# Patient Record
Sex: FEMALE | Race: WHITE | NOT HISPANIC OR LATINO
[De-identification: names, ages, dates, MRNs, and addresses within clinical notes are randomized per-mention and may not be internally consistent; named-entity substitution may affect disease eponyms.]

---

## 2017-03-07 ENCOUNTER — NON-APPOINTMENT (OUTPATIENT)
Age: 62
End: 2017-03-07

## 2017-03-07 ENCOUNTER — APPOINTMENT (OUTPATIENT)
Dept: CARDIOLOGY | Facility: CLINIC | Age: 62
End: 2017-03-07

## 2017-03-07 VITALS
HEIGHT: 64 IN | HEART RATE: 80 BPM | DIASTOLIC BLOOD PRESSURE: 89 MMHG | BODY MASS INDEX: 19.12 KG/M2 | SYSTOLIC BLOOD PRESSURE: 142 MMHG | OXYGEN SATURATION: 100 % | WEIGHT: 112 LBS

## 2017-03-07 DIAGNOSIS — Z01.810 ENCOUNTER FOR PREPROCEDURAL CARDIOVASCULAR EXAMINATION: ICD-10-CM

## 2017-03-07 DIAGNOSIS — N18.9 CHRONIC KIDNEY DISEASE, UNSPECIFIED: ICD-10-CM

## 2017-03-10 ENCOUNTER — OUTPATIENT (OUTPATIENT)
Dept: OUTPATIENT SERVICES | Facility: HOSPITAL | Age: 62
LOS: 1 days | End: 2017-03-10
Payer: COMMERCIAL

## 2017-03-10 VITALS
WEIGHT: 115.08 LBS | HEIGHT: 63 IN | SYSTOLIC BLOOD PRESSURE: 121 MMHG | RESPIRATION RATE: 17 BRPM | HEART RATE: 100 BPM | DIASTOLIC BLOOD PRESSURE: 81 MMHG | OXYGEN SATURATION: 98 % | TEMPERATURE: 98 F

## 2017-03-10 DIAGNOSIS — Z90.49 ACQUIRED ABSENCE OF OTHER SPECIFIED PARTS OF DIGESTIVE TRACT: Chronic | ICD-10-CM

## 2017-03-10 DIAGNOSIS — M19.011 PRIMARY OSTEOARTHRITIS, RIGHT SHOULDER: Chronic | ICD-10-CM

## 2017-03-10 DIAGNOSIS — I70.90 UNSPECIFIED ATHEROSCLEROSIS: ICD-10-CM

## 2017-03-10 DIAGNOSIS — Z98.51 TUBAL LIGATION STATUS: Chronic | ICD-10-CM

## 2017-03-10 DIAGNOSIS — I70.211 ATHEROSCLEROSIS OF NATIVE ARTERIES OF EXTREMITIES WITH INTERMITTENT CLAUDICATION, RIGHT LEG: ICD-10-CM

## 2017-03-10 DIAGNOSIS — M19.90 UNSPECIFIED OSTEOARTHRITIS, UNSPECIFIED SITE: ICD-10-CM

## 2017-03-10 DIAGNOSIS — I10 ESSENTIAL (PRIMARY) HYPERTENSION: ICD-10-CM

## 2017-03-10 DIAGNOSIS — G25.81 RESTLESS LEGS SYNDROME: ICD-10-CM

## 2017-03-10 DIAGNOSIS — M06.9 RHEUMATOID ARTHRITIS, UNSPECIFIED: Chronic | ICD-10-CM

## 2017-03-10 DIAGNOSIS — F17.200 NICOTINE DEPENDENCE, UNSPECIFIED, UNCOMPLICATED: ICD-10-CM

## 2017-03-10 DIAGNOSIS — K27.1 ACUTE PEPTIC ULCER, SITE UNSPECIFIED, WITH PERFORATION: Chronic | ICD-10-CM

## 2017-03-10 DIAGNOSIS — Z01.818 ENCOUNTER FOR OTHER PREPROCEDURAL EXAMINATION: ICD-10-CM

## 2017-03-10 LAB
ALBUMIN SERPL ELPH-MCNC: 4.4 G/DL — SIGNIFICANT CHANGE UP (ref 3.3–5)
ALP SERPL-CCNC: 74 U/L — SIGNIFICANT CHANGE UP (ref 40–120)
ALT FLD-CCNC: 15 U/L — SIGNIFICANT CHANGE UP (ref 10–45)
ANION GAP SERPL CALC-SCNC: 10 MMOL/L — SIGNIFICANT CHANGE UP (ref 5–17)
AST SERPL-CCNC: 30 U/L — SIGNIFICANT CHANGE UP (ref 10–40)
BILIRUB SERPL-MCNC: 0.2 MG/DL — SIGNIFICANT CHANGE UP (ref 0.2–1.2)
BLD GP AB SCN SERPL QL: NEGATIVE — SIGNIFICANT CHANGE UP
BUN SERPL-MCNC: 31 MG/DL — HIGH (ref 7–23)
CALCIUM SERPL-MCNC: 9.7 MG/DL — SIGNIFICANT CHANGE UP (ref 8.4–10.5)
CHLORIDE SERPL-SCNC: 105 MMOL/L — SIGNIFICANT CHANGE UP (ref 96–108)
CO2 SERPL-SCNC: 22 MMOL/L — SIGNIFICANT CHANGE UP (ref 22–31)
CREAT SERPL-MCNC: 2.03 MG/DL — HIGH (ref 0.5–1.3)
GLUCOSE SERPL-MCNC: 83 MG/DL — SIGNIFICANT CHANGE UP (ref 70–99)
HCT VFR BLD CALC: 35.2 % — SIGNIFICANT CHANGE UP (ref 34.5–45)
HGB BLD-MCNC: 11.4 G/DL — LOW (ref 11.5–15.5)
MCHC RBC-ENTMCNC: 31.6 PG — SIGNIFICANT CHANGE UP (ref 27–34)
MCHC RBC-ENTMCNC: 32.4 GM/DL — SIGNIFICANT CHANGE UP (ref 32–36)
MCV RBC AUTO: 97.5 FL — SIGNIFICANT CHANGE UP (ref 80–100)
PLATELET # BLD AUTO: 259 K/UL — SIGNIFICANT CHANGE UP (ref 150–400)
POTASSIUM SERPL-MCNC: 4.5 MMOL/L — SIGNIFICANT CHANGE UP (ref 3.5–5.3)
POTASSIUM SERPL-SCNC: 4.5 MMOL/L — SIGNIFICANT CHANGE UP (ref 3.5–5.3)
PROT SERPL-MCNC: 7.1 G/DL — SIGNIFICANT CHANGE UP (ref 6–8.3)
RBC # BLD: 3.61 M/UL — LOW (ref 3.8–5.2)
RBC # FLD: 13.4 % — SIGNIFICANT CHANGE UP (ref 10.3–14.5)
RH IG SCN BLD-IMP: POSITIVE — SIGNIFICANT CHANGE UP
SODIUM SERPL-SCNC: 137 MMOL/L — SIGNIFICANT CHANGE UP (ref 135–145)
WBC # BLD: 7.87 K/UL — SIGNIFICANT CHANGE UP (ref 3.8–10.5)
WBC # FLD AUTO: 7.87 K/UL — SIGNIFICANT CHANGE UP (ref 3.8–10.5)

## 2017-03-10 PROCEDURE — 86900 BLOOD TYPING SEROLOGIC ABO: CPT

## 2017-03-10 PROCEDURE — 86901 BLOOD TYPING SEROLOGIC RH(D): CPT

## 2017-03-10 PROCEDURE — 80053 COMPREHEN METABOLIC PANEL: CPT

## 2017-03-10 PROCEDURE — 85027 COMPLETE CBC AUTOMATED: CPT

## 2017-03-10 PROCEDURE — G0463: CPT

## 2017-03-10 PROCEDURE — 86850 RBC ANTIBODY SCREEN: CPT

## 2017-03-10 NOTE — H&P PST ADULT - PMH
Arthritis  Cervical Spine  and Hands  Hypertension    Restless leg syndrome  Especially with General Anesthesia

## 2017-03-10 NOTE — H&P PST ADULT - ATTENDING COMMENTS
We plan right femoral endarterectomy for disabling claudication.  All risks and alternatives were reiterated to the Juan Luiss who agree with this plan.

## 2017-03-10 NOTE — H&P PST ADULT - NSANTHOSAYNRD_GEN_A_CORE
No. SEDRICK screening performed.  STOP BANG Legend: 0-2 = LOW Risk; 3-4 = INTERMEDIATE Risk; 5-8 = HIGH Risk

## 2017-03-10 NOTE — H&P PST ADULT - ASSESSMENT
Atherosclerosis of native arteries of extremities with intermittent claudication Right Leg.  Scheduled for a Right Femoral Endarterectomy Atherosclerosis of native arteries of extremities with intermittent claudication Right Leg.  Scheduled for a Right Femoral Endarterectomy.  Pt presently taking PLAVIX.  Dr Bolton instructed the patient to stop the PLAVIX  a week prior to the surgery. She was instructed to continue the Nicotine Patch and remove the day of surgery.

## 2017-03-10 NOTE — H&P PST ADULT - PSH
Acute peptic ulcer with perforation  1980  Osteoarthritis of right shoulder region  Right Shoulder Arthroscopy  2007  Rheumatoid arthritis of carpometacarpal joint of thumb  Total Joint Replacement of Left Thumb  S/P appendectomy  2014  S/P tubal ligation  1986

## 2017-03-18 ENCOUNTER — APPOINTMENT (OUTPATIENT)
Dept: CARDIOLOGY | Facility: CLINIC | Age: 62
End: 2017-03-18

## 2017-03-20 ENCOUNTER — APPOINTMENT (OUTPATIENT)
Dept: CARDIOLOGY | Facility: CLINIC | Age: 62
End: 2017-03-20

## 2017-03-23 ENCOUNTER — RESULT REVIEW (OUTPATIENT)
Age: 62
End: 2017-03-23

## 2017-03-24 ENCOUNTER — INPATIENT (INPATIENT)
Facility: HOSPITAL | Age: 62
LOS: 0 days | Discharge: HOME | DRG: 254 | End: 2017-03-25
Attending: SURGERY | Admitting: SURGERY
Payer: COMMERCIAL

## 2017-03-24 ENCOUNTER — TRANSCRIPTION ENCOUNTER (OUTPATIENT)
Age: 62
End: 2017-03-24

## 2017-03-24 VITALS
OXYGEN SATURATION: 98 % | TEMPERATURE: 98 F | WEIGHT: 115.08 LBS | SYSTOLIC BLOOD PRESSURE: 152 MMHG | HEART RATE: 88 BPM | HEIGHT: 63 IN | RESPIRATION RATE: 20 BRPM | DIASTOLIC BLOOD PRESSURE: 82 MMHG

## 2017-03-24 DIAGNOSIS — I70.211 ATHEROSCLEROSIS OF NATIVE ARTERIES OF EXTREMITIES WITH INTERMITTENT CLAUDICATION, RIGHT LEG: ICD-10-CM

## 2017-03-24 DIAGNOSIS — M06.9 RHEUMATOID ARTHRITIS, UNSPECIFIED: Chronic | ICD-10-CM

## 2017-03-24 DIAGNOSIS — M19.011 PRIMARY OSTEOARTHRITIS, RIGHT SHOULDER: Chronic | ICD-10-CM

## 2017-03-24 DIAGNOSIS — K27.1 ACUTE PEPTIC ULCER, SITE UNSPECIFIED, WITH PERFORATION: Chronic | ICD-10-CM

## 2017-03-24 DIAGNOSIS — Z90.49 ACQUIRED ABSENCE OF OTHER SPECIFIED PARTS OF DIGESTIVE TRACT: Chronic | ICD-10-CM

## 2017-03-24 DIAGNOSIS — Z98.51 TUBAL LIGATION STATUS: Chronic | ICD-10-CM

## 2017-03-24 LAB — RH IG SCN BLD-IMP: POSITIVE — SIGNIFICANT CHANGE UP

## 2017-03-24 PROCEDURE — 99223 1ST HOSP IP/OBS HIGH 75: CPT

## 2017-03-24 PROCEDURE — 88311 DECALCIFY TISSUE: CPT | Mod: 26

## 2017-03-24 PROCEDURE — 88304 TISSUE EXAM BY PATHOLOGIST: CPT | Mod: 26

## 2017-03-24 RX ORDER — CEFAZOLIN SODIUM 1 G
2000 VIAL (EA) INJECTION EVERY 12 HOURS
Qty: 0 | Refills: 0 | Status: DISCONTINUED | OUTPATIENT
Start: 2017-03-24 | End: 2017-03-24

## 2017-03-24 RX ORDER — MORPHINE SULFATE 50 MG/1
2 CAPSULE, EXTENDED RELEASE ORAL EVERY 4 HOURS
Qty: 0 | Refills: 0 | Status: DISCONTINUED | OUTPATIENT
Start: 2017-03-24 | End: 2017-03-25

## 2017-03-24 RX ORDER — SODIUM CHLORIDE 9 MG/ML
3 INJECTION INTRAMUSCULAR; INTRAVENOUS; SUBCUTANEOUS EVERY 8 HOURS
Qty: 0 | Refills: 0 | Status: DISCONTINUED | OUTPATIENT
Start: 2017-03-24 | End: 2017-03-24

## 2017-03-24 RX ORDER — CLOPIDOGREL BISULFATE 75 MG/1
75 TABLET, FILM COATED ORAL DAILY
Qty: 0 | Refills: 0 | Status: DISCONTINUED | OUTPATIENT
Start: 2017-03-25 | End: 2017-03-25

## 2017-03-24 RX ORDER — SIMVASTATIN 20 MG/1
10 TABLET, FILM COATED ORAL AT BEDTIME
Qty: 0 | Refills: 0 | Status: DISCONTINUED | OUTPATIENT
Start: 2017-03-24 | End: 2017-03-25

## 2017-03-24 RX ORDER — CEFAZOLIN SODIUM 1 G
2000 VIAL (EA) INJECTION EVERY 8 HOURS
Qty: 0 | Refills: 0 | Status: DISCONTINUED | OUTPATIENT
Start: 2017-03-24 | End: 2017-03-24

## 2017-03-24 RX ORDER — PRAMIPEXOLE DIHYDROCHLORIDE 0.12 MG/1
0.25 TABLET ORAL AT BEDTIME
Qty: 0 | Refills: 0 | Status: DISCONTINUED | OUTPATIENT
Start: 2017-03-24 | End: 2017-03-25

## 2017-03-24 RX ORDER — HYDROMORPHONE HYDROCHLORIDE 2 MG/ML
0.5 INJECTION INTRAMUSCULAR; INTRAVENOUS; SUBCUTANEOUS
Qty: 0 | Refills: 0 | Status: DISCONTINUED | OUTPATIENT
Start: 2017-03-24 | End: 2017-03-24

## 2017-03-24 RX ORDER — LOSARTAN POTASSIUM 100 MG/1
25 TABLET, FILM COATED ORAL DAILY
Qty: 0 | Refills: 0 | Status: DISCONTINUED | OUTPATIENT
Start: 2017-03-24 | End: 2017-03-24

## 2017-03-24 RX ORDER — LOSARTAN POTASSIUM 100 MG/1
25 TABLET, FILM COATED ORAL DAILY
Qty: 0 | Refills: 0 | Status: DISCONTINUED | OUTPATIENT
Start: 2017-03-24 | End: 2017-03-25

## 2017-03-24 RX ORDER — SODIUM CHLORIDE 9 MG/ML
1000 INJECTION INTRAMUSCULAR; INTRAVENOUS; SUBCUTANEOUS
Qty: 0 | Refills: 0 | Status: DISCONTINUED | OUTPATIENT
Start: 2017-03-24 | End: 2017-03-25

## 2017-03-24 RX ORDER — HEPARIN SODIUM 5000 [USP'U]/ML
5000 INJECTION INTRAVENOUS; SUBCUTANEOUS EVERY 8 HOURS
Qty: 0 | Refills: 0 | Status: DISCONTINUED | OUTPATIENT
Start: 2017-03-24 | End: 2017-03-25

## 2017-03-24 RX ORDER — SODIUM CHLORIDE 9 MG/ML
3 INJECTION INTRAMUSCULAR; INTRAVENOUS; SUBCUTANEOUS EVERY 8 HOURS
Qty: 0 | Refills: 0 | Status: DISCONTINUED | OUTPATIENT
Start: 2017-03-24 | End: 2017-03-25

## 2017-03-24 RX ADMIN — MORPHINE SULFATE 2 MILLIGRAM(S): 50 CAPSULE, EXTENDED RELEASE ORAL at 18:46

## 2017-03-24 RX ADMIN — SODIUM CHLORIDE 3 MILLILITER(S): 9 INJECTION INTRAMUSCULAR; INTRAVENOUS; SUBCUTANEOUS at 21:39

## 2017-03-24 RX ADMIN — HYDROMORPHONE HYDROCHLORIDE 0.5 MILLIGRAM(S): 2 INJECTION INTRAMUSCULAR; INTRAVENOUS; SUBCUTANEOUS at 10:45

## 2017-03-24 RX ADMIN — HEPARIN SODIUM 5000 UNIT(S): 5000 INJECTION INTRAVENOUS; SUBCUTANEOUS at 21:33

## 2017-03-24 RX ADMIN — SODIUM CHLORIDE 75 MILLILITER(S): 9 INJECTION INTRAMUSCULAR; INTRAVENOUS; SUBCUTANEOUS at 13:38

## 2017-03-24 RX ADMIN — SODIUM CHLORIDE 75 MILLILITER(S): 9 INJECTION INTRAMUSCULAR; INTRAVENOUS; SUBCUTANEOUS at 21:34

## 2017-03-24 RX ADMIN — MORPHINE SULFATE 2 MILLIGRAM(S): 50 CAPSULE, EXTENDED RELEASE ORAL at 19:02

## 2017-03-24 RX ADMIN — HYDROMORPHONE HYDROCHLORIDE 0.5 MILLIGRAM(S): 2 INJECTION INTRAMUSCULAR; INTRAVENOUS; SUBCUTANEOUS at 14:40

## 2017-03-24 RX ADMIN — PRAMIPEXOLE DIHYDROCHLORIDE 0.25 MILLIGRAM(S): 0.12 TABLET ORAL at 21:33

## 2017-03-24 RX ADMIN — HYDROMORPHONE HYDROCHLORIDE 0.5 MILLIGRAM(S): 2 INJECTION INTRAMUSCULAR; INTRAVENOUS; SUBCUTANEOUS at 11:02

## 2017-03-24 RX ADMIN — HYDROMORPHONE HYDROCHLORIDE 0.5 MILLIGRAM(S): 2 INJECTION INTRAMUSCULAR; INTRAVENOUS; SUBCUTANEOUS at 14:55

## 2017-03-24 RX ADMIN — SODIUM CHLORIDE 3 MILLILITER(S): 9 INJECTION INTRAMUSCULAR; INTRAVENOUS; SUBCUTANEOUS at 06:43

## 2017-03-24 RX ADMIN — HYDROMORPHONE HYDROCHLORIDE 0.5 MILLIGRAM(S): 2 INJECTION INTRAMUSCULAR; INTRAVENOUS; SUBCUTANEOUS at 10:27

## 2017-03-24 RX ADMIN — HYDROMORPHONE HYDROCHLORIDE 0.5 MILLIGRAM(S): 2 INJECTION INTRAMUSCULAR; INTRAVENOUS; SUBCUTANEOUS at 10:46

## 2017-03-24 RX ADMIN — SODIUM CHLORIDE 3 MILLILITER(S): 9 INJECTION INTRAMUSCULAR; INTRAVENOUS; SUBCUTANEOUS at 13:35

## 2017-03-24 RX ADMIN — HEPARIN SODIUM 5000 UNIT(S): 5000 INJECTION INTRAVENOUS; SUBCUTANEOUS at 13:46

## 2017-03-24 RX ADMIN — SIMVASTATIN 10 MILLIGRAM(S): 20 TABLET, FILM COATED ORAL at 21:33

## 2017-03-25 ENCOUNTER — CHART COPY (OUTPATIENT)
Age: 62
End: 2017-03-25

## 2017-03-25 ENCOUNTER — TRANSCRIPTION ENCOUNTER (OUTPATIENT)
Age: 62
End: 2017-03-25

## 2017-03-25 VITALS
OXYGEN SATURATION: 98 % | DIASTOLIC BLOOD PRESSURE: 69 MMHG | TEMPERATURE: 99 F | SYSTOLIC BLOOD PRESSURE: 111 MMHG | HEART RATE: 72 BPM | RESPIRATION RATE: 18 BRPM

## 2017-03-25 LAB
ANION GAP SERPL CALC-SCNC: 10 MMOL/L — SIGNIFICANT CHANGE UP (ref 5–17)
BASOPHILS # BLD AUTO: 0 K/UL — SIGNIFICANT CHANGE UP (ref 0–0.2)
BASOPHILS NFR BLD AUTO: 0.4 % — SIGNIFICANT CHANGE UP (ref 0–2)
BUN SERPL-MCNC: 24 MG/DL — HIGH (ref 7–23)
CALCIUM SERPL-MCNC: 8 MG/DL — LOW (ref 8.4–10.5)
CHLORIDE SERPL-SCNC: 108 MMOL/L — SIGNIFICANT CHANGE UP (ref 96–108)
CO2 SERPL-SCNC: 21 MMOL/L — LOW (ref 22–31)
CREAT SERPL-MCNC: 1.64 MG/DL — HIGH (ref 0.5–1.3)
EOSINOPHIL # BLD AUTO: 0.1 K/UL — SIGNIFICANT CHANGE UP (ref 0–0.5)
EOSINOPHIL NFR BLD AUTO: 2 % — SIGNIFICANT CHANGE UP (ref 0–6)
GLUCOSE SERPL-MCNC: 87 MG/DL — SIGNIFICANT CHANGE UP (ref 70–99)
HCT VFR BLD CALC: 26.5 % — LOW (ref 34.5–45)
HCT VFR BLD CALC: 27.7 % — LOW (ref 34.5–45)
HGB BLD-MCNC: 8.9 G/DL — LOW (ref 11.5–15.5)
HGB BLD-MCNC: 9.1 G/DL — LOW (ref 11.5–15.5)
LYMPHOCYTES # BLD AUTO: 1.7 K/UL — SIGNIFICANT CHANGE UP (ref 1–3.3)
LYMPHOCYTES # BLD AUTO: 25.9 % — SIGNIFICANT CHANGE UP (ref 13–44)
MAGNESIUM SERPL-MCNC: 2 MG/DL — SIGNIFICANT CHANGE UP (ref 1.6–2.6)
MCHC RBC-ENTMCNC: 32.8 GM/DL — SIGNIFICANT CHANGE UP (ref 32–36)
MCHC RBC-ENTMCNC: 32.8 PG — SIGNIFICANT CHANGE UP (ref 27–34)
MCHC RBC-ENTMCNC: 33.5 PG — SIGNIFICANT CHANGE UP (ref 27–34)
MCHC RBC-ENTMCNC: 33.6 GM/DL — SIGNIFICANT CHANGE UP (ref 32–36)
MCV RBC AUTO: 100 FL — SIGNIFICANT CHANGE UP (ref 80–100)
MCV RBC AUTO: 99.8 FL — SIGNIFICANT CHANGE UP (ref 80–100)
MONOCYTES # BLD AUTO: 0.6 K/UL — SIGNIFICANT CHANGE UP (ref 0–0.9)
MONOCYTES NFR BLD AUTO: 8.8 % — SIGNIFICANT CHANGE UP (ref 2–14)
NEUTROPHILS # BLD AUTO: 4.2 K/UL — SIGNIFICANT CHANGE UP (ref 1.8–7.4)
NEUTROPHILS NFR BLD AUTO: 62.9 % — SIGNIFICANT CHANGE UP (ref 43–77)
PHOSPHATE SERPL-MCNC: 3.1 MG/DL — SIGNIFICANT CHANGE UP (ref 2.5–4.5)
PLATELET # BLD AUTO: 161 K/UL — SIGNIFICANT CHANGE UP (ref 150–400)
PLATELET # BLD AUTO: 171 K/UL — SIGNIFICANT CHANGE UP (ref 150–400)
POTASSIUM SERPL-MCNC: 4.8 MMOL/L — SIGNIFICANT CHANGE UP (ref 3.5–5.3)
POTASSIUM SERPL-SCNC: 4.8 MMOL/L — SIGNIFICANT CHANGE UP (ref 3.5–5.3)
RBC # BLD: 2.66 M/UL — LOW (ref 3.8–5.2)
RBC # BLD: 2.77 M/UL — LOW (ref 3.8–5.2)
RBC # FLD: 11.9 % — SIGNIFICANT CHANGE UP (ref 10.3–14.5)
RBC # FLD: 11.9 % — SIGNIFICANT CHANGE UP (ref 10.3–14.5)
SODIUM SERPL-SCNC: 139 MMOL/L — SIGNIFICANT CHANGE UP (ref 135–145)
WBC # BLD: 6.6 K/UL — SIGNIFICANT CHANGE UP (ref 3.8–10.5)
WBC # BLD: 6.7 K/UL — SIGNIFICANT CHANGE UP (ref 3.8–10.5)
WBC # FLD AUTO: 6.6 K/UL — SIGNIFICANT CHANGE UP (ref 3.8–10.5)
WBC # FLD AUTO: 6.7 K/UL — SIGNIFICANT CHANGE UP (ref 3.8–10.5)

## 2017-03-25 PROCEDURE — C1768: CPT

## 2017-03-25 PROCEDURE — 86901 BLOOD TYPING SEROLOGIC RH(D): CPT

## 2017-03-25 PROCEDURE — 83735 ASSAY OF MAGNESIUM: CPT

## 2017-03-25 PROCEDURE — 88311 DECALCIFY TISSUE: CPT

## 2017-03-25 PROCEDURE — 85027 COMPLETE CBC AUTOMATED: CPT

## 2017-03-25 PROCEDURE — 88304 TISSUE EXAM BY PATHOLOGIST: CPT

## 2017-03-25 PROCEDURE — 86900 BLOOD TYPING SEROLOGIC ABO: CPT

## 2017-03-25 PROCEDURE — 99232 SBSQ HOSP IP/OBS MODERATE 35: CPT

## 2017-03-25 PROCEDURE — 80048 BASIC METABOLIC PNL TOTAL CA: CPT

## 2017-03-25 PROCEDURE — C1769: CPT

## 2017-03-25 PROCEDURE — 84100 ASSAY OF PHOSPHORUS: CPT

## 2017-03-25 RX ORDER — SIMVASTATIN 20 MG/1
1 TABLET, FILM COATED ORAL
Qty: 0 | Refills: 0 | COMMUNITY
Start: 2017-03-25

## 2017-03-25 RX ORDER — SIMVASTATIN 20 MG/1
1 TABLET, FILM COATED ORAL
Qty: 0 | Refills: 0 | COMMUNITY

## 2017-03-25 RX ORDER — PRAMIPEXOLE DIHYDROCHLORIDE 0.12 MG/1
2 TABLET ORAL
Qty: 0 | Refills: 0 | COMMUNITY

## 2017-03-25 RX ORDER — CLOPIDOGREL BISULFATE 75 MG/1
1 TABLET, FILM COATED ORAL
Qty: 0 | Refills: 0 | COMMUNITY
Start: 2017-03-25

## 2017-03-25 RX ORDER — OXYCODONE HYDROCHLORIDE 5 MG/1
1 TABLET ORAL
Qty: 30 | Refills: 0 | OUTPATIENT
Start: 2017-03-25

## 2017-03-25 RX ORDER — SODIUM CHLORIDE 9 MG/ML
1000 INJECTION INTRAMUSCULAR; INTRAVENOUS; SUBCUTANEOUS
Qty: 0 | Refills: 0 | Status: DISCONTINUED | OUTPATIENT
Start: 2017-03-25 | End: 2017-03-25

## 2017-03-25 RX ORDER — CLOPIDOGREL BISULFATE 75 MG/1
1 TABLET, FILM COATED ORAL
Qty: 0 | Refills: 0 | COMMUNITY

## 2017-03-25 RX ORDER — PRAMIPEXOLE DIHYDROCHLORIDE 0.12 MG/1
1 TABLET ORAL
Qty: 0 | Refills: 0 | COMMUNITY
Start: 2017-03-25

## 2017-03-25 RX ADMIN — SODIUM CHLORIDE 75 MILLILITER(S): 9 INJECTION INTRAMUSCULAR; INTRAVENOUS; SUBCUTANEOUS at 13:01

## 2017-03-25 RX ADMIN — CLOPIDOGREL BISULFATE 75 MILLIGRAM(S): 75 TABLET, FILM COATED ORAL at 13:07

## 2017-03-25 RX ADMIN — SODIUM CHLORIDE 3 MILLILITER(S): 9 INJECTION INTRAMUSCULAR; INTRAVENOUS; SUBCUTANEOUS at 13:01

## 2017-03-25 RX ADMIN — SODIUM CHLORIDE 3 MILLILITER(S): 9 INJECTION INTRAMUSCULAR; INTRAVENOUS; SUBCUTANEOUS at 06:09

## 2017-03-25 RX ADMIN — HEPARIN SODIUM 5000 UNIT(S): 5000 INJECTION INTRAVENOUS; SUBCUTANEOUS at 06:15

## 2017-03-25 NOTE — DISCHARGE NOTE ADULT - PATIENT PORTAL LINK FT
“You can access the [employer] Patient Portal, offered by [facility], by registering with the following website: [email or web address]”

## 2017-03-25 NOTE — DISCHARGE NOTE ADULT - NS AS ACTIVITY OBS
No Heavy lifting/straining/Do not make important decisions/Do not drive or operate machinery/not while taking narcotic pain medication

## 2017-03-25 NOTE — DISCHARGE NOTE ADULT - CARE PROVIDER_API CALL
Rip Bolton), Vascular Surgery  [address] [facility] [address] [place] [address]  Phone: [phone]  Fax: [phone]

## 2017-03-25 NOTE — DISCHARGE NOTE ADULT - MEDICATION SUMMARY - MEDICATIONS TO TAKE
I will START or STAY ON the medications listed below when I get home from the hospital:    acetaminophen-oxyCODONE 325 mg-5 mg oral tablet  -- 1-2 tab(s) by mouth every 4 hours, As needed, Moderate Pain MDD:6  -- Indication: For pain    Benicar 40 mg oral tablet  -- 1 tab(s) by mouth once a day  -- Indication: For blood pressure    diltiaZEM 180 mg/24 hours oral capsule, extended release  -- 1 cap(s) by mouth once a day  -- Indication: For blood pressure    simvastatin 10 mg oral tablet  -- 1 tab(s) by mouth once a day (at bedtime)  -- Indication: For cholesterol    pramipexole 0.25 mg oral tablet  -- 1 tab(s) by mouth once a day (at bedtime)  -- Indication: For Antiparkinson    clopidogrel 75 mg oral tablet  -- 1 tab(s) by mouth once a day  -- Indication: For Antiplatelet

## 2017-03-25 NOTE — DISCHARGE NOTE ADULT - HOSPITAL COURSE
61-year-old female with disabling right leg claudication, brought to the OR now for femoral endarterectomy on 3/24/17. The patient tolerated the procedure well (see operative report for full details). She was transferred to PACU in stable condition.  Diet was advanced as tolerated. She remained intact from a neurovascular standpoint.  On day of discharge, the patient was tolerating diet, ambulating well and pain controlled. She will follow up with Dr. Bolton in 1 week.

## 2017-03-25 NOTE — DISCHARGE NOTE ADULT - PLAN OF CARE
improve ambulation and return to daily living activity prior to surgery, postoperative recovery WOUND CARE: Leave steri strips in place until they come off on their own.  Please pat area dry.   BATHING: Please do not submerge wound underwater. You may shower and/or sponge bathe.  ACTIVITY: No heavy lifting or straining. Otherwise, you may return to your usual level of physical activity. If you are taking narcotic pain medication (such as Percocet), do NOT drive a car, operate machinery or make important decisions.  DIET: Return to your usual diet.  NOTIFY YOUR SURGEON IF: You have any bleeding that does not stop, any pus draining from your wound, any fever (over 100.4 F) or chills, numbness or tingling, weakness in the leg, excessive swelling around incision or if your pain is not controlled on your discharge pain medications.  FOLLOW-UP:  1. Please call to make a follow-up appointment within one week of discharge with Dr. Bolton (See below for office information to call for an appointment)   2. Please follow up with your primary care physician in one week regarding your hospitalization.

## 2017-03-25 NOTE — DISCHARGE NOTE ADULT - CARE PLAN
Principal Discharge DX:	Right leg claudication  Goal:	improve ambulation and return to daily living activity prior to surgery, postoperative recovery  Instructions for follow-up, activity and diet:	WOUND CARE: Leave steri strips in place until they come off on their own.  Please pat area dry.   BATHING: Please do not submerge wound underwater. You may shower and/or sponge bathe.  ACTIVITY: No heavy lifting or straining. Otherwise, you may return to your usual level of physical activity. If you are taking narcotic pain medication (such as Percocet), do NOT drive a car, operate machinery or make important decisions.  DIET: Return to your usual diet.  NOTIFY YOUR SURGEON IF: You have any bleeding that does not stop, any pus draining from your wound, any fever (over 100.4 F) or chills, numbness or tingling, weakness in the leg, excessive swelling around incision or if your pain is not controlled on your discharge pain medications.  FOLLOW-UP:  1. Please call to make a follow-up appointment within one week of discharge with Dr. Bolton (See below for office information to call for an appointment)   2. Please follow up with your primary care physician in one week regarding your hospitalization.

## 2017-03-28 LAB — SURGICAL PATHOLOGY STUDY: SIGNIFICANT CHANGE UP

## 2017-08-16 ENCOUNTER — APPOINTMENT (OUTPATIENT)
Dept: CARDIOLOGY | Facility: CLINIC | Age: 62
End: 2017-08-16
Payer: COMMERCIAL

## 2017-08-16 ENCOUNTER — NON-APPOINTMENT (OUTPATIENT)
Age: 62
End: 2017-08-16

## 2017-08-16 VITALS
BODY MASS INDEX: 20.14 KG/M2 | HEART RATE: 82 BPM | SYSTOLIC BLOOD PRESSURE: 155 MMHG | DIASTOLIC BLOOD PRESSURE: 76 MMHG | HEIGHT: 64 IN | OXYGEN SATURATION: 98 % | WEIGHT: 118 LBS

## 2017-08-16 PROCEDURE — 99215 OFFICE O/P EST HI 40 MIN: CPT

## 2017-08-16 PROCEDURE — 93000 ELECTROCARDIOGRAM COMPLETE: CPT

## 2017-08-19 ENCOUNTER — APPOINTMENT (OUTPATIENT)
Dept: CARDIOLOGY | Facility: CLINIC | Age: 62
End: 2017-08-19
Payer: COMMERCIAL

## 2017-08-19 PROCEDURE — 93880 EXTRACRANIAL BILAT STUDY: CPT

## 2017-08-25 ENCOUNTER — OTHER (OUTPATIENT)
Age: 62
End: 2017-08-25

## 2019-04-13 ENCOUNTER — INPATIENT (INPATIENT)
Facility: HOSPITAL | Age: 64
LOS: 0 days | Discharge: HOME | DRG: 313 | End: 2019-04-14
Attending: INTERNAL MEDICINE | Admitting: INTERNAL MEDICINE
Payer: COMMERCIAL

## 2019-04-13 VITALS
TEMPERATURE: 98 F | HEART RATE: 115 BPM | WEIGHT: 115.96 LBS | OXYGEN SATURATION: 100 % | RESPIRATION RATE: 18 BRPM | DIASTOLIC BLOOD PRESSURE: 82 MMHG | SYSTOLIC BLOOD PRESSURE: 172 MMHG | HEIGHT: 64 IN

## 2019-04-13 DIAGNOSIS — N18.2 CHRONIC KIDNEY DISEASE, STAGE 2 (MILD): ICD-10-CM

## 2019-04-13 DIAGNOSIS — Z90.49 ACQUIRED ABSENCE OF OTHER SPECIFIED PARTS OF DIGESTIVE TRACT: Chronic | ICD-10-CM

## 2019-04-13 DIAGNOSIS — R09.89 OTHER SPECIFIED SYMPTOMS AND SIGNS INVOLVING THE CIRCULATORY AND RESPIRATORY SYSTEMS: ICD-10-CM

## 2019-04-13 DIAGNOSIS — I73.9 PERIPHERAL VASCULAR DISEASE, UNSPECIFIED: ICD-10-CM

## 2019-04-13 DIAGNOSIS — K27.1 ACUTE PEPTIC ULCER, SITE UNSPECIFIED, WITH PERFORATION: Chronic | ICD-10-CM

## 2019-04-13 DIAGNOSIS — Z98.51 TUBAL LIGATION STATUS: Chronic | ICD-10-CM

## 2019-04-13 DIAGNOSIS — R00.0 TACHYCARDIA, UNSPECIFIED: ICD-10-CM

## 2019-04-13 DIAGNOSIS — Z98.890 OTHER SPECIFIED POSTPROCEDURAL STATES: Chronic | ICD-10-CM

## 2019-04-13 DIAGNOSIS — D64.9 ANEMIA, UNSPECIFIED: ICD-10-CM

## 2019-04-13 DIAGNOSIS — R07.9 CHEST PAIN, UNSPECIFIED: ICD-10-CM

## 2019-04-13 DIAGNOSIS — M06.9 RHEUMATOID ARTHRITIS, UNSPECIFIED: Chronic | ICD-10-CM

## 2019-04-13 DIAGNOSIS — M19.011 PRIMARY OSTEOARTHRITIS, RIGHT SHOULDER: Chronic | ICD-10-CM

## 2019-04-13 PROBLEM — I10 ESSENTIAL (PRIMARY) HYPERTENSION: Chronic | Status: ACTIVE | Noted: 2017-03-10

## 2019-04-13 PROBLEM — M19.90 UNSPECIFIED OSTEOARTHRITIS, UNSPECIFIED SITE: Chronic | Status: ACTIVE | Noted: 2017-03-10

## 2019-04-13 PROBLEM — G25.81 RESTLESS LEGS SYNDROME: Chronic | Status: ACTIVE | Noted: 2017-03-10

## 2019-04-13 LAB
ALBUMIN SERPL ELPH-MCNC: 2.7 G/DL — LOW (ref 3.3–5)
ALP SERPL-CCNC: 136 U/L — HIGH (ref 40–120)
ALT FLD-CCNC: 16 U/L — SIGNIFICANT CHANGE UP (ref 12–78)
ANION GAP SERPL CALC-SCNC: 8 MMOL/L — SIGNIFICANT CHANGE UP (ref 5–17)
APTT BLD: 33.5 SEC — SIGNIFICANT CHANGE UP (ref 28.5–37)
APTT BLD: 43.3 SEC — HIGH (ref 27.5–36.3)
AST SERPL-CCNC: 16 U/L — SIGNIFICANT CHANGE UP (ref 15–37)
BASOPHILS # BLD AUTO: 0.05 K/UL — SIGNIFICANT CHANGE UP (ref 0–0.2)
BASOPHILS NFR BLD AUTO: 0.4 % — SIGNIFICANT CHANGE UP (ref 0–2)
BILIRUB SERPL-MCNC: 0.4 MG/DL — SIGNIFICANT CHANGE UP (ref 0.2–1.2)
BUN SERPL-MCNC: 28 MG/DL — HIGH (ref 7–23)
CALCIUM SERPL-MCNC: 8.2 MG/DL — LOW (ref 8.5–10.1)
CHLORIDE SERPL-SCNC: 110 MMOL/L — HIGH (ref 96–108)
CK MB BLD-MCNC: <2.9 % — SIGNIFICANT CHANGE UP (ref 0–3.5)
CK MB CFR SERPL CALC: <1 NG/ML — SIGNIFICANT CHANGE UP (ref 0–3.6)
CK SERPL-CCNC: 28 U/L — SIGNIFICANT CHANGE UP (ref 26–192)
CK SERPL-CCNC: 31 U/L — SIGNIFICANT CHANGE UP (ref 26–192)
CK SERPL-CCNC: 35 U/L — SIGNIFICANT CHANGE UP (ref 26–192)
CO2 SERPL-SCNC: 21 MMOL/L — LOW (ref 22–31)
CREAT SERPL-MCNC: 2.1 MG/DL — HIGH (ref 0.5–1.3)
D DIMER BLD IA.RAPID-MCNC: 632 NG/ML DDU — HIGH
EOSINOPHIL # BLD AUTO: 0.1 K/UL — SIGNIFICANT CHANGE UP (ref 0–0.5)
EOSINOPHIL NFR BLD AUTO: 0.8 % — SIGNIFICANT CHANGE UP (ref 0–6)
GLUCOSE SERPL-MCNC: 121 MG/DL — HIGH (ref 70–99)
HCT VFR BLD CALC: 27.2 % — LOW (ref 34.5–45)
HCT VFR BLD CALC: 27.3 % — LOW (ref 34.5–45)
HCV AB S/CO SERPL IA: 0.06 S/CO — SIGNIFICANT CHANGE UP (ref 0–0.99)
HCV AB SERPL-IMP: SIGNIFICANT CHANGE UP
HGB BLD-MCNC: 8.7 G/DL — LOW (ref 11.5–15.5)
HGB BLD-MCNC: 8.9 G/DL — LOW (ref 11.5–15.5)
IMM GRANULOCYTES NFR BLD AUTO: 0.8 % — SIGNIFICANT CHANGE UP (ref 0–1.5)
INR BLD: 1.09 RATIO — SIGNIFICANT CHANGE UP (ref 0.88–1.16)
LYMPHOCYTES # BLD AUTO: 1.14 K/UL — SIGNIFICANT CHANGE UP (ref 1–3.3)
LYMPHOCYTES # BLD AUTO: 8.8 % — LOW (ref 13–44)
MCHC RBC-ENTMCNC: 29.5 PG — SIGNIFICANT CHANGE UP (ref 27–34)
MCHC RBC-ENTMCNC: 30.1 PG — SIGNIFICANT CHANGE UP (ref 27–34)
MCHC RBC-ENTMCNC: 31.9 GM/DL — LOW (ref 32–36)
MCHC RBC-ENTMCNC: 32.7 GM/DL — SIGNIFICANT CHANGE UP (ref 32–36)
MCV RBC AUTO: 91.9 FL — SIGNIFICANT CHANGE UP (ref 80–100)
MCV RBC AUTO: 92.5 FL — SIGNIFICANT CHANGE UP (ref 80–100)
MONOCYTES # BLD AUTO: 0.94 K/UL — HIGH (ref 0–0.9)
MONOCYTES NFR BLD AUTO: 7.2 % — SIGNIFICANT CHANGE UP (ref 2–14)
NEUTROPHILS # BLD AUTO: 10.64 K/UL — HIGH (ref 1.8–7.4)
NEUTROPHILS NFR BLD AUTO: 82 % — HIGH (ref 43–77)
NRBC # BLD: 0 /100 WBCS — SIGNIFICANT CHANGE UP (ref 0–0)
NRBC # BLD: 0 /100 WBCS — SIGNIFICANT CHANGE UP (ref 0–0)
PLATELET # BLD AUTO: 277 K/UL — SIGNIFICANT CHANGE UP (ref 150–400)
PLATELET # BLD AUTO: 285 K/UL — SIGNIFICANT CHANGE UP (ref 150–400)
POTASSIUM SERPL-MCNC: 3.5 MMOL/L — SIGNIFICANT CHANGE UP (ref 3.5–5.3)
POTASSIUM SERPL-SCNC: 3.5 MMOL/L — SIGNIFICANT CHANGE UP (ref 3.5–5.3)
PROT SERPL-MCNC: 6.3 G/DL — SIGNIFICANT CHANGE UP (ref 6–8.3)
PROTHROM AB SERPL-ACNC: 12.5 SEC — SIGNIFICANT CHANGE UP (ref 10–12.9)
RBC # BLD: 2.95 M/UL — LOW (ref 3.8–5.2)
RBC # BLD: 2.96 M/UL — LOW (ref 3.8–5.2)
RBC # FLD: 15.5 % — HIGH (ref 10.3–14.5)
RBC # FLD: 15.6 % — HIGH (ref 10.3–14.5)
SODIUM SERPL-SCNC: 139 MMOL/L — SIGNIFICANT CHANGE UP (ref 135–145)
TROPONIN I SERPL-MCNC: <.015 NG/ML — SIGNIFICANT CHANGE UP (ref 0.01–0.04)
WBC # BLD: 12.98 K/UL — HIGH (ref 3.8–10.5)
WBC # BLD: 13.12 K/UL — HIGH (ref 3.8–10.5)
WBC # FLD AUTO: 12.98 K/UL — HIGH (ref 3.8–10.5)
WBC # FLD AUTO: 13.12 K/UL — HIGH (ref 3.8–10.5)

## 2019-04-13 PROCEDURE — 99285 EMERGENCY DEPT VISIT HI MDM: CPT

## 2019-04-13 PROCEDURE — 93010 ELECTROCARDIOGRAM REPORT: CPT

## 2019-04-13 PROCEDURE — 78582 LUNG VENTILAT&PERFUS IMAGING: CPT | Mod: 26

## 2019-04-13 PROCEDURE — 93970 EXTREMITY STUDY: CPT | Mod: 26

## 2019-04-13 PROCEDURE — 99223 1ST HOSP IP/OBS HIGH 75: CPT

## 2019-04-13 PROCEDURE — 71045 X-RAY EXAM CHEST 1 VIEW: CPT | Mod: 26

## 2019-04-13 RX ORDER — HEPARIN SODIUM 5000 [USP'U]/ML
625 INJECTION INTRAVENOUS; SUBCUTANEOUS
Qty: 25000 | Refills: 0 | Status: DISCONTINUED | OUTPATIENT
Start: 2019-04-13 | End: 2019-04-13

## 2019-04-13 RX ORDER — BUPROPION HYDROCHLORIDE 150 MG/1
150 TABLET, EXTENDED RELEASE ORAL DAILY
Qty: 0 | Refills: 0 | Status: DISCONTINUED | OUTPATIENT
Start: 2019-04-13 | End: 2019-04-14

## 2019-04-13 RX ORDER — ACETAMINOPHEN 500 MG
650 TABLET ORAL EVERY 6 HOURS
Qty: 0 | Refills: 0 | Status: DISCONTINUED | OUTPATIENT
Start: 2019-04-13 | End: 2019-04-14

## 2019-04-13 RX ORDER — MORPHINE SULFATE 50 MG/1
4 CAPSULE, EXTENDED RELEASE ORAL ONCE
Qty: 0 | Refills: 0 | Status: DISCONTINUED | OUTPATIENT
Start: 2019-04-13 | End: 2019-04-13

## 2019-04-13 RX ORDER — PANTOPRAZOLE SODIUM 20 MG/1
40 TABLET, DELAYED RELEASE ORAL
Qty: 0 | Refills: 0 | Status: DISCONTINUED | OUTPATIENT
Start: 2019-04-13 | End: 2019-04-14

## 2019-04-13 RX ORDER — OXYCODONE AND ACETAMINOPHEN 5; 325 MG/1; MG/1
2 TABLET ORAL EVERY 4 HOURS
Qty: 0 | Refills: 0 | Status: DISCONTINUED | OUTPATIENT
Start: 2019-04-13 | End: 2019-04-14

## 2019-04-13 RX ORDER — HYDRALAZINE HCL 50 MG
10 TABLET ORAL
Qty: 0 | Refills: 0 | Status: DISCONTINUED | OUTPATIENT
Start: 2019-04-13 | End: 2019-04-14

## 2019-04-13 RX ORDER — HEPARIN SODIUM 5000 [USP'U]/ML
3200 INJECTION INTRAVENOUS; SUBCUTANEOUS ONCE
Qty: 0 | Refills: 0 | Status: COMPLETED | OUTPATIENT
Start: 2019-04-13 | End: 2019-04-13

## 2019-04-13 RX ORDER — PRAMIPEXOLE DIHYDROCHLORIDE 0.12 MG/1
0.5 TABLET ORAL AT BEDTIME
Qty: 0 | Refills: 0 | Status: DISCONTINUED | OUTPATIENT
Start: 2019-04-13 | End: 2019-04-14

## 2019-04-13 RX ORDER — CLOPIDOGREL BISULFATE 75 MG/1
75 TABLET, FILM COATED ORAL DAILY
Qty: 0 | Refills: 0 | Status: DISCONTINUED | OUTPATIENT
Start: 2019-04-13 | End: 2019-04-14

## 2019-04-13 RX ORDER — DILTIAZEM HCL 120 MG
360 CAPSULE, EXT RELEASE 24 HR ORAL DAILY
Qty: 0 | Refills: 0 | Status: DISCONTINUED | OUTPATIENT
Start: 2019-04-13 | End: 2019-04-14

## 2019-04-13 RX ORDER — SODIUM CHLORIDE 9 MG/ML
1000 INJECTION INTRAMUSCULAR; INTRAVENOUS; SUBCUTANEOUS
Qty: 0 | Refills: 0 | Status: DISCONTINUED | OUTPATIENT
Start: 2019-04-13 | End: 2019-04-14

## 2019-04-13 RX ORDER — SIMVASTATIN 20 MG/1
10 TABLET, FILM COATED ORAL AT BEDTIME
Qty: 0 | Refills: 0 | Status: DISCONTINUED | OUTPATIENT
Start: 2019-04-13 | End: 2019-04-14

## 2019-04-13 RX ORDER — OLMESARTAN MEDOXOMIL 5 MG/1
1 TABLET, FILM COATED ORAL
Qty: 0 | Refills: 0 | COMMUNITY

## 2019-04-13 RX ORDER — ONDANSETRON 8 MG/1
4 TABLET, FILM COATED ORAL ONCE
Qty: 0 | Refills: 0 | Status: COMPLETED | OUTPATIENT
Start: 2019-04-13 | End: 2019-04-13

## 2019-04-13 RX ORDER — OXYCODONE AND ACETAMINOPHEN 5; 325 MG/1; MG/1
1 TABLET ORAL EVERY 4 HOURS
Qty: 0 | Refills: 0 | Status: DISCONTINUED | OUTPATIENT
Start: 2019-04-13 | End: 2019-04-14

## 2019-04-13 RX ADMIN — OXYCODONE AND ACETAMINOPHEN 2 TABLET(S): 5; 325 TABLET ORAL at 17:19

## 2019-04-13 RX ADMIN — MORPHINE SULFATE 4 MILLIGRAM(S): 50 CAPSULE, EXTENDED RELEASE ORAL at 05:16

## 2019-04-13 RX ADMIN — CLOPIDOGREL BISULFATE 75 MILLIGRAM(S): 75 TABLET, FILM COATED ORAL at 11:44

## 2019-04-13 RX ADMIN — ONDANSETRON 4 MILLIGRAM(S): 8 TABLET, FILM COATED ORAL at 02:23

## 2019-04-13 RX ADMIN — Medication 10 MILLIGRAM(S): at 18:28

## 2019-04-13 RX ADMIN — HEPARIN SODIUM 3200 UNIT(S): 5000 INJECTION INTRAVENOUS; SUBCUTANEOUS at 02:59

## 2019-04-13 RX ADMIN — Medication 360 MILLIGRAM(S): at 11:44

## 2019-04-13 RX ADMIN — SIMVASTATIN 10 MILLIGRAM(S): 20 TABLET, FILM COATED ORAL at 21:29

## 2019-04-13 RX ADMIN — MORPHINE SULFATE 4 MILLIGRAM(S): 50 CAPSULE, EXTENDED RELEASE ORAL at 02:23

## 2019-04-13 RX ADMIN — HEPARIN SODIUM 6.25 UNIT(S)/HR: 5000 INJECTION INTRAVENOUS; SUBCUTANEOUS at 03:02

## 2019-04-13 RX ADMIN — OXYCODONE AND ACETAMINOPHEN 2 TABLET(S): 5; 325 TABLET ORAL at 22:30

## 2019-04-13 RX ADMIN — MORPHINE SULFATE 4 MILLIGRAM(S): 50 CAPSULE, EXTENDED RELEASE ORAL at 02:52

## 2019-04-13 RX ADMIN — OXYCODONE AND ACETAMINOPHEN 2 TABLET(S): 5; 325 TABLET ORAL at 21:29

## 2019-04-13 RX ADMIN — PRAMIPEXOLE DIHYDROCHLORIDE 0.5 MILLIGRAM(S): 0.12 TABLET ORAL at 21:28

## 2019-04-13 RX ADMIN — OXYCODONE AND ACETAMINOPHEN 1 TABLET(S): 5; 325 TABLET ORAL at 11:31

## 2019-04-13 RX ADMIN — MORPHINE SULFATE 4 MILLIGRAM(S): 50 CAPSULE, EXTENDED RELEASE ORAL at 03:38

## 2019-04-13 RX ADMIN — MORPHINE SULFATE 4 MILLIGRAM(S): 50 CAPSULE, EXTENDED RELEASE ORAL at 06:08

## 2019-04-13 RX ADMIN — SODIUM CHLORIDE 50 MILLILITER(S): 9 INJECTION INTRAMUSCULAR; INTRAVENOUS; SUBCUTANEOUS at 09:49

## 2019-04-13 RX ADMIN — OXYCODONE AND ACETAMINOPHEN 1 TABLET(S): 5; 325 TABLET ORAL at 12:16

## 2019-04-13 NOTE — ED ADULT NURSE NOTE - OBJECTIVE STATEMENT
Pt complains of 10/10 middle chest pain which radiates to her arm, and back. Denies any other symptoms at this time.

## 2019-04-13 NOTE — ED PROVIDER NOTE - CLINICAL SUMMARY MEDICAL DECISION MAKING FREE TEXT BOX
63 year old female with chest pain x 2 days, worse after cross-country flight yesterday.  Associated with SOB, rapid heart rate.  Labs, d-dimer, CE, cariology consult, EKG, analgesia, treat for PE, admit

## 2019-04-13 NOTE — H&P ADULT - PROBLEM SELECTOR PLAN 1
pt recent travel with cp, sob and tachycardia  unable to have cta due to ckd  vq scan  echo  dopplers noted  iv heparin for now

## 2019-04-13 NOTE — ED ADULT TRIAGE NOTE - CHIEF COMPLAINT QUOTE
c/o chest pain s since yesterday- radiating to left side of arm and back- has difficulty breathing, denies nausea, vomiting

## 2019-04-13 NOTE — ED PROVIDER NOTE - PROGRESS NOTE DETAILS
VQ scan ordered.  Patient aware of elevated Cr, states it is usually 1.8 and she has been told by her nephrologist (Esdras Zepeda in [place]) that she cannot receive IV contrast.  Will presumably treat for PE given chest pain, SOB, sinus tachycardia, history of coagulapathy, and return from [place] by plane yesterday.  Will give heparin instead of Lovenox due to renal insufficiency

## 2019-04-13 NOTE — H&P ADULT - NSICDXPASTMEDICALHX_GEN_ALL_CORE_FT
PAST MEDICAL HISTORY:  Arthritis Cervical Spine  and Hands    Hypertension     Restless leg syndrome Especially with General Anesthesia

## 2019-04-13 NOTE — ED ADULT NURSE REASSESSMENT NOTE - NS ED NURSE REASSESS COMMENT FT1
dr forrest at bedside, [place] made aware of pt needing vq scan dr forrest at bedside, [place], [place] made aware of pt needing vq scan. spoke to dr forrest about heparin drip, to continue with ACS protocol. pt was titrated up to 7.25ml/hr for aptt result

## 2019-04-13 NOTE — CONSULT NOTE ADULT - SUBJECTIVE AND OBJECTIVE BOX
[facility] Consultation    CHIEF COMPLAINT: Patient is a 63y old  Female who presents with a chief complaint of chest pain    HPI:  Patient came in to the emergency room with one day of left-sided chest pain which he describes as a constant burning in the left shoulder radiating to the left arm without relationship to exertion, position, or other clear inciting factors. Reports no shortness of breath, palpitations, lightheadedness, or syncope    Past medical history is remarkable for peripheral arterial disease status post right femoral endarterectomy for claudication, stenosis 75% right internal carotid artery stenosis. She had a unremarkable echo and negative pharmacologic stress nuclear evaluation in March of 2017    She continues to smoke    PAST MEDICAL & SURGICAL HISTORY:  Hypertension  Restless leg syndrome: Especially with General Anesthesia  Arthritis: Cervical Spine  and Hands  Acute peptic ulcer with perforation: 1980  S/P appendectomy: 2014  S/P tubal ligation: 1986  Osteoarthritis of right shoulder region: Right Shoulder Arthroscopy  2007  Rheumatoid arthritis of carpometacarpal joint of thumb: Total Joint Replacement of Left Thumb      SOCIAL HISTORY: pos tobacco, no etoh    FAMILY HISTORY: no CAD      MEDICATIONS  (STANDING):  buPROPion XL . 150 milliGRAM(s) Oral daily  clopidogrel Tablet 75 milliGRAM(s) Oral daily  diltiazem    milliGRAM(s) Oral daily  heparin  Infusion 625 Unit(s)/Hr (6.25 mL/Hr) IV Continuous <Continuous>  hydrALAZINE 10 milliGRAM(s) Oral two times a day  pantoprazole    Tablet 40 milliGRAM(s) Oral before breakfast  pramipexole 0.5 milliGRAM(s) Oral at bedtime  simvastatin 10 milliGRAM(s) Oral at bedtime  sodium chloride 0.9%. 1000 milliLiter(s) (50 mL/Hr) IV Continuous <Continuous>        Allergies    No Known Allergies          REVIEW OF SYSTEMS:    CONSTITUTIONAL: No weakness, fevers or chills  EYES: No visual changes, No diplopia  ENMT: No throat pain , No exudate  NECK: No pain or stiffness  RESPIRATORY: No cough, wheezing, hemoptysis; No shortness of breath  CARDIOVASCULAR:  chest pain as described, no palpitations  GASTROINTESTINAL: No abdominal pain. No nausea, vomiting, or hematemesis; No diarrhea or constipation. No melena or hematochezia.  GENITOURINARY: No dysuria, frequency or hematuria  NEUROLOGICAL: No numbness or weakness  SKIN: No itching or rash  All other review of systems is negative unless indicated above    VITAL SIGNS:   Vital Signs Last 24 Hrs  T(C): 36.8 (13 Apr 2019 05:25), Max: 36.8 (13 Apr 2019 05:25)  T(F): 98.3 (13 Apr 2019 05:25), Max: 98.3 (13 Apr 2019 05:25)  HR: 106 (13 Apr 2019 05:25) (106 - 115)  BP: 151/74 (13 Apr 2019 05:25) (151/74 - 172/82)  BP(mean): --  RR: 18 (13 Apr 2019 05:25) (18 - 18)  SpO2: 99% (13 Apr 2019 05:25) (99% - 100%)    I&O's Summary      PHYSICAL EXAM:    Constitutional: NAD, awake and alert, well-developed  Eyes:   Pupils round, no lesions  ENMT: no exudate or erythema  Pulmonary: Non-labored, breath sounds are clear bilaterally, No wheezing, rales or rhonchi  Cardiovascular: PMI  non-displaced Regular S1 and S2, no murmurs, rubs, gallops or clicks  Gastrointestinal: Bowel Sounds present, soft, nontender.   Lymph: No peripheral edema. No cervical lymphadenopathy.  Neurological: Alert, no focal deficits  Skin: No rashes. Changes of chronic venous stasis. No cyanosis.  Psych:  Mood & affect appropriate    LABS: All Labs Reviewed:                        8.7    12.98 )-----------( 285      ( 13 Apr 2019 02:16 )             27.3     13 Apr 2019 02:16    139    |  110    |  28     ----------------------------<  121    3.5     |  21     |  2.10     Ca    8.2        13 Apr 2019 02:16    TPro  6.3    /  Alb  2.7    /  TBili  0.4    /  DBili  x      /  AST  16     /  ALT  16     /  AlkPhos  136    13 Apr 2019 02:16    PT/INR - ( 13 Apr 2019 02:16 )   PT: 12.5 sec;   INR: 1.09 ratio         PTT - ( 13 Apr 2019 02:16 )  PTT:33.5 sec  CARDIAC MARKERS ( 13 Apr 2019 02:16 )  <.015 ng/mL / x     / 35 U/L / x     / <1.0 ng/mL    ECG: SR, normal

## 2019-04-13 NOTE — H&P ADULT - PROBLEM SELECTOR PLAN 3
unclear etiology despite being on diltiazem  unable to have cta due to ckd  vq scan ordered  iv heparin  le dopplers noted  r/o mi  echo

## 2019-04-13 NOTE — ED PROVIDER NOTE - OBJECTIVE STATEMENT
63 year old female with a history of HTN, claudication, renal insufficiency presents with chest pain and shortness of breath.  She states the chest pain initially started 2 days ago, described as mild and just a "slight annoyance."  Yesterday, she took a direct flight from [place] back to [place].  Afterwards, the chest pain worsened, described as pressure radiating to her left arm, associated with SOB and rapid heart rate.  She has never had pain like this before.  She has 2 stents in her leg, takes Plavix.  Smokes 1 PPD. Last stress test about 1 year ago.  PMD Dr. Jarquin, Cardiology Dr. Lacy

## 2019-04-13 NOTE — CONSULT NOTE ADULT - ASSESSMENT
The patient was admitted for atypical chest discomfort doubt suggestion of an acute coronary syndrome or other acute cardiac process. Troponin level is normal and EKG is unremarkable.. She has known peripheral arterial disease with known right internal carotid artery stenosis s/p right femoral endarterectomy on antiplatelet therapy    She is stable at present with possible musculoskeletal etiology of her discomfort    Recommend    Serial cardiac enzymes    Consider orthopedic evaluation    No clear indication for intravenous heparin    Continue antiplatelet therapy    No other cardiac evaluation is required at this time    Followup with Dr. Lacy as an outpatient

## 2019-04-13 NOTE — H&P ADULT - NSICDXPASTSURGICALHX_GEN_ALL_CORE_FT
PAST SURGICAL HISTORY:  Acute peptic ulcer with perforation 1980    Osteoarthritis of right shoulder region Right Shoulder Arthroscopy  2007    Rheumatoid arthritis of carpometacarpal joint of thumb Total Joint Replacement of Left Thumb    S/P appendectomy 2014    S/P tubal ligation 1986

## 2019-04-14 ENCOUNTER — TRANSCRIPTION ENCOUNTER (OUTPATIENT)
Age: 64
End: 2019-04-14

## 2019-04-14 VITALS
RESPIRATION RATE: 18 BRPM | SYSTOLIC BLOOD PRESSURE: 129 MMHG | DIASTOLIC BLOOD PRESSURE: 69 MMHG | OXYGEN SATURATION: 95 % | TEMPERATURE: 98 F | HEART RATE: 95 BPM

## 2019-04-14 LAB
ANION GAP SERPL CALC-SCNC: 7 MMOL/L — SIGNIFICANT CHANGE UP (ref 5–17)
BUN SERPL-MCNC: 21 MG/DL — SIGNIFICANT CHANGE UP (ref 7–23)
CALCIUM SERPL-MCNC: 8.5 MG/DL — SIGNIFICANT CHANGE UP (ref 8.5–10.1)
CHLORIDE SERPL-SCNC: 109 MMOL/L — HIGH (ref 96–108)
CK SERPL-CCNC: 25 U/L — LOW (ref 26–192)
CO2 SERPL-SCNC: 23 MMOL/L — SIGNIFICANT CHANGE UP (ref 22–31)
CREAT SERPL-MCNC: 1.8 MG/DL — HIGH (ref 0.5–1.3)
GLUCOSE SERPL-MCNC: 103 MG/DL — HIGH (ref 70–99)
HCT VFR BLD CALC: 25.4 % — LOW (ref 34.5–45)
HGB BLD-MCNC: 8.1 G/DL — LOW (ref 11.5–15.5)
MCHC RBC-ENTMCNC: 29.8 PG — SIGNIFICANT CHANGE UP (ref 27–34)
MCHC RBC-ENTMCNC: 31.9 GM/DL — LOW (ref 32–36)
MCV RBC AUTO: 93.4 FL — SIGNIFICANT CHANGE UP (ref 80–100)
NRBC # BLD: 0 /100 WBCS — SIGNIFICANT CHANGE UP (ref 0–0)
PLATELET # BLD AUTO: 268 K/UL — SIGNIFICANT CHANGE UP (ref 150–400)
POTASSIUM SERPL-MCNC: 3.9 MMOL/L — SIGNIFICANT CHANGE UP (ref 3.5–5.3)
POTASSIUM SERPL-SCNC: 3.9 MMOL/L — SIGNIFICANT CHANGE UP (ref 3.5–5.3)
RBC # BLD: 2.72 M/UL — LOW (ref 3.8–5.2)
RBC # FLD: 15.2 % — HIGH (ref 10.3–14.5)
SODIUM SERPL-SCNC: 139 MMOL/L — SIGNIFICANT CHANGE UP (ref 135–145)
TROPONIN I SERPL-MCNC: <.015 NG/ML — SIGNIFICANT CHANGE UP (ref 0.01–0.04)
WBC # BLD: 13.67 K/UL — HIGH (ref 3.8–10.5)
WBC # FLD AUTO: 13.67 K/UL — HIGH (ref 3.8–10.5)

## 2019-04-14 PROCEDURE — 96375 TX/PRO/DX INJ NEW DRUG ADDON: CPT

## 2019-04-14 PROCEDURE — 99232 SBSQ HOSP IP/OBS MODERATE 35: CPT

## 2019-04-14 PROCEDURE — 85610 PROTHROMBIN TIME: CPT

## 2019-04-14 PROCEDURE — 80053 COMPREHEN METABOLIC PANEL: CPT

## 2019-04-14 PROCEDURE — 85027 COMPLETE CBC AUTOMATED: CPT

## 2019-04-14 PROCEDURE — 93970 EXTREMITY STUDY: CPT

## 2019-04-14 PROCEDURE — 85379 FIBRIN DEGRADATION QUANT: CPT

## 2019-04-14 PROCEDURE — 84484 ASSAY OF TROPONIN QUANT: CPT

## 2019-04-14 PROCEDURE — A9540: CPT

## 2019-04-14 PROCEDURE — 85730 THROMBOPLASTIN TIME PARTIAL: CPT

## 2019-04-14 PROCEDURE — 36415 COLL VENOUS BLD VENIPUNCTURE: CPT

## 2019-04-14 PROCEDURE — 80048 BASIC METABOLIC PNL TOTAL CA: CPT

## 2019-04-14 PROCEDURE — 99285 EMERGENCY DEPT VISIT HI MDM: CPT | Mod: 25

## 2019-04-14 PROCEDURE — 96374 THER/PROPH/DIAG INJ IV PUSH: CPT

## 2019-04-14 PROCEDURE — 78582 LUNG VENTILAT&PERFUS IMAGING: CPT

## 2019-04-14 PROCEDURE — 93005 ELECTROCARDIOGRAM TRACING: CPT

## 2019-04-14 PROCEDURE — 82553 CREATINE MB FRACTION: CPT

## 2019-04-14 PROCEDURE — 86803 HEPATITIS C AB TEST: CPT

## 2019-04-14 PROCEDURE — 84443 ASSAY THYROID STIM HORMONE: CPT

## 2019-04-14 PROCEDURE — 71045 X-RAY EXAM CHEST 1 VIEW: CPT

## 2019-04-14 PROCEDURE — 82550 ASSAY OF CK (CPK): CPT

## 2019-04-14 PROCEDURE — A9567: CPT

## 2019-04-14 RX ORDER — ACETAMINOPHEN 500 MG
2 TABLET ORAL
Qty: 0 | Refills: 0 | DISCHARGE
Start: 2019-04-14

## 2019-04-14 RX ADMIN — OXYCODONE AND ACETAMINOPHEN 2 TABLET(S): 5; 325 TABLET ORAL at 07:25

## 2019-04-14 RX ADMIN — Medication 360 MILLIGRAM(S): at 06:56

## 2019-04-14 RX ADMIN — OXYCODONE AND ACETAMINOPHEN 2 TABLET(S): 5; 325 TABLET ORAL at 06:55

## 2019-04-14 RX ADMIN — PANTOPRAZOLE SODIUM 40 MILLIGRAM(S): 20 TABLET, DELAYED RELEASE ORAL at 08:30

## 2019-04-14 RX ADMIN — Medication 10 MILLIGRAM(S): at 06:56

## 2019-04-14 RX ADMIN — CLOPIDOGREL BISULFATE 75 MILLIGRAM(S): 75 TABLET, FILM COATED ORAL at 11:15

## 2019-04-14 RX ADMIN — BUPROPION HYDROCHLORIDE 150 MILLIGRAM(S): 150 TABLET, EXTENDED RELEASE ORAL at 11:15

## 2019-04-14 NOTE — PROGRESS NOTE ADULT - ASSESSMENT
The patient was admitted for atypical chest discomfort doubt suggestion of an acute coronary syndrome or other acute cardiac process. Troponin level is normal and EKG is unremarkable.. She has known peripheral arterial disease with known right internal carotid artery stenosis s/p right femoral endarterectomy on antiplatelet therapy    She is stable at present with possible musculoskeletal etiology of her discomfort    Recommend    -cardiac enzymes negative x3  _ hemodynamically stable   - VQ scan low probability   -Chest xray shows no active pulmonary process  -CW dilt cd, hydralazine, statin  -Consider orthopedic evaluation  -Continue antiplatelet therapy  -D/C tele   -No other cardiac evaluation is required at this time  -Followup with Dr. Lacy as an outpatient  -Monitor and replete lytes, keep K>4 and Mg >2  - All other workup per primary team  Thank you for the consult  Will continue to follow  Luan MELO  Cardiology

## 2019-04-14 NOTE — DISCHARGE NOTE NURSING/CASE MANAGEMENT/SOCIAL WORK - NSDCDPATPORTLINK_GEN_ALL_CORE
You can access the [facility] Patient Portal, offered by [facility], by registering with the following website: [email or web address]

## 2019-04-14 NOTE — PROGRESS NOTE ADULT - SUBJECTIVE AND OBJECTIVE BOX
[facility] -- Chao Lomax, Regino Syed, Roshan Paris Savella  Office # [phone]      Follow Up:    CP  Subjective/Observations:   No events overnight resting comfortably in bed.  No complaints of chest pain, dyspnea, or palpitations reported. No signs of orthopnea or PND. Pt c/o back pain with deep breaths    REVIEW OF SYSTEMS: All other review of systems is negative unless indicated above    PAST MEDICAL & SURGICAL HISTORY:  Hypertension  Restless leg syndrome: Especially with General Anesthesia  Arthritis: Cervical Spine  and Hands  H/O cervical discectomy  Acute peptic ulcer with perforation: 1980  S/P appendectomy: 2014  S/P tubal ligation: 1986  Osteoarthritis of right shoulder region: Right Shoulder Arthroscopy  2007  Rheumatoid arthritis of carpometacarpal joint of thumb: Total Joint Replacement of Left Thumb      MEDICATIONS  (STANDING):  buPROPion XL . 150 milliGRAM(s) Oral daily  clopidogrel Tablet 75 milliGRAM(s) Oral daily  diltiazem    milliGRAM(s) Oral daily  hydrALAZINE 10 milliGRAM(s) Oral two times a day  pantoprazole    Tablet 40 milliGRAM(s) Oral before breakfast  pramipexole 0.5 milliGRAM(s) Oral at bedtime  simvastatin 10 milliGRAM(s) Oral at bedtime  sodium chloride 0.9%. 1000 milliLiter(s) (50 mL/Hr) IV Continuous <Continuous>    MEDICATIONS  (PRN):  acetaminophen   Tablet .. 650 milliGRAM(s) Oral every 6 hours PRN Temp greater or equal to 38C (100.4F), Mild Pain (1 - 3)  oxyCODONE    5 mG/acetaminophen 325 mG 1 Tablet(s) Oral every 4 hours PRN Moderate Pain (4 - 6)  oxyCODONE    5 mG/acetaminophen 325 mG 2 Tablet(s) Oral every 4 hours PRN Severe Pain (7 - 10)      Allergies    No Known Allergies    Intolerances        Vital Signs Last 24 Hrs  T(C): 36.8 (14 Apr 2019 07:25), Max: 37.4 (13 Apr 2019 15:47)  T(F): 98.2 (14 Apr 2019 07:25), Max: 99.4 (13 Apr 2019 15:47)  HR: 95 (14 Apr 2019 07:25) (90 - 106)  BP: 129/69 (14 Apr 2019 07:25) (120/69 - 156/58)  BP(mean): --  RR: 18 (14 Apr 2019 07:25) (16 - 18)  SpO2: 95% (14 Apr 2019 07:25) (95% - 98%)    I&O's Summary        PHYSICAL EXAM:  TELE: Sr  Constitutional: NAD, awake and alert, well-developed  HEENT: Moist Mucous Membranes, Anicteric  Pulmonary: Non-labored, breath sounds are clear bilaterally, No wheezing, crackles or rhonchi  Cardiovascular: Regular, S1 and S2 nl, No murmurs, rubs, gallops or clicks  Gastrointestinal: Bowel Sounds present, soft, nontender.   Lymph: No lymphadenopathy. No peripheral edema.  Skin: No visible rashes or ulcers.  Psych:  Mood & affect appropriate    LABS: All Labs Reviewed:                        8.1    13.67 )-----------( 268      ( 14 Apr 2019 06:24 )             25.4                         8.9    13.12 )-----------( 277      ( 13 Apr 2019 09:44 )             27.2                         8.7    12.98 )-----------( 285      ( 13 Apr 2019 02:16 )             27.3     14 Apr 2019 06:24    139    |  109    |  21     ----------------------------<  103    3.9     |  23     |  1.80   13 Apr 2019 02:16    139    |  110    |  28     ----------------------------<  121    3.5     |  21     |  2.10     Ca    8.5        14 Apr 2019 06:24  Ca    8.2        13 Apr 2019 02:16    TPro  6.3    /  Alb  2.7    /  TBili  0.4    /  DBili  x      /  AST  16     /  ALT  16     /  AlkPhos  136    13 Apr 2019 02:16    PT/INR - ( 13 Apr 2019 02:16 )   PT: 12.5 sec;   INR: 1.09 ratio         PTT - ( 13 Apr 2019 08:40 )  PTT:43.3 sec  CARDIAC MARKERS ( 14 Apr 2019 06:32 )  <.015 ng/mL / x     / 25 U/L / x     / x      CARDIAC MARKERS ( 13 Apr 2019 20:31 )  <.015 ng/mL / x     / 28 U/L / x     / x      CARDIAC MARKERS ( 13 Apr 2019 11:49 )  <.015 ng/mL / x     / 31 U/L / x     / x      CARDIAC MARKERS ( 13 Apr 2019 02:16 )  <.015 ng/mL / x     / 35 U/L / x     / <1.0 ng/mL         ECG:  < from: 12 Lead ECG (04.13.19 @ 01:42) >  Ventricular Rate 113 BPM    Atrial Rate 113 BPM    P-R Interval 128 ms    QRS Duration 96 ms    Q-T Interval 348 ms    QTC Calculation(Bezet) 477 ms    P Axis 74 degrees    R Axis 77 degrees    T Axis 68 degrees    Diagnosis Line Sinus tachycardia  Possible Left atrial enlargement  Borderline ECG  When compared with ECG of 13-APR-2019 01:42, (Unconfirmed)  No significant change was found  Confirmed by Kunal PINON, Luan ([id number]) on 4/13/2019 10:42:31 AM    < end of copied text >    Echo:    Radiology:  < from: NM Pulmonary Ventilation/Perfusion Scan (04.13.19 @ 13:11) >  EXAM:  NM PULM VENTILATION PERFUS IMG                            PROCEDURE DATE:  04/13/2019          INTERPRETATION:  RADIOPHARMACEUTICAL: 1.0 mCi Tc-99m-DTPA via inhalation;   6.2 mCi Tc-99m-MAA, I.V.    CLINICAL INFORMATION: 63 year old female with dyspnea, tachycardia and   recent travel; referred to evaluate for pulmonary embolism.    TECHNIQUE:  Ventilation and perfusion images of the lungs were obtained   following administration of Tc-99m-DTPA and Tc-99m-MAA. Images were   obtained in the anterior, posterior, both lateral, and all 4 oblique   projections. The study was interpreted in conjunction with a chest   radiograph of April 13, 2019.    COMPARISON: None    FINDINGS: There is very heterogeneous distribution of radiopharmaceutical   in both lungs on the ventilation and perfusion images. There are no   segmental perfusion defects in either lung.    IMPRESSION: Very low probability of pulmonary embolus.                DELISA HERNANDEZ M.D., ATTENDING RADIOLOGIST  This document has been electronically signed. Apr 13 2019  1:26PM        < end of copied text >  < from: Xray Chest 1 View AP/PA (04.13.19 @ 02:46) >  EXAM:  XR CHEST AP OR PA 1V                            PROCEDURE DATE:  04/13/2019          INTERPRETATION:  Chest portable.    Clinical History: Chest pain.    Comparison: 1/27/2013.    Single AP view submitted.    The evaluation of the cardiomediastinal silhouette is limited on portable   technique.    No focal consolidation, effusion or pneumothorax is noted.    Impression:    No acute pulmonary process demonstrated.                      TYREL AVELAR M.D., ATTENDING RADIOLOGIST  This document has been electronically signed. Apr 13 2019 11:02AM                < end of copied text >           Luan Joya ANP   Cardiology

## 2019-04-14 NOTE — DISCHARGE NOTE PROVIDER - NSDCCPCAREPLAN_GEN_ALL_CORE_FT
PRINCIPAL DISCHARGE DIAGNOSIS  Diagnosis: Anemia  Assessment and Plan of Treatment: fu with pcp  outpt [facility] for anemia, gi fu      SECONDARY DISCHARGE DIAGNOSES  Diagnosis: Shortness of breath at rest  Assessment and Plan of Treatment: fu with pcp

## 2019-04-14 NOTE — DISCHARGE NOTE PROVIDER - HOSPITAL COURSE
pt admitted for cp and sob with tachycardia, seen by cardiology. ruled out for acute mi, and pe rules out by negative vq scan. pt noted to be dehydrated and with anemia, improved with ivf, will get outpt [facility] for anemia by her pcp, feels better.    cleared by cardio for dc. all other [facility] as outpt.    >30 minutes spent on discharge

## 2019-04-14 NOTE — DISCHARGE NOTE PROVIDER - CARE PROVIDER_API CALL
Angelo Jarquin)  Internal Medicine  [address] [place] [address]  Phone: [phone]  Fax: [phone]  Follow Up Time:

## 2019-05-20 ENCOUNTER — INPATIENT (INPATIENT)
Facility: HOSPITAL | Age: 64
LOS: 7 days | Discharge: HOME | DRG: 288 | End: 2019-05-28
Attending: INTERNAL MEDICINE | Admitting: INTERNAL MEDICINE
Payer: COMMERCIAL

## 2019-05-20 VITALS
RESPIRATION RATE: 14 BRPM | HEART RATE: 99 BPM | TEMPERATURE: 99 F | SYSTOLIC BLOOD PRESSURE: 117 MMHG | WEIGHT: 106.04 LBS | OXYGEN SATURATION: 97 % | DIASTOLIC BLOOD PRESSURE: 49 MMHG

## 2019-05-20 DIAGNOSIS — D64.9 ANEMIA, UNSPECIFIED: ICD-10-CM

## 2019-05-20 DIAGNOSIS — K27.1 ACUTE PEPTIC ULCER, SITE UNSPECIFIED, WITH PERFORATION: Chronic | ICD-10-CM

## 2019-05-20 DIAGNOSIS — Z98.51 TUBAL LIGATION STATUS: Chronic | ICD-10-CM

## 2019-05-20 DIAGNOSIS — M19.011 PRIMARY OSTEOARTHRITIS, RIGHT SHOULDER: Chronic | ICD-10-CM

## 2019-05-20 DIAGNOSIS — M06.9 RHEUMATOID ARTHRITIS, UNSPECIFIED: Chronic | ICD-10-CM

## 2019-05-20 DIAGNOSIS — E78.5 HYPERLIPIDEMIA, UNSPECIFIED: ICD-10-CM

## 2019-05-20 DIAGNOSIS — I10 ESSENTIAL (PRIMARY) HYPERTENSION: ICD-10-CM

## 2019-05-20 DIAGNOSIS — Z29.9 ENCOUNTER FOR PROPHYLACTIC MEASURES, UNSPECIFIED: ICD-10-CM

## 2019-05-20 DIAGNOSIS — Z98.890 OTHER SPECIFIED POSTPROCEDURAL STATES: Chronic | ICD-10-CM

## 2019-05-20 DIAGNOSIS — R06.09 OTHER FORMS OF DYSPNEA: ICD-10-CM

## 2019-05-20 DIAGNOSIS — N18.4 CHRONIC KIDNEY DISEASE, STAGE 4 (SEVERE): ICD-10-CM

## 2019-05-20 DIAGNOSIS — I73.9 PERIPHERAL VASCULAR DISEASE, UNSPECIFIED: ICD-10-CM

## 2019-05-20 DIAGNOSIS — G25.81 RESTLESS LEGS SYNDROME: ICD-10-CM

## 2019-05-20 DIAGNOSIS — Z90.49 ACQUIRED ABSENCE OF OTHER SPECIFIED PARTS OF DIGESTIVE TRACT: Chronic | ICD-10-CM

## 2019-05-20 LAB
ALBUMIN SERPL ELPH-MCNC: 2.5 G/DL — LOW (ref 3.3–5)
ALP SERPL-CCNC: 117 U/L — SIGNIFICANT CHANGE UP (ref 40–120)
ALT FLD-CCNC: 32 U/L — SIGNIFICANT CHANGE UP (ref 12–78)
ANION GAP SERPL CALC-SCNC: 13 MMOL/L — SIGNIFICANT CHANGE UP (ref 5–17)
APTT BLD: 32.2 SEC — SIGNIFICANT CHANGE UP (ref 27.5–36.3)
AST SERPL-CCNC: 47 U/L — HIGH (ref 15–37)
BASOPHILS # BLD AUTO: 0.03 K/UL — SIGNIFICANT CHANGE UP (ref 0–0.2)
BASOPHILS NFR BLD AUTO: 0.3 % — SIGNIFICANT CHANGE UP (ref 0–2)
BILIRUB DIRECT SERPL-MCNC: 0.1 MG/DL — SIGNIFICANT CHANGE UP (ref 0.05–0.2)
BILIRUB INDIRECT FLD-MCNC: 0.2 MG/DL — SIGNIFICANT CHANGE UP (ref 0.2–1)
BILIRUB SERPL-MCNC: 0.3 MG/DL — SIGNIFICANT CHANGE UP (ref 0.2–1.2)
BUN SERPL-MCNC: 33 MG/DL — HIGH (ref 7–23)
CALCIUM SERPL-MCNC: 8.2 MG/DL — LOW (ref 8.5–10.1)
CHLORIDE SERPL-SCNC: 107 MMOL/L — SIGNIFICANT CHANGE UP (ref 96–108)
CO2 SERPL-SCNC: 17 MMOL/L — LOW (ref 22–31)
CREAT SERPL-MCNC: 2.4 MG/DL — HIGH (ref 0.5–1.3)
EOSINOPHIL # BLD AUTO: 0.02 K/UL — SIGNIFICANT CHANGE UP (ref 0–0.5)
EOSINOPHIL NFR BLD AUTO: 0.2 % — SIGNIFICANT CHANGE UP (ref 0–6)
ERYTHROCYTE [SEDIMENTATION RATE] IN BLOOD: 64 MM/HR — HIGH (ref 0–20)
GLUCOSE SERPL-MCNC: 90 MG/DL — SIGNIFICANT CHANGE UP (ref 70–99)
HCT VFR BLD CALC: 23.1 % — LOW (ref 34.5–45)
HGB BLD-MCNC: 7.3 G/DL — LOW (ref 11.5–15.5)
IMM GRANULOCYTES NFR BLD AUTO: 0.8 % — SIGNIFICANT CHANGE UP (ref 0–1.5)
INR BLD: 1.25 RATIO — HIGH (ref 0.88–1.16)
LYMPHOCYTES # BLD AUTO: 1.13 K/UL — SIGNIFICANT CHANGE UP (ref 1–3.3)
LYMPHOCYTES # BLD AUTO: 12.3 % — LOW (ref 13–44)
MAGNESIUM SERPL-MCNC: 2 MG/DL — SIGNIFICANT CHANGE UP (ref 1.6–2.6)
MCHC RBC-ENTMCNC: 27.1 PG — SIGNIFICANT CHANGE UP (ref 27–34)
MCHC RBC-ENTMCNC: 31.6 GM/DL — LOW (ref 32–36)
MCV RBC AUTO: 85.9 FL — SIGNIFICANT CHANGE UP (ref 80–100)
MONOCYTES # BLD AUTO: 0.66 K/UL — SIGNIFICANT CHANGE UP (ref 0–0.9)
MONOCYTES NFR BLD AUTO: 7.2 % — SIGNIFICANT CHANGE UP (ref 2–14)
NEUTROPHILS # BLD AUTO: 7.31 K/UL — SIGNIFICANT CHANGE UP (ref 1.8–7.4)
NEUTROPHILS NFR BLD AUTO: 79.2 % — HIGH (ref 43–77)
NRBC # BLD: 0 /100 WBCS — SIGNIFICANT CHANGE UP (ref 0–0)
NT-PROBNP SERPL-SCNC: HIGH PG/ML (ref 0–125)
PLATELET # BLD AUTO: 252 K/UL — SIGNIFICANT CHANGE UP (ref 150–400)
POTASSIUM SERPL-MCNC: 3.8 MMOL/L — SIGNIFICANT CHANGE UP (ref 3.5–5.3)
POTASSIUM SERPL-SCNC: 3.8 MMOL/L — SIGNIFICANT CHANGE UP (ref 3.5–5.3)
PROCALCITONIN SERPL-MCNC: 1.12 NG/ML — HIGH (ref 0–0.04)
PROT SERPL-MCNC: 6.3 G/DL — SIGNIFICANT CHANGE UP (ref 6–8.3)
PROTHROM AB SERPL-ACNC: 14.2 SEC — HIGH (ref 10–12.9)
RBC # BLD: 2.69 M/UL — LOW (ref 3.8–5.2)
RBC # BLD: 2.69 M/UL — LOW (ref 3.8–5.2)
RBC # FLD: 16.5 % — HIGH (ref 10.3–14.5)
RETICS #: 76.4 K/UL — SIGNIFICANT CHANGE UP (ref 25–125)
RETICS/RBC NFR: 2.8 % — HIGH (ref 0.5–2.5)
SODIUM SERPL-SCNC: 137 MMOL/L — SIGNIFICANT CHANGE UP (ref 135–145)
WBC # BLD: 9.22 K/UL — SIGNIFICANT CHANGE UP (ref 3.8–10.5)
WBC # FLD AUTO: 9.22 K/UL — SIGNIFICANT CHANGE UP (ref 3.8–10.5)

## 2019-05-20 PROCEDURE — 99285 EMERGENCY DEPT VISIT HI MDM: CPT

## 2019-05-20 PROCEDURE — 99223 1ST HOSP IP/OBS HIGH 75: CPT

## 2019-05-20 PROCEDURE — 71250 CT THORAX DX C-: CPT | Mod: 26

## 2019-05-20 PROCEDURE — 93970 EXTREMITY STUDY: CPT | Mod: 26

## 2019-05-20 PROCEDURE — 74176 CT ABD & PELVIS W/O CONTRAST: CPT | Mod: 26

## 2019-05-20 PROCEDURE — 93010 ELECTROCARDIOGRAM REPORT: CPT

## 2019-05-20 PROCEDURE — 71046 X-RAY EXAM CHEST 2 VIEWS: CPT | Mod: 26

## 2019-05-20 PROCEDURE — 76770 US EXAM ABDO BACK WALL COMP: CPT | Mod: 26

## 2019-05-20 RX ORDER — METOPROLOL TARTRATE 50 MG
25 TABLET ORAL ONCE
Refills: 0 | Status: COMPLETED | OUTPATIENT
Start: 2019-05-20 | End: 2019-05-20

## 2019-05-20 RX ORDER — HEPARIN SODIUM 5000 [USP'U]/ML
5000 INJECTION INTRAVENOUS; SUBCUTANEOUS EVERY 12 HOURS
Refills: 0 | Status: DISCONTINUED | OUTPATIENT
Start: 2019-05-20 | End: 2019-05-21

## 2019-05-20 RX ORDER — PANTOPRAZOLE SODIUM 20 MG/1
40 TABLET, DELAYED RELEASE ORAL
Refills: 0 | Status: DISCONTINUED | OUTPATIENT
Start: 2019-05-20 | End: 2019-05-28

## 2019-05-20 RX ORDER — FUROSEMIDE 40 MG
20 TABLET ORAL DAILY
Refills: 0 | Status: DISCONTINUED | OUTPATIENT
Start: 2019-05-20 | End: 2019-05-21

## 2019-05-20 RX ORDER — HYDRALAZINE HCL 50 MG
1 TABLET ORAL
Qty: 0 | Refills: 0 | DISCHARGE

## 2019-05-20 RX ORDER — SODIUM CHLORIDE 9 MG/ML
3 INJECTION INTRAMUSCULAR; INTRAVENOUS; SUBCUTANEOUS ONCE
Refills: 0 | Status: COMPLETED | OUTPATIENT
Start: 2019-05-20 | End: 2019-05-20

## 2019-05-20 RX ORDER — SIMVASTATIN 20 MG/1
10 TABLET, FILM COATED ORAL AT BEDTIME
Refills: 0 | Status: DISCONTINUED | OUTPATIENT
Start: 2019-05-20 | End: 2019-05-28

## 2019-05-20 RX ORDER — ACETAMINOPHEN 500 MG
650 TABLET ORAL EVERY 6 HOURS
Refills: 0 | Status: DISCONTINUED | OUTPATIENT
Start: 2019-05-20 | End: 2019-05-28

## 2019-05-20 RX ORDER — BUPROPION HYDROCHLORIDE 150 MG/1
150 TABLET, EXTENDED RELEASE ORAL DAILY
Refills: 0 | Status: DISCONTINUED | OUTPATIENT
Start: 2019-05-20 | End: 2019-05-28

## 2019-05-20 RX ORDER — DILTIAZEM HCL 120 MG
360 CAPSULE, EXT RELEASE 24 HR ORAL DAILY
Refills: 0 | Status: DISCONTINUED | OUTPATIENT
Start: 2019-05-20 | End: 2019-05-28

## 2019-05-20 RX ORDER — PRAMIPEXOLE DIHYDROCHLORIDE 0.12 MG/1
0.5 TABLET ORAL AT BEDTIME
Refills: 0 | Status: DISCONTINUED | OUTPATIENT
Start: 2019-05-20 | End: 2019-05-28

## 2019-05-20 RX ORDER — HYDRALAZINE HCL 50 MG
10 TABLET ORAL
Refills: 0 | Status: DISCONTINUED | OUTPATIENT
Start: 2019-05-20 | End: 2019-05-23

## 2019-05-20 RX ORDER — CLOPIDOGREL BISULFATE 75 MG/1
75 TABLET, FILM COATED ORAL DAILY
Refills: 0 | Status: DISCONTINUED | OUTPATIENT
Start: 2019-05-20 | End: 2019-05-21

## 2019-05-20 RX ADMIN — Medication 20 MILLIGRAM(S): at 19:15

## 2019-05-20 RX ADMIN — Medication 650 MILLIGRAM(S): at 19:14

## 2019-05-20 RX ADMIN — Medication 25 MILLIGRAM(S): at 19:14

## 2019-05-20 RX ADMIN — Medication 650 MILLIGRAM(S): at 20:15

## 2019-05-20 RX ADMIN — HEPARIN SODIUM 5000 UNIT(S): 5000 INJECTION INTRAVENOUS; SUBCUTANEOUS at 18:40

## 2019-05-20 RX ADMIN — SODIUM CHLORIDE 3 MILLILITER(S): 9 INJECTION INTRAMUSCULAR; INTRAVENOUS; SUBCUTANEOUS at 16:01

## 2019-05-20 RX ADMIN — PRAMIPEXOLE DIHYDROCHLORIDE 0.5 MILLIGRAM(S): 0.12 TABLET ORAL at 23:06

## 2019-05-20 RX ADMIN — SIMVASTATIN 10 MILLIGRAM(S): 20 TABLET, FILM COATED ORAL at 23:06

## 2019-05-20 NOTE — ED ADULT NURSE NOTE - PSH
Acute peptic ulcer with perforation  1980  H/O cervical discectomy    Osteoarthritis of right shoulder region  Right Shoulder Arthroscopy  2007  Rheumatoid arthritis of carpometacarpal joint of thumb  Total Joint Replacement of Left Thumb  S/P appendectomy  2014  S/P tubal ligation  1986

## 2019-05-20 NOTE — ED PROVIDER NOTE - CADM POA URETHRAL CATHETER
After Visit Summary   4/12/2017    Meg Diallo    MRN: [id number]           Patient Information     Date Of Birth          2000        Visit Information        Provider Department      4/12/2017 10:00 AM Orin Kaplan LMFT [facility]        Today's Diagnoses     Major depressive disorder, single episode, severe with mood-congruent psychotic features (H)    -  1       Follow-ups after your visit        Your next 10 appointments already scheduled     Apr 19, 2017 10:30 AM CDT   (Arrive by 10:15 AM)   Return Visit with SVITLANA Haynes   [facility] ([facility])    [address] [place], [place] [address] [place] [address]   [phone]            Apr 26, 2017 10:00 AM CDT   (Arrive by 9:45 AM)   Return Visit with SVITLANA Haynes   [facility] ([facility])    [address] [place] [address]   [phone]            May 03, 2017  3:20 PM CDT   (Arrive by 3:05 PM)   Return Visit with Barb Vasquez MD   [facility] ([facility])    [address] [place] [id number] [phone]              Who to contact     If you have questions or need follow up information about today's clinic visit or your schedule please contact [facility] directly at [phone].  Normal or non-critical lab and imaging results will be communicated to you by [employer], letter or phone within 4 business days after the clinic has received the results. If you do not hear from us within 7 days, please contact the clinic through [employer] or phone. If you have a critical or abnormal lab result, we will notify you by phone as soon as possible.  Submit refill requests through [employer] or call your pharmacy and they will forward the refill request to us. Please allow 3 business days for your refill to be completed.          Additional Information About Your Visit        [employer] Information     [employer] lets you send messages to your doctor,  view your test results, renew your prescriptions, schedule appointments and more. To sign up, go to [email or web address], contact your [place] clinic or call [phone] during business hours.            Care EveryWhere ID     This is your Care EveryWhere ID. This could be used by other organizations to access your [place] medical records  [id number]         Blood Pressure from Last 3 Encounters:   04/06/17 104/58   04/05/17 94/52   03/22/17 118/72    Weight from Last 3 Encounters:   04/06/17 157 lb (71.2 kg) (89 %)*   04/05/17 157 lb (71.2 kg) (89 %)*   03/22/17 157 lb 3.2 oz (71.3 kg) (89 %)*     * Growth percentiles are based on [facility] 2-20 Years data.              Today, you had the following     No orders found for display       Primary Care Provider    None       No address on file        Thank you!     Thank you for choosing [facility]  for your care. Our goal is always to provide you with excellent care. Hearing back from our patients is one way we can continue to improve our services. Please take a few minutes to complete the written survey that you may receive in the mail after your visit with us. Thank you!             Your Updated Medication List - Protect others around you: Learn how to safely use, store and throw away your medicines at www.disposemymeds.org.          This list is accurate as of: 4/12/17 12:12 PM.  Always use your most recent med list.                   Brand Name Dispense Instructions for use    clindamycin-benzoyl peroxide gel    BENZACLIN    30 g    Apply 1 Application topically 2 times daily Not to be exposed to sun while on the face.       guaiFENesin-dextromethorphan 100-10 MG/5ML syrup    ROBITUSSIN DM    210 mL    Take 5-10 mLs by mouth 3 times daily for 7 days       melatonin 3 MG Caps     30 capsule    Take 1 capful by mouth every evening as needed       VENTOLIN  (90 BASE) MCG/ACT Inhaler   Generic drug:  albuterol     18 g    INHALE 2 PUFFS EVERY 4-6  HOURS AS NEEDED          No

## 2019-05-20 NOTE — CONSULT NOTE ADULT - SUBJECTIVE AND OBJECTIVE BOX
[facility] - Chao Lomax, Kunal, Regino, [place], Elva Islas  Office Number: [phone]    Initial Consult Note    CHIEF COMPLAINT: Patient is a 63y old  Female who presents with a chief complaint of CABA, weakness (20 May 2019 15:29)      HPI:  This is a 63 F with PMH of HTN CKD PAD depression smoker who came in c/o 5 day h/o increased CABA and weakness. She denies CP, n/v/d, palpitations, BRBPR, melena. + anorexia. Ten pound weight loss in 1 month. Patient had an EGD/colon last week which she says was normal.    Past medical history is also remarkable for peripheral arterial disease status post right femoral endarterectomy for claudication, stenosis 75% right internal carotid artery stenosis. She had a unremarkable echo and negative pharmacologic stress nuclear evaluation in March of 2017. (20 May 2019 15:29)    PAST MEDICAL & SURGICAL HISTORY:  Hypertension  Restless leg syndrome: Especially with General Anesthesia  Arthritis: Cervical Spine  and Hands  H/O cervical discectomy  Acute peptic ulcer with perforation: 1980  S/P appendectomy: 2014  S/P tubal ligation: 1986  Osteoarthritis of right shoulder region: Right Shoulder Arthroscopy  2007  Rheumatoid arthritis of carpometacarpal joint of thumb: Total Joint Replacement of Left Thumb    SOCIAL HISTORY:  No tobacco, ethanol, or drug abuse.    FAMILY HISTORY:  No pertinent family history in first degree relatives    No family history of acute MI or sudden cardiac death.    MEDICATIONS  (STANDING):  buPROPion XL . 150 milliGRAM(s) Oral daily  clopidogrel Tablet 75 milliGRAM(s) Oral daily  diltiazem    milliGRAM(s) Oral daily  heparin  Injectable 5000 Unit(s) SubCutaneous every 12 hours  hydrALAZINE 10 milliGRAM(s) Oral two times a day  pantoprazole    Tablet 40 milliGRAM(s) Oral before breakfast  pramipexole 0.5 milliGRAM(s) Oral at bedtime  simvastatin 10 milliGRAM(s) Oral at bedtime    MEDICATIONS  (PRN):  acetaminophen   Tablet .. 650 milliGRAM(s) Oral every 6 hours PRN Temp greater or equal to 38C (100.4F), Mild Pain (1 - 3)    Allergies    No Known Allergies    Intolerances    REVIEW OF SYSTEMS:    CONSTITUTIONAL: No weakness, fevers or chills  EYES/ENT: No visual changes;  No vertigo or throat pain   NECK: No pain or stiffness  RESPIRATORY: No cough, wheezing, hemoptysis; No shortness of breath  CARDIOVASCULAR: No chest pain or palpitations  GASTROINTESTINAL: No abdominal pain. No nausea, vomiting, or hematemesis; No diarrhea or constipation. No melena or hematochezia.  GENITOURINARY: No dysuria, frequency or hematuria  NEUROLOGICAL: No numbness or weakness  SKIN: No itching or rash  All other review of systems is negative unless indicated above    VITAL SIGNS:   Vital Signs Last 24 Hrs  T(C): 37.2 (20 May 2019 14:08), Max: 37.2 (20 May 2019 14:08)  T(F): 98.9 (20 May 2019 14:08), Max: 98.9 (20 May 2019 14:08)  HR: 99 (20 May 2019 14:08) (99 - 99)  BP: 117/49 (20 May 2019 14:08) (117/49 - 117/49)  BP(mean): --  RR: 14 (20 May 2019 14:08) (14 - 14)  SpO2: 97% (20 May 2019 14:08) (97% - 97%)    I&O's Summary      On Exam:    Constitutional: NAD, alert and oriented x 3  Lungs:  Non-labored, breath sounds are clear bilaterally, No wheezing, rales or rhonchi  Cardiovascular: RRR.  S1 and S2 positive.  No murmurs, rubs, gallops or clicks  Gastrointestinal: Bowel Sounds present, soft, nontender.   Lymph: No peripheral edema. No cervical lymphadenopathy.  Neurological: Alert, no focal deficits  Skin: No rashes or ulcers   Psych:  Mood & affect appropriate.    LABS: All Labs Reviewed:                        7.3    9.22  )-----------( 252      ( 20 May 2019 15:14 )             23.1     20 May 2019 15:14    137    |  107    |  33     ----------------------------<  90     3.8     |  17     |  2.40     Ca    8.2        20 May 2019 15:14  Mg     2.0       20 May 2019 15:14    TPro  6.3    /  Alb  2.5    /  TBili  0.3    /  DBili  .10    /  AST  47     /  ALT  32     /  AlkPhos  117    20 May 2019 15:14    PT/INR - ( 20 May 2019 15:14 )   PT: 14.2 sec;   INR: 1.25 ratio         PTT - ( 20 May 2019 15:14 )  PTT:32.2 sec      Blood Culture:   05-20 @ 15:14  Pro Bnp [id number]    RADIOLOGY:  < from: Xray Chest 2 Views PA/Lat (05.20.19 @ 16:02) >    EXAM:  XR CHEST PA LAT 2V                          PROCEDURE DATE:  05/20/2019      INTERPRETATION:  Clinical information: Shortness of breath.    Technique: PA and lateral views of the chest.    Comparison: Prior chest x-ray examination from4/13/2019.    Findings: There are new small bilateral pleural effusions with adjacent   atelectasis. The heart size is mildly enlarged. There is redemonstration   of a lower anterior cervical discectomy and fusion. Otherwise, the   visualized osseousstructures are unremarkable.    IMPRESSION: New small bilateral pleural effusions with adjacent atelectasis.    Unchanged mild cardiomegaly.    LONNIE TRUJILLO M.D., ATTENDING RADIOLOGIST  This document has been electronically signed. May 20 2019  4:07PM      < end of copied text >    EKG: [facility] - Chao Lomax, Kunal, Regino, [place], Elva Islas  Office Number: [phone]    Initial Consult Note    CHIEF COMPLAINT: Patient is a 63y old  Female who presents with a chief complaint of CABA, weakness (20 May 2019 15:29)      HPI:  This is a 63 F with PMH of HTN CKD PAD depression smoker who came in c/o 5 day h/o increased CABA and weakness. She denies CP, n/v/d, palpitations, BRBPR, melena. + anorexia. Ten pound weight loss in 1 month. Patient had an EGD/colon last week which she says was normal.    Past medical history is also remarkable for peripheral arterial disease status post right femoral endarterectomy for claudication, stenosis 75% right internal carotid artery stenosis. She had a unremarkable echo and negative pharmacologic stress nuclear evaluation in March of 2017. (20 May 2019 15:29)    PAST MEDICAL & SURGICAL HISTORY:  Hypertension  Restless leg syndrome: Especially with General Anesthesia  Arthritis: Cervical Spine  and Hands  H/O cervical discectomy  Acute peptic ulcer with perforation: 1980  S/P appendectomy: 2014  S/P tubal ligation: 1986  Osteoarthritis of right shoulder region: Right Shoulder Arthroscopy  2007  Rheumatoid arthritis of carpometacarpal joint of thumb: Total Joint Replacement of Left Thumb    SOCIAL HISTORY:  No tobacco, ethanol, or drug abuse.    FAMILY HISTORY:  No pertinent family history in first degree relatives    No family history of acute MI or sudden cardiac death.    MEDICATIONS  (STANDING):  buPROPion XL . 150 milliGRAM(s) Oral daily  clopidogrel Tablet 75 milliGRAM(s) Oral daily  diltiazem    milliGRAM(s) Oral daily  heparin  Injectable 5000 Unit(s) SubCutaneous every 12 hours  hydrALAZINE 10 milliGRAM(s) Oral two times a day  pantoprazole    Tablet 40 milliGRAM(s) Oral before breakfast  pramipexole 0.5 milliGRAM(s) Oral at bedtime  simvastatin 10 milliGRAM(s) Oral at bedtime    MEDICATIONS  (PRN):  acetaminophen   Tablet .. 650 milliGRAM(s) Oral every 6 hours PRN Temp greater or equal to 38C (100.4F), Mild Pain (1 - 3)    Allergies    No Known Allergies    Intolerances    REVIEW OF SYSTEMS:    CONSTITUTIONAL: + weakness.  No fevers or chills.  + unintentional weight loss of 10 lbs within 1 month  EYES/ENT: No visual changes;  No vertigo or throat pain   NECK: + neck pain and stiffness  RESPIRATORY: No cough, wheezing, hemoptysis; + shortness of breath + CABA  CARDIOVASCULAR: No chest pain or palpitations  GASTROINTESTINAL: No abdominal pain. No nausea, vomiting, or hematemesis; No diarrhea or constipation. No melena or hematochezia.  GENITOURINARY: No dysuria, frequency or hematuria  NEUROLOGICAL: No numbness or weakness  SKIN: No itching or rash  All other review of systems is negative unless indicated above    VITAL SIGNS:   Vital Signs Last 24 Hrs  T(C): 37.2 (20 May 2019 14:08), Max: 37.2 (20 May 2019 14:08)  T(F): 98.9 (20 May 2019 14:08), Max: 98.9 (20 May 2019 14:08)  HR: 99 (20 May 2019 14:08) (99 - 99)  BP: 117/49 (20 May 2019 14:08) (117/49 - 117/49)  BP(mean): --  RR: 14 (20 May 2019 14:08) (14 - 14)  SpO2: 97% (20 May 2019 14:08) (97% - 97%)    I&O's Summary      On Exam:    Constitutional: NAD, alert and oriented x 3  Lungs:  Non-labored, breath sounds are diminished bilaterally, No wheezing or rhonchi. + fine rales at bases L>R  Cardiovascular: RRR.  S1 and S2 positive.  No murmurs, rubs, gallops or clicks  Gastrointestinal: Bowel Sounds present, soft, nontender.   Lymph: No peripheral edema. No cervical lymphadenopathy.  Neurological: Alert, no focal deficits  Skin: No rashes or ulcers   Psych:  Mood & affect appropriate.    LABS: All Labs Reviewed:                        7.3    9.22  )-----------( 252      ( 20 May 2019 15:14 )             23.1     20 May 2019 15:14    137    |  107    |  33     ----------------------------<  90     3.8     |  17     |  2.40     Ca    8.2        20 May 2019 15:14  Mg     2.0       20 May 2019 15:14    TPro  6.3    /  Alb  2.5    /  TBili  0.3    /  DBili  .10    /  AST  47     /  ALT  32     /  AlkPhos  117    20 May 2019 15:14    PT/INR - ( 20 May 2019 15:14 )   PT: 14.2 sec;   INR: 1.25 ratio       PTT - ( 20 May 2019 15:14 )  PTT:32.2 sec    Blood Culture:   05-20 @ 15:14  Pro Bnp [id number]    RADIOLOGY:  < from: Xray Chest 2 Views PA/Lat (05.20.19 @ 16:02) >    EXAM:  XR CHEST PA LAT 2V                          PROCEDURE DATE:  05/20/2019      INTERPRETATION:  Clinical information: Shortness of breath.    Technique: PA and lateral views of the chest.    Comparison: Prior chest x-ray examination from4/13/2019.    Findings: There are new small bilateral pleural effusions with adjacent   atelectasis. The heart size is mildly enlarged. There is redemonstration   of a lower anterior cervical discectomy and fusion. Otherwise, the   visualized osseousstructures are unremarkable.    IMPRESSION: New small bilateral pleural effusions with adjacent atelectasis.    Unchanged mild cardiomegaly.    LONNIE TRUJILLO M.D., ATTENDING RADIOLOGIST  This document has been electronically signed. May 20 2019  4:07PM      < end of copied text >    EKG: [facility] - Chao Lomax, Kunal, Regino, [place], Elva Islas  Office Number: [phone]    Initial Consult Note    CHIEF COMPLAINT: Patient is a 63y old  Female who presents with a chief complaint of CABA, weakness (20 May 2019 15:29)      HPI:  This is a 63 F with PMH of HTN CKD PAD depression smoker who came in c/o 5 day h/o increased CABA and weakness. She denies CP, n/v/d, palpitations, BRBPR, melena. + anorexia. Ten pound weight loss in 1 month. Patient had an EGD/colon last week which she says was normal. SHe is also complaining of orthopnea.     Past medical history is also remarkable for peripheral arterial disease status post right femoral endarterectomy for claudication, stenosis 75% right internal carotid artery stenosis. She had a unremarkable echo and negative pharmacologic stress nuclear evaluation in March of 2017.      PAST MEDICAL & SURGICAL HISTORY:  Hypertension  Restless leg syndrome: Especially with General Anesthesia  Arthritis: Cervical Spine  and Hands  H/O cervical discectomy  Acute peptic ulcer with perforation: 1980  S/P appendectomy: 2014  S/P tubal ligation: 1986  Osteoarthritis of right shoulder region: Right Shoulder Arthroscopy  2007  Rheumatoid arthritis of carpometacarpal joint of thumb: Total Joint Replacement of Left Thumb    SOCIAL HISTORY:  No tobacco, ethanol, or drug abuse.    FAMILY HISTORY:  No pertinent family history in first degree relatives    No family history of acute MI or sudden cardiac death.    MEDICATIONS  (STANDING):  buPROPion XL . 150 milliGRAM(s) Oral daily  clopidogrel Tablet 75 milliGRAM(s) Oral daily  diltiazem    milliGRAM(s) Oral daily  heparin  Injectable 5000 Unit(s) SubCutaneous every 12 hours  hydrALAZINE 10 milliGRAM(s) Oral two times a day  pantoprazole    Tablet 40 milliGRAM(s) Oral before breakfast  pramipexole 0.5 milliGRAM(s) Oral at bedtime  simvastatin 10 milliGRAM(s) Oral at bedtime    MEDICATIONS  (PRN):  acetaminophen   Tablet .. 650 milliGRAM(s) Oral every 6 hours PRN Temp greater or equal to 38C (100.4F), Mild Pain (1 - 3)    Allergies    No Known Allergies    Intolerances    REVIEW OF SYSTEMS:    CONSTITUTIONAL: + weakness.  No fevers or chills.  + unintentional weight loss of 10 lbs within 1 month  EYES/ENT: No visual changes;  No vertigo or throat pain   NECK: + neck pain and stiffness  RESPIRATORY: No cough, wheezing, hemoptysis; + shortness of breath + CABA  CARDIOVASCULAR: No chest pain or palpitations  GASTROINTESTINAL: No abdominal pain. No nausea, vomiting, or hematemesis; No diarrhea or constipation. No melena or hematochezia.  GENITOURINARY: No dysuria, frequency or hematuria  NEUROLOGICAL: No numbness or weakness  SKIN: No itching or rash  All other review of systems is negative unless indicated above    VITAL SIGNS:   Vital Signs Last 24 Hrs  T(C): 37.2 (20 May 2019 14:08), Max: 37.2 (20 May 2019 14:08)  T(F): 98.9 (20 May 2019 14:08), Max: 98.9 (20 May 2019 14:08)  HR: 99 (20 May 2019 14:08) (99 - 99)  BP: 117/49 (20 May 2019 14:08) (117/49 - 117/49)  BP(mean): --  RR: 14 (20 May 2019 14:08) (14 - 14)  SpO2: 97% (20 May 2019 14:08) (97% - 97%)    I&O's Summary      On Exam:    Constitutional: NAD, alert and oriented x 3  HEENT MMM, Anicteric   Lungs:  Non-labored, breath sounds are diminished bilaterally, No wheezing or rhonchi. + fine rales at bases L>R  Cardiovascular: RRR.  S1 and S2 positive.  No murmurs, rubs, gallops or clicks  Gastrointestinal: Bowel Sounds present, soft, nontender.   Lymph: No peripheral edema. No cervical lymphadenopathy.  Neurological: Alert, no focal deficits  Skin: No rashes or ulcers   Psych:  Mood & affect appropriate.    LABS: All Labs Reviewed:                        7.3    9.22  )-----------( 252      ( 20 May 2019 15:14 )             23.1     20 May 2019 15:14    137    |  107    |  33     ----------------------------<  90     3.8     |  17     |  2.40     Ca    8.2        20 May 2019 15:14  Mg     2.0       20 May 2019 15:14    TPro  6.3    /  Alb  2.5    /  TBili  0.3    /  DBili  .10    /  AST  47     /  ALT  32     /  AlkPhos  117    20 May 2019 15:14    PT/INR - ( 20 May 2019 15:14 )   PT: 14.2 sec;   INR: 1.25 ratio       PTT - ( 20 May 2019 15:14 )  PTT:32.2 sec    Blood Culture:   05-20 @ 15:14  Pro Bnp [id number]    RADIOLOGY:  < from: Xray Chest 2 Views PA/Lat (05.20.19 @ 16:02) >    EXAM:  XR CHEST PA LAT 2V                          PROCEDURE DATE:  05/20/2019      INTERPRETATION:  Clinical information: Shortness of breath.    Technique: PA and lateral views of the chest.    Comparison: Prior chest x-ray examination from4/13/2019.    Findings: There are new small bilateral pleural effusions with adjacent   atelectasis. The heart size is mildly enlarged. There is redemonstration   of a lower anterior cervical discectomy and fusion. Otherwise, the   visualized osseousstructures are unremarkable.    IMPRESSION: New small bilateral pleural effusions with adjacent atelectasis.    Unchanged mild cardiomegaly.    LONNIE TRUJILLO M.D., ATTENDING RADIOLOGIST  This document has been electronically signed. May 20 2019  4:07PM      < end of copied text >    EKG: SR with left atrial enlargement. [facility] - Chao Lomax, Kunal, Regino, [place], Elva Islas  Office Number: [phone]    Initial Consult Note    CHIEF COMPLAINT: Patient is a 63y old  Female who presents with a chief complaint of CABA, weakness (20 May 2019 15:29)      HPI:  This is a 63 F with PMH of HTN CKD PAD depression smoker who came in c/o 5 day h/o increased CABA and weakness. She denies CP, n/v/d, palpitations, BRBPR, melena. + anorexia. Ten pound weight loss in 1 month. Patient had an EGD/colon last week which she says was normal. She is also complaining of orthopnea.     Past medical history is also remarkable for peripheral arterial disease status post right femoral endarterectomy for claudication, stenosis 75% right internal carotid artery stenosis. She had a unremarkable echo and negative pharmacologic stress nuclear evaluation in March of 2017.      PAST MEDICAL & SURGICAL HISTORY:  Hypertension  Restless leg syndrome: Especially with General Anesthesia  Arthritis: Cervical Spine  and Hands  H/O cervical discectomy  Acute peptic ulcer with perforation: 1980  S/P appendectomy: 2014  S/P tubal ligation: 1986  Osteoarthritis of right shoulder region: Right Shoulder Arthroscopy  2007  Rheumatoid arthritis of carpometacarpal joint of thumb: Total Joint Replacement of Left Thumb    SOCIAL HISTORY:  No tobacco, ethanol, or drug abuse.    FAMILY HISTORY:  No pertinent family history in first degree relatives    No family history of acute MI or sudden cardiac death.    MEDICATIONS  (STANDING):  buPROPion XL . 150 milliGRAM(s) Oral daily  clopidogrel Tablet 75 milliGRAM(s) Oral daily  diltiazem    milliGRAM(s) Oral daily  heparin  Injectable 5000 Unit(s) SubCutaneous every 12 hours  hydrALAZINE 10 milliGRAM(s) Oral two times a day  pantoprazole    Tablet 40 milliGRAM(s) Oral before breakfast  pramipexole 0.5 milliGRAM(s) Oral at bedtime  simvastatin 10 milliGRAM(s) Oral at bedtime    MEDICATIONS  (PRN):  acetaminophen   Tablet .. 650 milliGRAM(s) Oral every 6 hours PRN Temp greater or equal to 38C (100.4F), Mild Pain (1 - 3)    Allergies    No Known Allergies    Intolerances    REVIEW OF SYSTEMS:    CONSTITUTIONAL: + weakness.  No fevers or chills.  + unintentional weight loss of 10 lbs within 1 month  EYES/ENT: No visual changes;  No vertigo or throat pain   NECK: + neck pain and stiffness  RESPIRATORY: No cough, wheezing, hemoptysis; + shortness of breath + CABA  CARDIOVASCULAR: No chest pain or palpitations  GASTROINTESTINAL: No abdominal pain. No nausea, vomiting, or hematemesis; No diarrhea or constipation. No melena or hematochezia.  GENITOURINARY: No dysuria, frequency or hematuria  NEUROLOGICAL: No numbness or weakness  SKIN: No itching or rash  All other review of systems is negative unless indicated above    VITAL SIGNS:   Vital Signs Last 24 Hrs  T(C): 37.2 (20 May 2019 14:08), Max: 37.2 (20 May 2019 14:08)  T(F): 98.9 (20 May 2019 14:08), Max: 98.9 (20 May 2019 14:08)  HR: 99 (20 May 2019 14:08) (99 - 99)  BP: 117/49 (20 May 2019 14:08) (117/49 - 117/49)  BP(mean): --  RR: 14 (20 May 2019 14:08) (14 - 14)  SpO2: 97% (20 May 2019 14:08) (97% - 97%)    I&O's Summary      On Exam:    Constitutional: NAD, alert and oriented x 3  HEENT MMM, Anicteric   Lungs:  Non-labored, breath sounds are diminished bilaterally, No wheezing or rhonchi. + fine rales at bases L>R  Cardiovascular: RRR.  S1 and S2 positive.  No murmurs, rubs, gallops or clicks  Gastrointestinal: Bowel Sounds present, soft, nontender.   Lymph: No peripheral edema. No cervical lymphadenopathy.  Neurological: Alert, no focal deficits  Skin: No rashes or ulcers   Psych:  Mood & affect appropriate.    LABS: All Labs Reviewed:                        7.3    9.22  )-----------( 252      ( 20 May 2019 15:14 )             23.1     20 May 2019 15:14    137    |  107    |  33     ----------------------------<  90     3.8     |  17     |  2.40     Ca    8.2        20 May 2019 15:14  Mg     2.0       20 May 2019 15:14    TPro  6.3    /  Alb  2.5    /  TBili  0.3    /  DBili  .10    /  AST  47     /  ALT  32     /  AlkPhos  117    20 May 2019 15:14    PT/INR - ( 20 May 2019 15:14 )   PT: 14.2 sec;   INR: 1.25 ratio       PTT - ( 20 May 2019 15:14 )  PTT:32.2 sec    Blood Culture:   05-20 @ 15:14  Pro Bnp [id number]    RADIOLOGY:  < from: Xray Chest 2 Views PA/Lat (05.20.19 @ 16:02) >    EXAM:  XR CHEST PA LAT 2V                          PROCEDURE DATE:  05/20/2019      INTERPRETATION:  Clinical information: Shortness of breath.    Technique: PA and lateral views of the chest.    Comparison: Prior chest x-ray examination from4/13/2019.    Findings: There are new small bilateral pleural effusions with adjacent   atelectasis. The heart size is mildly enlarged. There is redemonstration   of a lower anterior cervical discectomy and fusion. Otherwise, the   visualized osseousstructures are unremarkable.    IMPRESSION: New small bilateral pleural effusions with adjacent atelectasis.    Unchanged mild cardiomegaly.    LONNIE TRUJILLO M.D., ATTENDING RADIOLOGIST  This document has been electronically signed. May 20 2019  4:07PM      < end of copied text >    EKG: SR with left atrial enlargement.

## 2019-05-20 NOTE — CONSULT NOTE ADULT - ASSESSMENT
Assessment/Plan:  63y Female with HTN, CKD, PAD s/p right femoral endarterectomy, depression, and current smoker presented to the ED c/o SOB and weakness x 5 days and a 10-lb weight loss within a month. Assessment/Plan:  63y Female with HTN, CKD, PAD s/p right femoral endarterectomy, depression, and smoker (quit 5 days ago) presented to the ED c/o SOB and weakness that started in April but have gotten worse 5 days and a 10-lb weight loss within a month with less appetitie.  Patient states, she had to stop to catch her breath in going to the bathroom from her living room.  She used to be a very active woman until recently.  Denies edema, however, admits to orthopnea.  Denies CP or palpitations, though, she was here last month with c/o neck pain radiating to her chest, for which she though was an MI.  Her workup for ACS and PE was negative and she was sent home.  However, she was found to be anemic and was placed on Iron.  Her EGD last week was negative.  She denies any melena.  Denies fever chills, nausea, vomiting, diarrhea, constipation, abdominal pain    In the ED, her CxR showed small B/L pleural effusion, pro-BNP>08727, H/H 7.3/23.1, procalcitonin=1.12.  She is now receiving 1 unit of PRBC's.  Upon evaluation, she is dyspneic during conversation and requiring her HOB elevated.  She also has elevated creatinine (2.8).    She follows Dr. Lacy as outpatient and claims to have had negative cardiac workup     Recommend:    Heart Failure  - Admit to medicine floor  - She appears volume overloaded, though, no evidence of edema or JVD.  CxR showed bibasilar pleural effusion  - Please give Lasix 20 mg IV x 1 post transfusion  - Start Lasix 20 mg IV daily.  Patient is Lasix-naive  - Monitor strict I & O   - Daily weights  - Monitor electrolytes, replete to keep K>4 and Mag>2  - TTE to assess LVF and valvular structures    INA on CKD  - Monitor renal function  - Avoid nephrotoxics    Anemia  - Transfuse per Primary  - Follow up anemia w/u  - FOBT  - Heme eval    PAD  - Continue Plavix    DVT ppx  - PAS for now    Smoker  - Education on smoking cessation    Further cardiac workup pending clinical course  Will continue to follow    Judi Villanueva [facility]  Cardiology Assessment/Plan:  63y Female with HTN, CKD, PAD s/p right femoral endarterectomy, depression, and smoker (quit 5 days ago) presented to the ED c/o SOB and weakness that started in April but have gotten worse 5 days and a 10-lb weight loss within a month with less appetitie.  Patient states, she had to stop to catch her breath in going to the bathroom from her living room.  She used to be a very active woman until recently.  Denies edema, however, admits to orthopnea.  Denies CP or palpitations, though, she was here last month with c/o neck pain radiating to her chest, for which she though was an MI.  Her workup for ACS and PE was negative and she was sent home.  However, she was found to be anemic and was placed on Iron.  Her EGD last week was negative.  She denies any melena.  Denies fever chills, nausea, vomiting, diarrhea, constipation, abdominal pain    In the ED, her CxR showed small B/L pleural effusion, pro-BNP>53427, H/H 7.3/23.1, procalcitonin=1.12.  She is now receiving 1 unit of PRBC's.  Upon evaluation, she is dyspneic during conversation and requiring her HOB elevated.  She also has elevated creatinine (2.8).    She follows Dr. Lacy as outpatient and claims to have had negative cardiac workup     Recommend:  - Likely in high output HF  - Admit to medicine floor. consider remote tele  - She appears volume overloaded, though, no evidence of edema or JVD.  CxR showed bibasilar pleural effusion  - Please give Lasix 40 mg IV x 1 post transfusion  - Start Lasix 40 mg IV daily.     - Please continue to maintain strict I/Os, monitor daily weights, Cr, and K.   - Monitor electrolytes, replete to keep K>4 and Mag>2  - TTE to assess LVF and valvular structures    INA on CKD  - Monitor renal function  - Avoid nephrotoxics    Anemia  - Transfuse per Primary  - Follow up anemia w/u  - FOBT  - Heme eval  - Would hold plavix.     PAD  - would monitor for claudication and and signs of limb ischemia.     DVT ppx  - PAS for now    Smoker  - Education on smoking cessation    Further cardiac workup pending clinical course  Will continue to follow    Judi Villanueva [facility]  Cardiology Assessment/Plan:  63y Female with HTN, CKD, PAD s/p right femoral endarterectomy, depression, and smoker (quit 5 days ago) presented to the ED c/o SOB and weakness that started in April but have gotten worse 5 days and a 10-lb weight loss within a month with less appetitie.  Patient states, she had to stop to catch her breath in going to the bathroom from her living room.  She used to be a very active woman until recently.  Denies edema, however, admits to orthopnea.  Denies CP or palpitations, though, she was here last month with c/o neck pain radiating to her chest, for which she though was an MI.  Her workup for ACS and PE was negative and she was sent home.  However, she was found to be anemic and was placed on Iron.  Her EGD last week was negative.  She denies any melena.  Denies fever chills, nausea, vomiting, diarrhea, constipation, abdominal pain    In the ED, her CxR showed small B/L pleural effusion, pro-BNP>39512, H/H 7.3/23.1, procalcitonin=1.12.  She is now receiving 1 unit of PRBC's.  Upon evaluation, she is dyspneic during conversation and requiring her HOB elevated.  She also has elevated creatinine (2.8), baseline = 2.1    She follows Dr. Lacy as outpatient and claims to have had negative cardiac workup.  TTE in 2017 with normal LVF and no segmental WMA.  Nuclear stress test (Pharm) normal.    Recommend:    - Likely in high output HF  - Admit to medicine floor. consider remote tele  - She appears volume overloaded, though, no evidence of edema or JVD.  CxR showed bibasilar pleural effusion  - Please give Lasix 40 mg IV x 1 post transfusion  - Start Lasix 40 mg IV daily.     - Please continue to maintain strict I/Os, monitor daily weights, Cr, and K.   - Monitor electrolytes, replete to keep K>4 and Mag>2  - TTE to assess LVF and valvular structures    INA on CKD  - Monitor renal function  - Avoid nephrotoxics    Anemia  - Transfuse per Primary  - Follow up anemia w/u  - FOBT  - Heme eval  - Would hold plavix.     PAD  - would monitor for claudication and and signs of limb ischemia.     DVT ppx  - PAS for now    Smoker  - Education on smoking cessation    Further cardiac workup pending clinical course  Will continue to follow    Judi Villanueva DNP  Cardiology

## 2019-05-20 NOTE — H&P ADULT - PROBLEM SELECTOR PLAN 2
INA on CKD 4  Monitor Cr  Renal u/s  Avoid nephrotoxic medications  Renal consult  Further work-up/management pending clinical course.

## 2019-05-20 NOTE — H&P ADULT - HISTORY OF PRESENT ILLNESS
This is a 63 F with PMH of HTN CKD PAD depression smoker who came in c/o 5 day h/o increased CABA and weakness. She denies CP, n/v/d, palpitations, BRBPR, melena. + anorexia. Ten pound weight loss in 1 month. Patient had an EGD/colon last week which she says was normal. This is a 63 F with PMH of HTN CKD PAD depression smoker who came in c/o 5 day h/o increased CABA and weakness. She denies CP, n/v/d, palpitations, BRBPR, melena. + anorexia. Ten pound weight loss in 1 month. Patient had an EGD/colon last week which she says was normal.    Past medical history is also remarkable for peripheral arterial disease status post right femoral endarterectomy for claudication, stenosis 75% right internal carotid artery stenosis. She had a unremarkable echo and negative pharmacologic stress nuclear evaluation in March of 2017.

## 2019-05-20 NOTE — ED ADULT NURSE NOTE - OBJECTIVE STATEMENT
Pt. received alert and oriented x3 with chief complaint of SOB w/ exertion and generalized weakness for 1 month.

## 2019-05-20 NOTE — ED PROVIDER NOTE - ATTENDING CONTRIBUTION TO CARE
pt with c/o symptomatic anemia hemoccult positive, elevated LDH.  imaging neg for acute pathology. admit for GI and trend H/H

## 2019-05-20 NOTE — ED PROVIDER NOTE - CHPI ED SYMPTOMS NEG
no decreased eating/drinking/no pain/no fever/no headache/no vomiting/no loss of consciousness/no nausea

## 2019-05-20 NOTE — H&P ADULT - PROBLEM SELECTOR PLAN 1
Admit  Iron studies, B12, folate, ferritin, haptoglobin, LDH, reticulocyte count  SPEP, UPEP  Transfuse 1 unit PRBC  Hematology consult  Further work-up/management pending clinical course.

## 2019-05-20 NOTE — CHART NOTE - NSCHARTNOTEFT_GEN_A_CORE
Called b/c pt desaturating in to mid-80s -- likely fluid overload  Lasix 20mg ivp x 1 stat and then qday  Check LE u/s to r/o DVT  Check CT chest to evaluate effusions  Consider V/Q scan in AM if above work-up negative and no improvement with lasix.  Oxygen 2L NC and titrate as needed   Continuous pulse oximetry  Pulmonary consult    >31 min spent

## 2019-05-20 NOTE — H&P ADULT - NSICDXPASTSURGICALHX_GEN_ALL_CORE_FT
PAST SURGICAL HISTORY:  Acute peptic ulcer with perforation 1980    H/O cervical discectomy     Osteoarthritis of right shoulder region Right Shoulder Arthroscopy  2007    Rheumatoid arthritis of carpometacarpal joint of thumb Total Joint Replacement of Left Thumb    S/P appendectomy 2014    S/P tubal ligation 1986

## 2019-05-20 NOTE — CONSULT NOTE ADULT - ATTENDING COMMENTS
I saw and examined the patient personally. Spoke with above provider regarding this case. I reviewed the above findings completely.  I agree with the above history, physical, and plan which I have edited where appropriate.     Appears that pt is likely symptomatic from anemia. Tx PRBCs. Appears mildly vol ol.   lasix 40mg IV.  hold antiplatelets  nabila carson I saw and examined the patient personally. Spoke with above provider regarding this case. I reviewed the above findings completely.  I agree with the above history, physical, and plan which I have edited where appropriate.      Appears that pt is likely symptomatic from anemia. Tx PRBCs. Appears mildly vol ol.   lasix 40mg IV.  hold antiplatelets  nabila carson

## 2019-05-20 NOTE — H&P ADULT - PROBLEM SELECTOR PLAN 3
Continue hydralazine and diltiazem Likely secondary to anemia  Transfuse 1 unit PRBC  Check ECHO due to elevated proBNP  Check CXR  Further work-up/management pending clinical course.

## 2019-05-21 DIAGNOSIS — D64.9 ANEMIA, UNSPECIFIED: ICD-10-CM

## 2019-05-21 LAB
ALBUMIN SERPL ELPH-MCNC: 2.4 G/DL — LOW (ref 3.3–5)
ALP SERPL-CCNC: 119 U/L — SIGNIFICANT CHANGE UP (ref 40–120)
ALT FLD-CCNC: 35 U/L — SIGNIFICANT CHANGE UP (ref 12–78)
ANION GAP SERPL CALC-SCNC: 11 MMOL/L — SIGNIFICANT CHANGE UP (ref 5–17)
AST SERPL-CCNC: 43 U/L — HIGH (ref 15–37)
BASOPHILS # BLD AUTO: 0.02 K/UL — SIGNIFICANT CHANGE UP (ref 0–0.2)
BASOPHILS NFR BLD AUTO: 0.2 % — SIGNIFICANT CHANGE UP (ref 0–2)
BILIRUB SERPL-MCNC: 0.6 MG/DL — SIGNIFICANT CHANGE UP (ref 0.2–1.2)
BUN SERPL-MCNC: 35 MG/DL — HIGH (ref 7–23)
CALCIUM SERPL-MCNC: 8.1 MG/DL — LOW (ref 8.5–10.1)
CHLORIDE SERPL-SCNC: 109 MMOL/L — HIGH (ref 96–108)
CO2 SERPL-SCNC: 17 MMOL/L — LOW (ref 22–31)
CREAT SERPL-MCNC: 2.4 MG/DL — HIGH (ref 0.5–1.3)
CRP SERPL-MCNC: 11.34 MG/DL — HIGH (ref 0–0.4)
EOSINOPHIL # BLD AUTO: 0 K/UL — SIGNIFICANT CHANGE UP (ref 0–0.5)
EOSINOPHIL NFR BLD AUTO: 0 % — SIGNIFICANT CHANGE UP (ref 0–6)
FERRITIN SERPL-MCNC: 136 NG/ML — SIGNIFICANT CHANGE UP (ref 15–150)
FOLATE SERPL-MCNC: 14.4 NG/ML — SIGNIFICANT CHANGE UP
GLUCOSE SERPL-MCNC: 147 MG/DL — HIGH (ref 70–99)
HAPTOGLOB SERPL-MCNC: 355 MG/DL — HIGH (ref 34–200)
HCT VFR BLD CALC: 30.1 % — LOW (ref 34.5–45)
HGB BLD-MCNC: 9.7 G/DL — LOW (ref 11.5–15.5)
IMM GRANULOCYTES NFR BLD AUTO: 0.8 % — SIGNIFICANT CHANGE UP (ref 0–1.5)
IRON SATN MFR SERPL: 17 UG/DL — LOW (ref 30–160)
IRON SATN MFR SERPL: 8 % — LOW (ref 14–50)
LDH SERPL L TO P-CCNC: 283 U/L — HIGH (ref 50–242)
LYMPHOCYTES # BLD AUTO: 0.88 K/UL — LOW (ref 1–3.3)
LYMPHOCYTES # BLD AUTO: 6.9 % — LOW (ref 13–44)
MAGNESIUM SERPL-MCNC: 2.1 MG/DL — SIGNIFICANT CHANGE UP (ref 1.6–2.6)
MCHC RBC-ENTMCNC: 27.2 PG — SIGNIFICANT CHANGE UP (ref 27–34)
MCHC RBC-ENTMCNC: 32.2 GM/DL — SIGNIFICANT CHANGE UP (ref 32–36)
MCV RBC AUTO: 84.3 FL — SIGNIFICANT CHANGE UP (ref 80–100)
MONOCYTES # BLD AUTO: 0.58 K/UL — SIGNIFICANT CHANGE UP (ref 0–0.9)
MONOCYTES NFR BLD AUTO: 4.6 % — SIGNIFICANT CHANGE UP (ref 2–14)
NEUTROPHILS # BLD AUTO: 11.09 K/UL — HIGH (ref 1.8–7.4)
NEUTROPHILS NFR BLD AUTO: 87.5 % — HIGH (ref 43–77)
NRBC # BLD: 0 /100 WBCS — SIGNIFICANT CHANGE UP (ref 0–0)
OB PNL STL: POSITIVE
PLATELET # BLD AUTO: 249 K/UL — SIGNIFICANT CHANGE UP (ref 150–400)
POTASSIUM SERPL-MCNC: 4.5 MMOL/L — SIGNIFICANT CHANGE UP (ref 3.5–5.3)
POTASSIUM SERPL-SCNC: 4.5 MMOL/L — SIGNIFICANT CHANGE UP (ref 3.5–5.3)
PROT SERPL-MCNC: 6.1 G/DL — SIGNIFICANT CHANGE UP (ref 6–8.3)
RBC # BLD: 3.57 M/UL — LOW (ref 3.8–5.2)
RBC # FLD: 16.7 % — HIGH (ref 10.3–14.5)
SODIUM SERPL-SCNC: 137 MMOL/L — SIGNIFICANT CHANGE UP (ref 135–145)
TIBC SERPL-MCNC: 223 UG/DL — SIGNIFICANT CHANGE UP (ref 220–430)
UIBC SERPL-MCNC: 206 UG/DL — SIGNIFICANT CHANGE UP (ref 110–370)
VIT B12 SERPL-MCNC: 643 PG/ML — SIGNIFICANT CHANGE UP (ref 232–1245)
WBC # BLD: 12.67 K/UL — HIGH (ref 3.8–10.5)
WBC # FLD AUTO: 12.67 K/UL — HIGH (ref 3.8–10.5)

## 2019-05-21 PROCEDURE — 99232 SBSQ HOSP IP/OBS MODERATE 35: CPT

## 2019-05-21 PROCEDURE — 93306 TTE W/DOPPLER COMPLETE: CPT | Mod: 26

## 2019-05-21 RX ORDER — AZITHROMYCIN 500 MG/1
500 TABLET, FILM COATED ORAL ONCE
Refills: 0 | Status: COMPLETED | OUTPATIENT
Start: 2019-05-21 | End: 2019-05-21

## 2019-05-21 RX ORDER — CEFTRIAXONE 500 MG/1
1 INJECTION, POWDER, FOR SOLUTION INTRAMUSCULAR; INTRAVENOUS EVERY 24 HOURS
Refills: 0 | Status: DISCONTINUED | OUTPATIENT
Start: 2019-05-22 | End: 2019-05-22

## 2019-05-21 RX ORDER — CEFTRIAXONE 500 MG/1
1 INJECTION, POWDER, FOR SOLUTION INTRAMUSCULAR; INTRAVENOUS ONCE
Refills: 0 | Status: COMPLETED | OUTPATIENT
Start: 2019-05-21 | End: 2019-05-21

## 2019-05-21 RX ORDER — FUROSEMIDE 40 MG
20 TABLET ORAL ONCE
Refills: 0 | Status: COMPLETED | OUTPATIENT
Start: 2019-05-21 | End: 2019-05-21

## 2019-05-21 RX ORDER — ALBUTEROL 90 UG/1
2.5 AEROSOL, METERED ORAL EVERY 8 HOURS
Refills: 0 | Status: DISCONTINUED | OUTPATIENT
Start: 2019-05-21 | End: 2019-05-28

## 2019-05-21 RX ORDER — FUROSEMIDE 40 MG
40 TABLET ORAL DAILY
Refills: 0 | Status: DISCONTINUED | OUTPATIENT
Start: 2019-05-22 | End: 2019-05-22

## 2019-05-21 RX ORDER — AZITHROMYCIN 500 MG/1
500 TABLET, FILM COATED ORAL EVERY 24 HOURS
Refills: 0 | Status: DISCONTINUED | OUTPATIENT
Start: 2019-05-22 | End: 2019-05-22

## 2019-05-21 RX ORDER — CEFTRIAXONE 500 MG/1
INJECTION, POWDER, FOR SOLUTION INTRAMUSCULAR; INTRAVENOUS
Refills: 0 | Status: DISCONTINUED | OUTPATIENT
Start: 2019-05-21 | End: 2019-05-22

## 2019-05-21 RX ORDER — AZITHROMYCIN 500 MG/1
TABLET, FILM COATED ORAL
Refills: 0 | Status: DISCONTINUED | OUTPATIENT
Start: 2019-05-21 | End: 2019-05-22

## 2019-05-21 RX ORDER — IRON SUCROSE 20 MG/ML
100 INJECTION, SOLUTION INTRAVENOUS EVERY 24 HOURS
Refills: 0 | Status: COMPLETED | OUTPATIENT
Start: 2019-05-21 | End: 2019-05-23

## 2019-05-21 RX ADMIN — Medication 650 MILLIGRAM(S): at 02:03

## 2019-05-21 RX ADMIN — ALBUTEROL 2.5 MILLIGRAM(S): 90 AEROSOL, METERED ORAL at 07:46

## 2019-05-21 RX ADMIN — Medication 20 MILLIGRAM(S): at 18:57

## 2019-05-21 RX ADMIN — HEPARIN SODIUM 5000 UNIT(S): 5000 INJECTION INTRAVENOUS; SUBCUTANEOUS at 05:05

## 2019-05-21 RX ADMIN — Medication 360 MILLIGRAM(S): at 09:42

## 2019-05-21 RX ADMIN — AZITHROMYCIN 255 MILLIGRAM(S): 500 TABLET, FILM COATED ORAL at 13:31

## 2019-05-21 RX ADMIN — CEFTRIAXONE 100 GRAM(S): 500 INJECTION, POWDER, FOR SOLUTION INTRAMUSCULAR; INTRAVENOUS at 12:46

## 2019-05-21 RX ADMIN — PRAMIPEXOLE DIHYDROCHLORIDE 0.5 MILLIGRAM(S): 0.12 TABLET ORAL at 21:52

## 2019-05-21 RX ADMIN — SIMVASTATIN 10 MILLIGRAM(S): 20 TABLET, FILM COATED ORAL at 21:52

## 2019-05-21 RX ADMIN — IRON SUCROSE 100 MILLIGRAM(S): 20 INJECTION, SOLUTION INTRAVENOUS at 18:57

## 2019-05-21 RX ADMIN — ALBUTEROL 2.5 MILLIGRAM(S): 90 AEROSOL, METERED ORAL at 23:12

## 2019-05-21 RX ADMIN — BUPROPION HYDROCHLORIDE 150 MILLIGRAM(S): 150 TABLET, EXTENDED RELEASE ORAL at 12:20

## 2019-05-21 RX ADMIN — PANTOPRAZOLE SODIUM 40 MILLIGRAM(S): 20 TABLET, DELAYED RELEASE ORAL at 06:39

## 2019-05-21 RX ADMIN — Medication 650 MILLIGRAM(S): at 03:00

## 2019-05-21 RX ADMIN — CLOPIDOGREL BISULFATE 75 MILLIGRAM(S): 75 TABLET, FILM COATED ORAL at 12:46

## 2019-05-21 RX ADMIN — ALBUTEROL 2.5 MILLIGRAM(S): 90 AEROSOL, METERED ORAL at 14:24

## 2019-05-21 RX ADMIN — Medication 20 MILLIGRAM(S): at 12:20

## 2019-05-21 NOTE — CHART NOTE - NSCHARTNOTEFT_GEN_A_CORE
Upon Nutritional Assessment by the Registered Dietitian your patient was determined to meet criteria / has evidence of the following diagnosis/diagnoses:          [ ]  Mild Protein Calorie Malnutrition        [ ]  Moderate Protein Calorie Malnutrition        [X] Severe Protein Calorie Malnutrition        [ ] Unspecified Protein Calorie Malnutrition        [X] Underweight / BMI <19        [ ] Morbid Obesity / BMI > 40      Findings as based on:  [X] Comprehensive nutrition assessment   [X] Nutrition Focused Physical Exam: BMI: 18.2 kg/m2; Nutrition focused physical exam conducted - found signs of malnutrition [ ]absent [X]present   Subcutaneous fat loss: [X] Orbital fat pads region (moderate), [X]Buccal fat region (moderate), [X]Triceps region (severe),  Muscle wasting: [X]Temples region (severe), [X]Clavicle region (moderate), [X]Shoulder region (moderate), [X] Scapula region (moderate), [X]Interosseous region (mild)  [X] Other: Malnutrition (acute, severe), related to inadequate energy/protein intake in setting of acute illness as evidenced by 8.6% wt loss x 5 weeks, moderate-severe muscle and fat loss, and <75% of nutrition needs x1 month.      Nutrition Plan/Recommendations:    1) Liberalize diet to low sodium. Encourage po intake of nutrient dense, high kcal/protein meals and snacks. Monitor renal indices closely (pt with CKD stage 4) to determine need for renal restriction. Check current Phos level.  2) Add on Ensure Enlive daily for additional 350 kcal, 20 grams of protein. Add on Mighty shake daily for additional 200 kcal, 6 grams of protein. Discussed with covering MD and pending verification placed.  3) Multivitamin for micronutrient coverage.  4) Monitor po intake, weights, BMs, skin integrity, tolerance of diet consistency, and nutrition related labs.   5) RD to remain available.      PROVIDER Section:     By signing this assessment you are acknowledging and agree with the diagnosis/diagnoses assigned by the Registered Dietitian    Jumana Villatoro RD

## 2019-05-21 NOTE — DIETITIAN INITIAL EVALUATION ADULT. - FACTORS AFF FOOD INTAKE
increased lethargy per pt; difficulty breathing upon exertion. Patient states she has been sleeping through meals PTA./persistent lack of appetite/other (specify)

## 2019-05-21 NOTE — DIETITIAN INITIAL EVALUATION ADULT. - OTHER INFO
Pt seen for nutrition assessment for BMI <18 (current BMI 18.2 kg/m2). Per chart, pt with PMH of HTN, arthritis, admitted for CABA, weakness +anemia (S/P 1 unit PRBC). Pt reports moderate appetite/intake this AM, consumed 50% of breakfast (cereal, roll, tea). Denied N/V/diarrhea/constipation, last BM 5/21.

## 2019-05-21 NOTE — PROGRESS NOTE ADULT - SUBJECTIVE AND OBJECTIVE BOX
[facility] -- Chao Lomax, Regino Syed Pannella, Patel, Savella  Office # [phone]    Follow Up:  Weakness and SOB    Subjective/Observations:     REVIEW OF SYSTEMS: All other review of systems is negative unless indicated above    PAST MEDICAL & SURGICAL HISTORY:  Hypertension  Restless leg syndrome: Especially with General Anesthesia  Arthritis: Cervical Spine  and Hands  H/O cervical discectomy  Acute peptic ulcer with perforation: 1980  S/P appendectomy: 2014  S/P tubal ligation: 1986  Osteoarthritis of right shoulder region: Right Shoulder Arthroscopy  2007  Rheumatoid arthritis of carpometacarpal joint of thumb: Total Joint Replacement of Left Thumb    MEDICATIONS  (STANDING):  ALBUTerol    0.083% 2.5 milliGRAM(s) Nebulizer every 8 hours  azithromycin  IVPB      buPROPion XL . 150 milliGRAM(s) Oral daily  cefTRIAXone   IVPB      diltiazem    milliGRAM(s) Oral daily  furosemide   Injectable 20 milliGRAM(s) IV Push once  hydrALAZINE 10 milliGRAM(s) Oral two times a day  pantoprazole    Tablet 40 milliGRAM(s) Oral before breakfast  pramipexole 0.5 milliGRAM(s) Oral at bedtime  simvastatin 10 milliGRAM(s) Oral at bedtime    MEDICATIONS  (PRN):  acetaminophen   Tablet .. 650 milliGRAM(s) Oral every 6 hours PRN Temp greater or equal to 38C (100.4F), Mild Pain (1 - 3)      Allergies    No Known Allergie  Intolerances    Vital Signs Last 24 Hrs  T(C): 37.4 (21 May 2019 12:59), Max: 37.4 (20 May 2019 19:09)  T(F): 99.4 (21 May 2019 12:59), Max: 99.4 (20 May 2019 19:09)  HR: 85 (21 May 2019 14:26) (79 - 108)  BP: 149/60 (21 May 2019 12:59) (94/77 - 180/66)  BP(mean): --  RR: 19 (21 May 2019 12:59) (17 - 20)  SpO2: 95% (21 May 2019 14:26) (91% - 99%)    I&O's Summary    21 May 2019 07:01  -  21 May 2019 16:26  --------------------------------------------------------  IN: 0 mL / OUT: 300 mL / NET: -300 mL    PHYSICAL EXAM:  TELE: Not on tele  Constitutional: NAD, awake and alert, well-developed  HEENT: Moist Mucous Membranes, Anicteric  Pulmonary: Non-labored, breath sounds are clear bilaterally, No wheezing, rales or rhonchi  Cardiovascular: Regular, S1 and S2, No murmurs, rubs, gallops or clicks  Gastrointestinal: Bowel Sounds present, soft, nontender.   Lymph: No peripheral edema. No lymphadenopathy.  Skin: No visible rashes or ulcers.  Psych:  Mood & affect appropriate    LABS: All Labs Reviewed:                     9.7    12.67 )-----------( 249      ( 21 May 2019 10:48 )             30.1                         7.3    9.22  )-----------( 252      ( 20 May 2019 15:14 )             23.1     21 May 2019 10:48    137    |  109    |  35     ----------------------------<  147    4.5     |  17     |  2.40   20 May 2019 15:14    137    |  107    |  33     ----------------------------<  90     3.8     |  17     |  2.40     Ca    8.1        21 May 2019 10:48  Ca    8.2        20 May 2019 15:14  Mg     2.1       21 May 2019 10:48  Mg     2.0       20 May 2019 15:14    TPro  6.1    /  Alb  2.4    /  TBili  0.6    /  DBili  x      /  AST  43     /  ALT  35     /  AlkPhos  119    21 May 2019 10:48  TPro  6.3    /  Alb  2.5    /  TBili  0.3    /  DBili  .10    /  AST  47     /  ALT  32     /  AlkPhos  117    20 May 2019 15:14    PT/INR - ( 20 May 2019 15:14 )   PT: 14.2 sec;   INR: 1.25 ratio     PTT - ( 20 May 2019 15:14 )  PTT:32.2 sec    < from: CT Chest No Cont (05.20.19 @ 22:27) >    EXAM:  CT CHEST                            PROCEDURE DATE:  05/20/2019          INTERPRETATION:  [facility] RADIOLOGIST PRELIMINARY REPORT    EXAM:    CT Chest Without Contrast     EXAM DATE/TIME:    5/20/2019 10:13 PM     CLINICAL HISTORY:    63 years old, female; Signs and symptoms; Other: Weight loss, anemia;   Shortness of breath; Prior surgery; Surgery type: Exploratory, tubal   ligation     TECHNIQUE:    Imaging protocol: Axial computed tomography images of the chest without   intravenous contrast. Coronal and sagittal reformatted images were   created and   reviewed.    3D rendering: MIP reconstructed images were created and reviewed.     COMPARISON:    DX XR CHEST PA AND LATERAL 5/20/2019 3:59 PM     FINDINGS:    Lungs: Smooth interlobular septal thickening compatible with hydrostatic   interstitial pulmonary edema/CHF. Patchy airspace opacity in the lungs   bilaterally, compatible with pneumonia.    Pleural space: Small bilateral pleural effusions.    Heart: Normal. No cardiomegaly. No pericardial effusion.    Mediastinum: Esophagus is unremarkable.    Aorta: Normal. No aortic aneurysm.    Lymph nodes: Unremarkable. No enlarged lymph nodes.    Bones/joints: Unremarkable. No acute fracture.    Soft tissues: Unremarkable.     IMPRESSION:   1. Hydrostatic interstitial pulmonary edema/CHF.   2. Small bilateral pleural effusions.   3. Multifocal bilateral pneumonia.       =========================  EXAM:    CT Abdomen and Pelvis Without Contrast     EXAM DATE/TIME:    5/20/2019 10:13 PM     CLINICAL HISTORY:    63 years old, female; Signs and symptoms; Other: Weight loss, anemia;   Shortness of breath; Prior surgery; Surgery type: Exploratory, tubal   ligation     TECHNIQUE:    Imaging protocol: Axial computed tomography images of the abdomen and   pelvis   without contrast. Coronal and sagittal reformatted images were created   and   reviewed.    3D rendering: MIP reconstructed images were created and reviewed.     COMPARISON:    DX XR CHEST PA AND LATERAL 5/20/2019 3:59 PM     FINDINGS:     ABDOMEN:    Liver: Normal. No mass.    Gallbladder and bile ducts: Normal. No calcified stones. No ductal   dilation.    Pancreas: Normal. No ductal dilation.    Spleen: Normal. No splenomegaly.    Adrenals: Normal. No mass.    Kidneys and ureters: No hydronephrosis or urolithiasis. Cortical   thinning.   Stomach and bowel: Colonic diverticulosis. No diverticulitis. No bowel   wall   thickening or intestinal obstruction.    Appendix: Appendix not visualized. No evidence of appendicitis.     PELVIS:    Bladder: Unremarkable as visualized.    Reproductive: Unremarkable as visualized.     ABDOMEN and PELVIS:    Intraperitoneal space: Normal. No free air. No significant fluid   collection.    Bones/joints: No acute fracture. No dislocation.    Soft tissues: Unremarkable.    Vasculature: Normal. No abdominal aortic aneurysm.    Lymph nodes: Normal. No enlarged lymph nodes.    Other findings: 2.5 cm AAA. No rupture.     IMPRESSION:   No acute findings.    Agree with above report    NATIVIDAD ZAMORA M.D., ATTENDING RADIOLOGIST  This document has been electronically signed. May 21 2019 10:20AM      < end of copied text >    < from: CT Abdomen and Pelvis No Cont (05.20.19 @ 22:25) >    EXAM:  CT ABDOMEN AND PELVIS                          PROCEDURE DATE:  05/20/2019      INTERPRETATION:  [facility] RADIOLOGIST PRELIMINARY REPORT    EXAM:    CT Chest Without Contrast     EXAM DATE/TIME:    5/20/2019 10:13 PM     CLINICAL HISTORY:    63 years old, female; Signs and symptoms; Other: Weight loss, anemia;   Shortness of breath; Prior surgery; Surgery type: Exploratory, tubal   ligation     TECHNIQUE:    Imaging protocol: Axial computed tomography images of the chest without   intravenous contrast. Coronal and sagittal reformatted images were   created and   reviewed.    3D rendering: MIP reconstructed images were created and reviewed.     COMPARISON:    DX XR CHEST PA AND LATERAL 5/20/2019 3:59 PM     FINDINGS:    Lungs: Smooth interlobular septal thickening compatible with hydrostatic   interstitial pulmonary edema/CHF. Patchy airspace opacity in the lungs   bilaterally, compatible with pneumonia.    Pleural space: Small bilateral pleural effusions.    Heart: Normal. No cardiomegaly. No pericardial effusion.    Mediastinum: Esophagus is unremarkable.    Aorta: Normal. No aortic aneurysm.    Lymph nodes: Unremarkable. No enlarged lymph nodes.    Bones/joints: Unremarkable. No acute fracture.    Soft tissues: Unremarkable.     IMPRESSION:   1. Hydrostatic interstitial pulmonary edema/CHF.   2. Small bilateral pleural effusions.   3. Multifocal bilateral pneumonia.     =========================  EXAM:    CT Abdomen and Pelvis Without Contrast     EXAM DATE/TIME:   5/20/2019 10:13 PM     CLINICAL HISTORY:    63 years old, female; Signs and symptoms; Other: Weight loss, anemia;   Shortness of breath; Prior surgery; Surgery type: Exploratory, tubal   ligation     TECHNIQUE:    Imaging protocol: Axial computed tomography images of the abdomen and   pelvis   without contrast. Coronal and sagittal reformatted images were created   and   reviewed.    3D rendering: MIP reconstructed images were created and reviewed.     COMPARISON:    DX XR CHEST PA AND LATERAL 5/20/2019 3:59 PM     FINDINGS:     ABDOMEN:    Liver: Normal. No mass.    Gallbladder and bile ducts: Normal. No calcified stones. No ductal   dilation.    Pancreas: Normal. No ductal dilation.    Spleen: Normal. No splenomegaly.    Adrenals: Normal. No mass.    Kidneys and ureters: Chronic medical renal disease.    Stomach and bowel: Colonic diverticulosis. No diverticulitis. No bowel   wall   thickening or intestinal obstruction.    Appendix: Appendix not visualized. No evidence of appendicitis.     PELVIS:    Bladder: Unremarkable as visualized.    Reproductive: Unremarkable as visualized.     ABDOMEN and PELVIS:    Intraperitoneal space: Normal. No free air. No significant fluid   collection.    Bones/joints: No acute fracture. No dislocation.    Soft tissues: Unremarkable.    Vasculature: Normal. No abdominal aortic aneurysm.    Lymph nodes: Normal. No enlarged lymph nodes.    Other findings: 2.5 cm AAA. No rupture.     IMPRESSION:   No acute findings.    Agree with above report    NATIVIDAD ZAMORA M.D., ATTENDING RADIOLOGIST  This document has been electronically signed. May 21 2019 10:36AM      < end of copied text >  	   < from: US Duplex Venous Lower Ext Complete, Bilateral (05.20.19 @ 22:12) >    EXAM:  US DPLX LWR EXT VEINS COMPL BI                            PROCEDURE DATE:  05/20/2019          INTERPRETATION:  Venous Duplex Ultrasound of the Lower Extremities    Clinical information: Intermittent leg pain bilaterally    Sonographic evaluation of the lower extremity veins to the level of the   posterior tibial veins was performed using gray-scale and color Doppler   imaging.    Interpretation:     Right leg:  The visualized veins are patent and compressible.  No   abnormal echoes or flow disturbances are identified.    Left leg:  The visualized veins are patent and compressible.  No abnormal   echoes or flow disturbances are identified.    IMPRESSION:  No ultrasound evidence of DVT.    The above findings correspond to a report provided by the offsite   overnight image reading service at the time of the examination.    Thank you for this referral.    MATHEW UMANZOR M.D., ATTENDING RADIOLOGIST  This document has been electronically signed. May 21 2019  8:02AM      < end of copied text > [facility] -- Chao Lomax, Regino Syed Pannella, Patel, Savella  Office # [phone]    Follow Up:  Weakness and SOB    Subjective/Observations: On nasal cannula.  Admits that she feels better but still dyspneic on ambulation to the BR.  Denies coughing.  Denies CP or palpitations    REVIEW OF SYSTEMS: All other review of systems is negative unless indicated above    PAST MEDICAL & SURGICAL HISTORY:  Hypertension  Restless leg syndrome: Especially with General Anesthesia  Arthritis: Cervical Spine  and Hands  H/O cervical discectomy  Acute peptic ulcer with perforation: 1980  S/P appendectomy: 2014  S/P tubal ligation: 1986  Osteoarthritis of right shoulder region: Right Shoulder Arthroscopy  2007  Rheumatoid arthritis of carpometacarpal joint of thumb: Total Joint Replacement of Left Thumb    MEDICATIONS  (STANDING):  ALBUTerol    0.083% 2.5 milliGRAM(s) Nebulizer every 8 hours  azithromycin  IVPB      buPROPion XL . 150 milliGRAM(s) Oral daily  cefTRIAXone   IVPB      diltiazem    milliGRAM(s) Oral daily  furosemide   Injectable 20 milliGRAM(s) IV Push once  hydrALAZINE 10 milliGRAM(s) Oral two times a day  pantoprazole    Tablet 40 milliGRAM(s) Oral before breakfast  pramipexole 0.5 milliGRAM(s) Oral at bedtime  simvastatin 10 milliGRAM(s) Oral at bedtime    MEDICATIONS  (PRN):  acetaminophen   Tablet .. 650 milliGRAM(s) Oral every 6 hours PRN Temp greater or equal to 38C (100.4F), Mild Pain (1 - 3)    Allergies    No Known Allergie  Intolerances    Vital Signs Last 24 Hrs  T(C): 37.4 (21 May 2019 12:59), Max: 37.4 (20 May 2019 19:09)  T(F): 99.4 (21 May 2019 12:59), Max: 99.4 (20 May 2019 19:09)  HR: 85 (21 May 2019 14:26) (79 - 108)  BP: 149/60 (21 May 2019 12:59) (94/77 - 180/66)  BP(mean): --  RR: 19 (21 May 2019 12:59) (17 - 20)  SpO2: 95% (21 May 2019 14:26) (91% - 99%)    I&O's Summary    21 May 2019 07:01  -  21 May 2019 16:26  --------------------------------------------------------  IN: 0 mL / OUT: 300 mL / NET: -300 mL    PHYSICAL EXAM:  TELE: Not on tele  Constitutional: NAD, awake and alert, well-developed  HEENT: Moist Mucous Membranes, Anicteric  Pulmonary: Non-labored, breath sounds are clear bilaterally, No wheezing, rales or rhonchi  Cardiovascular: Regular, S1 and S2, No murmurs, rubs, gallops or clicks  Gastrointestinal: Bowel Sounds present, soft, nontender.   Lymph: No peripheral edema. No lymphadenopathy.  Skin: No visible rashes or ulcers.  Psych:  Mood & affect appropriate    LABS: All Labs Reviewed:                     9.7    12.67 )-----------( 249      ( 21 May 2019 10:48 )             30.1                         7.3    9.22  )-----------( 252      ( 20 May 2019 15:14 )             23.1     21 May 2019 10:48    137    |  109    |  35     ----------------------------<  147    4.5     |  17     |  2.40   20 May 2019 15:14    137    |  107    |  33     ----------------------------<  90     3.8     |  17     |  2.40     Ca    8.1        21 May 2019 10:48  Ca    8.2        20 May 2019 15:14  Mg     2.1       21 May 2019 10:48  Mg     2.0       20 May 2019 15:14    TPro  6.1    /  Alb  2.4    /  TBili  0.6    /  DBili  x      /  AST  43     /  ALT  35     /  AlkPhos  119    21 May 2019 10:48  TPro  6.3    /  Alb  2.5    /  TBili  0.3    /  DBili  .10    /  AST  47     /  ALT  32     /  AlkPhos  117    20 May 2019 15:14    PT/INR - ( 20 May 2019 15:14 )   PT: 14.2 sec;   INR: 1.25 ratio     PTT - ( 20 May 2019 15:14 )  PTT:32.2 sec    < from: CT Chest No Cont (05.20.19 @ 22:27) >    EXAM:  CT CHEST                          PROCEDURE DATE:  05/20/2019      INTERPRETATION:  [facility] RADIOLOGIST PRELIMINARY REPORT    EXAM:    CT Chest Without Contrast     EXAM DATE/TIME:    5/20/2019 10:13 PM     CLINICAL HISTORY:    63 years old, female; Signs and symptoms; Other: Weight loss, anemia;   Shortness of breath; Prior surgery; Surgery type: Exploratory, tubal   ligation     TECHNIQUE:    Imaging protocol: Axial computed tomography images of the chest without   intravenous contrast. Coronal and sagittal reformatted images were   created and   reviewed.    3D rendering: MIP reconstructed images were created and reviewed.     COMPARISON:    DX XR CHEST PA AND LATERAL 5/20/2019 3:59 PM     FINDINGS:    Lungs: Smooth interlobular septal thickening compatible with hydrostatic   interstitial pulmonary edema/CHF. Patchy airspace opacity in the lungs   bilaterally, compatible with pneumonia.    Pleural space: Small bilateral pleural effusions.    Heart: Normal. No cardiomegaly. No pericardial effusion.    Mediastinum: Esophagus is unremarkable.    Aorta: Normal. No aortic aneurysm.    Lymph nodes: Unremarkable. No enlarged lymph nodes.    Bones/joints: Unremarkable. No acute fracture.    Soft tissues: Unremarkable.     IMPRESSION:   1. Hydrostatic interstitial pulmonary edema/CHF.   2. Small bilateral pleural effusions.   3. Multifocal bilateral pneumonia.       =========================  EXAM:    CT Abdomen and Pelvis Without Contrast     EXAM DATE/TIME:    5/20/2019 10:13 PM     CLINICAL HISTORY:    63 years old, female; Signs and symptoms; Other: Weight loss, anemia;   Shortness of breath; Prior surgery; Surgery type: Exploratory, tubal   ligation     TECHNIQUE:    Imaging protocol: Axial computed tomography images of the abdomen and   pelvis   without contrast. Coronal and sagittal reformatted images were created   and   reviewed.    3D rendering: MIP reconstructed images were created and reviewed.     COMPARISON:    DX XR CHEST PA AND LATERAL 5/20/2019 3:59 PM     FINDINGS:     ABDOMEN:    Liver: Normal. No mass.    Gallbladder and bile ducts: Normal. No calcified stones. No ductal   dilation.    Pancreas: Normal. No ductal dilation.    Spleen: Normal. No splenomegaly.    Adrenals: Normal. No mass.    Kidneys and ureters: No hydronephrosis or urolithiasis. Cortical   thinning.   Stomach and bowel: Colonic diverticulosis. No diverticulitis. No bowel   wall   thickening or intestinal obstruction.    Appendix: Appendix not visualized. No evidence of appendicitis.     PELVIS:    Bladder: Unremarkable as visualized.    Reproductive: Unremarkable as visualized.     ABDOMEN and PELVIS:    Intraperitoneal space: Normal. No free air. No significant fluid   collection.    Bones/joints: No acute fracture. No dislocation.    Soft tissues: Unremarkable.    Vasculature: Normal. No abdominal aortic aneurysm.    Lymph nodes: Normal. No enlarged lymph nodes.    Other findings: 2.5 cm AAA. No rupture.     IMPRESSION:   No acute findings.    Agree with above report    NATIVIDAD ZAMORA M.D., ATTENDING RADIOLOGIST  This document has been electronically signed. May 21 2019 10:20AM      < end of copied text >    < from: CT Abdomen and Pelvis No Cont (05.20.19 @ 22:25) >    EXAM:  CT ABDOMEN AND PELVIS                          PROCEDURE DATE:  05/20/2019      INTERPRETATION:  [facility] RADIOLOGIST PRELIMINARY REPORT    EXAM:    CT Chest Without Contrast     EXAM DATE/TIME:    5/20/2019 10:13 PM     CLINICAL HISTORY:    63 years old, female; Signs and symptoms; Other: Weight loss, anemia;   Shortness of breath; Prior surgery; Surgery type: Exploratory, tubal   ligation     TECHNIQUE:    Imaging protocol: Axial computed tomography images of the chest without   intravenous contrast. Coronal and sagittal reformatted images were   created and   reviewed.    3D rendering: MIP reconstructed images were created and reviewed.     COMPARISON:    DX XR CHEST PA AND LATERAL 5/20/2019 3:59 PM     FINDINGS:    Lungs: Smooth interlobular septal thickening compatible with hydrostatic   interstitial pulmonary edema/CHF. Patchy airspace opacity in the lungs   bilaterally, compatible with pneumonia.    Pleural space: Small bilateral pleural effusions.    Heart: Normal. No cardiomegaly. No pericardial effusion.    Mediastinum: Esophagus is unremarkable.    Aorta: Normal. No aortic aneurysm.    Lymph nodes: Unremarkable. No enlarged lymph nodes.    Bones/joints: Unremarkable. No acute fracture.    Soft tissues: Unremarkable.     IMPRESSION:   1. Hydrostatic interstitial pulmonary edema/CHF.   2. Small bilateral pleural effusions.   3. Multifocal bilateral pneumonia.     =========================  EXAM:    CT Abdomen and Pelvis Without Contrast     EXAM DATE/TIME:   5/20/2019 10:13 PM     CLINICAL HISTORY:    63 years old, female; Signs and symptoms; Other: Weight loss, anemia;   Shortness of breath; Prior surgery; Surgery type: Exploratory, tubal   ligation     TECHNIQUE:    Imaging protocol: Axial computed tomography images of the abdomen and   pelvis   without contrast. Coronal and sagittal reformatted images were created   and   reviewed.    3D rendering: MIP reconstructed images were created and reviewed.     COMPARISON:    DX XR CHEST PA AND LATERAL 5/20/2019 3:59 PM     FINDINGS:     ABDOMEN:    Liver: Normal. No mass.    Gallbladder and bile ducts: Normal. No calcified stones. No ductal   dilation.    Pancreas: Normal. No ductal dilation.    Spleen: Normal. No splenomegaly.    Adrenals: Normal. No mass.    Kidneys and ureters: Chronic medical renal disease.    Stomach and bowel: Colonic diverticulosis. No diverticulitis. No bowel   wall   thickening or intestinal obstruction.    Appendix: Appendix not visualized. No evidence of appendicitis.     PELVIS:    Bladder: Unremarkable as visualized.    Reproductive: Unremarkable as visualized.     ABDOMEN and PELVIS:    Intraperitoneal space: Normal. No free air. No significant fluid   collection.    Bones/joints: No acute fracture. No dislocation.    Soft tissues: Unremarkable.    Vasculature: Normal. No abdominal aortic aneurysm.    Lymph nodes: Normal. No enlarged lymph nodes.    Other findings: 2.5 cm AAA. No rupture.     IMPRESSION:   No acute findings.    Agree with above report    NATIVIDAD ZAMORA M.D., ATTENDING RADIOLOGIST  This document has been electronically signed. May 21 2019 10:36AM      < end of copied text >  	   < from: US Duplex Venous Lower Ext Complete, Bilateral (05.20.19 @ 22:12) >    EXAM:  US DPLX LWR EXT VEINS COMPL BI                            PROCEDURE DATE:  05/20/2019          INTERPRETATION:  Venous Duplex Ultrasound of the Lower Extremities    Clinical information: Intermittent leg pain bilaterally    Sonographic evaluation of the lower extremity veins to the level of the   posterior tibial veins was performed using gray-scale and color Doppler   imaging.    Interpretation:     Right leg:  The visualized veins are patent and compressible.  No   abnormal echoes or flow disturbances are identified.    Left leg:  The visualized veins are patent and compressible.  No abnormal   echoes or flow disturbances are identified.    IMPRESSION:  No ultrasound evidence of DVT.    The above findings correspond to a report provided by the offsite   overnight image reading service at the time of the examination.    Thank you for this referral.    MATHEW UMANZOR M.D., ATTENDING RADIOLOGIST  This document has been electronically signed. May 21 2019  8:02AM      < end of copied text >

## 2019-05-21 NOTE — PROGRESS NOTE ADULT - SUBJECTIVE AND OBJECTIVE BOX
Patient is a 63y old  Female who presents with a chief complaint of CABA, weakness (21 May 2019 06:03)      INTERVAL HPI/OVERNIGHT EVENTS: Patient seen and examined. NAD. Still SOB.    Vital Signs Last 24 Hrs  T(C): 36.7 (21 May 2019 04:42), Max: 37.4 (20 May 2019 19:09)  T(F): 98.1 (21 May 2019 04:42), Max: 99.4 (20 May 2019 19:09)  HR: 96 (21 May 2019 09:40) (79 - 108)  BP: 129/64 (21 May 2019 09:40) (94/77 - 180/66)  BP(mean): --  RR: 20 (21 May 2019 04:42) (14 - 20)  SpO2: 95% (21 May 2019 07:49) (92% - 99%)    05-21    137  |  109<H>  |  35<H>  ----------------------------<  147<H>  4.5   |  17<L>  |  2.40<H>    Ca    8.1<L>      21 May 2019 10:48  Mg     2.1     05-21    TPro  6.1  /  Alb  2.4<L>  /  TBili  0.6  /  DBili  x   /  AST  43<H>  /  ALT  35  /  AlkPhos  119  05-21                          9.7    12.67 )-----------( 249      ( 21 May 2019 10:48 )             30.1     PT/INR - ( 20 May 2019 15:14 )   PT: 14.2 sec;   INR: 1.25 ratio         PTT - ( 20 May 2019 15:14 )  PTT:32.2 sec  CAPILLARY BLOOD GLUCOSE                  acetaminophen   Tablet .. 650 milliGRAM(s) Oral every 6 hours PRN  ALBUTerol    0.083% 2.5 milliGRAM(s) Nebulizer every 8 hours  buPROPion XL . 150 milliGRAM(s) Oral daily  clopidogrel Tablet 75 milliGRAM(s) Oral daily  diltiazem    milliGRAM(s) Oral daily  furosemide   Injectable 20 milliGRAM(s) IV Push daily  heparin  Injectable 5000 Unit(s) SubCutaneous every 12 hours  hydrALAZINE 10 milliGRAM(s) Oral two times a day  pantoprazole    Tablet 40 milliGRAM(s) Oral before breakfast  pramipexole 0.5 milliGRAM(s) Oral at bedtime  simvastatin 10 milliGRAM(s) Oral at bedtime      < from: CT Chest No Cont (05.20.19 @ 22:27) >    EXAM:  CT CHEST                            PROCEDURE DATE:  05/20/2019          INTERPRETATION:  [facility] RADIOLOGIST PRELIMINARY REPORT    EXAM:    CT Chest Without Contrast     EXAM DATE/TIME:    5/20/2019 10:13 PM     CLINICAL HISTORY:    63 years old, female; Signs and symptoms; Other: Weight loss, anemia;   Shortness of breath; Prior surgery; Surgery type: Exploratory, tubal   ligation     TECHNIQUE:    Imaging protocol: Axial computed tomography images of the chest without   intravenous contrast. Coronal and sagittal reformatted images were   created and   reviewed.    3D rendering: MIP reconstructed images were created and reviewed.     COMPARISON:    DX XR CHEST PA AND LATERAL 5/20/2019 3:59 PM     FINDINGS:    Lungs: Smooth interlobular septal thickening compatible with hydrostatic   interstitial pulmonary edema/CHF. Patchy airspace opacity in the lungs   bilaterally, compatible with pneumonia.    Pleural space: Small bilateral pleural effusions.    Heart: Normal. No cardiomegaly. No pericardial effusion.    Mediastinum: Esophagus is unremarkable.    Aorta: Normal. No aortic aneurysm.    Lymph nodes: Unremarkable. No enlarged lymph nodes.    Bones/joints: Unremarkable. No acute fracture.    Soft tissues: Unremarkable.     IMPRESSION:   1. Hydrostatic interstitial pulmonary edema/CHF.   2. Small bilateral pleural effusions.   3. Multifocal bilateral pneumonia.       =========================  EXAM:    CT Abdomen and Pelvis Without Contrast     EXAM DATE/TIME:    5/20/2019 10:13 PM     CLINICAL HISTORY:    63 years old, female; Signs and symptoms; Other: Weight loss, anemia;   Shortness of breath; Prior surgery; Surgery type: Exploratory, tubal   ligation     TECHNIQUE:    Imaging protocol: Axial computed tomography images of the abdomen and   pelvis   without contrast. Coronal and sagittal reformatted images were created   and   reviewed.    3D rendering: MIP reconstructed images were created and reviewed.     COMPARISON:    DX XR CHEST PA AND LATERAL 5/20/2019 3:59 PM     FINDINGS:     ABDOMEN:    Liver: Normal. No mass.    Gallbladder and bile ducts: Normal. No calcified stones. No ductal   dilation.    Pancreas: Normal. No ductal dilation.    Spleen: Normal. No splenomegaly.    Adrenals: Normal. No mass.    Kidneys and ureters: No hydronephrosis or urolithiasis. Cortical   thinning.   Stomach and bowel: Colonic diverticulosis. No diverticulitis. No bowel   wall   thickening or intestinal obstruction.    Appendix: Appendix not visualized. No evidence of appendicitis.     PELVIS:    Bladder: Unremarkable as visualized.    Reproductive: Unremarkable as visualized.     ABDOMEN and PELVIS:    Intraperitoneal space: Normal. No free air. No significant fluid   collection.    Bones/joints: No acute fracture. No dislocation.    Soft tissues: Unremarkable.    Vasculature: Normal. No abdominal aortic aneurysm.    Lymph nodes: Normal. No enlarged lymph nodes.    Other findings: 2.5 cm AAA. No rupture.     IMPRESSION:   No acute findings.    Agree with above report                NATIVIDAD ZAMORA M.D., ATTENDING RADIOLOGIST  This document has been electronically signed. May 21 2019 10:20AM                < end of copied text >              REVIEW OF SYSTEMS:  CONSTITUTIONAL: No fever, no weight loss, or + fatigue  NECK: No pain, no stiffness  RESPIRATORY: No cough, no wheezing, no chills, no hemoptysis, + shortness of breath  CARDIOVASCULAR: No chest pain, no palpitations, no dizziness, no leg swelling  GASTROINTESTINAL: No abdominal pain. No nausea, no vomiting, no hematemesis; No diarrhea, no constipation. No melena, no hematochezia.  GENITOURINARY: No dysuria, no frequency, no hematuria, no incontinence  NEUROLOGICAL: No headaches, no loss of strength, no numbness, no tremors  SKIN: No itching, no burning  MUSCULOSKELETAL: No joint pain, no swelling; No muscle, no back, no extremity pain  PSYCHIATRIC: No depression, no mood swings,   HEME/LYMPH: No easy bruising, no bleeding gums  ALLERY AND IMMUNOLOGIC: No hives       Consultant(s) Notes Reviewed:  [X] YES  [ ] NO    PHYSICAL EXAM:  GENERAL: NAD  HEAD:  Atraumatic, Normocephalic  EYES: EOMI, PERRLA, conjunctiva and sclera clear  ENMT: No tonsillar erythema, exudates, or enlargement; Moist mucous membranes  NECK: Supple, No JVD  NERVOUS SYSTEM:  Awake & alert  CHEST/LUNG: bibasilar crackles  HEART: Regular rate and rhythm  ABDOMEN: Soft, Nontender, Nondistended; Bowel sounds present  EXTREMITIES:  No clubbing, cyanosis, or edema  LYMPH: No lymphadenopathy noted  SKIN: No rashes      Advanced care planning discussed with patient/family [X] YES   [ ] NO    Advanced care planning discussed with patient/family. Advanced care planning forms reviewed/discussed/completed. 20 minutes spent.

## 2019-05-21 NOTE — PROGRESS NOTE ADULT - ASSESSMENT
Assessment/Plan:  63y Female with HTN, CKD, PAD s/p right femoral endarterectomy, depression, and smoker (quit 5 days ago) presented to the ED c/o SOB and weakness that started in April but have gotten worse 5 days and a 10-lb weight loss within a month with less appetitie.  Patient states, she had to stop to catch her breath in going to the bathroom from her living room.  She used to be a very active woman until recently.  Denies edema, however, admits to orthopnea.  Denies CP or palpitations, though, she was here last month with c/o neck pain radiating to her chest, for which she though was an MI.  Her workup for ACS and PE was negative and she was sent home.  However, she was found to be anemic and was placed on Iron.  Her EGD last week was negative.  She denies any melena.  Denies fever chills, nausea, vomiting, diarrhea, constipation, abdominal pain    In the ED, her CxR showed small B/L pleural effusion, pro-BNP>48487, H/H 7.3/23.1, procalcitonin=1.12.  She is now receiving 1 unit of PRBC's.  Upon evaluation, she is dyspneic during conversation and requiring her HOB elevated.  She also has elevated creatinine (2.8), baseline = 2.1    She follows Dr. Lacy as outpatient and claims to have had negative cardiac workup.  TTE in 2017 with normal LVF and no segmental WMA.  Nuclear stress test (Pharm) normal.    Recommend:    - Likely in high output HF.  s/p Lasix 40 gm IV x 1 in ED.  Now euvolemic  - CT chest yesterday showed interstitial pulmonary edema, small B/L pleural effusions and multifocal B/L Pna  - Continue Lasix 40 mg IV daily.     - Please continue to maintain strict I/Os, monitor daily weights, Cr, and K.   - Monitor electrolytes, replete to keep K>4 and Mag>2  - TTE to assess LVF and valvular structures    INA on CKD  - Creatinine stable at 2.4  - Monitor renal function  - Avoid nephrotoxics  - Renal following    Anemia  - Transfuse per Primary.  s/p 1 unit of PRBC's with appropriate response  - Follow up anemia w/u  - FOBT + but no overt GIB.  GI following  - Heme eval  - Would hold plavix.     HTN  - SBP is labile, range 110-180.  - Continue Cardizem CD  - May initiate Norvasc if remains elevated      PAD  - Would monitor for claudication and and signs of limb ischemia.   - Continue statin    DVT ppx  - PAS for now    Smoker  - Education on smoking cessation    Further cardiac workup pending clinical course  Other workup per Primary  Will continue to follow    Judi Villanueva [facility]  Cardiology Assessment/Plan:  63y Female with HTN, CKD, PAD s/p right femoral endarterectomy, depression, and smoker (quit 5 days ago) presented to the ED c/o SOB and weakness that started in April but have gotten worse 5 days and a 10-lb weight loss within a month with less appetitie.  Patient states, she had to stop to catch her breath in going to the bathroom from her living room.  She used to be a very active woman until recently.  Denies edema, however, admits to orthopnea.  Denies CP or palpitations, though, she was here last month with c/o neck pain radiating to her chest, for which she though was an MI.  Her workup for ACS and PE was negative and she was sent home.  However, she was found to be anemic and was placed on Iron.  Her EGD last week was negative.  She denies any melena.  Denies fever chills, nausea, vomiting, diarrhea, constipation, abdominal pain    In the ED, her CxR showed small B/L pleural effusion, pro-BNP>22842, H/H 7.3/23.1, procalcitonin=1.12.  She is now receiving 1 unit of PRBC's.  Upon evaluation, she is dyspneic during conversation and requiring her HOB elevated.  She also has elevated creatinine (2.8), baseline = 2.1    She follows Dr. Lacy as outpatient and claims to have had negative cardiac workup.  TTE in 2017 with normal LVF and no segmental WMA.  Nuclear stress test (Pharm) normal.    Recommend:    - was likely in a degree of hf.  s/p Lasix 40 gm IV x 1 in ED.  Now more euvolemic  - CT chest yesterday showed interstitial pulmonary edema, small B/L pleural effusions and multifocal B/L Pna  - Continue Lasix 40 mg IV daily.     - Please continue to maintain strict I/Os, monitor daily weights, Cr, and K.   - Monitor electrolytes, replete to keep K>4 and Mag>2  - TTE to assess LVF and valvular structures    INA on CKD  - Creatinine stable at 2.4  - Monitor renal function while actively diuresing  - Avoid nephrotoxics  - Renal following    Anemia  - Transfuse per Primary.  s/p 1 unit of PRBC's with appropriate response  - Follow up anemia w/u  - FOBT + but no overt GIB.  GI following  - Heme eval  - Would hold plavix for now    HTN  - SBP is labile, range 110-180.  - Continue Cardizem CD  - May initiate Norvasc if remains elevated      PAD  - Would monitor for claudication and and signs of limb ischemia.   - Continue statin    DVT ppx  - PAS for now    Smoker  - Education on smoking cessation    Further cardiac workup pending clinical course  Other workup per Primary  Will continue to follow    Judi Villanueva [facility]  Cardiology

## 2019-05-21 NOTE — CONSULT NOTE ADULT - SUBJECTIVE AND OBJECTIVE BOX
Date/Time Patient Seen:  		  Referring MD:   Data Reviewed	       Patient is a 63y old  Female who presents with a chief complaint of RAMAN, weakness (20 May 2019 17:57)      Subjective/HPI    in bed  seen and examined  vs and meds reviewed  labs reviewed  ct chest reviewed    sob and raman  on o2 support    H and P reviewed    H&P Adult [Charted Location: [facility] ED] [Authored: 20-May-2019 15:29]- for Visit: [id number], Complete, Revised, Signed in Full, General    History and Physical:   Source of Information	Patient  Outpatient Providers	Dr Jarquin       History of Present Illness:  Reason for Admission: RAMAN, weakness  History of Present Illness:   This is a 63 F with PMH of HTN CKD PAD depression smoker who came in c/o 5 day h/o increased RAMAN and weakness. She denies CP, n/v/d, palpitations, BRBPR, melena. + anorexia. Ten pound weight loss in 1 month. Patient had an EGD/colon last week which she says was normal.    Past medical history is also remarkable for peripheral arterial disease status post right femoral endarterectomy for claudication, stenosis 75% right internal carotid artery stenosis. She had a unremarkable echo and negative pharmacologic stress nuclear evaluation in March of 2017.      FAMILY HISTORY:  No pertinent family history in first degree relatives.     No Pertinent Family History in first degree relatives of: none.     Tobacco Screening:  · Core Measure Site	Yes  · Has the patient used tobacco in the past 30 days?	Yes  · Tobacco Cessation Education/Counseling	Offered and patient declined  · Tobacco Cessation Medication	Offered and patient declined    Risk Assessment:    Present on Admission:  Deep Venous Thrombosis	no  Pulmonary Embolus	no       PAST MEDICAL & SURGICAL HISTORY:  Hypertension  Restless leg syndrome: Especially with General Anesthesia  Arthritis: Cervical Spine  and Hands  H/O cervical discectomy  Acute peptic ulcer with perforation: 1980  S/P appendectomy: 2014  S/P tubal ligation: 1986  Osteoarthritis of right shoulder region: Right Shoulder Arthroscopy  2007  Rheumatoid arthritis of carpometacarpal joint of thumb: Total Joint Replacement of Left Thumb        Medication list         MEDICATIONS  (STANDING):  buPROPion XL . 150 milliGRAM(s) Oral daily  clopidogrel Tablet 75 milliGRAM(s) Oral daily  diltiazem    milliGRAM(s) Oral daily  furosemide   Injectable 20 milliGRAM(s) IV Push daily  heparin  Injectable 5000 Unit(s) SubCutaneous every 12 hours  hydrALAZINE 10 milliGRAM(s) Oral two times a day  pantoprazole    Tablet 40 milliGRAM(s) Oral before breakfast  pramipexole 0.5 milliGRAM(s) Oral at bedtime  simvastatin 10 milliGRAM(s) Oral at bedtime    MEDICATIONS  (PRN):  acetaminophen   Tablet .. 650 milliGRAM(s) Oral every 6 hours PRN Temp greater or equal to 38C (100.4F), Mild Pain (1 - 3)         Vitals log        ICU Vital Signs Last 24 Hrs  T(C): 36.7 (21 May 2019 04:42), Max: 37.4 (20 May 2019 19:09)  T(F): 98.1 (21 May 2019 04:42), Max: 99.4 (20 May 2019 19:09)  HR: 79 (21 May 2019 04:42) (79 - 108)  BP: 112/58 (21 May 2019 04:42) (94/77 - 180/66)  BP(mean): --  ABP: --  ABP(mean): --  RR: 20 (21 May 2019 04:42) (14 - 20)  SpO2: 93% (21 May 2019 04:42) (92% - 99%)           Input and Output:  I&O's Detail      Lab Data                        7.3    9.22  )-----------( 252      ( 20 May 2019 15:14 )             23.1     05-20    137  |  107  |  33<H>  ----------------------------<  90  3.8   |  17<L>  |  2.40<H>    Ca    8.2<L>      20 May 2019 15:14  Mg     2.0     05-20    TPro  6.3  /  Alb  2.5<L>  /  TBili  0.3  /  DBili  .10  /  AST  47<H>  /  ALT  32  /  AlkPhos  117  05-20      family hx - HTN  smoker        Review of Systems	    sob  raman    Objective     Physical Examination    heart s1s2  lung dec BS  on o2 support  head at  moves all extr      Pertinent Lab findings & Imaging      [place]:  NO   Adequate UO     I&O's Detail           Discussed with:     Cultures:	        Radiology      ******PRELIMINARY REPORT******    ******PRELIMINARY REPORT******          EXAM:  CT CHEST                            PROCEDURE DATE:  05/20/2019    ******PRELIMINARY REPORT******    ******PRELIMINARY REPORT******              INTERPRETATION:  [facility] RADIOLOGIST PRELIMINARY REPORT    EXAM:    CT Chest Without Contrast     EXAM DATE/TIME:    5/20/2019 10:13 PM     CLINICAL HISTORY:    63 years old, female; Signs and symptoms; Other: Weight loss, anemia;   Shortness of breath; Prior surgery; Surgery type: Exploratory, tubal   ligation     TECHNIQUE:    Imaging protocol: Axial computed tomography images of the chest without   intravenous contrast. Coronal and sagittal reformatted images were   created and   reviewed.    3D rendering: MIP reconstructed images were created and reviewed.     COMPARISON:    DX XR CHEST PA AND LATERAL 5/20/2019 3:59 PM     FINDINGS:    Lungs: Smooth interlobular septal thickening compatible with hydrostatic   interstitial pulmonary edema/CHF. Patchy airspace opacity in the lungs   bilaterally, compatible with pneumonia.    Pleural space: Small bilateral pleural effusions.    Heart: Normal. No cardiomegaly. No pericardial effusion.    Mediastinum: Esophagus is unremarkable.    Aorta: Normal. No aortic aneurysm.    Lymph nodes: Unremarkable. No enlarged lymph nodes.    Bones/joints: Unremarkable. No acute fracture.    Soft tissues: Unremarkable.     IMPRESSION:   1. Hydrostatic interstitial pulmonary edema/CHF.   2. Small bilateral pleural effusions.   3. Multifocal bilateral pneumonia.       =========================  EXAM:    CT Abdomen and Pelvis Without Contrast     EXAM DATE/TIME:    5/20/2019 10:13 PM     CLINICAL HISTORY:    63 years old, female; Signs and symptoms; Other: Weight loss, anemia;   Shortness of breath; Prior surgery; Surgery type: Exploratory, tubal   ligation     TECHNIQUE:    Imaging protocol: Axial computed tomography images of the abdomen and   pelvis   without contrast. Coronal and sagittal reformatted images were created   and   reviewed.    3D rendering: MIP reconstructed images were created and reviewed.     COMPARISON:    DX XR CHEST PA AND LATERAL 5/20/2019 3:59 PM     FINDINGS:     ABDOMEN:    Liver: Normal. No mass.    Gallbladder and bile ducts: Normal. No calcified stones. No ductal   dilation.    Pancreas: Normal. No ductal dilation.    Spleen: Normal. No splenomegaly.    Adrenals: Normal. No mass.    Kidneys and ureters: Chronic medical renal disease.    Stomach and bowel: Colonic diverticulosis. No diverticulitis. No bowel   wall   thickening or intestinal obstruction.    Appendix: Appendix not visualized. No evidence of appendicitis.     PELVIS:    Bladder: Unremarkable as visualized.    Reproductive: Unremarkable as visualized.     ABDOMEN and PELVIS:    Intraperitoneal space: Normal. No free air. No significant fluid   collection.    Bones/joints: No acute fracture. No dislocation.    Soft tissues: Unremarkable.    Vasculature: Normal. No abdominal aortic aneurysm.    Lymph nodes: Normal. No enlarged lymph nodes.    Other findings: 2.5 cm AAA. No rupture.     IMPRESSION:   No acute findings.          ******PRELIMINARY REPORT******    ******PRELIMINARY REPORT******              MARGARET OCONNELL M.D.;[facility] RADIOLOGIST

## 2019-05-21 NOTE — DIETITIAN INITIAL EVALUATION ADULT. - SIGNS/SYMPTOMS
as evidenced by as evidenced by 8.6% wt loss x 5 weeks, mod-severe muscle/fat loss,<75% of nutrition needs x1 month

## 2019-05-21 NOTE — CONSULT NOTE ADULT - ASSESSMENT
64 y/o woman w hx HTN, CKD, PAD depression smoker(stppped for 7days 1ppd x40yrs),adm w c/w 1week incr fatigue, reports cough and SOB/CABA when arrived in ER.  CBC w Hgb 7.3, MCV low 90's, CT c/a/p w findings c/w CHF and multilobar pneumonia.  Pt was tx'd one unit PRBC w some improvement in fatigue, Hgb today 9.7  Review of labs in computer show in 4/2019 Hgb was 8+, MCV low 90's, pt reports that was "incidental finding", she was here for neck pain, fatigue was mild at that time. Started taking Slow FE one a day since that time. Had endoscopy and colonoscopy early May w Dr Prater, negative, not candidate for small bowel capsule study due to anatomy of the bowel.  Stool occult blood positive during this admission.  CMP sig for Cr 2 range  Review of shin blood morphologty no abnormal findings  Hgb w higher than expected incr after one unit PRBC    -anemia-multifactorial, due to renal insufficiency, acute illness, multilovar pneumonia, CHF, blood loss w occult blood positive stool and on Plavix, iron studies also can be c/w partially treated iron deficiency.  -GI consulted  -will start IV Venofer to expedite repletion, 100mg IV fo 3 days   -check SPEP/immunofixation/hossein levels in view of renal insuff and anemia to r/o paraprotein disease.  -follow serial CBC    discussed w pt

## 2019-05-21 NOTE — CONSULT NOTE ADULT - SUBJECTIVE AND OBJECTIVE BOX
Chief Complaint:  Patient is a 63y old  Female who presents with a chief complaint of CABA, weakness (21 May 2019 12:23)    Hypertension  Restless leg syndrome  Arthritis  H/O cervical discectomy  Acute peptic ulcer with perforation  S/P appendectomy  S/P tubal ligation  Osteoarthritis of right shoulder region  Rheumatoid arthritis of carpometacarpal joint of thumb     HPI:  This is a 63 F with PMH of HTN CKD PAD depression smoker who came in c/o 5 day h/o increased CABA and weakness. She denies CP, n/v/d, palpitations, BRBPR, melena. + anorexia. Ten pound weight loss in 1 month. Patient had an EGD/colon last week which she says was normal.    Past medical history is also remarkable for peripheral arterial disease status post right femoral endarterectomy for claudication, stenosis 75% right internal carotid artery stenosis. She had a unremarkable echo and negative pharmacologic stress nuclear evaluation in 2017. (20 May 2019 15:29)    gi consulted for ob+ stool. chart reviewed. pt seen and examined. reports dark stools but states she was starte don po iron by dr costello recent;y. denies hematemesis and brbpr. had egd/colon 5/10/2019 reportedly normal. states she was told she is not a candidate for capsule study but doesnt remember why. c/o sob and recent wt loss/dec po. denies f/c/n/v/d/c/abd pain. abd sx as above- remote hx- sp ex lap for abd pain found to have perf ulcer. fhx nc. on plavix pta.    recent vs/labs/imaging reviewed afebrile mildly tachy  sp 1u prbc  ob +  inr 1.25  low iron   ct as below      No Known Allergies      acetaminophen   Tablet .. 650 milliGRAM(s) Oral every 6 hours PRN  ALBUTerol    0.083% 2.5 milliGRAM(s) Nebulizer every 8 hours  azithromycin  IVPB      buPROPion XL . 150 milliGRAM(s) Oral daily  cefTRIAXone   IVPB      clopidogrel Tablet 75 milliGRAM(s) Oral daily  diltiazem    milliGRAM(s) Oral daily  furosemide   Injectable 20 milliGRAM(s) IV Push once  hydrALAZINE 10 milliGRAM(s) Oral two times a day  pantoprazole    Tablet 40 milliGRAM(s) Oral before breakfast  pramipexole 0.5 milliGRAM(s) Oral at bedtime  simvastatin 10 milliGRAM(s) Oral at bedtime        FAMILY HISTORY:  No pertinent family history in first degree relatives        Review of Systems:    General: wt loss  Eyes:  Good vision, no reported pain  ENT:  No sore throat, pain, runny nose, dysphagia  CV:  No pain, palpitations, no lightheadedness  Resp:  sob  GI: see above  :  No pain, bleeding, incontinence, nocturia  Muscle:  No pain, weakness  Neuro:  No weakness, tingling, memory problems  Psych:  No fatigue, insomnia, mood problems, depression  Endocrine:  No polyuria, polydypsia, cold/heat intolerance  Heme:  No petechiae, ecchymosis, easy bruisability  Skin:  No rash, tattoos, scars, edema    Relevant Family History:   n/c    Relevant Social History: n/c      Physical Exam:    Vital Signs:  Vital Signs Last 24 Hrs  T(C): 37.4 (21 May 2019 12:59), Max: 37.4 (20 May 2019 19:09)  T(F): 99.4 (21 May 2019 12:59), Max: 99.4 (20 May 2019 19:09)  HR: 101 (21 May 2019 12:59) (79 - 108)  BP: 149/60 (21 May 2019 12:59) (94/77 - 180/66)  BP(mean): --  RR: 19 (21 May 2019 12:59) (17 - 20)  SpO2: 91% (21 May 2019 12:59) (91% - 99%)  Daily     Daily Weight in k.4 (21 May 2019 11:40)    General:  appears uncomfortable frail  HEENT:  NC/AT  Chest:  dec bs  Cardiovascular:  Regular rhythm, S1, S2  Abdomen:  Soft, non-tender, non-distended  Extremities:  no  edema  Skin:  No rash  Neuro/Psych:  A&O x3    Laboratory:                            9.7    12.67 )-----------( 249      ( 21 May 2019 10:48 )             30.1     05-21    137  |  109<H>  |  35<H>  ----------------------------<  147<H>  4.5   |  17<L>  |  2.40<H>    Ca    8.1<L>      21 May 2019 10:48  Mg     2.1         TPro  6.1  /  Alb  2.4<L>  /  TBili  0.6  /  DBili  x   /  AST  43<H>  /  ALT  35  /  AlkPhos  119  05-    LIVER FUNCTIONS - ( 21 May 2019 10:48 )  Alb: 2.4 g/dL / Pro: 6.1 g/dL / ALK PHOS: 119 U/L / ALT: 35 U/L / AST: 43 U/L / GGT: x           PT/INR - ( 20 May 2019 15:14 )   PT: 14.2 sec;   INR: 1.25 ratio         PTT - ( 20 May 2019 15:14 )  PTT:32.2 sec      Imaging:      < from: CT Abdomen and Pelvis No Cont (19 @ 22:25) >    EXAM:  CT ABDOMEN AND PELVIS                            PROCEDURE DATE:  2019          INTERPRETATION:  [facility] RADIOLOGIST PRELIMINARY REPORT    EXAM:    CT Chest Without Contrast     EXAM DATE/TIME:    2019 10:13 PM     CLINICAL HISTORY:    63 years old, female; Signs and symptoms; Other: Weight loss, anemia;   Shortness of breath; Prior surgery; Surgery type: Exploratory, tubal   ligation     TECHNIQUE:    Imaging protocol: Axial computed tomography images of the chest without   intravenous contrast. Coronal and sagittal reformatted images were   created and   reviewed.    3D rendering: MIP reconstructed images were created and reviewed.     COMPARISON:    DX XR CHEST PA AND LATERAL 2019 3:59 PM     FINDINGS:    Lungs: Smooth interlobular septal thickening compatible with hydrostatic   interstitial pulmonary edema/CHF. Patchy airspace opacity in the lungs   bilaterally, compatible with pneumonia.    Pleural space: Small bilateral pleural effusions.    Heart: Normal. No cardiomegaly. No pericardial effusion.    Mediastinum: Esophagus is unremarkable.    Aorta: Normal. No aortic aneurysm.    Lymph nodes: Unremarkable. No enlarged lymph nodes.    Bones/joints: Unremarkable. No acute fracture.    Soft tissues: Unremarkable.     IMPRESSION:   1. Hydrostatic interstitial pulmonary edema/CHF.   2. Small bilateral pleural effusions.   3. Multifocal bilateral pneumonia.       =========================  EXAM:    CT Abdomen and Pelvis Without Contrast     EXAM DATE/TIME:   2019 10:13 PM     CLINICAL HISTORY:    63 years old, female; Signs and symptoms; Other: Weight loss, anemia;   Shortness of breath; Prior surgery; Surgery type: Exploratory, tubal   ligation     TECHNIQUE:    Imaging protocol: Axial computed tomography images of the abdomen and   pelvis   without contrast. Coronal and sagittal reformatted images were created   and   reviewed.    3D rendering: MIP reconstructed images were created and reviewed.     COMPARISON:    DX XR CHEST PA AND LATERAL 2019 3:59 PM     FINDINGS:     ABDOMEN:    Liver: Normal. No mass.    Gallbladder and bile ducts: Normal. No calcified stones. No ductal   dilation.    Pancreas: Normal. No ductal dilation.    Spleen: Normal. No splenomegaly.    Adrenals: Normal. No mass.    Kidneys and ureters: Chronic medical renal disease.    Stomach and bowel: Colonic diverticulosis. No diverticulitis. No bowel   wall   thickening or intestinal obstruction.    Appendix: Appendix not visualized. No evidence of appendicitis.     PELVIS:    Bladder: Unremarkable as visualized.    Reproductive: Unremarkable as visualized.     ABDOMEN and PELVIS:    Intraperitoneal space: Normal. No free air. No significant fluid   collection.    Bones/joints: No acute fracture. No dislocation.    Soft tissues: Unremarkable.    Vasculature: Normal. No abdominal aortic aneurysm.    Lymph nodes: Normal. No enlarged lymph nodes.    Other findings: 2.5 cm AAA. No rupture.     IMPRESSION:   No acute findings.    Agree with above report                NATIVIDAD ZAMORA M.D., ATTENDING RADIOLOGIST  This document has been electronically signed. May 21 2019 10:36AM                < end of copied text >

## 2019-05-21 NOTE — DIETITIAN INITIAL EVALUATION ADULT. - ORAL INTAKE PTA
no Pt reports poor appetite/intake PTA. Diet recall "on a good day" includes breakfast: english muffin, tea, 2 glasses of orange juice, lunch: ham sandwich on rye, and dinner: meatloaf, mashed potatoes, a vegetable. Takes iron, calcium, align probiotic.

## 2019-05-21 NOTE — DIETITIAN INITIAL EVALUATION ADULT. - NS AS NUTRI INTERV MEALS SNACK
Liberalize diet to low sodium. Encourage po intake of nutrient dense, high kcal/protein meals and snacks. Monitor renal indices closely (pt with CKD stage 4) to determine need for renal restrictions. Check Phos level.

## 2019-05-21 NOTE — CONSULT NOTE ADULT - ASSESSMENT
63 F with PMH of HTN CKD PAD depression smoker who came in c/o 5 day h/o increased CABA and weakness.  gi consulted for anemia

## 2019-05-21 NOTE — CONSULT NOTE ADULT - ASSESSMENT
63 white female with a history of HTN, CAD, CHF, CKD with anemia now admitted with a history of 10 pound weight loss associated with SOB and low hgb,.   HGB improved with blood transfusion. Renal indices are close to baseline. Will continue the abx as ordered. Follow up with SPEP and IPEP.   May need heme/onc evaluation. Start on lasix at 20 mg IVP twice a day. will follow.

## 2019-05-21 NOTE — DIETITIAN INITIAL EVALUATION ADULT. - NS AS NUTRI INTERV MEDICAL AND FOOD SUPPLEMENTS
Add on Ensure Enlive daily for additional 350 kcal, 20 grams of protein. Add on Mighty shake daily for additional 200 kcal, 6 grams of protein. Discussed with covering MD and pending verification placed.

## 2019-05-21 NOTE — CONSULT NOTE ADULT - SUBJECTIVE AND OBJECTIVE BOX
All records reviewed.    HPI:  62 y/o woman w hx HTN, CKD, PAD depression smoker(stppped for 7days 1ppd x40yrs),adm w c/w 1week incr fatigue, reports cough and SOB/CABA when arrived in ER.  CBC w Hgb 7.3, MCV low 90's, CT c/a/p w findings c/w CHF and multilobar pneumonia.  Pt was tx'd one unit PRBC w some improvement in fatigue, Hgb today 9.7  Review of labs in computer show in 4/2019 Hgb was 8+, MCV low 90's, pt reports that was "incidental finding", she was here for neck pain, fatigue was mild at that time. Started taking Slow FE one a day since that time. Had endoscopy and colonoscopy early May w Dr Prater, negative, not candidate for small bowel capsule study due to anatomy of the bowel.  Stool occult blood positive during this admission.        PAST MEDICAL & SURGICAL HISTORY:  Hypertension  Restless leg syndrome: Especially with General Anesthesia  Arthritis: Cervical Spine  and Hands  H/O cervical discectomy  Acute peptic ulcer with perforation: 1980  S/P appendectomy: 2014  S/P tubal ligation: 1986  Osteoarthritis of right shoulder region: Right Shoulder Arthroscopy  2007  Rheumatoid arthritis of carpometacarpal joint of thumb: Total Joint Replacement of Left Thumb  right leg stent 4 yrs ago and started on Plavix      Review of System:  see above  no melena, blood in stool or urine  easy bruisability since on Plavix    MEDICATIONS  (STANDING):  ALBUTerol    0.083% 2.5 milliGRAM(s) Nebulizer every 8 hours  azithromycin  IVPB      buPROPion XL . 150 milliGRAM(s) Oral daily  cefTRIAXone   IVPB      diltiazem    milliGRAM(s) Oral daily  furosemide   Injectable 20 milliGRAM(s) IV Push once  hydrALAZINE 10 milliGRAM(s) Oral two times a day  pantoprazole    Tablet 40 milliGRAM(s) Oral before breakfast  pramipexole 0.5 milliGRAM(s) Oral at bedtime  simvastatin 10 milliGRAM(s) Oral at bedtime    MEDICATIONS  (PRN):  acetaminophen   Tablet .. 650 milliGRAM(s) Oral every 6 hours PRN Temp greater or equal to 38C (100.4F), Mild Pain (1 - 3)      Allergies    No Known Allergies    Intolerances        SOCIAL HISTORY:  no Etoh, smoker 1ppd x40yrs stopped 7 days ago    FAMILY HISTORY:  No anemia      Vital Signs Last 24 Hrs  T(C): 37.4 (21 May 2019 12:59), Max: 37.4 (20 May 2019 19:09)  T(F): 99.4 (21 May 2019 12:59), Max: 99.4 (20 May 2019 19:09)  HR: 85 (21 May 2019 14:26) (79 - 108)  BP: 149/60 (21 May 2019 12:59) (112/58 - 180/66)  BP(mean): --  RR: 19 (21 May 2019 12:59) (18 - 20)  SpO2: 95% (21 May 2019 14:26) (91% - 99%)    PHYSICAL EXAM:      General:well developed well nourished, thin,  in no acute distress  Eyes:sclera anicteric, pupils equal and reactive to light  ENMT:buccal mucosa moist, pharynx not injected  Neck:supple, trachea midline  Lungs:clear, no wheeze/rhonchi  Cardiovascular:regular rate and rhythm, S1 S2  Abdomen:soft, nontender, no organomegaly present, bowel sounds normal  Neurological:alert and oriented x3, Cranial Nerves II-XII grossly intact  Skin:no increased ecchymosis/petechiae/purpura  Lymph Nodes:no palpable cervical/supraclavicular lymph nodes enlargements  Extremities:no cyanosis/clubbing/edema        LABS:                        9.7    12.67 )-----------( 249      ( 21 May 2019 10:48 )             30.1     05-21 @ 10:48  WBC12.67  RBC3.57 Hgb9.7 Hct30.1  MCV84.3  [id number]  Auto Oigjmc18.5 Band-- Auto Lymph6.9 Atypical Lymph-- Reactive Lymph-- Auto Mono4.6 Auto Eos0.0 Auto Baso0.2        05-20 @ 15:14  WBC9.22  RBC2.69 Hgb7.3 Hct23.1  MCV85.9  [id number]  Auto Rnjvwq53.2 Band-- Auto Lymph12.3 Atypical Lymph-- Reactive Lymph-- Auto Mono7.2 Auto Eos0.2 Auto Baso0.3    Iron with Total Binding Capacity (05.20.19 @ 23:38)    Iron - Total Binding Capacity.: 223 ug/dL    % Saturation, Iron: 8 %    Iron Total, Serum: 17 ug/dL    Unsaturated Iron Binding Capacity: 206 ug/dL    Ferritin, Serum: 136 ng/mL (05.20.19 @ 23:05)    Vitamin B12, Serum: 643 pg/mL (05.20.19 @ 23:05)    Folate, Serum: 14.4 ng/mL (05.20.19 @ 23:05)    Thyroid Stimulating Hormone, Serum: 1.44 uIU/mL (04.13.19 @ 11:49)          05-21    137  |  109<H>  |  35<H>  ----------------------------<  147<H>  4.5   |  17<L>  |  2.40<H>    Ca    8.1<L>      21 May 2019 10:48  Mg     2.1     05-21    TPro  6.1  /  Alb  2.4<L>  /  TBili  0.6  /  DBili  x   /  AST  43<H>  /  ALT  35  /  AlkPhos  119  05-21 05-20 @ 15:14  PT14.2 INR1.25  PTT32.2        PERIPHERAL BLOOD SMEAR REVIEW:  RBC-normocytic, normochromic, no significant anisocytosis or poikilocytosis. No rouleaux/schistocytes or increased polychromasia.  WBC-normal in differential and morphology, no immature or abnormal cells seen.  Platelets-adequate on smear, no platelets clumping or giant platelets.       RADIOLOGY & ADDITIONAL STUDIES:  < from: CT Chest No Cont (05.20.19 @ 22:27) >  EXAM:  CT CHEST                            PROCEDURE DATE:  05/20/2019          INTERPRETATION:  [facility] RADIOLOGIST PRELIMINARY REPORT    EXAM:    CT Chest Without Contrast     EXAM DATE/TIME:    5/20/2019 10:13 PM     CLINICAL HISTORY:    63 years old, female; Signs and symptoms; Other: Weight loss, anemia;   Shortness of breath; Prior surgery; Surgery type: Exploratory, tubal   ligation     TECHNIQUE:    Imaging protocol: Axial computed tomography images of the chest without   intravenous contrast. Coronal and sagittal reformatted images were   created and   reviewed.    3D rendering: MIP reconstructed images were created and reviewed.     COMPARISON:    DX XR CHEST PA AND LATERAL 5/20/2019 3:59 PM     FINDINGS:    Lungs: Smooth interlobular septal thickening compatible with hydrostatic   interstitial pulmonary edema/CHF. Patchy airspace opacity in the lungs   bilaterally, compatible with pneumonia.    Pleural space: Small bilateral pleural effusions.    Heart: Normal. No cardiomegaly. No pericardial effusion.    Mediastinum: Esophagus is unremarkable.    Aorta: Normal. No aortic aneurysm.    Lymph nodes: Unremarkable. No enlarged lymph nodes.    Bones/joints: Unremarkable. No acute fracture.    Soft tissues: Unremarkable.     IMPRESSION:   1. Hydrostatic interstitial pulmonary edema/CHF.   2. Small bilateral pleural effusions.   3. Multifocal bilateral pneumonia.       =========================  EXAM:    CT Abdomen and Pelvis Without Contrast     EXAM DATE/TIME:    5/20/2019 10:13 PM     CLINICAL HISTORY:    63 years old, female; Signs and symptoms; Other: Weight loss, anemia;   Shortness of breath; Prior surgery; Surgery type: Exploratory, tubal   ligation     TECHNIQUE:    Imaging protocol: Axial computed tomography images of the abdomen and   pelvis   without contrast. Coronal and sagittal reformatted images were created   and   reviewed.    3D rendering: MIP reconstructed images were created and reviewed.     COMPARISON:    DX XR CHEST PA AND LATERAL 5/20/2019 3:59 PM     FINDINGS:     ABDOMEN:    Liver: Normal. No mass.    Gallbladder and bile ducts: Normal. No calcified stones. No ductal   dilation.    Pancreas: Normal. No ductal dilation.    Spleen: Normal. No splenomegaly.    Adrenals: Normal. No mass.    Kidneys and ureters: No hydronephrosis or urolithiasis. Cortical   thinning.   Stomach and bowel: Colonic diverticulosis. No diverticulitis. No bowel   wall   thickening or intestinal obstruction.    Appendix: Appendix not visualized. No evidence of appendicitis.     PELVIS:    Bladder: Unremarkable as visualized.    Reproductive: Unremarkable as visualized.     ABDOMEN and PELVIS:    Intraperitoneal space: Normal. No free air. No significant fluid   collection.    Bones/joints: No acute fracture. No dislocation.    Soft tissues: Unremarkable.    Vasculature: Normal. No abdominal aortic aneurysm.    Lymph nodes: Normal. No enlarged lymph nodes.    Other findings: 2.5 cm AAA. No rupture.     IMPRESSION:   No acute findings.    Agree with above report    < end of copied text >

## 2019-05-21 NOTE — DIETITIAN INITIAL EVALUATION ADULT. - ENERGY NEEDS
Wt: 106 pounds, Ht: 64 inches, BMI: 18.2 kg/m2, IBW: 120 pounds +/-10%, %IBW: 88%  pulmonary edema, effusions, stage 1 sacrum

## 2019-05-21 NOTE — DIETITIAN INITIAL EVALUATION ADULT. - 35 TO
65 y/o M w/ PMHx DM, HTN, CAD s/p PCI, CABG (2008), Afib on Eliquis, CKD and recurrent MRSA abscess, now present with Upper back abscess, no documented fevers, WBC of 8.1, hemodynamically stable.    Plan:  - NPO  - Hold Anticoagulation  - IV fluid  - IV antibiotic with MRSA coverage  - Blood cultures  - Added to the OR for I&D in the OR with possible Vac placement.  - Rest of plan by primary team.  - Seen and Examined with the Chief resident 1904

## 2019-05-21 NOTE — DIETITIAN INITIAL EVALUATION ADULT. - PHYSICAL APPEARANCE
BMI: 18.2 kg/m2 (using stated height of 64 inches); Nutrition focused physical exam conducted - found signs of malnutrition [ ]absent [X]present   Subcutaneous fat loss: [X] Orbital fat pads region (moderate), [X]Buccal fat region (moderate), [X]Triceps region (severe),  Muscle wasting: [X]Temples region (severe), [X]Clavicle region (moderate), [X]Shoulder region (moderate), [X] Scapula region (moderate), [X]Interosseous region (mild)/underweight

## 2019-05-21 NOTE — PROGRESS NOTE ADULT - PROBLEM SELECTOR PLAN 1
Possibly secondary to CKD  S/P 1 unit PRBC  Hg improved  Iron studies, B12, folate, ferritin, haptoglobin, LDH, reticulocyte count noted  Check SPEP, UPEP  Monitor h/h  Transfuse prn  Hematology consult  Further work-up/management pending clinical course.

## 2019-05-21 NOTE — CONSULT NOTE ADULT - SUBJECTIVE AND OBJECTIVE BOX
Nephrology Consultation: [facility], [facility]  63y    HPI:  This is a 63 F with PMH of HTN CKD PAD depression smoker who came in c/o 5 day h/o increased CABA and weakness. She denies CP, n/v/d, palpitations, BRBPR, melena. + anorexia. Ten pound weight loss in 1 month. Patient had an EGD/colon last week which she says was normal. S/P one unit of PRBC. CT scan showed congestive heart failure.     Past medical history is also remarkable for peripheral arterial disease status post right femoral endarterectomy for claudication, stenosis 75% right internal carotid artery stenosis. She had a unremarkable echo and negative pharmacologic stress nuclear evaluation in March of 2017. (20 May 2019 15:29)      PAST MEDICAL & SURGICAL HISTORY:  Hypertension  Restless leg syndrome: Especially with General Anesthesia  Arthritis: Cervical Spine  and Hands  H/O cervical discectomy  Acute peptic ulcer with perforation: 1980  S/P appendectomy: 2014  S/P tubal ligation: 1986  Osteoarthritis of right shoulder region: Right Shoulder Arthroscopy  2007  Rheumatoid arthritis of carpometacarpal joint of thumb: Total Joint Replacement of Left Thumb      Allergies    No Known Allergies    Intolerances        Home Medications:  acetaminophen 325 mg oral tablet: 2 tab(s) orally every 6 hours, As needed, Temp greater or equal to 38C (100.4F), Mild Pain (1 - 3) (20 May 2019 21:03)  buPROPion: 150 milligram(s) orally once a day (20 May 2019 21:03)  Caltrate 600 + D oral tablet: 1 tab(s) orally once a day (20 May 2019 21:03)  dilTIAZem 360 mg/24 hours oral capsule, extended release: 1 cap(s) orally once a day (20 May 2019 21:03)  Mirapex 0.25 mg oral tablet: 2 tab(s) orally once a day (at bedtime) (20 May 2019 21:03)  Plavix 75 mg oral tablet: 1 tab(s) orally once a day (20 May 2019 21:03)  raNITIdine 150 mg oral tablet: 1 tab(s) orally 2 times a day (20 May 2019 21:03)  simvastatin 10 mg oral tablet: 1 tab(s) orally once a day (at bedtime) (20 May 2019 21:03)        FAMILY HISTORY:  No pertinent family history in first degree relatives      SOCIAL HISTORY:    REVIEW OF SYSTEMS:    Constitutional: No fever, weight loss or fatigue  Eyes: No eye pain, visual disturbances, or discharge  ENT:  No difficulty hearing, tinnitus, vertigo; No sinus or throat pain  Neck: No pain or stiffness  Breasts: No pain, masses or nipple discharge  Respiratory: No cough, wheezing, chills or hemoptysis  Cardiovascular: No chest pain, palpitations, shortness of breath, dizziness or leg swelling  Gastrointestinal: No abdominal or epigastric pain. No nausea, vomiting or hematemesis; No diarrhea or constipation. No melena or hematochezia.  Genitourinary: No dysuria, frequency, hematuria or incontinence  Rectal: No pain, hemorrhoids or incontinence  Neurological: No headaches, memory loss, loss of strength, numbness or tremors  Skin: No itching, burning, rashes or lesions   Lymph Nodes: No enlarged glands  Endocrine: No heat or cold intolerance; No hair loss  Musculoskeletal: No joint pain or swelling; No muscle, back or extremity pain  Psychiatric: No depression, anxiety, mood swings or difficulty sleeping  Heme/Lymph: No easy bruising or bleeding gums  Allergy and Immunologic: No hives or eczema    acetaminophen   Tablet .. 650 milliGRAM(s) Oral every 6 hours PRN  ALBUTerol    0.083% 2.5 milliGRAM(s) Nebulizer every 8 hours  azithromycin  IVPB      azithromycin  IVPB 500 milliGRAM(s) IV Intermittent once  buPROPion XL . 150 milliGRAM(s) Oral daily  cefTRIAXone   IVPB 1 Gram(s) IV Intermittent once  cefTRIAXone   IVPB      clopidogrel Tablet 75 milliGRAM(s) Oral daily  diltiazem    milliGRAM(s) Oral daily  heparin  Injectable 5000 Unit(s) SubCutaneous every 12 hours  hydrALAZINE 10 milliGRAM(s) Oral two times a day  pantoprazole    Tablet 40 milliGRAM(s) Oral before breakfast  pramipexole 0.5 milliGRAM(s) Oral at bedtime  simvastatin 10 milliGRAM(s) Oral at bedtime      Vital Signs Last 24 Hrs  T(C): 36.7 (21 May 2019 04:42), Max: 37.4 (20 May 2019 19:09)  T(F): 98.1 (21 May 2019 04:42), Max: 99.4 (20 May 2019 19:09)  HR: 96 (21 May 2019 09:40) (79 - 108)  BP: 129/64 (21 May 2019 09:40) (94/77 - 180/66)  BP(mean): --  RR: 20 (21 May 2019 04:42) (14 - 20)  SpO2: 95% (21 May 2019 07:49) (92% - 99%)    PHYSICAL EXAM:    Constitutional: NAD, well-groomed, well-developed  HEENT: PERRLA, EOMI, Normal Hearing, MMM  Neck: No LAD, No JVD  Back: Normal spine flexure, No CVA tenderness  Respiratory: CTAB/L   Cardiovascular: S1 and S2, RRR, no M/G/R  Gastrointestinal: BS+, soft, NT/ND  Extremities: No peripheral edema  Vascular: 2+ peripheral pulses  Neurological: A/O x 3, no focal deficits  Skin: No rashes      LABS:                        9.7    12.67 )-----------( 249      ( 21 May 2019 10:48 )             30.1     05-21    137  |  109<H>  |  35<H>  ----------------------------<  147<H>  4.5   |  17<L>  |  2.40<H>    Ca    8.1<L>      21 May 2019 10:48  Mg     2.1     05-21    TPro  6.1  /  Alb  2.4<L>  /  TBili  0.6  /  DBili  x   /  AST  43<H>  /  ALT  35  /  AlkPhos  119  05-21    PT/INR - ( 20 May 2019 15:14 )   PT: 14.2 sec;   INR: 1.25 ratio         PTT - ( 20 May 2019 15:14 )  PTT:32.2 sec      MICROBIOLOGY:  RECENT CULTURES:        RADIOLOGY & ADDITIONAL STUDIES:    < from: US Kidney and Bladder (05.20.19 @ 22:27) >  EXAM:  US KIDNEYS AND BLADDER                            PROCEDURE DATE:  05/20/2019          INTERPRETATION:  [facility] RADIOLOGIST PRELIMINARY REPORT    EXAM:    US Retroperitoneal Limited, Kidneys     EXAM DATE/TIME:    5/20/2019 9:45 PM     CLINICALHISTORY:    63 years old, female; Condition or disease; Other: Lorenzo     TECHNIQUE:    Imaging protocol: Real-time ultrasound of the retroperitoneum with image   documentation. Examination was focused on the kidneys.     COMPARISON:    No relevant prior studies available.     FINDINGS:  Bilateral echogenic renal parenchyma consistent with medical renal disease   Right kidney: 1.1 cm right renal cyst. No hydronephrosis.    Left kidney: No stones. No hydronephrosis.    Bladder: Bladder is unremarkable. Postvoid residual is 102 mL.     IMPRESSION:   No hydronephrosis. Medical renal disease.    Agree with above report      < from: CT Chest No Cont (05.20.19 @ 22:27) >  EXAM:  CT CHEST                            PROCEDURE DATE:  05/20/2019          INTERPRETATION:  [facility] RADIOLOGIST PRELIMINARY REPORT    EXAM:    CT Chest Without Contrast     EXAM DATE/TIME:    5/20/2019 10:13 PM     CLINICAL HISTORY:    63 years old, female; Signs and symptoms; Other: Weight loss, anemia;   Shortness of breath; Prior surgery; Surgery type: Exploratory, tubal   ligation     TECHNIQUE:    Imaging protocol: Axial computed tomography images of the chest without   intravenous contrast. Coronal and sagittal reformatted images were   created and   reviewed.    3D rendering: MIP reconstructed images were created and reviewed.     COMPARISON:    DX XR CHEST PA AND LATERAL 5/20/2019 3:59 PM     FINDINGS:    Lungs: Smooth interlobular septal thickening compatible with hydrostatic   interstitial pulmonary edema/CHF. Patchy airspace opacity in the lungs   bilaterally, compatible with pneumonia.    Pleural space: Small bilateral pleural effusions.    Heart: Normal. No cardiomegaly. No pericardial effusion.    Mediastinum: Esophagus is unremarkable.    Aorta: Normal. No aortic aneurysm.    Lymph nodes: Unremarkable. No enlarged lymph nodes.    Bones/joints: Unremarkable. No acute fracture.    Soft tissues: Unremarkable.     IMPRESSION:   1. Hydrostatic interstitial pulmonary edema/CHF.   2. Small bilateral pleural effusions.   3. Multifocal bilateral pneumonia.       =========================  EXAM:    CT Abdomen and Pelvis Without Contrast     EXAM DATE/TIME:    5/20/2019 10:13 PM     CLINICAL HISTORY:    63 years old, female; Signs and symptoms; Other: Weight loss, anemia;   Shortness of breath; Prior surgery; Surgery type: Exploratory, tubal   ligation     TECHNIQUE:    Imaging protocol: Axial computed tomography images of the abdomen and   pelvis   without contrast. Coronal and sagittal reformatted images were created   and   reviewed.    3D rendering: MIP reconstructed images were created and reviewed.     COMPARISON:    DX XR CHEST PA AND LATERAL 5/20/2019 3:59 PM     FINDINGS:     ABDOMEN:    Liver: Normal. No mass.    Gallbladder and bile ducts: Normal. No calcified stones. No ductal   dilation.    Pancreas: Normal. No ductal dilation.    Spleen: Normal. No splenomegaly.    Adrenals: Normal. No mass.    Kidneys and ureters: No hydronephrosis or urolithiasis. Cortical   thinning.   Stomach and bowel: Colonic diverticulosis. No diverticulitis. No bowel   wall   thickening or intestinal obstruction.    Appendix: Appendix not visualized. No evidence of appendicitis.     PELVIS:    Bladder: Unremarkable as visualized.    Reproductive: Unremarkable as visualized.     ABDOMEN and PELVIS:    Intraperitoneal space: Normal. No free air. No significant fluid   collection.    Bones/joints: No acute fracture. No dislocation.    Soft tissues: Unremarkable.    Vasculature: Normal. No abdominal aortic aneurysm.    Lymph nodes: Normal. No enlarged lymph nodes.    Other findings: 2.5 cm AAA. No rupture.     IMPRESSION:   No acute findings.    Agree with above report

## 2019-05-21 NOTE — PROGRESS NOTE ADULT - PROBLEM SELECTOR PLAN 2
INA on CKD 4  Monitor Cr  Renal u/s noted  Avoid nephrotoxic medications  Renal consult  Further work-up/management pending clinical course.

## 2019-05-21 NOTE — CONSULT NOTE ADULT - PROBLEM SELECTOR RECOMMENDATION 9
dyspnea - multifactorial - smoker, heart disease, ashd, ckd,   TTE pending  US dopplers -   I and O  s/p LASIX  will add NEBS  serial labs, serial clinical assessment, monitor vs and HD and Sat  NRT  smoking cess ed and counseling  discussed plan of care with the patient  will follow
suspect multifactorial  ob+ stool  reports recent normal egd/colon  rec obtain old records  ?capsule study  ct a/p neg for rp bleed  no evidence of active gib  monitor cbc, transfuse prn  cont ppi  consider heme eval  monitor stool color  if w s/s active gib check bleeding scan  further plan of care to be dw attg

## 2019-05-21 NOTE — DIETITIAN INITIAL EVALUATION ADULT. - PROBLEM SELECTOR PLAN 3
Likely secondary to anemia  Transfuse 1 unit PRBC  Check ECHO due to elevated proBNP  Check CXR  Further work-up/management pending clinical course.

## 2019-05-22 LAB
ANION GAP SERPL CALC-SCNC: 12 MMOL/L — SIGNIFICANT CHANGE UP (ref 5–17)
APPEARANCE UR: CLEAR — SIGNIFICANT CHANGE UP
BACTERIA # UR AUTO: ABNORMAL
BILIRUB UR-MCNC: NEGATIVE — SIGNIFICANT CHANGE UP
BUN SERPL-MCNC: 35 MG/DL — HIGH (ref 7–23)
CALCIUM SERPL-MCNC: 8.3 MG/DL — LOW (ref 8.5–10.1)
CHLORIDE SERPL-SCNC: 105 MMOL/L — SIGNIFICANT CHANGE UP (ref 96–108)
CO2 SERPL-SCNC: 19 MMOL/L — LOW (ref 22–31)
COLOR SPEC: YELLOW — SIGNIFICANT CHANGE UP
CREAT SERPL-MCNC: 2.6 MG/DL — HIGH (ref 0.5–1.3)
CRP SERPL-MCNC: 14.23 MG/DL — HIGH (ref 0–0.4)
DIFF PNL FLD: NEGATIVE — SIGNIFICANT CHANGE UP
EPI CELLS # UR: SIGNIFICANT CHANGE UP
ERYTHROCYTE [SEDIMENTATION RATE] IN BLOOD: 38 MM/HR — HIGH (ref 0–20)
FERRITIN SERPL-MCNC: 460 NG/ML — HIGH (ref 15–150)
GLUCOSE SERPL-MCNC: 133 MG/DL — HIGH (ref 70–99)
GLUCOSE UR QL: NEGATIVE — SIGNIFICANT CHANGE UP
HCT VFR BLD CALC: 28.6 % — LOW (ref 34.5–45)
HGB BLD-MCNC: 9 G/DL — LOW (ref 11.5–15.5)
KETONES UR-MCNC: NEGATIVE — SIGNIFICANT CHANGE UP
LEUKOCYTE ESTERASE UR-ACNC: NEGATIVE — SIGNIFICANT CHANGE UP
MAGNESIUM SERPL-MCNC: 1.9 MG/DL — SIGNIFICANT CHANGE UP (ref 1.6–2.6)
MCHC RBC-ENTMCNC: 27 PG — SIGNIFICANT CHANGE UP (ref 27–34)
MCHC RBC-ENTMCNC: 31.5 GM/DL — LOW (ref 32–36)
MCV RBC AUTO: 85.9 FL — SIGNIFICANT CHANGE UP (ref 80–100)
NITRITE UR-MCNC: NEGATIVE — SIGNIFICANT CHANGE UP
NRBC # BLD: 0 /100 WBCS — SIGNIFICANT CHANGE UP (ref 0–0)
PH UR: 5 — SIGNIFICANT CHANGE UP (ref 5–8)
PLATELET # BLD AUTO: 251 K/UL — SIGNIFICANT CHANGE UP (ref 150–400)
POTASSIUM SERPL-MCNC: 3.6 MMOL/L — SIGNIFICANT CHANGE UP (ref 3.5–5.3)
POTASSIUM SERPL-SCNC: 3.6 MMOL/L — SIGNIFICANT CHANGE UP (ref 3.5–5.3)
PROCALCITONIN SERPL-MCNC: 1.2 NG/ML — HIGH (ref 0–0.04)
PROT UR-MCNC: 25 MG/DL
RBC # BLD: 3.33 M/UL — LOW (ref 3.8–5.2)
RBC # FLD: 16.7 % — HIGH (ref 10.3–14.5)
RBC CASTS # UR COMP ASSIST: SIGNIFICANT CHANGE UP /HPF (ref 0–4)
SODIUM SERPL-SCNC: 136 MMOL/L — SIGNIFICANT CHANGE UP (ref 135–145)
SP GR SPEC: 1.01 — SIGNIFICANT CHANGE UP (ref 1.01–1.02)
UROBILINOGEN FLD QL: NEGATIVE — SIGNIFICANT CHANGE UP
WBC # BLD: 12.54 K/UL — HIGH (ref 3.8–10.5)
WBC # FLD AUTO: 12.54 K/UL — HIGH (ref 3.8–10.5)
WBC UR QL: SIGNIFICANT CHANGE UP

## 2019-05-22 PROCEDURE — 99233 SBSQ HOSP IP/OBS HIGH 50: CPT

## 2019-05-22 PROCEDURE — 71045 X-RAY EXAM CHEST 1 VIEW: CPT | Mod: 26

## 2019-05-22 RX ORDER — CEFTRIAXONE 500 MG/1
2 INJECTION, POWDER, FOR SOLUTION INTRAMUSCULAR; INTRAVENOUS EVERY 24 HOURS
Refills: 0 | Status: DISCONTINUED | OUTPATIENT
Start: 2019-05-23 | End: 2019-05-23

## 2019-05-22 RX ORDER — CEFTRIAXONE 500 MG/1
1 INJECTION, POWDER, FOR SOLUTION INTRAMUSCULAR; INTRAVENOUS ONCE
Refills: 0 | Status: COMPLETED | OUTPATIENT
Start: 2019-05-22 | End: 2019-05-22

## 2019-05-22 RX ORDER — TIOTROPIUM BROMIDE 18 UG/1
1 CAPSULE ORAL; RESPIRATORY (INHALATION) DAILY
Refills: 0 | Status: DISCONTINUED | OUTPATIENT
Start: 2019-05-22 | End: 2019-05-28

## 2019-05-22 RX ORDER — FUROSEMIDE 40 MG
40 TABLET ORAL DAILY
Refills: 0 | Status: DISCONTINUED | OUTPATIENT
Start: 2019-05-23 | End: 2019-05-28

## 2019-05-22 RX ADMIN — ALBUTEROL 2.5 MILLIGRAM(S): 90 AEROSOL, METERED ORAL at 15:24

## 2019-05-22 RX ADMIN — ALBUTEROL 2.5 MILLIGRAM(S): 90 AEROSOL, METERED ORAL at 07:42

## 2019-05-22 RX ADMIN — Medication 360 MILLIGRAM(S): at 05:17

## 2019-05-22 RX ADMIN — Medication 40 MILLIGRAM(S): at 05:17

## 2019-05-22 RX ADMIN — Medication 650 MILLIGRAM(S): at 21:44

## 2019-05-22 RX ADMIN — PANTOPRAZOLE SODIUM 40 MILLIGRAM(S): 20 TABLET, DELAYED RELEASE ORAL at 05:17

## 2019-05-22 RX ADMIN — CEFTRIAXONE 100 GRAM(S): 500 INJECTION, POWDER, FOR SOLUTION INTRAMUSCULAR; INTRAVENOUS at 12:32

## 2019-05-22 RX ADMIN — Medication 10 MILLIGRAM(S): at 05:17

## 2019-05-22 RX ADMIN — CEFTRIAXONE 100 GRAM(S): 500 INJECTION, POWDER, FOR SOLUTION INTRAMUSCULAR; INTRAVENOUS at 14:21

## 2019-05-22 RX ADMIN — PRAMIPEXOLE DIHYDROCHLORIDE 0.5 MILLIGRAM(S): 0.12 TABLET ORAL at 20:48

## 2019-05-22 RX ADMIN — Medication 650 MILLIGRAM(S): at 20:44

## 2019-05-22 RX ADMIN — TIOTROPIUM BROMIDE 1 CAPSULE(S): 18 CAPSULE ORAL; RESPIRATORY (INHALATION) at 14:21

## 2019-05-22 RX ADMIN — IRON SUCROSE 100 MILLIGRAM(S): 20 INJECTION, SOLUTION INTRAVENOUS at 18:59

## 2019-05-22 RX ADMIN — BUPROPION HYDROCHLORIDE 150 MILLIGRAM(S): 150 TABLET, EXTENDED RELEASE ORAL at 12:31

## 2019-05-22 RX ADMIN — SIMVASTATIN 10 MILLIGRAM(S): 20 TABLET, FILM COATED ORAL at 20:48

## 2019-05-22 RX ADMIN — ALBUTEROL 2.5 MILLIGRAM(S): 90 AEROSOL, METERED ORAL at 21:39

## 2019-05-22 NOTE — PROGRESS NOTE ADULT - PROBLEM SELECTOR PLAN 2
Blood cultures x 2 STAT  Continue iv abx -- adjust as per ID recommendations  Will need ZAHIRA  Cardiology f/u  ID consult  Further work-up/management pending clinical course. -Blood cultures x 2 STAT  -Continue iv abx -- adjust as per ID recommendations  -Will need ZAHIRA  -Cardiology f/u  -ID consult  -Further work-up/management pending clinical course.

## 2019-05-22 NOTE — PROGRESS NOTE ADULT - PROBLEM SELECTOR PLAN 3
INA on CKD 4  Trending upward possibly secondary to iv lasix -- may need to hold lasix  Monitor Cr  Renal u/s noted  Avoid nephrotoxic medications  Renal f/u  Further work-up/management pending clinical course. -INA on CKD 4  -Trending upward possibly secondary to iv lasix -- may need to hold lasix  -Monitor Cr  -Renal u/s noted  -Avoid nephrotoxic medications  -Renal f/u  -Further work-up/management pending clinical course.

## 2019-05-22 NOTE — PROGRESS NOTE ADULT - ATTENDING COMMENTS
I personally saw and examined the patient in detail.  I have spoken to the above provider regarding the assessment and plan of care.  I reviewed the above assessment and plan of care, and agree.  I have made changes in the body of the note where appropriate.  Total time spent taking care of patient, reviewing data, and discussing case with medical team and staff in excess of 35 minutes.  TTE with mobile echodensity on AV and mod to severe AI.  This is the cause of her heart failure most likely.  Needs IV lasix to maintain negative balance. S TAT blood cultures to assess for endocarditis, as well as possibly a ZAHRIA.  Explained to the patient and family in detail.

## 2019-05-22 NOTE — PROGRESS NOTE ADULT - SUBJECTIVE AND OBJECTIVE BOX
Interval History:    Chart reviewed and events noted;   ROS:negative exept    MEDICATIONS  (STANDING):  ALBUTerol    0.083% 2.5 milliGRAM(s) Nebulizer every 8 hours  azithromycin  IVPB      azithromycin  IVPB 500 milliGRAM(s) IV Intermittent every 24 hours  buPROPion XL . 150 milliGRAM(s) Oral daily  cefTRIAXone   IVPB 1 Gram(s) IV Intermittent every 24 hours  cefTRIAXone   IVPB      diltiazem    milliGRAM(s) Oral daily  furosemide   Injectable 40 milliGRAM(s) IV Push daily  hydrALAZINE 10 milliGRAM(s) Oral two times a day  iron sucrose Injectable 100 milliGRAM(s) IV Push every 24 hours  pantoprazole    Tablet 40 milliGRAM(s) Oral before breakfast  pramipexole 0.5 milliGRAM(s) Oral at bedtime  simvastatin 10 milliGRAM(s) Oral at bedtime  tiotropium 18 MICROgram(s) Capsule 1 Capsule(s) Inhalation daily    MEDICATIONS  (PRN):  acetaminophen   Tablet .. 650 milliGRAM(s) Oral every 6 hours PRN Temp greater or equal to 38C (100.4F), Mild Pain (1 - 3)      Vital Signs Last 24 Hrs  T(C): 36.8 (22 May 2019 04:37), Max: 37.4 (21 May 2019 12:59)  T(F): 98.3 (22 May 2019 04:37), Max: 99.4 (21 May 2019 12:59)  HR: 96 (22 May 2019 04:37) (74 - 101)  BP: 128/56 (22 May 2019 04:37) (119/39 - 149/60)  BP(mean): --  RR: 18 (22 May 2019 04:37) (18 - 19)  SpO2: 93% (22 May 2019 04:37) (90% - 95%)    PHYSICAL EXAM  General: adult in NAD  HEENT: clear oropharynx, anicteric sclera, pink conjunctivae  Neck: supple  CV: normal S1S2 with no murmur rubs or gallops  Lungs: clear to auscultation, no wheezes, no rhales  Abdomen: soft non-tender non-distended, no hepato/splenomegaly  Ext: no clubbing cyanosis or edema  Skin: no rashes and no petichiae  Neuro: alert and oriented X3 no focal deficits      LABS:                        9.7    12.67 )-----------( 249      ( 21 May 2019 10:48 )             30.1     05-21    137  |  109<H>  |  35<H>  ----------------------------<  147<H>  4.5   |  17<L>  |  2.40<H>    Ca    8.1<L>      21 May 2019 10:48  Mg     2.1     05-21    TPro  6.1  /  Alb  2.4<L>  /  TBili  0.6  /  DBili  x   /  AST  43<H>  /  ALT  35  /  AlkPhos  119  05-21    PT/INR - ( 20 May 2019 15:14 )   PT: 14.2 sec;   INR: 1.25 ratio         PTT - ( 20 May 2019 15:14 )  PTT:32.2 sec      RADIOLOGY & ADDITIONAL STUDIES:    IMPRESSION/RECOMMENDATIONS: Interval History:    pt feels overall better   in bed , still on O2, looks comfortable     MEDICATIONS  (STANDING):  ALBUTerol    0.083% 2.5 milliGRAM(s) Nebulizer every 8 hours  azithromycin  IVPB      azithromycin  IVPB 500 milliGRAM(s) IV Intermittent every 24 hours  buPROPion XL . 150 milliGRAM(s) Oral daily  cefTRIAXone   IVPB 1 Gram(s) IV Intermittent every 24 hours  cefTRIAXone   IVPB      diltiazem    milliGRAM(s) Oral daily  furosemide   Injectable 40 milliGRAM(s) IV Push daily  hydrALAZINE 10 milliGRAM(s) Oral two times a day  iron sucrose Injectable 100 milliGRAM(s) IV Push every 24 hours  pantoprazole    Tablet 40 milliGRAM(s) Oral before breakfast  pramipexole 0.5 milliGRAM(s) Oral at bedtime  simvastatin 10 milliGRAM(s) Oral at bedtime  tiotropium 18 MICROgram(s) Capsule 1 Capsule(s) Inhalation daily    MEDICATIONS  (PRN):  acetaminophen   Tablet .. 650 milliGRAM(s) Oral every 6 hours PRN Temp greater or equal to 38C (100.4F), Mild Pain (1 - 3)      Vital Signs Last 24 Hrs  T(C): 36.8 (22 May 2019 04:37), Max: 37.4 (21 May 2019 12:59)  T(F): 98.3 (22 May 2019 04:37), Max: 99.4 (21 May 2019 12:59)  HR: 96 (22 May 2019 04:37) (74 - 101)  BP: 128/56 (22 May 2019 04:37) (119/39 - 149/60)  BP(mean): --  RR: 18 (22 May 2019 04:37) (18 - 19)  SpO2: 93% (22 May 2019 04:37) (90% - 95%)    PHYSICAL EXAM  General: adult in NAD  HEENT: clear oropharynx, anicteric sclera, pink conjunctivae    CV: normal S1S2 with no murmur rubs or gallops  Lungs: decreased BS B/L at bases with mild crackles   Abdomen: soft non-tender non-distended, no hepato/splenomegaly  Ext: no clubbing cyanosis or edema  Skin: no rashes and no petichiae  Neuro: alert and oriented X3 no focal deficits      LABS:                        9.7    12.67 )-----------( 249      ( 21 May 2019 10:48 )             30.1     05-21    137  |  109<H>  |  35<H>  ----------------------------<  147<H>  4.5   |  17<L>  |  2.40<H>    Ca    8.1<L>      21 May 2019 10:48  Mg     2.1     05-21    TPro  6.1  /  Alb  2.4<L>  /  TBili  0.6  /  DBili  x   /  AST  43<H>  /  ALT  35  /  AlkPhos  119  05-21    PT/INR - ( 20 May 2019 15:14 )   PT: 14.2 sec;   INR: 1.25 ratio         PTT - ( 20 May 2019 15:14 )  PTT:32.2 sec

## 2019-05-22 NOTE — PROGRESS NOTE ADULT - SUBJECTIVE AND OBJECTIVE BOX
Patient is a 63y old  Female who presents with a chief complaint of CABA, weakness (22 May 2019 09:30)    Patient seen in follow up for LORENZO, anemia. + SOB.     PAST MEDICAL HISTORY:  Hypertension  Restless leg syndrome  Arthritis    MEDICATIONS  (STANDING):  ALBUTerol    0.083% 2.5 milliGRAM(s) Nebulizer every 8 hours  azithromycin  IVPB      azithromycin  IVPB 500 milliGRAM(s) IV Intermittent every 24 hours  buPROPion XL . 150 milliGRAM(s) Oral daily  cefTRIAXone   IVPB 1 Gram(s) IV Intermittent every 24 hours  cefTRIAXone   IVPB      diltiazem    milliGRAM(s) Oral daily  furosemide   Injectable 40 milliGRAM(s) IV Push daily  hydrALAZINE 10 milliGRAM(s) Oral two times a day  iron sucrose Injectable 100 milliGRAM(s) IV Push every 24 hours  pantoprazole    Tablet 40 milliGRAM(s) Oral before breakfast  pramipexole 0.5 milliGRAM(s) Oral at bedtime  simvastatin 10 milliGRAM(s) Oral at bedtime  tiotropium 18 MICROgram(s) Capsule 1 Capsule(s) Inhalation daily    MEDICATIONS  (PRN):  acetaminophen   Tablet .. 650 milliGRAM(s) Oral every 6 hours PRN Temp greater or equal to 38C (100.4F), Mild Pain (1 - 3)    T(C): 36.8 (05-22-19 @ 04:37), Max: 37.4 (05-21-19 @ 12:59)  HR: 96 (05-22-19 @ 04:37) (74 - 101)  BP: 128/56 (05-22-19 @ 04:37) (112/58 - 149/60)  RR: 18 (05-22-19 @ 04:37) (18 - 20)  SpO2: 93% (05-22-19 @ 04:37) (90% - 95%)  Wt(kg): --  I&O's Detail    21 May 2019 07:01  -  22 May 2019 07:00  --------------------------------------------------------  IN:  Total IN: 0 mL    OUT:    Voided: 300 mL  Total OUT: 300 mL    Total NET: -300 mL          PHYSICAL EXAM:  General: NAD  Respiratory: b/l air entry  Cardiovascular: S1 S2  Gastrointestinal: soft  Extremities:  no edema                          9.0    12.54 )-----------( 251      ( 22 May 2019 08:40 )             28.6     05-22    136  |  105  |  35<H>  ----------------------------<  133<H>  3.6   |  19<L>  |  2.60<H>    Ca    8.3<L>      22 May 2019 08:40  Mg     1.9     05-22    TPro  6.1  /  Alb  2.4<L>  /  TBili  0.6  /  DBili  x   /  AST  43<H>  /  ALT  35  /  AlkPhos  119  05-21    LIVER FUNCTIONS - ( 21 May 2019 10:48 )  Alb: 2.4 g/dL / Pro: 6.1 g/dL / ALK PHOS: 119 U/L / ALT: 35 U/L / AST: 43 U/L / GGT: x               Sodium, Serum: 136 (05-22 @ 08:40)  Sodium, Serum: 137 (05-21 @ 10:48)  Sodium, Serum: 137 (05-20 @ 15:14)    Creatinine, Serum: 2.60 (05-22 @ 08:40)  Creatinine, Serum: 2.40 (05-21 @ 10:48)  Creatinine, Serum: 2.40 (05-20 @ 15:14)    Potassium, Serum: 3.6 (05-22 @ 08:40)  Potassium, Serum: 4.5 (05-21 @ 10:48)  Potassium, Serum: 3.8 (05-20 @ 15:14)    Hemoglobin: 9.0 (05-22 @ 08:40)  Hemoglobin: 9.7 (05-21 @ 10:48)  Hemoglobin: 7.3 (05-20 @ 15:14)    < from: CT Chest No Cont (05.20.19 @ 22:27) >  EXAM:  CT CHEST                            PROCEDURE DATE:  05/20/2019          INTERPRETATION:  [facility] RADIOLOGIST PRELIMINARY REPORT    EXAM:    CT Chest Without Contrast     EXAM DATE/TIME:    5/20/2019 10:13 PM     CLINICAL HISTORY:    63 years old, female; Signs and symptoms; Other: Weight loss, anemia;   Shortness of breath; Prior surgery; Surgery type: Exploratory, tubal   ligation     TECHNIQUE:    Imaging protocol: Axial computed tomography images of the chest without   intravenous contrast. Coronal and sagittal reformatted images were   created and   reviewed.    3D rendering: MIP reconstructed images were created and reviewed.     COMPARISON:    DX XR CHEST PA AND LATERAL 5/20/2019 3:59 PM     FINDINGS:    Lungs: Smooth interlobular septal thickening compatible with hydrostatic   interstitial pulmonary edema/CHF. Patchy airspace opacity in the lungs   bilaterally, compatible with pneumonia.    Pleural space: Small bilateral pleural effusions.    Heart: Normal. No cardiomegaly. No pericardial effusion.    Mediastinum: Esophagus is unremarkable.    Aorta: Normal. No aortic aneurysm.    Lymph nodes: Unremarkable. No enlarged lymph nodes.    Bones/joints: Unremarkable. No acute fracture.    Soft tissues: Unremarkable.     IMPRESSION:   1. Hydrostatic interstitial pulmonary edema/CHF.   2. Small bilateral pleural effusions.   3. Multifocal bilateral pneumonia.       =========================  EXAM:    CT Abdomen and Pelvis Without Contrast     EXAM DATE/TIME:    5/20/2019 10:13 PM     CLINICAL HISTORY:    63 years old, female; Signs and symptoms; Other: Weight loss, anemia;   Shortness of breath; Prior surgery; Surgery type: Exploratory, tubal   ligation     TECHNIQUE:    Imaging protocol: Axial computed tomography images of the abdomen and   pelvis   without contrast. Coronal and sagittal reformatted images were created   and   reviewed.    3D rendering: MIP reconstructed images were created and reviewed.     COMPARISON:    DX XR CHEST PA AND LATERAL 5/20/2019 3:59 PM     FINDINGS:     ABDOMEN:    Liver: Normal. No mass.    Gallbladder and bile ducts: Normal. No calcified stones. No ductal   dilation.    Pancreas: Normal. No ductal dilation.    Spleen: Normal. No splenomegaly.    Adrenals: Normal. No mass.    Kidneys and ureters: No hydronephrosis or urolithiasis. Cortical   thinning.   Stomach and bowel: Colonic diverticulosis. No diverticulitis. No bowel   wall   thickening or intestinal obstruction.    Appendix: Appendix not visualized. No evidence of appendicitis.     PELVIS:    Bladder: Unremarkable as visualized.    Reproductive: Unremarkable as visualized.     ABDOMEN and PELVIS:    Intraperitoneal space: Normal. No free air. No significant fluid   collection.    Bones/joints: No acute fracture. No dislocation.    Soft tissues: Unremarkable.    Vasculature: Normal. No abdominal aortic aneurysm.    Lymph nodes: Normal. No enlarged lymph nodes.    Other findings: 2.5 cm AAA. No rupture.     IMPRESSION:   No acute findings.    Agree with above report        NATIVIDAD ZAMORA M.D., ATTENDING RADIOLOGIST  This document has been electronically signed. May 21 2019 10:20AM    < end of copied text >      < from: US Kidney and Bladder (05.20.19 @ 22:27) >  EXAM:  US KIDNEYS AND BLADDER                            PROCEDURE DATE:  05/20/2019          INTERPRETATION:  [facility] RADIOLOGIST PRELIMINARY REPORT    EXAM:    US Retroperitoneal Limited, Kidneys     EXAM DATE/TIME:    5/20/2019 9:45 PM     CLINICALHISTORY:    63 years old, female; Condition or disease; Other: Lorenzo     TECHNIQUE:    Imaging protocol: Real-time ultrasound of the retroperitoneum with image   documentation. Examination was focused on the kidneys.     COMPARISON:    No relevant prior studies available.     FINDINGS:  Bilateral echogenic renal parenchyma consistent with medical renal disease   Right kidney: 1.1 cm right renal cyst. No hydronephrosis.    Left kidney: No stones. No hydronephrosis.    Bladder: Bladder is unremarkable. Postvoid residual is 102 mL.     IMPRESSION:   No hydronephrosis. Medical renal disease.    Agree with above report      NATIVIDAD ZAMORA M.D., ATTENDING RADIOLOGIST  This document has been electronically signed. May 21 2019 10:04AM    < end of copied text > Patient is a 63y old  Female who presents with a chief complaint of CABA, weakness (22 May 2019 09:30)    Patient seen in follow up for LORENZO, anemia. + SOB.   Echo results noted.     PAST MEDICAL HISTORY:  Hypertension  Restless leg syndrome  Arthritis    MEDICATIONS  (STANDING):  ALBUTerol    0.083% 2.5 milliGRAM(s) Nebulizer every 8 hours  azithromycin  IVPB      azithromycin  IVPB 500 milliGRAM(s) IV Intermittent every 24 hours  buPROPion XL . 150 milliGRAM(s) Oral daily  cefTRIAXone   IVPB 1 Gram(s) IV Intermittent every 24 hours  cefTRIAXone   IVPB      diltiazem    milliGRAM(s) Oral daily  furosemide   Injectable 40 milliGRAM(s) IV Push daily  hydrALAZINE 10 milliGRAM(s) Oral two times a day  iron sucrose Injectable 100 milliGRAM(s) IV Push every 24 hours  pantoprazole    Tablet 40 milliGRAM(s) Oral before breakfast  pramipexole 0.5 milliGRAM(s) Oral at bedtime  simvastatin 10 milliGRAM(s) Oral at bedtime  tiotropium 18 MICROgram(s) Capsule 1 Capsule(s) Inhalation daily    MEDICATIONS  (PRN):  acetaminophen   Tablet .. 650 milliGRAM(s) Oral every 6 hours PRN Temp greater or equal to 38C (100.4F), Mild Pain (1 - 3)    T(C): 36.8 (05-22-19 @ 04:37), Max: 37.4 (05-21-19 @ 12:59)  HR: 96 (05-22-19 @ 04:37) (74 - 101)  BP: 128/56 (05-22-19 @ 04:37) (112/58 - 149/60)  RR: 18 (05-22-19 @ 04:37) (18 - 20)  SpO2: 93% (05-22-19 @ 04:37) (90% - 95%)  Wt(kg): --  I&O's Detail    21 May 2019 07:01  -  22 May 2019 07:00  --------------------------------------------------------  IN:  Total IN: 0 mL    OUT:    Voided: 300 mL  Total OUT: 300 mL    Total NET: -300 mL          PHYSICAL EXAM:  General: NAD  Respiratory: b/l air entry  Cardiovascular: S1 S2  Gastrointestinal: soft  Extremities:  no edema                          9.0    12.54 )-----------( 251      ( 22 May 2019 08:40 )             28.6     05-22    136  |  105  |  35<H>  ----------------------------<  133<H>  3.6   |  19<L>  |  2.60<H>    Ca    8.3<L>      22 May 2019 08:40  Mg     1.9     05-22    TPro  6.1  /  Alb  2.4<L>  /  TBili  0.6  /  DBili  x   /  AST  43<H>  /  ALT  35  /  AlkPhos  119  05-21    LIVER FUNCTIONS - ( 21 May 2019 10:48 )  Alb: 2.4 g/dL / Pro: 6.1 g/dL / ALK PHOS: 119 U/L / ALT: 35 U/L / AST: 43 U/L / GGT: x               Sodium, Serum: 136 (05-22 @ 08:40)  Sodium, Serum: 137 (05-21 @ 10:48)  Sodium, Serum: 137 (05-20 @ 15:14)    Creatinine, Serum: 2.60 (05-22 @ 08:40)  Creatinine, Serum: 2.40 (05-21 @ 10:48)  Creatinine, Serum: 2.40 (05-20 @ 15:14)    Potassium, Serum: 3.6 (05-22 @ 08:40)  Potassium, Serum: 4.5 (05-21 @ 10:48)  Potassium, Serum: 3.8 (05-20 @ 15:14)    Hemoglobin: 9.0 (05-22 @ 08:40)  Hemoglobin: 9.7 (05-21 @ 10:48)  Hemoglobin: 7.3 (05-20 @ 15:14)    < from: CT Chest No Cont (05.20.19 @ 22:27) >  EXAM:  CT CHEST                            PROCEDURE DATE:  05/20/2019          INTERPRETATION:  [facility] RADIOLOGIST PRELIMINARY REPORT    EXAM:    CT Chest Without Contrast     EXAM DATE/TIME:    5/20/2019 10:13 PM     CLINICAL HISTORY:    63 years old, female; Signs and symptoms; Other: Weight loss, anemia;   Shortness of breath; Prior surgery; Surgery type: Exploratory, tubal   ligation     TECHNIQUE:    Imaging protocol: Axial computed tomography images of the chest without   intravenous contrast. Coronal and sagittal reformatted images were   created and   reviewed.    3D rendering: MIP reconstructed images were created and reviewed.     COMPARISON:    DX XR CHEST PA AND LATERAL 5/20/2019 3:59 PM     FINDINGS:    Lungs: Smooth interlobular septal thickening compatible with hydrostatic   interstitial pulmonary edema/CHF. Patchy airspace opacity in the lungs   bilaterally, compatible with pneumonia.    Pleural space: Small bilateral pleural effusions.    Heart: Normal. No cardiomegaly. No pericardial effusion.    Mediastinum: Esophagus is unremarkable.    Aorta: Normal. No aortic aneurysm.    Lymph nodes: Unremarkable. No enlarged lymph nodes.    Bones/joints: Unremarkable. No acute fracture.    Soft tissues: Unremarkable.     IMPRESSION:   1. Hydrostatic interstitial pulmonary edema/CHF.   2. Small bilateral pleural effusions.   3. Multifocal bilateral pneumonia.       =========================  EXAM:    CT Abdomen and Pelvis Without Contrast     EXAM DATE/TIME:    5/20/2019 10:13 PM     CLINICAL HISTORY:    63 years old, female; Signs and symptoms; Other: Weight loss, anemia;   Shortness of breath; Prior surgery; Surgery type: Exploratory, tubal   ligation     TECHNIQUE:    Imaging protocol: Axial computed tomography images of the abdomen and   pelvis   without contrast. Coronal and sagittal reformatted images were created   and   reviewed.    3D rendering: MIP reconstructed images were created and reviewed.     COMPARISON:    DX XR CHEST PA AND LATERAL 5/20/2019 3:59 PM     FINDINGS:     ABDOMEN:    Liver: Normal. No mass.    Gallbladder and bile ducts: Normal. No calcified stones. No ductal   dilation.    Pancreas: Normal. No ductal dilation.    Spleen: Normal. No splenomegaly.    Adrenals: Normal. No mass.    Kidneys and ureters: No hydronephrosis or urolithiasis. Cortical   thinning.   Stomach and bowel: Colonic diverticulosis. No diverticulitis. No bowel   wall   thickening or intestinal obstruction.    Appendix: Appendix not visualized. No evidence of appendicitis.     PELVIS:    Bladder: Unremarkable as visualized.    Reproductive: Unremarkable as visualized.     ABDOMEN and PELVIS:    Intraperitoneal space: Normal. No free air. No significant fluid   collection.    Bones/joints: No acute fracture. No dislocation.    Soft tissues: Unremarkable.    Vasculature: Normal. No abdominal aortic aneurysm.    Lymph nodes: Normal. No enlarged lymph nodes.    Other findings: 2.5 cm AAA. No rupture.     IMPRESSION:   No acute findings.    Agree with above report        NATIVIDAD ZAMORA M.D., ATTENDING RADIOLOGIST  This document has been electronically signed. May 21 2019 10:20AM    < end of copied text >      < from: US Kidney and Bladder (05.20.19 @ 22:27) >  EXAM:  US KIDNEYS AND BLADDER                            PROCEDURE DATE:  05/20/2019          INTERPRETATION:  [facility] RADIOLOGIST PRELIMINARY REPORT    EXAM:    US Retroperitoneal Limited, Kidneys     EXAM DATE/TIME:    5/20/2019 9:45 PM     CLINICALHISTORY:    63 years old, female; Condition or disease; Other: Lorenzo     TECHNIQUE:    Imaging protocol: Real-time ultrasound of the retroperitoneum with image   documentation. Examination was focused on the kidneys.     COMPARISON:    No relevant prior studies available.     FINDINGS:  Bilateral echogenic renal parenchyma consistent with medical renal disease   Right kidney: 1.1 cm right renal cyst. No hydronephrosis.    Left kidney: No stones. No hydronephrosis.    Bladder: Bladder is unremarkable. Postvoid residual is 102 mL.     IMPRESSION:   No hydronephrosis. Medical renal disease.    Agree with above report      NATIVIDAD ZAMORA M.D., ATTENDING RADIOLOGIST  This document has been electronically signed. May 21 2019 10:04AM    < end of copied text >

## 2019-05-22 NOTE — PROGRESS NOTE ADULT - SUBJECTIVE AND OBJECTIVE BOX
Date/Time Patient Seen:  		  Referring MD:   Data Reviewed	       Patient is a 63y old  Female who presents with a chief complaint of CABA, weakness (21 May 2019 16:46)      Subjective/HPI     PAST MEDICAL & SURGICAL HISTORY:  Hypertension  Restless leg syndrome: Especially with General Anesthesia  Arthritis: Cervical Spine  and Hands  H/O cervical discectomy  Acute peptic ulcer with perforation: 1980  S/P appendectomy: 2014  S/P tubal ligation: 1986  Osteoarthritis of right shoulder region: Right Shoulder Arthroscopy  2007  Rheumatoid arthritis of carpometacarpal joint of thumb: Total Joint Replacement of Left Thumb        Medication list         MEDICATIONS  (STANDING):  ALBUTerol    0.083% 2.5 milliGRAM(s) Nebulizer every 8 hours  azithromycin  IVPB      azithromycin  IVPB 500 milliGRAM(s) IV Intermittent every 24 hours  buPROPion XL . 150 milliGRAM(s) Oral daily  cefTRIAXone   IVPB 1 Gram(s) IV Intermittent every 24 hours  cefTRIAXone   IVPB      diltiazem    milliGRAM(s) Oral daily  furosemide   Injectable 40 milliGRAM(s) IV Push daily  hydrALAZINE 10 milliGRAM(s) Oral two times a day  iron sucrose Injectable 100 milliGRAM(s) IV Push every 24 hours  pantoprazole    Tablet 40 milliGRAM(s) Oral before breakfast  pramipexole 0.5 milliGRAM(s) Oral at bedtime  simvastatin 10 milliGRAM(s) Oral at bedtime  tiotropium 18 MICROgram(s) Capsule 1 Capsule(s) Inhalation daily    MEDICATIONS  (PRN):  acetaminophen   Tablet .. 650 milliGRAM(s) Oral every 6 hours PRN Temp greater or equal to 38C (100.4F), Mild Pain (1 - 3)         Vitals log        ICU Vital Signs Last 24 Hrs  T(C): 36.8 (22 May 2019 04:37), Max: 37.4 (21 May 2019 12:59)  T(F): 98.3 (22 May 2019 04:37), Max: 99.4 (21 May 2019 12:59)  HR: 96 (22 May 2019 04:37) (74 - 101)  BP: 128/56 (22 May 2019 04:37) (119/39 - 149/60)  BP(mean): --  ABP: --  ABP(mean): --  RR: 18 (22 May 2019 04:37) (18 - 19)  SpO2: 93% (22 May 2019 04:37) (90% - 95%)           Input and Output:  I&O's Detail    21 May 2019 07:01  -  22 May 2019 06:08  --------------------------------------------------------  IN:  Total IN: 0 mL    OUT:    Voided: 300 mL  Total OUT: 300 mL    Total NET: -300 mL          Lab Data                        9.7    12.67 )-----------( 249      ( 21 May 2019 10:48 )             30.1     05-21    137  |  109<H>  |  35<H>  ----------------------------<  147<H>  4.5   |  17<L>  |  2.40<H>    Ca    8.1<L>      21 May 2019 10:48  Mg     2.1     05-21    TPro  6.1  /  Alb  2.4<L>  /  TBili  0.6  /  DBili  x   /  AST  43<H>  /  ALT  35  /  AlkPhos  119  05-21            Review of Systems	      Objective     Physical Examination    heart s1s2  lung dec BS      Pertinent Lab findings & Imaging      Mike:  NO   Adequate UO     I&O's Detail    21 May 2019 07:01  -  22 May 2019 06:08  --------------------------------------------------------  IN:  Total IN: 0 mL    OUT:    Voided: 300 mL  Total OUT: 300 mL    Total NET: -300 mL               Discussed with:     Cultures:	        Radiology

## 2019-05-22 NOTE — PROGRESS NOTE ADULT - ASSESSMENT
Assessment/Plan:  63y Female with HTN, CKD, PAD s/p right femoral endarterectomy, depression, and smoker (quit 5 days ago) presented to the ED c/o SOB and weakness that started in April but have gotten worse 5 days and a 10-lb weight loss within a month with less appetitie.  Patient states, she had to stop to catch her breath in going to the bathroom from her living room.  She used to be a very active woman until recently.  Denies edema, however, admits to orthopnea.  Denies CP or palpitations, though, she was here last month with c/o neck pain radiating to her chest, for which she though was an MI.  Her workup for ACS and PE was negative and she was sent home.  However, she was found to be anemic and was placed on Iron.  Her EGD last week was negative.  She denies any melena.  Denies fever chills, nausea, vomiting, diarrhea, constipation, abdominal pain    In the ED, her CxR showed small B/L pleural effusion, pro-BNP>96564, H/H 7.3/23.1, procalcitonin=1.12.  She is now receiving 1 unit of PRBC's.  Upon evaluation, she is dyspneic during conversation and requiring her HOB elevated.  She also has elevated creatinine (2.8), baseline = 2.1    She follows Dr. Lacy as outpatient and claims to have had negative cardiac workup.  TTE in 2017 with normal LVF and no segmental WMA.  Nuclear stress test (Pharm) normal.    Recommend:    HFpEF  - Was fluid overloaded on presentation, s/p Lasix 40 gm IV x 1 in ED.  Now more euvolemic  - CT chest yesterday showed interstitial pulmonary edema, small B/L pleural effusions and multifocal B/L Pna  - CSwitch IV Lasix to PO  - Please continue to maintain strict I/Os, monitor daily weights, Cr, and K.   - Monitor electrolytes, replete to keep K>4 and Mag>2  - TTE to assess LVF and valvular structures    INA on CKD  - Creatinine trending up (2.6 <--2.4<--1.8)  - Monitor renal function while actively diuresing  - Avoid nephrotoxics  - Renal following    Anemia  - Transfuse per Primary.  s/p 1 unit of PRBC's with appropriate response  - Follow up anemia w/u  - FOBT + but no overt GIB.  GI following  - Heme eval  - Would hold plavix for now    HTN  - SBP is labile, range 110-180.  - Continue Cardizem CD  - May initiate Norvasc if remains elevated      PAD  - Would monitor for claudication and and signs of limb ischemia.   - Continue statin    DVT ppx  - PAS for now    Smoker  - Reinforce education on smoking cessation    Further cardiac workup pending clinical course  Other workup per Primary  Will continue to follow    Judi Villanueva DNP  Cardiology Assessment/Plan:  63y Female with HTN, CKD, PAD s/p right femoral endarterectomy, depression, and smoker (quit 5 days ago) presented to the ED c/o SOB and weakness that started in April but have gotten worse 5 days and a 10-lb weight loss within a month with less appetitie.  Patient states, she had to stop to catch her breath in going to the bathroom from her living room.  She used to be a very active woman until recently.  Denies edema, however, admits to orthopnea.  Denies CP or palpitations, though, she was here last month with c/o neck pain radiating to her chest, for which she though was an MI.  Her workup for ACS and PE was negative and she was sent home.  However, she was found to be anemic and was placed on Iron.  Her EGD last week was negative.  She denies any melena.  Denies fever chills, nausea, vomiting, diarrhea, constipation, abdominal pain    In the ED, her CxR showed small B/L pleural effusion, pro-BNP>30268, H/H 7.3/23.1, procalcitonin=1.12.  She is now receiving 1 unit of PRBC's.  Upon evaluation, she is dyspneic during conversation and requiring her HOB elevated.  She also has elevated creatinine (2.8), baseline = 2.1    She follows Dr. Lacy as outpatient and claims to have had negative cardiac workup.  TTE in 2017 with normal LVF and no segmental WMA.  Nuclear stress test (Pharm) normal.    Recommend:    HFpEF  - TTE shows mobile echodensity on AV with moderate to severe AI.  This is likely the cause of her CABA and heart failure.  The etiology of the mass is currently unclear  - Please send stat blood cultures to assess for endocarditis.  Unfortunately, she got abx already  - ID follow up  - May need a ZAHIRA to better assess the aortic valve  - Was fluid overloaded on presentation, s/p Lasix 40 gm IV x 1 in ED.    - CT chest yesterday showed interstitial pulmonary edema, small B/L pleural effusions and multifocal B/L Pna  - She is still volume overloaded.  She needs continued IV Lasix to maintain negative balance  - Please continue to maintain strict I/Os, monitor daily weights, Cr, and K.   - Monitor electrolytes, replete to keep K>4 and Mag>2    INA on CKD  - Creatinine trending up (2.6 <--2.4<--1.8)  - Monitor renal function while actively diuresing  - Avoid nephrotoxics  - Renal following    Anemia  - Transfuse per Primary.  s/p 1 unit of PRBC's with appropriate response  - Follow up anemia w/u  - FOBT + but no overt GIB.  GI following  - Heme eval appreciated.  ? hemolysis labs  - Would hold plavix for now    HTN  - SBP is labile, range 110-180.  - Continue Cardizem CD  - May initiate Norvasc if remains elevated      PAD  - Would monitor for claudication and and signs of limb ischemia.   - Continue statin    DVT ppx  - PAS for now    Smoker  - Reinforce education on smoking cessation    Further cardiac workup pending clinical course  Other workup per Primary  Will continue to follow    Judi Villanueva DNP  Cardiology

## 2019-05-22 NOTE — PROGRESS NOTE ADULT - ASSESSMENT
63 white female with a history of HTN, CAD, CHF, CKD with anemia now admitted with a history of 10 pound weight loss associated with SOB and low hgb.    1.	INA, CKD.   2.	Hypertension  3.	Anemia  4.	Dyspnea: Pneumonia, CHF    On IV lasix. Will change to PO lasix soon if clinically stable. On rx for pneumonia. Monitor BP trend. Titrate BP meds as needed. Salt restriction.   Monitor h/h trend. Check iron studies if not done recently. Transfuse PRBC's PRN. Will follow electrolytes and renal function trend.   Avoid nephrotoxic meds as possible. D/w family at bedside. 63 white female with a history of HTN, CAD, CHF, CKD with anemia now admitted with a history of 10 pound weight loss associated with SOB and low hgb.    1.	INA, CKD.   2.	Hypertension  3.	Anemia  4.	Dyspnea: Pneumonia, CHF  5.	Echo > ? Endocarditis    On IV lasix. Will change to PO lasix soon if clinically stable. On rx for pneumonia. Monitor BP trend. Titrate BP meds as needed. Salt restriction.   Check UA, complements. Monitor h/h trend. Check iron studies if not done recently. Transfuse PRBC's PRN. Will follow electrolytes and renal function trend.   Avoid nephrotoxic meds as possible. D/w family at bedside.

## 2019-05-22 NOTE — PROGRESS NOTE ADULT - SUBJECTIVE AND OBJECTIVE BOX
[facility] -- Chao Lomax, Regino Syed, Roshan Paris Savella  Office # [phone]    Follow Up:  Weakness and SOB    Subjective/Observations: Sitting up ion bed with nasal cannula.  Admits that she is feeling better everyday.  Still c/o cough with slight sputum production.  Also still with CABA, however, improving on a daily basis.  Denies CP or palpitations.    REVIEW OF SYSTEMS: All other review of systems is negative unless indicated above    PAST MEDICAL & SURGICAL HISTORY:  Hypertension  Restless leg syndrome: Especially with General Anesthesia  Arthritis: Cervical Spine  and Hands  H/O cervical discectomy  Acute peptic ulcer with perforation: 1980  S/P appendectomy: 2014  S/P tubal ligation: 1986  Osteoarthritis of right shoulder region: Right Shoulder Arthroscopy  2007  Rheumatoid arthritis of carpometacarpal joint of thumb: Total Joint Replacement of Left Thumb    MEDICATIONS  (STANDING):  ALBUTerol    0.083% 2.5 milliGRAM(s) Nebulizer every 8 hours  buPROPion XL . 150 milliGRAM(s) Oral daily  diltiazem    milliGRAM(s) Oral daily  hydrALAZINE 10 milliGRAM(s) Oral two times a day  iron sucrose Injectable 100 milliGRAM(s) IV Push every 24 hours  pantoprazole    Tablet 40 milliGRAM(s) Oral before breakfast  pramipexole 0.5 milliGRAM(s) Oral at bedtime  simvastatin 10 milliGRAM(s) Oral at bedtime  tiotropium 18 MICROgram(s) Capsule 1 Capsule(s) Inhalation daily    MEDICATIONS  (PRN):  acetaminophen   Tablet .. 650 milliGRAM(s) Oral every 6 hours PRN Temp greater or equal to 38C (100.4F), Mild Pain (1 - 3)    Allergies    No Known Allergies    Intolerances    Vital Signs Last 24 Hrs  T(C): 36.9 (22 May 2019 13:05), Max: 36.9 (22 May 2019 13:05)  T(F): 98.4 (22 May 2019 13:05), Max: 98.4 (22 May 2019 13:05)  HR: 97 (22 May 2019 13:05) (74 - 97)  BP: 129/57 (22 May 2019 13:05) (119/39 - 142/62)  BP(mean): --  RR: 19 (22 May 2019 13:05) (18 - 19)  SpO2: 91% (22 May 2019 13:05) (90% - 95%)    I&O's Summary    21 May 2019 07:01  -  22 May 2019 07:00  --------------------------------------------------------  IN: 0 mL / OUT: 300 mL / NET: -300 mL    PHYSICAL EXAM:  TELE: Not on tele  Constitutional: NAD, awake and alert, well-developed  HEENT: Moist Mucous Membranes, Anicteric  Pulmonary: Non-labored, breath sounds are diminished bilaterally, No wheezing, rales. + rhonchi right base  Cardiovascular: Regular, S1 and S2, No murmurs, rubs, gallops or clicks  Gastrointestinal: Bowel Sounds present, soft, nontender.   Lymph: No peripheral edema. No lymphadenopathy.  Skin: No visible rashes or ulcers.  Psych:  Mood & affect appropriate    LABS: All Labs Reviewed:                        9.0    12.54 )-----------( 251      ( 22 May 2019 08:40 )             28.6                         9.7    12.67 )-----------( 249      ( 21 May 2019 10:48 )             30.1                         7.3    9.22  )-----------( 252      ( 20 May 2019 15:14 )             23.1     22 May 2019 08:40    136    |  105    |  35     ----------------------------<  133    3.6     |  19     |  2.60   21 May 2019 10:48    137    |  109    |  35     ----------------------------<  147    4.5     |  17     |  2.40   20 May 2019 15:14    137    |  107    |  33     ----------------------------<  90     3.8     |  17     |  2.40     Ca    8.3        22 May 2019 08:40  Ca    8.1        21 May 2019 10:48  Ca    8.2        20 May 2019 15:14  Mg     1.9       22 May 2019 08:40  Mg     2.1       21 May 2019 10:48  Mg     2.0       20 May 2019 15:14    TPro  6.1    /  Alb  2.4    /  TBili  0.6    /  DBili  x      /  AST  43     /  ALT  35     /  AlkPhos  119    21 May 2019 10:48  TPro  6.3    /  Alb  2.5    /  TBili  0.3    /  DBili  .10    /  AST  47     /  ALT  32     /  AlkPhos  117    20 May 2019 15:14    PT/INR - ( 20 May 2019 15:14 )   PT: 14.2 sec;   INR: 1.25 ratio      PTT - ( 20 May 2019 15:14 )  PTT:32.2 sec  < from: CT Chest No Cont (05.20.19 @ 22:27) >    EXAM:  CT CHEST                          PROCEDURE DATE:  05/20/2019      INTERPRETATION:  [facility] RADIOLOGIST PRELIMINARY REPORT    EXAM:    CT Chest Without Contrast     EXAM DATE/TIME:    5/20/2019 10:13 PM     CLINICAL HISTORY:    63 years old, female; Signs and symptoms; Other: Weight loss, anemia;   Shortness of breath; Prior surgery; Surgery type: Exploratory, tubal   ligation     TECHNIQUE:    Imaging protocol: Axial computed tomography images of the chest without   intravenous contrast. Coronal and sagittal reformatted images were   created and   reviewed.    3D rendering: MIP reconstructed images were created and reviewed.     COMPARISON:    DX XR CHEST PA AND LATERAL 5/20/2019 3:59 PM     FINDINGS:    Lungs: Smooth interlobular septal thickening compatible with hydrostatic   interstitial pulmonary edema/CHF. Patchy airspace opacity in the lungs   bilaterally, compatible with pneumonia.    Pleural space: Small bilateral pleural effusions.    Heart: Normal. No cardiomegaly. No pericardial effusion.    Mediastinum: Esophagus is unremarkable.    Aorta: Normal. No aortic aneurysm.    Lymph nodes: Unremarkable. No enlarged lymph nodes.    Bones/joints: Unremarkable. No acute fracture.    Soft tissues: Unremarkable.     IMPRESSION:   1. Hydrostatic interstitial pulmonary edema/CHF.   2. Small bilateral pleural effusions.   3. Multifocal bilateral pneumonia.       =========================  EXAM:    CT Abdomen and Pelvis Without Contrast     EXAM DATE/TIME:    5/20/2019 10:13 PM     CLINICAL HISTORY:    63 years old, female; Signs and symptoms; Other: Weight loss, anemia;   Shortness of breath; Prior surgery; Surgery type: Exploratory, tubal   ligation     TECHNIQUE:    Imaging protocol: Axial computed tomography images of the abdomen and   pelvis   without contrast. Coronal and sagittal reformatted images were created   and   reviewed.    3D rendering: MIP reconstructed images were created and reviewed.     COMPARISON:    DX XR CHEST PA AND LATERAL 5/20/2019 3:59 PM     FINDINGS:     ABDOMEN:    Liver: Normal. No mass.    Gallbladder and bile ducts: Normal. No calcified stones. No ductal   dilation.    Pancreas: Normal. No ductal dilation.    Spleen: Normal. No splenomegaly.    Adrenals: Normal. No mass.    Kidneys and ureters: No hydronephrosis or urolithiasis. Cortical   thinning.   Stomach and bowel: Colonic diverticulosis. No diverticulitis. No bowel   wall   thickening or intestinal obstruction.    Appendix: Appendix not visualized. No evidence of appendicitis.     PELVIS:    Bladder: Unremarkable as visualized.    Reproductive: Unremarkable as visualized.     ABDOMEN and PELVIS:    Intraperitoneal space: Normal. No free air. No significant fluid   collection.    Bones/joints: No acute fracture. No dislocation.    Soft tissues: Unremarkable.    Vasculature: Normal. No abdominal aortic aneurysm.    Lymph nodes: Normal. No enlarged lymph nodes.    Other findings: 2.5 cm AAA. No rupture.     IMPRESSION:   No acute findings.    Agree with above report    NATIVIDAD ZAMORA M.D., ATTENDING RADIOLOGIST  This document has been electronically signed. May 21 2019 10:20AM      < end of copied text >    < from: CT Abdomen and Pelvis No Cont (05.20.19 @ 22:25) >    EXAM:  CT ABDOMEN AND PELVIS                          PROCEDURE DATE:  05/20/2019      INTERPRETATION:  [facility] RADIOLOGIST PRELIMINARY REPORT    EXAM:    CT Chest Without Contrast     EXAM DATE/TIME:    5/20/2019 10:13 PM     CLINICAL HISTORY:    63 years old, female; Signs and symptoms; Other: Weight loss, anemia;   Shortness of breath; Prior surgery; Surgery type: Exploratory, tubal   ligation     TECHNIQUE:    Imaging protocol: Axial computed tomography images of the chest without   intravenous contrast. Coronal and sagittal reformatted images were   created and   reviewed.    3D rendering: MIP reconstructed images were created and reviewed.     COMPARISON:    DX XR CHEST PA AND LATERAL 5/20/2019 3:59 PM     FINDINGS:    Lungs: Smooth interlobular septal thickening compatible with hydrostatic   interstitial pulmonary edema/CHF. Patchy airspace opacity in the lungs   bilaterally, compatible with pneumonia.    Pleural space: Small bilateral pleural effusions.    Heart: Normal. No cardiomegaly. No pericardial effusion.    Mediastinum: Esophagus is unremarkable.    Aorta: Normal. No aortic aneurysm.    Lymph nodes: Unremarkable. No enlarged lymph nodes.    Bones/joints: Unremarkable. No acute fracture.    Soft tissues: Unremarkable.     IMPRESSION:   1. Hydrostatic interstitial pulmonary edema/CHF.   2. Small bilateral pleural effusions.   3. Multifocal bilateral pneumonia.     =========================  EXAM:    CT Abdomen and Pelvis Without Contrast     EXAM DATE/TIME:   5/20/2019 10:13 PM     CLINICAL HISTORY:    63 years old, female; Signs and symptoms; Other: Weight loss, anemia;   Shortness of breath; Prior surgery; Surgery type: Exploratory, tubal   ligation     TECHNIQUE:    Imaging protocol: Axial computed tomography images of the abdomen and   pelvis   without contrast. Coronal and sagittal reformatted images were created   and   reviewed.    3D rendering: MIP reconstructed images were created and reviewed.     COMPARISON:    DX XR CHEST PA AND LATERAL 5/20/2019 3:59 PM     FINDINGS:     ABDOMEN:    Liver: Normal. No mass.    Gallbladder and bile ducts: Normal. No calcified stones. No ductal   dilation.    Pancreas: Normal. No ductal dilation.    Spleen: Normal. No splenomegaly.    Adrenals: Normal. No mass.    Kidneys and ureters: Chronic medical renal disease.    Stomach and bowel: Colonic diverticulosis. No diverticulitis. No bowel   wall   thickening or intestinal obstruction.    Appendix: Appendix not visualized. No evidence of appendicitis.     PELVIS:    Bladder: Unremarkable as visualized.    Reproductive: Unremarkable as visualized.     ABDOMEN and PELVIS:    Intraperitoneal space: Normal. No free air. No significant fluid   collection.    Bones/joints: No acute fracture. No dislocation.    Soft tissues: Unremarkable.    Vasculature: Normal. No abdominal aortic aneurysm.    Lymph nodes: Normal. No enlarged lymph nodes.    Other findings: 2.5 cm AAA. No rupture.     IMPRESSION:   No acute findings.    Agree with above report    NATIVIDAD ZAMORA M.D., ATTENDING RADIOLOGIST  This document has been electronically signed. May 21 2019 10:36AM      < end of copied text >  	   < from: US Duplex Venous Lower Ext Complete, Bilateral (05.20.19 @ 22:12) >    EXAM:  US DPLX LWR EXT VEINS COMPL BI                            PROCEDURE DATE:  05/20/2019          INTERPRETATION:  Venous Duplex Ultrasound of the Lower Extremities    Clinical information: Intermittent leg pain bilaterally    Sonographic evaluation of the lower extremity veins to the level of the   posterior tibial veins was performed using gray-scale and color Doppler   imaging.    Interpretation:     Right leg:  The visualized veins are patent and compressible.  No   abnormal echoes or flow disturbances are identified.    Left leg:  The visualized veins are patent and compressible.  No abnormal   echoes or flow disturbances are identified.    IMPRESSION:  No ultrasound evidence of DVT.    The above findings correspond to a report provided by the offsite   overnight image reading service at the time of the examination.    Thank you for this referral.    MATHEW UMANZOR M.D., ATTENDING RADIOLOGIST  This document has been electronically signed. May 21 2019  8:02AM      < end of copied text >

## 2019-05-22 NOTE — PROGRESS NOTE ADULT - SUBJECTIVE AND OBJECTIVE BOX
Patient is a 63y old  Female who presents with a chief complaint of CABA, weakness (22 May 2019 09:56)      INTERVAL HPI/OVERNIGHT EVENTS: Patient seen and examined. NAD. Feeling less SOB.    Vital Signs Last 24 Hrs  T(C): 36.8 (22 May 2019 04:37), Max: 37.4 (21 May 2019 12:59)  T(F): 98.3 (22 May 2019 04:37), Max: 99.4 (21 May 2019 12:59)  HR: 96 (22 May 2019 04:37) (74 - 101)  BP: 128/56 (22 May 2019 04:37) (119/39 - 149/60)  BP(mean): --  RR: 18 (22 May 2019 04:37) (18 - 19)  SpO2: 93% (22 May 2019 04:37) (90% - 95%)    05-22    136  |  105  |  35<H>  ----------------------------<  133<H>  3.6   |  19<L>  |  2.60<H>    Ca    8.3<L>      22 May 2019 08:40  Mg     1.9     05-22    TPro  6.1  /  Alb  2.4<L>  /  TBili  0.6  /  DBili  x   /  AST  43<H>  /  ALT  35  /  AlkPhos  119  05-21                          9.0    12.54 )-----------( 251      ( 22 May 2019 08:40 )             28.6     PT/INR - ( 20 May 2019 15:14 )   PT: 14.2 sec;   INR: 1.25 ratio         PTT - ( 20 May 2019 15:14 )  PTT:32.2 sec  CAPILLARY BLOOD GLUCOSE    < from: TTE Echo Doppler w/o Cont (05.21.19 @ 09:33) >     EXAM:  ECHO TTE WO CON COMP W DOPPLR         PROCEDURE DATE:  05/21/2019        INTERPRETATION:  INDICATION: Dyspnea    Blood Pressure 112/58    Height Weight 48kg       BSA    Dimensions:    LA 3.0       Normal Values: 2.0 - 4.0 cm    Ao 3.4  Normal Values: 2.0 - 3.8 cm  SEPTUM 1.1       Normal Values: 0.6 - 1.2 cm  PWT 1.2       Normal Values: 0.6 - 1.1 cm  LVIDd 5.4         Normal Values: 3.0 - 5.6 cm  LVIDs 3.3         Normal Values: 1.8 - 4.0 cm      OBSERVATIONS:    Mitral Valve: normal, mild MR.  Aortic Valve/Aorta: Calcified trileaflet aortic valve with probably   normal opening. Moderate to severe aortic insufficiency. There is a   mobile echogenic density seen on the aortic valve measuring 0.9 x 0.5 cm.  Tricuspid Valve: normal with trace TR.  Pulmonic Valve: Trace PI  Left Atrium: normal  Right Atrium: Not well-visualized  Left Ventricle: normal LV size and systolic function, estimated LVEF of   65%. Mild left ventricular hypertrophy  Right Ventricle: normal size and systolic function.  Pericardium/Pleura: normal, no significant pericardial effusion.      Conclusion:     Mild concentric LVH with normal internal dimensions and systolic   function, estimated LVEF of 65%.   Calcified trileaflet aortic valve with probably normal opening. Moderate   to severe aortic insufficiency. There is a mobile echogenic density seen   on the aortic valve measuring 0.9 x 0.5 cm.  Normal RV size and systolic function.     Normal trileaflet aortic valve, without AI.   Mild MR  Trace physiologic TR.    No significant pericardial effusion.                  MICHAEL CHILDRESS   This document has been electronically signed. May 22 2019  8:49AM                < end of copied text >                acetaminophen   Tablet .. 650 milliGRAM(s) Oral every 6 hours PRN  ALBUTerol    0.083% 2.5 milliGRAM(s) Nebulizer every 8 hours  azithromycin  IVPB      azithromycin  IVPB 500 milliGRAM(s) IV Intermittent every 24 hours  buPROPion XL . 150 milliGRAM(s) Oral daily  cefTRIAXone   IVPB 1 Gram(s) IV Intermittent every 24 hours  cefTRIAXone   IVPB      diltiazem    milliGRAM(s) Oral daily  furosemide   Injectable 40 milliGRAM(s) IV Push daily  hydrALAZINE 10 milliGRAM(s) Oral two times a day  iron sucrose Injectable 100 milliGRAM(s) IV Push every 24 hours  pantoprazole    Tablet 40 milliGRAM(s) Oral before breakfast  pramipexole 0.5 milliGRAM(s) Oral at bedtime  simvastatin 10 milliGRAM(s) Oral at bedtime  tiotropium 18 MICROgram(s) Capsule 1 Capsule(s) Inhalation daily              REVIEW OF SYSTEMS:  CONSTITUTIONAL: No fever, no weight loss, or no fatigue  NECK: No pain, no stiffness  RESPIRATORY: No cough, no wheezing, no chills, no hemoptysis, No shortness of breath  CARDIOVASCULAR: No chest pain, no palpitations, no dizziness, no leg swelling  GASTROINTESTINAL: No abdominal pain. No nausea, no vomiting, no hematemesis; No diarrhea, no constipation. No melena, no hematochezia.  GENITOURINARY: No dysuria, no frequency, no hematuria, no incontinence  NEUROLOGICAL: No headaches, no loss of strength, no numbness, no tremors  SKIN: No itching, no burning  MUSCULOSKELETAL: No joint pain, no swelling; No muscle, no back, no extremity pain  PSYCHIATRIC: No depression, no mood swings,   HEME/LYMPH: No easy bruising, no bleeding gums  ALLERY AND IMMUNOLOGIC: No hives       Consultant(s) Notes Reviewed:  [X] YES  [ ] NO    PHYSICAL EXAM:  GENERAL: NAD  HEAD:  Atraumatic, Normocephalic  EYES: EOMI, PERRLA, conjunctiva and sclera clear  ENMT: No tonsillar erythema, exudates, or enlargement; Moist mucous membranes  NECK: Supple, No JVD  NERVOUS SYSTEM:  Awake & alert  CHEST/LUNG: Clear to auscultation bilaterally; No rales, rhonchi, wheezing,  HEART: Regular rate and rhythm  ABDOMEN: Soft, Nontender, Nondistended; Bowel sounds present  EXTREMITIES:  No clubbing, cyanosis, or edema  LYMPH: No lymphadenopathy noted  SKIN: No rashes      Advanced care planning discussed with patient/family [X] YES   [ ] NO    Advanced care planning discussed with patient/family. Advanced care planning forms reviewed/discussed/completed. 20 minutes spent.

## 2019-05-22 NOTE — PROGRESS NOTE ADULT - PROBLEM SELECTOR PLAN 4
Multifactorial: secondary to anemia, CHF, PNA, tobacco, aortic insufficiency   S/P 1 unit PRBC  On iv lasix  Continue iv rocephin/zithromax  F/U urine legionella and strep Ag  Continue nebs  Further work-up/management pending clinical course. -Multifactorial: secondary to anemia, CHF, PNA, tobacco, aortic insufficiency   -S/P 1 unit PRBC  -On iv lasix  -Continue iv rocephin/zithromax  -F/U urine legionella and strep Ag  -Continue nebs  -Further work-up/management pending clinical course.

## 2019-05-22 NOTE — PROGRESS NOTE ADULT - PROBLEM SELECTOR PLAN 1
Possibly secondary to CKD  S/P 1 unit PRBC  Hg improved but trending downward -- possibly secondary to slow GI blood loss  Unable to get capsule endoscopy due to questionable curvature of UGI tract (near duodenum?)  Iron studies, B12, folate, ferritin, haptoglobin, LDH, reticulocyte count noted -- on iv venofer x 3 days  F/U SPEP, UPEP  Monitor h/h  Transfuse prn  Hematology/GI f/u  Further work-up/management pending clinical course. -Possibly secondary to CKD  -S/P 1 unit PRBC  -Hg improved but now trending downward -- possibly secondary to slow GI blood loss  -Heparin sq d/c'd  -Unable to get capsule endoscopy due to questionable curvature of UGI tract (near duodenum?)  -Iron studies, B12, folate, ferritin, haptoglobin, LDH, reticulocyte count noted -- on iv -venofer x 3 days  -F/U SPEP, UPEP  -Monitor h/h  -Transfuse prn  -Hematology/GI f/u  -Further work-up/management pending clinical course.

## 2019-05-22 NOTE — PROGRESS NOTE ADULT - ASSESSMENT
62 y/o woman w hx HTN, CKD, PAD depression smoker(stppped for 7days 1ppd x40yrs),adm w c/w 1week incr fatigue, reports cough and SOB/CABA when arrived in ER.  CBC w Hgb 7.3, MCV low 90's, CT c/a/p w findings c/w CHF and multilobar pneumonia.  Pt was tx'd one unit PRBC w some improvement in fatigue, Hgb today 9.7  Review of labs in computer show in 4/2019 Hgb was 8+, MCV low 90's, pt reports that was "incidental finding", she was here for neck pain, fatigue was mild at that time. Started taking Slow FE one a day since that time. Had endoscopy and colonoscopy early May w Dr Prater, negative, not candidate for small bowel capsule study due to anatomy of the bowel.  Stool occult blood positive during this admission.  CMP sig for Cr 2 range  Review of shin blood morphologty no abnormal findings  Hgb w higher than expected incr after one unit PRBC    -anemia is multifactorial, due to renal insufficiency, acute illness, multilobar pneumonia, CHF, blood loss w occult blood positive stool and on Plavix, iron studies also can be c/w partially treated iron deficiency.  -GI consulted  -started IV Venofer to expedite repletion, 100mg IV fo 3 days   -SPEP/immunofixation/hossein levels in view of renal insuff and anemia to r/o paraprotein disease.  -follow serial CBC( pending from today) 62 y/o woman w hx HTN, CKD, PAD depression smoker(stppped for 7days 1ppd x40yrs),adm w c/w 1week incr fatigue, reports cough and SOB/CABA when arrived in ER.  CBC w Hgb 7.3, MCV low 90's, CT c/a/p w findings c/w CHF and multilobar pneumonia.  Pt was tx'd one unit PRBC w some improvement in fatigue, Hgb today 9.7  Review of labs in computer show in 4/2019 Hgb was 8+, MCV low 90's, pt reports that was "incidental finding", she was here for neck pain, fatigue was mild at that time. Started taking Slow FE one a day since that time. Had endoscopy and colonoscopy early May w Dr Prater, negative, not candidate for small bowel capsule study due to anatomy of the bowel.  Stool occult blood positive during this admission.  CMP sig for Cr 2 range  Review of shin blood morphologty no abnormal findings  Hgb w higher than expected incr after one unit PRBC    -anemia is multifactorial, due to renal insufficiency, acute illness, multilobar pneumonia, CHF, blood loss w occult blood positive stool and on Plavix, iron studies also can be c/w partially treated iron deficiency.  -GI consulted  -started IV Venofer to expedite repletion, 100mg IV fo 3 days   -may be a candidate for procrit in outpt basis   -SPEP/immunofixation/hossein levels in view of renal insuff and anemia to r/o paraprotein disease.  -follow serial CBC( pending from today)

## 2019-05-22 NOTE — PROGRESS NOTE ADULT - PROBLEM SELECTOR PLAN 1
suspect multifactorial  ob+ stool  reports recent normal egd/colon; rec obtain old records  hx of remote crohns, states she is unable to have capsule study  ct a/p neg for rp bleed  no evidence of overt gib  monitor cbc, transfuse prn  cont ppi  heme eval noted, on venofer  monitor stool color  if w s/s active gib check bleeding scan  no immediate plans for gi intervention  further recs pending clinical course

## 2019-05-22 NOTE — CONSULT NOTE ADULT - SUBJECTIVE AND OBJECTIVE BOX
Infectious Diseases Consult by Aiyana Kay MD    Reason for Consult :    HPI:  This is a 63 F ex smoker of over 1ppd quit 4/2019 with PMH of HTN CKD PAD depression, anemia  who came in c/o 5 day h/o increased CABA and weakness. She denies CP, n/v/d, palpitations, BRBPR, melena. + anorexia. Ten pound weight loss in 1 month. Patient had an EGD/colon last week which she says was normal. She had a EGD and colonoscopy on 5/10/19 for evaluation of iron def anemia . She has no cough but has SOB on exertion . She claims there was blood reported in urine and stool on this admission     Past medical history is also remarkable for peripheral arterial disease status post right femoral endarterectomy for claudication, stenosis 75% right internal carotid artery stenosis. She had a unremarkable echo and negative pharmacologic stress nuclear evaluation in March of 2017.     She had low grade fever on admission , CXR was read as possible pneumonia and she was started on antibiotics . No BLOOD or Urine cs was not done on admission . Echo ( TTE) on this admission read as possible vegetation on aortic valve suspicious for endocarditis , so ID consult called     She has night sweated low grade fever, she has complete dentures so has not seen a dentist . She has no hx of recurrent skin infections, has not been on antibiotics PTA. She complaints of weakness, fatigue and poor appetite x  few weeks .      Past Medical & Surgical Hx:  PAST MEDICAL & SURGICAL HISTORY:  Hypertension  Restless leg syndrome: Especially with General Anesthesia  Arthritis: Cervical Spine  and Hands  H/O cervical discectomy  Acute peptic ulcer with perforation: 1980  S/P appendectomy: 2014  S/P tubal ligation: 1986  Osteoarthritis of right shoulder region: Right Shoulder Arthroscopy  2007  Rheumatoid arthritis of carpometacarpal joint of thumb: Total Joint Replacement of Left Thumb      Social History-- Retired lives at home   EtOH: denies   Tobacco: ex smoker, quit 1 month ago smoked over 1 PPD  Drug Use: denies         FAMILY HISTORY:  No pertinent family history in first degree relatives      Allergies    No Known Allergies    Intolerances        Home/ Out patient  Medications :  Home Medications:  acetaminophen 325 mg oral tablet: 2 tab(s) orally every 6 hours, As needed, Temp greater or equal to 38C (100.4F), Mild Pain (1 - 3) (20 May 2019 21:03)  buPROPion: 150 milligram(s) orally once a day (20 May 2019 21:03)  Caltrate 600 + D oral tablet: 1 tab(s) orally once a day (20 May 2019 21:03)  dilTIAZem 360 mg/24 hours oral capsule, extended release: 1 cap(s) orally once a day (20 May 2019 21:03)  Mirapex 0.25 mg oral tablet: 2 tab(s) orally once a day (at bedtime) (20 May 2019 21:03)  Plavix 75 mg oral tablet: 1 tab(s) orally once a day (20 May 2019 21:03)  raNITIdine 150 mg oral tablet: 1 tab(s) orally 2 times a day (20 May 2019 21:03)  simvastatin 10 mg oral tablet: 1 tab(s) orally once a day (at bedtime) (20 May 2019 21:03)      Current Inpatient Medications :    ANTIBIOTICS:   azithromycin  IVPB      azithromycin  IVPB 500 milliGRAM(s) IV Intermittent every 24 hours  cefTRIAXone   IVPB 1 Gram(s) IV Intermittent every 24 hours  cefTRIAXone   IVPB          OTHER RELEVANT MEDICATIONS :  acetaminophen   Tablet .. 650 milliGRAM(s) Oral every 6 hours PRN  ALBUTerol    0.083% 2.5 milliGRAM(s) Nebulizer every 8 hours  buPROPion XL . 150 milliGRAM(s) Oral daily  diltiazem    milliGRAM(s) Oral daily  furosemide   Injectable 40 milliGRAM(s) IV Push daily  hydrALAZINE 10 milliGRAM(s) Oral two times a day  iron sucrose Injectable 100 milliGRAM(s) IV Push every 24 hours  pantoprazole    Tablet 40 milliGRAM(s) Oral before breakfast  pramipexole 0.5 milliGRAM(s) Oral at bedtime  simvastatin 10 milliGRAM(s) Oral at bedtime  tiotropium 18 MICROgram(s) Capsule 1 Capsule(s) Inhalation daily      ROS:  CONSTITUTIONAL:  Negative fever or chills, feels weak, poor  appetite  EYES:  Negative  blurry vision or double vision  CARDIOVASCULAR:  Negative for chest pain or palpitations  RESPIRATORY:  Positive for sob on exertion , Neg for cough, wheezing,    GASTROINTESTINAL:  Negative for nausea, vomiting, diarrhea, constipation, or abdominal pain  GENITOURINARY:  Negative frequency, urgency , dysuria or hematuria   NEUROLOGIC:  No headache, confusion, dizziness, lightheadedness  All other systems were reviewed and are negative          I&O's Detail    21 May 2019 07:01  -  22 May 2019 07:00  --------------------------------------------------------  IN:  Total IN: 0 mL    OUT:    Voided: 300 mL  Total OUT: 300 mL    Total NET: -300 mL          Physical Exam:  Vital Signs Last 24 Hrs  T(C): 36.9 (22 May 2019 13:05), Max: 36.9 (22 May 2019 13:05)  T(F): 98.4 (22 May 2019 13:05), Max: 98.4 (22 May 2019 13:05)  HR: 97 (22 May 2019 13:05) (74 - 97)  BP: 129/57 (22 May 2019 13:05) (119/39 - 142/62)  BP(mean): --  RR: 19 (22 May 2019 13:05) (18 - 19)  SpO2: 91% (22 May 2019 13:05) (90% - 95%)      General: Chronically ill appearing poorly nourished, in no acute distress  Eyes: sclera anicteric, pupils equal and reactive to light  ENMT: buccal mucosa moist, pharynx not injected  Neck: supple, trachea midline  Lungs: coarse basal crackles bilaterally   Cardiovascular: regular rate and rhythm, S1 S2, SM 3/6   Abdomen: soft, nontender, no organomegaly present, bowel sounds normal, no splenomegaly   Neurological:  alert and oriented x3, Cranial Nerves II-XII grossly intact  Skin: no increased ecchymosis/petechiae/purpura  Lymph Nodes: no palpable cervical/supraclavicular lymph nodes enlargements  Extremities: no cyanosis/clubbing/edema, has ecchymosis on both arms as she is on plavix     Labs:                          9.0    12.54  )----------(  251       ( 22 May 2019 08:40 )               28.6      136    |  105    |  35     ----------------------------<  133        ( 22 May 2019 08:40 )  3.6     |  19     |  2.60     Ca    8.3        ( 22 May 2019 08:40 )  Mg     1.9       ( 22 May 2019 08:40 )    Sedimentation Rate, Erythrocyte (05.20.19 @ 15:14)    Sedimentation Rate, Erythrocyte: 64 mm/hr    C-Reactive Protein, Serum (05.20.19 @ 23:38)    C-Reactive Protein, Serum: 11.34 mg/dL    Procalcitonin, Serum (05.20.19 @ 15:14)    Procalcitonin, Serum: 1.12:     Serum Pro-Brain Natriuretic Peptide (05.20.19 @ 15:14)    Serum Pro-Brain Natriuretic Peptide: 26000 pg/mL    Ferritin, Serum (05.20.19 @ 23:05)    Ferritin, Serum: 136 ng/mL    Iron with Total Binding Capacity (05.20.19 @ 23:38)    Iron - Total Binding Capacity.: 223 ug/dL    % Saturation, Iron: 8 %    Iron Total, Serum: 17 ug/dL    Unsaturated Iron Binding Capacity: 206 ug/dL        RECENT CULTURES:          RADIOLOGY & ADDITIONAL STUDIES:  CT Chest No Cont (05.20.19 @ 22:27) >  FINDINGS:    Lungs: Smooth interlobular septal thickening compatible with hydrostatic   interstitial pulmonary edema/CHF. Patchy airspace opacity in the lungs   bilaterally, compatible with pneumonia.    Pleural space: Small bilateral pleural effusions.    Heart: Normal. No cardiomegaly. No pericardial effusion.    Mediastinum: Esophagus is unremarkable.    Aorta: Normal. No aortic aneurysm.    Lymph nodes: Unremarkable. No enlarged lymph nodes.    Bones/joints: Unremarkable. No acute fracture.    Soft tissues: Unremarkable.     IMPRESSION:   1. Hydrostatic interstitial pulmonary edema/CHF.   2. Small bilateral pleural effusions.   3. Multifocal bilateral pneumonia.     CT Chest No Cont (05.20.19 @ 22:27) >  FINDINGS:     ABDOMEN:    Liver: Normal. No mass.    Gallbladder and bile ducts: Normal. No calcified stones. No ductal   dilation.    Pancreas: Normal. No ductal dilation.    Spleen: Normal. No splenomegaly.    Adrenals: Normal. No mass.    Kidneys and ureters: No hydronephrosis or urolithiasis. Cortical   thinning.   Stomach and bowel: Colonic diverticulosis. No diverticulitis. No bowel   wall   thickening or intestinal obstruction.    Appendix: Appendix not visualized. No evidence of appendicitis.     PELVIS:    Bladder: Unremarkable as visualized.    Reproductive: Unremarkable as visualized.     ABDOMEN and PELVIS:    Intraperitoneal space: Normal. No free air. No significant fluid   collection.    Bones/joints: No acute fracture. No dislocation.    Soft tissues: Unremarkable.    Vasculature: Normal. No abdominal aortic aneurysm.    Lymph nodes: Normal. No enlarged lymph nodes.    Other findings: 2.5 cm AAA. No rupture.     IMPRESSION:   No acute findings.    Agree with above report    TTE Echo Doppler w/o Cont (05.21.19 @ 09:33) >  Dimensions:    LA 3.0       Normal Values: 2.0 - 4.0 cm    Ao 3.4  Normal Values: 2.0 - 3.8 cm  SEPTUM 1.1       Normal Values: 0.6 - 1.2 cm  PWT 1.2       Normal Values: 0.6 - 1.1 cm  LVIDd 5.4         Normal Values: 3.0 - 5.6 cm  LVIDs 3.3         Normal Values: 1.8 - 4.0 cm      OBSERVATIONS:    Mitral Valve: normal, mild MR.  Aortic Valve/Aorta: Calcified trileaflet aortic valve with probably   normal opening. Moderate to severe aortic insufficiency. There is a   mobile echogenic density seen on the aortic valve measuring 0.9 x 0.5 cm.  Tricuspid Valve: normal with trace TR.  Pulmonic Valve: Trace PI  Left Atrium: normal  Right Atrium: Not well-visualized  Left Ventricle: normal LV size and systolic function, estimated LVEF of   65%. Mild left ventricular hypertrophy  Right Ventricle: normal size and systolic function.  Pericardium/Pleura: normal, no significant pericardial effusion.      Conclusion:     Mild concentric LVH with normal internal dimensions and systolic   function, estimated LVEF of 65%.   Calcified trileaflet aortic valve with probably normal opening. Moderate   to severe aortic insufficiency. There is a mobile echogenic density seen   on the aortic valve measuring 0.9 x 0.5 cm.  Normal RV size and systolic function.     Normal trileaflet aortic valve, without AI.   Mild MR  Trace physiologic TR.    No significant pericardial effusion.      Assessment :     63y Female with HTN, CKD, PAD s/p right femoral endarterectomy, depression, and smoker (quit 5 days ago) presented to the ED c/o SOB and weakness that started in April but have gotten worse 5 days and a 10-lb weight loss within a month with less appetite  Patient states, she had to stop to catch her breath in going to the bathroom from her living room.  She used to be a very active woman until recently.  Denies edema, however, admits to orthopnea.  Denies CP or palpitations, though, she was here last month with c/o neck pain radiating to her chest, for which she though was an MI.  Her workup for ACS and PE was negative and she was sent home.  However, she was found to be anemic and was placed on Iron.  Her EGD last week was negative.  She denies any melena.  Denies fever chills, nausea, vomiting, diarrhea, constipation, abdominal pain    Her presentation was consistent with CHF likely from severe aortic insufficiency . She has prior hx of mild AI . Now echo shows possible mobile echogenic density on aortic valve suspicious for infective endocarditis . She has anemia ? anemia of chronic disease. I doubt if she ever had pneumonia . She was placed on antibiotics for presumed pneumonia on admission and No cultures were done . Her presentation could be all related to subacute IE     Plan :   - will follow blood cs x 2 that were done today , although note she has been on abx x over 48 hours so may be negative   - get sed rate, CRP, complement levels , repeat UA   - will increase Rocephin to 2 grams q 24 hours , as she has CKD stage 4 will avoid Vancomycin unless she has positive blood cs with staph   - screen for MRSA  - she will need ZAHIRA discussed with cardiology   - trend cbc   - diuretics as per cardiology and nephrology    Case discussed with medical team     Continue with present regime .  Appropriate use of antibiotics and adverse effects reviewed.      I have discussed the above plan of care with patient in detail. She expressed understanding of the treatment plan . Risks, benefits and alternatives discussed in detail. I have asked if she has  any questions or concerns and appropriately addressed them to the best of my ability .      > 75 minutes spent in direct patient care reviewing  the notes, lab data/ imaging , discussion with multidisciplinary team. All questions were addressed and answered to the best of my capacity .    Thank you for allowing me to participate in the care of your patient .      Aiyana Kay MD  [phone]

## 2019-05-22 NOTE — PROGRESS NOTE ADULT - PROBLEM SELECTOR PLAN 1
pulm edema eval, PNA eval, COPD, Smoker  CT chest reviewed  labs reviewed  feels better overall - on room air  spiriva - and NEBS  on ABX regimen  on LASIX IV  biomarkers, labs and cx reviewed  increase activity  will follow

## 2019-05-22 NOTE — PROGRESS NOTE ADULT - SUBJECTIVE AND OBJECTIVE BOX
INTERVAL HPI/OVERNIGHT EVENTS:  pt seen and examined  denies n/v/abd pain  no overt s/s gib per rn    MEDICATIONS  (STANDING):  ALBUTerol    0.083% 2.5 milliGRAM(s) Nebulizer every 8 hours  azithromycin  IVPB      azithromycin  IVPB 500 milliGRAM(s) IV Intermittent every 24 hours  buPROPion XL . 150 milliGRAM(s) Oral daily  cefTRIAXone   IVPB 1 Gram(s) IV Intermittent every 24 hours  cefTRIAXone   IVPB      diltiazem    milliGRAM(s) Oral daily  furosemide   Injectable 40 milliGRAM(s) IV Push daily  hydrALAZINE 10 milliGRAM(s) Oral two times a day  iron sucrose Injectable 100 milliGRAM(s) IV Push every 24 hours  pantoprazole    Tablet 40 milliGRAM(s) Oral before breakfast  pramipexole 0.5 milliGRAM(s) Oral at bedtime  simvastatin 10 milliGRAM(s) Oral at bedtime  tiotropium 18 MICROgram(s) Capsule 1 Capsule(s) Inhalation daily    MEDICATIONS  (PRN):  acetaminophen   Tablet .. 650 milliGRAM(s) Oral every 6 hours PRN Temp greater or equal to 38C (100.4F), Mild Pain (1 - 3)      Allergies    No Known Allergies    Intolerances        Review of Systems:    General:  No wt loss, fevers, chills, night sweats, fatigue   Eyes:  Good vision, no reported pain  ENT:  No sore throat, pain, runny nose, dysphagia  CV:  No pain, palpitations, hypo/hypertension  Resp:  No dyspnea, cough, tachypnea, wheezing  GI:  No pain, No nausea, No vomiting, No diarrhea, No constipation, No weight loss, No fever, No pruritis, No rectal bleeding, No melena, No dysphagia  :  No pain, bleeding, incontinence, nocturia  Muscle:  No pain, weakness  Neuro:  No weakness, tingling, memory problems  Psych:  No fatigue, insomnia, mood problems, depression  Endocrine:  No polyuria, polydypsia, cold/heat intolerance  Heme:  No petechiae, ecchymosis, easy bruisability  Skin:  No rash, tattoos, scars, edema      Vital Signs Last 24 Hrs  T(C): 36.8 (22 May 2019 04:37), Max: 37.4 (21 May 2019 12:59)  T(F): 98.3 (22 May 2019 04:37), Max: 99.4 (21 May 2019 12:59)  HR: 96 (22 May 2019 04:37) (74 - 101)  BP: 128/56 (22 May 2019 04:37) (119/39 - 149/60)  BP(mean): --  RR: 18 (22 May 2019 04:37) (18 - 19)  SpO2: 93% (22 May 2019 04:37) (90% - 95%)    PHYSICAL EXAM:      General:  nad lying in bed  HEENT:  NC/AT  Chest:  dec bs  Cardiovascular:  Regular rhythm, S1, S2  Abdomen:  Soft, non-tender, non-distended  Extremities:  no  edema  Skin:  No rash  Neuro/Psych:  A&O x3      LABS:                        9.0    12.54 )-----------( 251      ( 22 May 2019 08:40 )             28.6     05-22    136  |  105  |  35<H>  ----------------------------<  133<H>  3.6   |  19<L>  |  2.60<H>    Ca    8.3<L>      22 May 2019 08:40  Mg     1.9     05-22    TPro  6.1  /  Alb  2.4<L>  /  TBili  0.6  /  DBili  x   /  AST  43<H>  /  ALT  35  /  AlkPhos  119  05-21    PT/INR - ( 20 May 2019 15:14 )   PT: 14.2 sec;   INR: 1.25 ratio         PTT - ( 20 May 2019 15:14 )  PTT:32.2 sec      RADIOLOGY & ADDITIONAL TESTS:

## 2019-05-22 NOTE — PROGRESS NOTE ADULT - ATTENDING COMMENTS
Advanced care planning was discussed with patient and family.  Advanced care planning forms were reviewed and discussed.  Risks, benefits and alternatives of gastroenterologic procedures were discussed in detail and all questions were answered.    30 minutes spent. no overt gi bleed  cbc cont to decline  was told she could not get capsule  being worked up for endocarditis  would consider CT enterography or single balloon enteroscopy as outpatient

## 2019-05-23 DIAGNOSIS — Z71.89 OTHER SPECIFIED COUNSELING: ICD-10-CM

## 2019-05-23 LAB
% ALBUMIN: 45.1 % — SIGNIFICANT CHANGE UP
% ALPHA 1: 11.3 % — SIGNIFICANT CHANGE UP
% ALPHA 2: 17.1 % — SIGNIFICANT CHANGE UP
% BETA: 12.1 % — SIGNIFICANT CHANGE UP
% GAMMA: 14.4 % — SIGNIFICANT CHANGE UP
ALBUMIN SERPL ELPH-MCNC: 2.8 G/DL — LOW (ref 3.6–5.5)
ALBUMIN/GLOB SERPL ELPH: 0.8 RATIO — SIGNIFICANT CHANGE UP
ALPHA1 GLOB SERPL ELPH-MCNC: 0.7 G/DL — HIGH (ref 0.1–0.4)
ALPHA2 GLOB SERPL ELPH-MCNC: 1 G/DL — SIGNIFICANT CHANGE UP (ref 0.5–1)
ANION GAP SERPL CALC-SCNC: 10 MMOL/L — SIGNIFICANT CHANGE UP (ref 5–17)
B-GLOBULIN SERPL ELPH-MCNC: 0.7 G/DL — SIGNIFICANT CHANGE UP (ref 0.5–1)
BASOPHILS # BLD AUTO: 0.04 K/UL — SIGNIFICANT CHANGE UP (ref 0–0.2)
BASOPHILS NFR BLD AUTO: 0.3 % — SIGNIFICANT CHANGE UP (ref 0–2)
BUN SERPL-MCNC: 31 MG/DL — HIGH (ref 7–23)
CALCIUM SERPL-MCNC: 8.1 MG/DL — LOW (ref 8.5–10.1)
CHLORIDE SERPL-SCNC: 106 MMOL/L — SIGNIFICANT CHANGE UP (ref 96–108)
CO2 SERPL-SCNC: 20 MMOL/L — LOW (ref 22–31)
CREAT ?TM UR-MCNC: 85 MG/DL — SIGNIFICANT CHANGE UP
CREAT SERPL-MCNC: 2.3 MG/DL — HIGH (ref 0.5–1.3)
CREATININE, URINE RESULT: 83 MG/DL — SIGNIFICANT CHANGE UP
ENTEROCOC DNA BLD POS QL NAA+NON-PROBE: SIGNIFICANT CHANGE UP
EOSINOPHIL # BLD AUTO: 0.08 K/UL — SIGNIFICANT CHANGE UP (ref 0–0.5)
EOSINOPHIL NFR BLD AUTO: 0.6 % — SIGNIFICANT CHANGE UP (ref 0–6)
GAMMA GLOBULIN: 0.9 G/DL — SIGNIFICANT CHANGE UP (ref 0.6–1.6)
GLUCOSE SERPL-MCNC: 92 MG/DL — SIGNIFICANT CHANGE UP (ref 70–99)
GRAM STN FLD: SIGNIFICANT CHANGE UP
HCT VFR BLD CALC: 25.7 % — LOW (ref 34.5–45)
HCT VFR BLD CALC: 27.6 % — LOW (ref 34.5–45)
HGB BLD-MCNC: 8.3 G/DL — LOW (ref 11.5–15.5)
HGB BLD-MCNC: 8.7 G/DL — LOW (ref 11.5–15.5)
IGA FLD-MCNC: 129 MG/DL — SIGNIFICANT CHANGE UP (ref 84–499)
IGG FLD-MCNC: 809 MG/DL — SIGNIFICANT CHANGE UP (ref 610–1660)
IGM SERPL-MCNC: 136 MG/DL — SIGNIFICANT CHANGE UP (ref 35–242)
IMM GRANULOCYTES NFR BLD AUTO: 0.7 % — SIGNIFICANT CHANGE UP (ref 0–1.5)
INTERPRETATION SERPL IFE-IMP: SIGNIFICANT CHANGE UP
KAPPA LC SER QL IFE: 7.11 MG/DL — HIGH (ref 0.33–1.94)
KAPPA/LAMBDA FREE LIGHT CHAIN RATIO, SERUM: 0.95 RATIO — SIGNIFICANT CHANGE UP (ref 0.26–1.65)
LACTATE SERPL-SCNC: 1.5 MMOL/L — SIGNIFICANT CHANGE UP (ref 0.7–2)
LAMBDA LC SER QL IFE: 7.49 MG/DL — HIGH (ref 0.57–2.63)
LEGIONELLA AG UR QL: NEGATIVE — SIGNIFICANT CHANGE UP
LYMPHOCYTES # BLD AUTO: 0.71 K/UL — LOW (ref 1–3.3)
LYMPHOCYTES # BLD AUTO: 5.7 % — LOW (ref 13–44)
MCHC RBC-ENTMCNC: 27.2 PG — SIGNIFICANT CHANGE UP (ref 27–34)
MCHC RBC-ENTMCNC: 27.3 PG — SIGNIFICANT CHANGE UP (ref 27–34)
MCHC RBC-ENTMCNC: 31.5 GM/DL — LOW (ref 32–36)
MCHC RBC-ENTMCNC: 32.3 GM/DL — SIGNIFICANT CHANGE UP (ref 32–36)
MCV RBC AUTO: 84.5 FL — SIGNIFICANT CHANGE UP (ref 80–100)
MCV RBC AUTO: 86.3 FL — SIGNIFICANT CHANGE UP (ref 80–100)
METHOD TYPE: SIGNIFICANT CHANGE UP
MONOCYTES # BLD AUTO: 0.82 K/UL — SIGNIFICANT CHANGE UP (ref 0–0.9)
MONOCYTES NFR BLD AUTO: 6.6 % — SIGNIFICANT CHANGE UP (ref 2–14)
MRSA PCR RESULT.: SIGNIFICANT CHANGE UP
NEUTROPHILS # BLD AUTO: 10.65 K/UL — HIGH (ref 1.8–7.4)
NEUTROPHILS NFR BLD AUTO: 86.1 % — HIGH (ref 43–77)
NRBC # BLD: 0 /100 WBCS — SIGNIFICANT CHANGE UP (ref 0–0)
NRBC # BLD: 0 /100 WBCS — SIGNIFICANT CHANGE UP (ref 0–0)
PLATELET # BLD AUTO: 236 K/UL — SIGNIFICANT CHANGE UP (ref 150–400)
PLATELET # BLD AUTO: 273 K/UL — SIGNIFICANT CHANGE UP (ref 150–400)
POTASSIUM SERPL-MCNC: 3.8 MMOL/L — SIGNIFICANT CHANGE UP (ref 3.5–5.3)
POTASSIUM SERPL-SCNC: 3.8 MMOL/L — SIGNIFICANT CHANGE UP (ref 3.5–5.3)
PROCALCITONIN SERPL-MCNC: 0.98 NG/ML — HIGH (ref 0–0.04)
PROT ?TM UR-MCNC: 25 MG/DL — HIGH (ref 0–12)
PROT ?TM UR-MCNC: 30 MG/DL — HIGH (ref 0–12)
PROT PATTERN SERPL ELPH-IMP: SIGNIFICANT CHANGE UP
PROT SERPL-MCNC: 6.1 G/DL — SIGNIFICANT CHANGE UP (ref 6–8.3)
PROT/CREAT UR-RTO: 0.4 RATIO — HIGH (ref 0–0.2)
RBC # BLD: 3.04 M/UL — LOW (ref 3.8–5.2)
RBC # BLD: 3.2 M/UL — LOW (ref 3.8–5.2)
RBC # FLD: 16.4 % — HIGH (ref 10.3–14.5)
RBC # FLD: 16.5 % — HIGH (ref 10.3–14.5)
S AUREUS DNA NOSE QL NAA+PROBE: DETECTED
SODIUM SERPL-SCNC: 136 MMOL/L — SIGNIFICANT CHANGE UP (ref 135–145)
SPECIMEN SOURCE: SIGNIFICANT CHANGE UP
WBC # BLD: 12.19 K/UL — HIGH (ref 3.8–10.5)
WBC # BLD: 12.39 K/UL — HIGH (ref 3.8–10.5)
WBC # FLD AUTO: 12.19 K/UL — HIGH (ref 3.8–10.5)
WBC # FLD AUTO: 12.39 K/UL — HIGH (ref 3.8–10.5)

## 2019-05-23 PROCEDURE — 99232 SBSQ HOSP IP/OBS MODERATE 35: CPT

## 2019-05-23 RX ORDER — VANCOMYCIN HCL 1 G
1000 VIAL (EA) INTRAVENOUS ONCE
Refills: 0 | Status: COMPLETED | OUTPATIENT
Start: 2019-05-23 | End: 2019-05-23

## 2019-05-23 RX ORDER — AMPICILLIN TRIHYDRATE 250 MG
2 CAPSULE ORAL EVERY 8 HOURS
Refills: 0 | Status: DISCONTINUED | OUTPATIENT
Start: 2019-05-23 | End: 2019-05-28

## 2019-05-23 RX ORDER — AMPICILLIN TRIHYDRATE 250 MG
CAPSULE ORAL
Refills: 0 | Status: DISCONTINUED | OUTPATIENT
Start: 2019-05-23 | End: 2019-05-28

## 2019-05-23 RX ORDER — VANCOMYCIN HCL 1 G
VIAL (EA) INTRAVENOUS
Refills: 0 | Status: DISCONTINUED | OUTPATIENT
Start: 2019-05-23 | End: 2019-05-23

## 2019-05-23 RX ORDER — AMPICILLIN TRIHYDRATE 250 MG
2 CAPSULE ORAL ONCE
Refills: 0 | Status: COMPLETED | OUTPATIENT
Start: 2019-05-23 | End: 2019-05-23

## 2019-05-23 RX ADMIN — ALBUTEROL 2.5 MILLIGRAM(S): 90 AEROSOL, METERED ORAL at 07:23

## 2019-05-23 RX ADMIN — IRON SUCROSE 100 MILLIGRAM(S): 20 INJECTION, SOLUTION INTRAVENOUS at 17:56

## 2019-05-23 RX ADMIN — PRAMIPEXOLE DIHYDROCHLORIDE 0.5 MILLIGRAM(S): 0.12 TABLET ORAL at 21:59

## 2019-05-23 RX ADMIN — Medication 40 MILLIGRAM(S): at 05:51

## 2019-05-23 RX ADMIN — BUPROPION HYDROCHLORIDE 150 MILLIGRAM(S): 150 TABLET, EXTENDED RELEASE ORAL at 12:00

## 2019-05-23 RX ADMIN — PANTOPRAZOLE SODIUM 40 MILLIGRAM(S): 20 TABLET, DELAYED RELEASE ORAL at 05:51

## 2019-05-23 RX ADMIN — TIOTROPIUM BROMIDE 1 CAPSULE(S): 18 CAPSULE ORAL; RESPIRATORY (INHALATION) at 08:52

## 2019-05-23 RX ADMIN — SIMVASTATIN 10 MILLIGRAM(S): 20 TABLET, FILM COATED ORAL at 21:59

## 2019-05-23 RX ADMIN — ALBUTEROL 2.5 MILLIGRAM(S): 90 AEROSOL, METERED ORAL at 14:32

## 2019-05-23 RX ADMIN — Medication 250 MILLIGRAM(S): at 10:47

## 2019-05-23 RX ADMIN — ALBUTEROL 2.5 MILLIGRAM(S): 90 AEROSOL, METERED ORAL at 19:58

## 2019-05-23 RX ADMIN — Medication 216 GRAM(S): at 17:55

## 2019-05-23 RX ADMIN — Medication 216 GRAM(S): at 09:57

## 2019-05-23 NOTE — PROGRESS NOTE ADULT - SUBJECTIVE AND OBJECTIVE BOX
Patient is a 63y old  Female who presents with a chief complaint of CABA, weakness (23 May 2019 11:55)      INTERVAL HPI/OVERNIGHT EVENTS: Patient seen and examined. NAD. Still feels weak and had CABA.    Vital Signs Last 24 Hrs  T(C): 36.9 (23 May 2019 05:06), Max: 38.4 (22 May 2019 20:18)  T(F): 98.4 (23 May 2019 05:06), Max: 101.1 (22 May 2019 20:18)  HR: 91 (23 May 2019 07:25) (84 - 105)  BP: 109/48 (23 May 2019 05:06) (108/45 - 129/57)  BP(mean): --  RR: 16 (23 May 2019 05:06) (16 - 19)  SpO2: 96% (23 May 2019 07:25) (91% - 96%)        136  |  106  |  31<H>  ----------------------------<  92  3.8   |  20<L>  |  2.30<H>    Ca    8.1<L>      23 May 2019 08:36  Mg     1.9                                 8.7    12.39 )-----------( 273      ( 23 May 2019 08:36 )             27.6       CAPILLARY BLOOD GLUCOSE        Urinalysis Basic - ( 22 May 2019 20:36 )    Color: Yellow / Appearance: Clear / S.015 / pH: x  Gluc: x / Ketone: Negative  / Bili: Negative / Urobili: Negative   Blood: x / Protein: 25 mg/dL / Nitrite: Negative   Leuk Esterase: Negative / RBC: 0-2 /HPF / WBC 0-2   Sq Epi: x / Non Sq Epi: Occasional / Bacteria: Few      Culture - Blood (19 @ 16:46)    Gram Stain:   Growth in aerobic bottle: Gram Positive Cocci in Pairs and Chains  Growth in anaerobic bottle: Gram Positive Cocci in Pairs and Chains    -  Enterococcus species: Detec    Specimen Source: .Blood Blood    Organism: Blood Culture PCR    Culture Results:   Growth in anaerobic bottle: Gram Positive Cocci in Pairs and Chains  Growth in aerobic bottle: Gram Positive Cocci in Pairs and Chains  "Due to technical problems, Proteus sp. will Not be reported as part of  the BCID panel until further notice"  ***Blood Panel PCR results on this specimen are available  approximately 3 hours after the Gram stain result.***  Gram stain, PCR, and/or culture results may not always  correspond due to difference in methodologies.  ************************************************************  This PCR assay was performed using [employer].  The following targets are tested for: Enterococcus,  vancomycin resistant enterococci, Listeria monocytogenes,  coagulase negative staphylococci, S. aureus,  methicillin resistant S. aureus, Streptococcus agalactiae  (Group B), S. pneumoniae, S. pyogenes (Group A),  Acinetobacter baumannii, Enterobacter cloacae, E. coli,  Klebsiella oxytoca, K. pneumoniae, Proteus sp.,  Serratia marcescens, Haemophilus influenzae,  Neisseria meningitidis, Pseudomonas aeruginosa, Candida  albicans, C. glabrata, C krusei, C parapsilosis,  C. tropicalis and the KPC resistance gene.    Organism Identification: Blood Culture PCR    Method Type: PCR                acetaminophen   Tablet .. 650 milliGRAM(s) Oral every 6 hours PRN  ALBUTerol    0.083% 2.5 milliGRAM(s) Nebulizer every 8 hours  ampicillin  IVPB      ampicillin  IVPB 2 Gram(s) IV Intermittent every 8 hours  buPROPion XL . 150 milliGRAM(s) Oral daily  diltiazem    milliGRAM(s) Oral daily  furosemide    Tablet 40 milliGRAM(s) Oral daily  hydrALAZINE 10 milliGRAM(s) Oral two times a day  iron sucrose Injectable 100 milliGRAM(s) IV Push every 24 hours  pantoprazole    Tablet 40 milliGRAM(s) Oral before breakfast  pramipexole 0.5 milliGRAM(s) Oral at bedtime  simvastatin 10 milliGRAM(s) Oral at bedtime  tiotropium 18 MICROgram(s) Capsule 1 Capsule(s) Inhalation daily              REVIEW OF SYSTEMS:  CONSTITUTIONAL: + fever, no weight loss, or + fatigue  NECK: No pain, no stiffness  RESPIRATORY: No cough, no wheezing, no chills, no hemoptysis, + shortness of breath  CARDIOVASCULAR: No chest pain, no palpitations, no dizziness, no leg swelling  GASTROINTESTINAL: No abdominal pain. No nausea, no vomiting, no hematemesis; No diarrhea, no constipation. No melena, no hematochezia.  GENITOURINARY: No dysuria, no frequency, no hematuria, no incontinence  NEUROLOGICAL: No headaches, no loss of strength, no numbness, no tremors  SKIN: No itching, no burning  MUSCULOSKELETAL: No joint pain, no swelling; No muscle, no back, no extremity pain  PSYCHIATRIC: No depression, no mood swings,   HEME/LYMPH: No easy bruising, no bleeding gums  ALLERY AND IMMUNOLOGIC: No hives       Consultant(s) Notes Reviewed:  [X] YES  [ ] NO    PHYSICAL EXAM:  GENERAL: NAD  HEAD:  Atraumatic, Normocephalic  EYES: EOMI, PERRLA, conjunctiva and sclera clear  ENMT: No tonsillar erythema, exudates, or enlargement; Moist mucous membranes  NECK: Supple, No JVD  NERVOUS SYSTEM:  Awake & alert  CHEST/LUNG: Clear to auscultation bilaterally; No rales, rhonchi, wheezing,  HEART: Regular rate and rhythm  ABDOMEN: Soft, Nontender, Nondistended; Bowel sounds present  EXTREMITIES:  No clubbing, cyanosis, or edema  LYMPH: No lymphadenopathy noted  SKIN: No rashes      Advanced care planning discussed with patient/family [X] YES   [ ] NO    Advanced care planning discussed with patient/family. Advanced care planning forms reviewed/discussed/completed. 20 minutes spent.

## 2019-05-23 NOTE — PROGRESS NOTE ADULT - PROBLEM SELECTOR PLAN 4
-Multifactorial: secondary to anemia, CHF, PNA, tobacco, aortic insufficiency   -S/P 1 unit PRBC  -On po lasix  -F/U urine legionella and strep Ag  -Continue nebs  -Further work-up/management pending clinical course.

## 2019-05-23 NOTE — PROGRESS NOTE ADULT - SUBJECTIVE AND OBJECTIVE BOX
ID progress note covering Dr. Leyva    Name: VIVIANA CHAPMAN  Age: 63y  Gender: Female  MRN: [id number]    Interval History--  Notes reviewed    Past Medical History--  Hypertension  Restless leg syndrome  Arthritis  H/O cervical discectomy  Acute peptic ulcer with perforation  S/P appendectomy  S/P tubal ligation  Osteoarthritis of right shoulder region  Rheumatoid arthritis of carpometacarpal joint of thumb      For details regarding the patient's social history, family history, and other miscellaneous elements, please refer the initial infectious diseases consultation and/or the admitting history and physical examination for this admission.    Allergies--  Allergies    No Known Allergies    Intolerances        Medications--  Antibiotics:  cefTRIAXone   IVPB 2 Gram(s) IV Intermittent every 24 hours  vancomycin  IVPB 1000 milliGRAM(s) IV Intermittent once    Immunologic:    Other:  acetaminophen   Tablet .. PRN  ALBUTerol    0.083%  buPROPion XL .  diltiazem   CD  furosemide    Tablet  hydrALAZINE  iron sucrose Injectable  pantoprazole    Tablet  pramipexole  simvastatin  tiotropium 18 MICROgram(s) Capsule      Review of Systems--  A 10-point review of systems was obtained.     Pertinent positives and negatives--  Constitutional: No fevers. No Chills. No Rigors.   Cardiovascular: No chest pain. No palpitations.  Respiratory: No shortness of breath. No cough.  Gastrointestinal: No nausea or vomiting. No diarrhea or constipation.   Psychiatric: Pleasant. Appropriate affect.    Review of systems otherwise negative except as previously noted.    Physical Examination--  Vital Signs: T(F): 98.4 (05-23-19 @ 05:06), Max: 101.1 (05-22-19 @ 20:18)  HR: 91 (05-23-19 @ 07:25)  BP: 109/48 (05-23-19 @ 05:06)  RR: 16 (05-23-19 @ 05:06)  SpO2: 96% (05-23-19 @ 07:25)  Wt(kg): --  General: Nontoxic-appearing Female in no acute distress.  HEENT: AT/NC. PERRL. EOMI. Anicteric. Conjunctiva pink and moist. Oropharynx clear. Dentition fair.  Neck: Not rigid. No sense of mass.  Nodes: None palpable.  Lungs: Clear bilaterally without rales, wheezing or rhonchi  Heart: Regular rate and rhythm. No Murmur. No rub. No gallop. No palpable thrill.  Abdomen: Bowel sounds present and normoactive. Soft. Nondistended. Nontender. No sense of mass. No organomegaly.  Back: No spinal tenderness. No costovertebral angle tenderness.   Extremities: No cyanosis or clubbing. No edema.   Skin: Warm. Dry. Good turgor. No rash. No vasculitic stigmata.  Psychiatric: Appropriate affect and mood for situation.         Laboratory Studies--  CBC                        8.3    12.19 )-----------( 236      ( 23 May 2019 01:11 )             25.7       Chemistries  05-22    136  |  105  |  35<H>  ----------------------------<  133<H>  3.6   |  19<L>  |  2.60<H>    Ca    8.3<L>      22 May 2019 08:40  Mg     1.9     05-22    TPro  6.1  /  Alb  2.4<L>  /  TBili  0.6  /  DBili  x   /  AST  43<H>  /  ALT  35  /  AlkPhos  119  05-21      Culture Data    Culture - Blood (collected 22 May 2019 16:46)  Source: .Blood Blood  Gram Stain (23 May 2019 03:52):    Growth in aerobic bottle: Gram Positive Cocci in Pairs and Chains    Growth in anaerobic bottle: Gram Positive Cocci in Pairs and Chains  Preliminary Report (23 May 2019 03:52):    Growth in anaerobic bottle: Gram Positive Cocci in Pairs and Chains    Growth in aerobic bottle: Gram Positive Cocci in Pairs and Chains    "Due to technical problems, Proteus sp. will Not be reported as part of    the BCID panel until further notice"    ***Blood Panel PCR results on this specimen are available    approximately 3 hours after the Gram stain result.***    Gram stain, PCR, and/or culture results may not always    correspond due to difference in methodologies.    ************************************************************    This PCR assay was performed using [employer].    The following targets are tested for: Enterococcus,    vancomycin resistant enterococci, Listeria monocytogenes,    coagulase negative staphylococci, S. aureus,    methicillin resistant S. aureus, Streptococcus agalactiae    (Group B), S. pneumoniae, S. pyogenes (Group A),    Acinetobacter baumannii, Enterobacter cloacae, E. coli,    Klebsiella oxytoca, K. pneumoniae, Proteus sp.,    Serratia marcescens, Haemophilus influenzae,    Neisseria meningitidis, Pseudomonas aeruginosa, Candida    albicans, C. glabrata, C krusei, C parapsilosis,    C. tropicalis and the KPC resistance gene.  Organism: Blood Culture PCR (23 May 2019 05:56)  Organism: Blood Culture PCR (23 May 2019 05:56)    Culture - Blood (collected 22 May 2019 16:46)  Source: .Blood Blood  Gram Stain (23 May 2019 05:00):    Growth in aerobic bottle: Gram Positive Cocci in Pairs and Chains    Growth in anaerobic bottle: Gram Positive Cocci in Pairs and Chains  Preliminary Report (23 May 2019 05:00):    Growth in aerobic bottle: Gram Positive Cocci in Pairs and Chains    Growth in anaerobic bottle: Gram Positive Cocci in Pairs and Chains        RECENT CULTURES    Culture - Blood (collected 22 May 2019 16:46)  Source: .Blood Blood  Gram Stain (23 May 2019 03:52):    Growth in aerobic bottle: Gram Positive Cocci in Pairs and Chains    Growth in anaerobic bottle: Gram Positive Cocci in Pairs and Chains  Preliminary Report (23 May 2019 03:52):    Growth in anaerobic bottle: Gram Positive Cocci in Pairs and Chains    Growth in aerobic bottle: Gram Positive Cocci in Pairs and Chains    "Due to technical problems, Proteus sp. will Not be reported as part of    the BCID panel until further notice"    ***Blood Panel PCR results on this specimen are available    approximately 3 hours after the Gram stain result.***    Gram stain, PCR, and/or culture results may not always    correspond due to difference in methodologies.    ************************************************************    This PCR assay was performed using [employer].    The following targets are tested for: Enterococcus,    vancomycin resistant enterococci, Listeria monocytogenes,    coagulase negative staphylococci, S. aureus,    methicillin resistant S. aureus, Streptococcus agalactiae    (Group B), S. pneumoniae, S. pyogenes (Group A),    Acinetobacter baumannii, Enterobacter cloacae, E. coli,    Klebsiella oxytoca, K. pneumoniae, Proteus sp.,    Serratia marcescens, Haemophilus influenzae,    Neisseria meningitidis, Pseudomonas aeruginosa, Candida    albicans, C. glabrata, C krusei, C parapsilosis,    C. tropicalis and the KPC resistance gene.  Organism: Blood Culture PCR (23 May 2019 05:56)  Organism: Blood Culture PCR (23 May 2019 05:56)    Culture - Blood (collected 22 May 2019 16:46)  Source: .Blood Blood  Gram Stain (23 May 2019 05:00):    Growth in aerobic bottle: Gram Positive Cocci in Pairs and Chains    Growth in anaerobic bottle: Gram Positive Cocci in Pairs and Chains  Preliminary Report (23 May 2019 05:00):    Growth in aerobic bottle: Gram Positive Cocci in Pairs and Chains    Growth in anaerobic bottle: Gram Positive Cocci in Pairs and Chains        RADIOLOGY:      Assessment--      Suggestions--    I have discussed the above plan of care with patient/family in detail. They expressed understanding of the treatment plan . Risks, benefits and alternatives discussed in detail. I have asked if they have any questions or concerns and appropriately addressed them to the best of my ability .      > 35 minutes spent in direct patient care reviewing  the notes, lab data/ imaging , discussion with multidisciplinary team. All questions were addressed and answered to the best of my capacity .    Thank you for allowing me to participate in the care of your patient .        Aiyana Kay MD  [phone] ID progress note     Name: VIVIANA CHAPMAN  Age: 63y  Gender: Female  MRN: [id number]    Interval History-- Events noted, feeling better, les SOB, kept NPO , unclear ?? ZAHIRA . Blood cs 2/2 from  positive for gram positive cocci in pairs . Spiked fever to 101 last night this am  grade fevers   Notes reviewed    Past Medical History--  Hypertension  Restless leg syndrome  Arthritis  H/O cervical discectomy  Acute peptic ulcer with perforation  S/P appendectomy  S/P tubal ligation  Osteoarthritis of right shoulder region  Rheumatoid arthritis of carpometacarpal joint of thumb      For details regarding the patient's social history, family history, and other miscellaneous elements, please refer the initial infectious diseases consultation and/or the admitting history and physical examination for this admission.    Allergies--  Allergies    No Known Allergies    Intolerances        Medications--  Antibiotics:  cefTRIAXone   IVPB 2 Gram(s) IV Intermittent every 24 hours  vancomycin  IVPB 1000 milliGRAM(s) IV Intermittent once    Immunologic:    Other:  acetaminophen   Tablet .. PRN  ALBUTerol    0.083%  buPROPion XL .  diltiazem   CD  furosemide    Tablet  hydrALAZINE  iron sucrose Injectable  pantoprazole    Tablet  pramipexole  simvastatin  tiotropium 18 MICROgram(s) Capsule      Review of Systems--  A 10-point review of systems was obtained.     Pertinent positives and negatives--  Constitutional: Pos for  fevers. No Chills. No Rigors.   Cardiovascular: No chest pain. No palpitations.  Respiratory: Pos shortness of breath. No cough.  Gastrointestinal: No nausea or vomiting. No diarrhea or constipation.   Psychiatric: Pleasant. Appropriate affect.    Review of systems otherwise negative except as previously noted.    Physical Examination--  Vital Signs: T(F): 98.4 (19 @ 05:06), Max: 101.1 (19 @ 20:18)  HR: 91 (19 @ 07:25)  BP: 109/48 (19 @ 05:06)  RR: 16 (19 @ 05:06)  SpO2: 96% (19 @ 07:25)  Wt(kg): --    General: Chronically ill appearing poorly nourished, in no acute distress  Eyes: sclera anicteric, pupils equal and reactive to light  ENMT: buccal mucosa moist, pharynx not injected  Neck: supple, trachea midline  Lungs: coarse basal crackles bilaterally   Cardiovascular: regular rate and rhythm, S1 S2, SM 3/6   Abdomen: soft, nontender, no organomegaly present, bowel sounds normal, no splenomegaly   Neurological:  alert and oriented x3, Cranial Nerves II-XII grossly intact  Skin: no increased ecchymosis/petechiae/purpura  Lymph Nodes: no palpable cervical/supraclavicular lymph nodes enlargements  Extremities: no cyanosis/clubbing/edema, has ecchymosis on both arms as she is on plavix         Laboratory Studies--  CBC                        8.3    . )-----------( 236      ( 23 May 2019 01:11 )             25.7       Chemistries      136  |  105  |  35<H>  ----------------------------<  133<H>  3.6   |  19<L>  |  2.60<H>    Ca    8.3<L>      22 May 2019 08:40  Mg     1.9         TPro  6.1  /  Alb  2.4<L>  /  TBili  0.6  /  DBili  x   /  AST  43<H>  /  ALT  35  /  AlkPhos  119  -    Sedimentation Rate, Erythrocyte (19 @ 16:14)    Sedimentation Rate, Erythrocyte: 38 mm/hr    C-Reactive Protein, Serum (19 @ 22:12)    C-Reactive Protein, Serum: 14.23 mg/dL    Urinalysis Basic - ( 22 May 2019 20:36 )    Color: Yellow / Appearance: Clear / S.015 / pH: x  Gluc: x / Ketone: Negative  / Bili: Negative / Urobili: Negative   Blood: x / Protein: 25 mg/dL / Nitrite: Negative   Leuk Esterase: Negative / RBC: 0-2 /HPF / WBC 0-2   Sq Epi: x / Non Sq Epi: Occasional / Bacteria: Few    Procalcitonin, Serum (19 @ 16:14)    Procalcitonin, Serum: 1.20: septic shock. ng/mL    Serum Pro-Brain Natriuretic Peptide (19 @ 15:14)    Serum Pro-Brain Natriuretic Peptide: 41613 pg/mL    Ferritin, Serum (19 @ 23:05)    Ferritin, Serum: 136 ng/mL    Iron with Total Binding Capacity (19 @ 23:38)    Iron - Total Binding Capacity.: 223 ug/dL    % Saturation, Iron: 8 %    Iron Total, Serum: 17 ug/dL    Unsaturated Iron Binding Capacity: 206 ug/dL          RECENT CULTURES    Culture - Blood (19 @ 16:46)    -  Enterococcus species: Detec    Gram Stain:   Growth in aerobic bottle: Gram Positive Cocci in Pairs and Chains  Growth in anaerobic bottle: Gram Positive Cocci in Pairs and Chains    Specimen Source: .Blood Blood    Organism: Blood Culture PCR    Culture Results:   Growth in anaerobic bottle: Gram Positive Cocci in Pairs and Chains  Growth in aerobic bottle: Gram Positive Cocci in Pairs and Chains  "Due to technical problems, Proteus sp. will Not be reported as part of  the BCID panel until further notice"  ***Blood Panel PCR results on this specimen are available  approximately 3 hours after the Gram stain result.***  Gram stain, PCR, and/or culture results may not always  correspond due to difference in methodologies.  ************************************************************  This PCR assay was performed using [email or web address].  The following targets are tested for: Enterococcus,  vancomycin resistant enterococci, Listeria monocytogenes,  coagulase negative staphylococci, S. aureus,  methicillin resistant S. aureus, Streptococcus agalactiae  (Group B), S. pneumoniae, S. pyogenes (Group A),  Acinetobacter baumannii, Enterobacter cloacae, E. coli,  Klebsiella oxytoca, K. pneumoniae, Proteus sp.,  Serratia marcescens, Haemophilus influenzae,  Neisseria meningitidis, Pseudomonas aeruginosa, Candida  albicans, C. glabrata, C krusei, C parapsilosis,  C. tropicalis and the KPC resistance gene.    Organism Identification: Blood Culture PCR    Method Type: PCR      Culture - Blood (collected 22 May 2019 16:46)  Source: .Blood Blood  Gram Stain (23 May 2019 05:00):    Growth in aerobic bottle: Gram Positive Cocci in Pairs and Chains    Growth in anaerobic bottle: Gram Positive Cocci in Pairs and Chains  Preliminary Report (23 May 2019 05:00):    Growth in aerobic bottle: Gram Positive Cocci in Pairs and Chains    Growth in anaerobic bottle: Gram Positive Cocci in Pairs and Chains        RADIOLOGY:    CT Chest No Cont (19 @ 22:27) >  FINDINGS:    Lungs: Smooth interlobular septal thickening compatible with hydrostatic   interstitial pulmonary edema/CHF. Patchy airspace opacity in the lungs   bilaterally, compatible with pneumonia.    Pleural space: Small bilateral pleural effusions.    Heart: Normal. No cardiomegaly. No pericardial effusion.    Mediastinum: Esophagus is unremarkable.    Aorta: Normal. No aortic aneurysm.    Lymph nodes: Unremarkable. No enlarged lymph nodes.    Bones/joints: Unremarkable. No acute fracture.    Soft tissues: Unremarkable.     IMPRESSION:   1. Hydrostatic interstitial pulmonary edema/CHF.   2. Small bilateral pleural effusions.   3. Multifocal bilateral pneumonia.     CT Chest No Cont (19 @ 22:27) >  FINDINGS:     ABDOMEN:    Liver: Normal. No mass.    Gallbladder and bile ducts: Normal. No calcified stones. No ductal   dilation.    Pancreas: Normal. No ductal dilation.    Spleen: Normal. No splenomegaly.    Adrenals: Normal. No mass.    Kidneys and ureters: No hydronephrosis or urolithiasis. Cortical   thinning.   Stomach and bowel: Colonic diverticulosis. No diverticulitis. No bowel   wall   thickening or intestinal obstruction.    Appendix: Appendix not visualized. No evidence of appendicitis.     PELVIS:    Bladder: Unremarkable as visualized.    Reproductive: Unremarkable as visualized.     ABDOMEN and PELVIS:    Intraperitoneal space: Normal. No free air. No significant fluid   collection.    Bones/joints: No acute fracture. No dislocation.    Soft tissues: Unremarkable.    Vasculature: Normal. No abdominal aortic aneurysm.    Lymph nodes: Normal. No enlarged lymph nodes.    Other findings: 2.5 cm AAA. No rupture.     IMPRESSION:   No acute findings.    Agree with above report    TTE Echo Doppler w/o Cont (19 @ 09:33) >  Dimensions:    LA 3.0       Normal Values: 2.0 - 4.0 cm    Ao 3.4  Normal Values: 2.0 - 3.8 cm  SEPTUM 1.1       Normal Values: 0.6 - 1.2 cm  PWT 1.2       Normal Values: 0.6 - 1.1 cm  LVIDd 5.4         Normal Values: 3.0 - 5.6 cm  LVIDs 3.3         Normal Values: 1.8 - 4.0 cm      OBSERVATIONS:    Mitral Valve: normal, mild MR.  Aortic Valve/Aorta: Calcified trileaflet aortic valve with probably   normal opening. Moderate to severe aortic insufficiency. There is a   mobile echogenic density seen on the aortic valve measuring 0.9 x 0.5 cm.  Tricuspid Valve: normal with trace TR.  Pulmonic Valve: Trace PI  Left Atrium: normal  Right Atrium: Not well-visualized  Left Ventricle: normal LV size and systolic function, estimated LVEF of   65%. Mild left ventricular hypertrophy  Right Ventricle: normal size and systolic function.  Pericardium/Pleura: normal, no significant pericardial effusion.      Conclusion:     Mild concentric LVH with normal internal dimensions and systolic   function, estimated LVEF of 65%.   Calcified trileaflet aortic valve with probably normal opening. Moderate   to severe aortic insufficiency. There is a mobile echogenic density seen   on the aortic valve measuring 0.9 x 0.5 cm.  Normal RV size and systolic function.     Normal trileaflet aortic valve, without AI.   Mild MR  Trace physiologic TR.    No significant pericardial effusion.      Assessment :     63y Female with HTN, CKD, PAD s/p right femoral endarterectomy, depression, and smoker (quit 5 days ago) presented to the ED c/o SOB and weakness that started in April but have gotten worse 5 days and a 10-lb weight loss within a month with less appetite  Patient states, she had to stop to catch her breath in going to the bathroom from her living room.  She used to be a very active woman until recently.  Denies edema, however, admits to orthopnea.  Denies CP or palpitations, though, she was here last month with c/o neck pain radiating to her chest, for which she though was an MI.  Her workup for ACS and PE was negative and she was sent home.  However, she was found to be anemic and was placed on Iron.  Her EGD last week was negative.  She denies any melena.  Denies fever chills, nausea, vomiting, diarrhea, constipation, abdominal pain    Her presentation was consistent with CHF likely from severe aortic insufficiency . She has prior hx of mild AI . Now echo shows possible mobile echogenic density on aortic valve suspicious for infective endocarditis . She has anemia ? anemia of chronic disease. I doubt if she ever had pneumonia . She was placed on antibiotics for presumed pneumonia on admission and No cultures were done . Her presentation could be all related to subacute IE     She has positive blood cs with Gram positive cocci in pairs and chains identified by PCR as Enterococcus species . She most likely has infective endocarditis , source of bacteremia is unclear . ? urine or GI tract, she did have recent colonoscopy     Plan :   - will repeat blood cs x 2 today   - will change antibiotics to Ampicillin 2 grams q 8 hours and give one dose of vancomycin   - screen for MRSA  - she will need ZAHIRA discussed with cardiology , spoke to Dr. Syed NO ZAHIRA today so she can resume diet   - trend cbc   - diuretics as per cardiology and nephrology  - will get whole body indium scan to look for source of bacteremia     Case discussed with medical team     Continue with present regime .  Appropriate use of antibiotics and adverse effects reviewed.    I have discussed the above plan of care with patient in detail. She expressed understanding of the treatment plan . Risks, benefits and alternatives discussed in detail. I have asked if she has any questions or concerns and appropriately addressed them to the best of my ability .    > 35 minutes spent in direct patient care reviewing  the notes, lab data/ imaging , discussion with multidisciplinary team. All questions were addressed and answered to the best of my capacity .    Thank you for allowing me to participate in the care of your patient .        Aiyana Kay MD  [phone]

## 2019-05-23 NOTE — PROGRESS NOTE ADULT - PROBLEM SELECTOR PLAN 1
-Possibly secondary to CKD  -S/P 1 unit PRBC  -Hg improved but now trending downward -- possibly secondary to slow GI blood loss  -Heparin sq and plavix d/c'd  -Unable to get capsule endoscopy due to questionable curvature of UGI tract (near duodenum?)  -Iron studies, B12, folate, ferritin, haptoglobin, LDH, reticulocyte count noted -- on iv -venofer x 3 days  -F/U SPEP, UPEP  -Monitor h/h  -Transfuse prn  -Hematology/GI f/u  -Further work-up/management pending clinical course.

## 2019-05-23 NOTE — PROVIDER CONTACT NOTE (CRITICAL VALUE NOTIFICATION) - NS PROVIDER READ BACK
Brianda called to let Dr. Sebastian know that a form will be faxed over from [facility] for Dr. Sebastian to fill out, and to fax it back    Any questions please call Brianda  
yes
yes

## 2019-05-23 NOTE — PROGRESS NOTE ADULT - SUBJECTIVE AND OBJECTIVE BOX
[facility] -- Chao Lomax, Regino Syed, Roshan Paris Savella  Office # [phone]      Follow Up:    Weakness and SOB  Subjective/Observations:   No events overnight resting comfortably in bed.  No complaints of chest pain, dyspnea, or palpitations reported. No signs of orthopnea or PND. Feeling tired    REVIEW OF SYSTEMS: All other review of systems is negative unless indicated above    PAST MEDICAL & SURGICAL HISTORY:  Hypertension  Restless leg syndrome: Especially with General Anesthesia  Arthritis: Cervical Spine  and Hands  H/O cervical discectomy  Acute peptic ulcer with perforation: 1980  S/P appendectomy: 2014  S/P tubal ligation: 1986  Osteoarthritis of right shoulder region: Right Shoulder Arthroscopy  2007  Rheumatoid arthritis of carpometacarpal joint of thumb: Total Joint Replacement of Left Thumb      MEDICATIONS  (STANDING):  ALBUTerol    0.083% 2.5 milliGRAM(s) Nebulizer every 8 hours  ampicillin  IVPB      ampicillin  IVPB 2 Gram(s) IV Intermittent every 8 hours  buPROPion XL . 150 milliGRAM(s) Oral daily  diltiazem    milliGRAM(s) Oral daily  furosemide    Tablet 40 milliGRAM(s) Oral daily  hydrALAZINE 10 milliGRAM(s) Oral two times a day  iron sucrose Injectable 100 milliGRAM(s) IV Push every 24 hours  pantoprazole    Tablet 40 milliGRAM(s) Oral before breakfast  pramipexole 0.5 milliGRAM(s) Oral at bedtime  simvastatin 10 milliGRAM(s) Oral at bedtime  tiotropium 18 MICROgram(s) Capsule 1 Capsule(s) Inhalation daily    MEDICATIONS  (PRN):  acetaminophen   Tablet .. 650 milliGRAM(s) Oral every 6 hours PRN Temp greater or equal to 38C (100.4F), Mild Pain (1 - 3)      Allergies    No Known Allergies    Intolerances        Vital Signs Last 24 Hrs  T(C): 37.1 (23 May 2019 13:55), Max: 38.4 (22 May 2019 20:18)  T(F): 98.7 (23 May 2019 13:55), Max: 101.1 (22 May 2019 20:18)  HR: 94 (23 May 2019 13:55) (84 - 105)  BP: 101/57 (23 May 2019 13:55) (101/57 - 117/42)  BP(mean): --  RR: 16 (23 May 2019 13:55) (16 - 18)  SpO2: 98% (23 May 2019 13:55) (91% - 98%)    I&O's Summary        PHYSICAL EXAM:  TELE: Off tele   Constitutional: NAD, awake and alert, well-developed  HEENT: Moist Mucous Membranes, Anicteric  Pulmonary: Non-labored, breath sounds with crackles bilaterally at bases , No wheezing, or rhonchi   Cardiovascular: Regular, S1 and S2 nl, + murmur No rubs, gallops or clicks   Gastrointestinal: Bowel Sounds present, soft, nontender.   Lymph: No lymphadenopathy. No peripheral edema.  Skin: No visible rashes or ulcers.  Psych:  Mood & affect appropriate    LABS: All Labs Reviewed:                        8.7    12.39 )-----------( 273      ( 23 May 2019 08:36 )             27.6                         8.3    12.19 )-----------( 236      ( 23 May 2019 01:11 )             25.7                         9.0    12.54 )-----------( 251      ( 22 May 2019 08:40 )             28.6     23 May 2019 08:36    136    |  106    |  31     ----------------------------<  92     3.8     |  20     |  2.30   22 May 2019 08:40    136    |  105    |  35     ----------------------------<  133    3.6     |  19     |  2.60   21 May 2019 10:48    137    |  109    |  35     ----------------------------<  147    4.5     |  17     |  2.40     Ca    8.1        23 May 2019 08:36  Ca    8.3        22 May 2019 08:40  Ca    8.1        21 May 2019 10:48  Mg     1.9       22 May 2019 08:40  Mg     2.1       21 May 2019 10:48  Mg     2.0       20 May 2019 15:14    TPro  6.1    /  Alb  2.4    /  TBili  0.6    /  DBili  x      /  AST  43     /  ALT  35     /  AlkPhos  119    21 May 2019 10:48  TPro  6.3    /  Alb  2.5    /  TBili  0.3    /  DBili  .10    /  AST  47     /  ALT  32     /  AlkPhos  117    20 May 2019 15:14      EXAM:  CT CHEST                          PROCEDURE DATE:  05/20/2019      INTERPRETATION:  [facility] RADIOLOGIST PRELIMINARY REPORT    EXAM:    CT Chest Without Contrast     EXAM DATE/TIME:    5/20/2019 10:13 PM     CLINICAL HISTORY:    63 years old, female; Signs and symptoms; Other: Weight loss, anemia;   Shortness of breath; Prior surgery; Surgery type: Exploratory, tubal   ligation     TECHNIQUE:    Imaging protocol: Axial computed tomography images of the chest without   intravenous contrast. Coronal and sagittal reformatted images were   created and   reviewed.    3D rendering: MIP reconstructed images were created and reviewed.     COMPARISON:    DX XR CHEST PA AND LATERAL 5/20/2019 3:59 PM     FINDINGS:    Lungs: Smooth interlobular septal thickening compatible with hydrostatic   interstitial pulmonary edema/CHF. Patchy airspace opacity in the lungs   bilaterally, compatible with pneumonia.    Pleural space: Small bilateral pleural effusions.    Heart: Normal. No cardiomegaly. No pericardial effusion.    Mediastinum: Esophagus is unremarkable.    Aorta: Normal. No aortic aneurysm.    Lymph nodes: Unremarkable. No enlarged lymph nodes.    Bones/joints: Unremarkable. No acute fracture.    Soft tissues: Unremarkable.     IMPRESSION:   1. Hydrostatic interstitial pulmonary edema/CHF.   2. Small bilateral pleural effusions.   3. Multifocal bilateral pneumonia.       =========================  EXAM:    CT Abdomen and Pelvis Without Contrast     EXAM DATE/TIME:    5/20/2019 10:13 PM     CLINICAL HISTORY:    63 years old, female; Signs and symptoms; Other: Weight loss, anemia;   Shortness of breath; Prior surgery; Surgery type: Exploratory, tubal   ligation     TECHNIQUE:    Imaging protocol: Axial computed tomography images of the abdomen and   pelvis   without contrast. Coronal and sagittal reformatted images were created   and   reviewed.    3D rendering: MIP reconstructed images were created and reviewed.     COMPARISON:    DX XR CHEST PA AND LATERAL 5/20/2019 3:59 PM     FINDINGS:     ABDOMEN:    Liver: Normal. No mass.    Gallbladder and bile ducts: Normal. No calcified stones. No ductal   dilation.    Pancreas: Normal. No ductal dilation.    Spleen: Normal. No splenomegaly.    Adrenals: Normal. No mass.    Kidneys and ureters: No hydronephrosis or urolithiasis. Cortical   thinning.   Stomach and bowel: Colonic diverticulosis. No diverticulitis. No bowel   wall   thickening or intestinal obstruction.    Appendix: Appendix not visualized. No evidence of appendicitis.     PELVIS:    Bladder: Unremarkable as visualized.    Reproductive: Unremarkable as visualized.     ABDOMEN and PELVIS:    Intraperitoneal space: Normal. No free air. No significant fluid   collection.    Bones/joints: No acute fracture. No dislocation.    Soft tissues: Unremarkable.    Vasculature: Normal. No abdominal aortic aneurysm.    Lymph nodes: Normal. No enlarged lymph nodes.    Other findings: 2.5 cm AAA. No rupture.     IMPRESSION:   No acute findings.    Agree with above report    NATIVIDAD ZAMORA M.D., ATTENDING RADIOLOGIST  This document has been electronically signed. May 21 2019 10:20AM      < end of copied text >    < from: CT Abdomen and Pelvis No Cont (05.20.19 @ 22:25) >    EXAM:  CT ABDOMEN AND PELVIS                          PROCEDURE DATE:  05/20/2019      INTERPRETATION:  [facility] RADIOLOGIST PRELIMINARY REPORT    EXAM:    CT Chest Without Contrast     EXAM DATE/TIME:    5/20/2019 10:13 PM     CLINICAL HISTORY:    63 years old, female; Signs and symptoms; Other: Weight loss, anemia;   Shortness of breath; Prior surgery; Surgery type: Exploratory, tubal   ligation     TECHNIQUE:    Imaging protocol: Axial computed tomography images of the chest without   intravenous contrast. Coronal and sagittal reformatted images were   created and   reviewed.    3D rendering: MIP reconstructed images were created and reviewed.     COMPARISON:    DX XR CHEST PA AND LATERAL 5/20/2019 3:59 PM     FINDINGS:    Lungs: Smooth interlobular septal thickening compatible with hydrostatic   interstitial pulmonary edema/CHF. Patchy airspace opacity in the lungs   bilaterally, compatible with pneumonia.    Pleural space: Small bilateral pleural effusions.    Heart: Normal. No cardiomegaly. No pericardial effusion.    Mediastinum: Esophagus is unremarkable.    Aorta: Normal. No aortic aneurysm.    Lymph nodes: Unremarkable. No enlarged lymph nodes.    Bones/joints: Unremarkable. No acute fracture.    Soft tissues: Unremarkable.     IMPRESSION:   1. Hydrostatic interstitial pulmonary edema/CHF.   2. Small bilateral pleural effusions.   3. Multifocal bilateral pneumonia.     =========================  EXAM:    CT Abdomen and Pelvis Without Contrast     EXAM DATE/TIME:   5/20/2019 10:13 PM     CLINICAL HISTORY:    63 years old, female; Signs and symptoms; Other: Weight loss, anemia;   Shortness of breath; Prior surgery; Surgery type: Exploratory, tubal   ligation     TECHNIQUE:    Imaging protocol: Axial computed tomography images of the abdomen and   pelvis   without contrast. Coronal and sagittal reformatted images were created   and   reviewed.    3D rendering: MIP reconstructed images were created and reviewed.     COMPARISON:    DX XR CHEST PA AND LATERAL 5/20/2019 3:59 PM     FINDINGS:     ABDOMEN:    Liver: Normal. No mass.    Gallbladder and bile ducts: Normal. No calcified stones. No ductal   dilation.    Pancreas: Normal. No ductal dilation.    Spleen: Normal. No splenomegaly.    Adrenals: Normal. No mass.    Kidneys and ureters: Chronic medical renal disease.    Stomach and bowel: Colonic diverticulosis. No diverticulitis. No bowel   wall   thickening or intestinal obstruction.    Appendix: Appendix not visualized. No evidence of appendicitis.     PELVIS:    Bladder: Unremarkable as visualized.    Reproductive: Unremarkable as visualized.     ABDOMEN and PELVIS:    Intraperitoneal space: Normal. No free air. No significant fluid   collection.    Bones/joints: No acute fracture. No dislocation.    Soft tissues: Unremarkable.    Vasculature: Normal. No abdominal aortic aneurysm.    Lymph nodes: Normal. No enlarged lymph nodes.    Other findings: 2.5 cm AAA. No rupture.     IMPRESSION:   No acute findings.    Agree with above report    NATIVIDAD ZAMORA M.D., ATTENDING RADIOLOGIST  This document has been electronically signed. May 21 2019 10:36AM      < end of copied text >  	   < from: US Duplex Venous Lower Ext Complete, Bilateral (05.20.19 @ 22:12) >    EXAM:  US DPLX LWR EXT VEINS COMPL BI                            PROCEDURE DATE:  05/20/2019          INTERPRETATION:  Venous Duplex Ultrasound of the Lower Extremities    Clinical information: Intermittent leg pain bilaterally    Sonographic evaluation of the lower extremity veins to the level of the   posterior tibial veins was performed using gray-scale and color Doppler   imaging.    Interpretation:     Right leg:  The visualized veins are patent and compressible.  No   abnormal echoes or flow disturbances are identified.    Left leg:  The visualized veins are patent and compressible.  No abnormal   echoes or flow disturbances are identified.    IMPRESSION:  No ultrasound evidence of DVT.    The above findings correspond to a report provided by the offsite   overnight image reading service at the time of the examination.    Thank you for this referral.    MATHEW UMANZOR M.D., ATTENDING RADIOLOGIST  This document has been electronically signed. May 21 2019  8:02AM      < end of copied text >  < from: TTE Echo Doppler w/o Cont (05.21.19 @ 09:33) >  EXAM:  ECHO TTE WO CON COMP W DOPPLR         PROCEDURE DATE:  05/21/2019        INTERPRETATION:  INDICATION: Dyspnea    Blood Pressure 112/58    Height Weight 48kg       BSA    Dimensions:    LA 3.0       Normal Values: 2.0 - 4.0 cm    Ao 3.4  Normal Values: 2.0 - 3.8 cm  SEPTUM 1.1       Normal Values: 0.6 - 1.2 cm  PWT 1.2       Normal Values: 0.6 - 1.1 cm  LVIDd 5.4         Normal Values: 3.0 - 5.6 cm  LVIDs 3.3         Normal Values: 1.8 - 4.0 cm      OBSERVATIONS:    Mitral Valve: normal, mild MR.  Aortic Valve/Aorta: Calcified trileaflet aortic valve with probably   normal opening. Moderate to severe aortic insufficiency. There is a   mobile echogenic density seen on the aortic valve measuring 0.9 x 0.5 cm.  Tricuspid Valve: normal with trace TR.  Pulmonic Valve: Trace PI  Left Atrium: normal  Right Atrium: Not well-visualized  Left Ventricle: normal LV size and systolic function, estimated LVEF of   65%. Mild left ventricular hypertrophy  Right Ventricle: normal size and systolic function.  Pericardium/Pleura: normal, no significant pericardial effusion.      Conclusion:     Mild concentric LVH with normal internal dimensions and systolic   function, estimated LVEF of 65%.   Calcified trileaflet aortic valve with probably normal opening. Moderate   to severe aortic insufficiency. There is a mobile echogenic density seen   on the aortic valve measuring 0.9 x 0.5 cm.  Normal RV size and systolic function.     Normal trileaflet aortic valve, without AI.   Mild MR  Trace physiologic TR.    No significant pericardial effusion.      MICHAEL CHILDRESS   This document has been electronically signed. May 22 2019  8:49AM        < end of copied text >         Luan Joya [facility]   Cardiology

## 2019-05-23 NOTE — PROVIDER CONTACT NOTE (CRITICAL VALUE NOTIFICATION) - SITUATION
Blood culture aerobic bottle gram + cocci in pairs and chains MD made aware Blood culture aerobic bottle gram + cocci in pairs and chains

## 2019-05-23 NOTE — CHART NOTE - NSCHARTNOTEFT_GEN_A_CORE
Called by RN for fever 101.1. Pt received Tylenol and now afebrile. Pt seen and examined at bedside. Denies chills, SOB, cough, dysuria, frequency, abdominal pain, HA.    Vital Signs Last 24 Hrs  T(C): 36.8 (22 May 2019 23:23), Max: 38.4 (22 May 2019 20:18)  T(F): 98.3 (22 May 2019 23:23), Max: 101.1 (22 May 2019 20:18)  HR: 84 (22 May 2019 21:39) (84 - 105)  BP: 117/42 (22 May 2019 20:18) (108/45 - 129/57)  BP(mean): --  RR: 18 (22 May 2019 20:18) (18 - 19)  SpO2: 91% (22 May 2019 21:39) (91% - 97%)    General: NAD, resting comfortably in bed  Heart: systolic murmur, RRR, S1S2  Lungs: CTA b/l                          9.0    12.54 )-----------( 251      ( 22 May 2019 08:40 )             28.6   05-22    136  |  105  |  35<H>  ----------------------------<  133<H>  3.6   |  19<L>  |  2.60<H>    Ca    8.3<L>      22 May 2019 08:40  Mg     1.9     05-22    TPro  6.1  /  Alb  2.4<L>  /  TBili  0.6  /  DBili  x   /  AST  43<H>  /  ALT  35  /  AlkPhos  119  05-21    New fever: Patient had blood cultures and UA done in am. F/u CXR, CBC, lactate.     Rain Wadsworth, PGY-3 Called by RN for fever 101.1. Pt received Tylenol and now afebrile. Pt seen and examined at bedside. Denies chills, SOB, cough, dysuria, frequency, abdominal pain, HA.    Vital Signs Last 24 Hrs  T(C): 36.8 (22 May 2019 23:23), Max: 38.4 (22 May 2019 20:18)  T(F): 98.3 (22 May 2019 23:23), Max: 101.1 (22 May 2019 20:18)  HR: 84 (22 May 2019 21:39) (84 - 105)  BP: 117/42 (22 May 2019 20:18) (108/45 - 129/57)  BP(mean): --  RR: 18 (22 May 2019 20:18) (18 - 19)  SpO2: 91% (22 May 2019 21:39) (91% - 97%)    General: NAD, resting comfortably in bed  Heart: systolic murmur, RRR, S1S2  Lungs: CTA b/l                          9.0    12.54 )-----------( 251      ( 22 May 2019 08:40 )             28.6   05-22    136  |  105  |  35<H>  ----------------------------<  133<H>  3.6   |  19<L>  |  2.60<H>    Ca    8.3<L>      22 May 2019 08:40  Mg     1.9     05-22    TPro  6.1  /  Alb  2.4<L>  /  TBili  0.6  /  DBili  x   /  AST  43<H>  /  ALT  35  /  AlkPhos  119  05-21    New fever: Patient had blood cultures and UA done in am. Also MRSA/MSSA PCR sent in am. F/u STAT CXR, CBC, lactate.    Rain Wadsworth, PGY-3

## 2019-05-23 NOTE — PROVIDER CONTACT NOTE (CRITICAL VALUE NOTIFICATION) - TEST AND RESULT REPORTED:
( bottle 1 )Growth in aerobic bottle for gram positive cocci in the pair and chain   (bottle 2) Growth in the aerobic and anaerobic for gram positive cocci and pair and chain

## 2019-05-23 NOTE — PROGRESS NOTE ADULT - PROBLEM SELECTOR PLAN 1
dyspnea - multifactorial - getting better  eval for endocarditis  tylenol PRN for fever  ID eval noted  bronchodilators / inhalers  on emp ABX regimen  cvs regimen - Cardio following, I and O, TTE reviewed, may need ZAHIRA  multiple medical issues - acute and chronic  dyspnea is improving, on o2 support, however, cont to assess off oxygen  will follow  serial labs  serial clinical assessment

## 2019-05-23 NOTE — PROGRESS NOTE ADULT - PROBLEM SELECTOR PLAN 2
-Blood cultures noted   -On iv ampicillin  -Will need ZAHIRA  -Cardiology f/u  -ID consult noted  -Further work-up/management pending clinical course.

## 2019-05-23 NOTE — PROGRESS NOTE ADULT - ASSESSMENT
Assessment/Plan:  63y Female with HTN, CKD, PAD s/p right femoral endarterectomy, depression, and smoker (quit 5 days ago) presented to the ED c/o SOB and weakness that started in April but have gotten worse 5 days and a 10-lb weight loss within a month with less appetitie.  Patient states, she had to stop to catch her breath in going to the bathroom from her living room.  She used to be a very active woman until recently.  Denies edema, however, admits to orthopnea.  Denies CP or palpitations, though, she was here last month with c/o neck pain radiating to her chest, for which she though was an MI.  Her workup for ACS and PE was negative and she was sent home.  However, she was found to be anemic and was placed on Iron.  Her EGD last week was negative.  She denies any melena.  Denies fever chills, nausea, vomiting, diarrhea, constipation, abdominal pain    In the ED, her CxR showed small B/L pleural effusion, pro-BNP>22057, H/H 7.3/23.1, procalcitonin=1.12.  She is now receiving 1 unit of PRBC's.  Upon evaluation, she is dyspneic during conversation and requiring her HOB elevated.  She also has elevated creatinine (2.8), baseline = 2.1    She follows Dr. Lacy as outpatient and claims to have had negative cardiac workup.  TTE in 2017 with normal LVF and no segmental WMA.  Nuclear stress test (Pharm) normal.    Recommend:    HFpEF  - TTE shows mobile echodensity on AV with moderate to severe AI.  This is likely the cause of her CABA and heart failure.  The etiology of the mass is currently unclear  -  blood cultures+ for gram + cocci in pairs/chains    -ID following   - Will be scheduled for a ZAHIRA to better assess the aortic valve  - Was fluid overloaded on presentation, s/p Lasix 40 gm IV x 1 in ED.    - CT chest yesterday showed interstitial pulmonary edema, small B/L pleural effusions and multifocal B/L Pna  - She is still volume overloaded.  CW PO lasix   - Please continue to maintain strict I/Os, monitor daily weights, Cr, and K.   - Monitor electrolytes, replete to keep K>4 and Mag>2    INA on CKD  - Creatinine improving (2.3<--2.6 <--2.4<--1.8) off IV lasix  - Monitor renal function while actively diuresing  - Avoid nephrotoxics  - Renal following    Anemia  - Transfuse per Primary.  s/p 1 unit of PRBC's with appropriate response  - Follow up anemia w/u  - FOBT + but no overt GIB.  GI following  - Heme eval appreciated.   - Would hold plavix for now    HTN  - SBP improved   - Continue Cardizem CD/ hydralazine  - May initiate Norvasc if remains elevated      PAD  - Would monitor for claudication and and signs of limb ischemia.   - Continue statin    DVT ppx  - PAS for now    Smoker  - Reinforce education on smoking cessation    Further cardiac workup pending clinical course  Other workup per Primary  Will continue to follow    Luan MELO  Cardiology

## 2019-05-23 NOTE — PROGRESS NOTE ADULT - ASSESSMENT
64 y/o woman w hx HTN, CKD, PAD depression smoker(stppped for 7days 1ppd x40yrs),adm w c/w 1week incr fatigue, reports cough and SOB/CABA when arrived in ER.  CBC w Hgb 7.3, MCV low 90's, CT c/a/p w findings c/w CHF and multilobar pneumonia.  Pt was tx'd one unit PRBC w some improvement in fatigue, Hgb today 9.7  Review of labs in computer show in 4/2019 Hgb was 8+, MCV low 90's, pt reports that was "incidental finding", she was here for neck pain, fatigue was mild at that time. Started taking Slow FE one a day since that time. Had endoscopy and colonoscopy early May w Dr Prater, negative, not candidate for small bowel capsule study due to anatomy of the bowel.  Stool occult blood positive during this admission.  CMP sig for Cr 2 range  Review of shin blood morphologty no abnormal findings  Hgb w higher than expected incr after one unit PRBC    -anemia is multifactorial, due to renal insufficiency, acute illness, multilobar pneumonia, CHF, blood loss w occult blood positive stool and on Plavix, iron studies also can be c/w partially treated iron deficiency.  now being evaluated for possible endocarditis with +blood cultures  -GI consulted  -started IV Venofer to expedite repletion, 100mg IV fo 3 days   -may be a candidate for procrit in outpt basis   -SPEP/immunofixation/hossein levels in view of renal insuff and anemia to r/o paraprotein disease.  -follow serial CBC( pending from today)  continue present medical workup

## 2019-05-23 NOTE — PROVIDER CONTACT NOTE (CRITICAL VALUE NOTIFICATION) - ACTION/TREATMENT ORDERED:
awaiting call back, awaiting call back,  Called Dr. Kay infectious disease and left message. Awaiting on call back awaiting call back,  Called Dr. Kay infectious disease and left message. Awaiting on call back    Spoke with Dr. Kay. Informed MD of blood culture results. MD gave  orders.

## 2019-05-23 NOTE — PROGRESS NOTE ADULT - SUBJECTIVE AND OBJECTIVE BOX
Interval History:  continues to undergo current workup.. blood cultures noted  Chart reviewed and events noted;   Overnight events:    MEDICATIONS  (STANDING):  ALBUTerol    0.083% 2.5 milliGRAM(s) Nebulizer every 8 hours  ampicillin  IVPB      ampicillin  IVPB 2 Gram(s) IV Intermittent every 8 hours  buPROPion XL . 150 milliGRAM(s) Oral daily  diltiazem    milliGRAM(s) Oral daily  furosemide    Tablet 40 milliGRAM(s) Oral daily  hydrALAZINE 10 milliGRAM(s) Oral two times a day  iron sucrose Injectable 100 milliGRAM(s) IV Push every 24 hours  pantoprazole    Tablet 40 milliGRAM(s) Oral before breakfast  pramipexole 0.5 milliGRAM(s) Oral at bedtime  simvastatin 10 milliGRAM(s) Oral at bedtime  tiotropium 18 MICROgram(s) Capsule 1 Capsule(s) Inhalation daily    MEDICATIONS  (PRN):  acetaminophen   Tablet .. 650 milliGRAM(s) Oral every 6 hours PRN Temp greater or equal to 38C (100.4F), Mild Pain (1 - 3)      Vital Signs Last 24 Hrs  T(C): 37.1 (23 May 2019 13:55), Max: 38.4 (22 May 2019 20:18)  T(F): 98.7 (23 May 2019 13:55), Max: 101.1 (22 May 2019 20:18)  HR: 93 (23 May 2019 14:33) (84 - 105)  BP: 101/57 (23 May 2019 13:55) (101/57 - 117/42)  BP(mean): --  RR: 16 (23 May 2019 13:55) (16 - 18)  SpO2: 94% (23 May 2019 14:33) (91% - 98%)    PHYSICAL EXAM  General: adult in NAD  HEENT: clear oropharynx, anicteric sclera, pink conjunctivae  Neck: supple  CV: normal S1S2 with no murmur rubs or gallops  Lungs: clear to auscultation, no wheezes, no rhales  Abdomen: soft non-tender non-distended, no hepato/splenomegaly  Ext: no clubbing cyanosis or edema  Skin: no rashes and no petichiae  Neuro: alert and oriented X3 no focal deficits      LABS:  CBC Full  -  ( 23 May 2019 08:36 )  WBC Count : 12.39 K/uL  RBC Count : 3.20 M/uL  Hemoglobin : 8.7 g/dL  Hematocrit : 27.6 %  Platelet Count - Automated : 273 K/uL  Mean Cell Volume : 86.3 fl  Mean Cell Hemoglobin : 27.2 pg  Mean Cell Hemoglobin Concentration : 31.5 gm/dL  Auto Neutrophil # : 10.65 K/uL  Auto Lymphocyte # : 0.71 K/uL  Auto Monocyte # : 0.82 K/uL  Auto Eosinophil # : 0.08 K/uL  Auto Basophil # : 0.04 K/uL  Auto Neutrophil % : 86.1 %  Auto Lymphocyte % : 5.7 %  Auto Monocyte % : 6.6 %  Auto Eosinophil % : 0.6 %  Auto Basophil % : 0.3 %    05-23    136  |  106  |  31<H>  ----------------------------<  92  3.8   |  20<L>  |  2.30<H>    Ca    8.1<L>      23 May 2019 08:36  Mg     1.9     05-22          fe studies  Ferritin, Serum: 460 ng/mL (05-22 @ 22:17)  Iron - Total Binding Capacity.: 223 ug/dL (05-20 @ 23:38)  Ferritin, Serum: 136 ng/mL (05-20 @ 23:05)      WBC trend  12.39 K/uL (05-23-19 @ 08:36)  12.19 K/uL (05-23-19 @ 01:11)  12.54 K/uL (05-22-19 @ 08:40)  12.67 K/uL (05-21-19 @ 10:48)      Hgb trend  8.7 g/dL (05-23-19 @ 08:36)  8.3 g/dL (05-23-19 @ 01:11)  9.0 g/dL (05-22-19 @ 08:40)  9.7 g/dL (05-21-19 @ 10:48)      plt trend  273 K/uL (05-23-19 @ 08:36)  236 K/uL (05-23-19 @ 01:11)  251 K/uL (05-22-19 @ 08:40)  249 K/uL (05-21-19 @ 10:48)        RADIOLOGY & ADDITIONAL STUDIES:    Culture - Blood (05.22.19 @ 16:46)    Gram Stain:   Growth in aerobic bottle: Gram Positive Cocci in Pairs and Chains  Growth in anaerobic bottle: Gram Positive Cocci in Pairs and Chains    -  Enterococcus species: Detec    Specimen Source: .Blood Blood    Organism: Blood Culture PCR    Culture Results:   Growth in anaerobic bottle: Gram Positive Cocci in Pairs and Chains  Growth in aerobic bottle: Gram Positive Cocci in Pairs and Chains  "Due to technical problems, Proteus sp. will Not be reported as part of  the BCID panel until further notice"  ***Blood Panel PCR results on this specimen are available  approximately 3 hours after the Gram stain result.***  Gram stain, PCR, and/or culture results may not always  correspond due to difference in methodologies.  ************************************************************  This PCR assay was performed using [employer].  The following targets are tested for: Enterococcus,  vancomycin resistant enterococci, Listeria monocytogenes,  coagulase negative staphylococci, S. aureus,  methicillin resistant S. aureus, Streptococcus agalactiae  (Group B), S. pneumoniae, S. pyogenes (Group A),  Acinetobacter baumannii, Enterobacter cloacae, E. coli,  Klebsiella oxytoca, K. pneumoniae, Proteus sp.,  Serratia marcescens, Haemophilus influenzae,  Neisseria meningitidis, Pseudomonas aeruginosa, Candida  albicans, C. glabrata, C krusei, C parapsilosis,  C. tropicalis and the KPC resistance gene.    Organism Identification: Blood Culture PCR    Method Type: PCR

## 2019-05-23 NOTE — PROGRESS NOTE ADULT - ASSESSMENT
63 white female with a history of HTN, CAD, CHF, CKD with anemia now admitted with a history of 10 pound weight loss associated with SOB and low hgb,.   HGB improved with blood transfusion. Renal indices are close to baseline.   now found to have positive blood culures and is scheduled for indium scan and also ZAHIRA.   spoke to family at bed side.

## 2019-05-23 NOTE — PROGRESS NOTE ADULT - SUBJECTIVE AND OBJECTIVE BOX
Date/Time Patient Seen:  		  Referring MD:   Data Reviewed	       Patient is a 63y old  Female who presents with a chief complaint of CABA, weakness (22 May 2019 14:41)      Subjective/HPI     PAST MEDICAL & SURGICAL HISTORY:  Hypertension  Restless leg syndrome: Especially with General Anesthesia  Arthritis: Cervical Spine  and Hands  H/O cervical discectomy  Acute peptic ulcer with perforation: 1980  S/P appendectomy: 2014  S/P tubal ligation: 1986  Osteoarthritis of right shoulder region: Right Shoulder Arthroscopy  2007  Rheumatoid arthritis of carpometacarpal joint of thumb: Total Joint Replacement of Left Thumb        Medication list         MEDICATIONS  (STANDING):  ALBUTerol    0.083% 2.5 milliGRAM(s) Nebulizer every 8 hours  buPROPion XL . 150 milliGRAM(s) Oral daily  cefTRIAXone   IVPB 2 Gram(s) IV Intermittent every 24 hours  diltiazem    milliGRAM(s) Oral daily  furosemide    Tablet 40 milliGRAM(s) Oral daily  hydrALAZINE 10 milliGRAM(s) Oral two times a day  iron sucrose Injectable 100 milliGRAM(s) IV Push every 24 hours  pantoprazole    Tablet 40 milliGRAM(s) Oral before breakfast  pramipexole 0.5 milliGRAM(s) Oral at bedtime  simvastatin 10 milliGRAM(s) Oral at bedtime  tiotropium 18 MICROgram(s) Capsule 1 Capsule(s) Inhalation daily  vancomycin  IVPB 1000 milliGRAM(s) IV Intermittent once    MEDICATIONS  (PRN):  acetaminophen   Tablet .. 650 milliGRAM(s) Oral every 6 hours PRN Temp greater or equal to 38C (100.4F), Mild Pain (1 - 3)         Vitals log        ICU Vital Signs Last 24 Hrs  T(C): 36.9 (23 May 2019 05:06), Max: 38.4 (22 May 2019 20:18)  T(F): 98.4 (23 May 2019 05:06), Max: 101.1 (22 May 2019 20:18)  HR: 93 (23 May 2019 05:06) (84 - 105)  BP: 109/48 (23 May 2019 05:06) (108/45 - 129/57)  BP(mean): --  ABP: --  ABP(mean): --  RR: 16 (23 May 2019 05:06) (16 - 19)  SpO2: 95% (23 May 2019 05:06) (91% - 97%)           Input and Output:  I&O's Detail    21 May 2019 07:01  -  22 May 2019 07:00  --------------------------------------------------------  IN:  Total IN: 0 mL    OUT:    Voided: 300 mL  Total OUT: 300 mL    Total NET: -300 mL          Lab Data                        8.3    12.19 )-----------( 236      ( 23 May 2019 01:11 )             25.7     05-22    136  |  105  |  35<H>  ----------------------------<  133<H>  3.6   |  19<L>  |  2.60<H>    Ca    8.3<L>      22 May 2019 08:40  Mg     1.9     05-22    TPro  6.1  /  Alb  2.4<L>  /  TBili  0.6  /  DBili  x   /  AST  43<H>  /  ALT  35  /  AlkPhos  119  05-21            Review of Systems	      Objective     Physical Examination    heart s1s2  lung dec BS      Pertinent Lab findings & Imaging      Mike:  NO   Adequate UO     I&O's Detail    21 May 2019 07:01  -  22 May 2019 07:00  --------------------------------------------------------  IN:  Total IN: 0 mL    OUT:    Voided: 300 mL  Total OUT: 300 mL    Total NET: -300 mL               Discussed with:     Cultures:	  Culture Results:   Growth in aerobic bottle: Gram Positive Cocci in Pairs and Chains  Growth in anaerobic bottle: Gram Positive Cocci in Pairs and Chains (05-22 @ 16:46)  Culture Results:   Growth in anaerobic bottle: Gram Positive Cocci in Pairs and Chains  Growth in aerobic bottle: Gram Positive Cocci in Pairs and Chains  "Due to technical problems, Proteus sp. will Not be reported as part of  the BCID panel until further notice"  ***Blood Panel PCR results on this specimen are available  approximately 3 hours after the Gram stain result.***  Gram stain, PCR, and/or culture results may not always  correspond due to difference in methodologies.  ************************************************************  This PCR assay was performed using [employer].  The following targets are tested for: Enterococcus,  vancomycin resistant enterococci, Listeria monocytogenes,  coagulase negative staphylococci, S. aureus,  methicillin resistant S. aureus, Streptococcus agalactiae  (Group B), S. pneumoniae, S. pyogenes (Group A),  Acinetobacter baumannii, Enterobacter cloacae, E. coli,  Klebsiella oxytoca, K. pneumoniae, Proteus sp.,  Serratia marcescens, Haemophilus influenzae,  Neisseria meningitidis, Pseudomonas aeruginosa, Candida  albicans, C. glabrata, C krusei, C parapsilosis,  C. tropicalis and the KPC resistance gene. (05-22 @ 16:46)        Radiology

## 2019-05-23 NOTE — PROGRESS NOTE ADULT - PROBLEM SELECTOR PLAN 1
suspect multifactorial  ob+ stool  reports recent normal egd/colon; rec obtain old records  no overt gi bleed  cbc cont to decline  was told she could not get capsule  being worked up for endocarditis  would consider CT enterography or single balloon enteroscopy as outpatient .  hx of remote crohns, states she is unable to have capsule study  ct a/p neg for rp bleed  no evidence of overt gib  monitor cbc, transfuse prn  cont ppi  heme eval noted, on venofer  monitor stool color  if w s/s active gib check bleeding scan

## 2019-05-23 NOTE — PROGRESS NOTE ADULT - PROBLEM SELECTOR PLAN 3
-INA on CKD 4  -Monitor Cr  -Renal u/s noted  -Avoid nephrotoxic medications  -Renal f/u  -Further work-up/management pending clinical course.

## 2019-05-23 NOTE — PROGRESS NOTE ADULT - SUBJECTIVE AND OBJECTIVE BOX
VIVIANA CHAPMAN  63y  Female    Patient is a 63y old  Female who presents with a chief complaint of CABA, weakness (23 May 2019 14:24)      seen at bed side.   feels better  indium scan is in progress.      PAST MEDICAL & SURGICAL HISTORY:  Hypertension  Restless leg syndrome: Especially with General Anesthesia  Arthritis: Cervical Spine  and Hands  H/O cervical discectomy  Acute peptic ulcer with perforation:   S/P appendectomy:   S/P tubal ligation:   Osteoarthritis of right shoulder region: Right Shoulder Arthroscopy    Rheumatoid arthritis of carpometacarpal joint of thumb: Total Joint Replacement of Left Thumb          PHYSICAL EXAM:    T(C): 37.1 (19 @ 13:55), Max: 38.4 (19 @ 20:18)  HR: 93 (19 @ 14:33) (84 - 105)  BP: 101/57 (19 @ 13:55) (101/57 - 117/42)  RR: 16 (19 @ 13:55) (16 - 18)  SpO2: 94% (19 @ 14:33) (91% - 98%)  Wt(kg): --    I&O's Detail      Respiratory: clear anteriorly, decreased BS at bases  Cardiovascular: S1 S2  Gastrointestinal: soft NT ND +BS  Extremities: edema   Neuro: Awake and alert    MEDICATIONS  (STANDING):  ALBUTerol    0.083% 2.5 milliGRAM(s) Nebulizer every 8 hours  ampicillin  IVPB      ampicillin  IVPB 2 Gram(s) IV Intermittent every 8 hours  buPROPion XL . 150 milliGRAM(s) Oral daily  diltiazem    milliGRAM(s) Oral daily  furosemide    Tablet 40 milliGRAM(s) Oral daily  hydrALAZINE 10 milliGRAM(s) Oral two times a day  iron sucrose Injectable 100 milliGRAM(s) IV Push every 24 hours  pantoprazole    Tablet 40 milliGRAM(s) Oral before breakfast  pramipexole 0.5 milliGRAM(s) Oral at bedtime  simvastatin 10 milliGRAM(s) Oral at bedtime  tiotropium 18 MICROgram(s) Capsule 1 Capsule(s) Inhalation daily    MEDICATIONS  (PRN):  acetaminophen   Tablet .. 650 milliGRAM(s) Oral every 6 hours PRN Temp greater or equal to 38C (100.4F), Mild Pain (1 - 3)                            8.7    12.39 )-----------( 273      ( 23 May 2019 08:36 )             27.6       05-23    136  |  106  |  31<H>  ----------------------------<  92  3.8   |  20<L>  |  2.30<H>    Ca    8.1<L>      23 May 2019 08:36  Mg     1.9     05-22        Urinalysis Basic - ( 22 May 2019 20:36 )    Color: Yellow / Appearance: Clear / S.015 / pH: x  Gluc: x / Ketone: Negative  / Bili: Negative / Urobili: Negative   Blood: x / Protein: 25 mg/dL / Nitrite: Negative   Leuk Esterase: Negative / RBC: 0-2 /HPF / WBC 0-2   Sq Epi: x / Non Sq Epi: Occasional / Bacteria: Few

## 2019-05-23 NOTE — PROGRESS NOTE ADULT - SUBJECTIVE AND OBJECTIVE BOX
INTERVAL HPI/OVERNIGHT EVENTS:    pt seen and examined  denies n/v/abd pain  no overt s/s gib  no bm this morning    MEDICATIONS  (STANDING):  ALBUTerol    0.083% 2.5 milliGRAM(s) Nebulizer every 8 hours  ampicillin  IVPB      ampicillin  IVPB 2 Gram(s) IV Intermittent every 8 hours  buPROPion XL . 150 milliGRAM(s) Oral daily  diltiazem    milliGRAM(s) Oral daily  furosemide    Tablet 40 milliGRAM(s) Oral daily  hydrALAZINE 10 milliGRAM(s) Oral two times a day  iron sucrose Injectable 100 milliGRAM(s) IV Push every 24 hours  pantoprazole    Tablet 40 milliGRAM(s) Oral before breakfast  pramipexole 0.5 milliGRAM(s) Oral at bedtime  simvastatin 10 milliGRAM(s) Oral at bedtime  tiotropium 18 MICROgram(s) Capsule 1 Capsule(s) Inhalation daily    MEDICATIONS  (PRN):  acetaminophen   Tablet .. 650 milliGRAM(s) Oral every 6 hours PRN Temp greater or equal to 38C (100.4F), Mild Pain (1 - 3)      Allergies    No Known Allergies    Intolerances        Review of Systems:    General:  No wt loss, fevers, chills, night sweats,fatigue,   Eyes:  Good vision, no reported pain  ENT:  No sore throat, pain, runny nose, dysphagia  CV:  No pain, palpitations, hypo/hypertension  Resp:  No dyspnea, cough, tachypnea, wheezing  GI:  No pain, No nausea, No vomiting, No diarrhea, No constipation, No weight loss, No fever, No pruritis, No rectal bleeding, No melena, No dysphagia  :  No pain, bleeding, incontinence, nocturia  Muscle:  No pain, weakness  Neuro:  No weakness, tingling, memory problems  Psych:  No fatigue, insomnia, mood problems, depression  Endocrine:  No polyuria, polydypsia, cold/heat intolerance  Heme:  No petechiae, ecchymosis, easy bruisability  Skin:  No rash, tattoos, scars, edema      Vital Signs Last 24 Hrs  T(C): 36.9 (23 May 2019 05:06), Max: 38.4 (22 May 2019 20:18)  T(F): 98.4 (23 May 2019 05:06), Max: 101.1 (22 May 2019 20:18)  HR: 91 (23 May 2019 07:25) (84 - 105)  BP: 109/48 (23 May 2019 05:06) (108/45 - 129/57)  BP(mean): --  RR: 16 (23 May 2019 05:06) (16 - 19)  SpO2: 96% (23 May 2019 07:25) (91% - 96%)    PHYSICAL EXAM:    Constitutional: NAD, well-developed  HEENT: EOMI, throat clear  Neck: No LAD, supple  Respiratory: CTA and P  Cardiovascular: S1 and S2, RRR, no M  Gastrointestinal: BS+, soft, NT/ND, neg HSM,  Extremities: No peripheral edema, neg clubing, cyanosis  Vascular: 2+ peripheral pulses  Neurological: A/O x 3, no focal deficits  Psychiatric: Normal mood, normal affect  Skin: No rashes      LABS:                        8.7    12.39 )-----------( 273      ( 23 May 2019 08:36 )             27.6     05-23    136  |  106  |  31<H>  ----------------------------<  92  3.8   |  20<L>  |  2.30<H>    Ca    8.1<L>      23 May 2019 08:36  Mg     1.9     05-22        Urinalysis Basic - ( 22 May 2019 20:36 )    Color: Yellow / Appearance: Clear / S.015 / pH: x  Gluc: x / Ketone: Negative  / Bili: Negative / Urobili: Negative   Blood: x / Protein: 25 mg/dL / Nitrite: Negative   Leuk Esterase: Negative / RBC: 0-2 /HPF / WBC 0-2   Sq Epi: x / Non Sq Epi: Occasional / Bacteria: Few        RADIOLOGY & ADDITIONAL TESTS:

## 2019-05-24 DIAGNOSIS — R78.81 BACTEREMIA: ICD-10-CM

## 2019-05-24 DIAGNOSIS — I35.8 OTHER NONRHEUMATIC AORTIC VALVE DISORDERS: ICD-10-CM

## 2019-05-24 LAB
ANION GAP SERPL CALC-SCNC: 11 MMOL/L — SIGNIFICANT CHANGE UP (ref 5–17)
BASOPHILS # BLD AUTO: 0.03 K/UL — SIGNIFICANT CHANGE UP (ref 0–0.2)
BASOPHILS NFR BLD AUTO: 0.3 % — SIGNIFICANT CHANGE UP (ref 0–2)
BLD GP AB SCN SERPL QL: SIGNIFICANT CHANGE UP
BUN SERPL-MCNC: 27 MG/DL — HIGH (ref 7–23)
CALCIUM SERPL-MCNC: 8 MG/DL — LOW (ref 8.5–10.1)
CHLORIDE SERPL-SCNC: 103 MMOL/L — SIGNIFICANT CHANGE UP (ref 96–108)
CO2 SERPL-SCNC: 22 MMOL/L — SIGNIFICANT CHANGE UP (ref 22–31)
CREAT SERPL-MCNC: 2.1 MG/DL — HIGH (ref 0.5–1.3)
EOSINOPHIL # BLD AUTO: 0.2 K/UL — SIGNIFICANT CHANGE UP (ref 0–0.5)
EOSINOPHIL NFR BLD AUTO: 1.7 % — SIGNIFICANT CHANGE UP (ref 0–6)
GLUCOSE SERPL-MCNC: 99 MG/DL — SIGNIFICANT CHANGE UP (ref 70–99)
GRAM STN FLD: SIGNIFICANT CHANGE UP
GRAM STN FLD: SIGNIFICANT CHANGE UP
HCT VFR BLD CALC: 26.9 % — LOW (ref 34.5–45)
HGB BLD-MCNC: 8.5 G/DL — LOW (ref 11.5–15.5)
IMM GRANULOCYTES NFR BLD AUTO: 1.1 % — SIGNIFICANT CHANGE UP (ref 0–1.5)
LYMPHOCYTES # BLD AUTO: 0.82 K/UL — LOW (ref 1–3.3)
LYMPHOCYTES # BLD AUTO: 7 % — LOW (ref 13–44)
MAGNESIUM SERPL-MCNC: 2.3 MG/DL — SIGNIFICANT CHANGE UP (ref 1.6–2.6)
MCHC RBC-ENTMCNC: 27 PG — SIGNIFICANT CHANGE UP (ref 27–34)
MCHC RBC-ENTMCNC: 31.6 GM/DL — LOW (ref 32–36)
MCV RBC AUTO: 85.4 FL — SIGNIFICANT CHANGE UP (ref 80–100)
MONOCYTES # BLD AUTO: 0.86 K/UL — SIGNIFICANT CHANGE UP (ref 0–0.9)
MONOCYTES NFR BLD AUTO: 7.3 % — SIGNIFICANT CHANGE UP (ref 2–14)
NEUTROPHILS # BLD AUTO: 9.75 K/UL — HIGH (ref 1.8–7.4)
NEUTROPHILS NFR BLD AUTO: 82.6 % — HIGH (ref 43–77)
NRBC # BLD: 0 /100 WBCS — SIGNIFICANT CHANGE UP (ref 0–0)
PLATELET # BLD AUTO: 282 K/UL — SIGNIFICANT CHANGE UP (ref 150–400)
POTASSIUM SERPL-MCNC: 4.2 MMOL/L — SIGNIFICANT CHANGE UP (ref 3.5–5.3)
POTASSIUM SERPL-SCNC: 4.2 MMOL/L — SIGNIFICANT CHANGE UP (ref 3.5–5.3)
RBC # BLD: 3.15 M/UL — LOW (ref 3.8–5.2)
RBC # FLD: 16.7 % — HIGH (ref 10.3–14.5)
S PNEUM AG UR QL: NEGATIVE — SIGNIFICANT CHANGE UP
SODIUM SERPL-SCNC: 136 MMOL/L — SIGNIFICANT CHANGE UP (ref 135–145)
SPECIMEN SOURCE: SIGNIFICANT CHANGE UP
WBC # BLD: 11.79 K/UL — HIGH (ref 3.8–10.5)
WBC # FLD AUTO: 11.79 K/UL — HIGH (ref 3.8–10.5)

## 2019-05-24 PROCEDURE — 99232 SBSQ HOSP IP/OBS MODERATE 35: CPT

## 2019-05-24 PROCEDURE — 78806: CPT | Mod: 26

## 2019-05-24 RX ORDER — ACETAMINOPHEN 500 MG
1000 TABLET ORAL ONCE
Refills: 0 | Status: COMPLETED | OUTPATIENT
Start: 2019-05-24 | End: 2019-05-24

## 2019-05-24 RX ORDER — AMPICILLIN TRIHYDRATE 250 MG
2 CAPSULE ORAL
Qty: 126 | Refills: 0
Start: 2019-05-24 | End: 2019-07-04

## 2019-05-24 RX ADMIN — Medication 216 GRAM(S): at 02:11

## 2019-05-24 RX ADMIN — Medication 1000 MILLIGRAM(S): at 06:24

## 2019-05-24 RX ADMIN — Medication 40 MILLIGRAM(S): at 06:24

## 2019-05-24 RX ADMIN — ALBUTEROL 2.5 MILLIGRAM(S): 90 AEROSOL, METERED ORAL at 07:07

## 2019-05-24 RX ADMIN — PANTOPRAZOLE SODIUM 40 MILLIGRAM(S): 20 TABLET, DELAYED RELEASE ORAL at 06:24

## 2019-05-24 RX ADMIN — PRAMIPEXOLE DIHYDROCHLORIDE 0.5 MILLIGRAM(S): 0.12 TABLET ORAL at 21:51

## 2019-05-24 RX ADMIN — ALBUTEROL 2.5 MILLIGRAM(S): 90 AEROSOL, METERED ORAL at 13:53

## 2019-05-24 RX ADMIN — Medication 360 MILLIGRAM(S): at 06:24

## 2019-05-24 RX ADMIN — ALBUTEROL 2.5 MILLIGRAM(S): 90 AEROSOL, METERED ORAL at 20:00

## 2019-05-24 RX ADMIN — TIOTROPIUM BROMIDE 1 CAPSULE(S): 18 CAPSULE ORAL; RESPIRATORY (INHALATION) at 06:24

## 2019-05-24 RX ADMIN — SIMVASTATIN 10 MILLIGRAM(S): 20 TABLET, FILM COATED ORAL at 21:51

## 2019-05-24 RX ADMIN — BUPROPION HYDROCHLORIDE 150 MILLIGRAM(S): 150 TABLET, EXTENDED RELEASE ORAL at 12:29

## 2019-05-24 RX ADMIN — Medication 216 GRAM(S): at 18:51

## 2019-05-24 RX ADMIN — Medication 216 GRAM(S): at 12:29

## 2019-05-24 NOTE — PROGRESS NOTE ADULT - SUBJECTIVE AND OBJECTIVE BOX
[facility] -- Chao Lomax, Regino Syed Pannella, Patel, Savella  Office # [phone]    Follow Up:  Worsening weakness and SOB    Subjective/Observations:     REVIEW OF SYSTEMS: All other review of systems is negative unless indicated above    PAST MEDICAL & SURGICAL HISTORY:  Hypertension  Restless leg syndrome: Especially with General Anesthesia  Arthritis: Cervical Spine  and Hands  H/O cervical discectomy  Acute peptic ulcer with perforation: 1980  S/P appendectomy: 2014  S/P tubal ligation: 1986  Osteoarthritis of right shoulder region: Right Shoulder Arthroscopy  2007  Rheumatoid arthritis of carpometacarpal joint of thumb: Total Joint Replacement of Left Thumb    MEDICATIONS  (STANDING):  ALBUTerol    0.083% 2.5 milliGRAM(s) Nebulizer every 8 hours  ampicillin  IVPB      ampicillin  IVPB 2 Gram(s) IV Intermittent every 8 hours  buPROPion XL . 150 milliGRAM(s) Oral daily  diltiazem    milliGRAM(s) Oral daily  furosemide    Tablet 40 milliGRAM(s) Oral daily  pantoprazole    Tablet 40 milliGRAM(s) Oral before breakfast  pramipexole 0.5 milliGRAM(s) Oral at bedtime  simvastatin 10 milliGRAM(s) Oral at bedtime  tiotropium 18 MICROgram(s) Capsule 1 Capsule(s) Inhalation daily    MEDICATIONS  (PRN):  acetaminophen   Tablet .. 650 milliGRAM(s) Oral every 6 hours PRN Temp greater or equal to 38C (100.4F), Mild Pain (1 - 3)    Allergies    No Known Allergies    Intolerances    Vital Signs Last 24 Hrs  T(C): 36.4 (24 May 2019 13:07), Max: 37.1 (23 May 2019 13:55)  T(F): 97.5 (24 May 2019 13:07), Max: 98.8 (23 May 2019 20:36)  HR: 87 (24 May 2019 13:07) (87 - 102)  BP: 113/44 (24 May 2019 13:07) (101/57 - 123/56)  BP(mean): --  RR: 19 (24 May 2019 13:07) (16 - 21)  SpO2: 92% (24 May 2019 13:07) (91% - 98%)    I&O's Summary    PHYSICAL EXAM:  TELE: Not on tele  Constitutional: NAD, awake and alert, well-developed  HEENT: Moist Mucous Membranes, Anicteric  Pulmonary: Non-labored, breath sounds are clear bilaterally, No wheezing, rales or rhonchi  Cardiovascular: Regular, S1 and S2, No murmurs, rubs, gallops or clicks  Gastrointestinal: Bowel Sounds present, soft, nontender.   Lymph: No peripheral edema. No lymphadenopathy.  Skin: No visible rashes or ulcers.  Psych:  Mood & affect appropriate    LABS: All Labs Reviewed:                        8.5    11.79 )-----------( 282      ( 24 May 2019 07:25 )             26.9                         8.7    12.39 )-----------( 273      ( 23 May 2019 08:36 )             27.6                         8.3    12.19 )-----------( 236      ( 23 May 2019 01:11 )             25.7     24 May 2019 07:25    136    |  103    |  27     ----------------------------<  99     4.2     |  22     |  2.10   23 May 2019 08:36    136    |  106    |  31     ----------------------------<  92     3.8     |  20     |  2.30   22 May 2019 08:40    136    |  105    |  35     ----------------------------<  133    3.6     |  19     |  2.60     Ca    8.0        24 May 2019 07:25  Ca    8.1        23 May 2019 08:36  Ca    8.3        22 May 2019 08:40  Mg     2.3       24 May 2019 07:25  Mg     1.9       22 May 2019 08:40    TPro  x      /  Alb  2.8    /  TBili  x      /  DBili  x      /  AST  x      /  ALT  x      /  AlkPhos  x      22 May 2019 11:31    < from: TTE Echo Doppler w/o Cont (05.21.19 @ 09:33) >     EXAM:  ECHO TTE WO CON COMP W DOPPLR      PROCEDURE DATE:  05/21/2019      INTERPRETATION:  INDICATION: Dyspnea    Blood Pressure 112/58    Height Weight 48kg       BSA    Dimensions:    LA 3.0       Normal Values: 2.0 - 4.0 cm    Ao 3.4  Normal Values: 2.0 - 3.8 cm  SEPTUM 1.1       Normal Values: 0.6 - 1.2 cm  PWT 1.2       Normal Values: 0.6 - 1.1 cm  LVIDd 5.4         Normal Values: 3.0 - 5.6 cm  LVIDs 3.3         Normal Values: 1.8 - 4.0 cm    OBSERVATIONS:    Mitral Valve: normal, mild MR.  Aortic Valve/Aorta: Calcified trileaflet aortic valve with probably   normal opening. Moderate to severe aortic insufficiency. There is a   mobile echogenic density seen on the aortic valve measuring 0.9 x 0.5 cm.  Tricuspid Valve: normal with trace TR.  Pulmonic Valve: Trace PI  Left Atrium: normal  Right Atrium: Not well-visualized  Left Ventricle: normal LV size and systolic function, estimated LVEF of   65%. Mild left ventricular hypertrophy  Right Ventricle: normal size and systolic function.  Pericardium/Pleura: normal, no significant pericardial effusion.    Conclusion:     Mild concentric LVH with normal internal dimensions and systolic   function, estimated LVEF of 65%.   Calcified trileaflet aortic valve with probably normal opening. Moderate   to severe aortic insufficiency. There is a mobile echogenic density seen   on the aortic valve measuring 0.9 x 0.5 cm.  Normal RV size and systolic function.     Normal trileaflet aortic valve, without AI.   Mild MR  Trace physiologic TR.    No significant pericardial effusion.      MICHAEL CHILDRESS   This document has been electronically signed. May 22 2019  8:49AM      < end of copied text >    < from: Xray Chest 1 View- PORTABLE-Urgent (05.22.19 @ 23:57) >    EXAM:  XR CHEST PORTABLE URGENT 1V                          PROCEDURE DATE:  05/22/2019      INTERPRETATION:  Fever.    AP chest. Prior 5/20/2019.    There is interval worsening of the bibasilar airspace infiltrates and   bilateral pleural effusions. Moderate bilateral pleural effusions at this   time. No other change.    Impression: Interval worsening at the lung bases as described.    NATIVIDAD ZAMORA M.D., ATTENDING RADIOLOGIST  This document has been electronically signed. May 23 2019  8:52AM      < end of copied text >      < from: CT Chest No Cont (05.20.19 @ 22:27) >    EXAM:  CT CHEST                            PROCEDURE DATE:  05/20/2019          INTERPRETATION:  [facility] RADIOLOGIST PRELIMINARY REPORT    EXAM:    CT Chest Without Contrast     EXAM DATE/TIME:    5/20/2019 10:13 PM     CLINICAL HISTORY:    63 years old, female; Signs and symptoms; Other: Weight loss, anemia;   Shortness of breath; Prior surgery; Surgery type: Exploratory, tubal   ligation     TECHNIQUE:    Imaging protocol: Axial computed tomography images of the chest without   intravenous contrast. Coronal and sagittal reformatted images were   created and   reviewed.    3D rendering: MIP reconstructed images were created and reviewed.     COMPARISON:    DX XR CHEST PA AND LATERAL 5/20/2019 3:59 PM     FINDINGS:    Lungs: Smooth interlobular septal thickening compatible with hydrostatic   interstitial pulmonary edema/CHF. Patchy airspace opacity in the lungs   bilaterally, compatible with pneumonia.    Pleural space: Small bilateral pleural effusions.    Heart: Normal. No cardiomegaly. No pericardial effusion.    Mediastinum: Esophagus is unremarkable.    Aorta: Normal. No aortic aneurysm.    Lymph nodes: Unremarkable. No enlarged lymph nodes.    Bones/joints: Unremarkable. No acute fracture.    Soft tissues: Unremarkable.     IMPRESSION:   1. Hydrostatic interstitial pulmonary edema/CHF.   2. Small bilateral pleural effusions.   3. Multifocal bilateral pneumonia.       =========================  EXAM:    CT Abdomen and Pelvis Without Contrast     EXAM DATE/TIME:    5/20/2019 10:13 PM     CLINICAL HISTORY:    63 years old, female; Signs and symptoms; Other: Weight loss, anemia;   Shortness of breath; Prior surgery; Surgery type: Exploratory, tubal   ligation     TECHNIQUE:    Imaging protocol: Axial computed tomography images of the abdomen and   pelvis   without contrast. Coronal and sagittal reformatted images were created   and   reviewed.    3D rendering: MIP reconstructed images were created and reviewed.     COMPARISON:    DX XR CHEST PA AND LATERAL 5/20/2019 3:59 PM     FINDINGS:     ABDOMEN:    Liver: Normal. No mass.    Gallbladder and bile ducts: Normal. No calcified stones. No ductal   dilation.    Pancreas: Normal. No ductal dilation.    Spleen: Normal. No splenomegaly.    Adrenals: Normal. No mass.    Kidneys and ureters: No hydronephrosis or urolithiasis. Cortical   thinning.   Stomach and bowel: Colonic diverticulosis. No diverticulitis. No bowel   wall   thickening or intestinal obstruction.    Appendix: Appendix not visualized. No evidence of appendicitis.     PELVIS:    Bladder: Unremarkable as visualized.    Reproductive: Unremarkable as visualized.     ABDOMEN and PELVIS:    Intraperitoneal space: Normal. No free air. No significant fluid   collection.    Bones/joints: No acute fracture. No dislocation.    Soft tissues: Unremarkable.    Vasculature: Normal. No abdominal aortic aneurysm.    Lymph nodes: Normal. No enlarged lymph nodes.    Other findings: 2.5 cm AAA. No rupture.     IMPRESSION:   No acute findings.    Agree with above report    NATIVIDAD ZAMORA M.D., ATTENDING RADIOLOGIST  This document has been electronically signed. May 21 2019 10:20AM      < end of copied text > [facility] -- Chao Lomax, Regino Syed, Roshan Paris Savella  Office # [phone]    Follow Up:  Worsening weakness and SOB    Subjective/Observations: Seen and evaluated, off NC.  States, she still needs supplemental Oc2 intermittently.  Denies coughing.  No orthopnea noted when laying supine.  However, claims to have PND.  Denies CP or palpitations.    REVIEW OF SYSTEMS: All other review of systems is negative unless indicated above    PAST MEDICAL & SURGICAL HISTORY:  Hypertension  Restless leg syndrome: Especially with General Anesthesia  Arthritis: Cervical Spine  and Hands  H/O cervical discectomy  Acute peptic ulcer with perforation: 1980  S/P appendectomy: 2014  S/P tubal ligation: 1986  Osteoarthritis of right shoulder region: Right Shoulder Arthroscopy  2007  Rheumatoid arthritis of carpometacarpal joint of thumb: Total Joint Replacement of Left Thumb    MEDICATIONS  (STANDING):  ALBUTerol    0.083% 2.5 milliGRAM(s) Nebulizer every 8 hours  ampicillin  IVPB      ampicillin  IVPB 2 Gram(s) IV Intermittent every 8 hours  buPROPion XL . 150 milliGRAM(s) Oral daily  diltiazem    milliGRAM(s) Oral daily  furosemide    Tablet 40 milliGRAM(s) Oral daily  pantoprazole    Tablet 40 milliGRAM(s) Oral before breakfast  pramipexole 0.5 milliGRAM(s) Oral at bedtime  simvastatin 10 milliGRAM(s) Oral at bedtime  tiotropium 18 MICROgram(s) Capsule 1 Capsule(s) Inhalation daily    MEDICATIONS  (PRN):  acetaminophen   Tablet .. 650 milliGRAM(s) Oral every 6 hours PRN Temp greater or equal to 38C (100.4F), Mild Pain (1 - 3)    Allergies    No Known Allergies    Intolerances    Vital Signs Last 24 Hrs  T(C): 36.4 (24 May 2019 13:07), Max: 37.1 (23 May 2019 13:55)  T(F): 97.5 (24 May 2019 13:07), Max: 98.8 (23 May 2019 20:36)  HR: 87 (24 May 2019 13:07) (87 - 102)  BP: 113/44 (24 May 2019 13:07) (101/57 - 123/56)  BP(mean): --  RR: 19 (24 May 2019 13:07) (16 - 21)  SpO2: 92% (24 May 2019 13:07) (91% - 98%)    I&O's Summary    PHYSICAL EXAM:  TELE: Not on tele  Constitutional: NAD, awake and alert, well-developed  HEENT: Moist Mucous Membranes, Anicteric  Pulmonary: Non-labored, breath sounds are clear bilaterally, No wheezing, rales or rhonchi  Cardiovascular: Regular, S1 and S2, + murmurs.  No rubs, gallops or clicks  Gastrointestinal: Bowel Sounds present, soft, nontender.   Lymph: No peripheral edema. No lymphadenopathy.  Skin: No visible rashes or ulcers.  Psych:  Mood & affect appropriate    LABS: All Labs Reviewed:                        8.5    11.79 )-----------( 282      ( 24 May 2019 07:25 )             26.9                         8.7    12.39 )-----------( 273      ( 23 May 2019 08:36 )             27.6                         8.3    12.19 )-----------( 236      ( 23 May 2019 01:11 )             25.7     24 May 2019 07:25    136    |  103    |  27     ----------------------------<  99     4.2     |  22     |  2.10   23 May 2019 08:36    136    |  106    |  31     ----------------------------<  92     3.8     |  20     |  2.30   22 May 2019 08:40    136    |  105    |  35     ----------------------------<  133    3.6     |  19     |  2.60     Ca    8.0        24 May 2019 07:25  Ca    8.1        23 May 2019 08:36  Ca    8.3        22 May 2019 08:40  Mg     2.3       24 May 2019 07:25  Mg     1.9       22 May 2019 08:40    TPro  x      /  Alb  2.8    /  TBili  x      /  DBili  x      /  AST  x      /  ALT  x      /  AlkPhos  x      22 May 2019 11:31    < from: TTE Echo Doppler w/o Cont (05.21.19 @ 09:33) >     EXAM:  ECHO TTE WO CON COMP W DOPPLR      PROCEDURE DATE:  05/21/2019      INTERPRETATION:  INDICATION: Dyspnea    Blood Pressure 112/58    Height Weight 48kg       BSA    Dimensions:    LA 3.0       Normal Values: 2.0 - 4.0 cm    Ao 3.4  Normal Values: 2.0 - 3.8 cm  SEPTUM 1.1       Normal Values: 0.6 - 1.2 cm  PWT 1.2       Normal Values: 0.6 - 1.1 cm  LVIDd 5.4         Normal Values: 3.0 - 5.6 cm  LVIDs 3.3         Normal Values: 1.8 - 4.0 cm    OBSERVATIONS:    Mitral Valve: normal, mild MR.  Aortic Valve/Aorta: Calcified trileaflet aortic valve with probably   normal opening. Moderate to severe aortic insufficiency. There is a   mobile echogenic density seen on the aortic valve measuring 0.9 x 0.5 cm.  Tricuspid Valve: normal with trace TR.  Pulmonic Valve: Trace PI  Left Atrium: normal  Right Atrium: Not well-visualized  Left Ventricle: normal LV size and systolic function, estimated LVEF of   65%. Mild left ventricular hypertrophy  Right Ventricle: normal size and systolic function.  Pericardium/Pleura: normal, no significant pericardial effusion.    Conclusion:     Mild concentric LVH with normal internal dimensions and systolic   function, estimated LVEF of 65%.   Calcified trileaflet aortic valve with probably normal opening. Moderate   to severe aortic insufficiency. There is a mobile echogenic density seen   on the aortic valve measuring 0.9 x 0.5 cm.  Normal RV size and systolic function.     Normal trileaflet aortic valve, without AI.   Mild MR  Trace physiologic TR.    No significant pericardial effusion.      MICHAEL CHILDRESS   This document has been electronically signed. May 22 2019  8:49AM      < end of copied text >    < from: Xray Chest 1 View- PORTABLE-Urgent (05.22.19 @ 23:57) >    EXAM:  XR CHEST PORTABLE URGENT 1V                          PROCEDURE DATE:  05/22/2019      INTERPRETATION:  Fever.    AP chest. Prior 5/20/2019.    There is interval worsening of the bibasilar airspace infiltrates and   bilateral pleural effusions. Moderate bilateral pleural effusions at this   time. No other change.    Impression: Interval worsening at the lung bases as described.    NATIVIDAD ZAMORA M.D., ATTENDING RADIOLOGIST  This document has been electronically signed. May 23 2019  8:52AM      < end of copied text >      < from: CT Chest No Cont (05.20.19 @ 22:27) >    EXAM:  CT CHEST                            PROCEDURE DATE:  05/20/2019          INTERPRETATION:  [facility] RADIOLOGIST PRELIMINARY REPORT    EXAM:    CT Chest Without Contrast     EXAM DATE/TIME:    5/20/2019 10:13 PM     CLINICAL HISTORY:    63 years old, female; Signs and symptoms; Other: Weight loss, anemia;   Shortness of breath; Prior surgery; Surgery type: Exploratory, tubal   ligation     TECHNIQUE:    Imaging protocol: Axial computed tomography images of the chest without   intravenous contrast. Coronal and sagittal reformatted images were   created and   reviewed.    3D rendering: MIP reconstructed images were created and reviewed.     COMPARISON:    DX XR CHEST PA AND LATERAL 5/20/2019 3:59 PM     FINDINGS:    Lungs: Smooth interlobular septal thickening compatible with hydrostatic   interstitial pulmonary edema/CHF. Patchy airspace opacity in the lungs   bilaterally, compatible with pneumonia.    Pleural space: Small bilateral pleural effusions.    Heart: Normal. No cardiomegaly. No pericardial effusion.    Mediastinum: Esophagus is unremarkable.    Aorta: Normal. No aortic aneurysm.    Lymph nodes: Unremarkable. No enlarged lymph nodes.    Bones/joints: Unremarkable. No acute fracture.    Soft tissues: Unremarkable.     IMPRESSION:   1. Hydrostatic interstitial pulmonary edema/CHF.   2. Small bilateral pleural effusions.   3. Multifocal bilateral pneumonia.       =========================  EXAM:    CT Abdomen and Pelvis Without Contrast     EXAM DATE/TIME:    5/20/2019 10:13 PM     CLINICAL HISTORY:    63 years old, female; Signs and symptoms; Other: Weight loss, anemia;   Shortness of breath; Prior surgery; Surgery type: Exploratory, tubal   ligation     TECHNIQUE:    Imaging protocol: Axial computed tomography images of the abdomen and   pelvis   without contrast. Coronal and sagittal reformatted images were created   and   reviewed.    3D rendering: MIP reconstructed images were created and reviewed.     COMPARISON:    DX XR CHEST PA AND LATERAL 5/20/2019 3:59 PM     FINDINGS:     ABDOMEN:    Liver: Normal. No mass.    Gallbladder and bile ducts: Normal. No calcified stones. No ductal   dilation.    Pancreas: Normal. No ductal dilation.    Spleen: Normal. No splenomegaly.    Adrenals: Normal. No mass.    Kidneys and ureters: No hydronephrosis or urolithiasis. Cortical   thinning.   Stomach and bowel: Colonic diverticulosis. No diverticulitis. No bowel   wall   thickening or intestinal obstruction.    Appendix: Appendix not visualized. No evidence of appendicitis.     PELVIS:    Bladder: Unremarkable as visualized.    Reproductive: Unremarkable as visualized.     ABDOMEN and PELVIS:    Intraperitoneal space: Normal. No free air. No significant fluid   collection.    Bones/joints: No acute fracture. No dislocation.    Soft tissues: Unremarkable.    Vasculature: Normal. No abdominal aortic aneurysm.    Lymph nodes: Normal. No enlarged lymph nodes.    Other findings: 2.5 cm AAA. No rupture.     IMPRESSION:   No acute findings.    Agree with above report    NATIVIDAD ZAMORA M.D., ATTENDING RADIOLOGIST  This document has been electronically signed. May 21 2019 10:20AM      < end of copied text >

## 2019-05-24 NOTE — PROGRESS NOTE ADULT - SUBJECTIVE AND OBJECTIVE BOX
ID progress note    Name: VIVIANA CHAPMAN  Age: 63y  Gender: Female  MRN: [id number]    Interval History-- Events noted, doing and feeling well, less SOB . Still with positive blood cs , as per her she is having ZAHIRA next week   Notes reviewed    Past Medical History--  Hypertension  Restless leg syndrome  Arthritis  H/O cervical discectomy  Acute peptic ulcer with perforation  S/P appendectomy  S/P tubal ligation  Osteoarthritis of right shoulder region  Rheumatoid arthritis of carpometacarpal joint of thumb      For details regarding the patient's social history, family history, and other miscellaneous elements, please refer the initial infectious diseases consultation and/or the admitting history and physical examination for this admission.    Allergies--  Allergies    No Known Allergies    Intolerances        Medications--  Antibiotics:  ampicillin  IVPB      ampicillin  IVPB 2 Gram(s) IV Intermittent every 8 hours    Immunologic:    Other:  acetaminophen   Tablet .. PRN  ALBUTerol    0.083%  buPROPion XL .  diltiazem   CD  furosemide    Tablet  pantoprazole    Tablet  pramipexole  simvastatin  tiotropium 18 MICROgram(s) Capsule      Review of Systems--  A 10-point review of systems was obtained.     Pertinent positives and negatives--  Constitutional: No fevers. No Chills. No Rigors.   Cardiovascular: No chest pain. No palpitations.  Respiratory: No shortness of breath. No cough.  Gastrointestinal: No nausea or vomiting. No diarrhea or constipation.   Psychiatric: Pleasant. Appropriate affect.    Review of systems otherwise negative except as previously noted.    Physical Examination--  Vital Signs: T(F): 98.5 (05-24-19 @ 06:15), Max: 98.8 (05-23-19 @ 20:36)  HR: 95 (05-24-19 @ 07:08)  BP: 123/56 (05-24-19 @ 06:15)  RR: 18 (05-24-19 @ 06:15)  SpO2: 96% (05-24-19 @ 07:08)  Wt(kg): --  General: Nontoxic-appearing Female in no acute distress.  HEENT: AT/NC. PERRL. EOMI. Anicteric. Conjunctiva pink and moist. Oropharynx clear. Dentition fair.  Neck: Not rigid. No sense of mass.  Nodes: None palpable.  Lungs: Coarse breath sounds  bilaterally without rales, wheezing or rhonchi  Heart: Regular rate and rhythm. 3/6  Murmur. No rub. No gallop. No palpable thrill.  Abdomen: Bowel sounds present and normoactive. Soft. Nondistended. Nontender. No sense of mass. No organomegaly.  Back: No spinal tenderness. No costovertebral angle tenderness.   Extremities: No cyanosis or clubbing. No edema.   Skin: Warm. Dry. Good turgor. No rash. No vasculitic stigmata.  Psychiatric: Appropriate affect and mood for situation.         Laboratory Studies--  CBC                        8.5    11.79 )-----------( 282      ( 24 May 2019 07:25 )             26.9       Chemistries  05-24    136  |  103  |  27<H>  ----------------------------<  99  4.2   |  22  |  2.10<H>    Ca    8.0<L>      24 May 2019 07:25  Mg     2.3     05-24    TPro  x   /  Alb  2.8<L>  /  TBili  x   /  DBili  x   /  AST  x   /  ALT  x   /  AlkPhos  x   05-22        RECENT CULTURES    Culture - Blood (collected 23 May 2019 10:50)  Source: .Blood Blood-Peripheral  Gram Stain (24 May 2019 00:59):    Growth in aerobic and anaerobic bottles: Gram Positive Cocci in Pairs and    Chains  Preliminary Report (24 May 2019 00:59):    Growth in aerobic and anaerobic bottles: Gram Positive Cocci in Pairs and    Chains    Culture - Blood (collected 23 May 2019 10:50)  Source: .Blood Blood-Peripheral  Gram Stain (24 May 2019 01:00):    Growth in aerobic bottle: Gram Positive Cocci in Pairs and Chains    Growth in anaerobic bottle: Gram Positive Cocci in Pairs and Chains  Preliminary Report (24 May 2019 01:00):    Growth in aerobic bottle: Gram Positive Cocci in Pairs and Chains    Growth in anaerobic bottle: Gram Positive Cocci in Pairs and Chains    Culture - Blood (05.22.19 @ 16:46)    -  Enterococcus species: Detec    Gram Stain:   Growth in aerobic bottle: Gram Positive Cocci in Pairs and Chains  Growth in anaerobic bottle: Gram Positive Cocci in Pairs and Chains    Specimen Source: .Blood Blood    Organism: Blood Culture PCR    Culture Results:   Growth in anaerobic bottle: Gram Positive Cocci in Pairs and Chains  Growth in aerobic bottle: Gram Positive Cocci in Pairs and Chains  "Due to technical problems, Proteus sp. will Not be reported as part of  the BCID panel until further notice"  ***Blood Panel PCR results on this specimen are available  approximately 3 hours after the Gram stain result.***  Gram stain, PCR, and/or culture results may not always  correspond due to difference in methodologies.  ************************************************************  This PCR assay was performed using [employer].  The following targets are tested for: Enterococcus,  vancomycin resistant enterococci, Listeria monocytogenes,  coagulase negative staphylococci, S. aureus,  methicillin resistant S. aureus, Streptococcus agalactiae  (Group B), S. pneumoniae, S. pyogenes (Group A),  Acinetobacter baumannii, Enterobacter cloacae, E. coli,  Klebsiella oxytoca, K. pneumoniae, Proteus sp.,  Serratia marcescens, Haemophilus influenzae,  Neisseria meningitidis, Pseudomonas aeruginosa, Candida  albicans, C. glabrata, C krusei, C parapsilosis,  C. tropicalis and the KPC resistance gene.    Organism Identification: Blood Culture PCR    Method Type: PCR      Culture - Blood (collected 22 May 2019 16:46)  Source: .Blood Blood  Gram Stain (23 May 2019 05:00):    Growth in aerobic bottle: Gram Positive Cocci in Pairs and Chains    Growth in anaerobic bottle: Gram Positive Cocci in Pairs and Chains  Preliminary Report (23 May 2019 05:00):    Growth in aerobic bottle: Gram Positive Cocci in Pairs and Chains    Growth in anaerobic bottle: Gram Positive Cocci in Pairs and Chains    MRSA/MSSA PCR (05.23.19 @ 01:15)    MRSA PCR Result.: NotDetec:     Staph Aureus PCR Result: Detected        RADIOLOGY:    CT Chest No Cont (05.20.19 @ 22:27) >  FINDINGS:    Lungs: Smooth interlobular septal thickening compatible with hydrostatic   interstitial pulmonary edema/CHF. Patchy airspace opacity in the lungs   bilaterally, compatible with pneumonia.    Pleural space: Small bilateral pleural effusions.    Heart: Normal. No cardiomegaly. No pericardial effusion.    Mediastinum: Esophagus is unremarkable.    Aorta: Normal. No aortic aneurysm.    Lymph nodes: Unremarkable. No enlarged lymph nodes.    Bones/joints: Unremarkable. No acute fracture.    Soft tissues: Unremarkable.     IMPRESSION:   1. Hydrostatic interstitial pulmonary edema/CHF.   2. Small bilateral pleural effusions.   3. Multifocal bilateral pneumonia.     CT Chest No Cont (05.20.19 @ 22:27) >  FINDINGS:     ABDOMEN:    Liver: Normal. No mass.    Gallbladder and bile ducts: Normal. No calcified stones. No ductal   dilation.    Pancreas: Normal. No ductal dilation.    Spleen: Normal. No splenomegaly.    Adrenals: Normal. No mass.    Kidneys and ureters: No hydronephrosis or urolithiasis. Cortical   thinning.   Stomach and bowel: Colonic diverticulosis. No diverticulitis. No bowel   wall   thickening or intestinal obstruction.    Appendix: Appendix not visualized. No evidence of appendicitis.     PELVIS:    Bladder: Unremarkable as visualized.    Reproductive: Unremarkable as visualized.     ABDOMEN and PELVIS:    Intraperitoneal space: Normal. No free air. No significant fluid   collection.    Bones/joints: No acute fracture. No dislocation.    Soft tissues: Unremarkable.    Vasculature: Normal. No abdominal aortic aneurysm.    Lymph nodes: Normal. No enlarged lymph nodes.    Other findings: 2.5 cm AAA. No rupture.     IMPRESSION:   No acute findings.    Agree with above report    TTE Echo Doppler w/o Cont (05.21.19 @ 09:33) >  Dimensions:    LA 3.0       Normal Values: 2.0 - 4.0 cm    Ao 3.4  Normal Values: 2.0 - 3.8 cm  SEPTUM 1.1       Normal Values: 0.6 - 1.2 cm  PWT 1.2       Normal Values: 0.6 - 1.1 cm  LVIDd 5.4         Normal Values: 3.0 - 5.6 cm  LVIDs 3.3         Normal Values: 1.8 - 4.0 cm      OBSERVATIONS:    Mitral Valve: normal, mild MR.  Aortic Valve/Aorta: Calcified trileaflet aortic valve with probably   normal opening. Moderate to severe aortic insufficiency. There is a   mobile echogenic density seen on the aortic valve measuring 0.9 x 0.5 cm.  Tricuspid Valve: normal with trace TR.  Pulmonic Valve: Trace PI  Left Atrium: normal  Right Atrium: Not well-visualized  Left Ventricle: normal LV size and systolic function, estimated LVEF of   65%. Mild left ventricular hypertrophy  Right Ventricle: normal size and systolic function.  Pericardium/Pleura: normal, no significant pericardial effusion.      Conclusion:     Mild concentric LVH with normal internal dimensions and systolic   function, estimated LVEF of 65%.   Calcified trileaflet aortic valve with probably normal opening. Moderate   to severe aortic insufficiency. There is a mobile echogenic density seen   on the aortic valve measuring 0.9 x 0.5 cm.  Normal RV size and systolic function.     Normal trileaflet aortic valve, without AI.   Mild MR  Trace physiologic TR.    No significant pericardial effusion.      Assessment :     63y Female with HTN, CKD, PAD s/p right femoral endarterectomy, depression, and smoker (quit 5 days ago) presented to the ED c/o SOB and weakness that started in April but have gotten worse 5 days and a 10-lb weight loss within a month with less appetite  Patient states, she had to stop to catch her breath in going to the bathroom from her living room.  She used to be a very active woman until recently.  Denies edema, however, admits to orthopnea.  Denies CP or palpitations, though, she was here last month with c/o neck pain radiating to her chest, for which she though was an MI.  Her workup for ACS and PE was negative and she was sent home.  However, she was found to be anemic and was placed on Iron.  Her EGD last week was negative.  She denies any melena.  Denies fever chills, nausea, vomiting, diarrhea, constipation, abdominal pain    Her presentation was consistent with CHF likely from severe aortic insufficiency . She has prior hx of mild AI . Now echo shows possible mobile echogenic density on aortic valve suspicious for infective endocarditis . She has anemia ? anemia of chronic disease. I doubt if she ever had pneumonia . She was placed on antibiotics for presumed pneumonia on admission and No cultures were done . Her presentation could be all related to subacute IE     She has positive blood cs with Gram positive cocci in pairs and chains identified by PCR as Enterococcus species . She most likely has infective endocarditis , source of bacteremia is unclear . ? urine or GI tract, she did have recent colonoscopy     Plan :   - will repeat blood cs   - will change antibiotics to Ampicillin 2 grams q 8 hours   - for  ZAHIRA as per  cardiology next week, if persistent bacteremia may need valve replacement    - trend cbc   - diuretics as per cardiology and nephrology  - for whole body indium scan to look for source of bacteremia     Case discussed with medical team     Continue with present regime .  Appropriate use of antibiotics and adverse effects reviewed.    I have discussed the above plan of care with patient in detail. She expressed understanding of the treatment plan . Risks, benefits and alternatives discussed in detail. I have asked if she  any questions or concerns and appropriately addressed them to the best of my ability .      > 35 minutes spent in direct patient care reviewing  the notes, lab data/ imaging , discussion with multidisciplinary team. All questions were addressed and answered to the best of my capacity .    Thank you for allowing me to participate in the care of your patient .        Aiyana Kay MD  [phone]

## 2019-05-24 NOTE — PROGRESS NOTE ADULT - ASSESSMENT
Assessment/Plan:  63y Female with HTN, CKD, PAD s/p right femoral endarterectomy, depression, and smoker (quit 5 days ago) presented to the ED c/o SOB and weakness that started in April but have gotten worse 5 days and a 10-lb weight loss within a month with less appetitie.  Patient states, she had to stop to catch her breath in going to the bathroom from her living room.  She used to be a very active woman until recently.  Denies edema, however, admits to orthopnea.  Denies CP or palpitations, though, she was here last month with c/o neck pain radiating to her chest, for which she though was an MI.  Her workup for ACS and PE was negative and she was sent home.  However, she was found to be anemic and was placed on Iron.  Her EGD last week was negative.  She denies any melena.  Denies fever chills, nausea, vomiting, diarrhea, constipation, abdominal pain    In the ED, her CxR showed small B/L pleural effusion, pro-BNP>04370, H/H 7.3/23.1, procalcitonin=1.12.  She is now receiving 1 unit of PRBC's.  Upon evaluation, she is dyspneic during conversation and requiring her HOB elevated.  She also has elevated creatinine (2.8), baseline = 2.1    She follows with Dr. Lacy as outpatient and claims to have had negative cardiac workup.  TTE in 2017 with normal LVF and no segmental WMA.  Nuclear stress test (Pharm) normal.    Recommend:    HFpEF  - TTE shows mobile echodensity on AV with moderate to severe AI.  This is likely the cause of her CABA and heart failure.  The etiology of the mass is currently unclear  - Blood cultures persistently + for gram + cocci in pairs/chains.  With intermittent fever  - ID following   - Will be scheduled for a ZAHIRA to better assess the aortic valve.  Called [place] to shuttle on Tuesday, awaiting callback from Echo Attending  - CT chest showed interstitial pulmonary edema, small B/L pleural effusions and multifocal B/L Pna  - Continue PO lasix   - Please continue to maintain strict I/Os, monitor daily weights, Cr, and K.   - Monitor electrolytes, replete to keep K>4 and Mag>2    INA on CKD  - Creatinine improving (2.1 <--2.3<--2.6), now off IV lasix  - Monitor renal function while actively diuresing  - Avoid nephrotoxics  - Renal following    Anemia  - Transfuse per Primary.  s/p 1 unit of PRBC's with appropriate response  - H/H remain stable  - FOBT + but no overt GIB.  GI following  - Heme eval appreciated.   - Would hold plavix for now    HTN  - SBP improved   - Continue Cardizem CD/ hydralazine  - May initiate Norvasc if remains elevated      PAD  - Would monitor for claudication and and signs of limb ischemia.   - Continue statin    DVT ppx  - PAS for now    Smoker  - Reinforce education on smoking cessation    Further cardiac workup pending clinical course  Other workup per Primary  Will continue to follow    Judi Villanueva DNP  Cardiology Assessment/Plan:  63y Female with HTN, CKD, PAD s/p right femoral endarterectomy, depression, and smoker (quit 5 days ago) presented to the ED c/o SOB and weakness that started in April but have gotten worse 5 days and a 10-lb weight loss within a month with less appetitie.  Patient states, she had to stop to catch her breath in going to the bathroom from her living room.  She used to be a very active woman until recently.  Denies edema, however, admits to orthopnea.  Denies CP or palpitations, though, she was here last month with c/o neck pain radiating to her chest, for which she though was an MI.  Her workup for ACS and PE was negative and she was sent home.  However, she was found to be anemic and was placed on Iron.  Her EGD last week was negative.  She denies any melena.  Denies fever chills, nausea, vomiting, diarrhea, constipation, abdominal pain    In the ED, her CxR showed small B/L pleural effusion, pro-BNP>93520, H/H 7.3/23.1, procalcitonin=1.12.  She is now receiving 1 unit of PRBC's.  Upon evaluation, she is dyspneic during conversation and requiring her HOB elevated.  She also has elevated creatinine (2.8), baseline = 2.1    She follows with Dr. Lacy as outpatient and claims to have had negative cardiac workup.  TTE in 2017 with normal LVF and no segmental WMA.  Nuclear stress test (Pharm) normal.    Recommend:    HFpEF  - TTE shows mobile echodensity on AV with moderate to severe AI.  This is likely the cause of her CABA and heart failure.  The etiology of the mass is currently unclear  - Blood cultures persistently + for gram + cocci in pairs/chains.  With intermittent fever  - ID following   - Will be scheduled for a ZAHIRA to better assess the aortic valve.  Called [facility] to shuttle on Tuesday, 5/28.  Patient is scheduled at 10 am.  Please make her NPO post MN of 5/27 (Monday)  - CT chest showed interstitial pulmonary edema, small B/L pleural effusions and multifocal B/L Pna  - Continue PO lasix   - Please continue to maintain strict I/Os, monitor daily weights, Cr, and K.   - Monitor electrolytes, replete to keep K>4 and Mag>2    INA on CKD  - Creatinine improving (2.1 <--2.3<--2.6), now off IV lasix  - Monitor renal function while actively diuresing  - Avoid nephrotoxics  - Renal following    Anemia  - Transfuse per Primary.  s/p 1 unit of PRBC's with appropriate response  - H/H remain stable  - FOBT + but no overt GIB.  GI following  - Heme eval appreciated.   - Would hold plavix for now    HTN  - SBP improved   - Continue Cardizem CD/ hydralazine  - May initiate Norvasc if remains elevated      PAD  - Would monitor for claudication and and signs of limb ischemia.   - Continue statin    DVT ppx  - PAS for now    Smoker  - Reinforce education on smoking cessation    Further cardiac workup pending clinical course  Other workup per Primary  Will continue to follow    Judi Villanueva [facility]  Cardiology

## 2019-05-24 NOTE — PROGRESS NOTE ADULT - PROBLEM SELECTOR PLAN 2
-Blood cultures noted   -On iv ampicillin  -Will need ZAHIRA  -If blood cultures remain positive, might need valve replacement  -Cardiology f/u  -ID consult noted  -Further work-up/management pending clinical course.

## 2019-05-24 NOTE — PROGRESS NOTE ADULT - ASSESSMENT
63 white female with a history of HTN, CAD, CHF, CKD with anemia now admitted with a history of 10 pound weight loss associated with SOB and low hgb.    1.	INA, CKD.   2.	Hypertension  3.	Anemia  4.	Dyspnea: Pneumonia, CHF  5.	Enterococcus bacteremia, Endocarditis    Improving renal indices. On PO lasix. Monitor BP trend. Titrate BP meds as needed. Salt restriction.   Avoid nephrotoxic meds as possible. Awaiting ZAHIRA. Will follow electrolytes and renal function trend.

## 2019-05-24 NOTE — PROGRESS NOTE ADULT - PROBLEM SELECTOR PLAN 3
-Unclear source  -For gallium scan  - Continue iv ampicillin  - Further work-up/management pending clinical course.

## 2019-05-24 NOTE — PROGRESS NOTE ADULT - SUBJECTIVE AND OBJECTIVE BOX
Patient seen and examined;  Chart reviewed and events noted;   feeling overall well; awaiting ZAHIRA which will not be happening until next week at [place]     MEDICATIONS  (STANDING):  ALBUTerol    0.083% 2.5 milliGRAM(s) Nebulizer every 8 hours  ampicillin  IVPB 2 Gram(s) IV Intermittent every 8 hours  buPROPion XL . 150 milliGRAM(s) Oral daily  diltiazem    milliGRAM(s) Oral daily  furosemide    Tablet 40 milliGRAM(s) Oral daily  pantoprazole    Tablet 40 milliGRAM(s) Oral before breakfast  pramipexole 0.5 milliGRAM(s) Oral at bedtime  simvastatin 10 milliGRAM(s) Oral at bedtime  tiotropium 18 MICROgram(s) Capsule 1 Capsule(s) Inhalation daily    MEDICATIONS  (PRN):  acetaminophen   Tablet .. 650 milliGRAM(s) Oral every 6 hours PRN Temp greater or equal to 38C (100.4F), Mild Pain (1 - 3)      Vital Signs Last 24 Hrs  T(C): 36.4 (24 May 2019 13:07), Max: 37.1 (23 May 2019 20:36)  T(F): 97.5 (24 May 2019 13:07), Max: 98.8 (23 May 2019 20:36)  HR: 95 (24 May 2019 13:54) (87 - 102)  BP: 113/44 (24 May 2019 13:07) (113/43 - 123/56)  RR: 19 (24 May 2019 13:07) (18 - 21)  SpO2: 95% (24 May 2019 13:54) (91% - 96%)    PHYSICAL EXAM  General: adult in NAD  HEENT: clear oropharynx, anicteric sclera, pink conjunctivae  Neck: supple  CV: normal S1S2 with no murmur rubs or gallops  Lungs: clear to auscultation, no wheezes, no rhales  Abdomen: soft non-tender non-distended, no hepato/splenomegaly  Ext: no clubbing cyanosis or edema  Skin: no rashes and no petichiae  Neuro: alert and oriented X3 no focal deficits      LABS:                        8.5    11.79 )-----------( 282      ( 24 May 2019 07:25 )             26.9     Hemoglobin: 8.5 g/dL (05-24 @ 07:25)  Hemoglobin: 8.7 g/dL (05-23 @ 08:36)  Hemoglobin: 8.3 g/dL (05-23 @ 01:11)  Hemoglobin: 9.0 g/dL (05-22 @ 08:40)  Hemoglobin: 9.7 g/dL (05-21 @ 10:48)    05-24    136  |  103  |  27<H>  ----------------------------<  99  4.2   |  22  |  2.10<H>    Ca    8.0<L>      24 May 2019 07:25  Mg     2.3     05-24

## 2019-05-24 NOTE — PROGRESS NOTE ADULT - SUBJECTIVE AND OBJECTIVE BOX
Patient is a 63y old  Female who presents with a chief complaint of CABA, weakness (24 May 2019 09:11)      INTERVAL HPI/OVERNIGHT EVENTS: Patient seen and examined. NAD. No complaints.    Vital Signs Last 24 Hrs  T(C): 36.9 (24 May 2019 06:15), Max: 37.1 (23 May 2019 13:55)  T(F): 98.5 (24 May 2019 06:15), Max: 98.8 (23 May 2019 20:36)  HR: 95 (24 May 2019 07:08) (93 - 102)  BP: 123/56 (24 May 2019 06:15) (101/57 - 123/56)  BP(mean): --  RR: 18 (24 May 2019 06:15) (16 - 21)  SpO2: 96% (24 May 2019 07:08) (91% - 98%)        136  |  103  |  27<H>  ----------------------------<  99  4.2   |  22  |  2.10<H>    Ca    8.0<L>      24 May 2019 07:25  Mg     2.3         TPro  x   /  Alb  2.8<L>  /  TBili  x   /  DBili  x   /  AST  x   /  ALT  x   /  AlkPhos  x                             8.5    11.79 )-----------( 282      ( 24 May 2019 07:25 )             26.9       CAPILLARY BLOOD GLUCOSE        Urinalysis Basic - ( 22 May 2019 20:36 )    Color: Yellow / Appearance: Clear / S.015 / pH: x  Gluc: x / Ketone: Negative  / Bili: Negative / Urobili: Negative   Blood: x / Protein: 25 mg/dL / Nitrite: Negative   Leuk Esterase: Negative / RBC: 0-2 /HPF / WBC 0-2   Sq Epi: x / Non Sq Epi: Occasional / Bacteria: Few    Culture - Blood (19 @ 10:50)    Gram Stain:   Growth in aerobic and anaerobic bottles: Gram Positive Cocci in Pairs and  Chains    Specimen Source: .Blood Blood-Peripheral    Culture Results:   Growth in aerobic and anaerobic bottles: Gram Positive Cocci in Pairs and  Chains              acetaminophen   Tablet .. 650 milliGRAM(s) Oral every 6 hours PRN  ALBUTerol    0.083% 2.5 milliGRAM(s) Nebulizer every 8 hours  ampicillin  IVPB      ampicillin  IVPB 2 Gram(s) IV Intermittent every 8 hours  buPROPion XL . 150 milliGRAM(s) Oral daily  diltiazem    milliGRAM(s) Oral daily  furosemide    Tablet 40 milliGRAM(s) Oral daily  pantoprazole    Tablet 40 milliGRAM(s) Oral before breakfast  pramipexole 0.5 milliGRAM(s) Oral at bedtime  simvastatin 10 milliGRAM(s) Oral at bedtime  tiotropium 18 MICROgram(s) Capsule 1 Capsule(s) Inhalation daily              REVIEW OF SYSTEMS:  CONSTITUTIONAL: No fever, no weight loss, or no fatigue  NECK: No pain, no stiffness  RESPIRATORY: No cough, no wheezing, no chills, no hemoptysis, No shortness of breath  CARDIOVASCULAR: No chest pain, no palpitations, no dizziness, no leg swelling  GASTROINTESTINAL: No abdominal pain. No nausea, no vomiting, no hematemesis; No diarrhea, no constipation. No melena, no hematochezia.  GENITOURINARY: No dysuria, no frequency, no hematuria, no incontinence  NEUROLOGICAL: No headaches, no loss of strength, no numbness, no tremors  SKIN: No itching, no burning  MUSCULOSKELETAL: No joint pain, no swelling; No muscle, no back, no extremity pain  PSYCHIATRIC: No depression, no mood swings,   HEME/LYMPH: No easy bruising, no bleeding gums  ALLERY AND IMMUNOLOGIC: No hives       Consultant(s) Notes Reviewed:  [X] YES  [ ] NO    PHYSICAL EXAM:  GENERAL: NAD  HEAD:  Atraumatic, Normocephalic  EYES: EOMI, PERRLA, conjunctiva and sclera clear  ENMT: No tonsillar erythema, exudates, or enlargement; Moist mucous membranes  NECK: Supple, No JVD  NERVOUS SYSTEM:  Awake & alert  CHEST/LUNG: Clear to auscultation bilaterally; No rales, rhonchi, wheezing,  HEART: Regular rate and rhythm  ABDOMEN: Soft, Nontender, Nondistended; Bowel sounds present  EXTREMITIES:  No clubbing, cyanosis, or edema  LYMPH: No lymphadenopathy noted  SKIN: No rashes      Advanced care planning discussed with patient/family [X] YES   [ ] NO    Advanced care planning discussed with patient/family. Advanced care planning forms reviewed/discussed/completed. 20 minutes spent.

## 2019-05-24 NOTE — PROGRESS NOTE ADULT - SUBJECTIVE AND OBJECTIVE BOX
Patient is a 63y old  Female who presents with a chief complaint of CABA, weakness (22 May 2019 09:30)  Patient seen in follow up for INA, anemia.      Feels better. Renal function improving. Awaiting ZAHIRA.     PAST MEDICAL HISTORY:  Hypertension  Restless leg syndrome  Arthritis        MEDICATIONS  (STANDING):  ALBUTerol    0.083% 2.5 milliGRAM(s) Nebulizer every 8 hours  ampicillin  IVPB      ampicillin  IVPB 2 Gram(s) IV Intermittent every 8 hours  buPROPion XL . 150 milliGRAM(s) Oral daily  diltiazem    milliGRAM(s) Oral daily  furosemide    Tablet 40 milliGRAM(s) Oral daily  pantoprazole    Tablet 40 milliGRAM(s) Oral before breakfast  pramipexole 0.5 milliGRAM(s) Oral at bedtime  simvastatin 10 milliGRAM(s) Oral at bedtime  tiotropium 18 MICROgram(s) Capsule 1 Capsule(s) Inhalation daily    MEDICATIONS  (PRN):  acetaminophen   Tablet .. 650 milliGRAM(s) Oral every 6 hours PRN Temp greater or equal to 38C (100.4F), Mild Pain (1 - 3)    T(C): 36.9 (19 @ 06:15), Max: 38.4 (19 @ 20:18)  HR: 95 (19 @ 07:08) (84 - 105)  BP: 123/56 (19 @ 06:15) (101/57 - 129/57)  RR: 18 (19 @ 06:15)  SpO2: 96% (19 @ 07:08)  Wt(kg): --  I&O's Detail            PHYSICAL EXAM:  General: NAD  Respiratory: b/l air entry  Cardiovascular: S1 S2  Gastrointestinal: soft  Extremities:  no edema                        LABORATORY:                        8.5    11.79 )-----------( 282      ( 24 May 2019 07:25 )             26.9         136  |  103  |  27<H>  ----------------------------<  99  4.2   |  22  |  2.10<H>    Ca    8.0<L>      24 May 2019 07:25  Mg     2.3           Sodium, Serum: 136 mmol/L ( 07:25)  Sodium, Serum: 136 mmol/L ( 08:36)    Potassium, Serum: 4.2 mmol/L ( 07:25)  Potassium, Serum: 3.8 mmol/L ( @ 08:36)    Hemoglobin: 8.5 g/dL ( @ 07:25)  Hemoglobin: 8.7 g/dL ( 08:36)  Hemoglobin: 8.3 g/dL ( 01:11)  Hemoglobin: 9.0 g/dL ( @ 08:40)    Creatinine, Serum 2.10 ( @ 07:25)  Creatinine, Serum 2.30 ( 08:36)  Creatinine, Serum 2.60 ( @ 08:40)          Urinalysis Basic - ( 22 May 2019 20:36 )    Color: Yellow / Appearance: Clear / S.015 / pH: x  Gluc: x / Ketone: Negative  / Bili: Negative / Urobili: Negative   Blood: x / Protein: 25 mg/dL / Nitrite: Negative   Leuk Esterase: Negative / RBC: 0-2 /HPF / WBC 0-2   Sq Epi: x / Non Sq Epi: Occasional / Bacteria: Few

## 2019-05-24 NOTE — PROGRESS NOTE ADULT - PROBLEM SELECTOR PLAN 1
-Multifactorial: Possibly secondary to CKD and/or slow GI blood loss  -S/P 1 unit PRBC  -Hg improved but now trending downward -- possibly secondary to slow GI blood loss  -Heparin sq and plavix d/c'd  -Unable to get capsule endoscopy due to questionable curvature of UGI tract (near duodenum?)  -Iron studies, B12, folate, ferritin, haptoglobin, LDH, reticulocyte count noted -- on iv -venofer x 3 days  -F/U SPEP, UPEP  -Monitor h/h  -Transfuse prn  -Hematology/GI f/u  -Further work-up/management pending clinical course.

## 2019-05-24 NOTE — PROGRESS NOTE ADULT - SUBJECTIVE AND OBJECTIVE BOX
INTERVAL HPI/OVERNIGHT EVENTS:    No new overnight event.  No N/V/D.  Tolerating diet.  no brbpr/melena  for tagged WBC scan today    MEDICATIONS  (STANDING):  ALBUTerol    0.083% 2.5 milliGRAM(s) Nebulizer every 8 hours  ampicillin  IVPB      ampicillin  IVPB 2 Gram(s) IV Intermittent every 8 hours  buPROPion XL . 150 milliGRAM(s) Oral daily  diltiazem    milliGRAM(s) Oral daily  furosemide    Tablet 40 milliGRAM(s) Oral daily  pantoprazole    Tablet 40 milliGRAM(s) Oral before breakfast  pramipexole 0.5 milliGRAM(s) Oral at bedtime  simvastatin 10 milliGRAM(s) Oral at bedtime  tiotropium 18 MICROgram(s) Capsule 1 Capsule(s) Inhalation daily    MEDICATIONS  (PRN):  acetaminophen   Tablet .. 650 milliGRAM(s) Oral every 6 hours PRN Temp greater or equal to 38C (100.4F), Mild Pain (1 - 3)      Allergies    No Known Allergies    Intolerances        Review of Systems:    General:  No wt loss, fevers, chills, night sweats,fatigue,   Eyes:  Good vision, no reported pain  ENT:  No sore throat, pain, runny nose, dysphagia  CV:  No pain, palpitations, hypo/hypertension  Resp:  No dyspnea, cough, tachypnea, wheezing  GI:  No pain, No nausea, No vomiting, No diarrhea, No constipation, No weight loss, No fever, No pruritis, No rectal bleeding, No melena, No dysphagia  :  No pain, bleeding, incontinence, nocturia  Muscle:  No pain, weakness  Neuro:  No weakness, tingling, memory problems  Psych:  No fatigue, insomnia, mood problems, depression  Endocrine:  No polyuria, polydypsia, cold/heat intolerance  Heme:  No petechiae, ecchymosis, easy bruisability  Skin:  No rash, tattoos, scars, edema      Vital Signs Last 24 Hrs  T(C): 36.9 (24 May 2019 06:15), Max: 37.1 (23 May 2019 13:55)  T(F): 98.5 (24 May 2019 06:15), Max: 98.8 (23 May 2019 20:36)  HR: 95 (24 May 2019 07:08) (93 - 102)  BP: 123/56 (24 May 2019 06:15) (101/57 - 123/56)  BP(mean): --  RR: 18 (24 May 2019 06:15) (16 - 21)  SpO2: 96% (24 May 2019 07:08) (91% - 98%)    PHYSICAL EXAM:    Constitutional: NAD, well-developed  HEENT: EOMI, throat clear  Neck: No LAD, supple  Respiratory: CTA and P  Cardiovascular: S1 and S2, RRR, no M  Gastrointestinal: BS+, soft, NT/ND, neg HSM,  Extremities: No peripheral edema, neg clubing, cyanosis  Vascular: 2+ peripheral pulses  Neurological: A/O x 3, no focal deficits  Psychiatric: Normal mood, normal affect  Skin: No rashes      LABS:                        8.5    11.79 )-----------( 282      ( 24 May 2019 07:25 )             26.9     05-24    136  |  103  |  27<H>  ----------------------------<  99  4.2   |  22  |  2.10<H>    Ca    8.0<L>      24 May 2019 07:25  Mg     2.3         TPro  x   /  Alb  2.8<L>  /  TBili  x   /  DBili  x   /  AST  x   /  ALT  x   /  AlkPhos  x         Urinalysis Basic - ( 22 May 2019 20:36 )    Color: Yellow / Appearance: Clear / S.015 / pH: x  Gluc: x / Ketone: Negative  / Bili: Negative / Urobili: Negative   Blood: x / Protein: 25 mg/dL / Nitrite: Negative   Leuk Esterase: Negative / RBC: 0-2 /HPF / WBC 0-2   Sq Epi: x / Non Sq Epi: Occasional / Bacteria: Few        RADIOLOGY & ADDITIONAL TESTS:

## 2019-05-24 NOTE — PROGRESS NOTE ADULT - PROBLEM SELECTOR PLAN 1
pain this am - tylenol NOW  endocarditis eval in progress  on Ampicillin   ID following  planned for TTE  cont COPD regimen  smoking cess ed and counseling  cardio and ID followup  on o2 support - monitor vs and HD and Sat  keep sat > 88 pct  discussed plan of care and course of care with the patient  Nuclear scan scheduled

## 2019-05-24 NOTE — PROGRESS NOTE ADULT - ASSESSMENT
64 y/o woman w hx HTN, CKD, PAD depression smoker(stppped for 7days 1ppd x40yrs),adm w c/w 1week incr fatigue, reports cough and SOB/CABA when arrived in ER.  CBC w Hgb 7.3, MCV low 90's, CT c/a/p w findings c/w CHF and multilobar pneumonia.  Pt was tx'd one unit PRBC w some improvement in fatigue, Hgb today 9.7  Review of labs in computer show in 4/2019 Hgb was 8+, MCV low 90's, pt reports that was "incidental finding", she was here for neck pain, fatigue was mild at that time. Started taking Slow FE one a day since that time. Had endoscopy and colonoscopy early May w Dr Prater, negative, not candidate for small bowel capsule study due to anatomy of the bowel.  Stool occult blood positive during this admission.  CMP sig for Cr 2 range  Review of shin blood morphologty no abnormal findings  Hgb w higher than expected incr after one unit PRBC    -anemia is multifactorial, due to renal insufficiency, acute illness with endocarditis, multilobar pneumonia, CHF, blood loss w occult blood positive stool and on Plavix, and   -receiving IV iron for paritial iron deficiency  -no evidence of para-proteinemia   -may be a candidate for procrit in outpt basis   - continue present management with primary team;   - from heme standpoint, supportive transfusion to maintain Hg >7  - will sign off; reconsult if needed  - case discussed with Dr. TAYLOR Nesbitt

## 2019-05-24 NOTE — CHART NOTE - NSCHARTNOTEFT_GEN_A_CORE
Assessment: Pt reports fair to good appetite, preferences obtained. dislikes ensure and healthshakes, taking small amounts of healthshake occasionally. Reports BM today. Agreeable to snacks.    Factors impacting intake: [ ] none [ ] nausea  [ ] vomiting [ ] diarrhea [ ] constipation  [ ]chewing problems [ ] swallowing issues  [ ] other:     Diet Presciption: Diet, Regular:   Low Sodium  Supplement Feeding Modality:  Oral  Ensure Enlive Cans or Servings Per Day:  1       Frequency:  Daily  Health Shake Cans or Servings Per Day:  1       Frequency:  Daily (05-23-19 @ 09:37)    Intake: fair to good    Current Weight: Weight (kg): 48.1 (05-20 @ 14:08) Request current wt      Pertinent Medications: MEDICATIONS  (STANDING):  ALBUTerol    0.083% 2.5 milliGRAM(s) Nebulizer every 8 hours  ampicillin  IVPB      ampicillin  IVPB 2 Gram(s) IV Intermittent every 8 hours  buPROPion XL . 150 milliGRAM(s) Oral daily  diltiazem    milliGRAM(s) Oral daily  furosemide    Tablet 40 milliGRAM(s) Oral daily  pantoprazole    Tablet 40 milliGRAM(s) Oral before breakfast  pramipexole 0.5 milliGRAM(s) Oral at bedtime  simvastatin 10 milliGRAM(s) Oral at bedtime  tiotropium 18 MICROgram(s) Capsule 1 Capsule(s) Inhalation daily    MEDICATIONS  (PRN):  acetaminophen   Tablet .. 650 milliGRAM(s) Oral every 6 hours PRN Temp greater or equal to 38C (100.4F), Mild Pain (1 - 3)    Pertinent Labs: 05-24 Na136 mmol/L Glu 99 mg/dL K+ 4.2 mmol/L Cr  2.10 mg/dL<H> BUN 27 mg/dL<H> 05-22 Alb 2.8 g/dL<L>     CAPILLARY BLOOD GLUCOSE        Skin: st 1 sacrum, rec mvi daily    Estimated Needs:   [x ] no change since previous assessment  [ ] recalculated:     Previous Nutrition Diagnosis:   [ ] Inadequate Energy Intake [ ]Inadequate Oral Intake [ ] Excessive Energy Intake   [ ] Underweight [ ] Increased Nutrient Needs [ ] Overweight/Obesity   [ ] Altered GI Function [ ] Unintended Weight Loss [ ] Food & Nutrition Related Knowledge Deficit [x ] Malnutrition     Nutrition Diagnosis is [x ] ongoing  [ ] resolved [ ] not applicable     New Nutrition Diagnosis: [ x] not applicable       Interventions:  Honor preferences, encourage frequent snacks, high protein snacks offerred  Recommend  [ ] Change Diet To:  [ ] Nutrition Supplement  [ ] Nutrition Support  [ ] Other:     Monitoring and Evaluation:   [ ] PO intake [ x ] Tolerance to diet prescription [ x ] weights [ x ] labs[ x ] follow up per protocol  [ ] other:

## 2019-05-25 LAB
% GAMMA, URINE: 7 % — SIGNIFICANT CHANGE UP
-  AMPICILLIN: SIGNIFICANT CHANGE UP
-  GENTAMICIN SYNERGY: SIGNIFICANT CHANGE UP
-  VANCOMYCIN: SIGNIFICANT CHANGE UP
ALBUMIN 24H MFR UR ELPH: 15.3 % — SIGNIFICANT CHANGE UP
ALPHA1 GLOB 24H MFR UR ELPH: 50.7 % — SIGNIFICANT CHANGE UP
ALPHA2 GLOB 24H MFR UR ELPH: 15.2 % — SIGNIFICANT CHANGE UP
ANION GAP SERPL CALC-SCNC: 10 MMOL/L — SIGNIFICANT CHANGE UP (ref 5–17)
B-GLOBULIN 24H MFR UR ELPH: 11.8 % — SIGNIFICANT CHANGE UP
BUN SERPL-MCNC: 28 MG/DL — HIGH (ref 7–23)
C3 SERPL-MCNC: 128 MG/DL — SIGNIFICANT CHANGE UP (ref 81–157)
C4 SERPL-MCNC: 20 MG/DL — SIGNIFICANT CHANGE UP (ref 13–39)
CALCIUM SERPL-MCNC: 8.3 MG/DL — LOW (ref 8.5–10.1)
CHLORIDE SERPL-SCNC: 103 MMOL/L — SIGNIFICANT CHANGE UP (ref 96–108)
CO2 SERPL-SCNC: 23 MMOL/L — SIGNIFICANT CHANGE UP (ref 22–31)
CREAT SERPL-MCNC: 2.1 MG/DL — HIGH (ref 0.5–1.3)
CULTURE RESULTS: NO GROWTH — SIGNIFICANT CHANGE UP
CULTURE RESULTS: SIGNIFICANT CHANGE UP
GLUCOSE SERPL-MCNC: 120 MG/DL — HIGH (ref 70–99)
HCT VFR BLD CALC: 28.8 % — LOW (ref 34.5–45)
HGB BLD-MCNC: 9 G/DL — LOW (ref 11.5–15.5)
INTERPRETATION 24H UR IFE-IMP: SIGNIFICANT CHANGE UP
INTERPRETATION 24H UR IFE-IMP: SIGNIFICANT CHANGE UP
M PROTEIN 24H UR ELPH-MRATE: SIGNIFICANT CHANGE UP
MAGNESIUM SERPL-MCNC: 2.1 MG/DL — SIGNIFICANT CHANGE UP (ref 1.6–2.6)
MCHC RBC-ENTMCNC: 27.1 PG — SIGNIFICANT CHANGE UP (ref 27–34)
MCHC RBC-ENTMCNC: 31.3 GM/DL — LOW (ref 32–36)
MCV RBC AUTO: 86.7 FL — SIGNIFICANT CHANGE UP (ref 80–100)
METHOD TYPE: SIGNIFICANT CHANGE UP
NRBC # BLD: 0 /100 WBCS — SIGNIFICANT CHANGE UP (ref 0–0)
ORGANISM # SPEC MICROSCOPIC CNT: SIGNIFICANT CHANGE UP
PLATELET # BLD AUTO: 346 K/UL — SIGNIFICANT CHANGE UP (ref 150–400)
POTASSIUM SERPL-MCNC: 4.4 MMOL/L — SIGNIFICANT CHANGE UP (ref 3.5–5.3)
POTASSIUM SERPL-SCNC: 4.4 MMOL/L — SIGNIFICANT CHANGE UP (ref 3.5–5.3)
PROT ?TM UR-MCNC: 25 MG/DL — HIGH (ref 0–12)
PROT PATTERN 24H UR ELPH-IMP: SIGNIFICANT CHANGE UP
RBC # BLD: 3.32 M/UL — LOW (ref 3.8–5.2)
RBC # FLD: 16.9 % — HIGH (ref 10.3–14.5)
SODIUM SERPL-SCNC: 136 MMOL/L — SIGNIFICANT CHANGE UP (ref 135–145)
SPECIMEN SOURCE: SIGNIFICANT CHANGE UP
TOTAL VOLUME - 24 HOUR: SIGNIFICANT CHANGE UP ML
URINE CREATININE CALCULATION: SIGNIFICANT CHANGE UP G/24 H (ref 0.8–1.8)
WBC # BLD: 14.38 K/UL — HIGH (ref 3.8–10.5)
WBC # FLD AUTO: 14.38 K/UL — HIGH (ref 3.8–10.5)

## 2019-05-25 PROCEDURE — 99232 SBSQ HOSP IP/OBS MODERATE 35: CPT

## 2019-05-25 RX ADMIN — PRAMIPEXOLE DIHYDROCHLORIDE 0.5 MILLIGRAM(S): 0.12 TABLET ORAL at 21:40

## 2019-05-25 RX ADMIN — PANTOPRAZOLE SODIUM 40 MILLIGRAM(S): 20 TABLET, DELAYED RELEASE ORAL at 05:29

## 2019-05-25 RX ADMIN — SIMVASTATIN 10 MILLIGRAM(S): 20 TABLET, FILM COATED ORAL at 21:40

## 2019-05-25 RX ADMIN — ALBUTEROL 2.5 MILLIGRAM(S): 90 AEROSOL, METERED ORAL at 07:30

## 2019-05-25 RX ADMIN — Medication 216 GRAM(S): at 19:09

## 2019-05-25 RX ADMIN — ALBUTEROL 2.5 MILLIGRAM(S): 90 AEROSOL, METERED ORAL at 14:56

## 2019-05-25 RX ADMIN — Medication 216 GRAM(S): at 12:22

## 2019-05-25 RX ADMIN — BUPROPION HYDROCHLORIDE 150 MILLIGRAM(S): 150 TABLET, EXTENDED RELEASE ORAL at 12:22

## 2019-05-25 RX ADMIN — Medication 360 MILLIGRAM(S): at 05:28

## 2019-05-25 RX ADMIN — Medication 216 GRAM(S): at 02:03

## 2019-05-25 RX ADMIN — TIOTROPIUM BROMIDE 1 CAPSULE(S): 18 CAPSULE ORAL; RESPIRATORY (INHALATION) at 14:31

## 2019-05-25 RX ADMIN — Medication 40 MILLIGRAM(S): at 05:29

## 2019-05-25 NOTE — PROGRESS NOTE ADULT - ASSESSMENT
63y Female with HTN, CKD, PAD s/p right femoral endarterectomy, depression, and smoker (quit 5 days ago) presented to the ED c/o SOB and weakness that started in April but have gotten worse 5 days and a 10-lb weight loss within a month with less appetitie.  Patient states, she had to stop to catch her breath in going to the bathroom from her living room.  She was here last month with c/o neck pain radiating to her chest, for which she though was an MI.  Her workup for ACS and PE was negative and she was sent home.  However, she was found to be anemic and was placed on Iron.  Her EGD last week was negative.  TTE in 2017 with normal LVF and no segmental WMA.  Nuclear stress test (Pharm) normal.    In the ED, her CxR showed small B/L pleural effusion, pro-BNP>41870, H/H 7.3/23.1, procalcitonin=1.12.  She is now receiving 1 unit of PRBC's.  Upon evaluation, she is dyspneic during conversation and requiring her HOB elevated.  She also has elevated creatinine (2.8), baseline = 2.1    Recommend:    HFpEF  - TTE shows mobile echodensity on AV with moderate to severe AI.  This is likely the cause of her CABA and heart failure.  The etiology of the mass is currently unclear  - Blood cultures persistently + for gram + cocci in pairs/chains.  With intermittent fever  - ID following   - Scheduled for a ZAHIRA to better assess the aortic valve.  [place] to shuttle on Tuesday, 5/28.  Patient is scheduled at 10 am.  Please make her NPO post MN of 5/27 (Monday)  - CT chest showed interstitial pulmonary edema, small B/L pleural effusions and multifocal B/L Pna  - Continue PO lasix. She is comfortable.   - Please continue to maintain strict I/Os, monitor daily weights, Cr, and K.   - Monitor electrolytes, replete to keep K>4 and Mag>2    INA on CKD  - Creatinine stabilized, off of IV lasix  - Monitor renal function while actively diuresing  - Avoid nephrotoxics  - Renal following    Anemia  - Transfuse per Primary.  s/p 1 unit of PRBC's with appropriate response  - H/H remain stable  - FOBT + but no overt GIB.  GI following, and recent egd was unremarkable  - Heme eval appreciated.   - Would hold plavix for now    HTN  - SBP improved   - Continue Cardizem CD    DVT ppx  - PAS for now    Further cardiac workup pending clinical course  Other workup per Primary  Will continue to follow

## 2019-05-25 NOTE — PROGRESS NOTE ADULT - PROBLEM SELECTOR PLAN 5
-Multifactorial: secondary to anemia, CHF, PNA, tobacco, aortic insufficiency   -S/P 1 unit PRBC  -On po lasix  -Urine legionella and strep Ag negative  -Continue nebs  -Further work-up/management pending clinical course.

## 2019-05-25 NOTE — PROGRESS NOTE ADULT - SUBJECTIVE AND OBJECTIVE BOX
[facility] - Chao Lomax, [place], [place], [place], Elva Islas  Office Number:  [phone]    Patient resting comfortably in bed in NAD.  Laying flat with no respiratory distress.  No complaints of chest pain, dyspnea, palpitations, PND, or orthopnea.    ROS: negative unless otherwise mentioned.    Telemetry:  off tele    MEDICATIONS  (STANDING):  ALBUTerol    0.083% 2.5 milliGRAM(s) Nebulizer every 8 hours  ampicillin  IVPB      ampicillin  IVPB 2 Gram(s) IV Intermittent every 8 hours  buPROPion XL . 150 milliGRAM(s) Oral daily  diltiazem    milliGRAM(s) Oral daily  furosemide    Tablet 40 milliGRAM(s) Oral daily  pantoprazole    Tablet 40 milliGRAM(s) Oral before breakfast  pramipexole 0.5 milliGRAM(s) Oral at bedtime  simvastatin 10 milliGRAM(s) Oral at bedtime  tiotropium 18 MICROgram(s) Capsule 1 Capsule(s) Inhalation daily    MEDICATIONS  (PRN):  acetaminophen   Tablet .. 650 milliGRAM(s) Oral every 6 hours PRN Temp greater or equal to 38C (100.4F), Mild Pain (1 - 3)      Allergies    No Known Allergies    Intolerances        Vital Signs Last 24 Hrs  T(C): 36.9 (25 May 2019 04:28), Max: 36.9 (25 May 2019 04:28)  T(F): 98.4 (25 May 2019 04:28), Max: 98.4 (25 May 2019 04:28)  HR: 100 (25 May 2019 07:34) (87 - 100)  BP: 138/52 (25 May 2019 05:21) (113/44 - 138/52)  BP(mean): --  RR: 18 (25 May 2019 04:28) (18 - 19)  SpO2: 92% (25 May 2019 07:34) (91% - 95%)    I&O's Summary      ON EXAM:    Constitutional: NAD, awake and alert, well-developed  HEENT: Moist Mucous Membranes, Anicteric  Pulmonary: Non-labored, breath sounds are clear bilaterally, No wheezing, rales or rhonchi  Cardiovascular: Regular, S1 and S2, + sys and diastolic murmur.  No rubs, gallops or clicks  Gastrointestinal: Bowel Sounds present, soft, nontender.   Lymph: No peripheral edema. No lymphadenopathy.  Skin: No visible rashes or ulcers.  Psych:  Mood & affect appropriate    LABS: All Labs Reviewed:                        9.0    14.38 )-----------( 346      ( 25 May 2019 08:49 )             28.8                         8.5    11.79 )-----------( 282      ( 24 May 2019 07:25 )             26.9                         8.7    12.39 )-----------( 273      ( 23 May 2019 08:36 )             27.6     25 May 2019 08:49    136    |  103    |  28     ----------------------------<  120    4.4     |  23     |  2.10   24 May 2019 07:25    136    |  103    |  27     ----------------------------<  99     4.2     |  22     |  2.10   23 May 2019 08:36    136    |  106    |  31     ----------------------------<  92     3.8     |  20     |  2.30     Ca    8.3        25 May 2019 08:49  Ca    8.0        24 May 2019 07:25  Ca    8.1        23 May 2019 08:36  Mg     2.1       25 May 2019 08:49  Mg     2.3       24 May 2019 07:25    TPro  x      /  Alb  2.8    /  TBili  x      /  DBili  x      /  AST  x      /  ALT  x      /  AlkPhos  x      22 May 2019 11:31          Blood Culture: Organism --  Gram Stain Blood -- Gram Stain   Growth in aerobic bottle: Gram Positive Cocci in Pairs and Chains  Growth in anaerobic bottle: Gram Positive Cocci in Pairs and Chains  Specimen Source .Blood Blood-Peripheral  Culture-Blood --    Organism Blood Culture PCR  Gram Stain Blood -- Gram Stain   Growth in aerobic bottle: Gram Positive Cocci in Pairs and Chains  Growth in anaerobic bottle: Gram Positive Cocci in Pairs and Chains  Specimen Source .Blood Blood  Culture-Blood --

## 2019-05-25 NOTE — PROGRESS NOTE ADULT - SUBJECTIVE AND OBJECTIVE BOX
ID progress note     Name: VIVIANA CHAPMAN  Age: 63y  Gender: Female  MRN: [id number]    Interval History-- Events noted, doing well, afebrile , scheduled for ZAHIAR on Tuesday am. Blood cs positive for E fecalis sensitive to ampicillin . No new complaints   Notes reviewed    Past Medical History--  Hypertension  Restless leg syndrome  Arthritis  H/O cervical discectomy  Acute peptic ulcer with perforation  S/P appendectomy  S/P tubal ligation  Osteoarthritis of right shoulder region  Rheumatoid arthritis of carpometacarpal joint of thumb      For details regarding the patient's social history, family history, and other miscellaneous elements, please refer the initial infectious diseases consultation and/or the admitting history and physical examination for this admission.    Allergies--  Allergies    No Known Allergies    Intolerances        Medications--  Antibiotics:  ampicillin  IVPB      ampicillin  IVPB 2 Gram(s) IV Intermittent every 8 hours    Immunologic:    Other:  acetaminophen   Tablet .. PRN  ALBUTerol    0.083%  buPROPion XL .  diltiazem   CD  furosemide    Tablet  pantoprazole    Tablet  pramipexole  simvastatin  tiotropium 18 MICROgram(s) Capsule      Review of Systems--  A 10-point review of systems was obtained.     Pertinent positives and negatives--  Constitutional: No fevers. No Chills. No Rigors.   Cardiovascular: No chest pain. No palpitations.  Respiratory: No shortness of breath. No cough.  Gastrointestinal: No nausea or vomiting. No diarrhea or constipation.   Psychiatric: Pleasant. Appropriate affect.    Review of systems otherwise negative except as previously noted.    Physical Examination--  Vital Signs: T(F): 98.6 (05-25-19 @ 15:45), Max: 98.6 (05-25-19 @ 15:45)  HR: 87 (05-25-19 @ 15:45)  BP: 124/57 (05-25-19 @ 15:45)  RR: 18 (05-25-19 @ 15:45)  SpO2: 93% (05-25-19 @ 15:45)  Wt(kg): --  General: Nontoxic-appearing Female in no acute distress.  HEENT: AT/NC. PERRL. EOMI. Anicteric. Conjunctiva pink and moist. Oropharynx clear. Dentition fair.  Neck: Not rigid. No sense of mass.  Nodes: None palpable.  Lungs: Clear bilaterally without rales, wheezing or rhonchi  Heart: Regular rate and rhythm. No Murmur. No rub. No gallop. No palpable thrill.  Abdomen: Bowel sounds present and normoactive. Soft. Nondistended. Nontender. No sense of mass. No organomegaly.  Back: No spinal tenderness. No costovertebral angle tenderness.   Extremities: No cyanosis or clubbing. No edema.   Skin: Warm. Dry. Good turgor. No rash. No vasculitic stigmata.  Psychiatric: Appropriate affect and mood for situation.         Laboratory Studies--  CBC                        9.0    14.38 )-----------( 346      ( 25 May 2019 08:49 )             28.8       Chemistries  05-25    136  |  103  |  28<H>  ----------------------------<  120<H>  4.4   |  23  |  2.10<H>    Ca    8.3<L>      25 May 2019 08:49  Mg     2.1     05-25      RECENT CULTURES    Culture - Urine (collected 24 May 2019 21:21)  Source: .Urine Clean Catch (Midstream)  Final Report (25 May 2019 16:37):    No growth    Culture - Blood (collected 24 May 2019 11:05)  Source: .Blood Blood-Peripheral  Preliminary Report (25 May 2019 12:01):    No growth to date.    Culture - Blood (collected 24 May 2019 11:04)  Source: .Blood Blood-Peripheral  Preliminary Report (25 May 2019 12:01):    No growth to date.    Culture - Blood (collected 23 May 2019 10:50)  Source: .Blood Blood-Peripheral  Gram Stain (24 May 2019 00:59):    Growth in aerobic and anaerobic bottles: Gram Positive Cocci in Pairs and    Chains  Final Report (25 May 2019 13:55):    Growth in aerobic and anaerobic bottles: Enterococcus faecalis    See previous culture [id number]    Culture - Blood (collected 23 May 2019 10:50)  Source: .Blood Blood-Peripheral  Gram Stain (24 May 2019 01:00):    Growth in aerobic bottle: Gram Positive Cocci in Pairs and Chains    Growth in anaerobic bottle: Gram Positive Cocci in Pairs and Chains  Final Report (25 May 2019 13:53):    Growth in aerobic and anaerobic bottles: Enterococcus faecalis    See previous culture [id number]    Culture - Blood (05.22.19 @ 16:46)    -  Ampicillin: S <=2 Predicts results to ampicillin/sulbactam, amoxacillin-clavulanate and  piperacillin-tazobactam.    -  Vancomycin: S 2    -  Gentamicin synergy: S    -  Enterococcus species: Detec    Gram Stain:   Growth in aerobic bottle: Gram Positive Cocci in Pairs and Chains  Growth in anaerobic bottle: Gram Positive Cocci in Pairs and Chains    Specimen Source: .Blood Blood    Organism: Blood Culture PCR    Organism: Enterococcus faecalis    Culture Results:   Growth in aerobic and anaerobic bottles: Enterococcus faecalis  "Due to technical problems, Proteus sp. will Not be reported as part of  the BCID panel until further notice"  ***Blood Panel PCR results on this specimen are available  approximately 3 hours after the Gram stain result.***  Gram stain, PCR, and/or culture results may not always  correspond due to difference in methodologies.  ************************************************************  This PCR assay was performed using [employer].  The following targets are tested for: Enterococcus,  vancomycin resistant enterococci, Listeria monocytogenes,  coagulase negative staphylococci, S. aureus,  methicillin resistant S. aureus, Streptococcus agalactiae  (Group B), S. pneumoniae, S. pyogenes (Group A),  Acinetobacter baumannii, Enterobacter cloacae, E. coli,  Klebsiella oxytoca, K. pneumoniae, Proteus sp.,  Serratia marcescens, Haemophilus influenzae,  Neisseria meningitidis, Pseudomonas aeruginosa, Candida  albicans, C. glabrata, C krusei, C parapsilosis,  C. tropicalis and the KPC resistance gene.    Organism Identification: Blood Culture PCR  Enterococcus faecalis    Method Type: PCR    Method Type: JR    Culture - Blood (collected 22 May 2019 16:46)  Source: .Blood Blood  Gram Stain (23 May 2019 05:00):    Growth in aerobic bottle: Gram Positive Cocci in Pairs and Chains    Growth in anaerobic bottle: Gram Positive Cocci in Pairs and Chains  Final Report (25 May 2019 14:13):    Growth in aerobic and anaerobic bottles: Enterococcus faecalis    See previous culture [id number]        RADIOLOGY:    NM Multiple Day Procedure (05.24.19 @ 11:46) >  FINDINGS: These images demonstrate physiologic Tc-labeled leukocyte   distribution in the visualized structures. No abnormal tracer   accumulation is identified to suggest infection.    IMPRESSION: Normal Tc-labeled leukocyte scan.    No scan evidence of infection.    CT Chest No Cont (05.20.19 @ 22:27) >  FINDINGS:    Lungs: Smooth interlobular septal thickening compatible with hydrostatic   interstitial pulmonary edema/CHF. Patchy airspace opacity in the lungs   bilaterally, compatible with pneumonia.    Pleural space: Small bilateral pleural effusions.    Heart: Normal. No cardiomegaly. No pericardial effusion.    Mediastinum: Esophagus is unremarkable.    Aorta: Normal. No aortic aneurysm.    Lymph nodes: Unremarkable. No enlarged lymph nodes.    Bones/joints: Unremarkable. No acute fracture.    Soft tissues: Unremarkable.     IMPRESSION:   1. Hydrostatic interstitial pulmonary edema/CHF.   2. Small bilateral pleural effusions.   3. Multifocal bilateral pneumonia.     CT Chest No Cont (05.20.19 @ 22:27) >  FINDINGS:     ABDOMEN:    Liver: Normal. No mass.    Gallbladder and bile ducts: Normal. No calcified stones. No ductal   dilation.    Pancreas: Normal. No ductal dilation.    Spleen: Normal. No splenomegaly.    Adrenals: Normal. No mass.    Kidneys and ureters: No hydronephrosis or urolithiasis. Cortical   thinning.   Stomach and bowel: Colonic diverticulosis. No diverticulitis. No bowel   wall   thickening or intestinal obstruction.    Appendix: Appendix not visualized. No evidence of appendicitis.     PELVIS:    Bladder: Unremarkable as visualized.    Reproductive: Unremarkable as visualized.     ABDOMEN and PELVIS:    Intraperitoneal space: Normal. No free air. No significant fluid   collection.    Bones/joints: No acute fracture. No dislocation.    Soft tissues: Unremarkable.    Vasculature: Normal. No abdominal aortic aneurysm.    Lymph nodes: Normal. No enlarged lymph nodes.    Other findings: 2.5 cm AAA. No rupture.     IMPRESSION:   No acute findings.    Agree with above report    TTE Echo Doppler w/o Cont (05.21.19 @ 09:33) >  Dimensions:    LA 3.0       Normal Values: 2.0 - 4.0 cm    Ao 3.4  Normal Values: 2.0 - 3.8 cm  SEPTUM 1.1       Normal Values: 0.6 - 1.2 cm  PWT 1.2       Normal Values: 0.6 - 1.1 cm  LVIDd 5.4         Normal Values: 3.0 - 5.6 cm  LVIDs 3.3         Normal Values: 1.8 - 4.0 cm      OBSERVATIONS:    Mitral Valve: normal, mild MR.  Aortic Valve/Aorta: Calcified trileaflet aortic valve with probably   normal opening. Moderate to severe aortic insufficiency. There is a   mobile echogenic density seen on the aortic valve measuring 0.9 x 0.5 cm.  Tricuspid Valve: normal with trace TR.  Pulmonic Valve: Trace PI  Left Atrium: normal  Right Atrium: Not well-visualized  Left Ventricle: normal LV size and systolic function, estimated LVEF of   65%. Mild left ventricular hypertrophy  Right Ventricle: normal size and systolic function.  Pericardium/Pleura: normal, no significant pericardial effusion.      Conclusion:     Mild concentric LVH with normal internal dimensions and systolic   function, estimated LVEF of 65%.   Calcified trileaflet aortic valve with probably normal opening. Moderate   to severe aortic insufficiency. There is a mobile echogenic density seen   on the aortic valve measuring 0.9 x 0.5 cm.  Normal RV size and systolic function.     Normal trileaflet aortic valve, without AI.   Mild MR  Trace physiologic TR.    No significant pericardial effusion.      Assessment :     63y Female with HTN, CKD, PAD s/p right femoral endarterectomy, depression, and smoker (quit 5 days ago) presented to the ED c/o SOB and weakness that started in April but have gotten worse 5 days and a 10-lb weight loss within a month with less appetite  Patient states, she had to stop to catch her breath in going to the bathroom from her living room.  She used to be a very active woman until recently.  Denies edema, however, admits to orthopnea.  Denies CP or palpitations, though, she was here last month with c/o neck pain radiating to her chest, for which she though was an MI.  Her workup for ACS and PE was negative and she was sent home.  However, she was found to be anemic and was placed on Iron.  Her EGD last week was negative.  She denies any melena.  Denies fever chills, nausea, vomiting, diarrhea, constipation, abdominal pain    Her presentation was consistent with CHF likely from severe aortic insufficiency . She has prior hx of mild AI . Now echo shows possible mobile echogenic density on aortic valve suspicious for infective endocarditis . She has anemia ? anemia of chronic disease. I doubt if she ever had pneumonia . She was placed on antibiotics for presumed pneumonia on admission and No cultures were done . Her presentation could be all related to subacute IE     She has positive blood cs with Gram positive cocci in pairs and chains identified by PCR as Enterococcus species . She most likely has infective endocarditis , source of bacteremia is unclear . ? urine or GI tract, she did have recent colonoscopy     Plan :   - repeat blood cs negative x 1 day   - will continue with  Ampicillin 2 grams q 8 hours x 6 weeks . As she has CKD cannot use gentamicin for synergy   - for  ZAHIRA on Tuesday  if persistent bacteremia may need valve replacement    - trend cbc   - diuretics as per cardiology and nephrology  - dc planning , will need home IV abx     Case discussed with medical team     Continue with present regime .  Appropriate use of antibiotics and adverse effects reviewed.      I have discussed the above plan of care with patient in detail. He expressed understanding of the treatment plan . Risks, benefits and alternatives discussed in detail. I have asked if he has any questions or concerns and appropriately addressed them to the best of my ability .      > 35 minutes spent in direct patient care reviewing  the notes, lab data/ imaging , discussion with multidisciplinary team. All questions were addressed and answered to the best of my capacity .    Thank you for allowing me to participate in the care of your patient .        Aiyana Kay MD  [phone]

## 2019-05-25 NOTE — PROGRESS NOTE ADULT - SUBJECTIVE AND OBJECTIVE BOX
Patient is a 63y old  Female who presents with a chief complaint of CABA, weakness (25 May 2019 05:58)      INTERVAL HPI/OVERNIGHT EVENTS: Patient seen and examined. NAD. No complaints.    Vital Signs Last 24 Hrs  T(C): 36.9 (25 May 2019 04:28), Max: 36.9 (25 May 2019 04:28)  T(F): 98.4 (25 May 2019 04:28), Max: 98.4 (25 May 2019 04:28)  HR: 100 (25 May 2019 07:34) (87 - 100)  BP: 138/52 (25 May 2019 05:21) (113/44 - 138/52)  BP(mean): --  RR: 18 (25 May 2019 04:28) (18 - 19)  SpO2: 92% (25 May 2019 07:34) (91% - 95%)    05-25    136  |  103  |  28<H>  ----------------------------<  120<H>  4.4   |  23  |  2.10<H>    Ca    8.3<L>      25 May 2019 08:49  Mg     2.1     05-25                            9.0    14.38 )-----------( 346      ( 25 May 2019 08:49 )             28.8       CAPILLARY BLOOD GLUCOSE                  acetaminophen   Tablet .. 650 milliGRAM(s) Oral every 6 hours PRN  ALBUTerol    0.083% 2.5 milliGRAM(s) Nebulizer every 8 hours  ampicillin  IVPB      ampicillin  IVPB 2 Gram(s) IV Intermittent every 8 hours  buPROPion XL . 150 milliGRAM(s) Oral daily  diltiazem    milliGRAM(s) Oral daily  furosemide    Tablet 40 milliGRAM(s) Oral daily  pantoprazole    Tablet 40 milliGRAM(s) Oral before breakfast  pramipexole 0.5 milliGRAM(s) Oral at bedtime  simvastatin 10 milliGRAM(s) Oral at bedtime  tiotropium 18 MICROgram(s) Capsule 1 Capsule(s) Inhalation daily              REVIEW OF SYSTEMS:  CONSTITUTIONAL: No fever, no weight loss, or no fatigue  NECK: No pain, no stiffness  RESPIRATORY: No cough, no wheezing, no chills, no hemoptysis, No shortness of breath  CARDIOVASCULAR: No chest pain, no palpitations, no dizziness, no leg swelling  GASTROINTESTINAL: No abdominal pain. No nausea, no vomiting, no hematemesis; No diarrhea, no constipation. No melena, no hematochezia.  GENITOURINARY: No dysuria, no frequency, no hematuria, no incontinence  NEUROLOGICAL: No headaches, no loss of strength, no numbness, no tremors  SKIN: No itching, no burning  MUSCULOSKELETAL: No joint pain, no swelling; No muscle, no back, no extremity pain  PSYCHIATRIC: No depression, no mood swings,   HEME/LYMPH: No easy bruising, no bleeding gums  ALLERY AND IMMUNOLOGIC: No hives       Consultant(s) Notes Reviewed:  [X] YES  [ ] NO    PHYSICAL EXAM:  GENERAL: NAD  HEAD:  Atraumatic, Normocephalic  EYES: EOMI, PERRLA, conjunctiva and sclera clear  ENMT: No tonsillar erythema, exudates, or enlargement; Moist mucous membranes  NECK: Supple, No JVD  NERVOUS SYSTEM:  Awake & alert  CHEST/LUNG: Clear to auscultation bilaterally; No rales, rhonchi, wheezing,  HEART: Regular rate and rhythm  ABDOMEN: Soft, Nontender, Nondistended; Bowel sounds present  EXTREMITIES:  No clubbing, cyanosis, or edema  LYMPH: No lymphadenopathy noted  SKIN: No rashes      Advanced care planning discussed with patient/family [X] YES   [ ] NO    Advanced care planning discussed with patient/family. Advanced care planning forms reviewed/discussed/completed. 20 minutes spent.

## 2019-05-25 NOTE — PROGRESS NOTE ADULT - PROBLEM SELECTOR PLAN 3
-Unclear source  -Gallium scan negative  - Continue iv ampicillin  - Further work-up/management pending clinical course.

## 2019-05-25 NOTE — PROGRESS NOTE ADULT - PROBLEM SELECTOR PLAN 1
ZAHIRA planned for Tuesday  cvs regimen  lasix  nebs and spiriva for copd  cardio follow up  ID following  TTE reviewed  serial labs  serial clinical assessment  tolerating room air at rest  will follow and monitor  smoking cess ed and counseling

## 2019-05-25 NOTE — PROGRESS NOTE ADULT - SUBJECTIVE AND OBJECTIVE BOX
INTERVAL HPI/OVERNIGHT EVENTS:    No new overnight event.  No N/V/D.  Tolerating diet.  no brbpr/melena      MEDICATIONS  (STANDING):  ALBUTerol    0.083% 2.5 milliGRAM(s) Nebulizer every 8 hours  ampicillin  IVPB      ampicillin  IVPB 2 Gram(s) IV Intermittent every 8 hours  buPROPion XL . 150 milliGRAM(s) Oral daily  diltiazem    milliGRAM(s) Oral daily  furosemide    Tablet 40 milliGRAM(s) Oral daily  pantoprazole    Tablet 40 milliGRAM(s) Oral before breakfast  pramipexole 0.5 milliGRAM(s) Oral at bedtime  simvastatin 10 milliGRAM(s) Oral at bedtime  tiotropium 18 MICROgram(s) Capsule 1 Capsule(s) Inhalation daily    MEDICATIONS  (PRN):  acetaminophen   Tablet .. 650 milliGRAM(s) Oral every 6 hours PRN Temp greater or equal to 38C (100.4F), Mild Pain (1 - 3)      Allergies    No Known Allergies    Intolerances        Review of Systems:    General:  No wt loss, fevers, chills, night sweats,fatigue,   Eyes:  Good vision, no reported pain  ENT:  No sore throat, pain, runny nose, dysphagia  CV:  No pain, palpitations, hypo/hypertension  Resp:  No dyspnea, cough, tachypnea, wheezing  GI:  No pain, No nausea, No vomiting, No diarrhea, No constipation, No weight loss, No fever, No pruritis, No rectal bleeding, No melena, No dysphagia  :  No pain, bleeding, incontinence, nocturia  Muscle:  No pain, weakness  Neuro:  No weakness, tingling, memory problems  Psych:  No fatigue, insomnia, mood problems, depression  Endocrine:  No polyuria, polydypsia, cold/heat intolerance  Heme:  No petechiae, ecchymosis, easy bruisability  Skin:  No rash, tattoos, scars, edema      Vital Signs Last 24 Hrs  T(C): 36.9 (24 May 2019 06:15), Max: 37.1 (23 May 2019 13:55)  T(F): 98.5 (24 May 2019 06:15), Max: 98.8 (23 May 2019 20:36)  HR: 95 (24 May 2019 07:08) (93 - 102)  BP: 123/56 (24 May 2019 06:15) (101/57 - 123/56)  BP(mean): --  RR: 18 (24 May 2019 06:15) (16 - 21)  SpO2: 96% (24 May 2019 07:08) (91% - 98%)    PHYSICAL EXAM:    Constitutional: NAD, well-developed  HEENT: EOMI, throat clear  Neck: No LAD, supple  Respiratory: CTA and P  Cardiovascular: S1 and S2, RRR, no M  Gastrointestinal: BS+, soft, NT/ND, neg HSM,  Extremities: No peripheral edema, neg clubing, cyanosis  Vascular: 2+ peripheral pulses  Neurological: A/O x 3, no focal deficits  Psychiatric: Normal mood, normal affect  Skin: No rashes      LABS:                        8.5    11.79 )-----------( 282      ( 24 May 2019 07:25 )             26.9     05    136  |  103  |  27<H>  ----------------------------<  99  4.2   |  22  |  2.10<H>    Ca    8.0<L>      24 May 2019 07:25  Mg     2.3         TPro  x   /  Alb  2.8<L>  /  TBili  x   /  DBili  x   /  AST  x   /  ALT  x   /  AlkPhos  x         Urinalysis Basic - ( 22 May 2019 20:36 )    Color: Yellow / Appearance: Clear / S.015 / pH: x  Gluc: x / Ketone: Negative  / Bili: Negative / Urobili: Negative   Blood: x / Protein: 25 mg/dL / Nitrite: Negative   Leuk Esterase: Negative / RBC: 0-2 /HPF / WBC 0-2   Sq Epi: x / Non Sq Epi: Occasional / Bacteria: Few        RADIOLOGY & ADDITIONAL TESTS:

## 2019-05-25 NOTE — PROGRESS NOTE ADULT - PROBLEM SELECTOR PLAN 1
-Multifactorial: Possibly secondary to CKD, slow GI blood loss, sepsis  -S/P 1 unit PRBC  -Hg improved/stable  -Heparin sq and plavix d/c'd  -Unable to get capsule endoscopy due to questionable curvature of UGI tract (near duodenum?)  -Iron studies, B12, folate, ferritin, haptoglobin, LDH, reticulocyte count noted -- on iv -venofer x 3 days  -SPEP, UPEP noted  -Monitor h/h  -Transfuse prn  -Hematology/GI f/u  -Further work-up/management pending clinical course.

## 2019-05-25 NOTE — PROGRESS NOTE ADULT - SUBJECTIVE AND OBJECTIVE BOX
Date/Time Patient Seen:  		  Referring MD:   Data Reviewed	       Patient is a 63y old  Female who presents with a chief complaint of CABA, weakness (24 May 2019 14:48)      Subjective/HPI     PAST MEDICAL & SURGICAL HISTORY:  Hypertension  Restless leg syndrome: Especially with General Anesthesia  Arthritis: Cervical Spine  and Hands  H/O cervical discectomy  Acute peptic ulcer with perforation: 1980  S/P appendectomy: 2014  S/P tubal ligation: 1986  Osteoarthritis of right shoulder region: Right Shoulder Arthroscopy  2007  Rheumatoid arthritis of carpometacarpal joint of thumb: Total Joint Replacement of Left Thumb        Medication list         MEDICATIONS  (STANDING):  ALBUTerol    0.083% 2.5 milliGRAM(s) Nebulizer every 8 hours  ampicillin  IVPB      ampicillin  IVPB 2 Gram(s) IV Intermittent every 8 hours  buPROPion XL . 150 milliGRAM(s) Oral daily  diltiazem    milliGRAM(s) Oral daily  furosemide    Tablet 40 milliGRAM(s) Oral daily  pantoprazole    Tablet 40 milliGRAM(s) Oral before breakfast  pramipexole 0.5 milliGRAM(s) Oral at bedtime  simvastatin 10 milliGRAM(s) Oral at bedtime  tiotropium 18 MICROgram(s) Capsule 1 Capsule(s) Inhalation daily    MEDICATIONS  (PRN):  acetaminophen   Tablet .. 650 milliGRAM(s) Oral every 6 hours PRN Temp greater or equal to 38C (100.4F), Mild Pain (1 - 3)         Vitals log        ICU Vital Signs Last 24 Hrs  T(C): 36.9 (25 May 2019 04:28), Max: 36.9 (24 May 2019 06:15)  T(F): 98.4 (25 May 2019 04:28), Max: 98.5 (24 May 2019 06:15)  HR: 92 (25 May 2019 04:28) (87 - 98)  BP: 138/52 (25 May 2019 05:21) (113/44 - 138/52)  BP(mean): --  ABP: --  ABP(mean): --  RR: 18 (25 May 2019 04:28) (18 - 19)  SpO2: 91% (25 May 2019 04:28) (91% - 96%)           Input and Output:  I&O's Detail      Lab Data                        8.5    11.79 )-----------( 282      ( 24 May 2019 07:25 )             26.9     05-24    136  |  103  |  27<H>  ----------------------------<  99  4.2   |  22  |  2.10<H>    Ca    8.0<L>      24 May 2019 07:25  Mg     2.3     05-24              Review of Systems	      Objective     Physical Examination    heart s1s2  lung dec BS      Pertinent Lab findings & Imaging      Mike:  NO   Adequate UO     I&O's Detail           Discussed with:     Cultures:	        Radiology

## 2019-05-25 NOTE — PROGRESS NOTE ADULT - PROBLEM SELECTOR PLAN 2
-Blood cultures noted   -On iv ampicillin  -ZAHIRA on Tuesday, 5/28/19  -If blood cultures remain positive, might need valve replacement  -Cardiology f/u  -ID consult noted  -Further work-up/management pending clinical course.

## 2019-05-26 LAB
ANION GAP SERPL CALC-SCNC: 9 MMOL/L — SIGNIFICANT CHANGE UP (ref 5–17)
BUN SERPL-MCNC: 29 MG/DL — HIGH (ref 7–23)
CALCIUM SERPL-MCNC: 8.4 MG/DL — LOW (ref 8.5–10.1)
CHLORIDE SERPL-SCNC: 104 MMOL/L — SIGNIFICANT CHANGE UP (ref 96–108)
CO2 SERPL-SCNC: 26 MMOL/L — SIGNIFICANT CHANGE UP (ref 22–31)
CREAT SERPL-MCNC: 2.1 MG/DL — HIGH (ref 0.5–1.3)
GLUCOSE SERPL-MCNC: 102 MG/DL — HIGH (ref 70–99)
HCT VFR BLD CALC: 27.7 % — LOW (ref 34.5–45)
HGB BLD-MCNC: 8.5 G/DL — LOW (ref 11.5–15.5)
MAGNESIUM SERPL-MCNC: 2.2 MG/DL — SIGNIFICANT CHANGE UP (ref 1.6–2.6)
MCHC RBC-ENTMCNC: 26.8 PG — LOW (ref 27–34)
MCHC RBC-ENTMCNC: 30.7 GM/DL — LOW (ref 32–36)
MCV RBC AUTO: 87.4 FL — SIGNIFICANT CHANGE UP (ref 80–100)
NRBC # BLD: 0 /100 WBCS — SIGNIFICANT CHANGE UP (ref 0–0)
PLATELET # BLD AUTO: 342 K/UL — SIGNIFICANT CHANGE UP (ref 150–400)
POTASSIUM SERPL-MCNC: 4.5 MMOL/L — SIGNIFICANT CHANGE UP (ref 3.5–5.3)
POTASSIUM SERPL-SCNC: 4.5 MMOL/L — SIGNIFICANT CHANGE UP (ref 3.5–5.3)
RBC # BLD: 3.17 M/UL — LOW (ref 3.8–5.2)
RBC # FLD: 17 % — HIGH (ref 10.3–14.5)
SODIUM SERPL-SCNC: 139 MMOL/L — SIGNIFICANT CHANGE UP (ref 135–145)
WBC # BLD: 11.73 K/UL — HIGH (ref 3.8–10.5)
WBC # FLD AUTO: 11.73 K/UL — HIGH (ref 3.8–10.5)

## 2019-05-26 PROCEDURE — 99232 SBSQ HOSP IP/OBS MODERATE 35: CPT

## 2019-05-26 RX ADMIN — ALBUTEROL 2.5 MILLIGRAM(S): 90 AEROSOL, METERED ORAL at 23:04

## 2019-05-26 RX ADMIN — Medication 216 GRAM(S): at 18:38

## 2019-05-26 RX ADMIN — ALBUTEROL 2.5 MILLIGRAM(S): 90 AEROSOL, METERED ORAL at 00:05

## 2019-05-26 RX ADMIN — SIMVASTATIN 10 MILLIGRAM(S): 20 TABLET, FILM COATED ORAL at 21:50

## 2019-05-26 RX ADMIN — Medication 360 MILLIGRAM(S): at 05:40

## 2019-05-26 RX ADMIN — Medication 216 GRAM(S): at 02:25

## 2019-05-26 RX ADMIN — PRAMIPEXOLE DIHYDROCHLORIDE 0.5 MILLIGRAM(S): 0.12 TABLET ORAL at 21:50

## 2019-05-26 RX ADMIN — Medication 216 GRAM(S): at 11:15

## 2019-05-26 RX ADMIN — Medication 40 MILLIGRAM(S): at 05:40

## 2019-05-26 RX ADMIN — ALBUTEROL 2.5 MILLIGRAM(S): 90 AEROSOL, METERED ORAL at 15:26

## 2019-05-26 RX ADMIN — PANTOPRAZOLE SODIUM 40 MILLIGRAM(S): 20 TABLET, DELAYED RELEASE ORAL at 05:40

## 2019-05-26 RX ADMIN — ALBUTEROL 2.5 MILLIGRAM(S): 90 AEROSOL, METERED ORAL at 07:33

## 2019-05-26 RX ADMIN — TIOTROPIUM BROMIDE 1 CAPSULE(S): 18 CAPSULE ORAL; RESPIRATORY (INHALATION) at 13:53

## 2019-05-26 RX ADMIN — BUPROPION HYDROCHLORIDE 150 MILLIGRAM(S): 150 TABLET, EXTENDED RELEASE ORAL at 12:35

## 2019-05-26 NOTE — PROGRESS NOTE ADULT - ASSESSMENT
63 white female with a history of HTN, CAD, CHF, CKD with anemia now admitted with a history of 10 pound weight loss associated with SOB and low hgb.    1.	INA, CKD. Baseline creatinine 2-2.4  2.	Hypertension  3.	Anemia  4.	Dyspnea: Pneumonia, CHF  5.	Enterococcus bacteremia, Endocarditis    Renal function at baseline. On PO lasix. Monitor BP trend. Titrate BP meds as needed. Salt restriction.   Avoid nephrotoxic meds as possible. Awaiting ZAHIRA. Will follow electrolytes and renal function trend.

## 2019-05-26 NOTE — PROGRESS NOTE ADULT - SUBJECTIVE AND OBJECTIVE BOX
ID progress note     Name: VIVIANA CHAPMAN  Age: 63y  Gender: Female  MRN: [id number]    Interval History-- Events noted, doing well, afebrile , no new complaints except SOB on exertion   Notes reviewed    Past Medical History--  Hypertension  Restless leg syndrome  Arthritis  H/O cervical discectomy  Acute peptic ulcer with perforation  S/P appendectomy  S/P tubal ligation  Osteoarthritis of right shoulder region  Rheumatoid arthritis of carpometacarpal joint of thumb      For details regarding the patient's social history, family history, and other miscellaneous elements, please refer the initial infectious diseases consultation and/or the admitting history and physical examination for this admission.    Allergies--  Allergies    No Known Allergies    Intolerances        Medications--  Antibiotics:  ampicillin  IVPB      ampicillin  IVPB 2 Gram(s) IV Intermittent every 8 hours    Immunologic:    Other:  acetaminophen   Tablet .. PRN  ALBUTerol    0.083%  buPROPion XL .  diltiazem   CD  furosemide    Tablet  pantoprazole    Tablet  pramipexole  simvastatin  tiotropium 18 MICROgram(s) Capsule      Review of Systems--  A 10-point review of systems was obtained.     Pertinent positives and negatives--  Constitutional: No fevers. No Chills. No Rigors.   Cardiovascular: No chest pain. No palpitations.  Respiratory: No shortness of breath. No cough.  Gastrointestinal: No nausea or vomiting. No diarrhea or constipation.   Psychiatric: Pleasant. Appropriate affect.    Review of systems otherwise negative except as previously noted.    Physical Examination--  Vital Signs: T(F): 98.8 (05-26-19 @ 04:29), Max: 98.8 (05-26-19 @ 04:29)  HR: 77 (05-26-19 @ 07:35)  BP: 115/49 (05-26-19 @ 04:29)  RR: 17 (05-26-19 @ 04:29)  SpO2: 95% (05-26-19 @ 07:35)  Wt(kg): --  General: Nontoxic-appearing Female in no acute distress.  HEENT: AT/NC. PERRL. EOMI. Anicteric. Conjunctiva pink and moist. Oropharynx clear. Dentition fair.  Neck: Not rigid. No sense of mass.  Nodes: None palpable.  Lungs: Clear bilaterally without rales, wheezing or rhonchi  Heart: Regular rate and rhythm. No Murmur. No rub. No gallop. No palpable thrill.  Abdomen: Bowel sounds present and normoactive. Soft. Nondistended. Nontender. No sense of mass. No organomegaly.  Back: No spinal tenderness. No costovertebral angle tenderness.   Extremities: No cyanosis or clubbing. No edema.   Skin: Warm. Dry. Good turgor. No rash. No vasculitic stigmata.  Psychiatric: Appropriate affect and mood for situation.         Laboratory Studies--  CBC                        8.5    11.73 )-----------( 342      ( 26 May 2019 07:17 )             27.7       Chemistries  05-26    139  |  104  |  29<H>  ----------------------------<  102<H>  4.5   |  26  |  2.10<H>    Ca    8.4<L>      26 May 2019 07:17  Mg     2.2     05-26        RECENT CULTURES    Culture - Urine (collected 24 May 2019 21:21)  Source: .Urine Clean Catch (Midstream)  Final Report (25 May 2019 16:37):    No growth    Culture - Blood (collected 24 May 2019 11:05)  Source: .Blood Blood-Peripheral  Preliminary Report (25 May 2019 12:01):    No growth to date.    Culture - Blood (collected 24 May 2019 11:04)  Source: .Blood Blood-Peripheral  Preliminary Report (25 May 2019 12:01):    No growth to date.    Culture - Blood (collected 23 May 2019 10:50)  Source: .Blood Blood-Peripheral  Gram Stain (24 May 2019 00:59):    Growth in aerobic and anaerobic bottles: Gram Positive Cocci in Pairs and    Chains  Final Report (25 May 2019 13:55):    Growth in aerobic and anaerobic bottles: Enterococcus faecalis    See previous culture [id number]    Culture - Blood (collected 23 May 2019 10:50)  Source: .Blood Blood-Peripheral  Gram Stain (24 May 2019 01:00):    Growth in aerobic bottle: Gram Positive Cocci in Pairs and Chains    Growth in anaerobic bottle: Gram Positive Cocci in Pairs and Chains  Final Report (25 May 2019 13:53):    Growth in aerobic and anaerobic bottles: Enterococcus faecalis    See previous culture [id number]    Culture - Blood (05.22.19 @ 16:46)    -  Ampicillin: S <=2 Predicts results to ampicillin/sulbactam, amoxacillin-clavulanate and  piperacillin-tazobactam.    -  Vancomycin: S 2    -  Gentamicin synergy: S    -  Enterococcus species: Detec    Gram Stain:   Growth in aerobic bottle: Gram Positive Cocci in Pairs and Chains  Growth in anaerobic bottle: Gram Positive Cocci in Pairs and Chains    Specimen Source: .Blood Blood    Organism: Blood Culture PCR    Organism: Enterococcus faecalis    Culture Results:   Growth in aerobic and anaerobic bottles: Enterococcus faecalis  "Due to technical problems, Proteus sp. will Not be reported as part of  the BCID panel until further notice"  ***Blood Panel PCR results on this specimen are available  approximately 3 hours after the Gram stain result.***  Gram stain, PCR, and/or culture results may not always  correspond due to difference in methodologies.  ************************************************************  This PCR assay was performed using [employer].  The following targets are tested for: Enterococcus,  vancomycin resistant enterococci, Listeria monocytogenes,  coagulase negative staphylococci, S. aureus,  methicillin resistant S. aureus, Streptococcus agalactiae  (Group B), S. pneumoniae, S. pyogenes (Group A),  Acinetobacter baumannii, Enterobacter cloacae, E. coli,  Klebsiella oxytoca, K. pneumoniae, Proteus sp.,  Serratia marcescens, Haemophilus influenzae,  Neisseria meningitidis, Pseudomonas aeruginosa, Candida  albicans, C. glabrata, C krusei, C parapsilosis,  C. tropicalis and the KPC resistance gene.    Organism Identification: Blood Culture PCR  Enterococcus faecalis    Method Type: PCR    Method Type: JR    RADIOLOGY:  NM Multiple Day Procedure (05.24.19 @ 11:46) >  FINDINGS: These images demonstrate physiologic Tc-labeled leukocyte   distribution in the visualized structures. No abnormal tracer   accumulation is identified to suggest infection.    IMPRESSION: Normal Tc-labeled leukocyte scan.    No scan evidence of infection.    CT Chest No Cont (05.20.19 @ 22:27) >  FINDINGS:    Lungs: Smooth interlobular septal thickening compatible with hydrostatic   interstitial pulmonary edema/CHF. Patchy airspace opacity in the lungs   bilaterally, compatible with pneumonia.    Pleural space: Small bilateral pleural effusions.    Heart: Normal. No cardiomegaly. No pericardial effusion.    Mediastinum: Esophagus is unremarkable.    Aorta: Normal. No aortic aneurysm.    Lymph nodes: Unremarkable. No enlarged lymph nodes.    Bones/joints: Unremarkable. No acute fracture.    Soft tissues: Unremarkable.     IMPRESSION:   1. Hydrostatic interstitial pulmonary edema/CHF.   2. Small bilateral pleural effusions.   3. Multifocal bilateral pneumonia.     CT Chest No Cont (05.20.19 @ 22:27) >  FINDINGS:     ABDOMEN:    Liver: Normal. No mass.    Gallbladder and bile ducts: Normal. No calcified stones. No ductal   dilation.    Pancreas: Normal. No ductal dilation.    Spleen: Normal. No splenomegaly.    Adrenals: Normal. No mass.    Kidneys and ureters: No hydronephrosis or urolithiasis. Cortical   thinning.   Stomach and bowel: Colonic diverticulosis. No diverticulitis. No bowel   wall   thickening or intestinal obstruction.    Appendix: Appendix not visualized. No evidence of appendicitis.     PELVIS:    Bladder: Unremarkable as visualized.    Reproductive: Unremarkable as visualized.     ABDOMEN and PELVIS:    Intraperitoneal space: Normal. No free air. No significant fluid   collection.    Bones/joints: No acute fracture. No dislocation.    Soft tissues: Unremarkable.    Vasculature: Normal. No abdominal aortic aneurysm.    Lymph nodes: Normal. No enlarged lymph nodes.    Other findings: 2.5 cm AAA. No rupture.     IMPRESSION:   No acute findings.    Agree with above report    TTE Echo Doppler w/o Cont (05.21.19 @ 09:33) >  Dimensions:    LA 3.0       Normal Values: 2.0 - 4.0 cm    Ao 3.4  Normal Values: 2.0 - 3.8 cm  SEPTUM 1.1       Normal Values: 0.6 - 1.2 cm  PWT 1.2       Normal Values: 0.6 - 1.1 cm  LVIDd 5.4         Normal Values: 3.0 - 5.6 cm  LVIDs 3.3         Normal Values: 1.8 - 4.0 cm      OBSERVATIONS:    Mitral Valve: normal, mild MR.  Aortic Valve/Aorta: Calcified trileaflet aortic valve with probably   normal opening. Moderate to severe aortic insufficiency. There is a   mobile echogenic density seen on the aortic valve measuring 0.9 x 0.5 cm.  Tricuspid Valve: normal with trace TR.  Pulmonic Valve: Trace PI  Left Atrium: normal  Right Atrium: Not well-visualized  Left Ventricle: normal LV size and systolic function, estimated LVEF of   65%. Mild left ventricular hypertrophy  Right Ventricle: normal size and systolic function.  Pericardium/Pleura: normal, no significant pericardial effusion.      Conclusion:     Mild concentric LVH with normal internal dimensions and systolic   function, estimated LVEF of 65%.   Calcified trileaflet aortic valve with probably normal opening. Moderate   to severe aortic insufficiency. There is a mobile echogenic density seen   on the aortic valve measuring 0.9 x 0.5 cm.  Normal RV size and systolic function.     Normal trileaflet aortic valve, without AI.   Mild MR  Trace physiologic TR.    No significant pericardial effusion.      Assessment :     63y Female with HTN, CKD, PAD s/p right femoral endarterectomy, depression, and smoker (quit 5 days ago) presented to the ED c/o SOB and weakness that started in April but have gotten worse 5 days and a 10-lb weight loss within a month with less appetite  Patient states, she had to stop to catch her breath in going to the bathroom from her living room.  She used to be a very active woman until recently.  Denies edema, however, admits to orthopnea.  Denies CP or palpitations, though, she was here last month with c/o neck pain radiating to her chest, for which she though was an MI.  Her workup for ACS and PE was negative and she was sent home.  However, she was found to be anemic and was placed on Iron.  Her EGD last week was negative.  She denies any melena.  Denies fever chills, nausea, vomiting, diarrhea, constipation, abdominal pain    Her presentation was consistent with CHF likely from severe aortic insufficiency . She has prior hx of mild AI . Now echo shows possible mobile echogenic density on aortic valve suspicious for infective endocarditis . She has anemia ? anemia of chronic disease. I doubt if she ever had pneumonia . She was placed on antibiotics for presumed pneumonia on admission and No cultures were done . Her presentation could be all related to subacute IE     She has multiple positive blood cs with Enterococcus Fecalis . She most likely has infective endocarditis , source of bacteremia is unclear . ? urine or GI tract, she did have recent colonoscopy     Plan :   - will continue with  Ampicillin 2 grams q 8 hours x 6 weeks . As she has CKD cannot use gentamicin for synergy   - for  ZAHIRA on Tuesday  if persistent bacteremia may need valve replacement    - trend cbc   - diuretics as per cardiology and nephrology  - dc planning , will need home IV abx     Case discussed with medical team     Continue with present regime .  Appropriate use of antibiotics and adverse effects reviewed.    I have discussed the above plan of care with patient in detail. She expressed understanding of the treatment plan . Risks, benefits and alternatives discussed in detail. I have asked if she has any questions or concerns and appropriately addressed them to the best of my ability .      > 15 minutes spent in direct patient care reviewing  the notes, lab data/ imaging , discussion with multidisciplinary team. All questions were addressed and answered to the best of my capacity .    Thank you for allowing me to participate in the care of your patient .        Aiyana Kay MD  [phone]

## 2019-05-26 NOTE — PROGRESS NOTE ADULT - PROBLEM SELECTOR PLAN 1
-Blood cultures noted -- most recent blood cultures negative  -On iv ampicillin  -ZAHIRA on Tuesday, 5/28/19  -If blood cultures remain positive, might need valve replacement  -Cardiology f/u  -ID consult noted  -Further work-up/management pending clinical course.

## 2019-05-26 NOTE — PROGRESS NOTE ADULT - PROBLEM SELECTOR PLAN 2
-Multifactorial: Possibly secondary to CKD, slow GI blood loss, endocarditis  -S/P 1 unit PRBC  -Hg improved/stable  -Heparin sq and plavix d/c'd  -Unable to get capsule endoscopy due to questionable curvature of UGI tract (near duodenum?)  -Iron studies, B12, folate, ferritin, haptoglobin, LDH, reticulocyte count noted -- on iv -venofer x 3 days  -SPEP, UPEP noted  -Monitor h/h  -Transfuse prn  -Hematology/GI f/u  -Further work-up/management pending clinical course.

## 2019-05-26 NOTE — PROGRESS NOTE ADULT - ASSESSMENT
63y Female with HTN, CKD, PAD s/p right femoral endarterectomy, depression, and smoker (quit 5 PTA) presented to the ED c/o SOB and weakness that started in April but have gotten worse 5 days and a 10-lb weight loss within a month with less appetite.  Patient states, she had to stop to catch her breath in going to the bathroom from her living room.  She was here last month with c/o neck pain radiating to her chest, for which she though was an MI.  Her workup for ACS and PE was negative and she was sent home.  However, she was found to be anemic and was placed on Iron.  Her EGD last week was negative.  TTE in 2017 with normal LVF and no segmental WMA.  Nuclear stress test (Pharm) normal.    In the ED, her CxR showed small B/L pleural effusion, pro-BNP>[id number], H/H 7.3/23.1, procalcitonin=1.12.  She is now receiving 1 unit of PRBC's.  Upon evaluation, she is dyspneic during conversation and requiring her HOB elevated.  She also has elevated creatinine (2.8), baseline = 2.1    Recommend:    HFpEF  - TTE shows mobile echodensity on AV with moderate to severe AI.  This is likely the cause of her CABA and heart failure.  The etiology of the mass is currently unclear  - Blood cultures persistently + for gram + cocci in pairs/chains, though, latest is negative.  She has been afebrile  - Pet scan negative of any evidence of infection  - ID following   - Scheduled for a ZAHIRA to better assess the aortic valve.  [place] to [place] on Tuesday, 5/28.  Patient is scheduled at 10 am.  Please make her NPO post MN of 5/27 (Monday)  - CT chest showed interstitial pulmonary edema, small B/L pleural effusions and multifocal B/L Pna  - Continue PO lasix. She now appears euvolemic   - Please continue to maintain strict I/Os, monitor daily weights, Cr, and K.   - Monitor electrolytes, replete to keep K>4 and Mag>2    INA on CKD  - Creatinine stabilized at 2.1, off of IV lasix  - Monitor renal function while actively diuresing  - Avoid nephrotoxics  - Renal following    Anemia  - Transfuse per Primary.  s/p 1 unit of PRBC's with appropriate response  - H/H remain stable  - FOBT + but no overt GIB.  GI following, and recent egd was unremarkable  - Heme eval appreciated.   - Would hold Plavix for now    HTN  - SBP stable  - Continue Cardizem CD    DVT ppx  - PAS for now    Further cardiac workup pending clinical course  Other workup per Primary  Will continue to follow    Judi Villanueva [facility]  Cardiology

## 2019-05-26 NOTE — PROGRESS NOTE ADULT - SUBJECTIVE AND OBJECTIVE BOX
Patient is a 63y old  Female who presents with a chief complaint of CABA, weakness (26 May 2019 10:14)      INTERVAL HPI/OVERNIGHT EVENTS: Patient seen and examined. NAD. No complaints.    Vital Signs Last 24 Hrs  T(C): 37.1 (26 May 2019 04:29), Max: 37.1 (26 May 2019 04:29)  T(F): 98.8 (26 May 2019 04:29), Max: 98.8 (26 May 2019 04:29)  HR: 77 (26 May 2019 07:35) (77 - 92)  BP: 115/49 (26 May 2019 04:29) (104/59 - 129/51)  BP(mean): --  RR: 17 (26 May 2019 04:29) (17 - 18)  SpO2: 95% (26 May 2019 07:35) (91% - 95%)    05-26    139  |  104  |  29<H>  ----------------------------<  102<H>  4.5   |  26  |  2.10<H>    Ca    8.4<L>      26 May 2019 07:17  Mg     2.2     05-26                            8.5    11.73 )-----------( 342      ( 26 May 2019 07:17 )             27.7       CAPILLARY BLOOD GLUCOSE    Culture - Blood (05.24.19 @ 11:05)    Specimen Source: .Blood Blood-Peripheral    Culture Results:   No growth to date.                  acetaminophen   Tablet .. 650 milliGRAM(s) Oral every 6 hours PRN  ALBUTerol    0.083% 2.5 milliGRAM(s) Nebulizer every 8 hours  ampicillin  IVPB      ampicillin  IVPB 2 Gram(s) IV Intermittent every 8 hours  buPROPion XL . 150 milliGRAM(s) Oral daily  diltiazem    milliGRAM(s) Oral daily  furosemide    Tablet 40 milliGRAM(s) Oral daily  pantoprazole    Tablet 40 milliGRAM(s) Oral before breakfast  pramipexole 0.5 milliGRAM(s) Oral at bedtime  simvastatin 10 milliGRAM(s) Oral at bedtime  tiotropium 18 MICROgram(s) Capsule 1 Capsule(s) Inhalation daily              REVIEW OF SYSTEMS:  CONSTITUTIONAL: No fever, no weight loss, or no fatigue  NECK: No pain, no stiffness  RESPIRATORY: No cough, no wheezing, no chills, no hemoptysis, No shortness of breath  CARDIOVASCULAR: No chest pain, no palpitations, no dizziness, no leg swelling  GASTROINTESTINAL: No abdominal pain. No nausea, no vomiting, no hematemesis; No diarrhea, no constipation. No melena, no hematochezia.  GENITOURINARY: No dysuria, no frequency, no hematuria, no incontinence  NEUROLOGICAL: No headaches, no loss of strength, no numbness, no tremors  SKIN: No itching, no burning  MUSCULOSKELETAL: No joint pain, no swelling; No muscle, no back, no extremity pain  PSYCHIATRIC: No depression, no mood swings,   HEME/LYMPH: No easy bruising, no bleeding gums  ALLERY AND IMMUNOLOGIC: No hives       Consultant(s) Notes Reviewed:  [X] YES  [ ] NO    PHYSICAL EXAM:  GENERAL: NAD  HEAD:  Atraumatic, Normocephalic  EYES: EOMI, PERRLA, conjunctiva and sclera clear  ENMT: No tonsillar erythema, exudates, or enlargement; Moist mucous membranes  NECK: Supple, No JVD  NERVOUS SYSTEM:  Awake & alert  CHEST/LUNG: Clear to auscultation bilaterally; No rales, rhonchi, wheezing,  HEART: Regular rate and rhythm  ABDOMEN: Soft, Nontender, Nondistended; Bowel sounds present  EXTREMITIES:  No clubbing, cyanosis, or edema  LYMPH: No lymphadenopathy noted  SKIN: No rashes      Advanced care planning discussed with patient/family [X] YES   [ ] NO    Advanced care planning discussed with patient/family. Advanced care planning forms reviewed/discussed/completed. 20 minutes spent.

## 2019-05-26 NOTE — PROGRESS NOTE ADULT - PROBLEM SELECTOR PLAN 1
ZAHIRA planned for Tuesday  cvs regimen  lasix  nebs and spiriva for copd  cardio follow up  ID following  TTE reviewed  serial labs  serial clinical assessment  tolerating room air at rest  will follow and monitor  smoking cess ed and counseling.

## 2019-05-26 NOTE — PROGRESS NOTE ADULT - SUBJECTIVE AND OBJECTIVE BOX
Patient is a 63y old  Female who presents with a chief complaint of CABA, weakness (22 May 2019 09:30)  Patient seen in follow up for INA, anemia.    Feels better. Renal function improving. Awaiting ZAHIRA.     PAST MEDICAL HISTORY:  Hypertension  Restless leg syndrome  Arthritis      MEDICATIONS  (STANDING):  ALBUTerol    0.083% 2.5 milliGRAM(s) Nebulizer every 8 hours  ampicillin  IVPB      ampicillin  IVPB 2 Gram(s) IV Intermittent every 8 hours  buPROPion XL . 150 milliGRAM(s) Oral daily  diltiazem    milliGRAM(s) Oral daily  furosemide    Tablet 40 milliGRAM(s) Oral daily  pantoprazole    Tablet 40 milliGRAM(s) Oral before breakfast  pramipexole 0.5 milliGRAM(s) Oral at bedtime  simvastatin 10 milliGRAM(s) Oral at bedtime  tiotropium 18 MICROgram(s) Capsule 1 Capsule(s) Inhalation daily    MEDICATIONS  (PRN):  acetaminophen   Tablet .. 650 milliGRAM(s) Oral every 6 hours PRN Temp greater or equal to 38C (100.4F), Mild Pain (1 - 3)    T(C): 37.1 (19 @ 04:29), Max: 37.1 (19 @ 04:29)  HR: 77 (19 @ 07:35) (77 - 100)  BP: 115/49 (19 @ 04:29) (104/59 - 138/52)  RR: 17 (19 @ 04:29)  SpO2: 95% (19 @ 07:35)  Wt(kg): --  I&O's Detail    PHYSICAL EXAM:  General: NAD  Respiratory: b/l air entry  Cardiovascular: S1 S2  Gastrointestinal: soft  Extremities:  no edema                          LABORATORY:                        8.5    11.79 )-----------( 282      ( 24 May 2019 07:25 )             26.9         136  |  103  |  27<H>  ----------------------------<  99  4.2   |  22  |  2.10<H>    Ca    8.0<L>      24 May 2019 07:25  Mg     2.3     -      Sodium, Serum: 136 mmol/L ( 07:25)  Sodium, Serum: 136 mmol/L ( 08:36)    Potassium, Serum: 4.2 mmol/L ( 07:25)  Potassium, Serum: 3.8 mmol/L ( @ 08:36)    Hemoglobin: 8.5 g/dL ( @ 07:25)  Hemoglobin: 8.7 g/dL ( 08:36)  Hemoglobin: 8.3 g/dL ( 01:11)  Hemoglobin: 9.0 g/dL ( @ 08:40)    Creatinine, Serum 2.10 ( @ 07:25)  Creatinine, Serum 2.30 ( 08:36)  Creatinine, Serum 2.60 ( @ 08:40)    Urinalysis Basic - ( 22 May 2019 20:36 )    Color: Yellow / Appearance: Clear / S.015 / pH: x  Gluc: x / Ketone: Negative  / Bili: Negative / Urobili: Negative   Blood: x / Protein: 25 mg/dL / Nitrite: Negative   Leuk Esterase: Negative / RBC: 0-2 /HPF / WBC 0-2   Sq Epi: x / Non Sq Epi: Occasional / Bacteria: Few

## 2019-05-26 NOTE — PROGRESS NOTE ADULT - SUBJECTIVE AND OBJECTIVE BOX
Date/Time Patient Seen:  		  Referring MD:   Data Reviewed	       Patient is a 63y old  Female who presents with a chief complaint of CABA, weakness (25 May 2019 16:45)      Subjective/HPI     PAST MEDICAL & SURGICAL HISTORY:  Hypertension  Restless leg syndrome: Especially with General Anesthesia  Arthritis: Cervical Spine  and Hands  H/O cervical discectomy  Acute peptic ulcer with perforation: 1980  S/P appendectomy: 2014  S/P tubal ligation: 1986  Osteoarthritis of right shoulder region: Right Shoulder Arthroscopy  2007  Rheumatoid arthritis of carpometacarpal joint of thumb: Total Joint Replacement of Left Thumb        Medication list         MEDICATIONS  (STANDING):  ALBUTerol    0.083% 2.5 milliGRAM(s) Nebulizer every 8 hours  ampicillin  IVPB      ampicillin  IVPB 2 Gram(s) IV Intermittent every 8 hours  buPROPion XL . 150 milliGRAM(s) Oral daily  diltiazem    milliGRAM(s) Oral daily  furosemide    Tablet 40 milliGRAM(s) Oral daily  pantoprazole    Tablet 40 milliGRAM(s) Oral before breakfast  pramipexole 0.5 milliGRAM(s) Oral at bedtime  simvastatin 10 milliGRAM(s) Oral at bedtime  tiotropium 18 MICROgram(s) Capsule 1 Capsule(s) Inhalation daily    MEDICATIONS  (PRN):  acetaminophen   Tablet .. 650 milliGRAM(s) Oral every 6 hours PRN Temp greater or equal to 38C (100.4F), Mild Pain (1 - 3)         Vitals log        ICU Vital Signs Last 24 Hrs  T(C): 37.1 (26 May 2019 04:29), Max: 37.1 (26 May 2019 04:29)  T(F): 98.8 (26 May 2019 04:29), Max: 98.8 (26 May 2019 04:29)  HR: 91 (26 May 2019 04:29) (86 - 100)  BP: 115/49 (26 May 2019 04:29) (104/59 - 129/51)  BP(mean): --  ABP: --  ABP(mean): --  RR: 17 (26 May 2019 04:29) (17 - 18)  SpO2: 92% (26 May 2019 04:29) (91% - 93%)           Input and Output:  I&O's Detail      Lab Data                        9.0    14.38 )-----------( 346      ( 25 May 2019 08:49 )             28.8     05-25    136  |  103  |  28<H>  ----------------------------<  120<H>  4.4   |  23  |  2.10<H>    Ca    8.3<L>      25 May 2019 08:49  Mg     2.1     05-25              Review of Systems	      Objective     Physical Examination    heart s1s2  lung dec BS      Pertinent Lab findings & Imaging      Mike:  NO   Adequate UO     I&O's Detail           Discussed with:     Cultures:	        Radiology

## 2019-05-26 NOTE — PROGRESS NOTE ADULT - SUBJECTIVE AND OBJECTIVE BOX
[facility] -- Chao Lomax, Regino Syed, Roshan Paris Savella  Office # [phone]    Follow Up: Weakness, SOB     Subjective/Observations: Seen and evaluated, c/o dry cough from scratchy throat.  Denies SOB, CABA, or orthopnea.  Denies CP or palpitations    REVIEW OF SYSTEMS: All other review of systems is negative unless indicated above    PAST MEDICAL & SURGICAL HISTORY:  Hypertension  Restless leg syndrome: Especially with General Anesthesia  Arthritis: Cervical Spine  and Hands  H/O cervical discectomy  Acute peptic ulcer with perforation: 1980  S/P appendectomy: 2014  S/P tubal ligation: 1986  Osteoarthritis of right shoulder region: Right Shoulder Arthroscopy  2007  Rheumatoid arthritis of carpometacarpal joint of thumb: Total Joint Replacement of Left Thumb    MEDICATIONS  (STANDING):  ALBUTerol    0.083% 2.5 milliGRAM(s) Nebulizer every 8 hours  ampicillin  IVPB      ampicillin  IVPB 2 Gram(s) IV Intermittent every 8 hours  buPROPion XL . 150 milliGRAM(s) Oral daily  diltiazem    milliGRAM(s) Oral daily  furosemide    Tablet 40 milliGRAM(s) Oral daily  pantoprazole    Tablet 40 milliGRAM(s) Oral before breakfast  pramipexole 0.5 milliGRAM(s) Oral at bedtime  simvastatin 10 milliGRAM(s) Oral at bedtime  tiotropium 18 MICROgram(s) Capsule 1 Capsule(s) Inhalation daily    MEDICATIONS  (PRN):  acetaminophen   Tablet .. 650 milliGRAM(s) Oral every 6 hours PRN Temp greater or equal to 38C (100.4F), Mild Pain (1 - 3)    Allergies    No Known Allergies    Intolerances    Vital Signs Last 24 Hrs  T(C): 37.1 (26 May 2019 04:29), Max: 37.1 (26 May 2019 04:29)  T(F): 98.8 (26 May 2019 04:29), Max: 98.8 (26 May 2019 04:29)  HR: 77 (26 May 2019 07:35) (77 - 92)  BP: 115/49 (26 May 2019 04:29) (104/59 - 129/51)  BP(mean): --  RR: 17 (26 May 2019 04:29) (17 - 18)  SpO2: 95% (26 May 2019 07:35) (91% - 95%)    I&O's Summary    PHYSICAL EXAM:  TELE: Not on tele  Constitutional: NAD, awake and alert, well-developed  HEENT: Moist Mucous Membranes, Anicteric  Pulmonary: Non-labored, breath sounds are clear bilaterally, No wheezing, rales.  Fine rhonchi LLL  Cardiovascular: Regular, S1 and S2, No murmurs, rubs, gallops or clicks  Gastrointestinal: Bowel Sounds present, soft, nontender.   Lymph: No peripheral edema. No lymphadenopathy.  Skin: No visible rashes or ulcers.  Psych:  Mood & affect appropriate    LABS: All Labs Reviewed:                        8.5    11.73 )-----------( 342      ( 26 May 2019 07:17 )             27.7                         9.0    14.38 )-----------( 346      ( 25 May 2019 08:49 )             28.8                         8.5    11.79 )-----------( 282      ( 24 May 2019 07:25 )             26.9     26 May 2019 07:17    139    |  104    |  29     ----------------------------<  102    4.5     |  26     |  2.10   25 May 2019 08:49    136    |  103    |  28     ----------------------------<  120    4.4     |  23     |  2.10   24 May 2019 07:25    136    |  103    |  27     ----------------------------<  99     4.2     |  22     |  2.10     Ca    8.4        26 May 2019 07:17  Ca    8.3        25 May 2019 08:49  Ca    8.0        24 May 2019 07:25  Mg     2.2       26 May 2019 07:17  Mg     2.1       25 May 2019 08:49  Mg     2.3       24 May 2019 07:25    < from: TTE Echo Doppler w/o Cont (05.21.19 @ 09:33) >     EXAM:  ECHO TTE WO CON COMP W DOPPLR         PROCEDURE DATE:  05/21/2019        INTERPRETATION:  INDICATION: Dyspnea    Blood Pressure 112/58    Height Weight 48kg       BSA    Dimensions:    LA 3.0       Normal Values: 2.0 - 4.0 cm    Ao 3.4  Normal Values: 2.0 - 3.8 cm  SEPTUM 1.1       Normal Values: 0.6 - 1.2 cm  PWT 1.2       Normal Values: 0.6 - 1.1 cm  LVIDd 5.4         Normal Values: 3.0 - 5.6 cm  LVIDs 3.3         Normal Values: 1.8 - 4.0 cm      OBSERVATIONS:    Mitral Valve: normal, mild MR.  Aortic Valve/Aorta: Calcified trileaflet aortic valve with probably   normal opening. Moderate to severe aortic insufficiency. There is a   mobile echogenic density seen on the aortic valve measuring 0.9 x 0.5 cm.  Tricuspid Valve: normal with trace TR.  Pulmonic Valve: Trace PI  Left Atrium: normal  Right Atrium: Not well-visualized  Left Ventricle: normal LV size and systolic function, estimated LVEF of   65%. Mild left ventricular hypertrophy  Right Ventricle: normal size and systolic function.  Pericardium/Pleura: normal, no significant pericardial effusion.      Conclusion:     Mild concentric LVH with normal internal dimensions and systolic   function, estimated LVEF of 65%.   Calcified trileaflet aortic valve with probably normal opening. Moderate   to severe aortic insufficiency. There is a mobile echogenic density seen   on the aortic valve measuring 0.9 x 0.5 cm.  Normal RV size and systolic function.     Normal trileaflet aortic valve, without AI.   Mild MR  Trace physiologic TR.    No significant pericardial effusion.      MICHAEL CHILDRESS   This document has been electronically signed. May 22 2019  8:49AM    < end of copied text >    < from: Xray Chest 1 View- PORTABLE-Urgent (05.22.19 @ 23:57) >    EXAM:  XR CHEST PORTABLE URGENT 1V                            PROCEDURE DATE:  05/22/2019          INTERPRETATION:  Fever.    AP chest. Prior 5/20/2019.    There is interval worsening of the bibasilar airspace infiltrates and   bilateral pleural effusions. Moderate bilateral pleural effusions at this   time. No other change.    Impression: Interval worsening at the lung bases as described.    NATIVIDAD ZAMORA M.D., ATTENDING RADIOLOGIST  This document has been electronically signed. May 23 2019  8:52AM     < end of copied text >    < from: CT Chest No Cont (05.20.19 @ 22:27) >    EXAM:  CT CHEST                            PROCEDURE DATE:  05/20/2019          INTERPRETATION:  [facility] RADIOLOGIST PRELIMINARY REPORT    EXAM:    CT Chest Without Contrast     EXAM DATE/TIME:    5/20/2019 10:13 PM     CLINICAL HISTORY:    63 years old, female; Signs and symptoms; Other: Weight loss, anemia;   Shortness of breath; Prior surgery; Surgery type: Exploratory, tubal   ligation     TECHNIQUE:    Imaging protocol: Axial computed tomography images of the chest without   intravenous contrast. Coronal and sagittal reformatted images were   created and   reviewed.    3D rendering: MIP reconstructed images were created and reviewed.     COMPARISON:    DX XR CHEST PA AND LATERAL 5/20/2019 3:59 PM     FINDINGS:    Lungs: Smooth interlobular septal thickening compatible with hydrostatic   interstitial pulmonary edema/CHF. Patchy airspace opacity in the lungs   bilaterally, compatible with pneumonia.    Pleural space: Small bilateral pleural effusions.    Heart: Normal. No cardiomegaly. No pericardial effusion.    Mediastinum: Esophagus is unremarkable.    Aorta: Normal. No aortic aneurysm.    Lymph nodes: Unremarkable. No enlarged lymph nodes.    Bones/joints: Unremarkable. No acute fracture.    Soft tissues: Unremarkable.     IMPRESSION:   1. Hydrostatic interstitial pulmonary edema/CHF.   2. Small bilateral pleural effusions.   3. Multifocal bilateral pneumonia.       =========================  EXAM:    CT Abdomen and Pelvis Without Contrast     EXAM DATE/TIME:    5/20/2019 10:13 PM     CLINICAL HISTORY:    63 years old, female; Signs and symptoms; Other: Weight loss, anemia;   Shortness of breath; Prior surgery; Surgery type: Exploratory, tubal   ligation     TECHNIQUE:    Imaging protocol: Axial computed tomography images of the abdomen and   pelvis   without contrast. Coronal and sagittal reformatted images were created   and   reviewed.    3D rendering: MIP reconstructed images were created and reviewed.     COMPARISON:    DX XR CHEST PA AND LATERAL 5/20/2019 3:59 PM     FINDINGS:     ABDOMEN:    Liver: Normal. No mass.    Gallbladder and bile ducts: Normal. No calcified stones. No ductal   dilation.    Pancreas: Normal. No ductal dilation.    Spleen: Normal. No splenomegaly.    Adrenals: Normal. No mass.    Kidneys and ureters: No hydronephrosis or urolithiasis. Cortical   thinning.   Stomach and bowel: Colonic diverticulosis. No diverticulitis. No bowel   wall   thickening or intestinal obstruction.    Appendix: Appendix not visualized. No evidence of appendicitis.     PELVIS:    Bladder: Unremarkable as visualized.    Reproductive: Unremarkable as visualized.     ABDOMEN and PELVIS:    Intraperitoneal space: Normal. No free air. No significant fluid   collection.    Bones/joints: No acute fracture. No dislocation.    Soft tissues: Unremarkable.    Vasculature: Normal. No abdominal aortic aneurysm.    Lymph nodes: Normal. No enlarged lymph nodes.    Other findings: 2.5 cm AAA. No rupture.     IMPRESSION:   No acute findings.    Agree with above report    NATIVIDAD ZAMORA M.D., ATTENDING RADIOLOGIST  This document has been electronically signed. May 21 2019 10:20AM     < end of copied text >

## 2019-05-27 LAB
ANION GAP SERPL CALC-SCNC: 8 MMOL/L — SIGNIFICANT CHANGE UP (ref 5–17)
BUN SERPL-MCNC: 29 MG/DL — HIGH (ref 7–23)
CALCIUM SERPL-MCNC: 8.7 MG/DL — SIGNIFICANT CHANGE UP (ref 8.5–10.1)
CHLORIDE SERPL-SCNC: 102 MMOL/L — SIGNIFICANT CHANGE UP (ref 96–108)
CO2 SERPL-SCNC: 28 MMOL/L — SIGNIFICANT CHANGE UP (ref 22–31)
CREAT SERPL-MCNC: 2.3 MG/DL — HIGH (ref 0.5–1.3)
GLUCOSE SERPL-MCNC: 90 MG/DL — SIGNIFICANT CHANGE UP (ref 70–99)
HCT VFR BLD CALC: 28.3 % — LOW (ref 34.5–45)
HGB BLD-MCNC: 8.6 G/DL — LOW (ref 11.5–15.5)
MAGNESIUM SERPL-MCNC: 2.5 MG/DL — SIGNIFICANT CHANGE UP (ref 1.6–2.6)
MCHC RBC-ENTMCNC: 26.5 PG — LOW (ref 27–34)
MCHC RBC-ENTMCNC: 30.4 GM/DL — LOW (ref 32–36)
MCV RBC AUTO: 87.3 FL — SIGNIFICANT CHANGE UP (ref 80–100)
NRBC # BLD: 0 /100 WBCS — SIGNIFICANT CHANGE UP (ref 0–0)
PLATELET # BLD AUTO: 375 K/UL — SIGNIFICANT CHANGE UP (ref 150–400)
POTASSIUM SERPL-MCNC: 4.9 MMOL/L — SIGNIFICANT CHANGE UP (ref 3.5–5.3)
POTASSIUM SERPL-SCNC: 4.9 MMOL/L — SIGNIFICANT CHANGE UP (ref 3.5–5.3)
RBC # BLD: 3.24 M/UL — LOW (ref 3.8–5.2)
RBC # FLD: 17.2 % — HIGH (ref 10.3–14.5)
SODIUM SERPL-SCNC: 138 MMOL/L — SIGNIFICANT CHANGE UP (ref 135–145)
WBC # BLD: 11.55 K/UL — HIGH (ref 3.8–10.5)
WBC # FLD AUTO: 11.55 K/UL — HIGH (ref 3.8–10.5)

## 2019-05-27 PROCEDURE — 99232 SBSQ HOSP IP/OBS MODERATE 35: CPT

## 2019-05-27 RX ADMIN — Medication 40 MILLIGRAM(S): at 05:33

## 2019-05-27 RX ADMIN — PRAMIPEXOLE DIHYDROCHLORIDE 0.5 MILLIGRAM(S): 0.12 TABLET ORAL at 21:28

## 2019-05-27 RX ADMIN — SIMVASTATIN 10 MILLIGRAM(S): 20 TABLET, FILM COATED ORAL at 21:28

## 2019-05-27 RX ADMIN — TIOTROPIUM BROMIDE 1 CAPSULE(S): 18 CAPSULE ORAL; RESPIRATORY (INHALATION) at 18:07

## 2019-05-27 RX ADMIN — ALBUTEROL 2.5 MILLIGRAM(S): 90 AEROSOL, METERED ORAL at 07:39

## 2019-05-27 RX ADMIN — Medication 216 GRAM(S): at 18:02

## 2019-05-27 RX ADMIN — Medication 216 GRAM(S): at 11:16

## 2019-05-27 RX ADMIN — ALBUTEROL 2.5 MILLIGRAM(S): 90 AEROSOL, METERED ORAL at 23:13

## 2019-05-27 RX ADMIN — Medication 360 MILLIGRAM(S): at 05:32

## 2019-05-27 RX ADMIN — PANTOPRAZOLE SODIUM 40 MILLIGRAM(S): 20 TABLET, DELAYED RELEASE ORAL at 05:34

## 2019-05-27 RX ADMIN — ALBUTEROL 2.5 MILLIGRAM(S): 90 AEROSOL, METERED ORAL at 13:42

## 2019-05-27 RX ADMIN — Medication 216 GRAM(S): at 02:15

## 2019-05-27 RX ADMIN — BUPROPION HYDROCHLORIDE 150 MILLIGRAM(S): 150 TABLET, EXTENDED RELEASE ORAL at 12:17

## 2019-05-27 NOTE — PROGRESS NOTE ADULT - ASSESSMENT
63 white female with a history of HTN, CAD, CHF, CKD with anemia now admitted with a history of 10 pound weight loss associated with SOB and low hgb.    1.	INA, CKD. Baseline creatinine 2-2.4  2.	Hypertension  3.	Anemia  4.	Dyspnea: Pneumonia, CHF  5.	Enterococcus bacteremia, Endocarditis    Renal function at baseline. On PO lasix. Monitor BP trend. Titrate BP meds as needed. Salt restriction. Cardiology follow up.   Avoid nephrotoxic meds as possible. Awaiting ZAHIRA. Will follow electrolytes and renal function trend.

## 2019-05-27 NOTE — PROGRESS NOTE ADULT - SUBJECTIVE AND OBJECTIVE BOX
Patient is a 63y old  Female who presents with a chief complaint of CABA, weakness (22 May 2019 09:30)  Patient seen in follow up for INA, anemia.    Feels better. Renal function at baseline. Awaiting ZAHIRA.     PAST MEDICAL HISTORY:  Hypertension  Restless leg syndrome  Arthritis      MEDICATIONS  (STANDING):  ALBUTerol    0.083% 2.5 milliGRAM(s) Nebulizer every 8 hours  ampicillin  IVPB      ampicillin  IVPB 2 Gram(s) IV Intermittent every 8 hours  buPROPion XL . 150 milliGRAM(s) Oral daily  diltiazem    milliGRAM(s) Oral daily  furosemide    Tablet 40 milliGRAM(s) Oral daily  pantoprazole    Tablet 40 milliGRAM(s) Oral before breakfast  pramipexole 0.5 milliGRAM(s) Oral at bedtime  simvastatin 10 milliGRAM(s) Oral at bedtime  tiotropium 18 MICROgram(s) Capsule 1 Capsule(s) Inhalation daily    MEDICATIONS  (PRN):  acetaminophen   Tablet .. 650 milliGRAM(s) Oral every 6 hours PRN Temp greater or equal to 38C (100.4F), Mild Pain (1 - 3)    T(C): 37.2 (05-27-19 @ 05:11), Max: 37.2 (05-27-19 @ 05:11)  HR: 94 (05-27-19 @ 07:41) (77 - 96)  BP: 141/47 (05-27-19 @ 05:11) (104/47 - 141/47)  RR: 17 (05-27-19 @ 05:11)  SpO2: 92% (05-27-19 @ 07:41)  Wt(kg): --  I&O's Detail    26 May 2019 07:01  -  27 May 2019 07:00  --------------------------------------------------------  IN:    Oral Fluid: 480 mL  Total IN: 480 mL    OUT:  Total OUT: 0 mL    Total NET: 480 mL        PHYSICAL EXAM:  General: NAD  Respiratory: b/l air entry  Cardiovascular: S1 S2  Gastrointestinal: soft  Extremities:  no edema                          LABORATORY:                        8.6    11.55 )-----------( 375      ( 27 May 2019 07:55 )             28.3     05-27    138  |  102  |  29<H>  ----------------------------<  90  4.9   |  28  |  2.30<H>    Ca    8.7      27 May 2019 07:55  Mg     2.5     05-27      Sodium, Serum: 138 mmol/L (05-27 @ 07:55)  Sodium, Serum: 139 mmol/L (05-26 @ 07:17)    Potassium, Serum: 4.9 mmol/L (05-27 @ 07:55)  Potassium, Serum: 4.5 mmol/L (05-26 @ 07:17)    Hemoglobin: 8.6 g/dL (05-27 @ 07:55)  Hemoglobin: 8.5 g/dL (05-26 @ 07:17)  Hemoglobin: 9.0 g/dL (05-25 @ 08:49)    Creatinine, Serum 2.30 (05-27 @ 07:55)  Creatinine, Serum 2.10 (05-26 @ 07:17)  Creatinine, Serum 2.10 (05-25 @ 08:49)

## 2019-05-27 NOTE — CHART NOTE - NSCHARTNOTEFT_GEN_A_CORE
Assessment: Tolerating diet, takes ~75% most meals, snacks provided. No new wt. For ZAHIRA this week. Renal following, Cr 2.3(on low Na+ diet with Hx malnutrition).    Factors impacting intake: [ ] none [ ] nausea  [ ] vomiting [ ] diarrhea [ ] constipation  [ ]chewing problems [ ] swallowing issues  [ ] other:     Diet Presciption: Diet, Regular:   Low Sodium  Supplement Feeding Modality:  Oral  Ensure Enlive Cans or Servings Per Day:  1       Frequency:  Daily  Health Shake Cans or Servings Per Day:  1       Frequency:  Daily (05-23-19 @ 09:37)  Diet, NPO after Midnight:      NPO Start Date: 27-May-2019,   NPO Start Time: 23:59 (05-27-19 @ 09:04)    Intake: ~75%    Current Weight: Weight (kg): 48.1 (05-20 @ 14:08)  Request current wt  % Weight Change    Pertinent Medications: MEDICATIONS  (STANDING):  ALBUTerol    0.083% 2.5 milliGRAM(s) Nebulizer every 8 hours  ampicillin  IVPB      ampicillin  IVPB 2 Gram(s) IV Intermittent every 8 hours  buPROPion XL . 150 milliGRAM(s) Oral daily  diltiazem    milliGRAM(s) Oral daily  furosemide    Tablet 40 milliGRAM(s) Oral daily  pantoprazole    Tablet 40 milliGRAM(s) Oral before breakfast  pramipexole 0.5 milliGRAM(s) Oral at bedtime  simvastatin 10 milliGRAM(s) Oral at bedtime  tiotropium 18 MICROgram(s) Capsule 1 Capsule(s) Inhalation daily    MEDICATIONS  (PRN):  acetaminophen   Tablet .. 650 milliGRAM(s) Oral every 6 hours PRN Temp greater or equal to 38C (100.4F), Mild Pain (1 - 3)    Pertinent Labs: 05-27 Na138 mmol/L Glu 90 mg/dL K+ 4.9 mmol/L Cr  2.30 mg/dL<H> BUN 29 mg/dL<H> 05-22 Alb 2.8 g/dL<L>     CAPILLARY BLOOD GLUCOSE        Skin: sacral st 1 pressure injury noted    Estimated Needs:   [x ] no change since previous assessment  [ ] recalculated:     Previous Nutrition Diagnosis:   [ ] Inadequate Energy Intake [ ]Inadequate Oral Intake [ ] Excessive Energy Intake   [ ] Underweight [ ] Increased Nutrient Needs [ ] Overweight/Obesity   [ ] Altered GI Function [ ] Unintended Weight Loss [ ] Food & Nutrition Related Knowledge Deficit [x ] Malnutrition     Nutrition Diagnosis is [x ] ongoing  [ ] resolved [ ] not applicable     New Nutrition Diagnosis: [x ] not applicable       Interventions:   Recommend  [ ] Change Diet To:  [ ] Nutrition Supplement  [ ] Nutrition Support  [x ] Other:   Encourage po intake of meals/snacks  Monitoring and Evaluation:   [ ] PO intake [ x ] Tolerance to diet prescription [ x ] weights [ x ] labs[ x ] follow up per protocol  [ ] other:

## 2019-05-27 NOTE — PROGRESS NOTE ADULT - SUBJECTIVE AND OBJECTIVE BOX
Patient is a 63y old  Female who presents with a chief complaint of CABA, weakness (27 May 2019 10:07)      INTERVAL HPI/OVERNIGHT EVENTS: Patient seen and examined. NAD. No complaints.    Vital Signs Last 24 Hrs  T(C): 37.2 (27 May 2019 05:11), Max: 37.2 (27 May 2019 05:11)  T(F): 98.9 (27 May 2019 05:11), Max: 98.9 (27 May 2019 05:11)  HR: 94 (27 May 2019 07:41) (83 - 96)  BP: 141/47 (27 May 2019 05:11) (104/47 - 141/47)  BP(mean): --  RR: 17 (27 May 2019 05:11) (17 - 18)  SpO2: 92% (27 May 2019 07:41) (91% - 93%)    05-27    138  |  102  |  29<H>  ----------------------------<  90  4.9   |  28  |  2.30<H>    Ca    8.7      27 May 2019 07:55  Mg     2.5     05-27                            8.6    11.55 )-----------( 375      ( 27 May 2019 07:55 )             28.3       CAPILLARY BLOOD GLUCOSE                  acetaminophen   Tablet .. 650 milliGRAM(s) Oral every 6 hours PRN  ALBUTerol    0.083% 2.5 milliGRAM(s) Nebulizer every 8 hours  ampicillin  IVPB      ampicillin  IVPB 2 Gram(s) IV Intermittent every 8 hours  buPROPion XL . 150 milliGRAM(s) Oral daily  diltiazem    milliGRAM(s) Oral daily  furosemide    Tablet 40 milliGRAM(s) Oral daily  pantoprazole    Tablet 40 milliGRAM(s) Oral before breakfast  pramipexole 0.5 milliGRAM(s) Oral at bedtime  simvastatin 10 milliGRAM(s) Oral at bedtime  tiotropium 18 MICROgram(s) Capsule 1 Capsule(s) Inhalation daily              REVIEW OF SYSTEMS:  CONSTITUTIONAL: No fever, no weight loss, or no fatigue  NECK: No pain, no stiffness  RESPIRATORY: No cough, no wheezing, no chills, no hemoptysis, No shortness of breath  CARDIOVASCULAR: No chest pain, no palpitations, no dizziness, no leg swelling  GASTROINTESTINAL: No abdominal pain. No nausea, no vomiting, no hematemesis; No diarrhea, no constipation. No melena, no hematochezia.  GENITOURINARY: No dysuria, no frequency, no hematuria, no incontinence  NEUROLOGICAL: No headaches, no loss of strength, no numbness, no tremors  SKIN: No itching, no burning  MUSCULOSKELETAL: No joint pain, no swelling; No muscle, no back, no extremity pain  PSYCHIATRIC: No depression, no mood swings,   HEME/LYMPH: No easy bruising, no bleeding gums  ALLERY AND IMMUNOLOGIC: No hives       Consultant(s) Notes Reviewed:  [X] YES  [ ] NO    PHYSICAL EXAM:  GENERAL: NAD  HEAD:  Atraumatic, Normocephalic  EYES: EOMI, PERRLA, conjunctiva and sclera clear  ENMT: No tonsillar erythema, exudates, or enlargement; Moist mucous membranes  NECK: Supple, No JVD  NERVOUS SYSTEM:  Awake & alert  CHEST/LUNG: Clear to auscultation bilaterally; No rales, rhonchi, wheezing,  HEART: Regular rate and rhythm  ABDOMEN: Soft, Nontender, Nondistended; Bowel sounds present  EXTREMITIES:  No clubbing, cyanosis, or edema  LYMPH: No lymphadenopathy noted  SKIN: No rashes      Advanced care planning discussed with patient/family [X] YES   [ ] NO    Advanced care planning discussed with patient/family. Advanced care planning forms reviewed/discussed/completed. 20 minutes spent.

## 2019-05-27 NOTE — PROGRESS NOTE ADULT - PROBLEM SELECTOR PLAN 1
seen and examined, awake, alert, on and off o2 support  ZAHIRA planned for Tuesday  cvs regimen  lasix  nebs and spiriva for copd  cardio follow up  ID following  TTE reviewed  serial labs  serial clinical assessment  tolerating room air at rest  will follow and monitor  smoking cess ed and counseling.

## 2019-05-27 NOTE — PROGRESS NOTE ADULT - ASSESSMENT
63y Female with HTN, CKD, PAD s/p right femoral endarterectomy, depression, and smoker (quit 5 PTA) presented to the ED c/o SOB and weakness that started in April but have gotten worse 5 days and a 10-lb weight loss within a month with less appetite.  Patient states, she had to stop to catch her breath in going to the bathroom from her living room.  She was here last month with c/o neck pain radiating to her chest, for which she though was an MI.  Her workup for ACS and PE was negative and she was sent home.  However, she was found to be anemic and was placed on Iron.  Her EGD last week was negative.  TTE in 2017 with normal LVF and no segmental WMA.  Nuclear stress test (Pharm) normal.    In the ED, her CxR showed small B/L pleural effusion, pro-BNP>60720, H/H 7.3/23.1, procalcitonin=1.12.  She is now receiving 1 unit of PRBC's.  Upon evaluation, she is dyspneic during conversation and requiring her HOB elevated.  She also has elevated creatinine (2.8), baseline = 2.1    Recommend:    HFpEF  - TTE shows mobile echodensity on AV with moderate to severe AI.  This is likely the cause of her CABA and heart failure.  The etiology of the mass is currently unclear  - Blood cultures persistently + for gram + cocci in pairs/chains, though, latest is negative.  She has been afebrile  - Pet scan negative of any evidence of infection  - ID following   - Scheduled for a ZAHIRA to better assess the aortic valve.  [employer] to shuttle in am, Tuesday, 5/28.  Patient is scheduled at 10 am.  NPO post MN tonight  - CT chest showed interstitial pulmonary edema, small B/L pleural effusions and multifocal B/L Pna  - Continue PO lasix. She now appears euvolemic   - Please continue to maintain strict I/Os, monitor daily weights, Cr, and K.   - Monitor electrolytes, replete to keep K>4 and Mag>2    INA on CKD  - Creatinine trended up slightly = 2.3 <--2.1.  - Patient now euvolemic.  Continue Lasix PO for now in anticipation of ZAHIRA.  Will reevaluate after if Lasix can be reduced, based on renal function and volume status  - Avoid nephrotoxics  - Renal following    Anemia  - Transfuse per Primary.  s/p 1 unit of PRBC's with appropriate response  - H/H remain stable (8.6/28.3)  - FOBT + but no overt GIB.  GI following, and recent EGD was unremarkable  - Heme following  - Would hold Plavix for now    HTN  - SBP stable  - Continue Cardizem CD    DVT ppx  - PAS for now    Further cardiac workup pending clinical course  Other workup per Primary  Will continue to follow    Judi Villanueva [facility]  Cardiology 63y Female with HTN, CKD, PAD s/p right femoral endarterectomy, depression, and smoker (quit 5 PTA) presented to the ED c/o SOB and weakness that started in April but have gotten worse 5 days and a 10-lb weight loss within a month with less appetite.  Patient states, she had to stop to catch her breath in going to the bathroom from her living room.  She was here last month with c/o neck pain radiating to her chest, for which she though was an MI.  Her workup for ACS and PE was negative and she was sent home.  However, she was found to be anemic and was placed on Iron.  Her EGD last week was negative.  TTE in 2017 with normal LVF and no segmental WMA.  Nuclear stress test (Pharm) normal.    In the ED, her CxR showed small B/L pleural effusion, pro-BNP>44171, H/H 7.3/23.1, procalcitonin=1.12.  She is now receiving 1 unit of PRBC's.  Upon evaluation, she is dyspneic during conversation and requiring her HOB elevated.  She also has elevated creatinine (2.8), baseline = 2.1    Recommend:    HFpEF  - TTE shows mobile echodensity on AV with moderate to severe AI.  This is likely the cause of her CABA and heart failure.  The etiology of the mass is currently unclear  - Blood cultures persistently + for gram + cocci in pairs/chains, though, latest is negative.  She has been afebrile  - Pet scan negative of any evidence of infection  - ID following   - Scheduled for a ZAHIRA to better assess the aortic valve.  [place] to shuttle in am, Tuesday, 5/28.  Patient is scheduled at 10 am.  NPO post MN tonight  - she had an EGD and colonoscopy 2 weeks ago that were unremarkable  - CT chest showed interstitial pulmonary edema, small B/L pleural effusions and multifocal B/L Pna  - Continue PO lasix. She now appears euvolemic   - Please continue to maintain strict I/Os, monitor daily weights, Cr, and K.   - Monitor electrolytes, replete to keep K>4 and Mag>2    INA on CKD  - Creatinine trended up slightly = 2.3 <--2.1.  - Patient now euvolemic.  Continue Lasix PO for now in anticipation of ZAHIRA.  Will reevaluate after if Lasix can be reduced, based on renal function and volume status  - Avoid nephrotoxics  - Renal following    Anemia  - Transfuse per Primary.  s/p 1 unit of PRBC's with appropriate response  - H/H remain stable (8.6/28.3)  - FOBT + but no overt GIB.  GI following, and recent EGD was unremarkable  - Heme following  - Would hold Plavix for now    HTN  - SBP stable  - Continue Cardizem CD    DVT ppx  - PAS for now    Further cardiac workup pending clinical course  Other workup per Primary  Will continue to follow    Judi Villanueva DNP  Cardiology DISPLAY PLAN FREE TEXT

## 2019-05-27 NOTE — PROGRESS NOTE ADULT - SUBJECTIVE AND OBJECTIVE BOX
[facility] -- Chao Lomax, Regino Syed, Roshan Paris Savella  Office # [phone]    Follow Up:  SOB    Subjective/Observations: Remains comfortable on RA.  Denies SOB, CABA, or orthopnea.  Denies CP or palpitations    REVIEW OF SYSTEMS: All other review of systems is negative unless indicated above    PAST MEDICAL & SURGICAL HISTORY:  Hypertension  Restless leg syndrome: Especially with General Anesthesia  Arthritis: Cervical Spine  and Hands  H/O cervical discectomy  Acute peptic ulcer with perforation: 1980  S/P appendectomy: 2014  S/P tubal ligation: 1986  Osteoarthritis of right shoulder region: Right Shoulder Arthroscopy  2007  Rheumatoid arthritis of carpometacarpal joint of thumb: Total Joint Replacement of Left Thumb    MEDICATIONS  (STANDING):  ALBUTerol    0.083% 2.5 milliGRAM(s) Nebulizer every 8 hours  ampicillin  IVPB      ampicillin  IVPB 2 Gram(s) IV Intermittent every 8 hours  buPROPion XL . 150 milliGRAM(s) Oral daily  diltiazem    milliGRAM(s) Oral daily  furosemide    Tablet 40 milliGRAM(s) Oral daily  pantoprazole    Tablet 40 milliGRAM(s) Oral before breakfast  pramipexole 0.5 milliGRAM(s) Oral at bedtime  simvastatin 10 milliGRAM(s) Oral at bedtime  tiotropium 18 MICROgram(s) Capsule 1 Capsule(s) Inhalation daily    MEDICATIONS  (PRN):  acetaminophen   Tablet .. 650 milliGRAM(s) Oral every 6 hours PRN Temp greater or equal to 38C (100.4F), Mild Pain (1 - 3)    Allergies    No Known Allergies    Intolerances    Vital Signs Last 24 Hrs  T(C): 37.2 (27 May 2019 05:11), Max: 37.2 (27 May 2019 05:11)  T(F): 98.9 (27 May 2019 05:11), Max: 98.9 (27 May 2019 05:11)  HR: 94 (27 May 2019 07:41) (83 - 96)  BP: 141/47 (27 May 2019 05:11) (104/47 - 141/47)  BP(mean): --  RR: 17 (27 May 2019 05:11) (17 - 18)  SpO2: 92% (27 May 2019 07:41) (91% - 93%)    I&O's Summary    26 May 2019 07:01  -  27 May 2019 07:00  --------------------------------------------------------  IN: 480 mL / OUT: 0 mL / NET: 480 mL    PHYSICAL EXAM:  TELE: Not on tele  Constitutional: NAD, awake and alert, well-developed  HEENT: Moist Mucous Membranes, Anicteric  Pulmonary: Non-labored, breath sounds are clear bilaterally, No wheezing, rales or rhonchi  Cardiovascular: Regular, S1 and S2, No murmurs, rubs, gallops or clicks  Gastrointestinal: Bowel Sounds present, soft, nontender.   Lymph: No peripheral edema. No lymphadenopathy.  Skin: No visible rashes or ulcers.  Psych:  Mood & affect appropriate    LABS: All Labs Reviewed:                        8.6    11.55 )-----------( 375      ( 27 May 2019 07:55 )             28.3                         8.5    11.73 )-----------( 342      ( 26 May 2019 07:17 )             27.7                         9.0    14.38 )-----------( 346      ( 25 May 2019 08:49 )             28.8     27 May 2019 07:55    138    |  102    |  29     ----------------------------<  90     4.9     |  28     |  2.30   26 May 2019 07:17    139    |  104    |  29     ----------------------------<  102    4.5     |  26     |  2.10   25 May 2019 08:49    136    |  103    |  28     ----------------------------<  120    4.4     |  23     |  2.10     Ca    8.7        27 May 2019 07:55  Ca    8.4        26 May 2019 07:17  Ca    8.3        25 May 2019 08:49  Mg     2.5       27 May 2019 07:55  Mg     2.2       26 May 2019 07:17  Mg     2.1       25 May 2019 08:49    < from: TTE Echo Doppler w/o Cont (05.21.19 @ 09:33) >     EXAM:  ECHO TTE WO CON COMP W DOPPLR         PROCEDURE DATE:  05/21/2019        INTERPRETATION:  INDICATION: Dyspnea    Blood Pressure 112/58    Height Weight 48kg       BSA    Dimensions:    LA 3.0       Normal Values: 2.0 - 4.0 cm    Ao 3.4  Normal Values: 2.0 - 3.8 cm  SEPTUM 1.1       Normal Values: 0.6 - 1.2 cm  PWT 1.2       Normal Values: 0.6 - 1.1 cm  LVIDd 5.4         Normal Values: 3.0 - 5.6 cm  LVIDs 3.3         Normal Values: 1.8 - 4.0 cm      OBSERVATIONS:    Mitral Valve: normal, mild MR.  Aortic Valve/Aorta: Calcified trileaflet aortic valve with probably   normal opening. Moderate to severe aortic insufficiency. There is a   mobile echogenic density seen on the aortic valve measuring 0.9 x 0.5 cm.  Tricuspid Valve: normal with trace TR.  Pulmonic Valve: Trace PI  Left Atrium: normal  Right Atrium: Not well-visualized  Left Ventricle: normal LV size and systolic function, estimated LVEF of   65%. Mild left ventricular hypertrophy  Right Ventricle: normal size and systolic function.  Pericardium/Pleura: normal, no significant pericardial effusion.      Conclusion:     Mild concentric LVH with normal internal dimensions and systolic   function, estimated LVEF of 65%.   Calcified trileaflet aortic valve with probably normal opening. Moderate   to severe aortic insufficiency. There is a mobile echogenic density seen   on the aortic valve measuring 0.9 x 0.5 cm.  Normal RV size and systolic function.     Normal trileaflet aortic valve, without AI.   Mild MR  Trace physiologic TR.    No significant pericardial effusion.      MICHAEL CHILDRESS   This document has been electronically signed. May 22 2019  8:49AM    < end of copied text >    < from: Xray Chest 1 View- PORTABLE-Urgent (05.22.19 @ 23:57) >    EXAM:  XR CHEST PORTABLE URGENT 1V                            PROCEDURE DATE:  05/22/2019          INTERPRETATION:  Fever.    AP chest. Prior 5/20/2019.    There is interval worsening of the bibasilar airspace infiltrates and   bilateral pleural effusions. Moderate bilateral pleural effusions at this   time. No other change.    Impression: Interval worsening at the lung bases as described.    NATIVIDAD ZAMORA M.D., ATTENDING RADIOLOGIST  This document has been electronically signed. May 23 2019  8:52AM     < end of copied text >    < from: CT Chest No Cont (05.20.19 @ 22:27) >    EXAM:  CT CHEST                            PROCEDURE DATE:  05/20/2019          INTERPRETATION:  [facility] RADIOLOGIST PRELIMINARY REPORT    EXAM:    CT Chest Without Contrast     EXAM DATE/TIME:    5/20/2019 10:13 PM     CLINICAL HISTORY:    63 years old, female; Signs and symptoms; Other: Weight loss, anemia;   Shortness of breath; Prior surgery; Surgery type: Exploratory, tubal   ligation     TECHNIQUE:    Imaging protocol: Axial computed tomography images of the chest without   intravenous contrast. Coronal and sagittal reformatted images were   created and   reviewed.    3D rendering: MIP reconstructed images were created and reviewed.     COMPARISON:    DX XR CHEST PA AND LATERAL 5/20/2019 3:59 PM     FINDINGS:    Lungs: Smooth interlobular septal thickening compatible with hydrostatic   interstitial pulmonary edema/CHF. Patchy airspace opacity in the lungs   bilaterally, compatible with pneumonia.    Pleural space: Small bilateral pleural effusions.    Heart: Normal. No cardiomegaly. No pericardial effusion.    Mediastinum: Esophagus is unremarkable.    Aorta: Normal. No aortic aneurysm.    Lymph nodes: Unremarkable. No enlarged lymph nodes.    Bones/joints: Unremarkable. No acute fracture.    Soft tissues: Unremarkable.     IMPRESSION:   1. Hydrostatic interstitial pulmonary edema/CHF.   2. Small bilateral pleural effusions.   3. Multifocal bilateral pneumonia.       =========================  EXAM:    CT Abdomen and Pelvis Without Contrast     EXAM DATE/TIME:    5/20/2019 10:13 PM     CLINICAL HISTORY:    63 years old, female; Signs and symptoms; Other: Weight loss, anemia;   Shortness of breath; Prior surgery; Surgery type: Exploratory, tubal   ligation     TECHNIQUE:    Imaging protocol: Axial computed tomography images of the abdomen and   pelvis   without contrast. Coronal and sagittal reformatted images were created   and   reviewed.    3D rendering: MIP reconstructed images were created and reviewed.     COMPARISON:    DX XR CHEST PA AND LATERAL 5/20/2019 3:59 PM     FINDINGS:     ABDOMEN:    Liver: Normal. No mass.    Gallbladder and bile ducts: Normal. No calcified stones. No ductal   dilation.    Pancreas: Normal. No ductal dilation.    Spleen: Normal. No splenomegaly.    Adrenals: Normal. No mass.    Kidneys and ureters: No hydronephrosis or urolithiasis. Cortical   thinning.   Stomach and bowel: Colonic diverticulosis. No diverticulitis. No bowel   wall   thickening or intestinal obstruction.    Appendix: Appendix not visualized. No evidence of appendicitis.     PELVIS:    Bladder: Unremarkable as visualized.    Reproductive: Unremarkable as visualized.     ABDOMEN and PELVIS:    Intraperitoneal space: Normal. No free air. No significant fluid   collection.    Bones/joints: No acute fracture. No dislocation.    Soft tissues: Unremarkable.    Vasculature: Normal. No abdominal aortic aneurysm.    Lymph nodes: Normal. No enlarged lymph nodes.    Other findings: 2.5 cm AAA. No rupture.     IMPRESSION:   No acute findings.    Agree with above report    NATIVIDAD ZAMORA M.D., ATTENDING RADIOLOGIST  This document has been electronically signed. May 21 2019 10:20AM     < end of copied text >

## 2019-05-27 NOTE — PROGRESS NOTE ADULT - SUBJECTIVE AND OBJECTIVE BOX
Date/Time Patient Seen:  		  Referring MD:   Data Reviewed	       Patient is a 63y old  Female who presents with a chief complaint of CABA, weakness (26 May 2019 12:36)      Subjective/HPI     PAST MEDICAL & SURGICAL HISTORY:  Hypertension  Restless leg syndrome: Especially with General Anesthesia  Arthritis: Cervical Spine  and Hands  H/O cervical discectomy  Acute peptic ulcer with perforation: 1980  S/P appendectomy: 2014  S/P tubal ligation: 1986  Osteoarthritis of right shoulder region: Right Shoulder Arthroscopy  2007  Rheumatoid arthritis of carpometacarpal joint of thumb: Total Joint Replacement of Left Thumb        Medication list         MEDICATIONS  (STANDING):  ALBUTerol    0.083% 2.5 milliGRAM(s) Nebulizer every 8 hours  ampicillin  IVPB      ampicillin  IVPB 2 Gram(s) IV Intermittent every 8 hours  buPROPion XL . 150 milliGRAM(s) Oral daily  diltiazem    milliGRAM(s) Oral daily  furosemide    Tablet 40 milliGRAM(s) Oral daily  pantoprazole    Tablet 40 milliGRAM(s) Oral before breakfast  pramipexole 0.5 milliGRAM(s) Oral at bedtime  simvastatin 10 milliGRAM(s) Oral at bedtime  tiotropium 18 MICROgram(s) Capsule 1 Capsule(s) Inhalation daily    MEDICATIONS  (PRN):  acetaminophen   Tablet .. 650 milliGRAM(s) Oral every 6 hours PRN Temp greater or equal to 38C (100.4F), Mild Pain (1 - 3)         Vitals log        ICU Vital Signs Last 24 Hrs  T(C): 37.2 (27 May 2019 05:11), Max: 37.2 (27 May 2019 05:11)  T(F): 98.9 (27 May 2019 05:11), Max: 98.9 (27 May 2019 05:11)  HR: 96 (27 May 2019 05:11) (77 - 96)  BP: 141/47 (27 May 2019 05:11) (104/47 - 141/47)  BP(mean): --  ABP: --  ABP(mean): --  RR: 17 (27 May 2019 05:11) (17 - 18)  SpO2: 92% (27 May 2019 05:11) (91% - 95%)           Input and Output:  I&O's Detail    26 May 2019 07:01  -  27 May 2019 06:02  --------------------------------------------------------  IN:    Oral Fluid: 480 mL  Total IN: 480 mL    OUT:  Total OUT: 0 mL    Total NET: 480 mL          Lab Data                        8.5    11.73 )-----------( 342      ( 26 May 2019 07:17 )             27.7     05-26    139  |  104  |  29<H>  ----------------------------<  102<H>  4.5   |  26  |  2.10<H>    Ca    8.4<L>      26 May 2019 07:17  Mg     2.2     05-26              Review of Systems	      Objective     Physical Examination    heart s1s2  lung dec BS      Pertinent Lab findings & Imaging      Mike:  NO   Adequate UO     I&O's Detail    26 May 2019 07:01  -  27 May 2019 06:02  --------------------------------------------------------  IN:    Oral Fluid: 480 mL  Total IN: 480 mL    OUT:  Total OUT: 0 mL    Total NET: 480 mL               Discussed with:     Cultures:	        Radiology

## 2019-05-27 NOTE — PROGRESS NOTE ADULT - SUBJECTIVE AND OBJECTIVE BOX
ID progress note     Name: VIVIANA CHAPMAN  Age: 63y  Gender: Female  MRN: [id number]    Interval History-- Events noted , scheduled for ZAHIRA tomorrow Repeat blood cs negative    Notes reviewed    Past Medical History--  Hypertension  Restless leg syndrome  Arthritis  H/O cervical discectomy  Acute peptic ulcer with perforation  S/P appendectomy  S/P tubal ligation  Osteoarthritis of right shoulder region  Rheumatoid arthritis of carpometacarpal joint of thumb      For details regarding the patient's social history, family history, and other miscellaneous elements, please refer the initial infectious diseases consultation and/or the admitting history and physical examination for this admission.    Allergies--  Allergies    No Known Allergies    Intolerances        Medications--  Antibiotics:  ampicillin  IVPB      ampicillin  IVPB 2 Gram(s) IV Intermittent every 8 hours    Immunologic:    Other:  acetaminophen   Tablet .. PRN  ALBUTerol    0.083%  buPROPion XL .  diltiazem   CD  furosemide    Tablet  pantoprazole    Tablet  pramipexole  simvastatin  tiotropium 18 MICROgram(s) Capsule      Review of Systems--  A 10-point review of systems was obtained.     Pertinent positives and negatives--  Constitutional: No fevers. No Chills. No Rigors.   Cardiovascular: No chest pain. No palpitations.  Respiratory: No shortness of breath. No cough.  Gastrointestinal: No nausea or vomiting. No diarrhea or constipation.   Psychiatric: Pleasant. Appropriate affect.    Review of systems otherwise negative except as previously noted.    Physical Examination--  Vital Signs: T(F): 98 (05-27-19 @ 13:55), Max: 98.9 (05-27-19 @ 05:11)  HR: 91 (05-27-19 @ 13:55)  BP: 124/45 (05-27-19 @ 13:55)  RR: 17 (05-27-19 @ 13:55)  SpO2: 90% (05-27-19 @ 13:55)  Wt(kg): --  General: Nontoxic-appearing Female in no acute distress.  HEENT: AT/NC. PERRL. EOMI. Anicteric. Conjunctiva pink and moist. Oropharynx clear. Dentition fair.  Neck: Not rigid. No sense of mass.  Nodes: None palpable.  Lungs: Clear bilaterally without rales, wheezing or rhonchi  Heart: Regular rate and rhythm. No Murmur. No rub. No gallop. No palpable thrill.  Abdomen: Bowel sounds present and normoactive. Soft. Nondistended. Nontender. No sense of mass. No organomegaly.  Back: No spinal tenderness. No costovertebral angle tenderness.   Extremities: No cyanosis or clubbing. No edema.   Skin: Warm. Dry. Good turgor. No rash. No vasculitic stigmata.  Psychiatric: Appropriate affect and mood for situation.         Laboratory Studies--  CBC                        8.6    11.55 )-----------( 375      ( 27 May 2019 07:55 )             28.3       Chemistries  05-27    138  |  102  |  29<H>  ----------------------------<  90  4.9   |  28  |  2.30<H>    Ca    8.7      27 May 2019 07:55  Mg     2.5     05-27        Culture Data    Culture - Blood (collected 25 May 2019 16:33)  Source: .Blood Blood-Peripheral  Preliminary Report (26 May 2019 17:01):    No growth to date.    Culture - Urine (collected 24 May 2019 21:21)  Source: .Urine Clean Catch (Midstream)  Final Report (25 May 2019 16:37):    No growth    Culture - Blood (collected 24 May 2019 11:05)  Source: .Blood Blood-Peripheral  Preliminary Report (25 May 2019 12:01):    No growth to date.    Culture - Blood (collected 24 May 2019 11:04)  Source: .Blood Blood-Peripheral  Preliminary Report (25 May 2019 12:01):    No growth to date.    Culture - Blood (collected 23 May 2019 10:50)  Source: .Blood Blood-Peripheral  Gram Stain (24 May 2019 00:59):    Growth in aerobic and anaerobic bottles: Gram Positive Cocci in Pairs and    Chains  Final Report (25 May 2019 13:55):    Growth in aerobic and anaerobic bottles: Enterococcus faecalis    See previous culture [id number]    Culture - Blood (collected 23 May 2019 10:50)  Source: .Blood Blood-Peripheral  Gram Stain (24 May 2019 01:00):    Growth in aerobic bottle: Gram Positive Cocci in Pairs and Chains    Growth in anaerobic bottle: Gram Positive Cocci in Pairs and Chains  Final Report (25 May 2019 13:53):    Growth in aerobic and anaerobic bottles: Enterococcus faecalis    See previous culture [id number]    Culture - Blood (collected 22 May 2019 16:46)  Source: .Blood Blood  Gram Stain (23 May 2019 03:52):    Growth in aerobic bottle: Gram Positive Cocci in Pairs and Chains    Growth in anaerobic bottle: Gram Positive Cocci in Pairs and Chains  Final Report (25 May 2019 13:51):    Growth in aerobic and anaerobic bottles: Enterococcus faecalis    "Due to technical problems, Proteus sp. will Not be reported as part of    the BCID panel until further notice"    ***Blood Panel PCR results on this specimen are available    approximately 3 hours after the Gram stain result.***    Gram stain, PCR, and/or culture results may not always    correspond due to difference in methodologies.    ************************************************************    This PCR assay was performed using [employer].    The following targets are tested for: Enterococcus,    vancomycin resistant enterococci, Listeria monocytogenes,    coagulase negative staphylococci, S. aureus,    methicillin resistant S. aureus, Streptococcus agalactiae    (Group B), S. pneumoniae, S. pyogenes (Group A),    Acinetobacter baumannii, Enterobacter cloacae, E. coli,    Klebsiella oxytoca, K. pneumoniae, Proteus sp.,    Serratia marcescens, Haemophilus influenzae,    Neisseria meningitidis, Pseudomonas aeruginosa, Candida    albicans, C. glabrata, C krusei, C parapsilosis,    C. tropicalis and the KPC resistance gene.  Organism: Blood Culture PCR  Enterococcus faecalis (25 May 2019 13:51)  Organism: Enterococcus faecalis (25 May 2019 13:51)  Organism: Blood Culture PCR (25 May 2019 13:51)    Culture - Blood (collected 22 May 2019 16:46)  Source: .Blood Blood  Gram Stain (23 May 2019 05:00):    Growth in aerobic bottle: Gram Positive Cocci in Pairs and Chains    Growth in anaerobic bottle: Gram Positive Cocci in Pairs and Chains  Final Report (25 May 2019 14:13):    Growth in aerobic and anaerobic bottles: Enterococcus faecalis    See previous culture [id number]            RADIOLOGY:  NM Multiple Day Procedure (05.24.19 @ 11:46) >  FINDINGS: These images demonstrate physiologic Tc-labeled leukocyte   distribution in the visualized structures. No abnormal tracer   accumulation is identified to suggest infection.    IMPRESSION: Normal Tc-labeled leukocyte scan.    No scan evidence of infection.    CT Chest No Cont (05.20.19 @ 22:27) >  FINDINGS:    Lungs: Smooth interlobular septal thickening compatible with hydrostatic   interstitial pulmonary edema/CHF. Patchy airspace opacity in the lungs   bilaterally, compatible with pneumonia.    Pleural space: Small bilateral pleural effusions.    Heart: Normal. No cardiomegaly. No pericardial effusion.    Mediastinum: Esophagus is unremarkable.    Aorta: Normal. No aortic aneurysm.    Lymph nodes: Unremarkable. No enlarged lymph nodes.    Bones/joints: Unremarkable. No acute fracture.    Soft tissues: Unremarkable.     IMPRESSION:   1. Hydrostatic interstitial pulmonary edema/CHF.   2. Small bilateral pleural effusions.   3. Multifocal bilateral pneumonia.     CT Chest No Cont (05.20.19 @ 22:27) >  FINDINGS:     ABDOMEN:    Liver: Normal. No mass.    Gallbladder and bile ducts: Normal. No calcified stones. No ductal   dilation.    Pancreas: Normal. No ductal dilation.    Spleen: Normal. No splenomegaly.    Adrenals: Normal. No mass.    Kidneys and ureters: No hydronephrosis or urolithiasis. Cortical   thinning.   Stomach and bowel: Colonic diverticulosis. No diverticulitis. No bowel   wall   thickening or intestinal obstruction.    Appendix: Appendix not visualized. No evidence of appendicitis.     PELVIS:    Bladder: Unremarkable as visualized.    Reproductive: Unremarkable as visualized.     ABDOMEN and PELVIS:    Intraperitoneal space: Normal. No free air. No significant fluid   collection.    Bones/joints: No acute fracture. No dislocation.    Soft tissues: Unremarkable.    Vasculature: Normal. No abdominal aortic aneurysm.    Lymph nodes: Normal. No enlarged lymph nodes.    Other findings: 2.5 cm AAA. No rupture.     IMPRESSION:   No acute findings.    Agree with above report    TTE Echo Doppler w/o Cont (05.21.19 @ 09:33) >  Dimensions:    LA 3.0       Normal Values: 2.0 - 4.0 cm    Ao 3.4  Normal Values: 2.0 - 3.8 cm  SEPTUM 1.1       Normal Values: 0.6 - 1.2 cm  PWT 1.2       Normal Values: 0.6 - 1.1 cm  LVIDd 5.4         Normal Values: 3.0 - 5.6 cm  LVIDs 3.3         Normal Values: 1.8 - 4.0 cm      OBSERVATIONS:    Mitral Valve: normal, mild MR.  Aortic Valve/Aorta: Calcified trileaflet aortic valve with probably   normal opening. Moderate to severe aortic insufficiency. There is a   mobile echogenic density seen on the aortic valve measuring 0.9 x 0.5 cm.  Tricuspid Valve: normal with trace TR.  Pulmonic Valve: Trace PI  Left Atrium: normal  Right Atrium: Not well-visualized  Left Ventricle: normal LV size and systolic function, estimated LVEF of   65%. Mild left ventricular hypertrophy  Right Ventricle: normal size and systolic function.  Pericardium/Pleura: normal, no significant pericardial effusion.      Conclusion:     Mild concentric LVH with normal internal dimensions and systolic   function, estimated LVEF of 65%.   Calcified trileaflet aortic valve with probably normal opening. Moderate   to severe aortic insufficiency. There is a mobile echogenic density seen   on the aortic valve measuring 0.9 x 0.5 cm.  Normal RV size and systolic function.     Normal trileaflet aortic valve, without AI.   Mild MR  Trace physiologic TR.    No significant pericardial effusion.      Assessment :     63y Female with HTN, CKD, PAD s/p right femoral endarterectomy, depression, and smoker (quit 5 days ago) presented to the ED c/o SOB and weakness that started in April but have gotten worse 5 days and a 10-lb weight loss within a month with less appetite  Patient states, she had to stop to catch her breath in going to the bathroom from her living room.  She used to be a very active woman until recently.  Denies edema, however, admits to orthopnea.  Denies CP or palpitations, though, she was here last month with c/o neck pain radiating to her chest, for which she though was an MI.  Her workup for ACS and PE was negative and she was sent home.  However, she was found to be anemic and was placed on Iron.  Her EGD last week was negative.  She denies any melena.  Denies fever chills, nausea, vomiting, diarrhea, constipation, abdominal pain    Her presentation was consistent with CHF likely from severe aortic insufficiency . She has prior hx of mild AI . Now echo shows possible mobile echogenic density on aortic valve suspicious for infective endocarditis . She has anemia ? anemia of chronic disease. I doubt if she ever had pneumonia . She was placed on antibiotics for presumed pneumonia on admission and No cultures were done . Her presentation could be all related to subacute IE     She has multiple positive blood cs with Enterococcus Fecalis . She most likely has infective endocarditis , source of bacteremia is unclear . ? urine or GI tract, she did have recent colonoscopy     Plan :   - will continue with  Ampicillin 2 grams q 8 hours x 6 weeks . As she has CKD cannot use gentamicin for synergy   - for  ZAHIRA on Tuesday     - trend cbc   - diuretics as per cardiology and nephrology  - dc planning , will need home IV abx   - picc line placement     Case discussed with medical team     Continue with present regime .  Appropriate use of antibiotics and adverse effects reviewed.    I have discussed the above plan of care with patient's family in detail. They expressed understanding of the treatment plan . Risks, benefits and alternatives discussed in detail. I have asked if they have any questions or concerns and appropriately addressed them to the best of my ability .      > 35 minutes spent in direct patient care reviewing  the notes, lab data/ imaging , discussion with multidisciplinary team. All questions were addressed and answered to the best of my capacity .    Thank you for allowing me to participate in the care of your patient .        Aiyana Kay MD  [phone]

## 2019-05-28 ENCOUNTER — APPOINTMENT (OUTPATIENT)
Dept: CV DIAGNOSITCS | Facility: HOSPITAL | Age: 64
End: 2019-05-28

## 2019-05-28 ENCOUNTER — TRANSCRIPTION ENCOUNTER (OUTPATIENT)
Age: 64
End: 2019-05-28

## 2019-05-28 ENCOUNTER — INPATIENT (INPATIENT)
Facility: HOSPITAL | Age: 64
LOS: 9 days | Discharge: HOME | DRG: 216 | End: 2019-06-07
Attending: THORACIC SURGERY (CARDIOTHORACIC VASCULAR SURGERY) | Admitting: THORACIC SURGERY (CARDIOTHORACIC VASCULAR SURGERY)
Payer: COMMERCIAL

## 2019-05-28 ENCOUNTER — CHART COPY (OUTPATIENT)
Age: 64
End: 2019-05-28

## 2019-05-28 VITALS
WEIGHT: 106.92 LBS | HEART RATE: 95 BPM | HEIGHT: 64 IN | SYSTOLIC BLOOD PRESSURE: 146 MMHG | RESPIRATION RATE: 20 BRPM | OXYGEN SATURATION: 95 % | TEMPERATURE: 99 F | DIASTOLIC BLOOD PRESSURE: 56 MMHG

## 2019-05-28 VITALS — OXYGEN SATURATION: 93 %

## 2019-05-28 DIAGNOSIS — I33.0 ACUTE AND SUBACUTE INFECTIVE ENDOCARDITIS: ICD-10-CM

## 2019-05-28 DIAGNOSIS — M19.011 PRIMARY OSTEOARTHRITIS, RIGHT SHOULDER: Chronic | ICD-10-CM

## 2019-05-28 DIAGNOSIS — N18.9 CHRONIC KIDNEY DISEASE, UNSPECIFIED: ICD-10-CM

## 2019-05-28 DIAGNOSIS — Z98.890 OTHER SPECIFIED POSTPROCEDURAL STATES: Chronic | ICD-10-CM

## 2019-05-28 DIAGNOSIS — M06.9 RHEUMATOID ARTHRITIS, UNSPECIFIED: Chronic | ICD-10-CM

## 2019-05-28 DIAGNOSIS — Z98.51 TUBAL LIGATION STATUS: Chronic | ICD-10-CM

## 2019-05-28 DIAGNOSIS — Z90.49 ACQUIRED ABSENCE OF OTHER SPECIFIED PARTS OF DIGESTIVE TRACT: Chronic | ICD-10-CM

## 2019-05-28 DIAGNOSIS — I35.1 NONRHEUMATIC AORTIC (VALVE) INSUFFICIENCY: ICD-10-CM

## 2019-05-28 DIAGNOSIS — I25.10 ATHEROSCLEROTIC HEART DISEASE OF NATIVE CORONARY ARTERY WITHOUT ANGINA PECTORIS: ICD-10-CM

## 2019-05-28 DIAGNOSIS — K27.1 ACUTE PEPTIC ULCER, SITE UNSPECIFIED, WITH PERFORATION: Chronic | ICD-10-CM

## 2019-05-28 LAB
ANION GAP SERPL CALC-SCNC: 10 MMOL/L — SIGNIFICANT CHANGE UP (ref 5–17)
BLD GP AB SCN SERPL QL: NEGATIVE — SIGNIFICANT CHANGE UP
BUN SERPL-MCNC: 30 MG/DL — HIGH (ref 7–23)
CALCIUM SERPL-MCNC: 8.7 MG/DL — SIGNIFICANT CHANGE UP (ref 8.5–10.1)
CHLORIDE SERPL-SCNC: 103 MMOL/L — SIGNIFICANT CHANGE UP (ref 96–108)
CO2 SERPL-SCNC: 25 MMOL/L — SIGNIFICANT CHANGE UP (ref 22–31)
CREAT SERPL-MCNC: 2.3 MG/DL — HIGH (ref 0.5–1.3)
ESTIMATED AVERAGE GLUCOSE: 97 MG/DL — SIGNIFICANT CHANGE UP (ref 68–114)
GLUCOSE SERPL-MCNC: 103 MG/DL — HIGH (ref 70–99)
HBA1C BLD-MCNC: 5 % — SIGNIFICANT CHANGE UP (ref 4–5.6)
HBA1C BLD-MCNC: 5 % — SIGNIFICANT CHANGE UP (ref 4–5.6)
HCT VFR BLD CALC: 27.9 % — LOW (ref 34.5–45)
HGB BLD-MCNC: 8.6 G/DL — LOW (ref 11.5–15.5)
INR BLD: 1.15 RATIO — SIGNIFICANT CHANGE UP (ref 0.88–1.16)
MAGNESIUM SERPL-MCNC: 2.5 MG/DL — SIGNIFICANT CHANGE UP (ref 1.6–2.6)
MCHC RBC-ENTMCNC: 27 PG — SIGNIFICANT CHANGE UP (ref 27–34)
MCHC RBC-ENTMCNC: 30.8 GM/DL — LOW (ref 32–36)
MCV RBC AUTO: 87.7 FL — SIGNIFICANT CHANGE UP (ref 80–100)
NRBC # BLD: 0 /100 WBCS — SIGNIFICANT CHANGE UP (ref 0–0)
PA ADP PRP-ACNC: 264 PRU — SIGNIFICANT CHANGE UP (ref 194–417)
PLATELET # BLD AUTO: 380 K/UL — SIGNIFICANT CHANGE UP (ref 150–400)
POTASSIUM SERPL-MCNC: 4.3 MMOL/L — SIGNIFICANT CHANGE UP (ref 3.5–5.3)
POTASSIUM SERPL-SCNC: 4.3 MMOL/L — SIGNIFICANT CHANGE UP (ref 3.5–5.3)
PROTHROM AB SERPL-ACNC: 13 SEC — HIGH (ref 10–12.9)
RBC # BLD: 3.18 M/UL — LOW (ref 3.8–5.2)
RBC # FLD: 17.2 % — HIGH (ref 10.3–14.5)
RH IG SCN BLD-IMP: POSITIVE — SIGNIFICANT CHANGE UP
SODIUM SERPL-SCNC: 138 MMOL/L — SIGNIFICANT CHANGE UP (ref 135–145)
TSH SERPL-MCNC: 1.05 UIU/ML — SIGNIFICANT CHANGE UP (ref 0.27–4.2)
WBC # BLD: 10.71 K/UL — HIGH (ref 3.8–10.5)
WBC # FLD AUTO: 10.71 K/UL — HIGH (ref 3.8–10.5)

## 2019-05-28 PROCEDURE — 83735 ASSAY OF MAGNESIUM: CPT

## 2019-05-28 PROCEDURE — 71046 X-RAY EXAM CHEST 2 VIEWS: CPT

## 2019-05-28 PROCEDURE — 36415 COLL VENOUS BLD VENIPUNCTURE: CPT

## 2019-05-28 PROCEDURE — 82746 ASSAY OF FOLIC ACID SERUM: CPT

## 2019-05-28 PROCEDURE — 85730 THROMBOPLASTIN TIME PARTIAL: CPT

## 2019-05-28 PROCEDURE — 93970 EXTREMITY STUDY: CPT

## 2019-05-28 PROCEDURE — 99232 SBSQ HOSP IP/OBS MODERATE 35: CPT

## 2019-05-28 PROCEDURE — 74176 CT ABD & PELVIS W/O CONTRAST: CPT

## 2019-05-28 PROCEDURE — 87186 SC STD MICRODIL/AGAR DIL: CPT

## 2019-05-28 PROCEDURE — 83521 IG LIGHT CHAINS FREE EACH: CPT

## 2019-05-28 PROCEDURE — 93306 TTE W/DOPPLER COMPLETE: CPT | Mod: 26

## 2019-05-28 PROCEDURE — 83615 LACTATE (LD) (LDH) ENZYME: CPT

## 2019-05-28 PROCEDURE — 82607 VITAMIN B-12: CPT

## 2019-05-28 PROCEDURE — 82248 BILIRUBIN DIRECT: CPT

## 2019-05-28 PROCEDURE — 87640 STAPH A DNA AMP PROBE: CPT

## 2019-05-28 PROCEDURE — 99253 IP/OBS CNSLTJ NEW/EST LOW 45: CPT | Mod: 24

## 2019-05-28 PROCEDURE — 85027 COMPLETE CBC AUTOMATED: CPT

## 2019-05-28 PROCEDURE — 85610 PROTHROMBIN TIME: CPT

## 2019-05-28 PROCEDURE — 82784 ASSAY IGA/IGD/IGG/IGM EACH: CPT

## 2019-05-28 PROCEDURE — 84156 ASSAY OF PROTEIN URINE: CPT

## 2019-05-28 PROCEDURE — 86140 C-REACTIVE PROTEIN: CPT

## 2019-05-28 PROCEDURE — 87899 AGENT NOS ASSAY W/OPTIC: CPT

## 2019-05-28 PROCEDURE — 83540 ASSAY OF IRON: CPT

## 2019-05-28 PROCEDURE — 86850 RBC ANTIBODY SCREEN: CPT

## 2019-05-28 PROCEDURE — 86334 IMMUNOFIX E-PHORESIS SERUM: CPT

## 2019-05-28 PROCEDURE — 83880 ASSAY OF NATRIURETIC PEPTIDE: CPT

## 2019-05-28 PROCEDURE — 84155 ASSAY OF PROTEIN SERUM: CPT

## 2019-05-28 PROCEDURE — 85652 RBC SED RATE AUTOMATED: CPT

## 2019-05-28 PROCEDURE — 87040 BLOOD CULTURE FOR BACTERIA: CPT

## 2019-05-28 PROCEDURE — 87449 NOS EACH ORGANISM AG IA: CPT

## 2019-05-28 PROCEDURE — 93005 ELECTROCARDIOGRAM TRACING: CPT

## 2019-05-28 PROCEDURE — 82570 ASSAY OF URINE CREATININE: CPT

## 2019-05-28 PROCEDURE — 99221 1ST HOSP IP/OBS SF/LOW 40: CPT

## 2019-05-28 PROCEDURE — 83605 ASSAY OF LACTIC ACID: CPT

## 2019-05-28 PROCEDURE — 76376 3D RENDER W/INTRP POSTPROCES: CPT | Mod: 26

## 2019-05-28 PROCEDURE — 84145 PROCALCITONIN (PCT): CPT

## 2019-05-28 PROCEDURE — 71045 X-RAY EXAM CHEST 1 VIEW: CPT

## 2019-05-28 PROCEDURE — 76770 US EXAM ABDO BACK WALL COMP: CPT

## 2019-05-28 PROCEDURE — 93312 ECHO TRANSESOPHAGEAL: CPT | Mod: 26

## 2019-05-28 PROCEDURE — 82272 OCCULT BLD FECES 1-3 TESTS: CPT

## 2019-05-28 PROCEDURE — 71045 X-RAY EXAM CHEST 1 VIEW: CPT | Mod: 26

## 2019-05-28 PROCEDURE — 94640 AIRWAY INHALATION TREATMENT: CPT

## 2019-05-28 PROCEDURE — 86901 BLOOD TYPING SEROLOGIC RH(D): CPT

## 2019-05-28 PROCEDURE — 84166 PROTEIN E-PHORESIS/URINE/CSF: CPT

## 2019-05-28 PROCEDURE — 80048 BASIC METABOLIC PNL TOTAL CA: CPT

## 2019-05-28 PROCEDURE — 71250 CT THORAX DX C-: CPT

## 2019-05-28 PROCEDURE — 87150 DNA/RNA AMPLIFIED PROBE: CPT

## 2019-05-28 PROCEDURE — 99285 EMERGENCY DEPT VISIT HI MDM: CPT | Mod: 25

## 2019-05-28 PROCEDURE — 78802 RP LOCLZJ TUM WHBDY 1 D IMG: CPT

## 2019-05-28 PROCEDURE — 85045 AUTOMATED RETICULOCYTE COUNT: CPT

## 2019-05-28 PROCEDURE — 84165 PROTEIN E-PHORESIS SERUM: CPT

## 2019-05-28 PROCEDURE — 81001 URINALYSIS AUTO W/SCOPE: CPT

## 2019-05-28 PROCEDURE — 86900 BLOOD TYPING SEROLOGIC ABO: CPT

## 2019-05-28 PROCEDURE — P9016: CPT

## 2019-05-28 PROCEDURE — 87086 URINE CULTURE/COLONY COUNT: CPT

## 2019-05-28 PROCEDURE — 94760 N-INVAS EAR/PLS OXIMETRY 1: CPT

## 2019-05-28 PROCEDURE — 83010 ASSAY OF HAPTOGLOBIN QUANT: CPT

## 2019-05-28 PROCEDURE — 93306 TTE W/DOPPLER COMPLETE: CPT

## 2019-05-28 PROCEDURE — 86160 COMPLEMENT ANTIGEN: CPT

## 2019-05-28 PROCEDURE — 80053 COMPREHEN METABOLIC PANEL: CPT

## 2019-05-28 PROCEDURE — 86923 COMPATIBILITY TEST ELECTRIC: CPT

## 2019-05-28 PROCEDURE — 87641 MR-STAPH DNA AMP PROBE: CPT

## 2019-05-28 PROCEDURE — 83550 IRON BINDING TEST: CPT

## 2019-05-28 PROCEDURE — 82728 ASSAY OF FERRITIN: CPT

## 2019-05-28 RX ORDER — PRAMIPEXOLE DIHYDROCHLORIDE 0.12 MG/1
0.5 TABLET ORAL AT BEDTIME
Refills: 0 | Status: DISCONTINUED | OUTPATIENT
Start: 2019-05-28 | End: 2019-05-31

## 2019-05-28 RX ORDER — TIOTROPIUM BROMIDE 18 UG/1
1 CAPSULE ORAL; RESPIRATORY (INHALATION) DAILY
Refills: 0 | Status: DISCONTINUED | OUTPATIENT
Start: 2019-05-28 | End: 2019-05-31

## 2019-05-28 RX ORDER — DILTIAZEM HCL 120 MG
360 CAPSULE, EXT RELEASE 24 HR ORAL DAILY
Refills: 0 | Status: DISCONTINUED | OUTPATIENT
Start: 2019-05-28 | End: 2019-05-31

## 2019-05-28 RX ORDER — SODIUM CHLORIDE 9 MG/ML
3 INJECTION INTRAMUSCULAR; INTRAVENOUS; SUBCUTANEOUS EVERY 8 HOURS
Refills: 0 | Status: DISCONTINUED | OUTPATIENT
Start: 2019-05-28 | End: 2019-05-31

## 2019-05-28 RX ORDER — ACETAMINOPHEN 500 MG
650 TABLET ORAL EVERY 6 HOURS
Refills: 0 | Status: DISCONTINUED | OUTPATIENT
Start: 2019-05-28 | End: 2019-05-31

## 2019-05-28 RX ORDER — PANTOPRAZOLE SODIUM 20 MG/1
1 TABLET, DELAYED RELEASE ORAL
Qty: 0 | Refills: 0 | DISCHARGE
Start: 2019-05-28

## 2019-05-28 RX ORDER — FUROSEMIDE 40 MG
1 TABLET ORAL
Qty: 0 | Refills: 0 | DISCHARGE
Start: 2019-05-28

## 2019-05-28 RX ORDER — FUROSEMIDE 40 MG
40 TABLET ORAL DAILY
Refills: 0 | Status: DISCONTINUED | OUTPATIENT
Start: 2019-05-28 | End: 2019-05-28

## 2019-05-28 RX ORDER — AMPICILLIN TRIHYDRATE 250 MG
CAPSULE ORAL
Refills: 0 | Status: DISCONTINUED | OUTPATIENT
Start: 2019-05-28 | End: 2019-05-31

## 2019-05-28 RX ORDER — ALBUTEROL 90 UG/1
2.5 AEROSOL, METERED ORAL ONCE
Refills: 0 | Status: DISCONTINUED | OUTPATIENT
Start: 2019-05-28 | End: 2019-05-31

## 2019-05-28 RX ORDER — AMPICILLIN TRIHYDRATE 250 MG
2 CAPSULE ORAL ONCE
Refills: 0 | Status: COMPLETED | OUTPATIENT
Start: 2019-05-28 | End: 2019-05-28

## 2019-05-28 RX ORDER — PANTOPRAZOLE SODIUM 20 MG/1
40 TABLET, DELAYED RELEASE ORAL
Refills: 0 | Status: DISCONTINUED | OUTPATIENT
Start: 2019-05-28 | End: 2019-05-31

## 2019-05-28 RX ORDER — AMPICILLIN TRIHYDRATE 250 MG
2 CAPSULE ORAL EVERY 6 HOURS
Refills: 0 | Status: DISCONTINUED | OUTPATIENT
Start: 2019-05-29 | End: 2019-05-31

## 2019-05-28 RX ORDER — BUPROPION HYDROCHLORIDE 150 MG/1
150 TABLET, EXTENDED RELEASE ORAL DAILY
Refills: 0 | Status: DISCONTINUED | OUTPATIENT
Start: 2019-05-28 | End: 2019-05-31

## 2019-05-28 RX ORDER — CLOPIDOGREL BISULFATE 75 MG/1
1 TABLET, FILM COATED ORAL
Qty: 0 | Refills: 0 | DISCHARGE

## 2019-05-28 RX ORDER — SIMVASTATIN 20 MG/1
10 TABLET, FILM COATED ORAL AT BEDTIME
Refills: 0 | Status: DISCONTINUED | OUTPATIENT
Start: 2019-05-28 | End: 2019-05-31

## 2019-05-28 RX ORDER — ALBUTEROL 90 UG/1
0 AEROSOL, METERED ORAL
Qty: 0 | Refills: 0 | DISCHARGE
Start: 2019-05-28

## 2019-05-28 RX ORDER — TIOTROPIUM BROMIDE 18 UG/1
1 CAPSULE ORAL; RESPIRATORY (INHALATION)
Qty: 0 | Refills: 0 | DISCHARGE
Start: 2019-05-28

## 2019-05-28 RX ORDER — CEFTRIAXONE 500 MG/1
2 INJECTION, POWDER, FOR SOLUTION INTRAMUSCULAR; INTRAVENOUS EVERY 12 HOURS
Refills: 0 | Status: DISCONTINUED | OUTPATIENT
Start: 2019-05-28 | End: 2019-05-31

## 2019-05-28 RX ADMIN — Medication 40 MILLIGRAM(S): at 06:25

## 2019-05-28 RX ADMIN — SODIUM CHLORIDE 3 MILLILITER(S): 9 INJECTION INTRAMUSCULAR; INTRAVENOUS; SUBCUTANEOUS at 21:10

## 2019-05-28 RX ADMIN — Medication 216 GRAM(S): at 17:54

## 2019-05-28 RX ADMIN — PRAMIPEXOLE DIHYDROCHLORIDE 0.5 MILLIGRAM(S): 0.12 TABLET ORAL at 21:23

## 2019-05-28 RX ADMIN — Medication 216 GRAM(S): at 23:55

## 2019-05-28 RX ADMIN — CEFTRIAXONE 100 GRAM(S): 500 INJECTION, POWDER, FOR SOLUTION INTRAMUSCULAR; INTRAVENOUS at 18:34

## 2019-05-28 RX ADMIN — Medication 40 MILLIGRAM(S): at 17:17

## 2019-05-28 RX ADMIN — Medication 360 MILLIGRAM(S): at 06:25

## 2019-05-28 RX ADMIN — PANTOPRAZOLE SODIUM 40 MILLIGRAM(S): 20 TABLET, DELAYED RELEASE ORAL at 06:25

## 2019-05-28 RX ADMIN — TIOTROPIUM BROMIDE 1 CAPSULE(S): 18 CAPSULE ORAL; RESPIRATORY (INHALATION) at 06:25

## 2019-05-28 RX ADMIN — ALBUTEROL 2.5 MILLIGRAM(S): 90 AEROSOL, METERED ORAL at 07:42

## 2019-05-28 RX ADMIN — SIMVASTATIN 10 MILLIGRAM(S): 20 TABLET, FILM COATED ORAL at 21:23

## 2019-05-28 RX ADMIN — Medication 216 GRAM(S): at 02:19

## 2019-05-28 RX ADMIN — Medication 650 MILLIGRAM(S): at 19:19

## 2019-05-28 RX ADMIN — Medication 650 MILLIGRAM(S): at 17:17

## 2019-05-28 NOTE — DISCHARGE NOTE PROVIDER - CARE PROVIDER_API CALL
Angelo Jarquin)  Internal Medicine  [address] [place] [address]  Phone: [phone]  Fax: [phone]  Follow Up Time: 1 week

## 2019-05-28 NOTE — DISCHARGE NOTE PROVIDER - NSDCCPCAREPLAN_GEN_ALL_CORE_FT
PRINCIPAL DISCHARGE DIAGNOSIS  Diagnosis: Valvular endocarditis  Assessment and Plan of Treatment: ZAHIRA  Will need AVR

## 2019-05-28 NOTE — DISCHARGE NOTE PROVIDER - HOSPITAL COURSE
This is a 63 F with PMH of HTN CKD PAD depression smoker who came in c/o 5 day h/o increased CABA and weakness. She denies CP, n/v/d, palpitations, BRBPR, melena. + anorexia. Ten pound weight loss in 1 month. Patient had an EGD/colon last week which she says was normal.        Past medical history is also remarkable for peripheral arterial disease status post right femoral endarterectomy for claudication, stenosis 75% right internal carotid artery stenosis. She had a unremarkable echo and negative pharmacologic stress nuclear evaluation in March of 2017.        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    Staying at [facility] for AVR        >30 minutes spent on discharge

## 2019-05-28 NOTE — PROGRESS NOTE ADULT - ASSESSMENT
63y Female with HTN, CKD, PAD s/p right femoral endarterectomy, depression, and smoker (quit 5 PTA) presented to the ED c/o SOB and weakness that started in April but have gotten worse 5 days and a 10-lb weight loss within a month with less appetite.  Patient states, she had to stop to catch her breath in going to the bathroom from her living room.  She was here last month with c/o neck pain radiating to her chest, for which she though was an MI.  Her workup for ACS and PE was negative and she was sent home.  However, she was found to be anemic and was placed on Iron.  Her EGD last week was negative.  TTE in 2017 with normal LVF and no segmental WMA.  Nuclear stress test (Pharm) normal.    In the ED, her CxR showed small B/L pleural effusion, pro-BNP>04639, H/H 7.3/23.1, procalcitonin=1.12.  She is now receiving 1 unit of PRBC's.  Upon evaluation, she is dyspneic during conversation and requiring her HOB elevated.  She also has elevated creatinine (2.8), baseline = 2.1    Recommend:    HFpEF  - TTE shows mobile echodensity on AV with moderate to severe AI.  This is likely the cause of her CABA and heart failure.  The etiology of the mass is currently unclear  - Blood cultures persistently + for gram + cocci in pairs/chains, though, latest is negative.  She has been afebrile  - Pet scan negative of any evidence of infection  - ID following   - Scheduled for a ZAHIRA to better assess the aortic valve.  She is going to [facility] this AM.  SHe is NPO  - she had an EGD and colonoscopy 2 weeks ago that were unremarkable  - CT chest showed interstitial pulmonary edema, small B/L pleural effusions and multifocal B/L Pna  - Continue PO lasix. She now appears euvolemic   - Please continue to maintain strict I/Os, monitor daily weights, Cr, and K.   - Monitor electrolytes, replete to keep K>4 and Mag>2    INA on CKD  - Creatinine trended up slightly = 2.3 <--2.1.  - Patient now euvolemic.  Continue Lasix PO for now in anticipation of ZAHIRA.  Will reevaluate after if Lasix can be reduced, based on renal function and volume status  - Avoid nephrotoxics  - Renal following    Anemia  - Transfuse per Primary.  s/p 1 unit of PRBC's with appropriate response  - H/H remain stable (8.6/28.3)  - FOBT + but no overt GIB.  GI following, and recent EGD was unremarkable  - Heme following  - Would hold Plavix for now    HTN  - SBP stable  - Continue Cardizem CD    DVT ppx  - PAS for now    Further cardiac workup pending clinical course  Other workup per Primary  Will continue to follow

## 2019-05-28 NOTE — PROGRESS NOTE ADULT - SUBJECTIVE AND OBJECTIVE BOX
INTERVAL HPI/OVERNIGHT EVENTS:  pt seen and examined  denies n/v/d/c/melena/brbpr  for torito today    MEDICATIONS  (STANDING):  ALBUTerol    0.083% 2.5 milliGRAM(s) Nebulizer every 8 hours  ampicillin  IVPB      ampicillin  IVPB 2 Gram(s) IV Intermittent every 8 hours  buPROPion XL . 150 milliGRAM(s) Oral daily  diltiazem    milliGRAM(s) Oral daily  furosemide    Tablet 40 milliGRAM(s) Oral daily  pantoprazole    Tablet 40 milliGRAM(s) Oral before breakfast  pramipexole 0.5 milliGRAM(s) Oral at bedtime  simvastatin 10 milliGRAM(s) Oral at bedtime  tiotropium 18 MICROgram(s) Capsule 1 Capsule(s) Inhalation daily    MEDICATIONS  (PRN):  acetaminophen   Tablet .. 650 milliGRAM(s) Oral every 6 hours PRN Temp greater or equal to 38C (100.4F), Mild Pain (1 - 3)      Allergies    No Known Allergies    Intolerances        Review of Systems:    General:  No wt loss, fevers, chills, night sweats, fatigue   Eyes:  Good vision, no reported pain  ENT:  No sore throat, pain, runny nose, dysphagia  CV:  No pain, palpitations, hypo/hypertension  Resp:  No dyspnea, cough, tachypnea, wheezing  GI:  No pain, No nausea, No vomiting, No diarrhea, No constipation, No weight loss, No fever, No pruritis, No rectal bleeding, No melena, No dysphagia  :  No pain, bleeding, incontinence, nocturia  Muscle:  No pain, weakness  Neuro:  No weakness, tingling, memory problems  Psych:  No fatigue, insomnia, mood problems, depression  Endocrine:  No polyuria, polydypsia, cold/heat intolerance  Heme:  No petechiae, ecchymosis, easy bruisability  Skin:  No rash, tattoos, scars, edema      Vital Signs Last 24 Hrs  T(C): 37.2 (28 May 2019 04:07), Max: 37.2 (28 May 2019 04:07)  T(F): 98.9 (28 May 2019 04:07), Max: 98.9 (28 May 2019 04:07)  HR: 96 (28 May 2019 07:45) (91 - 99)  BP: 126/56 (28 May 2019 04:07) (116/47 - 126/56)  BP(mean): --  RR: 17 (28 May 2019 04:07) (17 - 18)  SpO2: 93% (28 May 2019 07:45) (90% - 98%)    PHYSICAL EXAM:    Constitutional: NAD  HEENT: ncat  Neck: No LAD  Respiratory: dec bs  Cardiovascular: S1 and S2, RRR  Gastrointestinal: soft nt nd  Extremities: No peripheral edema  Vascular: 2+ peripheral pulses  Neurological: A/O x 3  Skin: No rashes      LABS:                        8.6    10.71 )-----------( 380      ( 28 May 2019 08:26 )             27.9     05-27    138  |  102  |  29<H>  ----------------------------<  90  4.9   |  28  |  2.30<H>    Ca    8.7      27 May 2019 07:55  Mg     2.5     05-27      PT/INR - ( 28 May 2019 08:26 )   PT: 13.0 sec;   INR: 1.15 ratio               RADIOLOGY & ADDITIONAL TESTS:

## 2019-05-28 NOTE — H&P ADULT - NSHPLABSRESULTS_GEN_ALL_CORE
8.6    10.71 )-----------( 380      ( 28 May 2019 08:26 )             27.9   05-28    138  |  103  |  30<H>  ----------------------------<  103<H>  4.3   |  25  |  2.30<H>    Ca    8.7      28 May 2019 08:26  Mg     2.5     05-28    T(C): 37 (05-28-19 @ 15:55), Max: 37.2 (05-28-19 @ 04:07)  T(F): 98.6 (05-28-19 @ 15:55), Max: 98.9 (05-28-19 @ 04:07)  HR: 95 (05-28-19 @ 15:55) (91 - 99)  BP: 146/56 (05-28-19 @ 15:55) (116/47 - 146/56)  RR: 20 (05-28-19 @ 15:55) (17 - 20)  SpO2: 95% (05-28-19 @ 15:55) (93% - 98%) 8.6    10.71 )-----------( 380      ( 28 May 2019 08:26 )             27.9   05-28    138  |  103  |  30<H>  ----------------------------<  103<H>  4.3   |  25  |  2.30<H>    Ca    8.7      28 May 2019 08:26  Mg     2.5     05-28    T(C): 37 (05-28-19 @ 15:55), Max: 37.2 (05-28-19 @ 04:07)  T(F): 98.6 (05-28-19 @ 15:55), Max: 98.9 (05-28-19 @ 04:07)  HR: 95 (05-28-19 @ 15:55) (91 - 99)  BP: 146/56 (05-28-19 @ 15:55) (116/47 - 146/56)  RR: 20 (05-28-19 @ 15:55) (17 - 20)  SpO2: 95% (05-28-19 @ 15:55) (93% - 98%)< from: ZAHIRA w/TTE (w/3D Echo) (05.28.19 @ 09:51) >    1. Mitral annular calcification. Thickened mitral valve  leaflet tips with central malcoaptation.   Moderate-severe  mitral regurgitation. EROA 0.34sqcm  2. Multiple mobile Echogenic structure seen on the aortic  valve consistent with vegetation. From the short axis-  the  echodensity on  non cup (measures 6mm x 10mm)  on the right  cusp ( 9mm x 10mm.) The left cusp tip are frayed with  multiple mobile echo densities 12mm x 8mm collectively.  On  the Long axis there is a mobile  vegetation seen in the  LVOT  which measures 5mm x 20mm (image 32)   Severe aortic regurgitation.  3. Severe atheroma noted in aortic arch/descending aorta.  4. Severely dilated left atrium.  LA volume index = 68  cc/m2. No left atrial or left atrial appendage thrombus.  5. Severe left ventricular enlargement.  6. Overall preserved left ventricular ejection fraction.  7. Normal right ventricular size and function.  8. Normal tricuspid valve. Minimal tricuspid regurgitation.  9. Estimatedpulmonary artery systolic pressure equals 31  mm Hg, assuming right atrial pressure equals 3  mm Hg,  consistent with normal pulmonary pressures.  10. Small pericardial effusion.  11. Bilateral pleural effusions.

## 2019-05-28 NOTE — PROGRESS NOTE ADULT - SUBJECTIVE AND OBJECTIVE BOX
[facility] - Chao Lomax, Kunal, Regino, [place], Elva Islas  Office Number:  [phone]    Patient resting comfortably in bed in NAD.  Laying flat with no respiratory distress.  No complaints of chest pain, dyspnea, palpitations, PND, or orthopnea.    F/U for:  Endocarditis, CHF      MEDICATIONS  (STANDING):  ALBUTerol    0.083% 2.5 milliGRAM(s) Nebulizer every 8 hours  ampicillin  IVPB      ampicillin  IVPB 2 Gram(s) IV Intermittent every 8 hours  buPROPion XL . 150 milliGRAM(s) Oral daily  diltiazem    milliGRAM(s) Oral daily  furosemide    Tablet 40 milliGRAM(s) Oral daily  pantoprazole    Tablet 40 milliGRAM(s) Oral before breakfast  pramipexole 0.5 milliGRAM(s) Oral at bedtime  simvastatin 10 milliGRAM(s) Oral at bedtime  tiotropium 18 MICROgram(s) Capsule 1 Capsule(s) Inhalation daily    MEDICATIONS  (PRN):  acetaminophen   Tablet .. 650 milliGRAM(s) Oral every 6 hours PRN Temp greater or equal to 38C (100.4F), Mild Pain (1 - 3)      Allergies    No Known Allergies        Vital Signs Last 24 Hrs  T(C): 37.2 (28 May 2019 04:07), Max: 37.2 (28 May 2019 04:07)  T(F): 98.9 (28 May 2019 04:07), Max: 98.9 (28 May 2019 04:07)  HR: 99 (28 May 2019 04:07) (91 - 99)  BP: 126/56 (28 May 2019 04:07) (116/47 - 126/56)  BP(mean): --  RR: 17 (28 May 2019 04:07) (17 - 18)  SpO2: 93% (28 May 2019 04:07) (90% - 98%)    I&O's Summary      ON EXAM:    General: NAD, awake and alert, oriented x 3  HEENT: Mucous membranes are moist, anicteric  Lungs: Non-labored, breath sounds are clear bilaterally, No wheezing, rales or rhonchi  Cardiovascular: Regular, S1 and S2, 3/6 systolic murmur.  1/4 diastolic murmur  Gastrointestinal: Bowel Sounds present, soft, nontender.   Lymph: No peripheral edema. No lymphadenopathy.  Skin: No rashes or ulcers  Psych:  Mood & affect appropriate    LABS: All Labs Reviewed:                        8.6    11.55 )-----------( 375      ( 27 May 2019 07:55 )             28.3                         8.5    11.73 )-----------( 342      ( 26 May 2019 07:17 )             27.7                         9.0    14.38 )-----------( 346      ( 25 May 2019 08:49 )             28.8     27 May 2019 07:55    138    |  102    |  29     ----------------------------<  90     4.9     |  28     |  2.30   26 May 2019 07:17    139    |  104    |  29     ----------------------------<  102    4.5     |  26     |  2.10   25 May 2019 08:49    136    |  103    |  28     ----------------------------<  120    4.4     |  23     |  2.10     Ca    8.7        27 May 2019 07:55  Ca    8.4        26 May 2019 07:17  Ca    8.3        25 May 2019 08:49  Mg     2.5       27 May 2019 07:55  Mg     2.2       26 May 2019 07:17  Mg     2.1       25 May 2019 08:49            Blood Culture: Organism --  Gram Stain Blood -- Gram Stain --  Specimen Source .Blood Blood-Peripheral  Culture-Blood --    Organism --  Gram Stain Blood -- Gram Stain --  Specimen Source .Urine Clean Catch (Midstream)  Culture-Blood --    Organism --  Gram Stain Blood -- Gram Stain --  Specimen Source .Blood Blood-Peripheral  Culture-Blood --    Organism --  Gram Stain Blood -- Gram Stain --  Specimen Source .Blood Blood-Peripheral  Culture-Blood --    Organism --  Gram Stain Blood -- Gram Stain   Growth in aerobic bottle: Gram Positive Cocci in Pairs and Chains  Growth in anaerobic bottle: Gram Positive Cocci in Pairs and Chains  Specimen Source .Blood Blood-Peripheral  Culture-Blood --

## 2019-05-28 NOTE — H&P ADULT - PROBLEM SELECTOR PLAN 2
Admit to Dr Cee   Cardiac Cath in am  Tele  cardiac surgery evaluation  p2y12 MRSA type and screen  [facility] Friday 5/31 with Sari

## 2019-05-28 NOTE — H&P ADULT - HISTORY OF PRESENT ILLNESS
This is a 63 F PMH HTN CKD PAD s/p femoral  endarterectomy,  Plavix ,depression , and smoker ( quit  4/2019 ) endorsed 10 lb weight loss reports increasing SOB s/p colonoscopy -unremarkable.    Presented to [facility] ED found to have b/l pleural effusions, anemia, elevated creatinine 2.8 and TTE reveals mobile echodensity on AV with moderate to severe AI. Blood cultures + for gram + cocci in pairs/chains. Treated with one unit PRBC-  Ampicillin 2gram IV q6-  PET negative for evidence of infection. Stabilized transferred to [facility] for ZAHIRA. Admitted to  Dr Cee for further management.

## 2019-05-28 NOTE — PROGRESS NOTE ADULT - SUBJECTIVE AND OBJECTIVE BOX
Date/Time Patient Seen:  		  Referring MD:   Data Reviewed	       Patient is a 63y old  Female who presents with a chief complaint of CABA, weakness (27 May 2019 15:34)      Subjective/HPI     PAST MEDICAL & SURGICAL HISTORY:  Hypertension  Restless leg syndrome: Especially with General Anesthesia  Arthritis: Cervical Spine  and Hands  H/O cervical discectomy  Acute peptic ulcer with perforation: 1980  S/P appendectomy: 2014  S/P tubal ligation: 1986  Osteoarthritis of right shoulder region: Right Shoulder Arthroscopy  2007  Rheumatoid arthritis of carpometacarpal joint of thumb: Total Joint Replacement of Left Thumb        Medication list         MEDICATIONS  (STANDING):  ALBUTerol    0.083% 2.5 milliGRAM(s) Nebulizer every 8 hours  ampicillin  IVPB      ampicillin  IVPB 2 Gram(s) IV Intermittent every 8 hours  buPROPion XL . 150 milliGRAM(s) Oral daily  diltiazem    milliGRAM(s) Oral daily  furosemide    Tablet 40 milliGRAM(s) Oral daily  pantoprazole    Tablet 40 milliGRAM(s) Oral before breakfast  pramipexole 0.5 milliGRAM(s) Oral at bedtime  simvastatin 10 milliGRAM(s) Oral at bedtime  tiotropium 18 MICROgram(s) Capsule 1 Capsule(s) Inhalation daily    MEDICATIONS  (PRN):  acetaminophen   Tablet .. 650 milliGRAM(s) Oral every 6 hours PRN Temp greater or equal to 38C (100.4F), Mild Pain (1 - 3)         Vitals log        ICU Vital Signs Last 24 Hrs  T(C): 37.2 (28 May 2019 04:07), Max: 37.2 (28 May 2019 04:07)  T(F): 98.9 (28 May 2019 04:07), Max: 98.9 (28 May 2019 04:07)  HR: 99 (28 May 2019 04:07) (91 - 99)  BP: 126/56 (28 May 2019 04:07) (116/47 - 126/56)  BP(mean): --  ABP: --  ABP(mean): --  RR: 17 (28 May 2019 04:07) (17 - 18)  SpO2: 93% (28 May 2019 04:07) (90% - 98%)           Input and Output:  I&O's Detail    26 May 2019 07:01  -  27 May 2019 07:00  --------------------------------------------------------  IN:    Oral Fluid: 480 mL  Total IN: 480 mL    OUT:  Total OUT: 0 mL    Total NET: 480 mL          Lab Data                        8.6    11.55 )-----------( 375      ( 27 May 2019 07:55 )             28.3     05-27    138  |  102  |  29<H>  ----------------------------<  90  4.9   |  28  |  2.30<H>    Ca    8.7      27 May 2019 07:55  Mg     2.5     05-27              Review of Systems	      Objective     Physical Examination  heart s1s2  lung dec BS  abd soft  cn grossly int        Pertinent Lab findings & Imaging      Mike:  NO   Adequate UO     I&O's Detail    26 May 2019 07:01  -  27 May 2019 07:00  --------------------------------------------------------  IN:    Oral Fluid: 480 mL  Total IN: 480 mL    OUT:  Total OUT: 0 mL    Total NET: 480 mL               Discussed with:     Cultures:	        Radiology

## 2019-05-28 NOTE — PROGRESS NOTE ADULT - PROBLEM SELECTOR PLAN 1
suspect multifactorial  ob+ stool; no overt gi bleed; hgb low but stable  ct a/p neg for rp bleed  reports recent normal egd/colon  told she could not get capsule 2/2 anatomy  consider CT enterography or single balloon enteroscopy as outpatient  monitor cbc, transfuse prn  cont ppi  heme eval noted  monitor stool color  if w s/s active gib check bleeding scan  will follow

## 2019-05-28 NOTE — H&P ADULT - ASSESSMENT
This is a 63 F PMH HTN CKD PAD s/p femoral  endarterectomy,  Plavix ,depression , and smoker ( quit  4/2019 ) endorsed 10 lb weight loss reports increasing SOB s/p colonoscopy -unremarkable.    Presented to [facility] ED found to have b/l pleural effusions, anemia, elevated creatinine 2.8 and TTE reveals mobile echodensity on AV with moderate to severe AI. Blood cultures + for gram + cocci in pairs/chains. Treated with one unit PRBC-  Ampicillin 2gram IV q6-  PET negative for evidence of infection. Stabilized transferred to [facility] for ZAHIRA. Admitted to  Dr Cee for further management. Scheduled for CATH in am . [facility] Friday with Dr Cee.  Dr Prater ID consyulted Ceftriaxone 2g q12 iv added Ampicillin increased to 2grams q6IV   Dr Jolley consulted for CKD creatinine 2.20. This is a 63 F PMH HTN CKD PAD s/p femoral  endarterectomy,  Plavix ,depression ,CKD - Dr Zepeda (Nephrology) and smoker ( quit  4/2019 ) endorsed 10 lb weight loss reports increasing SOB s/p colonoscopy -unremarkable.    Presented to [place] ED found to have b/l pleural effusions, anemia, elevated creatinine 2.8 and TTE reveals mobile echodensity on AV with moderate to severe AI. Blood cultures + for gram + cocci in pairs/chains. Treated with one unit PRBC-  Ampicillin 2gram IV q6-  PET negative for evidence of infection. Stabilized transferred to [facility] for ZAHIRA. Admitted to  Dr Cee for further management. Scheduled for CATH in am . [facility] Friday with Dr Cee.  Dr Prater ID consyulted Ceftriaxone 2g q12 iv added Ampicillin increased to 2grams q6IV   Dr Berger  consulted for CKD creatinine 2.20.

## 2019-05-28 NOTE — H&P ADULT - NSHPPHYSICALEXAM_GEN_ALL_CORE
Constitutional:  thin deconditioned  Eyes: EOMI,PERRL  ENMT: WDL  Neck: no bruits ,no thyromegaly  Breasts :not examined  Back: no deformities no kyphosis  Respiratory: Breath  sounds equal with   rhonchi   Cardiovascular:  S1 S2  2/6 murmur   Gastrointestinal: Soft nontender positive bowels sounds   Genitourinary: not examined  Rectal: not examined  Extremities: + pedal pulse no edema no calf tenderness  Vascular :Equal and normal throughout  Neurological :Alert and oriented no focal deficits  Skin: normal no rashes   Lymph Nodes: non tender   Musculoskeletal: equal  strength throughout

## 2019-05-28 NOTE — H&P ADULT - NSHPREVIEWOFSYSTEMS_GEN_ALL_CORE
GENERAL SYMPTOMS: weakness,  denues fevers or chills  ENT: No visual changes;  No vertigo or throat pain   SKIN/BREAST: Negative  NECK: No pain or stiffness  RESPIRATORY/THORAX: SOB   cough,  denies wheezing, hemoptysis;  CARDIOVASCULAR: No chest pain or palpitations  GASTROINTESTINAL: No abdominal or epigastric pain. No nausea, vomiting, or hematemesis; No diarrhea or constipation.   GENITOURINARY: No dysuria, frequency or hematuria  NEUROLOGICAL: No numbness or weakness  MUSCULOSKELETAL: equal strength throughout   SKIN: No itching, burning, rashes, or lesions   All other review of systems is negative unless indicated above.  HEMATOLOGY/LYMPHATICS: Negative  ENDOCRINE: Negative

## 2019-05-28 NOTE — PROGRESS NOTE ADULT - PROVIDER SPECIALTY LIST ADULT
Cardiology
Gastroenterology
Heme/Onc
Infectious Disease
Internal Medicine
Nephrology
Pulmonology
Gastroenterology
Cardiology
Gastroenterology
Heme/Onc
Heme/Onc
Nephrology
Nephrology
Gastroenterology
Pulmonology
Gastroenterology

## 2019-05-28 NOTE — H&P ADULT - PROBLEM SELECTOR PLAN 3
Dr Jolley consulted   Creatinine 2.30 on admission baseline 1.6 Dr Berger consulted- [facility] nephrology    Creatinine 2.30 on admission baseline 1.6

## 2019-05-28 NOTE — PROGRESS NOTE ADULT - PROBLEM SELECTOR PROBLEM 3
ACP (advance care planning)
Bacteremia due to Enterococcus
CKD (chronic kidney disease) stage 4, GFR 15-29 ml/min
CKD (chronic kidney disease) stage 4, GFR 15-29 ml/min
Dyspnea on exertion
ACP (advance care planning)
ACP (advance care planning)

## 2019-05-28 NOTE — PROGRESS NOTE ADULT - REASON FOR ADMISSION
CBAA, weakness
CABA, weakness
ACBA, weakness
CABA, weakness

## 2019-05-28 NOTE — PROGRESS NOTE ADULT - PROBLEM SELECTOR PROBLEM 1
Anemia
Endocarditis of aortic valve
Dyspnea on exertion
Endocarditis of aortic valve
Endocarditis of aortic valve
Anemia
Anemia, unspecified
Endocarditis of aortic valve
Anemia

## 2019-05-29 LAB
ALBUMIN SERPL ELPH-MCNC: 3.1 G/DL — LOW (ref 3.3–5)
ALP SERPL-CCNC: 177 U/L — HIGH (ref 40–120)
ALT FLD-CCNC: 69 U/L — HIGH (ref 10–45)
ANION GAP SERPL CALC-SCNC: 16 MMOL/L — SIGNIFICANT CHANGE UP (ref 5–17)
APTT BLD: 33.5 SEC — SIGNIFICANT CHANGE UP (ref 27.5–36.3)
AST SERPL-CCNC: 56 U/L — HIGH (ref 10–40)
BASOPHILS # BLD AUTO: 0.1 K/UL — SIGNIFICANT CHANGE UP (ref 0–0.2)
BASOPHILS NFR BLD AUTO: 0.6 % — SIGNIFICANT CHANGE UP (ref 0–2)
BILIRUB SERPL-MCNC: 0.2 MG/DL — SIGNIFICANT CHANGE UP (ref 0.2–1.2)
BUN SERPL-MCNC: 27 MG/DL — HIGH (ref 7–23)
CALCIUM SERPL-MCNC: 9.3 MG/DL — SIGNIFICANT CHANGE UP (ref 8.4–10.5)
CHLORIDE SERPL-SCNC: 97 MMOL/L — SIGNIFICANT CHANGE UP (ref 96–108)
CO2 SERPL-SCNC: 23 MMOL/L — SIGNIFICANT CHANGE UP (ref 22–31)
CREAT SERPL-MCNC: 2.52 MG/DL — HIGH (ref 0.5–1.3)
CULTURE RESULTS: SIGNIFICANT CHANGE UP
EOSINOPHIL # BLD AUTO: 0.1 K/UL — SIGNIFICANT CHANGE UP (ref 0–0.5)
EOSINOPHIL NFR BLD AUTO: 1.3 % — SIGNIFICANT CHANGE UP (ref 0–6)
FIBRINOGEN PPP-MCNC: 694 MG/DL — HIGH (ref 320–500)
GLUCOSE SERPL-MCNC: 99 MG/DL — SIGNIFICANT CHANGE UP (ref 70–99)
GRAM STN FLD: SIGNIFICANT CHANGE UP
HCT VFR BLD CALC: 29.4 % — LOW (ref 34.5–45)
HGB BLD-MCNC: 9.7 G/DL — LOW (ref 11.5–15.5)
INR BLD: 1.1 RATIO — SIGNIFICANT CHANGE UP (ref 0.88–1.16)
LYMPHOCYTES # BLD AUTO: 1.3 K/UL — SIGNIFICANT CHANGE UP (ref 1–3.3)
LYMPHOCYTES # BLD AUTO: 12.2 % — LOW (ref 13–44)
MCHC RBC-ENTMCNC: 29.1 PG — SIGNIFICANT CHANGE UP (ref 27–34)
MCHC RBC-ENTMCNC: 33 GM/DL — SIGNIFICANT CHANGE UP (ref 32–36)
MCV RBC AUTO: 88.3 FL — SIGNIFICANT CHANGE UP (ref 80–100)
MONOCYTES # BLD AUTO: 0.6 K/UL — SIGNIFICANT CHANGE UP (ref 0–0.9)
MONOCYTES NFR BLD AUTO: 5.3 % — SIGNIFICANT CHANGE UP (ref 2–14)
MRSA PCR RESULT.: SIGNIFICANT CHANGE UP
NEUTROPHILS # BLD AUTO: 8.5 K/UL — HIGH (ref 1.8–7.4)
NEUTROPHILS NFR BLD AUTO: 80.5 % — HIGH (ref 43–77)
NT-PROBNP SERPL-SCNC: 6838 PG/ML — HIGH (ref 0–300)
PLATELET # BLD AUTO: 399 K/UL — SIGNIFICANT CHANGE UP (ref 150–400)
POTASSIUM SERPL-MCNC: 4.2 MMOL/L — SIGNIFICANT CHANGE UP (ref 3.5–5.3)
POTASSIUM SERPL-SCNC: 4.2 MMOL/L — SIGNIFICANT CHANGE UP (ref 3.5–5.3)
PROT SERPL-MCNC: 6.7 G/DL — SIGNIFICANT CHANGE UP (ref 6–8.3)
PROTHROM AB SERPL-ACNC: 12.7 SEC — SIGNIFICANT CHANGE UP (ref 10–12.9)
RBC # BLD: 3.33 M/UL — LOW (ref 3.8–5.2)
RBC # FLD: 16.2 % — HIGH (ref 10.3–14.5)
S AUREUS DNA NOSE QL NAA+PROBE: DETECTED
SODIUM SERPL-SCNC: 136 MMOL/L — SIGNIFICANT CHANGE UP (ref 135–145)
SPECIMEN SOURCE: SIGNIFICANT CHANGE UP
WBC # BLD: 10.5 K/UL — SIGNIFICANT CHANGE UP (ref 3.8–10.5)
WBC # FLD AUTO: 10.5 K/UL — SIGNIFICANT CHANGE UP (ref 3.8–10.5)

## 2019-05-29 PROCEDURE — 99223 1ST HOSP IP/OBS HIGH 75: CPT

## 2019-05-29 PROCEDURE — ZZZZZ: CPT

## 2019-05-29 PROCEDURE — 99232 SBSQ HOSP IP/OBS MODERATE 35: CPT

## 2019-05-29 PROCEDURE — 93454 CORONARY ARTERY ANGIO S&I: CPT | Mod: 26,GC

## 2019-05-29 PROCEDURE — 93010 ELECTROCARDIOGRAM REPORT: CPT

## 2019-05-29 PROCEDURE — 99222 1ST HOSP IP/OBS MODERATE 55: CPT

## 2019-05-29 RX ORDER — MUPIROCIN 20 MG/G
1 OINTMENT TOPICAL EVERY 12 HOURS
Refills: 0 | Status: DISCONTINUED | OUTPATIENT
Start: 2019-05-29 | End: 2019-05-29

## 2019-05-29 RX ORDER — ALPRAZOLAM 0.25 MG
0.5 TABLET ORAL ONCE
Refills: 0 | Status: DISCONTINUED | OUTPATIENT
Start: 2019-05-29 | End: 2019-05-29

## 2019-05-29 RX ORDER — MUPIROCIN 20 MG/G
1 OINTMENT TOPICAL EVERY 12 HOURS
Refills: 0 | Status: DISCONTINUED | OUTPATIENT
Start: 2019-05-29 | End: 2019-05-31

## 2019-05-29 RX ORDER — ALPRAZOLAM 0.25 MG
0.5 TABLET ORAL EVERY 12 HOURS
Refills: 0 | Status: DISCONTINUED | OUTPATIENT
Start: 2019-05-29 | End: 2019-05-31

## 2019-05-29 RX ADMIN — SODIUM CHLORIDE 3 MILLILITER(S): 9 INJECTION INTRAMUSCULAR; INTRAVENOUS; SUBCUTANEOUS at 06:42

## 2019-05-29 RX ADMIN — SODIUM CHLORIDE 3 MILLILITER(S): 9 INJECTION INTRAMUSCULAR; INTRAVENOUS; SUBCUTANEOUS at 11:50

## 2019-05-29 RX ADMIN — PANTOPRAZOLE SODIUM 40 MILLIGRAM(S): 20 TABLET, DELAYED RELEASE ORAL at 06:46

## 2019-05-29 RX ADMIN — Medication 360 MILLIGRAM(S): at 06:46

## 2019-05-29 RX ADMIN — SODIUM CHLORIDE 3 MILLILITER(S): 9 INJECTION INTRAMUSCULAR; INTRAVENOUS; SUBCUTANEOUS at 21:39

## 2019-05-29 RX ADMIN — Medication 216 GRAM(S): at 13:00

## 2019-05-29 RX ADMIN — TIOTROPIUM BROMIDE 1 CAPSULE(S): 18 CAPSULE ORAL; RESPIRATORY (INHALATION) at 13:00

## 2019-05-29 RX ADMIN — CEFTRIAXONE 100 GRAM(S): 500 INJECTION, POWDER, FOR SOLUTION INTRAMUSCULAR; INTRAVENOUS at 06:43

## 2019-05-29 RX ADMIN — SIMVASTATIN 10 MILLIGRAM(S): 20 TABLET, FILM COATED ORAL at 21:39

## 2019-05-29 RX ADMIN — CEFTRIAXONE 100 GRAM(S): 500 INJECTION, POWDER, FOR SOLUTION INTRAMUSCULAR; INTRAVENOUS at 21:46

## 2019-05-29 RX ADMIN — Medication 216 GRAM(S): at 06:43

## 2019-05-29 RX ADMIN — Medication 1 TABLET(S): at 13:00

## 2019-05-29 RX ADMIN — PRAMIPEXOLE DIHYDROCHLORIDE 0.5 MILLIGRAM(S): 0.12 TABLET ORAL at 21:39

## 2019-05-29 RX ADMIN — BUPROPION HYDROCHLORIDE 150 MILLIGRAM(S): 150 TABLET, EXTENDED RELEASE ORAL at 13:00

## 2019-05-29 RX ADMIN — Medication 216 GRAM(S): at 22:38

## 2019-05-29 RX ADMIN — Medication 0.5 MILLIGRAM(S): at 15:01

## 2019-05-29 NOTE — DIETITIAN INITIAL EVALUATION ADULT. - NS AS NUTRI INTERV MEALS SNACK
General/healthful diet/[place] - modified diet/Recommend continue current DASH/TLC diet to promote heart health. Will continue to monitor renal indices for possible addition of renal therapeutic diet. Pt's food preferences obtained and honored on menu.

## 2019-05-29 NOTE — CONSULT NOTE ADULT - SUBJECTIVE AND OBJECTIVE BOX
CHIEF COMPLAINT: Patient is a 63y old  Female who presents with a chief complaint of torito (29 May 2019 08:13)      HPI:  63 F PMH HTN CKD PAD s/p femoral  endarterectomy, depression , and smoker ( quit  4/2019 ) endorsed 10 lb weight loss reports increasing SOB s/p colonoscopy -unremarkable.    Presented to [place] ED found to have b/l pleural effusions, anemia, elevated creatinine 2.8 and TTE reveals mobile echodensity on AV with moderate to severe AI. Blood cultures + for gram + cocci in pairs/chains. Treated with one unit PRBC-  Ampicillin 2gram IV q6. Stabilized transferred to [facility] for TORITO.    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 Her orthopnea is stable. Denies any chest pain, palpitations, PND, orthopnea, near syncope, syncope, lower extremity edema, stroke like symptoms.       EKG: < from: 12 Lead ECG (05.20.19 @ 18:40) >   Sinus tachycardia  Possible Left atrial enlargement  Borderline ECG    < end of copied text >      REVIEW OF SYSTEMS:   All other review of systems are negative unless indicated above    PAST MEDICAL & SURGICAL HISTORY:  Hypertension  Restless leg syndrome: Especially with General Anesthesia  Arthritis: Cervical Spine  and Hands  H/O cervical discectomy  Acute peptic ulcer with perforation: 1980  S/P appendectomy: 2014  S/P tubal ligation: 1986  Osteoarthritis of right shoulder region: Right Shoulder Arthroscopy  2007  Rheumatoid arthritis of carpometacarpal joint of thumb: Total Joint Replacement of Left Thumb      SOCIAL HISTORY:  No tobacco, ethanol, or drug abuse.    FAMILY HISTORY:  Son with early MI in 30s.   No scd    MEDICATIONS  (STANDING):  ampicillin  IVPB 2 Gram(s) IV Intermittent every 6 hours  ampicillin  IVPB      buPROPion XL . 150 milliGRAM(s) Oral daily  calcium carbonate 1250 mG  + Vitamin D (OsCal 500 + D) 1 Tablet(s) Oral daily  cefTRIAXone   IVPB 2 Gram(s) IV Intermittent every 12 hours  diltiazem    milliGRAM(s) Oral daily  pantoprazole    Tablet 40 milliGRAM(s) Oral before breakfast  pramipexole 0.5 milliGRAM(s) Oral at bedtime  simvastatin 10 milliGRAM(s) Oral at bedtime  sodium chloride 0.9% lock flush 3 milliLiter(s) IV Push every 8 hours  tiotropium 18 MICROgram(s) Capsule 1 Capsule(s) Inhalation daily    MEDICATIONS  (PRN):  acetaminophen   Tablet .. 650 milliGRAM(s) Oral every 6 hours PRN Temp greater or equal to 38C (100.4F), Mild Pain (1 - 3)  ALBUTerol    0.083%. 2.5 milliGRAM(s) Nebulizer once PRN Wheezing      Allergies    No Known Allergies    Intolerances        Home meds:  Home Medications:  acetaminophen 325 mg oral tablet: 2 tab(s) orally every 6 hours, As needed, Temp greater or equal to 38C (100.4F), Mild Pain (1 - 3) (28 May 2019 16:46)  albuterol 2.5 mg/3 mL (0.083%) inhalation solution:  inhaled  (28 May 2019 16:46)  buPROPion: 150 milligram(s) orally once a day (28 May 2019 16:46)  Caltrate 600 + D oral tablet: 1 tab(s) orally once a day (28 May 2019 16:46)  dilTIAZem 360 mg/24 hours oral capsule, extended release: 1 cap(s) orally once a day (28 May 2019 16:46)  furosemide 40 mg oral tablet: 1 tab(s) orally once a day (28 May 2019 16:46)  Mirapex 0.25 mg oral tablet: 2 tab(s) orally once a day (at bedtime) (28 May 2019 16:46)  pantoprazole 40 mg oral delayed release tablet: 1 tab(s) orally once a day (before a meal) (28 May 2019 16:46)  raNITIdine 150 mg oral tablet: 1 tab(s) orally 2 times a day (28 May 2019 16:46)  simvastatin 10 mg oral tablet: 1 tab(s) orally once a day (at bedtime) (28 May 2019 16:46)  tiotropium 18 mcg inhalation capsule: 1 cap(s) inhaled once a day (28 May 2019 16:46)        VITAL SIGNS:   Vital Signs Last 24 Hrs  T(C): 36.6 (29 May 2019 05:28), Max: 37.2 (28 May 2019 19:55)  T(F): 97.9 (29 May 2019 05:28), Max: 99 (28 May 2019 19:55)  HR: 92 (29 May 2019 05:28) (92 - 95)  BP: 134/55 (29 May 2019 05:28) (126/55 - 146/56)  BP(mean): --  RR: 16 (29 May 2019 05:28) (16 - 20)  SpO2: 92% (29 May 2019 05:28) (92% - 95%)    I&O's Summary    28 May 2019 07:01  -  29 May 2019 07:00  --------------------------------------------------------  IN: 540 mL / OUT: 0 mL / NET: 540 mL        On Exam:  TELE: SR  Constitutional: NAD, awake and alert, well-developed  HEENT: Moist Mucous Membranes, Anicteric  Pulmonary: Decreased breath sounds b/l. No rales, crackles or wheeze appreciated.   Cardiovascular: Regular, S1 and S2, 2/4 DM, 2/6 SM  Gastrointestinal: Bowel Sounds present, soft, nontender.   Lymph: No peripheral edema. No lymphadenopathy.  Skin: No visible rashes or ulcers.  Psych:  Mood & affect appropriate    LABS: All Labs Reviewed:                        9.7    10.5  )-----------( 399      ( 29 May 2019 07:24 )             29.4                         8.6    10.71 )-----------( 380      ( 28 May 2019 08:26 )             27.9                         8.6    11.55 )-----------( 375      ( 27 May 2019 07:55 )             28.3     29 May 2019 07:24    136    |  97     |  27     ----------------------------<  99     4.2     |  23     |  2.52   28 May 2019 08:26    138    |  103    |  30     ----------------------------<  103    4.3     |  25     |  2.30   27 May 2019 07:55    138    |  102    |  29     ----------------------------<  90     4.9     |  28     |  2.30     Ca    9.3        29 May 2019 07:24  Ca    8.7        28 May 2019 08:26  Ca    8.7        27 May 2019 07:55  Mg     2.5       28 May 2019 08:26  Mg     2.5       27 May 2019 07:55    TPro  6.7    /  Alb  3.1    /  TBili  0.2    /  DBili  x      /  AST  56     /  ALT  69     /  AlkPhos  177    29 May 2019 07:24    PT/INR - ( 29 May 2019 07:25 )   PT: 12.7 sec;   INR: 1.10 ratio         PTT - ( 29 May 2019 07:25 )  PTT:33.5 sec      Blood Culture: Organism --  Gram Stain Blood -- Gram Stain --  Specimen Source .Blood Blood-Peripheral  Culture-Blood --    Organism --  Gram Stain Blood -- Gram Stain --  Specimen Source .Urine Clean Catch (Midstream)  Culture-Blood --    Organism --  Gram Stain Blood -- Gram Stain --  Specimen Source .Blood Blood-Peripheral  Culture-Blood --    Organism --  Gram Stain Blood -- Gram Stain --  Specimen Source .Blood Blood-Peripheral  Culture-Blood --      05-29 @ 07:24  Pro Bnp [id number]    05-28 @ 22:29  TSH: 1.05      RADIOLOGY:    < from: TORITO w/TTE (w/3D Echo) (05.28.19 @ 09:51) >    Patient name: VIVIANA CHAPMAN  YOB: 1955   Age: 63 (F)   MR#: [id number]  Study Date: 5/28/2019  Location: [address] [place] [address]  DSonographer: Boni Yu RDCS  Study quality: Technically fair  Referring Physician: GEORGE SANCHEZ MD  Height: 163 cm  Weight: 51 kg  BSA: 1.5 m2  ------------------------------------------------------------------------  PROCEDURE: Transesophageal and transthoracic  echocardiograms with 2-D, M-Mode and complete spectral and  color flow Doppler were performed.  Informed consent was  first obtained for TORITO. The patient was sedated - see  anesthesia record.  The procedure was monitored with  automatic blood pressure monitoring, ECG tracings and pulse  oximetry.  The transesophageal probe was placed in the  esophagus posterior to the heart without complications.  Real-time and reconstructed 3-dimensional imaging was  performed.  Color Doppler analysis was carried out.  INDICATION: Endocarditis, valve unspecified (I38)  ------------------------------------------------------------------------  Dimensions:    Normal Values:  LA:     3.8    2.0 - 4.0 cm  Ao:     3.0    2.0 - 3.8 cm  SEPTUM: 0.9    0.6 - 1.2 cm  PWT:    0.8    0.6 - 1.1 cm  LVIDd:  6.2    3.0 - 5.6 cm  LVIDs:  3.6    1.8 - 4.0 cm  Derived variables:  LVMI: 139 g/m2  RWT: 0.25  Fractional short: 42 %  EF (Teicholtz): 72 %  ------------------------------------------------------------------------  Observations:  Mitral Valve: Mitral annular calcification. Thickened  mitral valve leaflet tips with central malcoaptation.  Moderate-severe mitral regurgitation. EROA 0.34sqcm  Aortic Valve/Aorta: Multiple mobile Echogenic structure  seen on the aortic valve consistent with vegetation. From  the short axis-  the echodensity on  noncup (measures 6mm  x 10mm)  on the right cusp ( 9mm x 10mm.) The left cusp tip  are frayed with multiple mobile echo densities 12mm x 8mm  collectively.  On the Long axis there is a mobile  vegetation seen in the LVOT  which measures 5mm x 20mm  (image 32)   Severe aortic regurgitation.  Severe atheroma noted in aortic arch/descending aorta.  Left Atrium: Severely dilated left atrium.  LA volume index  = 68 cc/m2. No left atrial or left atrial appendage  thrombus.  Left Ventricle: Overall preserved left ventricular ejection  fraction. Severe left ventricular enlargement.  Right Heart: right atrial enlargement. Normal right  ventricular size and function. Normal tricuspid valve.  Minimal tricuspid regurgitation. Normal pulmonic valve.  Minimalpulmonic regurgitation.  Pericardium/Pleura: Small pericardial effusion.  Bilateral pleural effusions.  Hemodynamic: Contrast injection demonstrates no evidence of  a patent foramen ovale.  ------------------------------------------------------------------------  Conclusions:  1. Mitral annular calcification. Thickened mitral valve  leaflet tips with central malcoaptation.   Moderate-severe  mitral regurgitation. EROA 0.34sqcm  2. Multiple mobile Echogenic structure seen on the aortic  valve consistent with vegetation. From the short axis-  the  echodensity on  non cup (measures 6mm x 10mm)  on the right  cusp ( 9mm x 10mm.) The left cusp tip are frayed with  multiple mobile echo densities 12mm x 8mm collectively.  On  the Long axis there is a mobile  vegetation seen in the  LVOT  which measures 5mm x 20mm (image 32)   Severe aortic regurgitation.  3. Severe atheroma noted in aortic arch/descending aorta.  4. Severely dilated left atrium.  LA volume index = 68  cc/m2. No left atrial or left atrial appendage thrombus.  5. Severe left ventricular enlargement.  6. Overall preserved left ventricular ejection fraction.  7. Normal right ventricular size and function.  8. Normal tricuspid valve. Minimal tricuspid regurgitation.  9. Estimatedpulmonary artery systolic pressure equals 31  mm Hg, assuming right atrial pressure equals 3  mm Hg,  consistent with normal pulmonary pressures.  10. Small pericardial effusion.  11. Bilateral pleural effusions.  Discussed with Dr. JUNITO Paris at time of study.  *** No previous Echo exam.  ------------------------------------------------------------------------  Confirmed on  5/28/2019 - 18:07:47 by Rose Lincoln, M.D.  ------------------------------------------------------------------------    < end of copied text >

## 2019-05-29 NOTE — PROGRESS NOTE ADULT - SUBJECTIVE AND OBJECTIVE BOX
VITAL SIGNS    Subjective: "I'm feeling ok" Denies CP, palpitation, SOB, CABA, HA, dizziness, N/V/D, fever or chills.  No acute event noted overnight.     Telemetry:  NSR      Vital Signs Last 24 Hrs  T(C): 36.8 (19 @ 13:06), Max: 37.2 (19 @ 19:55)  T(F): 98.2 (19 @ 13:06), Max: 99 (19 @ 19:55)  HR: 90 (19 @ 13:06) (90 - 95)  BP: 131/55 (19 @ 13:06) (126/55 - 134/55)  RR: 17 (19 @ 13:06) (16 - 19)  SpO2: 96% (19 @ 13:06) (92% - 96%)            @ 07:01  -   @ 07:00  --------------------------------------------------------  IN: 540 mL / OUT: 0 mL / NET: 540 mL     @ 07:01  -   @ 18:25  --------------------------------------------------------  IN: 480 mL / OUT: 600 mL / NET: -120 mL    Daily     Daily Weight in k.4 (29 May 2019 11:34)    PHYSICAL EXAM    Neurology: alert and oriented x 3, nonfocal, no gross deficits    CV: (+) diatolic murmur     Lungs: CTA B/L     Abdomen: soft, nontender, nondistended, positive bowel sounds, (+) Flatus; (+) BM     :  Voiding               Extremities:  B/L LE (-) edema; negative calf tenderness; (+) 2 DP palpable        acetaminophen Tablet .. 650 milliGRAM(s) Oral every 6 hours PRN  ALBUTerol  0.083%. 2.5 milliGRAM(s) Nebulizer once PRN  ampicillin  IVPB 2 Gram(s) IV Intermittent every 6 hours  buPROPion XL . 150 milliGRAM(s) Oral daily  calcium carbonate 1250 mG  + Vitamin D (OsCal 500 + D) 1 Tablet(s) Oral daily  cefTRIAXone IVPB 2 Gram(s) IV Intermittent every 12 hours  diltiazem  milliGRAM(s) Oral daily  pantoprazole Tablet 40 milliGRAM(s) Oral before breakfast  pramipexole 0.5 milliGRAM(s) Oral at bedtime  simvastatin 10 milliGRAM(s) Oral at bedtime  sodium chloride 0.9% lock flush 3 milliLiter(s) IV Push every 8 hours  tiotropium 18 MICROgram(s) Capsule 1 Capsule(s) Inhalation daily    Physical Therapy Rec:   Home  [  ]   Home w/ PT  [ X ]  Rehab  [  ]    Discussed with Cardiothoracic Team at AM rounds.

## 2019-05-29 NOTE — PROGRESS NOTE ADULT - SUBJECTIVE AND OBJECTIVE BOX
Subjective: renal f/u requested by primary team for continuity of care. Hx of CKD3/Cr 1.8-2, being followed by my associate Dr Zepeda. Transferred for [facility] for cardiac cath prior to [facility].  Recent hx of refractory anemia. Neg GI w/u thus far. In [facility] she was diagnosed with enterococcal bacteremia, AV veg, AI.       MEDICATIONS  (STANDING):  ampicillin  IVPB 2 Gram(s) IV Intermittent every 6 hours  ampicillin  IVPB      buPROPion XL . 150 milliGRAM(s) Oral daily  calcium carbonate 1250 mG  + Vitamin D (OsCal 500 + D) 1 Tablet(s) Oral daily  cefTRIAXone   IVPB 2 Gram(s) IV Intermittent every 12 hours  diltiazem    milliGRAM(s) Oral daily  pantoprazole    Tablet 40 milliGRAM(s) Oral before breakfast  pramipexole 0.5 milliGRAM(s) Oral at bedtime  simvastatin 10 milliGRAM(s) Oral at bedtime  sodium chloride 0.9% lock flush 3 milliLiter(s) IV Push every 8 hours  tiotropium 18 MICROgram(s) Capsule 1 Capsule(s) Inhalation daily    MEDICATIONS  (PRN):  acetaminophen   Tablet .. 650 milliGRAM(s) Oral every 6 hours PRN Temp greater or equal to 38C (100.4F), Mild Pain (1 - 3)  ALBUTerol    0.083%. 2.5 milliGRAM(s) Nebulizer once PRN Wheezing          T(C): 36.6 (05-29-19 @ 05:28), Max: 37.2 (05-28-19 @ 19:55)  HR: 92 (05-29-19 @ 05:28) (92 - 97)  BP: 134/55 (05-29-19 @ 05:28) (126/55 - 146/56)  RR: 16 (05-29-19 @ 05:28) (16 - 20)  SpO2: 92% (05-29-19 @ 05:28) (92% - 95%)  Wt(kg): --        I&O's Detail    28 May 2019 07:01  -  29 May 2019 07:00  --------------------------------------------------------  IN:    Oral Fluid: 240 mL  Total IN: 240 mL    OUT:  Total OUT: 0 mL    Total NET: 240 mL               PHYSICAL EXAM:    GENERAL: comfortable  EYES: EOMI, PERRLA, conjunctiva and sclera clear  NECK: Supple, no inc in JVP  CHEST/LUNG: basilar crackles  HEART: S1S2  ABDOMEN: Soft, Nontender, Nondistended; Bowel sounds present  EXTREMITIES:  no edema  NEURO: no asterixis      LABS:  CBC Full  -  ( 28 May 2019 08:26 )  WBC Count : 10.71 K/uL  RBC Count : 3.18 M/uL  Hemoglobin : 8.6 g/dL  Hematocrit : 27.9 %  Platelet Count - Automated : 380 K/uL  Mean Cell Volume : 87.7 fl  Mean Cell Hemoglobin : 27.0 pg  Mean Cell Hemoglobin Concentration : 30.8 gm/dL  Auto Neutrophil # : x  Auto Lymphocyte # : x  Auto Monocyte # : x  Auto Eosinophil # : x  Auto Basophil # : x  Auto Neutrophil % : x  Auto Lymphocyte % : x  Auto Monocyte % : x  Auto Eosinophil % : x  Auto Basophil % : x    05-28    138  |  103  |  30<H>  ----------------------------<  103<H>  4.3   |  25  |  2.30<H>    Ca    8.7      28 May 2019 08:26  Mg     2.5     05-28      PT/INR - ( 28 May 2019 08:26 )   PT: 13.0 sec;   INR: 1.15 ratio               Impression:  * Mild INA -- bland urine on 5/22. Suspect hemodynamic pre-renal azotemia  * CKD3. Cr 1.8-2 baseline.   * AV endocarditis. For AVR on Fri    Recommendations:   * Moderate risk of contrast ATN but not prohibitive. Cardiac benefits vs renal risks of cardiac cath discussed with pt at length. She accepts the burdens and agrees to proceed. To lower risk of contrast nephropathy, please minimize dye load, no LV gram. Hold diuretic  * Follow Cr 24 and 48h post cath

## 2019-05-29 NOTE — DIETITIAN INITIAL EVALUATION ADULT. - ENERGY NEEDS
ht: 64 inches. wt: 106 pounds (current, standing, no edema noted). BMI: 18.2 kG/m2. UBW: 116 pounds per pt x ~6 weeks ago. IBW: 120 pounds +/- 10%. %IBW: 88%  Other pertinent objective information: 63 year old female pt with PMH HTN, CKD, PAD s/p femoral endarterectomy, depression, and smoker ( quit 4/2019) Presented to [place] ED with c/o SOB found to have B/L pleural effusions, anemia, elevated creatinine 2.8. TTE reveals mobile echodensity on AV with moderate to severe AI consistent with endocarditis. On antibiotics. Transferred to [facility] for further workup and possible surgical intervention. No pressure injuries noted per flowsheet records.

## 2019-05-29 NOTE — PROGRESS NOTE ADULT - ASSESSMENT
63 F PMH HTN CKD PAD s/p femoral endarterectomy (on Plavix), depression, CKD - Dr. Zepeda (Nephrology) and smoker (quit 4/2019 ) endorsed 10 lb weight loss reports increasing SOB s/p colonoscopy -unremarkable who presented to [place] ED found to have b/l pleural effusions, anemia, elevated creatinine 2.8 and TTE reveals mobile echodensity on AV with moderate to severe AI. Blood cultures + for gram + cocci in pairs/chains. Treated with one unit PRBC-  Ampicillin 2gram IV q6-  PET negative for evidence of infection. Stabilized transferred to [facility] for ZAHIRA. Admitted to  Dr Cee for further management. Scheduled for CATH in am. [facility] Friday with Dr Cee.  Hospital Course:   Dr. Prater ID consulted Ceftriaxone 2g q12 iv added Ampicillin increased to 2grams q6IV.   Renal Dr. Berger consulted for CKD creatinine 2.20.  Pre op Cardiac Surgery work up in progress   5/29 VVS; NPO for cardiac cath today.  Plan for cardiac surgery with Dr. Cee on Friday

## 2019-05-29 NOTE — DIETITIAN INITIAL EVALUATION ADULT. - NS AS NUTRI INTERV ED CONTENT
Purpose of the nutrition education/Nutrition relationship to health/disease/Discussed pre- and post-op nutrition recommendations including adequate intake of protein and energy, examples of protein and nutrient dense meal and snack items to try. Pt agreeable to try chocolate milk with lunch and dinner, high biological value protein sources at all meals. Pt voiced understanding./Recommended modifications

## 2019-05-29 NOTE — CHART NOTE - NSCHARTNOTEFT_GEN_A_CORE
Upon Nutritional Assessment by the Registered Dietitian your patient was determined to meet criteria / has evidence of the following diagnosis/diagnoses:          [ ]  Mild Protein Calorie Malnutrition        [ ]  Moderate Protein Calorie Malnutrition        [x] Severe Protein Calorie Malnutrition        [ ] Unspecified Protein Calorie Malnutrition        [ ] Underweight / BMI <19        [ ] Morbid Obesity / BMI > 40      Findings as based on:  [x] Comprehensive nutrition assessment   [x] Nutrition Focused Physical Exam  [x] Other: wt loss 8%/10 pounds      Nutrition Plan/Recommendations:      Please refer to RD initial assessment and recommendations    PROVIDER Section:     By signing this assessment you are acknowledging and agree with the diagnosis/diagnoses assigned by the Registered Dietitian    Comments:

## 2019-05-29 NOTE — DIETITIAN INITIAL EVALUATION ADULT. - PHYSICAL APPEARANCE
Pt consented to nutrition-focused physical exam. Reveals the following: moderate orbital fat pad loss, severe temporal wasting, severe tricep fat depletion.

## 2019-05-29 NOTE — DIETITIAN INITIAL EVALUATION ADULT. - OTHER INFO
Pt seen for consult: BMI 18.2. Pt currently reports improved "very good" appetite/po intake at this time, states she is able to consume most-all of meals. Denies GI distress no N+V, diarrhea or constipation. Pt c/o mild odynophagia however denies swallowing difficulty, denies chewing difficulty. Pt endorses recent weight change ~10 pounds wt loss over 6 weeks 2/2 feeling ill and lethargic, was not eating well as a result PTA. Pt reports much improved po intake now, denies need for oral supplements at this time. Instead wishes to optimize po intake via meals, is amenable to add chocolate milk to lunch and dinner tray, amenable to try and consume all of protein portions at meals.

## 2019-05-29 NOTE — PROGRESS NOTE ADULT - PROBLEM SELECTOR PLAN 1
Pre op Cardiac Surgery work up in progress   ID Following --> Dr. Prater   Continue on Ceftriaxone 2g q12  Continue on Ampicillian2 gram q6  NPO for cardiac cath today  [facility] Friday 5/31 with Dr. Cee

## 2019-05-29 NOTE — CONSULT NOTE ADULT - SUBJECTIVE AND OBJECTIVE BOX
Patient is a 63y old  Female who presents with a chief complaint of torito (29 May 2019 07:07)    HPI:  This is a 63 F PMH HTN CKD PAD s/p femoral  endarterectomy,  Plavix ,depression , and smoker ( quit  4/2019 ) endorsed 10 lb weight loss reports increasing SOB s/p colonoscopy -unremarkable.    Presented to [facility] ED found to have b/l pleural effusions, anemia, elevated creatinine 2.8 and TTE reveals mobile echodensity on AV with moderate to severe AI. Blood cultures + for gram + cocci in pairs/chains. Treated with one unit PRBC-  Ampicillin 2gram IV q6-  PET negative for evidence of infection. Stabilized transferred to [facility] for TORITO. Admitted to  Dr Cee for further management. (28 May 2019 16:45)      PAST MEDICAL & SURGICAL HISTORY:  Hypertension  Restless leg syndrome: Especially with General Anesthesia  Arthritis: Cervical Spine  and Hands  H/O cervical discectomy  Acute peptic ulcer with perforation: 1980  S/P appendectomy: 2014  S/P tubal ligation: 1986  Osteoarthritis of right shoulder region: Right Shoulder Arthroscopy  2007  Rheumatoid arthritis of carpometacarpal joint of thumb: Total Joint Replacement of Left Thumb      Social history:    FAMILY HISTORY:  No pertinent family history in first degree relatives    REVIEW OF SYSTEMS  General:	 ,malaise fatigue or chills. Fevers absent    Skin:No rash  	  Ophthalmologic:Denies any visual complaints,discharge redness or photophobia  	  ENMT:No nasal discharge,headache,sinus congestion or throat pain.No dental complaints    Respiratory and Thorax:No cough,sputum or chest pain.Denies shortness of breath  	  Cardiovascular:	No chest pain,palpitaions or dizziness    Gastrointestinal:	NO nausea,abdominal pain or diarrhea.    Genitourinary:	No dysuria,frequency. No flank pain    Musculoskeletal:	No joint swelling or pain.No weakness    Neurological:No confusion,diziness.No extremity weakness.No bladder or bowel incontinence	    Psychiatric:No delusions or hallucinations	    Hematology/Lymphatics:	No LN swelling.No gum bleeding     Endocrine:	No recent weight gain or loss.No abnormal heat/cold intolerance    Allergic/Immunologic:	No hives or rash   Allergies    No Known Allergies    Intolerances        Antimicrobials:    ampicillin  IVPB 2 Gram(s) IV Intermittent every 6 hours  ampicillin  IVPB      cefTRIAXone   IVPB 2 Gram(s) IV Intermittent every 12 hours        Vital Signs Last 24 Hrs  T(C): 36.6 (29 May 2019 05:28), Max: 37.2 (28 May 2019 19:55)  T(F): 97.9 (29 May 2019 05:28), Max: 99 (28 May 2019 19:55)  HR: 92 (29 May 2019 05:28) (92 - 95)  BP: 134/55 (29 May 2019 05:28) (126/55 - 146/56)  BP(mean): --  RR: 16 (29 May 2019 05:28) (16 - 20)  SpO2: 92% (29 May 2019 05:28) (92% - 95%)     tient in no acute distress.     and alert  No cachexia     Eyes:PERRL EOMI.NO discharge or conjunctival injection    ENMT:No sinus tenderness.No thrush.No pharyngeal exudate or erythema.Fair dental hygiene    Neck:Supple,No LN,no JVD      Respiratory:Good air entry bilaterally,CTA    Cardiovascular:S1 S2 wnl, No murmurs,rub or gallops    Gastrointestinal:Soft BS(+) no tenderness no masses ,No rebound or guarding    Genitourinary:No CVA tendereness     Rectal:    Extremities:No cyanosis,clubbing or edema.    Vascular:peripheral pulses felt    Neurological:AAO X 3,No grossly focal deficits    Skin:No rash     Lymph Nodes:No palpable LNs                                     9.7    10.5  )-----------( 399      ( 29 May 2019 07:24 )             29.4         05-29    136  |  97  |  27<H>  ----------------------------<  99  4.2   |  23  |  2.52<H>    Ca    9.3      29 May 2019 07:24  Mg     2.5     05-28    TPro  6.7  /  Alb  3.1<L>  /  TBili  0.2  /  DBili  x   /  AST  56<H>  /  ALT  69<H>  /  AlkPhos  177<H>  05-29      RECENT CULTURES:  05-25 @ 16:33  .Blood Blood-Peripheral  --  --  --    No growth to date.  --  05-24 @ 21:21  .Urine Clean Catch (Midstream)  --  --  --    No growth  --  05-24 @ 11:05  .Blood Blood-Peripheral  --  --  --    No growth to date.  --  05-24 @ 11:04  .Blood Blood-Peripheral  --  --  --    No growth to date.  --  05-23 @ 10:50  .Blood Blood-Peripheral  --  --  --    Growth in aerobic and anaerobic bottles: Enterococcus faecalis  See previous culture [address] [place], [place] [id number]  --  05-22 @ 16:46  .Blood Blood  Blood Culture PCR  Enterococcus faecalis  Blood Culture PCR  PCR    Growth in aerobic and anaerobic bottles: Enterococcus faecalis  See previous culture [id number]  --      MICROBIOLOGY:  Culture Results:   No growth to date. (05-25 @ 16:33)  Culture Results:   No growth (05-24 @ 21:21)  Culture Results:   No growth to date. (05-24 @ 11:05)  Culture Results:   No growth to date. (05-24 @ 11:04)  Culture Results:   Growth in aerobic and anaerobic bottles: Enterococcus faecalis  See previous culture [id number] (05-23 @ 10:50)  Culture Results:   Growth in aerobic and anaerobic bottles: Enterococcus faecalis  See previous culture [id number] (05-23 @ 10:50)  Culture Results:   Growth in aerobic and anaerobic bottles: Enterococcus faecalis  See previous culture [address] [place], [place] [id number] (05-22 @ 16:46)  Culture Results:   Growth in aerobic and anaerobic bottles: Enterococcus faecalis  "Due to technical problems, Proteus sp. will Not be reported as part of  the BCID panel until further notice"  ***Blood Panel PCR results on this specimen are available  approximately 3 hours after the Gram stain result.***  Gram stain, PCR, and/or culture results may not always  correspond due to difference in methodologies.  ************************************************************  This PCR assay was performed using [email or web address].  The following targets are tested for: Enterococcus,  vancomycin resistant enterococci, Listeria monocytogenes,  coagulase negative staphylococci, S. aureus,  methicillin resistant S. aureus, Streptococcus agalactiae  (Group B), S. pneumoniae, S. pyogenes (Group A),  Acinetobacter baumannii, Enterobacter cloacae, E. coli,  Klebsiella oxytoca, K. pneumoniae, Proteus sp.,  Serratia marcescens, Haemophilus influenzae,  Neisseria meningitidis, Pseudomonas aeruginosa, Candida  albicans, C. glabrata, C krusei, C parapsilosis,  C. tropicalis and the KPC resistance gene. (05-22 @ 16:46)          Radiology:      Assessment:        Recommendations and Plan:    Pager [phone]  After 5 pm/weekends or if no response :[phone]

## 2019-05-29 NOTE — PROGRESS NOTE ADULT - PROBLEM SELECTOR PLAN 2
Dr. Berger consulted- [facility] nephrology   Creatinine 2.50 on admission baseline 1.6  Monitor renal function   D/C Diuretics   Avoid Nephrotoxic agents   Daily BMP

## 2019-05-29 NOTE — DIETITIAN INITIAL EVALUATION ADULT. - ORAL INTAKE PTA
poor/Pt reports poor po intake PTA 2/2 acute illness. Prior pt reports normal po intake/appetite. Typical intake includes: English muffin, tea, 2 glasses of orange juice. Lunch: Ham sandwich on rye. Dinner: meatloaf, mashed potatoes, and vegetable. Pt dislikes coffee and yogurt. Consumes EtOH socially. Pt takes iron, calcium, and align probiotic.

## 2019-05-30 LAB
ANION GAP SERPL CALC-SCNC: 18 MMOL/L — HIGH (ref 5–17)
APPEARANCE UR: CLEAR — SIGNIFICANT CHANGE UP
BILIRUB UR-MCNC: NEGATIVE — SIGNIFICANT CHANGE UP
BUN SERPL-MCNC: 25 MG/DL — HIGH (ref 7–23)
CALCIUM SERPL-MCNC: 9 MG/DL — SIGNIFICANT CHANGE UP (ref 8.4–10.5)
CHLORIDE SERPL-SCNC: 96 MMOL/L — SIGNIFICANT CHANGE UP (ref 96–108)
CO2 SERPL-SCNC: 21 MMOL/L — LOW (ref 22–31)
COLOR SPEC: SIGNIFICANT CHANGE UP
CREAT SERPL-MCNC: 2.21 MG/DL — HIGH (ref 0.5–1.3)
CULTURE RESULTS: SIGNIFICANT CHANGE UP
CULTURE RESULTS: SIGNIFICANT CHANGE UP
DIFF PNL FLD: NEGATIVE — SIGNIFICANT CHANGE UP
GLUCOSE SERPL-MCNC: 206 MG/DL — HIGH (ref 70–99)
GLUCOSE UR QL: NEGATIVE — SIGNIFICANT CHANGE UP
HCT VFR BLD CALC: 27.5 % — LOW (ref 34.5–45)
HGB BLD-MCNC: 8.8 G/DL — LOW (ref 11.5–15.5)
KETONES UR-MCNC: NEGATIVE — SIGNIFICANT CHANGE UP
LEUKOCYTE ESTERASE UR-ACNC: NEGATIVE — SIGNIFICANT CHANGE UP
MCHC RBC-ENTMCNC: 28.5 PG — SIGNIFICANT CHANGE UP (ref 27–34)
MCHC RBC-ENTMCNC: 31.9 GM/DL — LOW (ref 32–36)
MCV RBC AUTO: 89.5 FL — SIGNIFICANT CHANGE UP (ref 80–100)
NITRITE UR-MCNC: NEGATIVE — SIGNIFICANT CHANGE UP
PH UR: 7.5 — SIGNIFICANT CHANGE UP (ref 5–8)
PLATELET # BLD AUTO: 348 K/UL — SIGNIFICANT CHANGE UP (ref 150–400)
POTASSIUM SERPL-MCNC: 4.1 MMOL/L — SIGNIFICANT CHANGE UP (ref 3.5–5.3)
POTASSIUM SERPL-SCNC: 4.1 MMOL/L — SIGNIFICANT CHANGE UP (ref 3.5–5.3)
PROT UR-MCNC: SIGNIFICANT CHANGE UP
RBC # BLD: 3.07 M/UL — LOW (ref 3.8–5.2)
RBC # FLD: 16.2 % — HIGH (ref 10.3–14.5)
SODIUM SERPL-SCNC: 135 MMOL/L — SIGNIFICANT CHANGE UP (ref 135–145)
SP GR SPEC: 1.02 — SIGNIFICANT CHANGE UP (ref 1.01–1.02)
SPECIMEN SOURCE: SIGNIFICANT CHANGE UP
SPECIMEN SOURCE: SIGNIFICANT CHANGE UP
UROBILINOGEN FLD QL: SIGNIFICANT CHANGE UP
WBC # BLD: 10.2 K/UL — SIGNIFICANT CHANGE UP (ref 3.8–10.5)
WBC # FLD AUTO: 10.2 K/UL — SIGNIFICANT CHANGE UP (ref 3.8–10.5)

## 2019-05-30 PROCEDURE — 99024 POSTOP FOLLOW-UP VISIT: CPT

## 2019-05-30 PROCEDURE — 99232 SBSQ HOSP IP/OBS MODERATE 35: CPT

## 2019-05-30 PROCEDURE — 99233 SBSQ HOSP IP/OBS HIGH 50: CPT

## 2019-05-30 PROCEDURE — 99232 SBSQ HOSP IP/OBS MODERATE 35: CPT | Mod: 57

## 2019-05-30 RX ORDER — POTASSIUM CHLORIDE 20 MEQ
10 PACKET (EA) ORAL
Refills: 0 | Status: DISCONTINUED | OUTPATIENT
Start: 2019-05-31 | End: 2019-05-31

## 2019-05-30 RX ORDER — CHLORHEXIDINE GLUCONATE 213 G/1000ML
30 SOLUTION TOPICAL ONCE
Refills: 0 | Status: DISCONTINUED | OUTPATIENT
Start: 2019-05-30 | End: 2019-05-31

## 2019-05-30 RX ORDER — SODIUM CHLORIDE 9 MG/ML
1000 INJECTION INTRAMUSCULAR; INTRAVENOUS; SUBCUTANEOUS
Refills: 0 | Status: DISCONTINUED | OUTPATIENT
Start: 2019-05-31 | End: 2019-06-03

## 2019-05-30 RX ORDER — PANTOPRAZOLE SODIUM 20 MG/1
40 TABLET, DELAYED RELEASE ORAL DAILY
Refills: 0 | Status: DISCONTINUED | OUTPATIENT
Start: 2019-05-31 | End: 2019-06-01

## 2019-05-30 RX ORDER — DEXTROSE 50 % IN WATER 50 %
50 SYRINGE (ML) INTRAVENOUS
Refills: 0 | Status: DISCONTINUED | OUTPATIENT
Start: 2019-05-31 | End: 2019-06-02

## 2019-05-30 RX ORDER — CEFUROXIME AXETIL 250 MG
1500 TABLET ORAL ONCE
Refills: 0 | Status: DISCONTINUED | OUTPATIENT
Start: 2019-05-30 | End: 2019-05-31

## 2019-05-30 RX ORDER — POLYETHYLENE GLYCOL 3350 17 G/17G
17 POWDER, FOR SOLUTION ORAL DAILY
Refills: 0 | Status: DISCONTINUED | OUTPATIENT
Start: 2019-05-31 | End: 2019-06-07

## 2019-05-30 RX ORDER — CHLORHEXIDINE GLUCONATE 213 G/1000ML
1 SOLUTION TOPICAL
Refills: 0 | Status: DISCONTINUED | OUTPATIENT
Start: 2019-05-31 | End: 2019-06-07

## 2019-05-30 RX ORDER — MEPERIDINE HYDROCHLORIDE 50 MG/ML
25 INJECTION INTRAMUSCULAR; INTRAVENOUS; SUBCUTANEOUS ONCE
Refills: 0 | Status: DISCONTINUED | OUTPATIENT
Start: 2019-05-31 | End: 2019-05-31

## 2019-05-30 RX ORDER — INSULIN HUMAN 100 [IU]/ML
3 INJECTION, SOLUTION SUBCUTANEOUS
Qty: 100 | Refills: 0 | Status: DISCONTINUED | OUTPATIENT
Start: 2019-05-31 | End: 2019-06-01

## 2019-05-30 RX ORDER — CHLORHEXIDINE GLUCONATE 213 G/1000ML
5 SOLUTION TOPICAL EVERY 4 HOURS
Refills: 0 | Status: DISCONTINUED | OUTPATIENT
Start: 2019-05-31 | End: 2019-06-01

## 2019-05-30 RX ORDER — CHLORHEXIDINE GLUCONATE 213 G/1000ML
1 SOLUTION TOPICAL ONCE
Refills: 0 | Status: COMPLETED | OUTPATIENT
Start: 2019-05-30 | End: 2019-05-30

## 2019-05-30 RX ORDER — DEXTROSE 50 % IN WATER 50 %
25 SYRINGE (ML) INTRAVENOUS
Refills: 0 | Status: DISCONTINUED | OUTPATIENT
Start: 2019-05-31 | End: 2019-06-02

## 2019-05-30 RX ADMIN — MUPIROCIN 1 APPLICATION(S): 20 OINTMENT TOPICAL at 05:12

## 2019-05-30 RX ADMIN — CHLORHEXIDINE GLUCONATE 1 APPLICATION(S): 213 SOLUTION TOPICAL at 21:35

## 2019-05-30 RX ADMIN — TIOTROPIUM BROMIDE 1 CAPSULE(S): 18 CAPSULE ORAL; RESPIRATORY (INHALATION) at 11:02

## 2019-05-30 RX ADMIN — CEFTRIAXONE 100 GRAM(S): 500 INJECTION, POWDER, FOR SOLUTION INTRAMUSCULAR; INTRAVENOUS at 05:12

## 2019-05-30 RX ADMIN — Medication 216 GRAM(S): at 23:35

## 2019-05-30 RX ADMIN — Medication 216 GRAM(S): at 11:34

## 2019-05-30 RX ADMIN — SODIUM CHLORIDE 3 MILLILITER(S): 9 INJECTION INTRAMUSCULAR; INTRAVENOUS; SUBCUTANEOUS at 14:10

## 2019-05-30 RX ADMIN — SODIUM CHLORIDE 3 MILLILITER(S): 9 INJECTION INTRAMUSCULAR; INTRAVENOUS; SUBCUTANEOUS at 05:10

## 2019-05-30 RX ADMIN — PRAMIPEXOLE DIHYDROCHLORIDE 0.5 MILLIGRAM(S): 0.12 TABLET ORAL at 21:44

## 2019-05-30 RX ADMIN — SIMVASTATIN 10 MILLIGRAM(S): 20 TABLET, FILM COATED ORAL at 21:44

## 2019-05-30 RX ADMIN — Medication 1 TABLET(S): at 11:02

## 2019-05-30 RX ADMIN — SODIUM CHLORIDE 3 MILLILITER(S): 9 INJECTION INTRAMUSCULAR; INTRAVENOUS; SUBCUTANEOUS at 21:36

## 2019-05-30 RX ADMIN — Medication 0.5 MILLIGRAM(S): at 23:07

## 2019-05-30 RX ADMIN — Medication 216 GRAM(S): at 17:31

## 2019-05-30 RX ADMIN — PANTOPRAZOLE SODIUM 40 MILLIGRAM(S): 20 TABLET, DELAYED RELEASE ORAL at 06:09

## 2019-05-30 RX ADMIN — BUPROPION HYDROCHLORIDE 150 MILLIGRAM(S): 150 TABLET, EXTENDED RELEASE ORAL at 11:01

## 2019-05-30 RX ADMIN — CEFTRIAXONE 100 GRAM(S): 500 INJECTION, POWDER, FOR SOLUTION INTRAMUSCULAR; INTRAVENOUS at 17:32

## 2019-05-30 RX ADMIN — MUPIROCIN 1 APPLICATION(S): 20 OINTMENT TOPICAL at 17:37

## 2019-05-30 RX ADMIN — Medication 360 MILLIGRAM(S): at 05:13

## 2019-05-30 RX ADMIN — Medication 216 GRAM(S): at 00:38

## 2019-05-30 RX ADMIN — Medication 216 GRAM(S): at 05:46

## 2019-05-30 NOTE — PROGRESS NOTE ADULT - SUBJECTIVE AND OBJECTIVE BOX
Cardiac Surgery Pre-op Note:  CC: Patient is a 63y old  Female who presents with a chief complaint of torito (30 May 2019 08:30)      Referring Physician: Primary team	                                                                                            Surgeon: Jaye  Procedure: () (AVR)    Allergies    No Known Allergies    Intolerances      HPI:  This is a 63 F PMH HTN CKD PAD s/p femoral  endarterectomy,  Plavix ,depression , and smoker ( quit  2019 ) endorsed 10 lb weight loss reports increasing SOB s/p colonoscopy -unremarkable.    Presented to [facility] ED found to have b/l pleural effusions, anemia, elevated creatinine 2.8 and TTE reveals mobile echodensity on AV with moderate to severe AI. Blood cultures + for gram + cocci in pairs/chains. Treated with one unit PRBC-  Ampicillin 2gram IV q6-  PET negative for evidence of infection. Stabilized transferred to [facility] for TORITO. Admitted to  Dr Cee for further management. (28 May 2019 16:45)      PAST MEDICAL & SURGICAL HISTORY:  Hypertension  Restless leg syndrome: Especially with General Anesthesia  Arthritis: Cervical Spine  and Hands  H/O cervical discectomy  Acute peptic ulcer with perforation:   S/P appendectomy:   S/P tubal ligation:   Osteoarthritis of right shoulder region: Right Shoulder Arthroscopy    Rheumatoid arthritis of carpometacarpal joint of thumb: Total Joint Replacement of Left Thumb      MEDICATIONS  (STANDING):  ampicillin  IVPB 2 Gram(s) IV Intermittent every 6 hours  ampicillin  IVPB      buPROPion XL . 150 milliGRAM(s) Oral daily  calcium carbonate 1250 mG  + Vitamin D (OsCal 500 + D) 1 Tablet(s) Oral daily  cefTRIAXone   IVPB 2 Gram(s) IV Intermittent every 12 hours  diltiazem    milliGRAM(s) Oral daily  mupirocin 2% Ointment 1 Application(s) Topical every 12 hours  pantoprazole    Tablet 40 milliGRAM(s) Oral before breakfast  pramipexole 0.5 milliGRAM(s) Oral at bedtime  simvastatin 10 milliGRAM(s) Oral at bedtime  sodium chloride 0.9% lock flush 3 milliLiter(s) IV Push every 8 hours  tiotropium 18 MICROgram(s) Capsule 1 Capsule(s) Inhalation daily    MEDICATIONS  (PRN):  acetaminophen   Tablet .. 650 milliGRAM(s) Oral every 6 hours PRN Temp greater or equal to 38C (100.4F), Mild Pain (1 - 3)  ALBUTerol    0.083%. 2.5 milliGRAM(s) Nebulizer once PRN Wheezing  ALPRAZolam 0.5 milliGRAM(s) Oral every 12 hours PRN anxiety      Labs:                        8.8    10.2  )-----------( 348      ( 30 May 2019 06:01 )             27.5         135  |  96  |  25<H>  ----------------------------<  206<H>  4.1   |  21<L>  |  2.21<H>    Ca    9.0      30 May 2019 06:01    TPro  6.7  /  Alb  3.1<L>  /  TBili  0.2  /  DBili  x   /  AST  56<H>  /  ALT  69<H>  /  AlkPhos  177<H>      PT/INR - ( 29 May 2019 07:25 )   PT: 12.7 sec;   INR: 1.10 ratio         PTT - ( 29 May 2019 07:25 )  PTT:33.5 sec    Blood Type: ABO Interpretation: O ( @ 18:00)    HGB A1C: Hemoglobin A1C, Whole Blood: 5.0 % ( @ 22:28)    Prealbumin:   Pro-BNP: Serum Pro-Brain Natriuretic Peptide: 6838 pg/mL ( @ 07:24)    Thyroid Panel:  @ 22:29/1.05  --/--/--    MRSA: MRSA PCR Result.: Anderson ( @ 23:35)   / MSSA: +mssa ON baCTROBAN  Urinalysis Basic - ( 30 May 2019 02:35 )    Color: Light Yellow / Appearance: Clear / S.021 / pH: x  Gluc: x / Ketone: Negative  / Bili: Negative / Urobili: <2 mg/dL   Blood: x / Protein: Trace / Nitrite: Negative   Leuk Esterase: Negative / RBC: x / WBC x   Sq Epi: x / Non Sq Epi: x / Bacteria: x        CXR: < from: Xray Chest 1 View AP/PA (19 @ 16:40) >  EXAM:  XR CHEST AP OR PA 1V                            PROCEDURE DATE:  2019            INTERPRETATION:  CLINICAL INFORMATION: Shortness of Breath    EXAM: PA chest    COMPARISON: Chest X-Ray 2019. CT Chest 2019.    IMPRESSION:    Unchanged mild pulmonary edema and layering bilateral pleural effusions.   No pneumothorax. The heart is normal in size.    < end of copied text >      EKG:< from: 12 Lead ECG (19 @ 16:16) >    Ventricular Rate 88 BPM    Atrial Rate 88 BPM    P-R Interval 130 ms    QRS Duration 100 ms    Q-T Interval 434 ms    QTC Calculation(Bezet) 525 ms    P Axis 56 degrees    R Axis 54 degrees    T Axis 55 degrees    Diagnosis Line NORMAL SINUS RHYTHM  POSSIBLE LEFT ATRIAL ENLARGEMENT  POOR R WAVE PROGRESSION  PROLONGED QT  ABNORMAL ECG    < end of copied text >      Carotid Duplex:  n/a    PFT's:    Echocardiogram:< from: TORITO w/TTE (w/3D Echo) (19 @ 09:51) >  ------------------------------------------------------------------------  Dimensions:    Normal Values:  LA:     3.8    2.0 - 4.0 cm  Ao:     3.0    2.0 - 3.8 cm  SEPTUM: 0.9    0.6 - 1.2 cm  PWT:    0.8    0.6 - 1.1 cm  LVIDd:  6.2    3.0 - 5.6 cm  LVIDs:  3.6    1.8 - 4.0 cm  Derived variables:  LVMI: 139 g/m2  RWT: 0.25  Fractional short: 42 %  EF (Teicholtz): 72 %  ------------------------------------------------------------------------  Observations:  Mitral Valve: Mitral annular calcification. Thickened  mitral valve leaflet tips with central malcoaptation.  Moderate-severe mitral regurgitation. EROA 0.34sqcm  Aortic Valve/Aorta: Multiple mobile Echogenic structure  seen on the aortic valve consistent with vegetation. From  the short axis-  the echodensity on  noncup (measures 6mm  x 10mm)  on the right cusp ( 9mm x 10mm.) The left cusp tip  are frayed with multiple mobile echo densities 12mm x 8mm  collectively.  On the Long axis there is a mobile  vegetation seen in the LVOT  which measures 5mm x 20mm  (image 32)   Severe aortic regurgitation.  Severe atheroma noted in aortic arch/descending aorta.  Left Atrium: Severely dilated left atrium.  LA volume index  = 68 cc/m2. No left atrial or left atrial appendage  thrombus.  Left Ventricle: Overall preserved left ventricular ejection  fraction. Severe left ventricular enlargement.  Right Heart: right atrial enlargement. Normal right  ventricular size and function. Normal tricuspid valve.  Minimal tricuspid regurgitation. Normal pulmonic valve.  Minimalpulmonic regurgitation.  Pericardium/Pleura: Small pericardial effusion.  Bilateral pleural effusions.  Hemodynamic: Contrast injection demonstrates no evidence of  a patent foramen ovale.  ------------------------------------------------------------------------  Conclusions:  1. Mitral annular calcification. Thickened mitral valve  leaflet tips with central malcoaptation.   Moderate-severe  mitral regurgitation. EROA 0.34sqcm  2. Multiple mobile Echogenic structure seen on the aortic  valve consistent with vegetation. From the short axis-  the  echodensity on  non cup (measures 6mm x 10mm)  on the right  cusp ( 9mm x 10mm.) The left cusp tip are frayed with  multiple mobile echo densities 12mm x 8mm collectively.  On  the Long axis there is a mobile  vegetation seen in the  LVOT  which measures 5mm x 20mm (image 32)   Severe aortic regurgitation.  3. Severe atheroma noted in aortic arch/descending aorta.  4. Severely dilated left atrium.  LA volume index = 68  cc/m2. No left atrial or left atrial appendage thrombus.  5. Severe left ventricular enlargement.  6. Overall preserved left ventricular ejection fraction.  7. Normal right ventricular size and function.  8. Normal tricuspid valve. Minimal tricuspid regurgitation.  9. Estimatedpulmonary artery systolic pressure equals 31  mm Hg, assuming right atrial pressure equals 3  mm Hg,  consistent with normal pulmonary pressures.  10. Small pericardial effusion.  11. Bilateral pleural effusions.  Discussed with Dr. JUNITO Paris at time of study.  *** No previous Echo exam.    < end of copied text >      Cardiac catheterization: < from: Cardiac Cath Lab - Adult (19 @ 18:30) >  [facility]  Department of Cardiology  [address] [place] [address]  [phone]  Cath Lab Report -- Comprehensive Report  Patient: VIVIANA CHAPMAN  Study date: 2019  Account number: [id number]  MR number:[id number]  : 1955  Gender: Female  Race: W  Case Physician(s):  MIGUEL ANGEL Alcaraz M.D.  Referring Physician:  INDICATIONS: Aortic valve insufficiency.  HISTORY: History includes peripheral vascular disease. The patient has  hypertension, renal failure (not requiring dialysis), and a former history  of cigarette use.  PROCEDURE:  --  Left coronary angiography.  --  Right coronary angiography.  TECHNIQUE: The risks and alternatives of the procedures and conscious  sedationwere explained to the patient and informed consent was obtained.  Cardiac catheterization performed electively.  Local anesthetic given. Right radial artery access. Left coronary artery  angiography. The vessel was injected utilizing a catheter. Right coronary  artery angiography. The vessel was injected utilizing a catheter.  RADIATION EXPOSURE: 1.35 min.  CONTRAST GIVEN: Omnipaque 20 ml.  MEDICATIONS GIVEN: Midazolam, 1 mg, IV. Fentanyl, 25 mcg, IV. Heparin, 2000  units, IV. Cardene, 500 mcg.  CORONARY VESSELS: The coronary circulation is right dominant.  LM:   --  LM: Normal.  LAD:   --  Ostial LAD: There was a 40 % stenosis.  --  Mid LAD: Angiography showed mild to moderate atherosclerosis with no  flow limiting lesions.  --  D1: There was a 50 % stenosis.  CX:   --  Circumflex: Angiography showed mild atherosclerosis with no flow  limiting lesions.  --  Ostial circumflex: There was a 20 % stenosis.  RCA:   --  Mid RCA: There was a 50 % stenosis.  COMPLICATIONS: There were no complications.  DIAGNOSTIC RECOMMENDATIONS: Non obstructive CAD.  Endocarditis with severe AI.  CTS for further management.  INTERVENTIONAL RECOMMENDATIONS: Non obstructive CAD.  Endocarditis with severe AI.    < end of copied text >      Gen: WN/WD NAD  Neuro: AAOx3, nonfocal  Pulm: CTA B/L  CV: RRR, S1S2 +systolic murmur  Abd: Soft, NT, ND +BS  Ext: No edema, + peripheral pulses      Pt has AICD/PPM [ ] Yes  [x ] No             Brand Name:  Pre-op Beta Blocker ordered within 24 hrs of surgery (CABG ONLY)?  [ ] Yes  [x ] No  If not, Why? Non obstructive CAD  Type & Cross  [x ] Yes  [ ] No  NPO after Midnight [x ] Yes  [ ] No  Pre-op ABX ordered, to be taped on chart:  [ x] Yes  [ ] No     Hibiclens/Peridex ordered [x ] Yes  [ ] No  Intraop on Hold: PRBCs, CXR, TORITO [x ]   Consent obtained  [x ] Yes  [ ] No

## 2019-05-30 NOTE — PROGRESS NOTE ADULT - ASSESSMENT
63y Female with HTN, CKD, PAD s/p right femoral endarterectomy, depression, and smoker (quit 5 PTA) presented to the ED c/o SOB  found to have Gram + sepsis with now signs of endocarditis.     - Endocarditis - ZAHIRA with echodensity on AV with  severe AI.  This is likely the cause of her CABA and heart failure.     - Blood cultures + for gram + cocci in pairs/chains, though, latest is negative.  She has been afebrile  - ID following. Cont Abx    - Will need at least AVR andlikely MVR.   - although cath report officially states only nonobstructive disease, her ostial lad looks tighter to me than the stated degree of stenosis. will d/w dr gutierrez  - Cr better  - no meaningful vol ol at present    - Please continue to maintain strict I/Os, monitor daily weights, Cr, and K.   - Plan for CTsx Friday.     - HTN: Continue Cardizem CD  - Cont statin  - Monitor H/H   - Hold Plavix.

## 2019-05-30 NOTE — PROGRESS NOTE ADULT - SUBJECTIVE AND OBJECTIVE BOX
infectious diseases progress note:    Patient is a 63y old  Female who presents with a chief complaint of torito (29 May 2019 18:25)        Atherosclerosis of native coronary artery without angina pectoris        ROS:  CONSTITUTIONAL:  Negative fever or chills, feels well, good appetite  EYES:  Negative  blurry vision or double vision  CARDIOVASCULAR:  Negative for chest pain or palpitations  RESPIRATORY:  Negative for cough, wheezing, or SOB   GASTROINTESTINAL:  Negative for nausea, vomiting, diarrhea, constipation, or abdominal pain  GENITOURINARY:  Negative frequency, urgency or dysuria  NEUROLOGIC:  No headache, confusion, dizziness, lightheadedness    Allergies    No Known Allergies    Intolerances        ANTIBIOTICS/RELEVANT:  antimicrobials  ampicillin  IVPB 2 Gram(s) IV Intermittent every 6 hours  ampicillin  IVPB      cefTRIAXone   IVPB 2 Gram(s) IV Intermittent every 12 hours    immunologic:    OTHER:  acetaminophen   Tablet .. 650 milliGRAM(s) Oral every 6 hours PRN  ALBUTerol    0.083%. 2.5 milliGRAM(s) Nebulizer once PRN  ALPRAZolam 0.5 milliGRAM(s) Oral every 12 hours PRN  buPROPion XL . 150 milliGRAM(s) Oral daily  calcium carbonate 1250 mG  + Vitamin D (OsCal 500 + D) 1 Tablet(s) Oral daily  diltiazem    milliGRAM(s) Oral daily  mupirocin 2% Ointment 1 Application(s) Topical every 12 hours  pantoprazole    Tablet 40 milliGRAM(s) Oral before breakfast  pramipexole 0.5 milliGRAM(s) Oral at bedtime  simvastatin 10 milliGRAM(s) Oral at bedtime  sodium chloride 0.9% lock flush 3 milliLiter(s) IV Push every 8 hours  tiotropium 18 MICROgram(s) Capsule 1 Capsule(s) Inhalation daily      Objective:  Vital Signs Last 24 Hrs  T(C): 36.8 (30 May 2019 04:53), Max: 36.8 (29 May 2019 13:06)  T(F): 98.2 (30 May 2019 04:53), Max: 98.2 (29 May 2019 13:06)  HR: 91 (30 May 2019 04:53) (87 - 108)  BP: 119/42 (30 May 2019 04:53) (111/50 - 152/47)  BP(mean): --  RR: 18 (30 May 2019 04:53) (17 - 18)  SpO2: 92% (30 May 2019 04:53) (92% - 96%)       Eyes:AMY, EOMI  Ear/Nose/Throat: no oral lesion, no sinus tenderness on percussion	  Neck:no JVD, no lymphadenopathy, supple  Respiratory: CTA elle  Cardiovascular: S1S2 RRR, no murmurs  Gastrointestinal:soft, (+) BS, no HSM  Extremities:no e/e/c        LABS:                        8.8    10.2  )-----------( 348      ( 30 May 2019 06:01 )             27.5         135  |  96  |  25<H>  ----------------------------<  206<H>  4.1   |  21<L>  |  2.21<H>    Ca    9.0      30 May 2019 06:01    TPro  6.7  /  Alb  3.1<L>  /  TBili  0.2  /  DBili  x   /  AST  56<H>  /  ALT  69<H>  /  AlkPhos  177<H>      PT/INR - ( 29 May 2019 07:25 )   PT: 12.7 sec;   INR: 1.10 ratio         PTT - ( 29 May 2019 07:25 )  PTT:33.5 sec  Urinalysis Basic - ( 30 May 2019 02:35 )    Color: Light Yellow / Appearance: Clear / S.021 / pH: x  Gluc: x / Ketone: Negative  / Bili: Negative / Urobili: <2 mg/dL   Blood: x / Protein: Trace / Nitrite: Negative   Leuk Esterase: Negative / RBC: x / WBC x   Sq Epi: x / Non Sq Epi: x / Bacteria: x          MICROBIOLOGY:    RECENT CULTURES:   @ 23:11 .Blood                No growth to date.     @ 16:33 .Blood Blood-Peripheral                No growth to date.     @ 21:21 .Urine Clean Catch (Midstream)                No growth     @ 11:05 .Blood Blood-Peripheral                No growth at 5 days.     @ 11:04 .Blood Blood-Peripheral       Growth in aerobic bottle: Gram Positive Cocci in Pairs and Chains           Growth in aerobic bottle: Gram Positive Cocci in Pairs and Chains     @ 10:50 .Blood Blood-Peripheral       Growth in aerobic bottle: Gram Positive Cocci in Pairs and Chains  Growth in anaerobic bottle: Gram Positive Cocci in Pairs and Chains           Growth in aerobic and anaerobic bottles: Enterococcus faecalis  See previous culture [id number]          RESPIRATORY CULTURES:              RADIOLOGY & ADDITIONAL STUDIES:        Pager [phone]  After 5 pm/weekends or if no response :[phone]

## 2019-05-30 NOTE — PATIENT PROFILE ADULT - FALL HARM RISK CONCLUSION
Consent: The patient's consent was obtained including but not limited to risks of crusting, scabbing, blistering, scarring, darker or lighter pigmentary change, recurrence, incomplete removal and infection. Render Post-Care Instructions In Note?: yes Post-Care Instructions: I reviewed with the patient in detail post-care instructions. Patient is to wear sunprotection, and avoid picking at any of the treated lesions. Pt may apply Vaseline to crusted or scabbing areas. Duration Of Freeze Thaw-Cycle (Seconds): 0 Detail Level: Detailed Render Note In Bullet Format When Appropriate: No Fall Risk

## 2019-05-30 NOTE — PROGRESS NOTE ADULT - ASSESSMENT
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   No signs or symptoms of neurological issues ,  no prior URI or infection or ppt event.  Follow up bc negative on ampicillin.  Pt has renal insufficieny , unclear how long it has been present and wether it is related to endocarditis     pt for surgery this week  add ceftriaxone to ampicillin for synergy  will need prolonged course of ab post op.   renal evaluation   for or tomrorow  continue same ab perioperatively

## 2019-05-30 NOTE — PROGRESS NOTE ADULT - ATTENDING COMMENTS
I spent 40 minutes,  with the patient at the bedside, with >50% of time for counseling and coordinating care.  We discussed at length the clinical condition, catheterization and plans for surgery

## 2019-05-30 NOTE — PROGRESS NOTE ADULT - SUBJECTIVE AND OBJECTIVE BOX
[facility]    Chao Lomax, Kunal, [place], [place], [place], [place]      [phone]    CHIEF COMPLAINT: Patient is a 63y old  Female who presents with a chief complaint of torito (30 May 2019 08:55)      Follow Up: endocarditis, cad, for surgery    Interim history: The patient reports no new symptoms.  Denies chest discomfort and shortness of breath.  No abdominal pain.  No new neurologic symptoms.      MEDICATIONS  (STANDING):  ampicillin  IVPB 2 Gram(s) IV Intermittent every 6 hours  ampicillin  IVPB      buPROPion XL . 150 milliGRAM(s) Oral daily  calcium carbonate 1250 mG  + Vitamin D (OsCal 500 + D) 1 Tablet(s) Oral daily  cefTRIAXone   IVPB 2 Gram(s) IV Intermittent every 12 hours  cefuroxime  IVPB 1500 milliGRAM(s) IV Intermittent once  chlorhexidine 0.12% Liquid 30 milliLiter(s) Swish and Spit once  chlorhexidine 4% Liquid 1 Application(s) Topical once  diltiazem    milliGRAM(s) Oral daily  mupirocin 2% Ointment 1 Application(s) Topical every 12 hours  pantoprazole    Tablet 40 milliGRAM(s) Oral before breakfast  pramipexole 0.5 milliGRAM(s) Oral at bedtime  simvastatin 10 milliGRAM(s) Oral at bedtime  sodium chloride 0.9% lock flush 3 milliLiter(s) IV Push every 8 hours  tiotropium 18 MICROgram(s) Capsule 1 Capsule(s) Inhalation daily    MEDICATIONS  (PRN):  acetaminophen   Tablet .. 650 milliGRAM(s) Oral every 6 hours PRN Temp greater or equal to 38C (100.4F), Mild Pain (1 - 3)  ALBUTerol    0.083%. 2.5 milliGRAM(s) Nebulizer once PRN Wheezing  ALPRAZolam 0.5 milliGRAM(s) Oral every 12 hours PRN anxiety      REVIEW OF SYSTEMS:  eye, ent, GI, , allergic, dermatologic, musculoskeletal and neurologic are negative except as described above    Vital Signs Last 24 Hrs  T(C): 36.8 (30 May 2019 04:53), Max: 36.8 (29 May 2019 13:06)  T(F): 98.2 (30 May 2019 04:53), Max: 98.2 (29 May 2019 13:06)  HR: 91 (30 May 2019 04:53) (87 - 108)  BP: 119/42 (30 May 2019 04:53) (111/50 - 152/47)  BP(mean): --  RR: 18 (30 May 2019 04:53) (17 - 18)  SpO2: 92% (30 May 2019 04:53) (92% - 96%)    I&O's Summary    29 May 2019 07:01  -  30 May 2019 07:00  --------------------------------------------------------  IN: 1030 mL / OUT: 1000 mL / NET: 30 mL        Telemetry past 24h: sr    PHYSICAL EXAM:    Constitutional: well-nourished, well-developed, NAD   HEENT:  MMM, sclerae anicteric, conjunctivae clear, no oral cyanosis.  Pulmonary: Non-labored, breath sounds are clear bilaterally, No wheezing, rales or rhonchi  Cardiovascular: Regular, S1 and S2.  to for murmur across av.  No rubs, gallops or clicks  Gastrointestinal: Bowel Sounds present, soft, nontender.   Lymph: No peripheral edema.   Neurological: Alert, no focal deficits  Skin: No rashes.  Psych:  Mood & affect appropriate    LABS: All Labs Reviewed:                        8.8    10.2  )-----------( 348      ( 30 May 2019 06:01 )             27.5                         9.7    10.5  )-----------( 399      ( 29 May 2019 07:24 )             29.4                         8.6    10.71 )-----------( 380      ( 28 May 2019 08:26 )             27.9     30 May 2019 06:01    135    |  96     |  25     ----------------------------<  206    4.1     |  21     |  2.21   29 May 2019 07:24    136    |  97     |  27     ----------------------------<  99     4.2     |  23     |  2.52   28 May 2019 08:26    138    |  103    |  30     ----------------------------<  103    4.3     |  25     |  2.30     Ca    9.0        30 May 2019 06:01  Ca    9.3        29 May 2019 07:24  Ca    8.7        28 May 2019 08:26  Mg     2.5       28 May 2019 08:26    TPro  6.7    /  Alb  3.1    /  TBili  0.2    /  DBili  x      /  AST  56     /  ALT  69     /  AlkPhos  177    29 May 2019 07:24    PT/INR - ( 29 May 2019 07:25 )   PT: 12.7 sec;   INR: 1.10 ratio         PTT - ( 29 May 2019 07:25 )  PTT:33.5 sec      Blood Culture: Organism --  Gram Stain Blood -- Gram Stain --  Specimen Source .Blood  Culture-Blood --    Organism --  Gram Stain Blood -- Gram Stain --  Specimen Source .Blood Blood-Peripheral  Culture-Blood --       @ 07:24  Pro Bnp [id number]     @ 22:29  TSH: 1.05      RADIOLOGY:  < from: Cardiac Cath Lab - Adult (19 @ 18:30) >    [facility]  Department of Cardiology  [address] [place] [address]  [phone]  Cath Lab Report -- Comprehensive Report  Patient: VIVIANA CHAPMAN  Study date: 2019  Account number: [id number]  MR number:[id number]  : 1955  Gender: Female  Race: W  Case Physician(s):  MIGUEL ANGEL Alcaraz M.D.  Referring Physician:  INDICATIONS: Aortic valve insufficiency.  HISTORY: History includes peripheral vascular disease. The patient has  hypertension, renal failure (not requiring dialysis), and a former history  of cigarette use.  PROCEDURE:  --  Left coronary angiography.  --  Right coronary angiography.  TECHNIQUE: The risks and alternatives of the procedures and conscious  sedationwere explained to the patient and informed consent was obtained.  Cardiac catheterization performed electively.  Local anesthetic given. Right radial artery access. Left coronary artery  angiography. The vessel was injected utilizing a catheter. Right coronary  artery angiography. The vessel was injected utilizing a catheter.  RADIATION EXPOSURE: 1.35 min.  CONTRAST GIVEN: Omnipaque 20 ml.  MEDICATIONS GIVEN: Midazolam, 1 mg, IV. Fentanyl, 25 mcg, IV. Heparin, 2000  units, IV. Cardene, 500 mcg.  CORONARY VESSELS: The coronary circulation is right dominant.  LM:   --  LM: Normal.  LAD:   --  Ostial LAD: There was a 40 % stenosis.  --  Mid LAD: Angiography showed mild to moderate atherosclerosis with no  flow limiting lesions.  --  D1: There was a 50 % stenosis.  CX:   --  Circumflex: Angiography showed mild atherosclerosis with no flow  limiting lesions.  --  Ostial circumflex: There was a 20 % stenosis.  RCA:   --  Mid RCA: There was a 50 % stenosis.  COMPLICATIONS: There were no complications.  DIAGNOSTIC RECOMMENDATIONS: Non obstructive CAD.  Endocarditis with severe AI.  CTS for further management.  INTERVENTIONAL RECOMMENDATIONS: Non obstructive CAD.  Endocarditis with severe AI.  CTS for further management.  Prepared and signed by  Cyndi Sharif D.O.  Signed 2019 08:43:47  HEMODYNAMIC TABLES  Pressures:  Baseline  Pressures:  - HR: 88  Pressures:  - Rhythm:  Pressures:  -- Aortic Pressure (S/D/M): 132/40/68  Outputs:  Baseline  Outputs:  -- CALCULATIONS[age]  Outputs:  -- CALCULATIONS: Body Surface Area: 1.50  Outputs:  -- CALCULATIONS: Height in cm: 162.00  Outputs:  -- CALCULATIONS: Sex: Female  Outputs:  -- CALCULATIONS: Weight in k.50    < end of copied text >    EKG:    Echo:

## 2019-05-31 ENCOUNTER — APPOINTMENT (OUTPATIENT)
Dept: CARDIOTHORACIC SURGERY | Facility: HOSPITAL | Age: 64
End: 2019-05-31

## 2019-05-31 ENCOUNTER — RESULT REVIEW (OUTPATIENT)
Age: 64
End: 2019-05-31

## 2019-05-31 LAB
ALBUMIN SERPL ELPH-MCNC: 2.4 G/DL — LOW (ref 3.3–5)
ALP SERPL-CCNC: 52 U/L — SIGNIFICANT CHANGE UP (ref 40–120)
ALT FLD-CCNC: 15 U/L — SIGNIFICANT CHANGE UP (ref 10–45)
ANION GAP SERPL CALC-SCNC: 16 MMOL/L — SIGNIFICANT CHANGE UP (ref 5–17)
APTT BLD: 50.5 SEC — HIGH (ref 27.5–36.3)
AST SERPL-CCNC: 28 U/L — SIGNIFICANT CHANGE UP (ref 10–40)
BASE EXCESS BLDV CALC-SCNC: -0.3 MMOL/L — SIGNIFICANT CHANGE UP (ref -2–2)
BASE EXCESS BLDV CALC-SCNC: -1.3 MMOL/L — SIGNIFICANT CHANGE UP (ref -2–2)
BASE EXCESS BLDV CALC-SCNC: -5.1 MMOL/L — LOW (ref -2–2)
BASE EXCESS BLDV CALC-SCNC: 0.1 MMOL/L — SIGNIFICANT CHANGE UP (ref -2–2)
BASE EXCESS BLDV CALC-SCNC: 3 MMOL/L — HIGH (ref -2–2)
BASOPHILS # BLD AUTO: 0 K/UL — SIGNIFICANT CHANGE UP (ref 0–0.2)
BASOPHILS NFR BLD AUTO: 0.1 % — SIGNIFICANT CHANGE UP (ref 0–2)
BILIRUB SERPL-MCNC: 0.9 MG/DL — SIGNIFICANT CHANGE UP (ref 0.2–1.2)
BLD GP AB SCN SERPL QL: NEGATIVE — SIGNIFICANT CHANGE UP
BLOOD GAS VENOUS - CREATININE: SIGNIFICANT CHANGE UP MG/DL (ref 0.5–1.3)
BUN SERPL-MCNC: 17 MG/DL — SIGNIFICANT CHANGE UP (ref 7–23)
CA-I SERPL-SCNC: 0.95 MMOL/L — LOW (ref 1.12–1.3)
CA-I SERPL-SCNC: 0.98 MMOL/L — LOW (ref 1.12–1.3)
CA-I SERPL-SCNC: 1 MMOL/L — LOW (ref 1.12–1.3)
CA-I SERPL-SCNC: 1.08 MMOL/L — LOW (ref 1.12–1.3)
CALCIUM SERPL-MCNC: 8.6 MG/DL — SIGNIFICANT CHANGE UP (ref 8.4–10.5)
CHLORIDE BLDV-SCNC: SIGNIFICANT CHANGE UP MMOL/L (ref 96–108)
CHLORIDE SERPL-SCNC: 105 MMOL/L — SIGNIFICANT CHANGE UP (ref 96–108)
CK MB BLD-MCNC: 19.4 % — HIGH (ref 0–3.5)
CK MB CFR SERPL CALC: 34.9 NG/ML — HIGH (ref 0–3.8)
CK SERPL-CCNC: 180 U/L — HIGH (ref 25–170)
CO2 BLDV-SCNC: 25 MMOL/L — SIGNIFICANT CHANGE UP (ref 22–30)
CO2 SERPL-SCNC: 21 MMOL/L — LOW (ref 22–31)
CREAT SERPL-MCNC: 1.73 MG/DL — HIGH (ref 0.5–1.3)
EOSINOPHIL # BLD AUTO: 0.1 K/UL — SIGNIFICANT CHANGE UP (ref 0–0.5)
EOSINOPHIL NFR BLD AUTO: 0.5 % — SIGNIFICANT CHANGE UP (ref 0–6)
FIBRINOGEN PPP-MCNC: 297 MG/DL — LOW (ref 350–510)
GAS PNL BLDA: SIGNIFICANT CHANGE UP
GAS PNL BLDV: 135 MMOL/L — LOW (ref 136–145)
GAS PNL BLDV: 137 MMOL/L — SIGNIFICANT CHANGE UP (ref 136–145)
GAS PNL BLDV: 138 MMOL/L — SIGNIFICANT CHANGE UP (ref 136–145)
GAS PNL BLDV: 140 MMOL/L — SIGNIFICANT CHANGE UP (ref 136–145)
GAS PNL BLDV: SIGNIFICANT CHANGE UP
GLUCOSE BLDC GLUCOMTR-MCNC: 118 MG/DL — HIGH (ref 70–99)
GLUCOSE BLDC GLUCOMTR-MCNC: 128 MG/DL — HIGH (ref 70–99)
GLUCOSE BLDC GLUCOMTR-MCNC: 145 MG/DL — HIGH (ref 70–99)
GLUCOSE BLDC GLUCOMTR-MCNC: 171 MG/DL — HIGH (ref 70–99)
GLUCOSE BLDC GLUCOMTR-MCNC: 96 MG/DL — SIGNIFICANT CHANGE UP (ref 70–99)
GLUCOSE BLDV-MCNC: 127 MG/DL — HIGH (ref 70–99)
GLUCOSE BLDV-MCNC: 134 MG/DL — HIGH (ref 70–99)
GLUCOSE BLDV-MCNC: 150 MG/DL — HIGH (ref 70–99)
GLUCOSE BLDV-MCNC: 159 MG/DL — HIGH (ref 70–99)
GLUCOSE SERPL-MCNC: 125 MG/DL — HIGH (ref 70–99)
GRAM STN FLD: SIGNIFICANT CHANGE UP
HCO3 BLDV-SCNC: 21 MMOL/L — SIGNIFICANT CHANGE UP (ref 21–29)
HCO3 BLDV-SCNC: 24 MMOL/L — SIGNIFICANT CHANGE UP (ref 21–29)
HCO3 BLDV-SCNC: 24 MMOL/L — SIGNIFICANT CHANGE UP (ref 21–29)
HCO3 BLDV-SCNC: 25 MMOL/L — SIGNIFICANT CHANGE UP (ref 21–29)
HCO3 BLDV-SCNC: 29 MMOL/L — SIGNIFICANT CHANGE UP (ref 21–29)
HCT VFR BLD CALC: 29.3 % — LOW (ref 34.5–45)
HCT VFR BLDA CALC: 20 % — CRITICAL LOW (ref 39–50)
HCT VFR BLDA CALC: 23 % — LOW (ref 39–50)
HCT VFR BLDA CALC: 26 % — LOW (ref 39–50)
HCT VFR BLDA CALC: 26 % — LOW (ref 39–50)
HGB BLD CALC-MCNC: 6.5 G/DL — CRITICAL LOW (ref 11.5–15.5)
HGB BLD CALC-MCNC: 7.5 G/DL — LOW (ref 11.5–15.5)
HGB BLD CALC-MCNC: 8.2 G/DL — LOW (ref 11.5–15.5)
HGB BLD CALC-MCNC: 8.4 G/DL — LOW (ref 11.5–15.5)
HGB BLD-MCNC: 9.7 G/DL — LOW (ref 11.5–15.5)
HOROWITZ INDEX BLDV+IHG-RTO: 0 — SIGNIFICANT CHANGE UP
HOROWITZ INDEX BLDV+IHG-RTO: 50 — SIGNIFICANT CHANGE UP
INR BLD: 1.25 RATIO — HIGH (ref 0.88–1.16)
LACTATE BLDV-MCNC: 0.5 MMOL/L — LOW (ref 0.7–2)
LACTATE BLDV-MCNC: 0.7 MMOL/L — SIGNIFICANT CHANGE UP (ref 0.7–2)
LACTATE BLDV-MCNC: 0.9 MMOL/L — SIGNIFICANT CHANGE UP (ref 0.7–2)
LACTATE BLDV-MCNC: 1.5 MMOL/L — SIGNIFICANT CHANGE UP (ref 0.7–2)
LYMPHOCYTES # BLD AUTO: 1.6 K/UL — SIGNIFICANT CHANGE UP (ref 1–3.3)
LYMPHOCYTES # BLD AUTO: 7.4 % — LOW (ref 13–44)
MAGNESIUM SERPL-MCNC: 3.6 MG/DL — HIGH (ref 1.6–2.6)
MCHC RBC-ENTMCNC: 29.2 PG — SIGNIFICANT CHANGE UP (ref 27–34)
MCHC RBC-ENTMCNC: 33.2 GM/DL — SIGNIFICANT CHANGE UP (ref 32–36)
MCV RBC AUTO: 88.1 FL — SIGNIFICANT CHANGE UP (ref 80–100)
MONOCYTES # BLD AUTO: 1 K/UL — HIGH (ref 0–0.9)
MONOCYTES NFR BLD AUTO: 4.4 % — SIGNIFICANT CHANGE UP (ref 2–14)
NEUTROPHILS # BLD AUTO: 19.5 K/UL — HIGH (ref 1.8–7.4)
NEUTROPHILS NFR BLD AUTO: 87.6 % — HIGH (ref 43–77)
PCO2 BLDV: 40 MMHG — SIGNIFICANT CHANGE UP (ref 35–50)
PCO2 BLDV: 45 MMHG — SIGNIFICANT CHANGE UP (ref 35–50)
PCO2 BLDV: 48 MMHG — SIGNIFICANT CHANGE UP (ref 35–50)
PCO2 BLDV: 51 MMHG — HIGH (ref 35–50)
PCO2 BLDV: 59 MMHG — HIGH (ref 35–50)
PH BLDV: 7.27 — LOW (ref 7.35–7.45)
PH BLDV: 7.31 — LOW (ref 7.35–7.45)
PH BLDV: 7.32 — LOW (ref 7.35–7.45)
PH BLDV: 7.35 — SIGNIFICANT CHANGE UP (ref 7.35–7.45)
PH BLDV: 7.4 — SIGNIFICANT CHANGE UP (ref 7.35–7.45)
PHOSPHATE SERPL-MCNC: 4.6 MG/DL — HIGH (ref 2.5–4.5)
PLATELET # BLD AUTO: 158 K/UL — SIGNIFICANT CHANGE UP (ref 150–400)
PO2 BLDV: 42 MMHG — SIGNIFICANT CHANGE UP (ref 25–45)
PO2 BLDV: 42 MMHG — SIGNIFICANT CHANGE UP (ref 25–45)
PO2 BLDV: 56 MMHG — HIGH (ref 25–45)
PO2 BLDV: 58 MMHG — HIGH (ref 25–45)
PO2 BLDV: 75 MMHG — HIGH (ref 25–45)
POTASSIUM BLDV-SCNC: 3.8 MMOL/L — SIGNIFICANT CHANGE UP (ref 3.5–5)
POTASSIUM BLDV-SCNC: 4.8 MMOL/L — SIGNIFICANT CHANGE UP (ref 3.5–5)
POTASSIUM BLDV-SCNC: 6 MMOL/L — HIGH (ref 3.5–5)
POTASSIUM BLDV-SCNC: 6.4 MMOL/L — CRITICAL HIGH (ref 3.5–5)
POTASSIUM SERPL-MCNC: 5.1 MMOL/L — SIGNIFICANT CHANGE UP (ref 3.5–5.3)
POTASSIUM SERPL-SCNC: 5.1 MMOL/L — SIGNIFICANT CHANGE UP (ref 3.5–5.3)
PROT SERPL-MCNC: 3.7 G/DL — LOW (ref 6–8.3)
PROTHROM AB SERPL-ACNC: 14.4 SEC — HIGH (ref 10–12.9)
RBC # BLD: 3.32 M/UL — LOW (ref 3.8–5.2)
RBC # FLD: 14.4 % — SIGNIFICANT CHANGE UP (ref 10.3–14.5)
RH IG SCN BLD-IMP: POSITIVE — SIGNIFICANT CHANGE UP
SAO2 % BLDV: 75 % — SIGNIFICANT CHANGE UP (ref 67–88)
SAO2 % BLDV: 78 % — SIGNIFICANT CHANGE UP (ref 67–88)
SAO2 % BLDV: 84 % — SIGNIFICANT CHANGE UP (ref 67–88)
SAO2 % BLDV: 86 % — SIGNIFICANT CHANGE UP (ref 67–88)
SAO2 % BLDV: 93 % — HIGH (ref 67–88)
SODIUM SERPL-SCNC: 142 MMOL/L — SIGNIFICANT CHANGE UP (ref 135–145)
TROPONIN T, HIGH SENSITIVITY RESULT: 410 NG/L — HIGH (ref 0–51)
WBC # BLD: 22.2 K/UL — HIGH (ref 3.8–10.5)
WBC # FLD AUTO: 22.2 K/UL — HIGH (ref 3.8–10.5)

## 2019-05-31 PROCEDURE — 71045 X-RAY EXAM CHEST 1 VIEW: CPT | Mod: 26

## 2019-05-31 PROCEDURE — 33426 REPAIR OF MITRAL VALVE: CPT

## 2019-05-31 PROCEDURE — 33405 REPLACEMENT AORTIC VALVE OPN: CPT | Mod: AS

## 2019-05-31 PROCEDURE — 33533 CABG ARTERIAL SINGLE: CPT

## 2019-05-31 PROCEDURE — 99232 SBSQ HOSP IP/OBS MODERATE 35: CPT

## 2019-05-31 PROCEDURE — 93010 ELECTROCARDIOGRAM REPORT: CPT

## 2019-05-31 PROCEDURE — 33430 REPLACEMENT OF MITRAL VALVE: CPT

## 2019-05-31 PROCEDURE — 33405 REPLACEMENT AORTIC VALVE OPN: CPT

## 2019-05-31 PROCEDURE — 33426 REPAIR OF MITRAL VALVE: CPT | Mod: AS

## 2019-05-31 PROCEDURE — 99291 CRITICAL CARE FIRST HOUR: CPT

## 2019-05-31 PROCEDURE — 88305 TISSUE EXAM BY PATHOLOGIST: CPT | Mod: 26

## 2019-05-31 RX ORDER — NOREPINEPHRINE BITARTRATE/D5W 8 MG/250ML
0.14 PLASTIC BAG, INJECTION (ML) INTRAVENOUS
Qty: 8 | Refills: 0 | Status: DISCONTINUED | OUTPATIENT
Start: 2019-05-31 | End: 2019-06-01

## 2019-05-31 RX ORDER — ASPIRIN/CALCIUM CARB/MAGNESIUM 324 MG
300 TABLET ORAL ONCE
Refills: 0 | Status: COMPLETED | OUTPATIENT
Start: 2019-05-31 | End: 2019-05-31

## 2019-05-31 RX ORDER — DOBUTAMINE HCL 250MG/20ML
2.5 VIAL (ML) INTRAVENOUS
Qty: 500 | Refills: 0 | Status: DISCONTINUED | OUTPATIENT
Start: 2019-05-31 | End: 2019-06-02

## 2019-05-31 RX ORDER — ALBUMIN HUMAN 25 %
250 VIAL (ML) INTRAVENOUS ONCE
Refills: 0 | Status: COMPLETED | OUTPATIENT
Start: 2019-05-31 | End: 2019-05-31

## 2019-05-31 RX ORDER — HYDROMORPHONE HYDROCHLORIDE 2 MG/ML
0.5 INJECTION INTRAMUSCULAR; INTRAVENOUS; SUBCUTANEOUS ONCE
Refills: 0 | Status: DISCONTINUED | OUTPATIENT
Start: 2019-05-31 | End: 2019-05-31

## 2019-05-31 RX ORDER — MILRINONE LACTATE 1 MG/ML
0.2 INJECTION, SOLUTION INTRAVENOUS
Qty: 20 | Refills: 0 | Status: DISCONTINUED | OUTPATIENT
Start: 2019-05-31 | End: 2019-05-31

## 2019-05-31 RX ORDER — AMPICILLIN TRIHYDRATE 250 MG
1.5 CAPSULE ORAL EVERY 6 HOURS
Refills: 0 | Status: DISCONTINUED | OUTPATIENT
Start: 2019-05-31 | End: 2019-05-31

## 2019-05-31 RX ORDER — SODIUM CHLORIDE 9 MG/ML
500 INJECTION, SOLUTION INTRAVENOUS ONCE
Refills: 0 | Status: COMPLETED | OUTPATIENT
Start: 2019-05-31 | End: 2019-05-31

## 2019-05-31 RX ORDER — AMPICILLIN TRIHYDRATE 250 MG
2 CAPSULE ORAL EVERY 6 HOURS
Refills: 0 | Status: DISCONTINUED | OUTPATIENT
Start: 2019-05-31 | End: 2019-06-07

## 2019-05-31 RX ORDER — AMPICILLIN TRIHYDRATE 250 MG
2 CAPSULE ORAL EVERY 6 HOURS
Refills: 0 | Status: DISCONTINUED | OUTPATIENT
Start: 2019-05-31 | End: 2019-05-31

## 2019-05-31 RX ORDER — HYDROMORPHONE HYDROCHLORIDE 2 MG/ML
0.5 INJECTION INTRAMUSCULAR; INTRAVENOUS; SUBCUTANEOUS EVERY 6 HOURS
Refills: 0 | Status: DISCONTINUED | OUTPATIENT
Start: 2019-05-31 | End: 2019-05-31

## 2019-05-31 RX ORDER — MUPIROCIN 20 MG/G
1 OINTMENT TOPICAL
Refills: 0 | Status: COMPLETED | OUTPATIENT
Start: 2019-05-31 | End: 2019-06-03

## 2019-05-31 RX ORDER — ALBUMIN HUMAN 25 %
500 VIAL (ML) INTRAVENOUS ONCE
Refills: 0 | Status: DISCONTINUED | OUTPATIENT
Start: 2019-05-31 | End: 2019-05-31

## 2019-05-31 RX ORDER — CEFUROXIME AXETIL 250 MG
1500 TABLET ORAL EVERY 12 HOURS
Refills: 0 | Status: DISCONTINUED | OUTPATIENT
Start: 2019-05-31 | End: 2019-05-31

## 2019-05-31 RX ORDER — MUPIROCIN 20 MG/G
1 OINTMENT TOPICAL
Refills: 0 | Status: DISCONTINUED | OUTPATIENT
Start: 2019-05-31 | End: 2019-05-31

## 2019-05-31 RX ORDER — CEFTRIAXONE 500 MG/1
2 INJECTION, POWDER, FOR SOLUTION INTRAMUSCULAR; INTRAVENOUS EVERY 12 HOURS
Refills: 0 | Status: DISCONTINUED | OUTPATIENT
Start: 2019-05-31 | End: 2019-06-07

## 2019-05-31 RX ORDER — HYDROMORPHONE HYDROCHLORIDE 2 MG/ML
0.5 INJECTION INTRAMUSCULAR; INTRAVENOUS; SUBCUTANEOUS EVERY 6 HOURS
Refills: 0 | Status: DISCONTINUED | OUTPATIENT
Start: 2019-05-31 | End: 2019-06-02

## 2019-05-31 RX ADMIN — CEFTRIAXONE 100 GRAM(S): 500 INJECTION, POWDER, FOR SOLUTION INTRAMUSCULAR; INTRAVENOUS at 06:13

## 2019-05-31 RX ADMIN — Medication 125 MILLILITER(S): at 15:38

## 2019-05-31 RX ADMIN — Medication 216 GRAM(S): at 19:51

## 2019-05-31 RX ADMIN — HYDROMORPHONE HYDROCHLORIDE 0.5 MILLIGRAM(S): 2 INJECTION INTRAMUSCULAR; INTRAVENOUS; SUBCUTANEOUS at 20:15

## 2019-05-31 RX ADMIN — HYDROMORPHONE HYDROCHLORIDE 0.5 MILLIGRAM(S): 2 INJECTION INTRAMUSCULAR; INTRAVENOUS; SUBCUTANEOUS at 17:13

## 2019-05-31 RX ADMIN — Medication 216 GRAM(S): at 15:37

## 2019-05-31 RX ADMIN — Medication 360 MILLIGRAM(S): at 05:14

## 2019-05-31 RX ADMIN — Medication 300 MILLIGRAM(S): at 19:52

## 2019-05-31 RX ADMIN — CHLORHEXIDINE GLUCONATE 5 MILLILITER(S): 213 SOLUTION TOPICAL at 16:37

## 2019-05-31 RX ADMIN — HYDROMORPHONE HYDROCHLORIDE 0.5 MILLIGRAM(S): 2 INJECTION INTRAMUSCULAR; INTRAVENOUS; SUBCUTANEOUS at 20:00

## 2019-05-31 RX ADMIN — Medication 216 GRAM(S): at 05:14

## 2019-05-31 RX ADMIN — MUPIROCIN 1 APPLICATION(S): 20 OINTMENT TOPICAL at 05:15

## 2019-05-31 RX ADMIN — Medication 125 MILLILITER(S): at 14:43

## 2019-05-31 RX ADMIN — SODIUM CHLORIDE 3 MILLILITER(S): 9 INJECTION INTRAMUSCULAR; INTRAVENOUS; SUBCUTANEOUS at 05:02

## 2019-05-31 RX ADMIN — Medication 125 MILLILITER(S): at 14:36

## 2019-05-31 RX ADMIN — CEFTRIAXONE 100 GRAM(S): 500 INJECTION, POWDER, FOR SOLUTION INTRAMUSCULAR; INTRAVENOUS at 18:12

## 2019-05-31 RX ADMIN — HYDROMORPHONE HYDROCHLORIDE 0.5 MILLIGRAM(S): 2 INJECTION INTRAMUSCULAR; INTRAVENOUS; SUBCUTANEOUS at 00:00

## 2019-05-31 RX ADMIN — PANTOPRAZOLE SODIUM 40 MILLIGRAM(S): 20 TABLET, DELAYED RELEASE ORAL at 16:38

## 2019-05-31 RX ADMIN — CHLORHEXIDINE GLUCONATE 5 MILLILITER(S): 213 SOLUTION TOPICAL at 18:11

## 2019-05-31 RX ADMIN — CHLORHEXIDINE GLUCONATE 5 MILLILITER(S): 213 SOLUTION TOPICAL at 23:09

## 2019-05-31 RX ADMIN — SODIUM CHLORIDE 1500 MILLILITER(S): 9 INJECTION, SOLUTION INTRAVENOUS at 15:36

## 2019-05-31 RX ADMIN — MUPIROCIN 1 APPLICATION(S): 20 OINTMENT TOPICAL at 19:52

## 2019-05-31 NOTE — CONSULT NOTE ADULT - ASSESSMENT
63 F PMH HTN CKD PAD s/p femoral  endarterectomy,  Plavix ,depression , and smoker ( quit  4/2019 ) endorsed 10 lb weight loss reports increasing SOB s/p colonoscopy -unremarkable.    Presented to [facility] ED found to have b/l pleural effusions, anemia, elevated creatinine 2.8 and TTE reveals mobile echodensity on AV with moderate to severe AI. Blood cultures + for gram + cocci in pairs/chains. Treated with one unit PRBC-  Ampicillin 2gram IV q6-  PET negative for evidence of infection. Stabilized transferred to [facility] for ZAHIRA. Admitted to  Dr Cee for further management.    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  Plan for AVR  and MV repair today  known CKD-3- Cr at baseline    Appreciate CT sx, cards, ID and nephrology mgmt    Will cont to follow  [facility]  [phone]
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   No signs or symptoms of neurological issues ,  no prior URI or infection or ppt event.  Follow up bc negative on ampicillin.  Pt has renal insufficieny , unclear how long it has been present and wether it is related to endocarditis     pt for surgery this week  add ceftriaxone to ampicillin for synergy  will need prolonged course of ab post op.   renal evaluation
63y Female with HTN, CKD, PAD s/p right femoral endarterectomy, depression, and smoker (quit 5 PTA) presented to the ED c/o SOB  found to have Gram + sepsis with now signs of endocarditis.     - Endocarditis - ZAHIRA with echodensity on AV with  severe AI.  This is likely the cause of her CABA and heart failure.     - Blood cultures + for gram + cocci in pairs/chains, though, latest is negative.  She has been afebrile  - ID following. Cont Abx    - Will need at least AVR +/- MVR.   - Plan for cath today  - Cr mildly elevated but per renal should not prohibit cath  - Still vol ol but will hold off on diuresis today given inc Cr and upcoming contrast.   - If Cr ok would plan to resume diuretic selena  - Please continue to maintain strict I/Os, monitor daily weights, Cr, and K.   - Plan for CTsx Friday.     - HTN: Continue Cardizem CD  - Cont statin  - Monitor H/H   - Hold Plavix.    Further cardiac workup pending clinical course  Will continue to follow

## 2019-05-31 NOTE — PROGRESS NOTE ADULT - ASSESSMENT
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   No signs or symptoms of neurological issues ,  no prior UTI or infection or ppt event.  Follow up bc negative on ampicillin.  Pt has renal insufficieny , unclear how long it has been present and wether it is related to endocarditis         ceftriaxone and  ampicillin for synergy ( want to avoid genta)   will need prolonged course of ab post op.    seen post op and intubated but stable  discussed with ctu  dc cefuroxime  continue ampicillin and ceftriaxone   ampicillin dose may need to be adjusted depending upon renal function  ID  to cover next  three days

## 2019-05-31 NOTE — BRIEF OPERATIVE NOTE - NSICDXBRIEFPREOP_GEN_ALL_CORE_FT
PRE-OP DIAGNOSIS:  Aortic valve insufficiency, infectious 31-May-2019 14:04:50  Raz Robertson  Mitral valve insufficiency 31-May-2019 14:04:01  Raz Robertson  CAD in native artery 31-May-2019 14:03:40  Raz Robertson

## 2019-05-31 NOTE — CONSULT NOTE ADULT - SUBJECTIVE AND OBJECTIVE BOX
63 F PMH HTN CKD PAD s/p femoral  endarterectomy,  Plavix ,depression , and smoker ( quit  2019 ) endorsed 10 lb weight loss reports increasing SOB s/p colonoscopy -unremarkable.    Presented to [facility] ED found to have b/l pleural effusions, anemia, elevated creatinine 2.8 and TTE reveals mobile echodensity on AV with moderate to severe AI. Blood cultures + for gram + cocci in pairs/chains. Treated with one unit PRBC-  Ampicillin 2gram IV q6-  PET negative for evidence of infection. Stabilized transferred to [facility] for ZAHIRA. Admitted to  Dr Cee for further management.    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  Plan for AVR today    Past Medical History  Reviewed Past Medical History from 2018 and no changes required.   TOBACCO ABUSE   BRADY NL   wellbutrin dnt   wt loss ,  ct chest abd and mri abd and gi eval inc eus done  R common iliac stenosis  stented  CAROTID ARTERY STENOSIS, RIGHT 70%   RESTLESS LEGS SYNDROME   B12 DEFICIENCY 10/12  B12 SL EFFECTIVE  REFLUX ESOPHAGITIS   MIXED HYPERLIPIDEMIA   PALPITATIONS  monika  HYPERTENSION, mild renal insufficiency 10/13  cervical disc dx  ANXIETY   osteopenia 11/15  BCC/SCC    MIGRAINE  PNEUMONIA  PUD S/P PERF  ULC COLITIS    [facility]  [facility]  gi pravin and georgette  ortho diego group;  ([place])  renal Bourla 10/13  dari  gyn advised  gavino  derm  muriel block    Past Surgical History  Reviewed Past Surgical History from 2018 and no changes required.  MOHS  PUD PERF   TUBAL LIG  D&C  r shoulder   lap appy 2/15 in [place]  l thumb surg   r iliac stent   Right common femoral endarterectomy 3/17  Skin cancer chest wall 2018  c spine ant fusion     Social History  Marital Status:   JEFFREY  Children: 4  [facility]     ashd mi stent   divrocing but girlfriend engaged edc 3/16 ROCIO   expecting 3rd in     diverticulitis surg  [place] LESV[place] AND  chuck has PAF cardioverted  Occupation: [employer] P[employer]  9 grandsons  one step grandson   exp 11 th grand  finally a girl 10/17        Risk Factors:     Smoked Tobacco Use:  Current every day smoker     Cigarettes:  Yes -- 2/3 pack(s) per day,   Counseled to quit/cut down:  yes  Alcohol use:  no  Exercise:  yes     Type of Exercise:  gym  treadmill    Vital Signs Last 24 Hrs  T(C): 36.3 (31 May 2019 04:58), Max: 36.9 (30 May 2019 20:33)  T(F): 97.3 (31 May 2019 04:58), Max: 98.4 (30 May 2019 20:33)  HR: 78 (31 May 2019 13:52) (78 - 98)  BP: 133/45 (31 May 2019 04:58) (133/45 - 144/49)  BP(mean): --  RR: 18 (31 May 2019 04:58) (18 - 18)  SpO2: 99% (31 May 2019 13:52) (92% - 99%)    MEDICATIONS  (STANDING):  albumin human  5% IVPB 500 milliLiter(s) IV Intermittent once  ampicillin  IVPB 2 Gram(s) IV Intermittent every 6 hours  cefTRIAXone   IVPB 2 Gram(s) IV Intermittent every 12 hours  cefuroxime  IVPB 1500 milliGRAM(s) IV Intermittent every 12 hours  chlorhexidine 0.12% Liquid 5 milliLiter(s) Oral Mucosa every 4 hours  chlorhexidine 2% Cloths 1 Application(s) Topical <User Schedule>  dextrose 50% Injectable 50 milliLiter(s) IV Push every 15 minutes  dextrose 50% Injectable 25 milliLiter(s) IV Push every 15 minutes  insulin regular Infusion 3 Unit(s)/Hr (3 mL/Hr) IV Continuous <Continuous>  meperidine     Injectable 25 milliGRAM(s) IV Push once  milrinone Infusion 0.2 MICROgram(s)/kG/Min (2.886 mL/Hr) IV Continuous <Continuous>  norepinephrine Infusion 0.14 MICROgram(s)/kG/Min (12.626 mL/Hr) IV Continuous <Continuous>  pantoprazole  Injectable 40 milliGRAM(s) IV Push daily  polyethylene glycol 3350 17 Gram(s) Oral daily  potassium chloride  10 mEq/50 mL IVPB 10 milliEquivalent(s) IV Intermittent every 1 hour  potassium chloride  10 mEq/50 mL IVPB 10 milliEquivalent(s) IV Intermittent every 1 hour  potassium chloride  10 mEq/50 mL IVPB 10 milliEquivalent(s) IV Intermittent every 1 hour  sodium chloride 0.9%. 1000 milliLiter(s) (10 mL/Hr) IV Continuous <Continuous>    MEDICATIONS  (PRN):      LABS:                        8.8    10.2  )-----------( 348      ( 30 May 2019 06:01 )             27.5         135  |  96  |  25<H>  ----------------------------<  206<H>  4.1   |  21<L>  |  2.21<H>    Ca    9.0      30 May 2019 06:01        CAPILLARY BLOOD GLUCOSE            Urinalysis Basic - ( 30 May 2019 02:35 )    Color: Light Yellow / Appearance: Clear / S.021 / pH: x  Gluc: x / Ketone: Negative  / Bili: Negative / Urobili: <2 mg/dL   Blood: x / Protein: Trace / Nitrite: Negative   Leuk Esterase: Negative / RBC: x / WBC x   Sq Epi: x / Non Sq Epi: x / Bacteria: x        RADIOLOGY & ADDITIONAL TESTS:  HGB A1C: Hemoglobin A1C, Whole Blood: 5.0 % ( @ 22:28)    Prealbumin:   Pro-BNP: Serum Pro-Brain Natriuretic Peptide: 6838 pg/mL ( @ 07:24)    Thyroid Panel:  @ 22:29/1.05  --/--/--MRSA: MRSA PCR Result.: Anderson ( @ 23:35)   / MSSA: +mssa ON baCTROBAN  Urinalysis Basic - ( 30 May 2019 02:35 )    Color: Light Yellow / Appearance: Clear / S.021 / pH: x  Gluc: x / Ketone: Negative  / Bili: Negative / Urobili: <2 mg/dL   Blood: x / Protein: Trace / Nitrite: Negative   Leuk Esterase: Negative / RBC: x / WBC x   Sq Epi: x / Non Sq Epi: x / Bacteria: x        CXR: < from: Xray Chest 1 View AP/PA (19 @ 16:40) >  EXAM:  XR CHEST AP OR PA 1V                            PROCEDURE DATE:  2019            INTERPRETATION:  CLINICAL INFORMATION: Shortness of Breath    EXAM: PA chest    COMPARISON: Chest X-Ray 2019. CT Chest 2019.    IMPRESSION:    Unchanged mild pulmonary edema and layering bilateral pleural effusions.   No pneumothorax. The heart is normal in size.    < end of copied text >EKG:< from: 12 Lead ECG (19 @ 16:16) >    Ventricular Rate 88 BPM    Atrial Rate 88 BPM    P-R Interval 130 ms    QRS Duration 100 ms    Q-T Interval 434 ms    QTC Calculation(Bezet) 525 ms    P Axis 56 degrees    R Axis 54 degrees    T Axis 55 degrees    Diagnosis Line NORMAL SINUS RHYTHM  POSSIBLE LEFT ATRIAL ENLARGEMENT  POOR R WAVE PROGRESSION  PROLONGED QT  ABNORMAL ECGCarotid Duplex:  n/a    PFT's:    Echocardiogram:< from: ZAHIRA w/TTE (w/3D Echo) (19 @ 09:51) >  ------------------------------------------------------------------------  Dimensions:    Normal Values:  LA:     3.8    2.0 - 4.0 cm  Ao:     3.0    2.0 - 3.8 cm  SEPTUM: 0.9    0.6 - 1.2 cm  PWT:    0.8    0.6 - 1.1 cm  LVIDd:  6.2    3.0 - 5.6 cm  LVIDs:  3.6    1.8 - 4.0 cm  Derived variables:  LVMI: 139 g/m2  RWT: 0.25  Fractional short: 42 %  EF (Teicholtz): 72 %  ------------------------------------------------------------------------  Observations:  Mitral Valve: Mitral annular calcification. Thickened  mitral valve leaflet tips with central malcoaptation.  Moderate-severe mitral regurgitation. EROA 0.34sqcm  Aortic Valve/Aorta: Multiple mobile Echogenic structure  seen on the aortic valve consistent with vegetation. From  the short axis-  the echodensity on  noncup (measures 6mm  x 10mm)  on the right cusp ( 9mm x 10mm.) The left cusp tip  are frayed with multiple mobile echo densities 12mm x 8mm  collectively.  On the Long axis there is a mobile  vegetation seen in the LVOT  which measures 5mm x 20mm  (image 32)   Severe aortic regurgitation.  Severe atheroma noted in aortic arch/descending aorta.  Left Atrium: Severely dilated left atrium.  LA volume index  = 68 cc/m2. No left atrial or left atrial appendage  thrombus.  Left Ventricle: Overall preserved left ventricular ejection  fraction. Severe left ventricular enlargement.  Right Heart: right atrial enlargement. Normal right  ventricular size and function. Normal tricuspid valve.  Minimal tricuspid regurgitation. Normal pulmonic valve.Minimalpulmonic regurgitation.  Pericardium/Pleura: Small pericardial effusion.  Bilateral pleural effusions.  Hemodynamic: Contrast injection demonstrates no evidence of  a patent foramen ovale.  ------------------------------------------------------------------------  Conclusions:  1. Mitral annular calcification. Thickened mitral valve  leaflet tips with central malcoaptation.   Moderate-severe  mitral regurgitation. EROA 0.34sqcm  2. Multiple mobile Echogenic structure seen on the aortic  valve consistent with vegetation. From the short axis-  the  echodensity on  non cup (measures 6mm x 10mm)  on the right  cusp ( 9mm x 10mm.) The left cusp tip are frayed with  multiple mobile echo densities 12mm x 8mm collectively.  On  the Long axis there is a mobile  vegetation seen in the  LVOT  which measures 5mm x 20mm (image 32)   Severe aortic regurgitation.3. Severe atheroma noted in aortic arch/descending aorta.  4. Severely dilated left atrium.  LA volume index = 68  cc/m2. No left atrial or left atrial appendage thrombus.  5. Severe left ventricular enlargement.  6. Overall preserved left ventricular ejection fraction.  7. Normal right ventricular size and function.  8. Normal tricuspid valve. Minimal tricuspid regurgitation.  9. Estimatedpulmonary artery systolic pressure equals 31  mm Hg, assuming right atrial pressure equals 3  mm Hg,  consistent with normal pulmonary pressures.  10. Small pericardial effusion.  11. Bilateral pleural effusions.  Discussed with Dr. JUNITO Paris at time of study.  *** No previous Echo exam.    < end of copied text >      Cardiac catheterization: < from: Cardiac Cath Lab - Adult (19 @ 18:30) >  [facility]  Right coronary angiography.  TECHNIQUE: The risks and alternatives of the procedures and conscious  sedationwere explained to the patient and informed consent was obtained.  Cardiac catheterization performed electively.  Local anesthetic given. Right radial artery access. Left coronary artery  angiography. The vessel was injected utilizing a catheter. Right coronary  artery angiography. The vessel was injected utilizing a catheter.  RADIATION EXPOSURE: 1.35 min.  CONTRAST GIVEN: Omnipaque 20 ml.  MEDICATIONS GIVEN: Midazolam, 1 mg, IV. Fentanyl, 25 mcg, IV. Heparin, 2000  units, IV. Cardene, 500 mcg.  CORONARY VESSELS: The coronary circulation is right dominant.  LM:   --  LM: Normal.  LAD:   --  Ostial LAD: There was a 40 % stenosis.  --  Mid LAD: Angiography showed mild to moderate atherosclerosis with no  flow limiting lesions.  --  D1: There was a 50 % stenosis.  CX:   --  Circumflex: Angiography showed mild atherosclerosis with no flow  limiting lesions.  --  Ostial circumflex: There was a 20 % stenosis.  RCA:   --  Mid RCA: There was a 50 % stenosis.  COMPLICATIONS: There were no complications.  DIAGNOSTIC RECOMMENDATIONS: Non obstructive CAD.  Endocarditis with severe AI.  CTS for further management.  INTERVENTIONAL RECOMMENDATIONS: Non obstructive CAD.  Endocarditis with severe AI. 63 F PMH HTN CKD PAD s/p femoral  endarterectomy,  Plavix ,depression , and smoker ( quit  2019 ) endorsed 10 lb weight loss reports increasing SOB s/p colonoscopy -unremarkable.    Presented to [place] ED found to have b/l pleural effusions, anemia, elevated creatinine 2.8 and TTE reveals mobile echodensity on AV with moderate to severe AI. Blood cultures + for gram + cocci in pairs/chains. Treated with one unit PRBC-  Ampicillin 2gram IV q6-  PET negative for evidence of infection. Stabilized transferred to [facility] for ZAHIRA. Admitted to  Dr Cee for further management.    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  Plan for AVR  and MV repair today  intubated, sedated post surgery    Past Medical History  Reviewed Past Medical History from 2018 and no changes required.   TOBACCO ABUSE   BRADY NL   wellbutrin dnt   wt loss ,  ct chest abd and mri abd and gi eval inc eus done  R common iliac stenosis  stented  CAROTID ARTERY STENOSIS, RIGHT 70%   RESTLESS LEGS SYNDROME   B12 DEFICIENCY 10/12  B12 SL EFFECTIVE  REFLUX ESOPHAGITIS   MIXED HYPERLIPIDEMIA   PALPITATIONS  frank  HYPERTENSION, mild renal insufficiency 10/13  cervical disc dx  ANXIETY   osteopenia 11/15  BCC/SCC    MIGRAINE  PNEUMONIA  PUD S/P PERF  ULC COLITIS    [facility]    [facility]  [facility]  neuro ahmed  surg fitterman  gi pravin and georgette  ortho diego group;  (teplitz)  renal Bourla 10/13  dari  gyn advised  kobren  derm  pain md robby block    Past Surgical History  Reviewed Past Surgical History from 2018 and no changes required.  MOHS  PUD PERF   TUBAL LIG  D&C  r shoulder   lap appy 2/15 in [place]  l thumb surg   r iliac stent   Right common femoral endarterectomy 3/17  Skin cancer chest wall 2018  c spine ant fusion     Social History  Marital Status:   JEFFREY  Children: 4  [facility]I [facility]     ashd mi stent   divrocing but girlfriend engaged edc 3/16 [facility]IO   expecting 3rd in     diverticulitis surg  [place]  chuck has PAF cardioverted  Occupation: [employer] P[employer]  9 grandsons  one step grandson   exp 11 th grand  finally a girl 10/17        Risk Factors:     Smoked Tobacco Use:  Current every day smoker     Cigarettes:  Yes -- 2/3 pack(s) per day,   Counseled to quit/cut down:  yes  Alcohol use:  no  Exercise:  yes     Type of Exercise:  gym  treadmill    Vital Signs Last 24 Hrs  T(C): 36.3 (31 May 2019 04:58), Max: 36.9 (30 May 2019 20:33)  T(F): 97.3 (31 May 2019 04:58), Max: 98.4 (30 May 2019 20:33)  HR: 78 (31 May 2019 13:52) (78 - 98)  BP: 133/45 (31 May 2019 04:58) (133/45 - 144/49)  BP(mean): --  RR: 18 (31 May 2019 04:58) (18 - 18)  SpO2: 99% (31 May 2019 13:52) (92% - 99%)    MEDICATIONS  (STANDING):  albumin human  5% IVPB 500 milliLiter(s) IV Intermittent once  ampicillin  IVPB 2 Gram(s) IV Intermittent every 6 hours  cefTRIAXone   IVPB 2 Gram(s) IV Intermittent every 12 hours  cefuroxime  IVPB 1500 milliGRAM(s) IV Intermittent every 12 hours  chlorhexidine 0.12% Liquid 5 milliLiter(s) Oral Mucosa every 4 hours  chlorhexidine 2% Cloths 1 Application(s) Topical <User Schedule>  dextrose 50% Injectable 50 milliLiter(s) IV Push every 15 minutes  dextrose 50% Injectable 25 milliLiter(s) IV Push every 15 minutes  insulin regular Infusion 3 Unit(s)/Hr (3 mL/Hr) IV Continuous <Continuous>  meperidine     Injectable 25 milliGRAM(s) IV Push once  milrinone Infusion 0.2 MICROgram(s)/kG/Min (2.886 mL/Hr) IV Continuous <Continuous>  norepinephrine Infusion 0.14 MICROgram(s)/kG/Min (12.626 mL/Hr) IV Continuous <Continuous>  pantoprazole  Injectable 40 milliGRAM(s) IV Push daily  polyethylene glycol 3350 17 Gram(s) Oral daily  potassium chloride  10 mEq/50 mL IVPB 10 milliEquivalent(s) IV Intermittent every 1 hour  potassium chloride  10 mEq/50 mL IVPB 10 milliEquivalent(s) IV Intermittent every 1 hour  potassium chloride  10 mEq/50 mL IVPB 10 milliEquivalent(s) IV Intermittent every 1 hour  sodium chloride 0.9%. 1000 milliLiter(s) (10 mL/Hr) IV Continuous <Continuous>    MEDICATIONS  (PRN):      LABS:                        8.8    10.2  )-----------( 348      ( 30 May 2019 06:01 )             27.5         135  |  96  |  25<H>  ----------------------------<  206<H>  4.1   |  21<L>  |  2.21<H>    Ca    9.0      30 May 2019 06:01        CAPILLARY BLOOD GLUCOSE            Urinalysis Basic - ( 30 May 2019 02:35 )    Color: Light Yellow / Appearance: Clear / S.021 / pH: x  Gluc: x / Ketone: Negative  / Bili: Negative / Urobili: <2 mg/dL   Blood: x / Protein: Trace / Nitrite: Negative   Leuk Esterase: Negative / RBC: x / WBC x   Sq Epi: x / Non Sq Epi: x / Bacteria: x        RADIOLOGY & ADDITIONAL TESTS:  HGB A1C: Hemoglobin A1C, Whole Blood: 5.0 % ( @ 22:28)    Prealbumin:   Pro-BNP: Serum Pro-Brain Natriuretic Peptide: 6838 pg/mL ( @ 07:24)    Thyroid Panel:  @ 22:29/1.05  --/--/--MRSA: MRSA PCR Result.: Anderson ( @ 23:35)   / MSSA: +mssa ON baCTROBAN  Urinalysis Basic - ( 30 May 2019 02:35 )    Color: Light Yellow / Appearance: Clear / S.021 / pH: x  Gluc: x / Ketone: Negative  / Bili: Negative / Urobili: <2 mg/dL   Blood: x / Protein: Trace / Nitrite: Negative   Leuk Esterase: Negative / RBC: x / WBC x   Sq Epi: x / Non Sq Epi: x / Bacteria: x        CXR: < from: Xray Chest 1 View AP/PA (19 @ 16:40) >  EXAM:  XR CHEST AP OR PA 1V                            PROCEDURE DATE:  2019            INTERPRETATION:  CLINICAL INFORMATION: Shortness of Breath    EXAM: PA chest    COMPARISON: Chest X-Ray 2019. CT Chest 2019.    IMPRESSION:    Unchanged mild pulmonary edema and layering bilateral pleural effusions.   No pneumothorax. The heart is normal in size.    < end of copied text >EKG:< from: 12 Lead ECG (19 @ 16:16) >    Ventricular Rate 88 BPM    Atrial Rate 88 BPM    P-R Interval 130 ms    QRS Duration 100 ms    Q-T Interval 434 ms    QTC Calculation(Bezet) 525 ms    P Axis 56 degrees    R Axis 54 degrees    T Axis 55 degrees    Diagnosis Line NORMAL SINUS RHYTHM  POSSIBLE LEFT ATRIAL ENLARGEMENT  POOR R WAVE PROGRESSION  PROLONGED QT  ABNORMAL ECGCarotid Duplex:  n/a    PFT's:    Echocardiogram:< from: ZAHIRA w/TTE (w/3D Echo) (19 @ 09:51) >  ------------------------------------------------------------------------  Dimensions:    Normal Values:  LA:     3.8    2.0 - 4.0 cm  Ao:     3.0    2.0 - 3.8 cm  SEPTUM: 0.9    0.6 - 1.2 cm  PWT:    0.8    0.6 - 1.1 cm  LVIDd:  6.2    3.0 - 5.6 cm  LVIDs:  3.6    1.8 - 4.0 cm  Derived variables:  LVMI: 139 g/m2  RWT: 0.25  Fractional short: 42 %  EF (Teicholtz): 72 %  ------------------------------------------------------------------------  Observations:  Mitral Valve: Mitral annular calcification. Thickened  mitral valve leaflet tips with central malcoaptation.  Moderate-severe mitral regurgitation. EROA 0.34sqcm  Aortic Valve/Aorta: Multiple mobile Echogenic structure  seen on the aortic valve consistent with vegetation. From  the short axis-  the echodensity on  noncup (measures 6mm  x 10mm)  on the right cusp ( 9mm x 10mm.) The left cusp tip  are frayed with multiple mobile echo densities 12mm x 8mm  collectively.  On the Long axis there is a mobile  vegetation seen in the LVOT  which measures 5mm x 20mm  (image 32)   Severe aortic regurgitation.  Severe atheroma noted in aortic arch/descending aorta.  Left Atrium: Severely dilated left atrium.  LA volume index  = 68 cc/m2. No left atrial or left atrial appendage  thrombus.  Left Ventricle: Overall preserved left ventricular ejection  fraction. Severe left ventricular enlargement.  Right Heart: right atrial enlargement. Normal right  ventricular size and function. Normal tricuspid valve.  Minimal tricuspid regurgitation. Normal pulmonic valve.Minimalpulmonic regurgitation.  Pericardium/Pleura: Small pericardial effusion.  Bilateral pleural effusions.  Hemodynamic: Contrast injection demonstrates no evidence of  a patent foramen ovale.  ------------------------------------------------------------------------  Conclusions:  1. Mitral annular calcification. Thickened mitral valve  leaflet tips with central malcoaptation.   Moderate-severe  mitral regurgitation. EROA 0.34sqcm  2. Multiple mobile Echogenic structure seen on the aortic  valve consistent with vegetation. From the short axis-  the  echodensity on  non cup (measures 6mm x 10mm)  on the right  cusp ( 9mm x 10mm.) The left cusp tip are frayed with  multiple mobile echo densities 12mm x 8mm collectively.  On  the Long axis there is a mobile  vegetation seen in the  LVOT  which measures 5mm x 20mm (image 32)   Severe aortic regurgitation.3. Severe atheroma noted in aortic arch/descending aorta.  4. Severely dilated left atrium.  LA volume index = 68  cc/m2. No left atrial or left atrial appendage thrombus.  5. Severe left ventricular enlargement.  6. Overall preserved left ventricular ejection fraction.  7. Normal right ventricular size and function.  8. Normal tricuspid valve. Minimal tricuspid regurgitation.  9. Estimatedpulmonary artery systolic pressure equals 31  mm Hg, assuming right atrial pressure equals 3  mm Hg,  consistent with normal pulmonary pressures.  10. Small pericardial effusion.  11. Bilateral pleural effusions.  Discussed with Dr. G Pannella at time of study.  *** No previous Echo exam.    < end of copied text >      Cardiac catheterization: < from: Cardiac Cath Lab - Adult (19 @ 18:30) >  [facility]  Right coronary angiography.  TECHNIQUE: The risks and alternatives of the procedures and conscious  sedationwere explained to the patient and informed consent was obtained.  Cardiac catheterization performed electively.  Local anesthetic given. Right radial artery access. Left coronary artery  angiography. The vessel was injected utilizing a catheter. Right coronary  artery angiography. The vessel was injected utilizing a catheter.  RADIATION EXPOSURE: 1.35 min.  CONTRAST GIVEN: Omnipaque 20 ml.  MEDICATIONS GIVEN: Midazolam, 1 mg, IV. Fentanyl, 25 mcg, IV. Heparin, 2000  units, IV. Cardene, 500 mcg.  CORONARY VESSELS: The coronary circulation is right dominant.  LM:   --  LM: Normal.  LAD:   --  Ostial LAD: There was a 40 % stenosis.  --  Mid LAD: Angiography showed mild to moderate atherosclerosis with no  flow limiting lesions.  --  D1: There was a 50 % stenosis.  CX:   --  Circumflex: Angiography showed mild atherosclerosis with no flow  limiting lesions.  --  Ostial circumflex: There was a 20 % stenosis.  RCA:   --  Mid RCA: There was a 50 % stenosis.  COMPLICATIONS: There were no complications.  DIAGNOSTIC RECOMMENDATIONS: Non obstructive CAD.  Endocarditis with severe AI.  CTS for further management.  INTERVENTIONAL RECOMMENDATIONS: Non obstructive CAD.  Endocarditis with severe AI.

## 2019-05-31 NOTE — PROGRESS NOTE ADULT - ASSESSMENT
63y Female with HTN, CKD, PAD s/p right femoral endarterectomy, depression, and smoker (quit 5 PTA) presented to the ED c/o SOB  found to have Gram + sepsis with now signs of endocarditis.     - Endocarditis - ZAHIRA with echodensity on AV with  severe AI.  This is likely the cause of her CABA and heart failure.     - Blood cultures + for gram + cocci in pairs/chains, though, latest is negative.  She has been afebrile  - ID following. Cont Abx    - Will need at least AVR andlikely MVR.  For OR today  - Cr better  - no meaningful vol ol at present    - Please continue to maintain strict I/Os, monitor daily weights, Cr, and K.     - HTN: Continue Cardizem CD  - Cont statin  - Monitor H/H   - Hold Plavix.

## 2019-05-31 NOTE — PROGRESS NOTE ADULT - SUBJECTIVE AND OBJECTIVE BOX
VIVIANA CHAPMAN   MRN#: [id number]     The patient is a 63y Female who was seen, evaluated, & examined with the CTICU staff on rounds and later in the day with a multidisciplinary care plan formulated & implemented.  All available clinical, laboratory, radiographic, pharmacologic, and electrocardiographic data were reviewed & analyzed.      The patient was in the CTICU in critical condition secondary to persistent cardiopulmonary dysfunction, cardiogenic shock-cardiovascular dysfunction, hemodynamically significant anemia/hypovolemia-shock, and hyperlactatemia-acidosis S/p [id number] for endocarditis.       Respiratory status required full ventilatory support, the following of ABG’s with A-line monitoring, and continuous pulse oximetry monitoring for support & to evaluate for & prevent further decompensation secondary to persistent cardiopulmonary dysfunction and cardiogenic shock-cardiovascular dysfunction.     Invasive hemodynamic monitoring with an A-line was required for the following of continuous MAP/BP monitoring to ensure adequate cardiovascular support and to evaluate for & help prevent decompensation while receiving intermittent volume expansion, blood transfusion, an IV Levophed drip, an IV Dobutamine drip, and an IV Primacor drip secondary to cardiogenic shock-cardiovascular dysfunction,  hemodynamically significant anemia/hypovolemia-shock, and acute postoperative blood loss anemia.       Patient required critical care management and I provided 30 minutes of non-continuous care to the patient.  Discussed at length with the CTICU staff and helped coordinate care. VIVIANA CHAPMAN   MRN#: [id number]     The patient is a 63y Female who was seen, evaluated, & examined with the CTICU staff on rounds and later in the day with a multidisciplinary care plan formulated & implemented.  All available clinical, laboratory, radiographic, pharmacologic, and electrocardiographic data were reviewed & analyzed.      The patient was in the CTICU in critical condition secondary to persistent cardiopulmonary dysfunction, cardiogenic shock-cardiovascular dysfunction, hemodynamically significant anemia/hypovolemia-shock S/P AVR-MV Repair-CABG for endocarditis.       Respiratory status required full ventilatory support, the following of ABG’s with A-line monitoring, and continuous pulse oximetry monitoring for support & to evaluate for & prevent further decompensation secondary to persistent cardiopulmonary dysfunction and cardiogenic shock-cardiovascular dysfunction.     Invasive hemodynamic monitoring with an A-line was required for the following of continuous MAP/BP monitoring to ensure adequate cardiovascular support and to evaluate for & help prevent decompensation while receiving intermittent volume expansion, blood transfusion, an IV Levophed drip, an IV Dobutamine drip, and an IV Primacor drip secondary to cardiogenic shock-cardiovascular dysfunction,  hemodynamically significant anemia/hypovolemia-shock, and acute postoperative blood loss anemia.       Patient required critical care management and I provided 30 minutes of non-continuous care to the patient.  Discussed at length with the CTICU staff and helped coordinate care. VIVIANA CHAPMAN   MRN#: [id number]     The patient is a 63y Female who was seen, evaluated, & examined with the CTICU staff on rounds and later in the day with a multidisciplinary care plan formulated & implemented.  All available clinical, laboratory, radiographic, pharmacologic, and electrocardiographic data were reviewed & analyzed.      The patient was in the CTICU in critical condition secondary to persistent cardiopulmonary dysfunction, cardiovascular dysfunction, hemodynamically significant anemia/hypovolemia-shock S/P AVR-MV Repair-CABG for endocarditis.       Respiratory status required full ventilatory support, the following of ABG’s with A-line monitoring, and continuous pulse oximetry monitoring for support & to evaluate for & prevent further decompensation secondary to persistent cardiopulmonary dysfunction and cardiovascular dysfunction.     Invasive hemodynamic monitoring with an A-line was required for the following of continuous MAP/BP monitoring to ensure adequate cardiovascular support and to evaluate for & help prevent decompensation while receiving intermittent volume expansion, blood transfusion, an IV Levophed drip, an IV Dobutamine drip, and an IV Primacor drip secondary to cardiovascular dysfunction, hemodynamically significant anemia/hypovolemia-shock, and acute postoperative blood loss anemia.       Patient required critical care management and I provided 30 minutes of non-continuous care to the patient.  Discussed at length with the CTICU staff and helped coordinate care.

## 2019-05-31 NOTE — PROGRESS NOTE ADULT - SUBJECTIVE AND OBJECTIVE BOX
infectious diseases progress note:    Patient is a 63y old  Female who presents with a chief complaint of torito (31 May 2019 14:07)        Atherosclerosis of native coronary artery without angina pectoris             Allergies    No Known Allergies    Intolerances        ANTIBIOTICS/RELEVANT:  antimicrobials  ampicillin  IVPB 1.5 Gram(s) IV Intermittent every 6 hours  cefTRIAXone   IVPB 2 Gram(s) IV Intermittent every 12 hours    immunologic:    OTHER:  albumin human  5% IVPB 250 milliLiter(s) IV Intermittent once  albumin human  5% IVPB 250 milliLiter(s) IV Intermittent once  albumin human  5% IVPB 250 milliLiter(s) IV Intermittent once  chlorhexidine 0.12% Liquid 5 milliLiter(s) Oral Mucosa every 4 hours  chlorhexidine 2% Cloths 1 Application(s) Topical <User Schedule>  dextrose 50% Injectable 50 milliLiter(s) IV Push every 15 minutes  dextrose 50% Injectable 25 milliLiter(s) IV Push every 15 minutes  insulin regular Infusion 3 Unit(s)/Hr IV Continuous <Continuous>  meperidine     Injectable 25 milliGRAM(s) IV Push once  milrinone Infusion 0.2 MICROgram(s)/kG/Min IV Continuous <Continuous>  mupirocin 2% Ointment 1 Application(s) Topical two times a day  norepinephrine Infusion 0.14 MICROgram(s)/kG/Min IV Continuous <Continuous>  pantoprazole  Injectable 40 milliGRAM(s) IV Push daily  polyethylene glycol 3350 17 Gram(s) Oral daily  potassium chloride  10 mEq/50 mL IVPB 10 milliEquivalent(s) IV Intermittent every 1 hour  potassium chloride  10 mEq/50 mL IVPB 10 milliEquivalent(s) IV Intermittent every 1 hour  potassium chloride  10 mEq/50 mL IVPB 10 milliEquivalent(s) IV Intermittent every 1 hour  sodium chloride 0.9%. 1000 milliLiter(s) IV Continuous <Continuous>      Objective:  Vital Signs Last 24 Hrs  T(C): 35.4 (31 May 2019 13:45), Max: 36.9 (30 May 2019 20:33)  T(F): 95.7 (31 May 2019 13:45), Max: 98.4 (30 May 2019 20:33)  HR: 76 (31 May 2019 14:16) (74 - 98)  BP: 133/45 (31 May 2019 04:58) (133/45 - 144/49)  BP(mean): --  RR: 20 (31 May 2019 14:00) (14 - 24)  SpO2: 100% (31 May 2019 14:16) (92% - 100%)       Eyes:AMY, EOMI  Ear/Nose/Throat: no oral lesion, no sinus tenderness on percussion	  Neck:no JVD, no lymphadenopathy, supple  Respiratory: CTA elle  Cardiovascular: S1S2 RRR, no murmurs  Gastrointestinal:soft, (+) BS, no HSM  Extremities:no e/e/c        LABS:                        8.8    10.2  )-----------( 348      ( 30 May 2019 06:01 )             27.5         135  |  96  |  25<H>  ----------------------------<  206<H>  4.1   |  21<L>  |  2.21<H>    Ca    9.0      30 May 2019 06:01        Urinalysis Basic - ( 30 May 2019 02:35 )    Color: Light Yellow / Appearance: Clear / S.021 / pH: x  Gluc: x / Ketone: Negative  / Bili: Negative / Urobili: <2 mg/dL   Blood: x / Protein: Trace / Nitrite: Negative   Leuk Esterase: Negative / RBC: x / WBC x   Sq Epi: x / Non Sq Epi: x / Bacteria: x          MICROBIOLOGY:    RECENT CULTURES:   @ 13:25 .Blood                No growth to date.     @ 23:11 .Blood       Growth in anaerobic bottle: Gram Positive Cocci in Pairs and Chains           Growth in anaerobic bottle: Gram Positive Cocci in Pairs and Chains     @ 16:33 .Blood Blood-Peripheral                No growth at 5 days.     @ 21:21 .Urine Clean Catch (Midstream)                No growth          RESPIRATORY CULTURES:              RADIOLOGY & ADDITIONAL STUDIES:        Pager [phone]  After 5 pm/weekends or if no response :[phone]

## 2019-05-31 NOTE — PROGRESS NOTE ADULT - SUBJECTIVE AND OBJECTIVE BOX
S/p OHS today, intubated in CTU    Vital Signs Last 24 Hrs  T(C): 37 (19 @ 16:00), Max: 37 (19 @ 16:00)  T(F): 98.6 (19 @ 16:00), Max: 98.6 (19 @ 16:00)  HR: 102 (19 @ 16:00) (74 - 102)  BP: 113/58 (19 @ 15:30) (93/55 - 144/49)  BP(mean): 79 (19 @ 15:30) (68 - 79)  RR: 17 (19 @ 16:00) (14 - 24)  SpO2: 97% (19 @ 16:00) (92% - 100%)    CHEST/LUNG: coarse BS b/l  HEART: S1S2  ABDOMEN: Soft, Nondistended  EXTREMITIES: no edema                        9.7    22.2  )-----------( 158      ( 31 May 2019 14:14 )             29.3     31 May 2019 14:14    142    |  105    |  17     ----------------------------<  125    5.1     |  21     |  1.73     Ca    8.6        31 May 2019 14:14  Phos  4.6       31 May 2019 14:14  Mg     3.6       31 May 2019 14:14    TPro  3.7    /  Alb  2.4    /  TBili  0.9    /  DBili  x      /  AST  28     /  ALT  15     /  AlkPhos  52     31 May 2019 14:14    LIVER FUNCTIONS - ( 31 May 2019 14:14 )  Alb: 2.4 g/dL / Pro: 3.7 g/dL / ALK PHOS: 52 U/L / ALT: 15 U/L / AST: 28 U/L / GGT: x           PT/INR - ( 31 May 2019 14:14 )   PT: 14.4 sec;   INR: 1.25 ratio       PTT - ( 31 May 2019 14:14 )  PTT:50.5 sec  CAPILLARY BLOOD GLUCOSE    POCT Blood Glucose.: 145 mg/dL (31 May 2019 15:59)    CARDIAC MARKERS ( 31 May 2019 14:14 )  x     / x     / 180 U/L / x     / 34.9 ng/mL    Urinalysis Basic - ( 30 May 2019 02:35 )    Color: Light Yellow / Appearance: Clear / S.021 / pH: x  Gluc: x / Ketone: Negative  / Bili: Negative / Urobili: <2 mg/dL   Blood: x / Protein: Trace / Nitrite: Negative   Leuk Esterase: Negative / RBC: x / WBC x   Sq Epi: x / Non Sq Epi: x / Bacteria: x    ampicillin  IVPB 2 Gram(s) IV Intermittent every 6 hours  cefTRIAXone   IVPB 2 Gram(s) IV Intermittent every 12 hours  chlorhexidine 0.12% Liquid 5 milliLiter(s) Oral Mucosa every 4 hours  chlorhexidine 2% Cloths 1 Application(s) Topical <User Schedule>  dextrose 50% Injectable 50 milliLiter(s) IV Push every 15 minutes  dextrose 50% Injectable 25 milliLiter(s) IV Push every 15 minutes  DOBUTamine Infusion 5 MICROgram(s)/kG/Min IV Continuous <Continuous>  insulin regular Infusion 3 Unit(s)/Hr IV Continuous <Continuous>  meperidine     Injectable 25 milliGRAM(s) IV Push once  milrinone Infusion 0.2 MICROgram(s)/kG/Min IV Continuous <Continuous>  mupirocin 2% Ointment 1 Application(s) Topical two times a day  norepinephrine Infusion 0.14 MICROgram(s)/kG/Min IV Continuous <Continuous>  pantoprazole  Injectable 40 milliGRAM(s) IV Push daily  polyethylene glycol 3350 17 Gram(s) Oral daily  potassium chloride  10 mEq/50 mL IVPB 10 milliEquivalent(s) IV Intermittent every 1 hour  potassium chloride  10 mEq/50 mL IVPB 10 milliEquivalent(s) IV Intermittent every 1 hour  potassium chloride  10 mEq/50 mL IVPB 10 milliEquivalent(s) IV Intermittent every 1 hour  sodium chloride 0.9%. 1000 milliLiter(s) IV Continuous <Continuous>    Impression/Plan:    Known CKD3 w/Cr 1.8-2 at baseline  AV endocarditis, severe AI  Plan for AVR on Fri  S/p cath  w/20cc of dye  Stable renal fx   Would continue to hold diuretics  Avoid nephrotoxins  BMP daily  D/w family at bedside S/p OHS today, intubated in CTU    Vital Signs Last 24 Hrs  T(C): 37 (19 @ 16:00), Max: 37 (19 @ 16:00)  T(F): 98.6 (19 @ 16:00), Max: 98.6 (19 @ 16:00)  HR: 102 (19 @ 16:00) (74 - 102)  BP: 113/58 (19 @ 15:30) (93/55 - 144/49)  BP(mean): 79 (19 @ 15:30) (68 - 79)  RR: 17 (19 @ 16:00) (14 - 24)  SpO2: 97% (19 @ 16:00) (92% - 100%)    CHEST/LUNG: coarse BS b/l  HEART: S1S2  ABDOMEN: Soft, Nondistended  EXTREMITIES: no edema                        9.7    22.2  )-----------( 158      (31 May 2019 14:14)             29.3     31 May 2019 14:14    142    |  105    |  17     ----------------------------<  125    5.1     |  21     |  1.73     Ca    8.6        31 May 2019 14:14  Phos  4.6       31 May 2019 14:14  Mg     3.6       31 May 2019 14:14    TPro  3.7    /  Alb  2.4    /  TBili  0.9    /  DBili  x      /  AST  28     /  ALT  15     /  AlkPhos  52     31 May 2019 14:14    LIVER FUNCTIONS - ( 31 May 2019 14:14 )  Alb: 2.4 g/dL / Pro: 3.7 g/dL / ALK PHOS: 52 U/L / ALT: 15 U/L / AST: 28 U/L / GGT: x           PT/INR - ( 31 May 2019 14:14 )   PT: 14.4 sec;   INR: 1.25 ratio       PTT - ( 31 May 2019 14:14 )  PTT:50.5 sec  CAPILLARY BLOOD GLUCOSE    POCT Blood Glucose.: 145 mg/dL (31 May 2019 15:59)    CARDIAC MARKERS ( 31 May 2019 14:14 )  x     / x     / 180 U/L / x     / 34.9 ng/mL    Urinalysis Basic - ( 30 May 2019 02:35 )    Color: Light Yellow / Appearance: Clear / S.021 / pH: x  Gluc: x / Ketone: Negative  / Bili: Negative / Urobili: <2 mg/dL   Blood: x / Protein: Trace / Nitrite: Negative   Leuk Esterase: Negative / RBC: x / WBC x   Sq Epi: x / Non Sq Epi: x / Bacteria: x    ampicillin  IVPB 2 Gram(s) IV Intermittent every 6 hours  cefTRIAXone   IVPB 2 Gram(s) IV Intermittent every 12 hours  chlorhexidine 0.12% Liquid 5 milliLiter(s) Oral Mucosa every 4 hours  chlorhexidine 2% Cloths 1 Application(s) Topical <User Schedule>  dextrose 50% Injectable 50 milliLiter(s) IV Push every 15 minutes  dextrose 50% Injectable 25 milliLiter(s) IV Push every 15 minutes  DOBUTamine Infusion 5 MICROgram(s)/kG/Min IV Continuous <Continuous>  insulin regular Infusion 3 Unit(s)/Hr IV Continuous <Continuous>  meperidine     Injectable 25 milliGRAM(s) IV Push once  milrinone Infusion 0.2 MICROgram(s)/kG/Min IV Continuous <Continuous>  mupirocin 2% Ointment 1 Application(s) Topical two times a day  norepinephrine Infusion 0.14 MICROgram(s)/kG/Min IV Continuous <Continuous>  pantoprazole  Injectable 40 milliGRAM(s) IV Push daily  polyethylene glycol 3350 17 Gram(s) Oral daily  potassium chloride  10 mEq/50 mL IVPB 10 milliEquivalent(s) IV Intermittent every 1 hour  potassium chloride  10 mEq/50 mL IVPB 10 milliEquivalent(s) IV Intermittent every 1 hour  potassium chloride  10 mEq/50 mL IVPB 10 milliEquivalent(s) IV Intermittent every 1 hour  sodium chloride 0.9%. 1000 milliLiter(s) IV Continuous <Continuous>    Impression/Plan:    Known CKD3 w/Cr 1.8-2 at baseline  AV endocarditis, severe AI  Plan for AVR on Fri  S/p cath  w/20cc of dye  Stable renal fx   Would continue to hold diuretics  Avoid nephrotoxins  BMP daily  D/w family at bedside S/p OHS today, intubated in CTU    Vital Signs Last 24 Hrs  T(C): 37 (19 @ 16:00), Max: 37 (19 @ 16:00)  T(F): 98.6 (19 @ 16:00), Max: 98.6 (19 @ 16:00)  HR: 102 (19 @ 16:00) (74 - 102)  BP: 113/58 (19 @ 15:30) (93/55 - 144/49)  BP(mean): 79 (19 @ 15:30) (68 - 79)  RR: 17 (19 @ 16:00) (14 - 24)  SpO2: 97% (19 @ 16:00) (92% - 100%)    CHEST/LUNG: coarse BS b/l  HEART: S1S2  ABDOMEN: Soft, Nondistended  EXTREMITIES: no edema                        9.7    22.2  )-----------( 158      (31 May 2019 14:14)             29.3     31 May 2019 14:14    142    |  105    |  17     ----------------------------<  125    5.1     |  21     |  1.73     Ca    8.6        31 May 2019 14:14  Phos  4.6       31 May 2019 14:14  Mg     3.6       31 May 2019 14:14    TPro  3.7    /  Alb  2.4    /  TBili  0.9    /  DBili  x      /  AST  28     /  ALT  15     /  AlkPhos  52     31 May 2019 14:14    LIVER FUNCTIONS - ( 31 May 2019 14:14 )  Alb: 2.4 g/dL / Pro: 3.7 g/dL / ALK PHOS: 52 U/L / ALT: 15 U/L / AST: 28 U/L / GGT: x           PT/INR - ( 31 May 2019 14:14 )   PT: 14.4 sec;   INR: 1.25 ratio       PTT - ( 31 May 2019 14:14 )  PTT:50.5 sec  CAPILLARY BLOOD GLUCOSE    POCT Blood Glucose.: 145 mg/dL (31 May 2019 15:59)    CARDIAC MARKERS ( 31 May 2019 14:14 )  x     / x     / 180 U/L / x     / 34.9 ng/mL    Urinalysis Basic - ( 30 May 2019 02:35 )    Color: Light Yellow / Appearance: Clear / S.021 / pH: x  Gluc: x / Ketone: Negative  / Bili: Negative / Urobili: <2 mg/dL   Blood: x / Protein: Trace / Nitrite: Negative   Leuk Esterase: Negative / RBC: x / WBC x   Sq Epi: x / Non Sq Epi: x / Bacteria: x    ampicillin  IVPB 2 Gram(s) IV Intermittent every 6 hours  cefTRIAXone   IVPB 2 Gram(s) IV Intermittent every 12 hours  chlorhexidine 0.12% Liquid 5 milliLiter(s) Oral Mucosa every 4 hours  chlorhexidine 2% Cloths 1 Application(s) Topical <User Schedule>  dextrose 50% Injectable 50 milliLiter(s) IV Push every 15 minutes  dextrose 50% Injectable 25 milliLiter(s) IV Push every 15 minutes  DOBUTamine Infusion 5 MICROgram(s)/kG/Min IV Continuous <Continuous>  insulin regular Infusion 3 Unit(s)/Hr IV Continuous <Continuous>  meperidine     Injectable 25 milliGRAM(s) IV Push once  milrinone Infusion 0.2 MICROgram(s)/kG/Min IV Continuous <Continuous>  mupirocin 2% Ointment 1 Application(s) Topical two times a day  norepinephrine Infusion 0.14 MICROgram(s)/kG/Min IV Continuous <Continuous>  pantoprazole  Injectable 40 milliGRAM(s) IV Push daily  polyethylene glycol 3350 17 Gram(s) Oral daily  potassium chloride  10 mEq/50 mL IVPB 10 milliEquivalent(s) IV Intermittent every 1 hour  potassium chloride  10 mEq/50 mL IVPB 10 milliEquivalent(s) IV Intermittent every 1 hour  potassium chloride  10 mEq/50 mL IVPB 10 milliEquivalent(s) IV Intermittent every 1 hour  sodium chloride 0.9%. 1000 milliLiter(s) IV Continuous <Continuous>    Impression/Plan:    Known CKD3 w/Cr 1.8-2 at baseline  AV endocarditis, severe AI  S/p CABG x 1, AVR, MV repair today  Stable renal fx so far  F/u BP, BMP, UO  Care per CTU team  Will follow

## 2019-05-31 NOTE — BRIEF OPERATIVE NOTE - NSICDXBRIEFPROCEDURE_GEN_ALL_CORE_FT
PROCEDURES:  Single vessel autologous CABG 31-May-2019 14:03:16  Raz Robertson  Repair, mitral valve, with ZAHIRA 31-May-2019 14:01:46  Raz Robertson  Repair or replacement, aortic valve, with cardiopulmonary bypass 31-May-2019 14:01:07  Raz Robertson

## 2019-05-31 NOTE — PROGRESS NOTE ADULT - SUBJECTIVE AND OBJECTIVE BOX
[facility] - Chao Lomax, Kunal, Regino, [place], Elva Islas  Office Number:  [phone]    Patient resting comfortably in bed in NAD.  Laying flat with no respiratory distress.  No complaints of chest pain, dyspnea, palpitations, PND, or orthopnea.  For oR today    F/U for:  Endocarditis    Telemetry:  NSR    MEDICATIONS  (STANDING):  Medications reviewed in detail      Allergies    No Known Allergies    Intolerances        Vital Signs Last 24 Hrs  T(C): 36.3 (31 May 2019 04:58), Max: 37 (30 May 2019 13:35)  T(F): 97.3 (31 May 2019 04:58), Max: 98.6 (30 May 2019 13:35)  HR: 94 (31 May 2019 04:58) (92 - 98)  BP: 133/45 (31 May 2019 04:58) (133/45 - 144/49)  BP(mean): --  RR: 18 (31 May 2019 04:58) (18 - 18)  SpO2: 92% (31 May 2019 04:58) (92% - 95%)    I&O's Summary    30 May 2019 07:01  -  31 May 2019 07:00  --------------------------------------------------------  IN: 1350 mL / OUT: 500 mL / NET: 850 mL        ON EXAM:    Constitutional: well-nourished, well-developed, NAD   HEENT:  MMM, sclerae anicteric, conjunctivae clear, no oral cyanosis.  Pulmonary: Non-labored, breath sounds are clear bilaterally, No wheezing, rales or rhonchi  Cardiovascular: Regular, S1 and S2.  to for murmur across av.  No rubs, gallops or clicks  Gastrointestinal: Bowel Sounds present, soft, nontender.   Lymph: No peripheral edema.   Neurological: Alert, no focal deficits  Skin: No rashes.  Psych:  Mood & affect appropriate    LABS: All Labs Reviewed:                        8.8    10.2  )-----------( 348      ( 30 May 2019 06:01 )             27.5                         9.7    10.5  )-----------( 399      ( 29 May 2019 07:24 )             29.4     30 May 2019 06:01    135    |  96     |  25     ----------------------------<  206    4.1     |  21     |  2.21   29 May 2019 07:24    136    |  97     |  27     ----------------------------<  99     4.2     |  23     |  2.52     Ca    9.0        30 May 2019 06:01  Ca    9.3        29 May 2019 07:24    TPro  6.7    /  Alb  3.1    /  TBili  0.2    /  DBili  x      /  AST  56     /  ALT  69     /  AlkPhos  177    29 May 2019 07:24          Blood Culture: Organism --  Gram Stain Blood -- Gram Stain --  Specimen Source .Blood  Culture-Blood --    Organism --  Gram Stain Blood -- Gram Stain   Growth in anaerobic bottle: Gram Positive Cocci in Pairs and Chains  Specimen Source .Blood  Culture-Blood --      05-29 @ 07:24  Pro Bnp [id number]    05-28 @ 22:29  TSH: 1.05

## 2019-05-31 NOTE — BRIEF OPERATIVE NOTE - NSICDXBRIEFPOSTOP_GEN_ALL_CORE_FT
POST-OP DIAGNOSIS:  CAD in native artery 31-May-2019 14:06:12  Raz Robertson  Aortic valve insufficiency due to infection 31-May-2019 14:05:56  Raz Robertson  Mitral insufficiency 31-May-2019 14:05:31  Raz Robertson

## 2019-06-01 LAB
ALBUMIN SERPL ELPH-MCNC: 3.5 G/DL — SIGNIFICANT CHANGE UP (ref 3.3–5)
ALP SERPL-CCNC: 57 U/L — SIGNIFICANT CHANGE UP (ref 40–120)
ALT FLD-CCNC: 16 U/L — SIGNIFICANT CHANGE UP (ref 10–45)
ANION GAP SERPL CALC-SCNC: 18 MMOL/L — HIGH (ref 5–17)
AST SERPL-CCNC: 36 U/L — SIGNIFICANT CHANGE UP (ref 10–40)
BASE EXCESS BLDV CALC-SCNC: -0.7 MMOL/L — SIGNIFICANT CHANGE UP (ref -2–2)
BASE EXCESS BLDV CALC-SCNC: -1 MMOL/L — SIGNIFICANT CHANGE UP (ref -2–2)
BASE EXCESS BLDV CALC-SCNC: -1.7 MMOL/L — SIGNIFICANT CHANGE UP (ref -2–2)
BASE EXCESS BLDV CALC-SCNC: -2.8 MMOL/L — LOW (ref -2–2)
BASE EXCESS BLDV CALC-SCNC: 0 MMOL/L — SIGNIFICANT CHANGE UP (ref -2–2)
BILIRUB SERPL-MCNC: 0.6 MG/DL — SIGNIFICANT CHANGE UP (ref 0.2–1.2)
BUN SERPL-MCNC: 22 MG/DL — SIGNIFICANT CHANGE UP (ref 7–23)
CA-I SERPL-SCNC: 1.15 MMOL/L — SIGNIFICANT CHANGE UP (ref 1.12–1.3)
CA-I SERPL-SCNC: 1.15 MMOL/L — SIGNIFICANT CHANGE UP (ref 1.12–1.3)
CALCIUM SERPL-MCNC: 8.7 MG/DL — SIGNIFICANT CHANGE UP (ref 8.4–10.5)
CHLORIDE BLDV-SCNC: 101 MMOL/L — SIGNIFICANT CHANGE UP (ref 96–108)
CHLORIDE BLDV-SCNC: 104 MMOL/L — SIGNIFICANT CHANGE UP (ref 96–108)
CHLORIDE SERPL-SCNC: 104 MMOL/L — SIGNIFICANT CHANGE UP (ref 96–108)
CO2 BLDV-SCNC: 24 MMOL/L — SIGNIFICANT CHANGE UP (ref 22–30)
CO2 BLDV-SCNC: 25 MMOL/L — SIGNIFICANT CHANGE UP (ref 22–30)
CO2 BLDV-SCNC: 26 MMOL/L — SIGNIFICANT CHANGE UP (ref 22–30)
CO2 SERPL-SCNC: 21 MMOL/L — LOW (ref 22–31)
CREAT SERPL-MCNC: 2.2 MG/DL — HIGH (ref 0.5–1.3)
GAS PNL BLDA: SIGNIFICANT CHANGE UP
GAS PNL BLDV: 131 MMOL/L — LOW (ref 136–145)
GAS PNL BLDV: 137 MMOL/L — SIGNIFICANT CHANGE UP (ref 136–145)
GAS PNL BLDV: SIGNIFICANT CHANGE UP
GLUCOSE BLDC GLUCOMTR-MCNC: 104 MG/DL — HIGH (ref 70–99)
GLUCOSE BLDC GLUCOMTR-MCNC: 127 MG/DL — HIGH (ref 70–99)
GLUCOSE BLDC GLUCOMTR-MCNC: 128 MG/DL — HIGH (ref 70–99)
GLUCOSE BLDC GLUCOMTR-MCNC: 131 MG/DL — HIGH (ref 70–99)
GLUCOSE BLDC GLUCOMTR-MCNC: 134 MG/DL — HIGH (ref 70–99)
GLUCOSE BLDV-MCNC: 120 MG/DL — HIGH (ref 70–99)
GLUCOSE BLDV-MCNC: 127 MG/DL — HIGH (ref 70–99)
GLUCOSE SERPL-MCNC: 168 MG/DL — HIGH (ref 70–99)
GRAM STN FLD: SIGNIFICANT CHANGE UP
GRAM STN FLD: SIGNIFICANT CHANGE UP
HCO3 BLDV-SCNC: 22 MMOL/L — SIGNIFICANT CHANGE UP (ref 21–29)
HCO3 BLDV-SCNC: 24 MMOL/L — SIGNIFICANT CHANGE UP (ref 21–29)
HCO3 BLDV-SCNC: 25 MMOL/L — SIGNIFICANT CHANGE UP (ref 21–29)
HCT VFR BLD CALC: 30.2 % — LOW (ref 34.5–45)
HCT VFR BLDA CALC: 27 % — LOW (ref 39–50)
HCT VFR BLDA CALC: 28 % — LOW (ref 39–50)
HGB BLD CALC-MCNC: 8.6 G/DL — LOW (ref 11.5–15.5)
HGB BLD CALC-MCNC: 8.9 G/DL — LOW (ref 11.5–15.5)
HGB BLD-MCNC: 10.3 G/DL — LOW (ref 11.5–15.5)
HOROWITZ INDEX BLDV+IHG-RTO: 40 — SIGNIFICANT CHANGE UP
HOROWITZ INDEX BLDV+IHG-RTO: 50 — SIGNIFICANT CHANGE UP
HOROWITZ INDEX BLDV+IHG-RTO: 50 — SIGNIFICANT CHANGE UP
LACTATE BLDV-MCNC: 1.2 MMOL/L — SIGNIFICANT CHANGE UP (ref 0.7–2)
LACTATE BLDV-MCNC: 1.3 MMOL/L — SIGNIFICANT CHANGE UP (ref 0.7–2)
MAGNESIUM SERPL-MCNC: 3.1 MG/DL — HIGH (ref 1.6–2.6)
MCHC RBC-ENTMCNC: 29.8 PG — SIGNIFICANT CHANGE UP (ref 27–34)
MCHC RBC-ENTMCNC: 34.2 GM/DL — SIGNIFICANT CHANGE UP (ref 32–36)
MCV RBC AUTO: 87.2 FL — SIGNIFICANT CHANGE UP (ref 80–100)
OTHER CELLS CSF MANUAL: 8 ML/DL — LOW (ref 18–22)
OTHER CELLS CSF MANUAL: 9 ML/DL — LOW (ref 18–22)
PCO2 BLDV: 41 MMHG — SIGNIFICANT CHANGE UP (ref 35–50)
PCO2 BLDV: 43 MMHG — SIGNIFICANT CHANGE UP (ref 35–50)
PCO2 BLDV: 44 MMHG — SIGNIFICANT CHANGE UP (ref 35–50)
PCO2 BLDV: 45 MMHG — SIGNIFICANT CHANGE UP (ref 35–50)
PCO2 BLDV: 47 MMHG — SIGNIFICANT CHANGE UP (ref 35–50)
PH BLDV: 7.32 — LOW (ref 7.35–7.45)
PH BLDV: 7.34 — LOW (ref 7.35–7.45)
PH BLDV: 7.36 — SIGNIFICANT CHANGE UP (ref 7.35–7.45)
PH BLDV: 7.36 — SIGNIFICANT CHANGE UP (ref 7.35–7.45)
PH BLDV: 7.38 — SIGNIFICANT CHANGE UP (ref 7.35–7.45)
PHOSPHATE SERPL-MCNC: 4.7 MG/DL — HIGH (ref 2.5–4.5)
PLATELET # BLD AUTO: 140 K/UL — LOW (ref 150–400)
PO2 BLDV: 32 MMHG — SIGNIFICANT CHANGE UP (ref 25–45)
PO2 BLDV: 32 MMHG — SIGNIFICANT CHANGE UP (ref 25–45)
PO2 BLDV: 41 MMHG — SIGNIFICANT CHANGE UP (ref 25–45)
PO2 BLDV: 43 MMHG — SIGNIFICANT CHANGE UP (ref 25–45)
PO2 BLDV: 43 MMHG — SIGNIFICANT CHANGE UP (ref 25–45)
POTASSIUM BLDV-SCNC: 4.2 MMOL/L — SIGNIFICANT CHANGE UP (ref 3.5–5.3)
POTASSIUM BLDV-SCNC: 4.5 MMOL/L — SIGNIFICANT CHANGE UP (ref 3.5–5.3)
POTASSIUM SERPL-MCNC: 4.8 MMOL/L — SIGNIFICANT CHANGE UP (ref 3.5–5.3)
POTASSIUM SERPL-SCNC: 4.8 MMOL/L — SIGNIFICANT CHANGE UP (ref 3.5–5.3)
PROT SERPL-MCNC: 5.1 G/DL — LOW (ref 6–8.3)
RBC # BLD: 3.47 M/UL — LOW (ref 3.8–5.2)
RBC # FLD: 15.2 % — HIGH (ref 10.3–14.5)
SAO2 % BLDV: 56 % — LOW (ref 67–88)
SAO2 % BLDV: 56 % — LOW (ref 67–88)
SAO2 % BLDV: 72 % — SIGNIFICANT CHANGE UP (ref 67–88)
SAO2 % BLDV: 72 % — SIGNIFICANT CHANGE UP (ref 67–88)
SAO2 % BLDV: 74 % — SIGNIFICANT CHANGE UP (ref 67–88)
SODIUM SERPL-SCNC: 143 MMOL/L — SIGNIFICANT CHANGE UP (ref 135–145)
SPECIMEN SOURCE: SIGNIFICANT CHANGE UP
WBC # BLD: 15.6 K/UL — HIGH (ref 3.8–10.5)
WBC # FLD AUTO: 15.6 K/UL — HIGH (ref 3.8–10.5)

## 2019-06-01 PROCEDURE — 93010 ELECTROCARDIOGRAM REPORT: CPT

## 2019-06-01 PROCEDURE — 99291 CRITICAL CARE FIRST HOUR: CPT

## 2019-06-01 PROCEDURE — 99232 SBSQ HOSP IP/OBS MODERATE 35: CPT

## 2019-06-01 PROCEDURE — 71045 X-RAY EXAM CHEST 1 VIEW: CPT | Mod: 26

## 2019-06-01 RX ORDER — ASPIRIN/CALCIUM CARB/MAGNESIUM 324 MG
325 TABLET ORAL DAILY
Refills: 0 | Status: DISCONTINUED | OUTPATIENT
Start: 2019-06-01 | End: 2019-06-07

## 2019-06-01 RX ORDER — ACETAMINOPHEN 500 MG
650 TABLET ORAL EVERY 4 HOURS
Refills: 0 | Status: COMPLETED | OUTPATIENT
Start: 2019-06-01 | End: 2019-06-03

## 2019-06-01 RX ORDER — HEPARIN SODIUM 5000 [USP'U]/ML
5000 INJECTION INTRAVENOUS; SUBCUTANEOUS EVERY 8 HOURS
Refills: 0 | Status: DISCONTINUED | OUTPATIENT
Start: 2019-06-01 | End: 2019-06-07

## 2019-06-01 RX ORDER — ALBUMIN HUMAN 25 %
250 VIAL (ML) INTRAVENOUS ONCE
Refills: 0 | Status: COMPLETED | OUTPATIENT
Start: 2019-06-01 | End: 2019-06-01

## 2019-06-01 RX ORDER — OXYCODONE HYDROCHLORIDE 5 MG/1
5 TABLET ORAL EVERY 6 HOURS
Refills: 0 | Status: DISCONTINUED | OUTPATIENT
Start: 2019-06-01 | End: 2019-06-07

## 2019-06-01 RX ORDER — PANTOPRAZOLE SODIUM 20 MG/1
40 TABLET, DELAYED RELEASE ORAL
Refills: 0 | Status: DISCONTINUED | OUTPATIENT
Start: 2019-06-01 | End: 2019-06-01

## 2019-06-01 RX ORDER — ALBUMIN HUMAN 25 %
250 VIAL (ML) INTRAVENOUS
Refills: 0 | Status: COMPLETED | OUTPATIENT
Start: 2019-06-01 | End: 2019-06-01

## 2019-06-01 RX ORDER — INSULIN LISPRO 100/ML
VIAL (ML) SUBCUTANEOUS
Refills: 0 | Status: DISCONTINUED | OUTPATIENT
Start: 2019-06-01 | End: 2019-06-02

## 2019-06-01 RX ORDER — VASOPRESSIN 20 [USP'U]/ML
0.03 INJECTION INTRAVENOUS
Qty: 50 | Refills: 0 | Status: DISCONTINUED | OUTPATIENT
Start: 2019-06-01 | End: 2019-06-01

## 2019-06-01 RX ORDER — OXYCODONE HYDROCHLORIDE 5 MG/1
10 TABLET ORAL EVERY 6 HOURS
Refills: 0 | Status: DISCONTINUED | OUTPATIENT
Start: 2019-06-01 | End: 2019-06-07

## 2019-06-01 RX ORDER — INSULIN LISPRO 100/ML
VIAL (ML) SUBCUTANEOUS AT BEDTIME
Refills: 0 | Status: DISCONTINUED | OUTPATIENT
Start: 2019-06-01 | End: 2019-06-02

## 2019-06-01 RX ORDER — FAMOTIDINE 10 MG/ML
20 INJECTION INTRAVENOUS DAILY
Refills: 0 | Status: DISCONTINUED | OUTPATIENT
Start: 2019-06-01 | End: 2019-06-07

## 2019-06-01 RX ORDER — HYDRALAZINE HCL 50 MG
10 TABLET ORAL ONCE
Refills: 0 | Status: COMPLETED | OUTPATIENT
Start: 2019-06-01 | End: 2019-06-01

## 2019-06-01 RX ADMIN — Medication 216 GRAM(S): at 07:36

## 2019-06-01 RX ADMIN — Medication 216 GRAM(S): at 20:09

## 2019-06-01 RX ADMIN — MUPIROCIN 1 APPLICATION(S): 20 OINTMENT TOPICAL at 17:14

## 2019-06-01 RX ADMIN — HYDROMORPHONE HYDROCHLORIDE 0.5 MILLIGRAM(S): 2 INJECTION INTRAMUSCULAR; INTRAVENOUS; SUBCUTANEOUS at 08:00

## 2019-06-01 RX ADMIN — OXYCODONE HYDROCHLORIDE 10 MILLIGRAM(S): 5 TABLET ORAL at 20:24

## 2019-06-01 RX ADMIN — HYDROMORPHONE HYDROCHLORIDE 0.5 MILLIGRAM(S): 2 INJECTION INTRAMUSCULAR; INTRAVENOUS; SUBCUTANEOUS at 16:00

## 2019-06-01 RX ADMIN — Medication 125 MILLILITER(S): at 11:21

## 2019-06-01 RX ADMIN — Medication 216 GRAM(S): at 01:55

## 2019-06-01 RX ADMIN — Medication 325 MILLIGRAM(S): at 12:29

## 2019-06-01 RX ADMIN — Medication 125 MILLILITER(S): at 04:00

## 2019-06-01 RX ADMIN — MUPIROCIN 1 APPLICATION(S): 20 OINTMENT TOPICAL at 06:19

## 2019-06-01 RX ADMIN — Medication 125 MILLILITER(S): at 16:20

## 2019-06-01 RX ADMIN — OXYCODONE HYDROCHLORIDE 10 MILLIGRAM(S): 5 TABLET ORAL at 13:00

## 2019-06-01 RX ADMIN — FAMOTIDINE 20 MILLIGRAM(S): 10 INJECTION INTRAVENOUS at 17:13

## 2019-06-01 RX ADMIN — Medication 3.61 MICROGRAM(S)/KG/MIN: at 07:37

## 2019-06-01 RX ADMIN — Medication 125 MILLILITER(S): at 03:29

## 2019-06-01 RX ADMIN — POLYETHYLENE GLYCOL 3350 17 GRAM(S): 17 POWDER, FOR SOLUTION ORAL at 12:29

## 2019-06-01 RX ADMIN — Medication 650 MILLIGRAM(S): at 23:35

## 2019-06-01 RX ADMIN — HYDROMORPHONE HYDROCHLORIDE 0.5 MILLIGRAM(S): 2 INJECTION INTRAMUSCULAR; INTRAVENOUS; SUBCUTANEOUS at 15:26

## 2019-06-01 RX ADMIN — CEFTRIAXONE 100 GRAM(S): 500 INJECTION, POWDER, FOR SOLUTION INTRAMUSCULAR; INTRAVENOUS at 17:13

## 2019-06-01 RX ADMIN — OXYCODONE HYDROCHLORIDE 10 MILLIGRAM(S): 5 TABLET ORAL at 12:29

## 2019-06-01 RX ADMIN — HYDROMORPHONE HYDROCHLORIDE 0.5 MILLIGRAM(S): 2 INJECTION INTRAMUSCULAR; INTRAVENOUS; SUBCUTANEOUS at 07:36

## 2019-06-01 RX ADMIN — PANTOPRAZOLE SODIUM 40 MILLIGRAM(S): 20 TABLET, DELAYED RELEASE ORAL at 09:19

## 2019-06-01 RX ADMIN — HYDROMORPHONE HYDROCHLORIDE 0.5 MILLIGRAM(S): 2 INJECTION INTRAMUSCULAR; INTRAVENOUS; SUBCUTANEOUS at 23:05

## 2019-06-01 RX ADMIN — OXYCODONE HYDROCHLORIDE 10 MILLIGRAM(S): 5 TABLET ORAL at 19:39

## 2019-06-01 RX ADMIN — Medication 216 GRAM(S): at 13:17

## 2019-06-01 RX ADMIN — CEFTRIAXONE 100 GRAM(S): 500 INJECTION, POWDER, FOR SOLUTION INTRAMUSCULAR; INTRAVENOUS at 06:19

## 2019-06-01 RX ADMIN — Medication 650 MILLIGRAM(S): at 22:50

## 2019-06-01 RX ADMIN — CHLORHEXIDINE GLUCONATE 1 APPLICATION(S): 213 SOLUTION TOPICAL at 06:18

## 2019-06-01 RX ADMIN — Medication 10 MILLIGRAM(S): at 23:52

## 2019-06-01 RX ADMIN — HYDROMORPHONE HYDROCHLORIDE 0.5 MILLIGRAM(S): 2 INJECTION INTRAMUSCULAR; INTRAVENOUS; SUBCUTANEOUS at 22:50

## 2019-06-01 RX ADMIN — HEPARIN SODIUM 5000 UNIT(S): 5000 INJECTION INTRAVENOUS; SUBCUTANEOUS at 13:17

## 2019-06-01 RX ADMIN — INSULIN HUMAN 3 UNIT(S)/HR: 100 INJECTION, SOLUTION SUBCUTANEOUS at 07:36

## 2019-06-01 RX ADMIN — HEPARIN SODIUM 5000 UNIT(S): 5000 INJECTION INTRAVENOUS; SUBCUTANEOUS at 22:51

## 2019-06-01 NOTE — PHYSICAL THERAPY INITIAL EVALUATION ADULT - ADDITIONAL COMMENTS
Pt lives with her  in a house with 0 steps. Pt has a 1st floor setup. Pt is independent with all ADLs and ambulation. Pt does not use a AD for ambulation. Pt's  is available to assist when needed. +drives. +reading eyeglasses.

## 2019-06-01 NOTE — PROGRESS NOTE ADULT - ASSESSMENT
63y Female with HTN, CKD, PAD s/p right femoral endarterectomy, depression, and smoker (quit 5 PTA) presented to the ED c/o SOB  found to have Gram + sepsis with now signs of endocarditis, s/p OR for bio AVR, MV repair, and CABG with LIMA to LAD     - Tolerated surgery well for endocarditis involving the aortic and mitral valves.  s/p CABG with LIMA to LAD  - Extubated overnight.  Now on high flow.  Wean to NC today  - Levo off.   at 2.5.  This should be able to be discontinued today  - Abx per ID  - Start baby aspirin and statin  - BB when able  - no meaningful vol ol at present    - Please continue to maintain strict I/Os, monitor daily weights, Cr, and K.   - Monitor H/H.  Transfuse as needed  - Will continue to follow.  Critically ill with high risk of decompensation.  Time greater than 35 minutes

## 2019-06-01 NOTE — PHYSICAL THERAPY INITIAL EVALUATION ADULT - DID THE PATIENT HAVE SURGERY?
Single vessel autologous CABG. Repair, mitral valve, with ZAHIRA. Repair or replacement, aortic valve, with cardiopulmonary bypass./yes

## 2019-06-01 NOTE — PHYSICAL THERAPY INITIAL EVALUATION ADULT - PLANNED THERAPY INTERVENTIONS, PT EVAL
gait training/strengthening/transfer training/GOAL: Stair Negotiation Training: Patient will be able to negotiate up & down 1 flight of stairs with unilateral rail, step to gait pattern, in 2 weeks./balance training/bed mobility training

## 2019-06-01 NOTE — PROGRESS NOTE ADULT - SUBJECTIVE AND OBJECTIVE BOX
VIVIANA CHAPMAN   MRN#: [id number]     The patient is a 63y Female who was seen, evaluated, & examined with the CTICU staff on rounds and later in the day with a multidisciplinary care plan formulated & implemented.  All available clinical, laboratory, radiographic, pharmacologic, and electrocardiographic data were reviewed & analyzed.      The patient was in the CTICU in critical condition secondary to persistent cardiopulmonary dysfunction-hypoxemia, resolved hemodynamically significant hypovolemia-shock, stress hyperglycemia, and Enterococcus endocarditis.      Respiratory status required high flow nasal oxygen, the following of ABG’s with A-line monitoring, and continuous pulse oximetry monitoring for support & to evaluate for & prevent further decompensation secondary to persistent cardiopulmonary dysfunction and hypoxemia     Invasive hemodynamic monitoring with an A-line was required for the following of continuous MAP/BP monitoring to ensure adequate cardiovascular support and to evaluate for & help prevent decompensation while receiving an IV Dobutamine drip and IV antibiotics secondary to persistent cardiovascular dysfunction and Enterococcus endocarditis.       Metabolic stability, stress hyperglycemia, & infection prophylaxis required an IV regular Insulin drip & the following of serial glucose levels to help achieve & maintain euglycemia.      Patient required critical care management and I provided 30 minutes of non-continuous care to the patient.  Discussed at length with the CTICU staff and helped coordinate care.

## 2019-06-01 NOTE — PROGRESS NOTE ADULT - ASSESSMENT
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   No signs or symptoms of neurological issues ,  no prior UTI or infection or ppt event.  Follow up bc negative on ampicillin.  Pt has renal insufficieny , unclear how long it has been present and wether it is related to endocarditis         ceftriaxone and  ampicillin for synergy ( want to avoid genta)   Valve tissue gram stain with polys and gram variable cocci sen- cultures pending  will need prolonged course   Would reduce ampicillin dose to 2grams IV q 8hr  Continue Ceftriaxone    Max Castellon MD  [phone]  After 5pm/weekends [phone]

## 2019-06-01 NOTE — PROGRESS NOTE ADULT - SUBJECTIVE AND OBJECTIVE BOX
INFECTIOUS DISEASES FOLLOW UP-- Zoë Castellon  [phone]    This is a follow up note for this  63yFemale with  Atherosclerosis of native coronary artery without angina pectoris  ID following for enterococcal endocarditis      ROS:  CONSTITUTIONAL:  No fever, sitting in a chair  CARDIOVASCULAR:  No chest pain or palpitations  RESPIRATORY:  No dyspnea  GASTROINTESTINAL:  No nausea, vomiting, diarrhea, or abdominal pain  GENITOURINARY:  No dysuria  NEUROLOGIC:  No headache,     Allergies    No Known Allergies    Intolerances        ANTIBIOTICS/RELEVANT:  antimicrobials  ampicillin  IVPB 2 Gram(s) IV Intermittent every 6 hours  cefTRIAXone   IVPB 2 Gram(s) IV Intermittent every 12 hours    immunologic:    OTHER:  acetaminophen   Tablet .. 650 milliGRAM(s) Oral every 4 hours  aspirin 325 milliGRAM(s) Oral daily  chlorhexidine 2% Cloths 1 Application(s) Topical <User Schedule>  dextrose 50% Injectable 50 milliLiter(s) IV Push every 15 minutes  dextrose 50% Injectable 25 milliLiter(s) IV Push every 15 minutes  DOBUTamine Infusion 2.5 MICROgram(s)/kG/Min IV Continuous <Continuous>  heparin  Injectable 5000 Unit(s) SubCutaneous every 8 hours  HYDROmorphone  Injectable 0.5 milliGRAM(s) IV Push every 6 hours PRN  insulin lispro (HumaLOG) corrective regimen sliding scale   SubCutaneous three times a day before meals  insulin lispro (HumaLOG) corrective regimen sliding scale   SubCutaneous at bedtime  mupirocin 2% Ointment 1 Application(s) Topical two times a day  oxyCODONE    IR 10 milliGRAM(s) Oral every 6 hours PRN  oxyCODONE    IR 5 milliGRAM(s) Oral every 6 hours PRN  pantoprazole    Tablet 40 milliGRAM(s) Oral before breakfast  polyethylene glycol 3350 17 Gram(s) Oral daily  sodium chloride 0.9%. 1000 milliLiter(s) IV Continuous <Continuous>      Objective:  Vital Signs Last 24 Hrs  T(C): 36.2 (01 Jun 2019 12:00), Max: 37.2 (31 May 2019 23:00)  T(F): 97.2 (01 Jun 2019 12:00), Max: 99 (31 May 2019 23:00)  HR: 81 (01 Jun 2019 14:00) (64 - 103)  BP: 113/58 (31 May 2019 15:30) (113/58 - 113/58)  BP(mean): 79 (31 May 2019 15:30) (79 - 79)  RR: 21 (01 Jun 2019 13:00) (11 - 35)  SpO2: 95% (01 Jun 2019 14:00) (91% - 99%)    PHYSICAL EXAM:  Constitutional:no acute distress sitting in a chair  Eyes:AMY, EOMI  Ear/Nose/Throat: no oral lesions, 	  Respiratory: clear BL tow CTs on left side  Cardiovascular: S1S2 sternotomy dressing dry intact  Gastrointestinal:soft, (+) BS, no tenderness  Extremities:no e/e/c  No Lymphadenopathy  IV sites not inflammed.    LABS:                        10.3   15.6  )-----------( 140      ( 01 Jun 2019 00:25 )             30.2     06-01    143  |  104  |  22  ----------------------------<  168<H>  4.8   |  21<L>  |  2.20<H>    Ca    8.7      01 Jun 2019 00:25  Phos  4.7     06-01  Mg     3.1     06-01    TPro  5.1<L>  /  Alb  3.5  /  TBili  0.6  /  DBili  x   /  AST  36  /  ALT  16  /  AlkPhos  57  06-01    PT/INR - ( 31 May 2019 14:14 )   PT: 14.4 sec;   INR: 1.25 ratio         PTT - ( 31 May 2019 14:14 )  PTT:50.5 sec      MICROBIOLOGY:            RECENT CULTURES:  06-01 @ 00:21  .Tissue  --  --  --  --  --  05-29 @ 13:25  .Blood  --  --  --    No growth to date.  --  05-28 @ 23:11  .Blood  --  --  --    Growth in anaerobic bottle: Gram Positive Cocci in Pairs and Chains  Identification and susceptibility to follow.  --  05-25 @ 16:33  .Blood Blood-Peripheral  --  --  --    No growth at 5 days.  --      RADIOLOGY & ADDITIONAL STUDIES:    < from: Xray Chest 1 View- PORTABLE-Routine (06.01.19 @ 04:11) >  IMPRESSION:    Interval removal of endotracheal tube. Right IJ central venous catheter   terminates in the SVC. Chest tubes are unchanged in position. Status post   aortic valve replacement and mitral valve annuloplasty.    Bilateral lower lung linear atelectasis. Trace left pleural effusion. No   pneumothorax. Stable mediastinal silhouette. Bones and soft tissues are   unremarkable.    < end of copied text >

## 2019-06-01 NOTE — PHYSICAL THERAPY INITIAL EVALUATION ADULT - PERTINENT HX OF CURRENT PROBLEM, REHAB EVAL
Pt is a 63 F PMH HTN CKD PAD s/p femoral endarterectomy, Plavix , depression, and smoker (quit 4/2019) endorsed 10 lb weight loss reports increasing SOB s/p colonoscopy -unremarkable. Presented to [facility] ED found to have b/l pleural effusions, anemia, elevated creatinine 2.8 and TTE reveals mobile echodensity on AV with moderate to severe AI. Blood cultures + for gram + cocci in pairs/chains. Treated with one unit PRBC- Ampicillin 2gram IV q6- PET negative for evidence of infection. Con't.

## 2019-06-01 NOTE — PHYSICAL THERAPY INITIAL EVALUATION ADULT - GENERAL OBSERVATIONS, REHAB EVAL
Pt received sitting in recliner with +cardiac monitor, +high flow O2, +chest tube, +ext pacemaker, +IV, +a-line, +pulse ox and +munoz. NAD noted.

## 2019-06-01 NOTE — PROGRESS NOTE ADULT - SUBJECTIVE AND OBJECTIVE BOX
[facility] - Chao Lomax Grossman, [place], Roshan Paris Savella  Office Number:  [phone]    Patient resting comfortably in bed in chair.  Extubated overnight.  Chest feels tight.  NSR overnight.  Levo off.  Low dose  on.    F/U for:  Endocarditis    Telemetry:  NSR.  PAC    MEDICATIONS  (STANDING):  ampicillin  IVPB 2 Gram(s) IV Intermittent every 6 hours  cefTRIAXone   IVPB 2 Gram(s) IV Intermittent every 12 hours  chlorhexidine 2% Cloths 1 Application(s) Topical <User Schedule>  dextrose 50% Injectable 50 milliLiter(s) IV Push every 15 minutes  dextrose 50% Injectable 25 milliLiter(s) IV Push every 15 minutes  DOBUTamine Infusion 2.5 MICROgram(s)/kG/Min (3.607 mL/Hr) IV Continuous <Continuous>  insulin regular Infusion 3 Unit(s)/Hr (3 mL/Hr) IV Continuous <Continuous>  mupirocin 2% Ointment 1 Application(s) Topical two times a day  norepinephrine Infusion 0.14 MICROgram(s)/kG/Min (12.626 mL/Hr) IV Continuous <Continuous>  pantoprazole  Injectable 40 milliGRAM(s) IV Push daily  polyethylene glycol 3350 17 Gram(s) Oral daily  sodium chloride 0.9%. 1000 milliLiter(s) (10 mL/Hr) IV Continuous <Continuous>  vasopressin Infusion 0.033 Unit(s)/Min (2 mL/Hr) IV Continuous <Continuous>    MEDICATIONS  (PRN):  HYDROmorphone  Injectable 0.5 milliGRAM(s) IV Push every 6 hours PRN Severe Pain (7 - 10)      Allergies    No Known Allergies    Intolerances        Vital Signs Last 24 Hrs  T(C): 36 (2019 04:00), Max: 37.2 (31 May 2019 23:00)  T(F): 96.8 (2019 04:00), Max: 99 (31 May 2019 23:00)  HR: 73 (2019 06:30) (64 - 103)  BP: 113/58 (31 May 2019 15:30) (93/55 - 113/58)  BP(mean): 79 (31 May 2019 15:30) (68 - 79)  RR: 17 (2019 06:30) (11 - 35)  SpO2: 97% (2019 06:30) (91% - 100%)    I&O's Summary    31 May 2019 07:01  -  2019 07:00  --------------------------------------------------------  IN: 3242.5 mL / OUT: 1745 mL / NET: 1497.5 mL        ON EXAM:    General: NAD, awake and alert, oriented x 3  HEENT: Mucous membranes are dry, anicteric. Right TLC clean, dry, and intact  Lungs: Non-labored, breath sounds are clear bilaterally, No wheezing, rales or rhonchi  Cardiovascular: Regular, S1 and S2, 1/6 systolic murmur  Gastrointestinal: Bowel Sounds present, soft, nontender.   Lymph: No peripheral edema. No lymphadenopathy.  Skin: No rashes or ulcers  Psych:  Mood & affect appropriate    LABS: All Labs Reviewed:                        10.3   15.6  )-----------( 140      ( 2019 00:25 )             30.2                         9.7    22.2  )-----------( 158      ( 31 May 2019 14:14 )             29.3                         8.8    10.2  )-----------( 348      ( 30 May 2019 06:01 )             27.5     2019 00:25    143    |  104    |  22     ----------------------------<  168    4.8     |  21     |  2.20   31 May 2019 14:14    142    |  105    |  17     ----------------------------<  125    5.1     |  21     |  1.73   30 May 2019 06:01    135    |  96     |  25     ----------------------------<  206    4.1     |  21     |  2.21     Ca    8.7        2019 00:25  Ca    8.6        31 May 2019 14:14  Ca    9.0        30 May 2019 06:01  Phos  4.7       2019 00:25  Phos  4.6       31 May 2019 14:14  Mg     3.1       2019 00:25  Mg     3.6       31 May 2019 14:14    TPro  5.1    /  Alb  3.5    /  TBili  0.6    /  DBili  x      /  AST  36     /  ALT  16     /  AlkPhos  57     2019 00:25  TPro  3.7    /  Alb  2.4    /  TBili  0.9    /  DBili  x      /  AST  28     /  ALT  15     /  AlkPhos  52     31 May 2019 14:14    PT/INR - ( 31 May 2019 14:14 )   PT: 14.4 sec;   INR: 1.25 ratio         PTT - ( 31 May 2019 14:14 )  PTT:50.5 sec  CARDIAC MARKERS ( 31 May 2019 14:14 )  x     / x     / 180 U/L / x     / 34.9 ng/mL      Blood Culture: Organism --  Gram Stain Blood -- Gram Stain   **Please Note**: This is a Corrected Report**  No polymorphonuclear cells seen per low power field  Numerous Gram Variable Cocci seen per oil power field  Previously reported as:  No polymorphonuclear cells seen per low power field  No organisms seen per oil power field  Specimen Source .Tissue  Culture-Blood --    Organism --  Gram Stain Blood -- Gram Stain --  Specimen Source .Blood  Culture-Blood --    Organism --  Gram Stain Blood -- Gram Stain   Growth in anaerobic bottle: Gram Positive Cocci in Pairs and Chains  Specimen Source .Blood  Culture-Blood --

## 2019-06-01 NOTE — PROGRESS NOTE ADULT - SUBJECTIVE AND OBJECTIVE BOX
Subjective: stable hemodynamics. Remains on Dobutamine. Is/Os 3200/1500      MEDICATIONS  (STANDING):  acetaminophen   Tablet .. 650 milliGRAM(s) Oral every 4 hours  ampicillin  IVPB 2 Gram(s) IV Intermittent every 6 hours  aspirin 325 milliGRAM(s) Oral daily  cefTRIAXone   IVPB 2 Gram(s) IV Intermittent every 12 hours  chlorhexidine 2% Cloths 1 Application(s) Topical <User Schedule>  dextrose 50% Injectable 50 milliLiter(s) IV Push every 15 minutes  dextrose 50% Injectable 25 milliLiter(s) IV Push every 15 minutes  DOBUTamine Infusion 2.5 MICROgram(s)/kG/Min (3.607 mL/Hr) IV Continuous <Continuous>  heparin  Injectable 5000 Unit(s) SubCutaneous every 8 hours  insulin lispro (HumaLOG) corrective regimen sliding scale   SubCutaneous three times a day before meals  insulin lispro (HumaLOG) corrective regimen sliding scale   SubCutaneous at bedtime  mupirocin 2% Ointment 1 Application(s) Topical two times a day  pantoprazole    Tablet 40 milliGRAM(s) Oral before breakfast  polyethylene glycol 3350 17 Gram(s) Oral daily  sodium chloride 0.9%. 1000 milliLiter(s) (10 mL/Hr) IV Continuous <Continuous>    MEDICATIONS  (PRN):  HYDROmorphone  Injectable 0.5 milliGRAM(s) IV Push every 6 hours PRN Severe Pain (7 - 10)  oxyCODONE    IR 10 milliGRAM(s) Oral every 6 hours PRN Severe Pain (7 - 10)  oxyCODONE    IR 5 milliGRAM(s) Oral every 6 hours PRN Moderate Pain (4 - 6)          T(C): 36.2 (06-01-19 @ 12:00), Max: 37.2 (05-31-19 @ 23:00)  HR: 79 (06-01-19 @ 13:00) (64 - 103)  BP: 113/58 (05-31-19 @ 15:30) (93/55 - 113/58)  RR: 21 (06-01-19 @ 13:00) (11 - 35)  SpO2: 95% (06-01-19 @ 13:00) (91% - 100%)  Wt(kg): --    ABG - ( 01 Jun 2019 12:23 )  pH, Arterial: 7.43  pH, Blood: x     /  pCO2: 36    /  pO2: 70    / HCO3: 23    / Base Excess: -.4   /  SaO2: 94                  I&O's Detail    31 May 2019 07:01  -  01 Jun 2019 07:00  --------------------------------------------------------  IN:    Albumin 5%  - 250 mL: 1250 mL    DOBUTamine Infusion: 3.6 mL    DOBUTamine Infusion: 86.4 mL    DOBUTamine Infusion: 12.9 mL    insulin regular Infusion: 28 mL    Lactated Ringers IV Bolus: 500 mL    milrinone  Infusion: 11.6 mL    norepinephrine Infusion: 164 mL    Oral Fluid: 240 mL    Packed Red Blood Cells: 350 mL    sodium chloride 0.9%.: 180 mL    Solution: 266 mL    Solution: 150 mL  Total IN: 3242.5 mL    OUT:    Chest Tube: 270 mL    Chest Tube: 280 mL    Indwelling Catheter - Urethral: 1195 mL  Total OUT: 1745 mL    Total NET: 1497.5 mL      01 Jun 2019 07:01  -  01 Jun 2019 13:49  --------------------------------------------------------  IN:    Albumin 5%  - 250 mL: 250 mL    DOBUTamine Infusion: 21.6 mL    insulin regular Infusion: 1 mL    Oral Fluid: 480 mL    sodium chloride 0.9%.: 60 mL    Solution: 50 mL  Total IN: 862.6 mL    OUT:    Chest Tube: 100 mL    Chest Tube: 80 mL    Indwelling Catheter - Urethral: 245 mL  Total OUT: 425 mL    Total NET: 437.6 mL          Mode: CPAP with PS  FiO2: 50  PEEP: 5  PS: 5  MAP: 7  PIP: 11       PHYSICAL EXAM:    GENERAL: no dyspnea.   EYES: EOMI, PERRLA, conjunctiva and sclera clear  NECK: Supple, no inc in JVP  CHEST/LUNG: poor effort. No crackles  HEART: S1S2  ABDOMEN: Soft, Nontender, Nondistended; Bowel sounds present  EXTREMITIES:  min edema  NEURO: no asterixis      LABS:  CBC Full  -  ( 01 Jun 2019 00:25 )  WBC Count : 15.6 K/uL  RBC Count : 3.47 M/uL  Hemoglobin : 10.3 g/dL  Hematocrit : 30.2 %  Platelet Count - Automated : 140 K/uL  Mean Cell Volume : 87.2 fl  Mean Cell Hemoglobin : 29.8 pg  Mean Cell Hemoglobin Concentration : 34.2 gm/dL  Auto Neutrophil # : x  Auto Lymphocyte # : x  Auto Monocyte # : x  Auto Eosinophil # : x  Auto Basophil # : x  Auto Neutrophil % : x  Auto Lymphocyte % : x  Auto Monocyte % : x  Auto Eosinophil % : x  Auto Basophil % : x    06-01    143  |  104  |  22  ----------------------------<  168<H>  4.8   |  21<L>  |  2.20<H>    Ca    8.7      01 Jun 2019 00:25  Phos  4.7     06-01  Mg     3.1     06-01    TPro  5.1<L>  /  Alb  3.5  /  TBili  0.6  /  DBili  x   /  AST  36  /  ALT  16  /  AlkPhos  57  06-01    PT/INR - ( 31 May 2019 14:14 )   PT: 14.4 sec;   INR: 1.25 ratio         PTT - ( 31 May 2019 14:14 )  PTT:50.5 sec          Impression:  * Mild INA -- suspect hemodynamic pre-renal azotemia  * CKD3. Cr 1.8-2 baseline.   * AV endocarditis. POD #1 bio AVR, MVR, LIMA to LAD CABG    Recommendations:   * Cont to monitor Cr trend  * Diuretics PRN clinical congestion.   * Avoid hypotension  * No ACE-I at this time.

## 2019-06-02 LAB
-  AMPICILLIN: SIGNIFICANT CHANGE UP
-  GENTAMICIN SYNERGY: SIGNIFICANT CHANGE UP
-  VANCOMYCIN: SIGNIFICANT CHANGE UP
ALBUMIN SERPL ELPH-MCNC: 3.9 G/DL — SIGNIFICANT CHANGE UP (ref 3.3–5)
ALP SERPL-CCNC: 58 U/L — SIGNIFICANT CHANGE UP (ref 40–120)
ALT FLD-CCNC: 15 U/L — SIGNIFICANT CHANGE UP (ref 10–45)
ANION GAP SERPL CALC-SCNC: 15 MMOL/L — SIGNIFICANT CHANGE UP (ref 5–17)
AST SERPL-CCNC: 26 U/L — SIGNIFICANT CHANGE UP (ref 10–40)
BASE EXCESS BLDV CALC-SCNC: -0.3 MMOL/L — SIGNIFICANT CHANGE UP (ref -2–2)
BILIRUB SERPL-MCNC: 0.2 MG/DL — SIGNIFICANT CHANGE UP (ref 0.2–1.2)
BUN SERPL-MCNC: 26 MG/DL — HIGH (ref 7–23)
CALCIUM SERPL-MCNC: 8.7 MG/DL — SIGNIFICANT CHANGE UP (ref 8.4–10.5)
CHLORIDE SERPL-SCNC: 99 MMOL/L — SIGNIFICANT CHANGE UP (ref 96–108)
CO2 BLDV-SCNC: 25 MMOL/L — SIGNIFICANT CHANGE UP (ref 22–30)
CO2 SERPL-SCNC: 22 MMOL/L — SIGNIFICANT CHANGE UP (ref 22–31)
CREAT SERPL-MCNC: 2.15 MG/DL — HIGH (ref 0.5–1.3)
CULTURE RESULTS: SIGNIFICANT CHANGE UP
GAS PNL BLDA: SIGNIFICANT CHANGE UP
GLUCOSE BLDC GLUCOMTR-MCNC: 113 MG/DL — HIGH (ref 70–99)
GLUCOSE BLDC GLUCOMTR-MCNC: 121 MG/DL — HIGH (ref 70–99)
GLUCOSE BLDC GLUCOMTR-MCNC: 133 MG/DL — HIGH (ref 70–99)
GLUCOSE BLDC GLUCOMTR-MCNC: 157 MG/DL — HIGH (ref 70–99)
GLUCOSE SERPL-MCNC: 136 MG/DL — HIGH (ref 70–99)
HCO3 BLDV-SCNC: 24 MMOL/L — SIGNIFICANT CHANGE UP (ref 21–29)
HCT VFR BLD CALC: 27 % — LOW (ref 34.5–45)
HGB BLD-MCNC: 9 G/DL — LOW (ref 11.5–15.5)
HOROWITZ INDEX BLDV+IHG-RTO: 44 — SIGNIFICANT CHANGE UP
MAGNESIUM SERPL-MCNC: 2.6 MG/DL — SIGNIFICANT CHANGE UP (ref 1.6–2.6)
MCHC RBC-ENTMCNC: 29.1 PG — SIGNIFICANT CHANGE UP (ref 27–34)
MCHC RBC-ENTMCNC: 33.2 GM/DL — SIGNIFICANT CHANGE UP (ref 32–36)
MCV RBC AUTO: 87.7 FL — SIGNIFICANT CHANGE UP (ref 80–100)
METHOD TYPE: SIGNIFICANT CHANGE UP
ORGANISM # SPEC MICROSCOPIC CNT: SIGNIFICANT CHANGE UP
ORGANISM # SPEC MICROSCOPIC CNT: SIGNIFICANT CHANGE UP
PCO2 BLDV: 40 MMHG — SIGNIFICANT CHANGE UP (ref 35–50)
PH BLDV: 7.39 — SIGNIFICANT CHANGE UP (ref 7.35–7.45)
PHOSPHATE SERPL-MCNC: 5 MG/DL — HIGH (ref 2.5–4.5)
PLATELET # BLD AUTO: 123 K/UL — LOW (ref 150–400)
PO2 BLDV: 46 MMHG — HIGH (ref 25–45)
POTASSIUM SERPL-MCNC: 4.5 MMOL/L — SIGNIFICANT CHANGE UP (ref 3.5–5.3)
POTASSIUM SERPL-SCNC: 4.5 MMOL/L — SIGNIFICANT CHANGE UP (ref 3.5–5.3)
PROT SERPL-MCNC: 5.7 G/DL — LOW (ref 6–8.3)
RBC # BLD: 3.08 M/UL — LOW (ref 3.8–5.2)
RBC # FLD: 15.4 % — HIGH (ref 10.3–14.5)
SAO2 % BLDV: 79 % — SIGNIFICANT CHANGE UP (ref 67–88)
SODIUM SERPL-SCNC: 136 MMOL/L — SIGNIFICANT CHANGE UP (ref 135–145)
SPECIMEN SOURCE: SIGNIFICANT CHANGE UP
WBC # BLD: 19.9 K/UL — HIGH (ref 3.8–10.5)
WBC # FLD AUTO: 19.9 K/UL — HIGH (ref 3.8–10.5)

## 2019-06-02 PROCEDURE — 99291 CRITICAL CARE FIRST HOUR: CPT

## 2019-06-02 PROCEDURE — 71045 X-RAY EXAM CHEST 1 VIEW: CPT | Mod: 26

## 2019-06-02 RX ORDER — HYDRALAZINE HCL 50 MG
10 TABLET ORAL ONCE
Refills: 0 | Status: COMPLETED | OUTPATIENT
Start: 2019-06-02 | End: 2019-06-02

## 2019-06-02 RX ORDER — HYDRALAZINE HCL 50 MG
5 TABLET ORAL ONCE
Refills: 0 | Status: COMPLETED | OUTPATIENT
Start: 2019-06-02 | End: 2019-06-02

## 2019-06-02 RX ORDER — CLOPIDOGREL BISULFATE 75 MG/1
75 TABLET, FILM COATED ORAL DAILY
Refills: 0 | Status: DISCONTINUED | OUTPATIENT
Start: 2019-06-02 | End: 2019-06-07

## 2019-06-02 RX ORDER — AMLODIPINE BESYLATE 2.5 MG/1
10 TABLET ORAL DAILY
Refills: 0 | Status: DISCONTINUED | OUTPATIENT
Start: 2019-06-02 | End: 2019-06-07

## 2019-06-02 RX ORDER — LABETALOL HCL 100 MG
10 TABLET ORAL ONCE
Refills: 0 | Status: COMPLETED | OUTPATIENT
Start: 2019-06-02 | End: 2019-06-02

## 2019-06-02 RX ORDER — METOPROLOL TARTRATE 50 MG
25 TABLET ORAL EVERY 12 HOURS
Refills: 0 | Status: DISCONTINUED | OUTPATIENT
Start: 2019-06-02 | End: 2019-06-07

## 2019-06-02 RX ADMIN — Medication 650 MILLIGRAM(S): at 03:25

## 2019-06-02 RX ADMIN — HEPARIN SODIUM 5000 UNIT(S): 5000 INJECTION INTRAVENOUS; SUBCUTANEOUS at 05:43

## 2019-06-02 RX ADMIN — Medication 216 GRAM(S): at 02:40

## 2019-06-02 RX ADMIN — Medication 10 MILLIGRAM(S): at 17:46

## 2019-06-02 RX ADMIN — HEPARIN SODIUM 5000 UNIT(S): 5000 INJECTION INTRAVENOUS; SUBCUTANEOUS at 21:38

## 2019-06-02 RX ADMIN — FAMOTIDINE 20 MILLIGRAM(S): 10 INJECTION INTRAVENOUS at 12:20

## 2019-06-02 RX ADMIN — Medication 650 MILLIGRAM(S): at 21:38

## 2019-06-02 RX ADMIN — Medication 650 MILLIGRAM(S): at 02:40

## 2019-06-02 RX ADMIN — CLOPIDOGREL BISULFATE 75 MILLIGRAM(S): 75 TABLET, FILM COATED ORAL at 12:20

## 2019-06-02 RX ADMIN — CHLORHEXIDINE GLUCONATE 1 APPLICATION(S): 213 SOLUTION TOPICAL at 05:35

## 2019-06-02 RX ADMIN — Medication 650 MILLIGRAM(S): at 06:30

## 2019-06-02 RX ADMIN — Medication 650 MILLIGRAM(S): at 17:46

## 2019-06-02 RX ADMIN — Medication 1: at 13:07

## 2019-06-02 RX ADMIN — HEPARIN SODIUM 5000 UNIT(S): 5000 INJECTION INTRAVENOUS; SUBCUTANEOUS at 14:10

## 2019-06-02 RX ADMIN — Medication 650 MILLIGRAM(S): at 09:04

## 2019-06-02 RX ADMIN — Medication 216 GRAM(S): at 14:10

## 2019-06-02 RX ADMIN — Medication 650 MILLIGRAM(S): at 22:30

## 2019-06-02 RX ADMIN — Medication 216 GRAM(S): at 21:38

## 2019-06-02 RX ADMIN — Medication 25 MILLIGRAM(S): at 15:43

## 2019-06-02 RX ADMIN — Medication 325 MILLIGRAM(S): at 12:20

## 2019-06-02 RX ADMIN — MUPIROCIN 1 APPLICATION(S): 20 OINTMENT TOPICAL at 17:47

## 2019-06-02 RX ADMIN — Medication 10 MILLIGRAM(S): at 09:54

## 2019-06-02 RX ADMIN — HYDROMORPHONE HYDROCHLORIDE 0.5 MILLIGRAM(S): 2 INJECTION INTRAMUSCULAR; INTRAVENOUS; SUBCUTANEOUS at 10:30

## 2019-06-02 RX ADMIN — CEFTRIAXONE 100 GRAM(S): 500 INJECTION, POWDER, FOR SOLUTION INTRAMUSCULAR; INTRAVENOUS at 17:46

## 2019-06-02 RX ADMIN — POLYETHYLENE GLYCOL 3350 17 GRAM(S): 17 POWDER, FOR SOLUTION ORAL at 12:20

## 2019-06-02 RX ADMIN — HYDROMORPHONE HYDROCHLORIDE 0.5 MILLIGRAM(S): 2 INJECTION INTRAMUSCULAR; INTRAVENOUS; SUBCUTANEOUS at 21:39

## 2019-06-02 RX ADMIN — Medication 10 MILLIGRAM(S): at 05:46

## 2019-06-02 RX ADMIN — Medication 650 MILLIGRAM(S): at 18:30

## 2019-06-02 RX ADMIN — Medication 650 MILLIGRAM(S): at 15:00

## 2019-06-02 RX ADMIN — Medication 216 GRAM(S): at 07:45

## 2019-06-02 RX ADMIN — Medication 5 MILLIGRAM(S): at 21:52

## 2019-06-02 RX ADMIN — OXYCODONE HYDROCHLORIDE 10 MILLIGRAM(S): 5 TABLET ORAL at 12:20

## 2019-06-02 RX ADMIN — Medication 650 MILLIGRAM(S): at 09:30

## 2019-06-02 RX ADMIN — HYDROMORPHONE HYDROCHLORIDE 0.5 MILLIGRAM(S): 2 INJECTION INTRAMUSCULAR; INTRAVENOUS; SUBCUTANEOUS at 22:00

## 2019-06-02 RX ADMIN — MUPIROCIN 1 APPLICATION(S): 20 OINTMENT TOPICAL at 05:48

## 2019-06-02 RX ADMIN — CEFTRIAXONE 100 GRAM(S): 500 INJECTION, POWDER, FOR SOLUTION INTRAMUSCULAR; INTRAVENOUS at 05:44

## 2019-06-02 RX ADMIN — Medication 650 MILLIGRAM(S): at 14:12

## 2019-06-02 RX ADMIN — Medication 650 MILLIGRAM(S): at 05:43

## 2019-06-02 RX ADMIN — OXYCODONE HYDROCHLORIDE 10 MILLIGRAM(S): 5 TABLET ORAL at 13:00

## 2019-06-02 RX ADMIN — HYDROMORPHONE HYDROCHLORIDE 0.5 MILLIGRAM(S): 2 INJECTION INTRAMUSCULAR; INTRAVENOUS; SUBCUTANEOUS at 10:09

## 2019-06-02 NOTE — PROGRESS NOTE ADULT - ASSESSMENT
63y Female with HTN, CKD, PAD s/p right femoral endarterectomy, depression, and smoker (quit 5 PTA) presented to the ED c/o SOB  found to have Gram + sepsis with now signs of endocarditis, s/p OR for bio AVR, MV repair, and CABG with LIMA to LAD     - Tolerated surgery well for endocarditis involving the aortic and mitral valves.  s/p CABG with LIMA to LAD  - Off high flow.  Continue NC as needed  - Levo off.   at 2.5.  This should be able to be discontinued today  - Abx per ID  - Continue aspirin  - Start statin drug  - BB when able  - no meaningful vol ol at present    - Please continue to maintain strict I/Os, monitor daily weights, Cr, and K.   - Monitor H/H.  Transfuse as needed.  H/H has been stable  - Creatinine stable  - Will continue to follow.  Critically ill with high risk of decompensation.  Time greater than 35 minutes

## 2019-06-02 NOTE — PROGRESS NOTE ADULT - SUBJECTIVE AND OBJECTIVE BOX
Subjective: no dyspnea. To be moved to RNF.       MEDICATIONS  (STANDING):  acetaminophen   Tablet .. 650 milliGRAM(s) Oral every 4 hours  ampicillin  IVPB 2 Gram(s) IV Intermittent every 6 hours  aspirin 325 milliGRAM(s) Oral daily  cefTRIAXone   IVPB 2 Gram(s) IV Intermittent every 12 hours  chlorhexidine 2% Cloths 1 Application(s) Topical <User Schedule>  clopidogrel Tablet 75 milliGRAM(s) Oral daily  dextrose 50% Injectable 50 milliLiter(s) IV Push every 15 minutes  dextrose 50% Injectable 25 milliLiter(s) IV Push every 15 minutes  famotidine    Tablet 20 milliGRAM(s) Oral daily  heparin  Injectable 5000 Unit(s) SubCutaneous every 8 hours  insulin lispro (HumaLOG) corrective regimen sliding scale   SubCutaneous three times a day before meals  insulin lispro (HumaLOG) corrective regimen sliding scale   SubCutaneous at bedtime  metoprolol tartrate 25 milliGRAM(s) Oral every 12 hours  mupirocin 2% Ointment 1 Application(s) Topical two times a day  polyethylene glycol 3350 17 Gram(s) Oral daily  sodium chloride 0.9%. 1000 milliLiter(s) (10 mL/Hr) IV Continuous <Continuous>    MEDICATIONS  (PRN):  HYDROmorphone  Injectable 0.5 milliGRAM(s) IV Push every 6 hours PRN Severe Pain (7 - 10)  oxyCODONE    IR 10 milliGRAM(s) Oral every 6 hours PRN Severe Pain (7 - 10)  oxyCODONE    IR 5 milliGRAM(s) Oral every 6 hours PRN Moderate Pain (4 - 6)          T(C): 35.9 (06-02-19 @ 12:00), Max: 36.2 (06-01-19 @ 20:00)  HR: 80 (06-02-19 @ 16:00) (76 - 91)  BP: 147/78 (06-02-19 @ 16:00) (114/63 - 155/76)  RR: 18 (06-02-19 @ 16:00) (8 - 33)  SpO2: 100% (06-02-19 @ 16:00) (91% - 100%)  Wt(kg): --    ABG - ( 02 Jun 2019 09:38 )  pH, Arterial: 7.42  pH, Blood: x     /  pCO2: 36    /  pO2: 119   / HCO3: 23    / Base Excess: -.9   /  SaO2: 99                  I&O's Detail    01 Jun 2019 07:01  -  02 Jun 2019 07:00  --------------------------------------------------------  IN:    Albumin 5%  - 250 mL: 500 mL    DOBUTamine Infusion: 86.4 mL    insulin regular Infusion: 1 mL    Oral Fluid: 720 mL    sodium chloride 0.9%.: 240 mL    Solution: 300 mL    Solution: 50 mL  Total IN: 1897.4 mL    OUT:    Chest Tube: 290 mL    Chest Tube: 360 mL    Indwelling Catheter - Urethral: 1415 mL  Total OUT: 2065 mL    Total NET: -167.6 mL      02 Jun 2019 07:01  -  02 Jun 2019 16:44  --------------------------------------------------------  IN:    Oral Fluid: 480 mL    sodium chloride 0.9%.: 90 mL    Solution: 200 mL  Total IN: 770 mL    OUT:    Chest Tube: 40 mL    Chest Tube: 160 mL    Indwelling Catheter - Urethral: 380 mL  Total OUT: 580 mL    Total NET: 190 mL               PHYSICAL EXAM:    GENERAL: no dyspnea.   EYES: EOMI, PERRLA, conjunctiva and sclera clear  NECK: Supple, no inc in JVP  CHEST/LUNG: poor effort. No crackles  HEART: S1S2  ABDOMEN: Soft, Nontender, Nondistended; Bowel sounds present  EXTREMITIES:  min edema  NEURO: no asterixis        LABS:  CBC Full  -  ( 02 Jun 2019 02:20 )  WBC Count : 19.9 K/uL  RBC Count : 3.08 M/uL  Hemoglobin : 9.0 g/dL  Hematocrit : 27.0 %  Platelet Count - Automated : 123 K/uL  Mean Cell Volume : 87.7 fl  Mean Cell Hemoglobin : 29.1 pg  Mean Cell Hemoglobin Concentration : 33.2 gm/dL  Auto Neutrophil # : x  Auto Lymphocyte # : x  Auto Monocyte # : x  Auto Eosinophil # : x  Auto Basophil # : x  Auto Neutrophil % : x  Auto Lymphocyte % : x  Auto Monocyte % : x  Auto Eosinophil % : x  Auto Basophil % : x    06-02    136  |  99  |  26<H>  ----------------------------<  136<H>  4.5   |  22  |  2.15<H>    Ca    8.7      02 Jun 2019 02:20  Phos  5.0     06-02  Mg     2.6     06-02    TPro  5.7<L>  /  Alb  3.9  /  TBili  0.2  /  DBili  x   /  AST  26  /  ALT  15  /  AlkPhos  58  06-02        Culture Results:   No growth to date. (06-01 @ 00:21)        Impression:  * Mild INA -- suspect hemodynamic pre-renal azotemia. Cr stable  * CKD3. Cr 1.8-2 baseline.   * AV endocarditis. POD #2 bio AVR, MVR, LIMA to LAD CABG    Recommendations:   * Cont to monitor Cr trend  * Diuretics PRN clinical congestion.   * Avoid hypotension  * No ACE-I at this time.

## 2019-06-02 NOTE — PROGRESS NOTE ADULT - SUBJECTIVE AND OBJECTIVE BOX
[facility] - Chao Lomax, Kunal, Regino, [place], Elva Islas  Office Number:  [phone]    Patient resting comfortably in bed in NAD.  Laying flat with no respiratory distress.  No complaints of chest pain, dyspnea, palpitations, PND, or orthopnea.    F/U for:  CAD, endocarditis    Telemetry:  NSR    MEDICATIONS  (STANDING):  acetaminophen   Tablet .. 650 milliGRAM(s) Oral every 4 hours  ampicillin  IVPB 2 Gram(s) IV Intermittent every 6 hours  aspirin 325 milliGRAM(s) Oral daily  cefTRIAXone   IVPB 2 Gram(s) IV Intermittent every 12 hours  chlorhexidine 2% Cloths 1 Application(s) Topical <User Schedule>  dextrose 50% Injectable 50 milliLiter(s) IV Push every 15 minutes  dextrose 50% Injectable 25 milliLiter(s) IV Push every 15 minutes  DOBUTamine Infusion 2.5 MICROgram(s)/kG/Min (3.607 mL/Hr) IV Continuous <Continuous>  famotidine    Tablet 20 milliGRAM(s) Oral daily  heparin  Injectable 5000 Unit(s) SubCutaneous every 8 hours  insulin lispro (HumaLOG) corrective regimen sliding scale   SubCutaneous three times a day before meals  insulin lispro (HumaLOG) corrective regimen sliding scale   SubCutaneous at bedtime  mupirocin 2% Ointment 1 Application(s) Topical two times a day  polyethylene glycol 3350 17 Gram(s) Oral daily  sodium chloride 0.9%. 1000 milliLiter(s) (10 mL/Hr) IV Continuous <Continuous>    MEDICATIONS  (PRN):  HYDROmorphone  Injectable 0.5 milliGRAM(s) IV Push every 6 hours PRN Severe Pain (7 - 10)  oxyCODONE    IR 10 milliGRAM(s) Oral every 6 hours PRN Severe Pain (7 - 10)  oxyCODONE    IR 5 milliGRAM(s) Oral every 6 hours PRN Moderate Pain (4 - 6)      Allergies    No Known Allergies          Vital Signs Last 24 Hrs  T(C): 35.9 (02 Jun 2019 04:00), Max: 36.3 (01 Jun 2019 16:00)  T(F): 96.6 (02 Jun 2019 04:00), Max: 97.3 (01 Jun 2019 16:00)  HR: 89 (02 Jun 2019 07:00) (70 - 90)  BP: 129/72 (02 Jun 2019 07:00) (129/72 - 155/76)  BP(mean): 95 (02 Jun 2019 07:00) (89 - 108)  RR: 17 (02 Jun 2019 07:00) (8 - 33)  SpO2: 99% (02 Jun 2019 07:00) (91% - 100%)    I&O's Summary    01 Jun 2019 07:01  -  02 Jun 2019 07:00  --------------------------------------------------------  IN: 1897.4 mL / OUT: 2065 mL / NET: -167.6 mL        ON EXAM:  General: NAD, awake and alert, oriented x 3  HEENT: Mucous membranes are dry, anicteric. Right TLC clean, dry, and intact  Lungs: Non-labored, breath sounds are clear bilaterally, No wheezing, rales or rhonchi  Cardiovascular: Regular, S1 and S2, 1/6 systolic murmur  Gastrointestinal: Bowel Sounds present, soft, nontender.   Lymph: No peripheral edema. No lymphadenopathy.  Skin: No rashes or ulcers  Psych:  Mood & affect appropriate    LABS: All Labs Reviewed:                        9.0    19.9  )-----------( 123      ( 02 Jun 2019 02:20 )             27.0                         10.3   15.6  )-----------( 140      ( 01 Jun 2019 00:25 )             30.2                         9.7    22.2  )-----------( 158      ( 31 May 2019 14:14 )             29.3     02 Jun 2019 02:20    136    |  99     |  26     ----------------------------<  136    4.5     |  22     |  2.15   01 Jun 2019 00:25    143    |  104    |  22     ----------------------------<  168    4.8     |  21     |  2.20   31 May 2019 14:14    142    |  105    |  17     ----------------------------<  125    5.1     |  21     |  1.73     Ca    8.7        02 Jun 2019 02:20  Ca    8.7        01 Jun 2019 00:25  Ca    8.6        31 May 2019 14:14  Phos  5.0       02 Jun 2019 02:20  Phos  4.7       01 Jun 2019 00:25  Phos  4.6       31 May 2019 14:14  Mg     2.6       02 Jun 2019 02:20  Mg     3.1       01 Jun 2019 00:25  Mg     3.6       31 May 2019 14:14    TPro  5.7    /  Alb  3.9    /  TBili  0.2    /  DBili  x      /  AST  26     /  ALT  15     /  AlkPhos  58     02 Jun 2019 02:20  TPro  5.1    /  Alb  3.5    /  TBili  0.6    /  DBili  x      /  AST  36     /  ALT  16     /  AlkPhos  57     01 Jun 2019 00:25  TPro  3.7    /  Alb  2.4    /  TBili  0.9    /  DBili  x      /  AST  28     /  ALT  15     /  AlkPhos  52     31 May 2019 14:14    PT/INR - ( 31 May 2019 14:14 )   PT: 14.4 sec;   INR: 1.25 ratio         PTT - ( 31 May 2019 14:14 )  PTT:50.5 sec  CARDIAC MARKERS ( 31 May 2019 14:14 )  x     / x     / 180 U/L / x     / 34.9 ng/mL      Blood Culture: Organism --  Gram Stain Blood -- Gram Stain   **Please Note**: This is a Corrected Report**  No polymorphonuclear cells seen per low power field  Numerous Gram Variable Cocci seen per oil power field  Previously reported as:  No polymorphonuclear cells seen per low power field  No organisms seen per oil power field  Specimen Source .Tissue  Culture-Blood --    Organism --  Gram Stain Blood -- Gram Stain --  Specimen Source .Blood  Culture-Blood --    Organism --  Gram Stain Blood -- Gram Stain   Growth in anaerobic bottle: Gram Positive Cocci in Pairs and Chains  Specimen Source .Blood  Culture-Blood --

## 2019-06-02 NOTE — PROGRESS NOTE ADULT - SUBJECTIVE AND OBJECTIVE BOX
VIVIANA CHAPMAN   MRN#: [id number]     The patient is a 63y Female W PMH HTN CKD PAD s/p femoral  endarterectomy,  Plavix ,depression , and smoker ( quit  4/2019 ) endorsed 10 lb weight loss reports increasing SOB, s/p C1L/AVR/MV repair. Pt was seen, evaluated, & examined with the CTICU staff on rounds and later in the day with a multidisciplinary care plan formulated & implemented.  All available clinical, laboratory, radiographic, pharmacologic, and electrocardiographic data were reviewed & analyzed.      The patient was in the CTICU in critical condition secondary to persistent cardiopulmonary dysfunction-hypoxemia, resolved hemodynamically significant hypovolemia-shock, stress hyperglycemia, and Enterococcus endocarditis.      Respiratory status required high flow nasal oxygen, the following of ABG’s with A-line monitoring, and continuous pulse oximetry monitoring for support & to evaluate for & prevent further decompensation secondary to persistent cardiopulmonary dysfunction and hypoxemia     Invasive hemodynamic monitoring with an A-line was required for the following of continuous MAP/BP monitoring to ensure adequate cardiovascular support and to evaluate for & help prevent decompensation while receiving an IV Dobutamine drip and IV antibiotics secondary to persistent cardiovascular dysfunction and Enterococcus endocarditis.       Metabolic stability, stress hyperglycemia, & infection prophylaxis required an IV regular Insulin drip & the following of serial glucose levels to help achieve & maintain euglycemia.      Patient required critical care management and I provided 35 minutes of non-continuous care to the patient.  Discussed at length with the CTICU staff and helped coordinate care.

## 2019-06-03 DIAGNOSIS — I25.10 ATHEROSCLEROTIC HEART DISEASE OF NATIVE CORONARY ARTERY WITHOUT ANGINA PECTORIS: ICD-10-CM

## 2019-06-03 LAB
ALBUMIN SERPL ELPH-MCNC: 3.4 G/DL — SIGNIFICANT CHANGE UP (ref 3.3–5)
ALP SERPL-CCNC: 77 U/L — SIGNIFICANT CHANGE UP (ref 40–120)
ALT FLD-CCNC: 18 U/L — SIGNIFICANT CHANGE UP (ref 10–45)
ANION GAP SERPL CALC-SCNC: 15 MMOL/L — SIGNIFICANT CHANGE UP (ref 5–17)
AST SERPL-CCNC: 24 U/L — SIGNIFICANT CHANGE UP (ref 10–40)
BASOPHILS # BLD AUTO: 0 K/UL — SIGNIFICANT CHANGE UP (ref 0–0.2)
BASOPHILS NFR BLD AUTO: 0 % — SIGNIFICANT CHANGE UP (ref 0–2)
BILIRUB SERPL-MCNC: 0.1 MG/DL — LOW (ref 0.2–1.2)
BUN SERPL-MCNC: 31 MG/DL — HIGH (ref 7–23)
CALCIUM SERPL-MCNC: 8.4 MG/DL — SIGNIFICANT CHANGE UP (ref 8.4–10.5)
CHLORIDE SERPL-SCNC: 99 MMOL/L — SIGNIFICANT CHANGE UP (ref 96–108)
CO2 SERPL-SCNC: 21 MMOL/L — LOW (ref 22–31)
CREAT SERPL-MCNC: 1.95 MG/DL — HIGH (ref 0.5–1.3)
CULTURE RESULTS: SIGNIFICANT CHANGE UP
EOSINOPHIL # BLD AUTO: 0 K/UL — SIGNIFICANT CHANGE UP (ref 0–0.5)
EOSINOPHIL NFR BLD AUTO: 0 % — SIGNIFICANT CHANGE UP (ref 0–6)
GLUCOSE SERPL-MCNC: 113 MG/DL — HIGH (ref 70–99)
HCT VFR BLD CALC: 27.8 % — LOW (ref 34.5–45)
HGB BLD-MCNC: 9.4 G/DL — LOW (ref 11.5–15.5)
LYMPHOCYTES # BLD AUTO: 0.4 K/UL — LOW (ref 1–3.3)
LYMPHOCYTES # BLD AUTO: 2.5 % — LOW (ref 13–44)
MCHC RBC-ENTMCNC: 30.3 PG — SIGNIFICANT CHANGE UP (ref 27–34)
MCHC RBC-ENTMCNC: 33.9 GM/DL — SIGNIFICANT CHANGE UP (ref 32–36)
MCV RBC AUTO: 89.4 FL — SIGNIFICANT CHANGE UP (ref 80–100)
MONOCYTES # BLD AUTO: 0.4 K/UL — SIGNIFICANT CHANGE UP (ref 0–0.9)
MONOCYTES NFR BLD AUTO: 2.2 % — SIGNIFICANT CHANGE UP (ref 2–14)
NEUTROPHILS # BLD AUTO: 16.4 K/UL — HIGH (ref 1.8–7.4)
NEUTROPHILS NFR BLD AUTO: 95.3 % — HIGH (ref 43–77)
PLATELET # BLD AUTO: 138 K/UL — LOW (ref 150–400)
POTASSIUM SERPL-MCNC: 4.4 MMOL/L — SIGNIFICANT CHANGE UP (ref 3.5–5.3)
POTASSIUM SERPL-SCNC: 4.4 MMOL/L — SIGNIFICANT CHANGE UP (ref 3.5–5.3)
PROT SERPL-MCNC: 5.6 G/DL — LOW (ref 6–8.3)
RBC # BLD: 3.11 M/UL — LOW (ref 3.8–5.2)
RBC # FLD: 15.4 % — HIGH (ref 10.3–14.5)
SODIUM SERPL-SCNC: 135 MMOL/L — SIGNIFICANT CHANGE UP (ref 135–145)
SPECIMEN SOURCE: SIGNIFICANT CHANGE UP
WBC # BLD: 17.2 K/UL — HIGH (ref 3.8–10.5)
WBC # FLD AUTO: 17.2 K/UL — HIGH (ref 3.8–10.5)

## 2019-06-03 PROCEDURE — 99232 SBSQ HOSP IP/OBS MODERATE 35: CPT

## 2019-06-03 PROCEDURE — 99233 SBSQ HOSP IP/OBS HIGH 50: CPT

## 2019-06-03 PROCEDURE — 71045 X-RAY EXAM CHEST 1 VIEW: CPT | Mod: 26

## 2019-06-03 RX ORDER — FUROSEMIDE 40 MG
40 TABLET ORAL EVERY 12 HOURS
Refills: 0 | Status: DISCONTINUED | OUTPATIENT
Start: 2019-06-03 | End: 2019-06-03

## 2019-06-03 RX ORDER — BUPROPION HYDROCHLORIDE 150 MG/1
150 TABLET, EXTENDED RELEASE ORAL DAILY
Refills: 0 | Status: DISCONTINUED | OUTPATIENT
Start: 2019-06-03 | End: 2019-06-07

## 2019-06-03 RX ORDER — ATORVASTATIN CALCIUM 80 MG/1
20 TABLET, FILM COATED ORAL AT BEDTIME
Refills: 0 | Status: DISCONTINUED | OUTPATIENT
Start: 2019-06-03 | End: 2019-06-07

## 2019-06-03 RX ORDER — HYDRALAZINE HCL 50 MG
10 TABLET ORAL ONCE
Refills: 0 | Status: COMPLETED | OUTPATIENT
Start: 2019-06-03 | End: 2019-06-03

## 2019-06-03 RX ORDER — SODIUM CHLORIDE 9 MG/ML
3 INJECTION INTRAMUSCULAR; INTRAVENOUS; SUBCUTANEOUS EVERY 8 HOURS
Refills: 0 | Status: DISCONTINUED | OUTPATIENT
Start: 2019-06-03 | End: 2019-06-07

## 2019-06-03 RX ORDER — FUROSEMIDE 40 MG
40 TABLET ORAL EVERY 12 HOURS
Refills: 0 | Status: DISCONTINUED | OUTPATIENT
Start: 2019-06-03 | End: 2019-06-04

## 2019-06-03 RX ORDER — BUPROPION HYDROCHLORIDE 150 MG/1
150 TABLET, EXTENDED RELEASE ORAL DAILY
Refills: 0 | Status: DISCONTINUED | OUTPATIENT
Start: 2019-06-03 | End: 2019-06-03

## 2019-06-03 RX ORDER — PRAMIPEXOLE DIHYDROCHLORIDE 0.12 MG/1
0.5 TABLET ORAL AT BEDTIME
Refills: 0 | Status: DISCONTINUED | OUTPATIENT
Start: 2019-06-03 | End: 2019-06-07

## 2019-06-03 RX ADMIN — Medication 325 MILLIGRAM(S): at 11:35

## 2019-06-03 RX ADMIN — CLOPIDOGREL BISULFATE 75 MILLIGRAM(S): 75 TABLET, FILM COATED ORAL at 11:35

## 2019-06-03 RX ADMIN — CEFTRIAXONE 100 GRAM(S): 500 INJECTION, POWDER, FOR SOLUTION INTRAMUSCULAR; INTRAVENOUS at 05:46

## 2019-06-03 RX ADMIN — Medication 650 MILLIGRAM(S): at 06:15

## 2019-06-03 RX ADMIN — Medication 40 MILLIGRAM(S): at 17:24

## 2019-06-03 RX ADMIN — OXYCODONE HYDROCHLORIDE 10 MILLIGRAM(S): 5 TABLET ORAL at 16:00

## 2019-06-03 RX ADMIN — MUPIROCIN 1 APPLICATION(S): 20 OINTMENT TOPICAL at 05:48

## 2019-06-03 RX ADMIN — OXYCODONE HYDROCHLORIDE 10 MILLIGRAM(S): 5 TABLET ORAL at 15:30

## 2019-06-03 RX ADMIN — CHLORHEXIDINE GLUCONATE 1 APPLICATION(S): 213 SOLUTION TOPICAL at 05:46

## 2019-06-03 RX ADMIN — Medication 25 MILLIGRAM(S): at 05:47

## 2019-06-03 RX ADMIN — SODIUM CHLORIDE 3 MILLILITER(S): 9 INJECTION INTRAMUSCULAR; INTRAVENOUS; SUBCUTANEOUS at 13:15

## 2019-06-03 RX ADMIN — Medication 1 TABLET(S): at 17:24

## 2019-06-03 RX ADMIN — Medication 216 GRAM(S): at 14:00

## 2019-06-03 RX ADMIN — CEFTRIAXONE 100 GRAM(S): 500 INJECTION, POWDER, FOR SOLUTION INTRAMUSCULAR; INTRAVENOUS at 17:24

## 2019-06-03 RX ADMIN — OXYCODONE HYDROCHLORIDE 10 MILLIGRAM(S): 5 TABLET ORAL at 22:08

## 2019-06-03 RX ADMIN — HEPARIN SODIUM 5000 UNIT(S): 5000 INJECTION INTRAVENOUS; SUBCUTANEOUS at 05:48

## 2019-06-03 RX ADMIN — FAMOTIDINE 20 MILLIGRAM(S): 10 INJECTION INTRAVENOUS at 11:36

## 2019-06-03 RX ADMIN — OXYCODONE HYDROCHLORIDE 5 MILLIGRAM(S): 5 TABLET ORAL at 10:20

## 2019-06-03 RX ADMIN — Medication 650 MILLIGRAM(S): at 02:30

## 2019-06-03 RX ADMIN — OXYCODONE HYDROCHLORIDE 5 MILLIGRAM(S): 5 TABLET ORAL at 09:50

## 2019-06-03 RX ADMIN — Medication 216 GRAM(S): at 20:11

## 2019-06-03 RX ADMIN — BUPROPION HYDROCHLORIDE 150 MILLIGRAM(S): 150 TABLET, EXTENDED RELEASE ORAL at 14:00

## 2019-06-03 RX ADMIN — Medication 650 MILLIGRAM(S): at 01:50

## 2019-06-03 RX ADMIN — HEPARIN SODIUM 5000 UNIT(S): 5000 INJECTION INTRAVENOUS; SUBCUTANEOUS at 22:08

## 2019-06-03 RX ADMIN — Medication 216 GRAM(S): at 01:50

## 2019-06-03 RX ADMIN — SODIUM CHLORIDE 10 MILLILITER(S): 9 INJECTION INTRAMUSCULAR; INTRAVENOUS; SUBCUTANEOUS at 01:50

## 2019-06-03 RX ADMIN — AMLODIPINE BESYLATE 10 MILLIGRAM(S): 2.5 TABLET ORAL at 00:17

## 2019-06-03 RX ADMIN — PRAMIPEXOLE DIHYDROCHLORIDE 0.5 MILLIGRAM(S): 0.12 TABLET ORAL at 22:07

## 2019-06-03 RX ADMIN — ATORVASTATIN CALCIUM 20 MILLIGRAM(S): 80 TABLET, FILM COATED ORAL at 22:07

## 2019-06-03 RX ADMIN — Medication 216 GRAM(S): at 08:59

## 2019-06-03 RX ADMIN — Medication 25 MILLIGRAM(S): at 17:24

## 2019-06-03 RX ADMIN — HEPARIN SODIUM 5000 UNIT(S): 5000 INJECTION INTRAVENOUS; SUBCUTANEOUS at 14:00

## 2019-06-03 RX ADMIN — OXYCODONE HYDROCHLORIDE 10 MILLIGRAM(S): 5 TABLET ORAL at 22:38

## 2019-06-03 RX ADMIN — Medication 650 MILLIGRAM(S): at 05:46

## 2019-06-03 RX ADMIN — OXYCODONE HYDROCHLORIDE 10 MILLIGRAM(S): 5 TABLET ORAL at 05:47

## 2019-06-03 RX ADMIN — OXYCODONE HYDROCHLORIDE 10 MILLIGRAM(S): 5 TABLET ORAL at 06:15

## 2019-06-03 RX ADMIN — SODIUM CHLORIDE 3 MILLILITER(S): 9 INJECTION INTRAMUSCULAR; INTRAVENOUS; SUBCUTANEOUS at 21:44

## 2019-06-03 RX ADMIN — Medication 40 MILLIGRAM(S): at 05:47

## 2019-06-03 NOTE — PROGRESS NOTE ADULT - ASSESSMENT
63 f   post op  5/31  C1L, AVR, MV repair   + E faecalis,  nl EF    H     CKD  cervical fusion,  smoker, PAD  lt fem endarectorectomy  post op products    ID for abx 63 f   post op  5/31  C1L, AVR, MV repair   + E faecalis,  nl EF    H     CKD  cervical fusion,  smoker, PAD  lt fem endarectorectomy  post op products    ID for abx     will need long course  abx  and PICC after BC    6/3  trf too floor  NSR   +pw   abx  and diuretics

## 2019-06-03 NOTE — PROGRESS NOTE ADULT - SUBJECTIVE AND OBJECTIVE BOX
S/p OHS 5/31, doing well, tx to floor today, awake, alert, denies complaints    Vital Signs Last 24 Hrs  T(C): 36.4 (06-03-19 @ 10:38), Max: 36.4 (06-03-19 @ 10:38)  T(F): 97.6 (06-03-19 @ 10:38), Max: 97.6 (06-03-19 @ 10:38)  HR: 78 (06-03-19 @ 10:38) (70 - 84)  BP: 160/80 (06-03-19 @ 10:38) (123/70 - 171/83)  BP(mean): 115 (06-03-19 @ 10:15) (11 - 123)  RR: 20 (06-03-19 @ 10:38) (10 - 26)  SpO2: 96% (06-03-19 @ 10:38) (94% - 100%)     CHEST/LUNG: b/l air entry  HEART: S1S2  ABDOMEN: Soft, Nondistended, NT  EXTREMITIES: no edema                                9.4    17.2  )-----------( 138      ( 03 Jun 2019 02:12 )             27.8     03 Jun 2019 02:12    135    |  99     |  31     ----------------------------<  113    4.4     |  21     |  1.95     Ca    8.4        03 Jun 2019 02:12  Phos  5.0       02 Jun 2019 02:20  Mg     2.6       02 Jun 2019 02:20    TPro  5.6    /  Alb  3.4    /  TBili  0.1    /  DBili  x      /  AST  24     /  ALT  18     /  AlkPhos  77     03 Jun 2019 02:12    LIVER FUNCTIONS - ( 03 Jun 2019 02:12 )  Alb: 3.4 g/dL / Pro: 5.6 g/dL / ALK PHOS: 77 U/L / ALT: 18 U/L / AST: 24 U/L / GGT: x           amLODIPine   Tablet 10 milliGRAM(s) Oral daily  ampicillin  IVPB 2 Gram(s) IV Intermittent every 6 hours  aspirin 325 milliGRAM(s) Oral daily  atorvastatin 20 milliGRAM(s) Oral at bedtime  buPROPion XL . 150 milliGRAM(s) Oral daily  calcium carbonate 1250 mG  + Vitamin D (OsCal 500 + D) 1 Tablet(s) Oral two times a day  cefTRIAXone   IVPB 2 Gram(s) IV Intermittent every 12 hours  chlorhexidine 2% Cloths 1 Application(s) Topical <User Schedule>  clopidogrel Tablet 75 milliGRAM(s) Oral daily  famotidine    Tablet 20 milliGRAM(s) Oral daily  furosemide    Tablet 40 milliGRAM(s) Oral every 12 hours  heparin  Injectable 5000 Unit(s) SubCutaneous every 8 hours  metoprolol tartrate 25 milliGRAM(s) Oral every 12 hours  oxyCODONE    IR 10 milliGRAM(s) Oral every 6 hours PRN  oxyCODONE    IR 5 milliGRAM(s) Oral every 6 hours PRN  polyethylene glycol 3350 17 Gram(s) Oral daily  pramipexole 0.5 milliGRAM(s) Oral at bedtime  sodium chloride 0.9% lock flush 3 milliLiter(s) IV Push every 8 hours    Impression/Plan:    Known CKD3 w/Cr 1.8-2 at baseline  AV endocarditis, severe AI  S/p CABG x 1, AVR, MV repair 5/31  Stable renal fx so far  F/u BP, BMP, UO  Abx per ID  Dose meds for CrCl < 30  Avoid nephrotoxins  Will follow

## 2019-06-03 NOTE — PROGRESS NOTE ADULT - SUBJECTIVE AND OBJECTIVE BOX
VIVIANA CHAPMAN   MRN#: [id number]     The patient is a 63y Female W PMH HTN CKD PAD s/p femoral  endarterectomy,  Plavix ,depression , and smoker ( quit  4/2019 ) endorsed 10 lb weight loss reports increasing SOB, s/p C1L/AVR/MV repair. Pt was seen, evaluated, & examined with the CTICU staff on rounds and later in the day with a multidisciplinary care plan formulated & implemented.  All available clinical, laboratory, radiographic, pharmacologic, and electrocardiographic data were reviewed & analyzed.      The patient was in the CTICU in critical condition secondary to persistent cardiopulmonary dysfunction-hypoxemia, resolved hemodynamically significant hypovolemia-shock, stress hyperglycemia, and Enterococcus endocarditis.      Respiratory status required high flow nasal oxygen, the following of ABG’s with A-line monitoring, and continuous pulse oximetry monitoring for support & to evaluate for & prevent further decompensation secondary to persistent cardiopulmonary dysfunction.     Invasive hemodynamic monitoring with an A-line was required for the following of continuous MAP/BP monitoring to ensure adequate cardiovascular support and to evaluate for & help prevent decompensation while receiving an IV Dobutamine drip and IV antibiotics secondary to persistent cardiovascular dysfunction and Enterococcus endocarditis. ID on board.     Metabolic stability, stress hyperglycemia, & infection prophylaxis required the following of serial glucose levels to help achieve & maintain euglycemia.      Patient required critical care management and I provided 30 minutes of non-continuous care to the patient.  Discussed at length with the CTICU staff and helped coordinate care.

## 2019-06-03 NOTE — CHART NOTE - NSCHARTNOTEFT_GEN_A_CORE
Nutrition Follow Up Note  Patient seen for: malnutrition follow up     Source: RN, medical record, CTU team    Chart reviewed, events noted: 63 year old female with PMH HTN, CKD, PAD s/p femoral endarterectomy, depression, and smoker ( quit 2019) Presented to [facility] ED with c/o SOB found to have B/L pleural effusions, anemia, elevated creatinine 2.8. TTE reveals mobile echodensity on AV with moderate to severe AI consistent with endocarditis. Pt now POD 3 from CABG x1, MVR, AVR. Extubated and being transferred to step down today    Diet : DASH    Patient reports: Pt seen, sitting in chair, reports feeling well. Pt states her appetite is good but has never been a big eater. Pt was amenable to verbal education regarding increased nutrient needs for wound healing and verbalized understanding. Pt often skips breakfast and lunch but does consume dinner. She denies all PO supplements and dislike yogurt and other dairy products. RD offered small snacks such as cracker with cheese vs peanut butter, almonds, raisins, however Pt states she is a picky eater and doesn't consume any of those foods. Pt endorses she is eating more in the hospital than she does at home and she even found a few meals she really likes; Penne with a meat sauce. Pt encouraged to provide food preferences as able and to consumes protein option at each meal as able.      PO intake: fair     GI: No GI distress per team.     Daily Weight in k.7 (-03), Weight in k.7 (-), Weight in k.3 (-30), Weight in k.4 (-), Weight in k.1 (-29)  % Weight Change    Pertinent Medications: MEDICATIONS  (STANDING):  amLODIPine   Tablet 10 milliGRAM(s) Oral daily  ampicillin  IVPB 2 Gram(s) IV Intermittent every 6 hours  aspirin 325 milliGRAM(s) Oral daily  atorvastatin 20 milliGRAM(s) Oral at bedtime  buPROPion XL . 150 milliGRAM(s) Oral daily  calcium carbonate 1250 mG  + Vitamin D (OsCal 500 + D) 1 Tablet(s) Oral two times a day  cefTRIAXone   IVPB 2 Gram(s) IV Intermittent every 12 hours  chlorhexidine 2% Cloths 1 Application(s) Topical <User Schedule>  clopidogrel Tablet 75 milliGRAM(s) Oral daily  famotidine    Tablet 20 milliGRAM(s) Oral daily  furosemide    Tablet 40 milliGRAM(s) Oral every 12 hours  heparin  Injectable 5000 Unit(s) SubCutaneous every 8 hours  metoprolol tartrate 25 milliGRAM(s) Oral every 12 hours  polyethylene glycol 3350 17 Gram(s) Oral daily  pramipexole 0.5 milliGRAM(s) Oral at bedtime  sodium chloride 0.9% lock flush 3 milliLiter(s) IV Push every 8 hours    MEDICATIONS  (PRN):  oxyCODONE    IR 10 milliGRAM(s) Oral every 6 hours PRN Severe Pain (7 - 10)  oxyCODONE    IR 5 milliGRAM(s) Oral every 6 hours PRN Moderate Pain (4 - 6)    Pertinent Labs:  @ 02:12: Na 135, BUN 31<H>, Cr 1.95<H>, <H>, K+ 4.4, Phos --, Mg --, Alk Phos 77, ALT/SGPT 18, AST/SGOT 24, HbA1c --    Finger Sticks:  POCT Blood Glucose.: 121 mg/dL ( @ 21:42)  POCT Blood Glucose.: 133 mg/dL ( @ 17:41)  POCT Blood Glucose.: 157 mg/dL ( @ 12:55)      Skin per nursing documentation: intact  Edema: +2 elle foot edema     Estimated Needs:   [ X] no change since previous assessment  [ ] recalculated:     Previous Nutrition Diagnosis: Severe malnutrition   Nutrition Diagnosis is: ongoing      Interventions:     Recommend  1. Provide food preferences as requested by Pt/family within diet restrictions    2. Encourage PO intake during meal times   3. Reviewed menu ordering procedures  4. Would liberalize diet to low Na  5. Refuses PO supplements and dan active   6. Pt aware RD remains available       Monitoring and Evaluation:     Continue to monitor Nutritional intake, Tolerance to diet prescription, weights, labs, skin integrity    RD remains available upon request and will follow up per protocol  Sarah Siegler RD, RDN, [facility] Pager #[phone]

## 2019-06-03 NOTE — PROGRESS NOTE ADULT - SUBJECTIVE AND OBJECTIVE BOX
Patient is a 63y old  Female who presents with a chief complaint of torito (03 Jun 2019 09:28)    Being followed by ID for        Interval history:  No other acute events      ROS:  No cough,SOB,CP  No N/V/D  No abd pain  No urinary complaints  No HA  No joint or limb pain  No other complaints    PAST MEDICAL & SURGICAL HISTORY:  Hypertension  Restless leg syndrome: Especially with General Anesthesia  Arthritis: Cervical Spine  and Hands  H/O cervical discectomy  Acute peptic ulcer with perforation: 1980  S/P appendectomy: 2014  S/P tubal ligation: 1986  Osteoarthritis of right shoulder region: Right Shoulder Arthroscopy  2007  Rheumatoid arthritis of carpometacarpal joint of thumb: Total Joint Replacement of Left Thumb    Allergies    No Known Allergies    Intolerances      Antimicrobials:    ampicillin  IVPB 2 Gram(s) IV Intermittent every 6 hours  cefTRIAXone   IVPB 2 Gram(s) IV Intermittent every 12 hours    MEDICATIONS  (STANDING):  amLODIPine   Tablet 10 milliGRAM(s) Oral daily  ampicillin  IVPB 2 Gram(s) IV Intermittent every 6 hours  aspirin 325 milliGRAM(s) Oral daily  cefTRIAXone   IVPB 2 Gram(s) IV Intermittent every 12 hours  chlorhexidine 2% Cloths 1 Application(s) Topical <User Schedule>  clopidogrel Tablet 75 milliGRAM(s) Oral daily  famotidine    Tablet 20 milliGRAM(s) Oral daily  furosemide    Tablet 40 milliGRAM(s) Oral every 12 hours  heparin  Injectable 5000 Unit(s) SubCutaneous every 8 hours  metoprolol tartrate 25 milliGRAM(s) Oral every 12 hours  polyethylene glycol 3350 17 Gram(s) Oral daily  sodium chloride 0.9% lock flush 3 milliLiter(s) IV Push every 8 hours      Vital Signs Last 24 Hrs  T(C): 35.4 (06-03-19 @ 08:00), Max: 36.2 (06-02-19 @ 16:00)  T(F): 95.7 (06-03-19 @ 08:00), Max: 97.1 (06-02-19 @ 16:00)  HR: 76 (06-03-19 @ 10:00) (70 - 84)  BP: 171/83 (06-03-19 @ 10:00) (114/63 - 171/83)  BP(mean): 116 (06-03-19 @ 10:00) (11 - 123)  RR: 23 (06-03-19 @ 10:00) (10 - 26)  SpO2: 94% (06-03-19 @ 10:00) (94% - 100%)    Physical Exam:    Constitutional well preserved,comfortable,pleasant    HEENT PERRLA EOMI,No pallor or icterus    No oral exudate or erythema    Neck supple no JVD or LN    Chest Good AE,CTA    CVS RRR S1 S2 WNl No murmur or rub or gallop    Abd soft BS normal No tenderness no masses    Ext No cyanosis clubbing or edema    IV site no erythema tenderness or discharge    Joints no swelling or LOM    CNS AAO X 3 no focal    Lab Data:                          9.4    17.2  )-----------( 138      ( 03 Jun 2019 02:12 )             27.8       06-03    135  |  99  |  31<H>  ----------------------------<  113<H>  4.4   |  21<L>  |  1.95<H>    Ca    8.4      03 Jun 2019 02:12  Phos  5.0     06-02  Mg     2.6     06-02    TPro  5.6<L>  /  Alb  3.4  /  TBili  0.1<L>  /  DBili  x   /  AST  24  /  ALT  18  /  AlkPhos  77  06-03          .Tissue  06-01-19   Few Enterococcus species  --    **Please Note**: This is a Corrected Report**  No polymorphonuclear cells seen per low power field  Numerous Gram Variable Cocci seen per oil power field  Previously reported as:  No polymorphonuclear cells seen per low power field  No organisms seen per oil power field      .Blood  05-29-19   No growth to date.  --  --      .Blood  05-28-19   Growth in anaerobic bottle: Enterococcus faecalis  --  Enterococcus faecalis                    WBC Count: 17.2 (06-03-19 @ 02:12)  WBC Count: 19.9 (06-02-19 @ 02:20)  WBC Count: 15.6 (06-01-19 @ 00:25)  WBC Count: 22.2 (05-31-19 @ 14:14)  WBC Count: 10.2 (05-30-19 @ 06:01)  WBC Count: 10.5 (05-29-19 @ 07:24)  WBC Count: 10.71 (05-28-19 @ 08:26) Patient is a 63y old  Female who presents with a chief complaint of torito (03 Jun 2019 09:28)    Being followed by ID for help in management        Interval history:  pt improved , being transferred to floor  denies cough  urinating frequently  no diarrhea  No other acute events      PAST MEDICAL & SURGICAL HISTORY:  Hypertension  Restless leg syndrome: Especially with General Anesthesia  Arthritis: Cervical Spine  and Hands  H/O cervical discectomy  Acute peptic ulcer with perforation: 1980  S/P appendectomy: 2014  S/P tubal ligation: 1986  Osteoarthritis of right shoulder region: Right Shoulder Arthroscopy  2007  Rheumatoid arthritis of carpometacarpal joint of thumb: Total Joint Replacement of Left Thumb    Allergies    No Known Allergies    Intolerances      Antimicrobials:    ampicillin  IVPB 2 Gram(s) IV Intermittent every 6 hours  cefTRIAXone   IVPB 2 Gram(s) IV Intermittent every 12 hours    MEDICATIONS  (STANDING):  amLODIPine   Tablet 10 milliGRAM(s) Oral daily  ampicillin  IVPB 2 Gram(s) IV Intermittent every 6 hours  aspirin 325 milliGRAM(s) Oral daily  cefTRIAXone   IVPB 2 Gram(s) IV Intermittent every 12 hours  chlorhexidine 2% Cloths 1 Application(s) Topical <User Schedule>  clopidogrel Tablet 75 milliGRAM(s) Oral daily  famotidine    Tablet 20 milliGRAM(s) Oral daily  furosemide    Tablet 40 milliGRAM(s) Oral every 12 hours  heparin  Injectable 5000 Unit(s) SubCutaneous every 8 hours  metoprolol tartrate 25 milliGRAM(s) Oral every 12 hours  polyethylene glycol 3350 17 Gram(s) Oral daily  sodium chloride 0.9% lock flush 3 milliLiter(s) IV Push every 8 hours      Vital Signs Last 24 Hrs  T(C): 35.4 (06-03-19 @ 08:00), Max: 36.2 (06-02-19 @ 16:00)  T(F): 95.7 (06-03-19 @ 08:00), Max: 97.1 (06-02-19 @ 16:00)  HR: 76 (06-03-19 @ 10:00) (70 - 84)  BP: 171/83 (06-03-19 @ 10:00) (114/63 - 171/83)  BP(mean): 116 (06-03-19 @ 10:00) (11 - 123)  RR: 23 (06-03-19 @ 10:00) (10 - 26)  SpO2: 94% (06-03-19 @ 10:00) (94% - 100%)    Physical Exam:    Constitutional well preserved,comfortable,pleasant    HEENT PERRLA EOMI,No pallor or icterus    No oral exudate or erythema    Neck supple no JVD or LN    Chest Good AE,CTA    CVS RRR S1 S2 WNl     sternal wound - dressed    Abd soft BS normal No tenderness   Ext No cyanosis clubbing or edema    IV site no erythema tenderness or discharge    Joints no swelling or LOM    CNS AAO X 3 no focal    Lab Data:                          9.4    17.2  )-----------( 138      ( 03 Jun 2019 02:12 )             27.8       06-03    135  |  99  |  31<H>  ----------------------------<  113<H>  4.4   |  21<L>  |  1.95<H>    Ca    8.4      03 Jun 2019 02:12  Phos  5.0     06-02  Mg     2.6     06-02    TPro  5.6<L>  /  Alb  3.4  /  TBili  0.1<L>  /  DBili  x   /  AST  24  /  ALT  18  /  AlkPhos  77  06-03          .Tissue  06-01-19   Few Enterococcus species  --    **Please Note**: This is a Corrected Report**  No polymorphonuclear cells seen per low power field  Numerous Gram Variable Cocci seen per oil power field  Previously reported as:  No polymorphonuclear cells seen per low power field  No organisms seen per oil power field      .Blood  05-29-19   No growth to date.  --  --      .Blood  05-28-19   Growth in anaerobic bottle: Enterococcus faecalis  --  Enterococcus faecalis        WBC Count: 17.2 (06-03-19 @ 02:12)  WBC Count: 19.9 (06-02-19 @ 02:20)  WBC Count: 15.6 (06-01-19 @ 00:25)  WBC Count: 22.2 (05-31-19 @ 14:14)  WBC Count: 10.2 (05-30-19 @ 06:01)  WBC Count: 10.5 (05-29-19 @ 07:24)  WBC Count: 10.71 (05-28-19 @ 08:26)    < from: NM Multiple Day Procedure (05.24.19 @ 11:46) >  IMPRESSION: Normal Tc-labeled leukocyte scan.    No scan evidence of infection.    < end of copied text >

## 2019-06-03 NOTE — PROGRESS NOTE ADULT - SUBJECTIVE AND OBJECTIVE BOX
Patient is a 63y old  Female who presents with a chief complaint of torito (03 Jun 2019 16:31)      INTERVAL HPI/OVERNIGHT EVENTS: feels well  denies any cp/sob/palpiattions/dizziness, afebrile      Vital Signs Last 24 Hrs  T(C): 36.7 (03 Jun 2019 19:46), Max: 36.7 (03 Jun 2019 19:46)  T(F): 98 (03 Jun 2019 19:46), Max: 98 (03 Jun 2019 19:46)  HR: 80 (03 Jun 2019 19:46) (70 - 80)  BP: 163/82 (03 Jun 2019 19:46) (123/70 - 171/83)  BP(mean): 115 (03 Jun 2019 10:15) (11 - 123)  RR: 18 (03 Jun 2019 19:46) (10 - 26)  SpO2: 96% (03 Jun 2019 19:46) (94% - 100%)    amLODIPine   Tablet 10 milliGRAM(s) Oral daily  ampicillin  IVPB 2 Gram(s) IV Intermittent every 6 hours  aspirin 325 milliGRAM(s) Oral daily  atorvastatin 20 milliGRAM(s) Oral at bedtime  buPROPion XL . 150 milliGRAM(s) Oral daily  calcium carbonate 1250 mG  + Vitamin D (OsCal 500 + D) 1 Tablet(s) Oral two times a day  cefTRIAXone   IVPB 2 Gram(s) IV Intermittent every 12 hours  chlorhexidine 2% Cloths 1 Application(s) Topical <User Schedule>  clopidogrel Tablet 75 milliGRAM(s) Oral daily  famotidine    Tablet 20 milliGRAM(s) Oral daily  furosemide    Tablet 40 milliGRAM(s) Oral every 12 hours  heparin  Injectable 5000 Unit(s) SubCutaneous every 8 hours  metoprolol tartrate 25 milliGRAM(s) Oral every 12 hours  oxyCODONE    IR 10 milliGRAM(s) Oral every 6 hours PRN  oxyCODONE    IR 5 milliGRAM(s) Oral every 6 hours PRN  polyethylene glycol 3350 17 Gram(s) Oral daily  pramipexole 0.5 milliGRAM(s) Oral at bedtime  sodium chloride 0.9% lock flush 3 milliLiter(s) IV Push every 8 hours      PHYSICAL EXAM:  GENERAL: NAD, non toxic  EYES: conjunctiva and sclera clear  ENMT: Moist mucous membranes  NECK: Supple, No JVD, Normal thyroid  NERVOUS SYSTEM:  Alert & Oriented X3,   CHEST/LUNG: Clear to auscultation bilaterally; No rales, rhonchi, wheezing, or rubs  HEART: Regular rate and rhythm; systolic murmurs, rubs, or gallops  ABDOMEN: Soft, Nontender, Nondistended; Bowel sounds present  EXTREMITIES:  2+ Peripheral Pulses, No clubbing, cyanosis, or edema  LYMPH: No lymphadenopathy noted  SKIN: No rashes or lesions    Consultant(s) Notes Reviewed:  [x ] YES  [ ] NO  Care Discussed with Consultants/Other Providers [ x] YES  [ ] NO    LABS:                        9.4    17.2  )-----------( 138      ( 03 Jun 2019 02:12 )             27.8     06-03    135  |  99  |  31<H>  ----------------------------<  113<H>  4.4   |  21<L>  |  1.95<H>    Ca    8.4      03 Jun 2019 02:12  Phos  5.0     06-02  Mg     2.6     06-02    TPro  5.6<L>  /  Alb  3.4  /  TBili  0.1<L>  /  DBili  x   /  AST  24  /  ALT  18  /  AlkPhos  77  06-03        CAPILLARY BLOOD GLUCOSE          ABG - ( 02 Jun 2019 09:38 )  pH, Arterial: 7.42  pH, Blood: x     /  pCO2: 36    /  pO2: 119   / HCO3: 23    / Base Excess: -.9   /  SaO2: 99                    RADIOLOGY & ADDITIONAL TESTS:    Imaging Personally Reviewed:  [ ] YES  [ ] NO

## 2019-06-03 NOTE — PROGRESS NOTE ADULT - ASSESSMENT
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   No signs or symptoms of neurological issues ,  no prior UTI or infection or ppt event.  Follow up bc negative on ampicillin.  Pt has renal insufficieny , unclear how long it has been present and whether it is related to endocarditis         ceftriaxone and  ampicillin for synergy ( want to avoid genta)   Valve tissue gram stain with polys and gram variable cocci sen- cultures pending  will need prolonged course   PLEASE CHECK REPEAT CULTURES    REVIEWED Antibiotic  dosing with PharmD

## 2019-06-03 NOTE — PROGRESS NOTE ADULT - ASSESSMENT
63y Female with HTN, CKD, PAD s/p right femoral endarterectomy, depression, and smoker (quit 5 PTA) presented to the ED c/o SOB  found to have Gram + sepsis with now signs of endocarditis, s/p OR for bio AVR, MV repair, and CABG with LIMA to LAD     - Tolerated surgery well for endocarditis involving the aortic and mitral valves.  s/p CABG with LIMA to LAD  - Off high flow.  Continue NC as needed  - Now off levophed and dobutmaine with improvement in her hemodynamics  - Abx per ID  - Continue aspirin  - Start statin drug  - Continue metoprolol 25 mg po bid. She is now also back on amlodipine, as her BP has been running on the higher side.  - agree with Lasix for volume overload. Small bilateral effusions on CXR this morning. CT management per CTS  - Please continue to maintain strict I/Os, monitor daily weights, Cr, and K.   - Monitor H/H.  Transfuse as needed.  H/H has been stable  - Creatinine stable  - Will continue to follow

## 2019-06-03 NOTE — PROGRESS NOTE ADULT - SUBJECTIVE AND OBJECTIVE BOX
VITAL SIGNS    Telemetry:  sr   70    Vital Signs Last 24 Hrs  T(C): 36.4 (19 @ 10:38), Max: 36.4 (19 @ 10:38)  T(F): 97.6 (19 @ 10:38), Max: 97.6 (19 @ 10:38)  HR: 78 (19 @ 10:38) (70 - 84)  BP: 160/80 (19 @ 10:38) (123/70 - 171/83)  RR: 20 (19 @ 10:38) (10 - 26)  SpO2: 96% (19 @ 10:38) (94% - 100%)                   Daily     Daily Weight in k.7 (2019 08:00)      Bilirubin Total, Serum: 0.1 mg/dL ( @ 02:12)    CAPILLARY BLOOD GLUCOSE      POCT Blood Glucose.: 121 mg/dL (2019 21:42)  POCT Blood Glucose.: 133 mg/dL (2019 17:41)  POCT Blood Glucose.: 157 mg/dL (2019 12:55)         Pacing Wires        [  ]   Settings:                                                     PHYSICAL EXAM    Neurology: alert and oriented x 3, moves all extremities with no defecits  CV :  RRR  Sternal Wound :  CDI , Stable  Lungs:   CTA B/L  Abdomen: soft, nontender, nondistended, positive bowel sounds, last bowel movement   Extremities: VITAL SIGNS    Telemetry:  sr   70    Vital Signs Last 24 Hrs  T(C): 36.4 (19 @ 10:38), Max: 36.4 (19 @ 10:38)  T(F): 97.6 (19 @ 10:38), Max: 97.6 (19 @ 10:38)  HR: 78 (19 @ 10:38) (70 - 84)  BP: 160/80 (19 @ 10:38) (123/70 - 171/83)  RR: 20 (19 @ 10:38) (10 - 26)  SpO2: 96% (19 @ 10:38) (94% - 100%)                   Daily     Daily Weight in k.7 (2019 08:00)      Bilirubin Total, Serum: 0.1 mg/dL ( @ 02:12)    CAPILLARY BLOOD GLUCOSE      POCT Blood Glucose.: 121 mg/dL (2019 21:42)  POCT Blood Glucose.: 133 mg/dL (2019 17:41)  POCT Blood Glucose.: 157 mg/dL (2019 12:55)         Pacing Wires       yes                                                   PHYSICAL EXAM    Neurology: alert and oriented x 3, moves all extremities with no defecits  CV :  RRR  Sternal Wound :  CDI , Stable  with pw  Lungs:   CTA B/L  Abdomen: soft, nontender, nondistended, positive bowel sounds, last bowel movement   6/3  Extremities:     plus one  pedal edema   no calf tenderness

## 2019-06-03 NOTE — PROGRESS NOTE ADULT - ASSESSMENT
63 F PMH HTN CKD PAD s/p femoral  endarterectomy,  Plavix ,depression , and smoker ( quit  4/2019 ) endorsed 10 lb weight loss reports increasing SOB s/p colonoscopy -unremarkable.    Presented to [place] ED found to have b/l pleural effusions, anemia, elevated creatinine 2.8 and TTE reveals mobile echodensity on AV with moderate to severe AI. Blood cultures + for gram + cocci in pairs/chains. Treated with one unit PRBC-  Ampicillin 2gram IV q6-  PET negative for evidence of infection. Stabilized transferred to [facility] for ZAHIRA. Admitted to  CT Sx for further management.    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  sp AVR  and MV repair and cabg 5/31  known CKD-3- Cr at baseline        Appreciate CT sx, cards, ID and nephrology mgmt    eventual plan for picc and long term abx    Will cont to follow  [facility]  [phone]

## 2019-06-03 NOTE — PROGRESS NOTE ADULT - PROBLEM SELECTOR PLAN 1
IR to place PICC, IV abx per ID    sp AVR  and MV repair need   PICC after clear BC  sent from today     sp AVR  and MV repair  and CABG

## 2019-06-03 NOTE — PROGRESS NOTE ADULT - SUBJECTIVE AND OBJECTIVE BOX
[facility] - Chao Lomax, Kunal, Regino, [place], Elva Islas  Office Number:  [phone]    Patient resting comfortably in chair in NAD.  No complaints of chest pain, dyspnea, palpitations, PND, or orthopnea.  She is feeling much better this morning.    ROS: negative unless otherwise mentioned.    Telemetry:  sr    MEDICATIONS  (STANDING):  amLODIPine   Tablet 10 milliGRAM(s) Oral daily  ampicillin  IVPB 2 Gram(s) IV Intermittent every 6 hours  aspirin 325 milliGRAM(s) Oral daily  cefTRIAXone   IVPB 2 Gram(s) IV Intermittent every 12 hours  chlorhexidine 2% Cloths 1 Application(s) Topical <User Schedule>  clopidogrel Tablet 75 milliGRAM(s) Oral daily  famotidine    Tablet 20 milliGRAM(s) Oral daily  furosemide    Tablet 40 milliGRAM(s) Oral every 12 hours  heparin  Injectable 5000 Unit(s) SubCutaneous every 8 hours  metoprolol tartrate 25 milliGRAM(s) Oral every 12 hours  polyethylene glycol 3350 17 Gram(s) Oral daily    MEDICATIONS  (PRN):  oxyCODONE    IR 10 milliGRAM(s) Oral every 6 hours PRN Severe Pain (7 - 10)  oxyCODONE    IR 5 milliGRAM(s) Oral every 6 hours PRN Moderate Pain (4 - 6)      Allergies    No Known Allergies    Intolerances        Vital Signs Last 24 Hrs  T(C): 35.4 (03 Jun 2019 08:00), Max: 36.2 (02 Jun 2019 16:00)  T(F): 95.7 (03 Jun 2019 08:00), Max: 97.1 (02 Jun 2019 16:00)  HR: 72 (03 Jun 2019 08:00) (70 - 84)  BP: 144/77 (03 Jun 2019 08:00) (114/63 - 166/91)  BP(mean): 105 (03 Jun 2019 08:00) (11 - 123)  RR: 16 (03 Jun 2019 08:00) (10 - 26)  SpO2: 100% (03 Jun 2019 08:00) (94% - 100%)    I&O's Summary    02 Jun 2019 07:01  -  03 Jun 2019 07:00  --------------------------------------------------------  IN: 1750 mL / OUT: 1715 mL / NET: 35 mL    03 Jun 2019 07:01  -  03 Jun 2019 09:28  --------------------------------------------------------  IN: 120 mL / OUT: 525 mL / NET: -405 mL        ON EXAM:    General: NAD, awake and alert, oriented x 3  HEENT: Mucous membranes are dry, anicteric. Right TLC clean, dry, and intact  Lungs: Non-labored, decreased bs bilaterally No wheezing, rales or rhonchi, ct in place  Cardiovascular: Regular, S1 and S2, 1/6 systolic murmur  Gastrointestinal: Bowel Sounds present, soft, nontender.   Lymph: + peripheral edema. No lymphadenopathy.  Skin: No rashes or ulcers  Psych:  Mood & affect appropriate      LABS: All Labs Reviewed:                        9.4    17.2  )-----------( 138      ( 03 Jun 2019 02:12 )             27.8                         9.0    19.9  )-----------( 123      ( 02 Jun 2019 02:20 )             27.0                         10.3   15.6  )-----------( 140      ( 01 Jun 2019 00:25 )             30.2     03 Jun 2019 02:12    135    |  99     |  31     ----------------------------<  113    4.4     |  21     |  1.95   02 Jun 2019 02:20    136    |  99     |  26     ----------------------------<  136    4.5     |  22     |  2.15   01 Jun 2019 00:25    143    |  104    |  22     ----------------------------<  168    4.8     |  21     |  2.20     Ca    8.4        03 Jun 2019 02:12  Ca    8.7        02 Jun 2019 02:20  Ca    8.7        01 Jun 2019 00:25  Phos  5.0       02 Jun 2019 02:20  Phos  4.7       01 Jun 2019 00:25  Phos  4.6       31 May 2019 14:14  Mg     2.6       02 Jun 2019 02:20  Mg     3.1       01 Jun 2019 00:25  Mg     3.6       31 May 2019 14:14    TPro  5.6    /  Alb  3.4    /  TBili  0.1    /  DBili  x      /  AST  24     /  ALT  18     /  AlkPhos  77     03 Jun 2019 02:12  TPro  5.7    /  Alb  3.9    /  TBili  0.2    /  DBili  x      /  AST  26     /  ALT  15     /  AlkPhos  58     02 Jun 2019 02:20  TPro  5.1    /  Alb  3.5    /  TBili  0.6    /  DBili  x      /  AST  36     /  ALT  16     /  AlkPhos  57     01 Jun 2019 00:25          Blood Culture: Organism --  Gram Stain Blood -- Gram Stain   **Please Note**: This is a Corrected Report**  No polymorphonuclear cells seen per low power field  Numerous Gram Variable Cocci seen per oil power field  Previously reported as:  No polymorphonuclear cells seen per low power field  No organisms seen per oil power field  Specimen Source .Tissue  Culture-Blood --    Organism --  Gram Stain Blood -- Gram Stain --  Specimen Source .Blood  Culture-Blood --

## 2019-06-04 LAB
-  AMPICILLIN: SIGNIFICANT CHANGE UP
-  TETRACYCLINE: SIGNIFICANT CHANGE UP
-  VANCOMYCIN: SIGNIFICANT CHANGE UP
ANION GAP SERPL CALC-SCNC: 14 MMOL/L — SIGNIFICANT CHANGE UP (ref 5–17)
APTT BLD: 31.5 SEC — SIGNIFICANT CHANGE UP (ref 27.5–36.3)
BUN SERPL-MCNC: 34 MG/DL — HIGH (ref 7–23)
CALCIUM SERPL-MCNC: 9 MG/DL — SIGNIFICANT CHANGE UP (ref 8.4–10.5)
CHLORIDE SERPL-SCNC: 96 MMOL/L — SIGNIFICANT CHANGE UP (ref 96–108)
CO2 SERPL-SCNC: 25 MMOL/L — SIGNIFICANT CHANGE UP (ref 22–31)
CREAT SERPL-MCNC: 1.89 MG/DL — HIGH (ref 0.5–1.3)
GLUCOSE SERPL-MCNC: 89 MG/DL — SIGNIFICANT CHANGE UP (ref 70–99)
HCT VFR BLD CALC: 31.2 % — LOW (ref 34.5–45)
HGB BLD-MCNC: 10.8 G/DL — LOW (ref 11.5–15.5)
INR BLD: 1.06 RATIO — SIGNIFICANT CHANGE UP (ref 0.88–1.16)
MCHC RBC-ENTMCNC: 30.6 PG — SIGNIFICANT CHANGE UP (ref 27–34)
MCHC RBC-ENTMCNC: 34.6 GM/DL — SIGNIFICANT CHANGE UP (ref 32–36)
MCV RBC AUTO: 88.5 FL — SIGNIFICANT CHANGE UP (ref 80–100)
METHOD TYPE: SIGNIFICANT CHANGE UP
PA ADP PRP-ACNC: 79 PRU — LOW (ref 194–417)
PLATELET # BLD AUTO: 200 K/UL — SIGNIFICANT CHANGE UP (ref 150–400)
POTASSIUM SERPL-MCNC: 3.7 MMOL/L — SIGNIFICANT CHANGE UP (ref 3.5–5.3)
POTASSIUM SERPL-SCNC: 3.7 MMOL/L — SIGNIFICANT CHANGE UP (ref 3.5–5.3)
PROTHROM AB SERPL-ACNC: 12.1 SEC — SIGNIFICANT CHANGE UP (ref 10–12.9)
RBC # BLD: 3.53 M/UL — LOW (ref 3.8–5.2)
RBC # FLD: 15.6 % — HIGH (ref 10.3–14.5)
SODIUM SERPL-SCNC: 135 MMOL/L — SIGNIFICANT CHANGE UP (ref 135–145)
WBC # BLD: 14.5 K/UL — HIGH (ref 3.8–10.5)
WBC # FLD AUTO: 14.5 K/UL — HIGH (ref 3.8–10.5)

## 2019-06-04 PROCEDURE — 99232 SBSQ HOSP IP/OBS MODERATE 35: CPT

## 2019-06-04 PROCEDURE — 71045 X-RAY EXAM CHEST 1 VIEW: CPT | Mod: 26

## 2019-06-04 RX ORDER — SPIRONOLACTONE 25 MG/1
25 TABLET, FILM COATED ORAL DAILY
Refills: 0 | Status: DISCONTINUED | OUTPATIENT
Start: 2019-06-04 | End: 2019-06-07

## 2019-06-04 RX ORDER — FUROSEMIDE 40 MG
40 TABLET ORAL DAILY
Refills: 0 | Status: DISCONTINUED | OUTPATIENT
Start: 2019-06-04 | End: 2019-06-07

## 2019-06-04 RX ADMIN — HEPARIN SODIUM 5000 UNIT(S): 5000 INJECTION INTRAVENOUS; SUBCUTANEOUS at 21:31

## 2019-06-04 RX ADMIN — Medication 40 MILLIGRAM(S): at 05:47

## 2019-06-04 RX ADMIN — Medication 325 MILLIGRAM(S): at 11:30

## 2019-06-04 RX ADMIN — CEFTRIAXONE 100 GRAM(S): 500 INJECTION, POWDER, FOR SOLUTION INTRAMUSCULAR; INTRAVENOUS at 05:47

## 2019-06-04 RX ADMIN — CLOPIDOGREL BISULFATE 75 MILLIGRAM(S): 75 TABLET, FILM COATED ORAL at 11:30

## 2019-06-04 RX ADMIN — OXYCODONE HYDROCHLORIDE 10 MILLIGRAM(S): 5 TABLET ORAL at 14:32

## 2019-06-04 RX ADMIN — FAMOTIDINE 20 MILLIGRAM(S): 10 INJECTION INTRAVENOUS at 11:30

## 2019-06-04 RX ADMIN — CHLORHEXIDINE GLUCONATE 1 APPLICATION(S): 213 SOLUTION TOPICAL at 21:27

## 2019-06-04 RX ADMIN — PRAMIPEXOLE DIHYDROCHLORIDE 0.5 MILLIGRAM(S): 0.12 TABLET ORAL at 21:31

## 2019-06-04 RX ADMIN — ATORVASTATIN CALCIUM 20 MILLIGRAM(S): 80 TABLET, FILM COATED ORAL at 21:31

## 2019-06-04 RX ADMIN — Medication 216 GRAM(S): at 14:32

## 2019-06-04 RX ADMIN — BUPROPION HYDROCHLORIDE 150 MILLIGRAM(S): 150 TABLET, EXTENDED RELEASE ORAL at 11:30

## 2019-06-04 RX ADMIN — Medication 1 TABLET(S): at 17:40

## 2019-06-04 RX ADMIN — HEPARIN SODIUM 5000 UNIT(S): 5000 INJECTION INTRAVENOUS; SUBCUTANEOUS at 05:46

## 2019-06-04 RX ADMIN — Medication 216 GRAM(S): at 20:06

## 2019-06-04 RX ADMIN — SODIUM CHLORIDE 3 MILLILITER(S): 9 INJECTION INTRAMUSCULAR; INTRAVENOUS; SUBCUTANEOUS at 21:30

## 2019-06-04 RX ADMIN — OXYCODONE HYDROCHLORIDE 10 MILLIGRAM(S): 5 TABLET ORAL at 22:43

## 2019-06-04 RX ADMIN — SODIUM CHLORIDE 3 MILLILITER(S): 9 INJECTION INTRAMUSCULAR; INTRAVENOUS; SUBCUTANEOUS at 11:11

## 2019-06-04 RX ADMIN — Medication 216 GRAM(S): at 02:23

## 2019-06-04 RX ADMIN — SODIUM CHLORIDE 3 MILLILITER(S): 9 INJECTION INTRAMUSCULAR; INTRAVENOUS; SUBCUTANEOUS at 05:44

## 2019-06-04 RX ADMIN — Medication 216 GRAM(S): at 08:39

## 2019-06-04 RX ADMIN — CEFTRIAXONE 100 GRAM(S): 500 INJECTION, POWDER, FOR SOLUTION INTRAMUSCULAR; INTRAVENOUS at 17:40

## 2019-06-04 RX ADMIN — SPIRONOLACTONE 25 MILLIGRAM(S): 25 TABLET, FILM COATED ORAL at 11:30

## 2019-06-04 RX ADMIN — AMLODIPINE BESYLATE 10 MILLIGRAM(S): 2.5 TABLET ORAL at 05:46

## 2019-06-04 RX ADMIN — Medication 25 MILLIGRAM(S): at 17:40

## 2019-06-04 RX ADMIN — Medication 25 MILLIGRAM(S): at 05:46

## 2019-06-04 RX ADMIN — OXYCODONE HYDROCHLORIDE 10 MILLIGRAM(S): 5 TABLET ORAL at 22:13

## 2019-06-04 RX ADMIN — Medication 1 TABLET(S): at 05:46

## 2019-06-04 RX ADMIN — OXYCODONE HYDROCHLORIDE 10 MILLIGRAM(S): 5 TABLET ORAL at 15:02

## 2019-06-04 RX ADMIN — HEPARIN SODIUM 5000 UNIT(S): 5000 INJECTION INTRAVENOUS; SUBCUTANEOUS at 14:32

## 2019-06-04 NOTE — PROVIDER CONTACT NOTE (CRITICAL VALUE NOTIFICATION) - BACKGROUND
63 f   post op  5/31  C1L, AVR, MV repair   + E faecalis,  nl EF    H     CKD  cervical fusion,  smoker, PAD  lt fem endarectorectomy
b/l pleural effusion, increased creatinine, and echo revealed moderate/severe aortic regurgitation

## 2019-06-04 NOTE — PROGRESS NOTE ADULT - SUBJECTIVE AND OBJECTIVE BOX
Patient is a 63y old  Female who presents with a chief complaint of torito (04 Jun 2019 07:52)      INTERVAL HPI/OVERNIGHT EVENTS: feels well  denies any cp/sob  ros unremarkable      Vital Signs Last 24 Hrs  T(C): 36.7 (04 Jun 2019 04:26), Max: 36.7 (03 Jun 2019 19:46)  T(F): 98.1 (04 Jun 2019 04:26), Max: 98.1 (04 Jun 2019 04:26)  HR: 52 (04 Jun 2019 04:26) (52 - 80)  BP: 166/77 (04 Jun 2019 04:26) (158/80 - 166/77)  BP(mean): --  RR: 18 (04 Jun 2019 04:26) (18 - 20)  SpO2: 94% (04 Jun 2019 04:26) (94% - 96%)    amLODIPine   Tablet 10 milliGRAM(s) Oral daily  ampicillin  IVPB 2 Gram(s) IV Intermittent every 6 hours  aspirin 325 milliGRAM(s) Oral daily  atorvastatin 20 milliGRAM(s) Oral at bedtime  buPROPion XL . 150 milliGRAM(s) Oral daily  calcium carbonate 1250 mG  + Vitamin D (OsCal 500 + D) 1 Tablet(s) Oral two times a day  cefTRIAXone   IVPB 2 Gram(s) IV Intermittent every 12 hours  chlorhexidine 2% Cloths 1 Application(s) Topical <User Schedule>  clopidogrel Tablet 75 milliGRAM(s) Oral daily  famotidine    Tablet 20 milliGRAM(s) Oral daily  furosemide    Tablet 40 milliGRAM(s) Oral every 12 hours  heparin  Injectable 5000 Unit(s) SubCutaneous every 8 hours  metoprolol tartrate 25 milliGRAM(s) Oral every 12 hours  oxyCODONE    IR 10 milliGRAM(s) Oral every 6 hours PRN  oxyCODONE    IR 5 milliGRAM(s) Oral every 6 hours PRN  polyethylene glycol 3350 17 Gram(s) Oral daily  pramipexole 0.5 milliGRAM(s) Oral at bedtime  sodium chloride 0.9% lock flush 3 milliLiter(s) IV Push every 8 hours      PHYSICAL EXAM:  GENERAL: NAD,   EYES: conjunctiva and sclera clear  ENMT: Moist mucous membranes  NECK: Supple, No JVD, Normal thyroid  NERVOUS SYSTEM:  Alert & Oriented X3,   CHEST/LUNG: Clear to auscultation bilaterally; No rales, rhonchi, wheezing, or rubs  chest dressing  HEART: Regular rate and rhythm; No murmurs, rubs, or gallops  ABDOMEN: Soft, Nontender, Nondistended; Bowel sounds present  EXTREMITIES:  2+ Peripheral Pulses, No clubbing, cyanosis, or edema  LYMPH: No lymphadenopathy noted  SKIN: No rashes or lesions    Consultant(s) Notes Reviewed:  [x ] YES  [ ] NO  Care Discussed with Consultants/Other Providers [ x] YES  [ ] NO    LABS:                        10.8   14.5  )-----------( 200      ( 04 Jun 2019 06:19 )             31.2     06-04    135  |  96  |  34<H>  ----------------------------<  89  3.7   |  25  |  1.89<H>    Ca    9.0      04 Jun 2019 06:19    TPro  5.6<L>  /  Alb  3.4  /  TBili  0.1<L>  /  DBili  x   /  AST  24  /  ALT  18  /  AlkPhos  77  06-03    PT/INR - ( 04 Jun 2019 06:19 )   PT: 12.1 sec;   INR: 1.06 ratio         PTT - ( 04 Jun 2019 06:19 )  PTT:31.5 sec    CAPILLARY BLOOD GLUCOSE                RADIOLOGY & ADDITIONAL TESTS:    Imaging Personally Reviewed:  [x ] YES  [ ] NO

## 2019-06-04 NOTE — PROGRESS NOTE ADULT - SUBJECTIVE AND OBJECTIVE BOX
VITAL SIGNS    Telemetry:    Vital Signs Last 24 Hrs  T(C): 36.4 (19 @ 14:04), Max: 36.7 (19 @ 19:46)  T(F): 97.5 (19 @ 14:04), Max: 98.1 (19 @ 04:26)  HR: 80 (19 @ 14:04) (52 - 80)  BP: 99/67 (19 @ 14:04) (99/67 - 166/77)  RR: 18 (19 @ 14:04) (18 - 18)  SpO2: 97% (19 @ 14:04) (94% - 97%)             @ 07:01  -   @ 07:00  --------------------------------------------------------  IN: 460 mL / OUT: 2645 mL / NET: -2185 mL     @ 07:01  -   @ 15:50  --------------------------------------------------------  IN: 940 mL / OUT: 1800 mL / NET: -860 mL       Daily     Daily Weight in k.1 (2019 07:00)  Admit Wt: Drug Dosing Weight  Height (cm): 162.56 (31 May 2019 09:30)  Weight (kg): 48.1 (31 May 2019 09:30)  BMI (kg/m2): 18.2 (31 May 2019 09:30)  BSA (m2): 1.49 (31 May 2019 09:30)     Daily Weight in k.1 (19 @ 07:00)    [facility]      135  |  96  |  34<H>  ----------------------------<  89  3.7   |  25  |  1.89<H>    Ca    9.0      2019 06:19    TPro  5.6<L>  /  Alb  3.4  /  TBili  0.1<L>  /  DBili  x   /  AST  24  /  ALT  18  /  AlkPhos  77  -03                                 10.8   14.5  )-----------( 200      ( 2019 06:19 )             31.2          PT/INR - ( 2019 06:19 )   PT: 12.1 sec;   INR: 1.06 ratio         PTT - ( 2019 06:19 )  PTT:31.5 sec          CAPILLARY BLOOD GLUCOSE              Drains:     MS       [  ]   [  ]            L Pleural [  ]            R Pleural  [  ]            TITI  [  ]           [place]keon  [  ]    Pacing Wires      [  ]   Settings:                                  Isolated  [  ]                    CXR:      MEDICATIONS  amLODIPine   Tablet 10 milliGRAM(s) Oral daily  ampicillin  IVPB 2 Gram(s) IV Intermittent every 6 hours  aspirin 325 milliGRAM(s) Oral daily  atorvastatin 20 milliGRAM(s) Oral at bedtime  buPROPion XL . 150 milliGRAM(s) Oral daily  calcium carbonate 1250 mG  + Vitamin D (OsCal 500 + D) 1 Tablet(s) Oral two times a day  cefTRIAXone   IVPB 2 Gram(s) IV Intermittent every 12 hours  chlorhexidine 2% Cloths 1 Application(s) Topical <User Schedule>  clopidogrel Tablet 75 milliGRAM(s) Oral daily  famotidine    Tablet 20 milliGRAM(s) Oral daily  furosemide    Tablet 40 milliGRAM(s) Oral daily  heparin  Injectable 5000 Unit(s) SubCutaneous every 8 hours  metoprolol tartrate 25 milliGRAM(s) Oral every 12 hours  oxyCODONE    IR 10 milliGRAM(s) Oral every 6 hours PRN  oxyCODONE    IR 5 milliGRAM(s) Oral every 6 hours PRN  polyethylene glycol 3350 17 Gram(s) Oral daily  pramipexole 0.5 milliGRAM(s) Oral at bedtime  sodium chloride 0.9% lock flush 3 milliLiter(s) IV Push every 8 hours  spironolactone 25 milliGRAM(s) Oral daily      PHYSICAL EXAM      Neurology: alert and oriented x 3, nonfocal, no gross deficits  CV :S1S2  Sternal Wound :  CDI , Stable  Lungs:cta   Abdomen: soft, nontender, nondistended, positive bowel sounds, last bowel movement   :   voids    Extremities:   warm to touch 2+ edema b/l lower extrem               PAST MEDICAL & SURGICAL HISTORY:  Hypertension  Restless leg syndrome: Especially with General Anesthesia  Arthritis: Cervical Spine  and Hands  H/O cervical discectomy  Acute peptic ulcer with perforation:   S/P appendectomy:   S/P tubal ligation:   Osteoarthritis of right shoulder region: Right Shoulder Arthroscopy    Rheumatoid arthritis of carpometacarpal joint of thumb: Total Joint Replacement of Left Thumb                 Discussed with Cardiothoracic Team at AM rounds.

## 2019-06-04 NOTE — PROGRESS NOTE ADULT - ASSESSMENT
63y Female with HTN, CKD, PAD s/p right femoral endarterectomy, depression, and smoker (quit 5 PTA) presented to the ED c/o SOB  found to have Gram + sepsis with now signs of endocarditis, s/p OR for bio AVR, MV repair, and CABG with LIMA to LAD     -there is no evidence of acute ischemia.  -s/p cabg with lima to the lad for significant ostial disease  -cont asa, plavix  -cont statin  -cont bb    -there is no evidence of significant arrhythmia.    -there is evidence for mild volume overload.  -cont po lasix    -DVT prophylaxis  -monitor electrolytes, keep k>4, Mg>2  -will follow

## 2019-06-04 NOTE — PROGRESS NOTE ADULT - SUBJECTIVE AND OBJECTIVE BOX
[facility]    Chao Lomax, Kunal, Regino, [place], Roshan, Elva      [phone]    CHIEF COMPLAINT: Patient is a 63y old  Female who presents with a chief complaint of torito (04 Jun 2019 07:52)      Follow Up: endocarditis, cad, s/p avr, mv repair and cabg    Interim history: The patient reports no new symptoms.  Denies chest discomfort and shortness of breath.  No abdominal pain.  No new neurologic symptoms.      MEDICATIONS  (STANDING):  amLODIPine   Tablet 10 milliGRAM(s) Oral daily  ampicillin  IVPB 2 Gram(s) IV Intermittent every 6 hours  aspirin 325 milliGRAM(s) Oral daily  atorvastatin 20 milliGRAM(s) Oral at bedtime  buPROPion XL . 150 milliGRAM(s) Oral daily  calcium carbonate 1250 mG  + Vitamin D (OsCal 500 + D) 1 Tablet(s) Oral two times a day  cefTRIAXone   IVPB 2 Gram(s) IV Intermittent every 12 hours  chlorhexidine 2% Cloths 1 Application(s) Topical <User Schedule>  clopidogrel Tablet 75 milliGRAM(s) Oral daily  famotidine    Tablet 20 milliGRAM(s) Oral daily  furosemide    Tablet 40 milliGRAM(s) Oral every 12 hours  heparin  Injectable 5000 Unit(s) SubCutaneous every 8 hours  metoprolol tartrate 25 milliGRAM(s) Oral every 12 hours  polyethylene glycol 3350 17 Gram(s) Oral daily  pramipexole 0.5 milliGRAM(s) Oral at bedtime  sodium chloride 0.9% lock flush 3 milliLiter(s) IV Push every 8 hours    MEDICATIONS  (PRN):  oxyCODONE    IR 10 milliGRAM(s) Oral every 6 hours PRN Severe Pain (7 - 10)  oxyCODONE    IR 5 milliGRAM(s) Oral every 6 hours PRN Moderate Pain (4 - 6)      REVIEW OF SYSTEMS:  eye, ent, GI, , allergic, dermatologic, musculoskeletal and neurologic are negative except as described above    Vital Signs Last 24 Hrs  T(C): 36.7 (04 Jun 2019 04:26), Max: 36.7 (03 Jun 2019 19:46)  T(F): 98.1 (04 Jun 2019 04:26), Max: 98.1 (04 Jun 2019 04:26)  HR: 52 (04 Jun 2019 04:26) (52 - 80)  BP: 166/77 (04 Jun 2019 04:26) (158/80 - 166/77)  BP(mean): --  RR: 18 (04 Jun 2019 04:26) (18 - 20)  SpO2: 94% (04 Jun 2019 04:26) (94% - 96%)    I&O's Summary    03 Jun 2019 07:01  -  04 Jun 2019 07:00  --------------------------------------------------------  IN: 460 mL / OUT: 2645 mL / NET: -2185 mL    04 Jun 2019 07:01  -  04 Jun 2019 10:27  --------------------------------------------------------  IN: 100 mL / OUT: 1000 mL / NET: -900 mL        Telemetry past 24h: sr    PHYSICAL EXAM:    Constitutional: well-nourished, well-developed, NAD   HEENT:  MMM, sclerae anicteric, conjunctivae clear, no oral cyanosis.  Pulmonary: Non-labored, breath sounds are clear bilaterally, No wheezing, rales or rhonchi  Cardiovascular: Regular, S1 and S2.  1/6 sys murmur.  No rubs, gallops or clicks  Gastrointestinal: Bowel Sounds present, soft, nontender.   Lymph: mild peripheral edema.   Neurological: Alert, no focal deficits  Skin: No rashes.  Psych:  Mood & affect appropriate    LABS: All Labs Reviewed:                        10.8   14.5  )-----------( 200      ( 04 Jun 2019 06:19 )             31.2                         9.4    17.2  )-----------( 138      ( 03 Jun 2019 02:12 )             27.8                         9.0    19.9  )-----------( 123      ( 02 Jun 2019 02:20 )             27.0     04 Jun 2019 06:19    135    |  96     |  34     ----------------------------<  89     3.7     |  25     |  1.89   03 Jun 2019 02:12    135    |  99     |  31     ----------------------------<  113    4.4     |  21     |  1.95   02 Jun 2019 02:20    136    |  99     |  26     ----------------------------<  136    4.5     |  22     |  2.15     Ca    9.0        04 Jun 2019 06:19  Ca    8.4        03 Jun 2019 02:12  Ca    8.7        02 Jun 2019 02:20  Phos  5.0       02 Jun 2019 02:20  Mg     2.6       02 Jun 2019 02:20    TPro  5.6    /  Alb  3.4    /  TBili  0.1    /  DBili  x      /  AST  24     /  ALT  18     /  AlkPhos  77     03 Jun 2019 02:12  TPro  5.7    /  Alb  3.9    /  TBili  0.2    /  DBili  x      /  AST  26     /  ALT  15     /  AlkPhos  58     02 Jun 2019 02:20    PT/INR - ( 04 Jun 2019 06:19 )   PT: 12.1 sec;   INR: 1.06 ratio         PTT - ( 04 Jun 2019 06:19 )  PTT:31.5 sec      Blood Culture: Organism --  Gram Stain Blood -- Gram Stain   **Please Note**: This is a Corrected Report**  No polymorphonuclear cells seen per low power field  Numerous Gram Variable Cocci seen per oil power field  Previously reported as:  No polymorphonuclear cells seen per low power field  No organisms seen per oil power field  Specimen Source .Tissue  Culture-Blood --            RADIOLOGY:    EKG:    Echo:

## 2019-06-04 NOTE — PROGRESS NOTE ADULT - SUBJECTIVE AND OBJECTIVE BOX
S/p OHS 5/31, doing well, awake, alert, denies complaints    Vital Signs Last 24 Hrs  T(C): 36.4 (06-04-19 @ 14:04), Max: 36.7 (06-03-19 @ 19:46)  T(F): 97.5 (06-04-19 @ 14:04), Max: 98.1 (06-04-19 @ 04:26)  HR: 80 (06-04-19 @ 14:04) (52 - 80)  BP: 99/67 (06-04-19 @ 14:04) (99/67 - 166/77)  RR: 18 (06-04-19 @ 14:04) (18 - 18)  SpO2: 97% (06-04-19 @ 14:04) (94% - 97%)     CHEST/LUNG: b/l air entry  HEART: S1S2  ABDOMEN: Soft, Nondistended, NT  EXTREMITIES: no edema                                        10.8   14.5  )-----------( 200      ( 04 Jun 2019 06:19 )             31.2     04 Jun 2019 06:19    135    |  96     |  34     ----------------------------<  89     3.7     |  25     |  1.89     Ca    9.0        04 Jun 2019 06:19    TPro  5.6    /  Alb  3.4    /  TBili  0.1    /  DBili  x      /  AST  24     /  ALT  18     /  AlkPhos  77     03 Jun 2019 02:12    LIVER FUNCTIONS - ( 03 Jun 2019 02:12 )  Alb: 3.4 g/dL / Pro: 5.6 g/dL / ALK PHOS: 77 U/L / ALT: 18 U/L / AST: 24 U/L / GGT: x           PT/INR - ( 04 Jun 2019 06:19 )   PT: 12.1 sec;   INR: 1.06 ratio      amLODIPine   Tablet 10 milliGRAM(s) Oral daily  ampicillin  IVPB 2 Gram(s) IV Intermittent every 6 hours  aspirin 325 milliGRAM(s) Oral daily  atorvastatin 20 milliGRAM(s) Oral at bedtime  buPROPion XL . 150 milliGRAM(s) Oral daily  calcium carbonate 1250 mG  + Vitamin D (OsCal 500 + D) 1 Tablet(s) Oral two times a day  cefTRIAXone   IVPB 2 Gram(s) IV Intermittent every 12 hours  chlorhexidine 2% Cloths 1 Application(s) Topical <User Schedule>  clopidogrel Tablet 75 milliGRAM(s) Oral daily  famotidine    Tablet 20 milliGRAM(s) Oral daily  furosemide    Tablet 40 milliGRAM(s) Oral daily  heparin  Injectable 5000 Unit(s) SubCutaneous every 8 hours  metoprolol tartrate 25 milliGRAM(s) Oral every 12 hours  oxyCODONE    IR 10 milliGRAM(s) Oral every 6 hours PRN  oxyCODONE    IR 5 milliGRAM(s) Oral every 6 hours PRN  polyethylene glycol 3350 17 Gram(s) Oral daily  pramipexole 0.5 milliGRAM(s) Oral at bedtime  sodium chloride 0.9% lock flush 3 milliLiter(s) IV Push every 8 hours  spironolactone 25 milliGRAM(s) Oral daily    Impression/Plan:    Known CKD3 w/Cr 1.8-2 at baseline  AV endocarditis, severe AI  S/p CABG x 1, AVR, MV repair 5/31  Stable renal fx so far  F/u renal fx on Lasix, Aldactone  Abx per ID  Dose meds for CrCl < 30  Avoid nephrotoxins

## 2019-06-04 NOTE — PROGRESS NOTE ADULT - ASSESSMENT
63 F PMH HTN CKD PAD s/p femoral  endarterectomy,  Plavix ,depression , and smoker ( quit  4/2019 ) endorsed 10 lb weight loss reports increasing SOB s/p colonoscopy -unremarkable.    Presented to [place] ED found to have b/l pleural effusions, anemia, elevated creatinine 2.8 and TTE reveals mobile echodensity on AV with moderate to severe AI. Blood cultures + for gram + cocci in pairs/chains. Treated with one unit PRBC-  Ampicillin 2gram IV q6-  PET negative for evidence of infection. Stabilized transferred to [facility] for ZAHIRA. Admitted to  CT Sx for further management.    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  sp AVR  and MV repair and cabg 5/31  known CKD-3- Cr at baseline        Appreciate CT sx, cards, ID and nephrology mgmt  Awaiting Bld cx clearence  cont current abx regimen    eventual plan for picc and long term abx    Will cont to follow  [facility]  [phone]

## 2019-06-04 NOTE — PROGRESS NOTE ADULT - ASSESSMENT
63 f   post op  5/31  C1L, AVR, MV repair   + E faecalis,  nl EF    H     CKD  cervical fusion,  smoker, PAD  lt fem endarectorectomy  post op products    ID for abx     will need long course  abx  and PICC after BC    6/3  trf too floor  NSR   +pw   abx  and diuretics   6/4: Preliminary bld cx negative. Pt will need PICC line when bld cx b/l negative

## 2019-06-04 NOTE — PROGRESS NOTE ADULT - ASSESSMENT
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   No signs or symptoms of neurological issues ,  no prior UTI or infection or ppt event.  Follow up bc negative on ampicillin.  Pt has renal insufficieny , unclear how long it has been present and whether it is related to endocarditis         ceftriaxone and  ampicillin for synergy ( want to avoid genta)   Valve tissue gram stain with polys and gram variable cocci sen- cultures pending  will need prolonged course with picc  in view of positive or culture; 6 weeks from surgery

## 2019-06-04 NOTE — PROGRESS NOTE ADULT - SUBJECTIVE AND OBJECTIVE BOX
infectious diseases progress note:    Patient is a 63y old  Female who presents with a chief complaint of torito (03 Jun 2019 16:31)        Atherosclerosis of native coronary artery without angina pectoris             No Known Allergies    Intolerances        ANTIBIOTICS/RELEVANT:  antimicrobials  ampicillin  IVPB 2 Gram(s) IV Intermittent every 6 hours  cefTRIAXone   IVPB 2 Gram(s) IV Intermittent every 12 hours    immunologic:    OTHER:  amLODIPine   Tablet 10 milliGRAM(s) Oral daily  aspirin 325 milliGRAM(s) Oral daily  atorvastatin 20 milliGRAM(s) Oral at bedtime  buPROPion XL . 150 milliGRAM(s) Oral daily  calcium carbonate 1250 mG  + Vitamin D (OsCal 500 + D) 1 Tablet(s) Oral two times a day  chlorhexidine 2% Cloths 1 Application(s) Topical <User Schedule>  clopidogrel Tablet 75 milliGRAM(s) Oral daily  famotidine    Tablet 20 milliGRAM(s) Oral daily  furosemide    Tablet 40 milliGRAM(s) Oral every 12 hours  heparin  Injectable 5000 Unit(s) SubCutaneous every 8 hours  metoprolol tartrate 25 milliGRAM(s) Oral every 12 hours  oxyCODONE    IR 10 milliGRAM(s) Oral every 6 hours PRN  oxyCODONE    IR 5 milliGRAM(s) Oral every 6 hours PRN  polyethylene glycol 3350 17 Gram(s) Oral daily  pramipexole 0.5 milliGRAM(s) Oral at bedtime  sodium chloride 0.9% lock flush 3 milliLiter(s) IV Push every 8 hours      Objective:  Vital Signs Last 24 Hrs  T(C): 36.7 (04 Jun 2019 04:26), Max: 36.7 (03 Jun 2019 19:46)  T(F): 98.1 (04 Jun 2019 04:26), Max: 98.1 (04 Jun 2019 04:26)  HR: 52 (04 Jun 2019 04:26) (52 - 80)  BP: 166/77 (04 Jun 2019 04:26) (123/70 - 171/83)  BP(mean): 115 (03 Jun 2019 10:15) (90 - 116)  RR: 18 (04 Jun 2019 04:26) (16 - 26)  SpO2: 94% (04 Jun 2019 04:26) (94% - 100%)    PHYSICAL EXAM:     Eyes:AMY, EOMI  Ear/Nose/Throat: no oral lesion, no sinus tenderness on percussion	  Neck:no JVD, no lymphadenopathy, supple  Respiratory: CTA elle  Cardiovascular: S1S2 RRR, no murmurs  Gastrointestinal:soft, (+) BS, no HSM  Extremities:no e/e/c        LABS:                        10.8   14.5  )-----------( 200      ( 04 Jun 2019 06:19 )             31.2     06-04    135  |  96  |  34<H>  ----------------------------<  89  3.7   |  25  |  1.89<H>    Ca    9.0      04 Jun 2019 06:19    TPro  5.6<L>  /  Alb  3.4  /  TBili  0.1<L>  /  DBili  x   /  AST  24  /  ALT  18  /  AlkPhos  77  06-03    PT/INR - ( 04 Jun 2019 06:19 )   PT: 12.1 sec;   INR: 1.06 ratio         PTT - ( 04 Jun 2019 06:19 )  PTT:31.5 sec        MICROBIOLOGY:    RECENT CULTURES:  06-01 @ 00:21 .Tissue       **Please Note**: This is a Corrected Report**  No polymorphonuclear cells seen per low power field  Numerous Gram Variable Cocci seen per oil power field  Previously reported as:  No polymorphonuclear cells seen per low power field  No organisms seen per oil power field           Few Enterococcus species    05-29 @ 13:25 .Blood                No growth at 5 days.    05-28 @ 23:11 .Blood   JR    Growth in anaerobic bottle: Gram Positive Cocci in Pairs and Chains    Enterococcus faecalis  Enterococcus faecalis     Growth in anaerobic bottle: Enterococcus faecalis          RESPIRATORY CULTURES:              RADIOLOGY & ADDITIONAL STUDIES:        Pager [phone]  After 5 pm/weekends or if no response :[phone]

## 2019-06-05 DIAGNOSIS — Z95.2 PRESENCE OF PROSTHETIC HEART VALVE: ICD-10-CM

## 2019-06-05 DIAGNOSIS — Z98.890 OTHER SPECIFIED POSTPROCEDURAL STATES: ICD-10-CM

## 2019-06-05 DIAGNOSIS — Z95.1 PRESENCE OF AORTOCORONARY BYPASS GRAFT: ICD-10-CM

## 2019-06-05 LAB
ANION GAP SERPL CALC-SCNC: 16 MMOL/L — SIGNIFICANT CHANGE UP (ref 5–17)
BUN SERPL-MCNC: 32 MG/DL — HIGH (ref 7–23)
CALCIUM SERPL-MCNC: 9.8 MG/DL — SIGNIFICANT CHANGE UP (ref 8.4–10.5)
CHLORIDE SERPL-SCNC: 95 MMOL/L — LOW (ref 96–108)
CO2 SERPL-SCNC: 28 MMOL/L — SIGNIFICANT CHANGE UP (ref 22–31)
CREAT SERPL-MCNC: 1.85 MG/DL — HIGH (ref 0.5–1.3)
CULTURE RESULTS: SIGNIFICANT CHANGE UP
GLUCOSE SERPL-MCNC: 113 MG/DL — HIGH (ref 70–99)
HCT VFR BLD CALC: 33.4 % — LOW (ref 34.5–45)
HGB BLD-MCNC: 11.1 G/DL — LOW (ref 11.5–15.5)
MCHC RBC-ENTMCNC: 29.5 PG — SIGNIFICANT CHANGE UP (ref 27–34)
MCHC RBC-ENTMCNC: 33.2 GM/DL — SIGNIFICANT CHANGE UP (ref 32–36)
MCV RBC AUTO: 89 FL — SIGNIFICANT CHANGE UP (ref 80–100)
ORGANISM # SPEC MICROSCOPIC CNT: SIGNIFICANT CHANGE UP
ORGANISM # SPEC MICROSCOPIC CNT: SIGNIFICANT CHANGE UP
PLATELET # BLD AUTO: 235 K/UL — SIGNIFICANT CHANGE UP (ref 150–400)
POTASSIUM SERPL-MCNC: 3.9 MMOL/L — SIGNIFICANT CHANGE UP (ref 3.5–5.3)
POTASSIUM SERPL-SCNC: 3.9 MMOL/L — SIGNIFICANT CHANGE UP (ref 3.5–5.3)
RBC # BLD: 3.76 M/UL — LOW (ref 3.8–5.2)
RBC # FLD: 15.5 % — HIGH (ref 10.3–14.5)
SODIUM SERPL-SCNC: 139 MMOL/L — SIGNIFICANT CHANGE UP (ref 135–145)
SPECIMEN SOURCE: SIGNIFICANT CHANGE UP
WBC # BLD: 12.3 K/UL — HIGH (ref 3.8–10.5)
WBC # FLD AUTO: 12.3 K/UL — HIGH (ref 3.8–10.5)

## 2019-06-05 PROCEDURE — 99233 SBSQ HOSP IP/OBS HIGH 50: CPT

## 2019-06-05 RX ORDER — POTASSIUM CHLORIDE 20 MEQ
20 PACKET (EA) ORAL ONCE
Refills: 0 | Status: COMPLETED | OUTPATIENT
Start: 2019-06-05 | End: 2019-06-05

## 2019-06-05 RX ADMIN — Medication 25 MILLIGRAM(S): at 05:02

## 2019-06-05 RX ADMIN — FAMOTIDINE 20 MILLIGRAM(S): 10 INJECTION INTRAVENOUS at 11:20

## 2019-06-05 RX ADMIN — Medication 325 MILLIGRAM(S): at 11:20

## 2019-06-05 RX ADMIN — PRAMIPEXOLE DIHYDROCHLORIDE 0.5 MILLIGRAM(S): 0.12 TABLET ORAL at 21:12

## 2019-06-05 RX ADMIN — OXYCODONE HYDROCHLORIDE 5 MILLIGRAM(S): 5 TABLET ORAL at 03:11

## 2019-06-05 RX ADMIN — Medication 216 GRAM(S): at 20:08

## 2019-06-05 RX ADMIN — HEPARIN SODIUM 5000 UNIT(S): 5000 INJECTION INTRAVENOUS; SUBCUTANEOUS at 21:12

## 2019-06-05 RX ADMIN — OXYCODONE HYDROCHLORIDE 10 MILLIGRAM(S): 5 TABLET ORAL at 12:22

## 2019-06-05 RX ADMIN — AMLODIPINE BESYLATE 10 MILLIGRAM(S): 2.5 TABLET ORAL at 05:02

## 2019-06-05 RX ADMIN — BUPROPION HYDROCHLORIDE 150 MILLIGRAM(S): 150 TABLET, EXTENDED RELEASE ORAL at 11:20

## 2019-06-05 RX ADMIN — CLOPIDOGREL BISULFATE 75 MILLIGRAM(S): 75 TABLET, FILM COATED ORAL at 11:20

## 2019-06-05 RX ADMIN — Medication 20 MILLIEQUIVALENT(S): at 10:15

## 2019-06-05 RX ADMIN — Medication 25 MILLIGRAM(S): at 17:24

## 2019-06-05 RX ADMIN — SPIRONOLACTONE 25 MILLIGRAM(S): 25 TABLET, FILM COATED ORAL at 05:04

## 2019-06-05 RX ADMIN — OXYCODONE HYDROCHLORIDE 10 MILLIGRAM(S): 5 TABLET ORAL at 11:22

## 2019-06-05 RX ADMIN — Medication 216 GRAM(S): at 14:07

## 2019-06-05 RX ADMIN — Medication 40 MILLIGRAM(S): at 05:02

## 2019-06-05 RX ADMIN — ATORVASTATIN CALCIUM 20 MILLIGRAM(S): 80 TABLET, FILM COATED ORAL at 21:12

## 2019-06-05 RX ADMIN — OXYCODONE HYDROCHLORIDE 5 MILLIGRAM(S): 5 TABLET ORAL at 02:41

## 2019-06-05 RX ADMIN — CHLORHEXIDINE GLUCONATE 1 APPLICATION(S): 213 SOLUTION TOPICAL at 08:33

## 2019-06-05 RX ADMIN — HEPARIN SODIUM 5000 UNIT(S): 5000 INJECTION INTRAVENOUS; SUBCUTANEOUS at 05:02

## 2019-06-05 RX ADMIN — SODIUM CHLORIDE 3 MILLILITER(S): 9 INJECTION INTRAMUSCULAR; INTRAVENOUS; SUBCUTANEOUS at 21:16

## 2019-06-05 RX ADMIN — Medication 1 TABLET(S): at 05:02

## 2019-06-05 RX ADMIN — Medication 216 GRAM(S): at 08:30

## 2019-06-05 RX ADMIN — Medication 216 GRAM(S): at 02:07

## 2019-06-05 RX ADMIN — OXYCODONE HYDROCHLORIDE 10 MILLIGRAM(S): 5 TABLET ORAL at 20:17

## 2019-06-05 RX ADMIN — OXYCODONE HYDROCHLORIDE 10 MILLIGRAM(S): 5 TABLET ORAL at 20:47

## 2019-06-05 RX ADMIN — SODIUM CHLORIDE 3 MILLILITER(S): 9 INJECTION INTRAMUSCULAR; INTRAVENOUS; SUBCUTANEOUS at 13:55

## 2019-06-05 RX ADMIN — CEFTRIAXONE 100 GRAM(S): 500 INJECTION, POWDER, FOR SOLUTION INTRAMUSCULAR; INTRAVENOUS at 17:24

## 2019-06-05 RX ADMIN — SODIUM CHLORIDE 3 MILLILITER(S): 9 INJECTION INTRAMUSCULAR; INTRAVENOUS; SUBCUTANEOUS at 06:42

## 2019-06-05 RX ADMIN — HEPARIN SODIUM 5000 UNIT(S): 5000 INJECTION INTRAVENOUS; SUBCUTANEOUS at 14:07

## 2019-06-05 RX ADMIN — CEFTRIAXONE 100 GRAM(S): 500 INJECTION, POWDER, FOR SOLUTION INTRAMUSCULAR; INTRAVENOUS at 05:02

## 2019-06-05 RX ADMIN — Medication 1 TABLET(S): at 17:24

## 2019-06-05 NOTE — PROGRESS NOTE ADULT - PROBLEM SELECTOR PLAN 1
need   PICC after clear BC  sent from 6/3    sp AVR  and MV repair  and CABG ID following  iv abx as per ID- ampicillin and ceftriaxone - 6 weeks from the date of surgery : 7/12   place he catheter as per renal - d/w IR  he catheter placement 6/6 in IR

## 2019-06-05 NOTE — PROGRESS NOTE ADULT - SUBJECTIVE AND OBJECTIVE BOX
[facility] -- Chao Lomax, Kunal, [place] Lacy Patel, Savella  Office # [phone]      Follow Up:  endocarditis    Subjective/Observations: Patient seen and examined. Events noted. Resting comfortably in bed. No complaints of chest pain or palpitations reported.  She is complaining of more orthopnea for the last 2 nights.       REVIEW OF SYSTEMS: All other review of systems is negative unless indicated above    PAST MEDICAL & SURGICAL HISTORY:  Hypertension  Restless leg syndrome: Especially with General Anesthesia  Arthritis: Cervical Spine  and Hands  H/O cervical discectomy  Acute peptic ulcer with perforation: 1980  S/P appendectomy: 2014  S/P tubal ligation: 1986  Osteoarthritis of right shoulder region: Right Shoulder Arthroscopy  2007  Rheumatoid arthritis of carpometacarpal joint of thumb: Total Joint Replacement of Left Thumb      MEDICATIONS  (STANDING):  amLODIPine   Tablet 10 milliGRAM(s) Oral daily  ampicillin  IVPB 2 Gram(s) IV Intermittent every 6 hours  aspirin 325 milliGRAM(s) Oral daily  atorvastatin 20 milliGRAM(s) Oral at bedtime  buPROPion XL . 150 milliGRAM(s) Oral daily  calcium carbonate 1250 mG  + Vitamin D (OsCal 500 + D) 1 Tablet(s) Oral two times a day  cefTRIAXone   IVPB 2 Gram(s) IV Intermittent every 12 hours  chlorhexidine 2% Cloths 1 Application(s) Topical <User Schedule>  clopidogrel Tablet 75 milliGRAM(s) Oral daily  famotidine    Tablet 20 milliGRAM(s) Oral daily  furosemide    Tablet 40 milliGRAM(s) Oral daily  heparin  Injectable 5000 Unit(s) SubCutaneous every 8 hours  metoprolol tartrate 25 milliGRAM(s) Oral every 12 hours  polyethylene glycol 3350 17 Gram(s) Oral daily  pramipexole 0.5 milliGRAM(s) Oral at bedtime  sodium chloride 0.9% lock flush 3 milliLiter(s) IV Push every 8 hours  spironolactone 25 milliGRAM(s) Oral daily    MEDICATIONS  (PRN):  oxyCODONE    IR 10 milliGRAM(s) Oral every 6 hours PRN Severe Pain (7 - 10)  oxyCODONE    IR 5 milliGRAM(s) Oral every 6 hours PRN Moderate Pain (4 - 6)      Allergies    No Known Allergies    Intolerances            Vital Signs Last 24 Hrs  T(C): 36.7 (05 Jun 2019 05:05), Max: 36.8 (04 Jun 2019 19:37)  T(F): 98 (05 Jun 2019 05:05), Max: 98.2 (04 Jun 2019 19:37)  HR: 81 (05 Jun 2019 05:05) (75 - 84)  BP: 128/73 (05 Jun 2019 05:05) (99/67 - 166/85)  BP(mean): --  RR: 18 (05 Jun 2019 05:05) (18 - 18)  SpO2: 95% (05 Jun 2019 05:05) (94% - 97%)    I&O's Summary    04 Jun 2019 07:01  -  05 Jun 2019 07:00  --------------------------------------------------------  IN: 1470 mL / OUT: 2300 mL / NET: -830 mL          PHYSICAL EXAM:  TELE: SR 70-80  Constitutional: NAD, awake    HEENT: Moist Mucous Membranes, Anicteric  Pulmonary: Decreased breath sounds b/l. No rales, crackles or wheeze appreciated.   Cardiovascular: Regular, S1 and S2, No murmurs, rubs, gallops or clicks  Gastrointestinal: Bowel Sounds present, soft, nontender.   Lymph: No peripheral edema. No lymphadenopathy.  Skin: No visible rashes or ulcers.  Psych:  Mood & affect appropriate for situation    LABS: All Labs Reviewed:                        11.1   12.3  )-----------( 235      ( 05 Jun 2019 06:59 )             33.4                         10.8   14.5  )-----------( 200      ( 04 Jun 2019 06:19 )             31.2                         9.4    17.2  )-----------( 138      ( 03 Jun 2019 02:12 )             27.8     05 Jun 2019 06:59    139    |  95     |  32     ----------------------------<  113    3.9     |  28     |  1.85   04 Jun 2019 06:19    135    |  96     |  34     ----------------------------<  89     3.7     |  25     |  1.89   03 Jun 2019 02:12    135    |  99     |  31     ----------------------------<  113    4.4     |  21     |  1.95     Ca    9.8        05 Jun 2019 06:59  Ca    9.0        04 Jun 2019 06:19  Ca    8.4        03 Jun 2019 02:12    TPro  5.6    /  Alb  3.4    /  TBili  0.1    /  DBili  x      /  AST  24     /  ALT  18     /  AlkPhos  77     03 Jun 2019 02:12    PT/INR - ( 04 Jun 2019 06:19 )   PT: 12.1 sec;   INR: 1.06 ratio         PTT - ( 04 Jun 2019 06:19 )  PTT:31.5 sec

## 2019-06-05 NOTE — PROGRESS NOTE ADULT - SUBJECTIVE AND OBJECTIVE BOX
S/p OHS 5/31, doing well, awake, alert, denies complaints    Vital Signs Last 24 Hrs  T(C): 36.9 (06-05-19 @ 12:39), Max: 36.9 (06-05-19 @ 12:39)  T(F): 98.4 (06-05-19 @ 12:39), Max: 98.4 (06-05-19 @ 12:39)  HR: 76 (06-05-19 @ 12:39) (76 - 81)  BP: 132/81 (06-05-19 @ 12:39) (128/73 - 166/85)  RR: 18 (06-05-19 @ 12:39) (18 - 18)  SpO2: 96% (06-05-19 @ 12:39) (94% - 96%)     CHEST/LUNG: b/l air entry  HEART: S1S2  ABDOMEN: Soft, Nondistended, NT  EXTREMITIES: no edema                                                 11.1   12.3  )-----------( 235      ( 05 Jun 2019 06:59 )             33.4     05 Jun 2019 06:59    139    |  95     |  32     ----------------------------<  113    3.9     |  28     |  1.85     Ca    9.8        05 Jun 2019 06:59    amLODIPine   Tablet 10 milliGRAM(s) Oral daily  ampicillin  IVPB 2 Gram(s) IV Intermittent every 6 hours  aspirin 325 milliGRAM(s) Oral daily  atorvastatin 20 milliGRAM(s) Oral at bedtime  buPROPion XL . 150 milliGRAM(s) Oral daily  calcium carbonate 1250 mG  + Vitamin D (OsCal 500 + D) 1 Tablet(s) Oral two times a day  cefTRIAXone   IVPB 2 Gram(s) IV Intermittent every 12 hours  chlorhexidine 2% Cloths 1 Application(s) Topical <User Schedule>  clopidogrel Tablet 75 milliGRAM(s) Oral daily  famotidine    Tablet 20 milliGRAM(s) Oral daily  furosemide    Tablet 40 milliGRAM(s) Oral daily  heparin  Injectable 5000 Unit(s) SubCutaneous every 8 hours  metoprolol tartrate 25 milliGRAM(s) Oral every 12 hours  oxyCODONE    IR 10 milliGRAM(s) Oral every 6 hours PRN  oxyCODONE    IR 5 milliGRAM(s) Oral every 6 hours PRN  polyethylene glycol 3350 17 Gram(s) Oral daily  pramipexole 0.5 milliGRAM(s) Oral at bedtime  sodium chloride 0.9% lock flush 3 milliLiter(s) IV Push every 8 hours  spironolactone 25 milliGRAM(s) Oral daily    Impression/Plan:    Known CKD 3 w/Cr 1.8-2 at baseline  AV endocarditis, severe AI  S/p CABG x 1, AVR, MV repair 5/31  Stable renal fx  F/u on Lasix, Aldactone  Abx per ID  Agree w/Mae cath for op abx tx  Dose meds for CrCl < 30  Avoid nephrotoxins  D/w CTS team  D/w pt and

## 2019-06-05 NOTE — PROGRESS NOTE ADULT - ASSESSMENT
63y Female with HTN, CKD, PAD s/p right femoral endarterectomy, depression, and smoker (quit 5 PTA) presented to the ED c/o SOB  found to have Gram + sepsis with now signs of endocarditis, s/p OR for bio AVR, MV repair, and CABG with LIMA to LAD     -there is no evidence of acute ischemia.  -s/p cabg with lima to the lad for significant ostial disease  -cont asa, plavix  -cont statin  -cont bb    -there is no evidence of significant arrhythmia.    -there is evidence for mild volume overload and worsening orthopnea.   -cont po lasix 40mg Qday. I would consider giving her a dose of lasix 40mg IV x 1  - Reduce salt intake.     -DVT prophylaxis  -monitor electrolytes, keep k>4, Mg>2  -will follow

## 2019-06-05 NOTE — PROGRESS NOTE ADULT - ASSESSMENT
63 f   post op  5/31  C1L, AVR, MV repair   + E faecalis,  nl EF    H     CKD  cervical fusion,  smoker, PAD  lt fem endarectorectomy  post op products    ID for abx     will need long course  abx  and PICC after BC    6/3  trf too floor  NSR   +pw   abx  and diuretics   6/4: Preliminary bld cx negative. Pt will need PICC line when bld cx b/l negative 63 f   post op  5/31  C1L, AVR, MV repair   + E faecalis,  nl EF    H     CKD  cervical fusion,  smoker, PAD  lt fem endarectorectomy  post op products    ID for abx     will need long course  abx  and PICC after BC    6/3  trf too floor  NSR   +pw   abx  and diuretics   6/4: Preliminary bld cx negative. Pt will need PICC line when bld cx b/l negative  6/5 VSS; WBC 12- continue ampicillin and ceftriaxone as per ID; place he catheter as per renal- Dr. Berger; d/w IR - he catheter to be placed 6/6 as per renal- npo after midnight   pw d/c this am; abx til 7/12 as per ID  Discharge planning- home after he placed and home IV ABX setup

## 2019-06-05 NOTE — PROGRESS NOTE ADULT - SUBJECTIVE AND OBJECTIVE BOX
VITAL SIGNS    Telemetry:    Vital Signs Last 24 Hrs  T(C): 36.9 (19 @ 12:39), Max: 36.9 (19 @ 12:39)  T(F): 98.4 (19 @ 12:39), Max: 98.4 (19 @ 12:39)  HR: 76 (19 @ 12:39) (76 - 84)  BP: 132/81 (19 @ 12:39) (99/67 - 166/85)  RR: 18 (19 @ 12:39) (18 - 18)  SpO2: 96% (19 @ 12:39) (94% - 97%)             @ 07:01  -   @ 07:00  --------------------------------------------------------  IN: 1470 mL / OUT: 2300 mL / NET: -830 mL     @ 07:01  -   @ 13:00  --------------------------------------------------------  IN: 100 mL / OUT: 1800 mL / NET: -1700 mL       Daily     Daily Weight in k.5 (2019 07:15)  Admit Wt: Drug Dosing Weight  Height (cm): 162.56 (31 May 2019 09:30)  Weight (kg): 48.1 (31 May 2019 09:30)  BMI (kg/m2): 18.2 (31 May 2019 09:30)  BSA (m2): 1.49 (31 May 2019 09:30)      CAPILLARY BLOOD GLUCOSE              amLODIPine   Tablet 10 milliGRAM(s) Oral daily  ampicillin  IVPB 2 Gram(s) IV Intermittent every 6 hours  aspirin 325 milliGRAM(s) Oral daily  atorvastatin 20 milliGRAM(s) Oral at bedtime  buPROPion XL . 150 milliGRAM(s) Oral daily  calcium carbonate 1250 mG  + Vitamin D (OsCal 500 + D) 1 Tablet(s) Oral two times a day  cefTRIAXone   IVPB 2 Gram(s) IV Intermittent every 12 hours  chlorhexidine 2% Cloths 1 Application(s) Topical <User Schedule>  clopidogrel Tablet 75 milliGRAM(s) Oral daily  famotidine    Tablet 20 milliGRAM(s) Oral daily  furosemide    Tablet 40 milliGRAM(s) Oral daily  heparin  Injectable 5000 Unit(s) SubCutaneous every 8 hours  metoprolol tartrate 25 milliGRAM(s) Oral every 12 hours  oxyCODONE    IR 10 milliGRAM(s) Oral every 6 hours PRN  oxyCODONE    IR 5 milliGRAM(s) Oral every 6 hours PRN  polyethylene glycol 3350 17 Gram(s) Oral daily  pramipexole 0.5 milliGRAM(s) Oral at bedtime  sodium chloride 0.9% lock flush 3 milliLiter(s) IV Push every 8 hours  spironolactone 25 milliGRAM(s) Oral daily      PHYSICAL EXAM    Subjective: "Hi.   Neurology: alert and oriented x 3, nonfocal, no gross deficits  CV : tele:  RSR  Sternal Wound :  CDI with dressing , Stable  Lungs: clear. RR easy, unlabored   Abdomen: soft, nontender, nondistended, positive bowel sounds, bowel movement   Neg N/V/D   :  pt voiding without difficulty   Extremities:   HARRISON; edema, neg calf tenderness.   PPP bilaterally      PW:  Chest tubes: VITAL SIGNS    Telemetry:  RSR 70-90   Vital Signs Last 24 Hrs  T(C): 36.9 (19 @ 12:39), Max: 36.9 (19 @ 12:39)  T(F): 98.4 (19 @ 12:39), Max: 98.4 (19 @ 12:39)  HR: 76 (19 @ 12:39) (76 - 84)  BP: 132/81 (19 @ 12:39) (99/67 - 166/85)  RR: 18 (19 @ 12:39) (18 - 18)  SpO2: 96% (19 @ 12:39) (94% - 97%)             @ 07:01  -   @ 07:00  --------------------------------------------------------  IN: 1470 mL / OUT: 2300 mL / NET: -830 mL     @ 07:01  -   @ 13:00  --------------------------------------------------------  IN: 100 mL / OUT: 1800 mL / NET: -1700 mL       Daily     Daily Weight in k.5 (2019 07:15)  Admit Wt: Drug Dosing Weight  Height (cm): 162.56 (31 May 2019 09:30)  Weight (kg): 48.1 (31 May 2019 09:30)  BMI (kg/m2): 18.2 (31 May 2019 09:30)  BSA (m2): 1.49 (31 May 2019 09:30)      CAPILLARY BLOOD GLUCOSE              amLODIPine   Tablet 10 milliGRAM(s) Oral daily  ampicillin  IVPB 2 Gram(s) IV Intermittent every 6 hours  aspirin 325 milliGRAM(s) Oral daily  atorvastatin 20 milliGRAM(s) Oral at bedtime  buPROPion XL . 150 milliGRAM(s) Oral daily  calcium carbonate 1250 mG  + Vitamin D (OsCal 500 + D) 1 Tablet(s) Oral two times a day  cefTRIAXone   IVPB 2 Gram(s) IV Intermittent every 12 hours  chlorhexidine 2% Cloths 1 Application(s) Topical <User Schedule>  clopidogrel Tablet 75 milliGRAM(s) Oral daily  famotidine    Tablet 20 milliGRAM(s) Oral daily  furosemide    Tablet 40 milliGRAM(s) Oral daily  heparin  Injectable 5000 Unit(s) SubCutaneous every 8 hours  metoprolol tartrate 25 milliGRAM(s) Oral every 12 hours  oxyCODONE    IR 10 milliGRAM(s) Oral every 6 hours PRN  oxyCODONE    IR 5 milliGRAM(s) Oral every 6 hours PRN  polyethylene glycol 3350 17 Gram(s) Oral daily  pramipexole 0.5 milliGRAM(s) Oral at bedtime  sodium chloride 0.9% lock flush 3 milliLiter(s) IV Push every 8 hours  spironolactone 25 milliGRAM(s) Oral daily      PHYSICAL EXAM    Subjective: "I feel ok."   Neurology: alert and oriented x 3, nonfocal, no gross deficits  CV : tele:  RSR 70-90   Sternal Wound :  CDI MATTHEW; + isolated   Lungs: clear. RR easy, unlabored   Abdomen: soft, nontender, nondistended, positive bowel sounds, + bowel movement; Neg N/V/D   :  pt voiding without difficulty   Extremities:   HARRISON; edema, neg calf tenderness.   PPP bilaterally      PW: + isolated   Chest tubes: none

## 2019-06-05 NOTE — PROGRESS NOTE ADULT - PROBLEM SELECTOR PLAN 2
sp  CABG  + pw  home plan  with abx continue postop care  continue asa/ statin/ plavix/ b-blockers  pw d/c today  diuresis  pain management  pulm toilet  increase activity as tolerated  Discharge planning- home with IV ABX once he placement

## 2019-06-06 LAB
ANION GAP SERPL CALC-SCNC: 12 MMOL/L — SIGNIFICANT CHANGE UP (ref 5–17)
BUN SERPL-MCNC: 30 MG/DL — HIGH (ref 7–23)
CALCIUM SERPL-MCNC: 9.6 MG/DL — SIGNIFICANT CHANGE UP (ref 8.4–10.5)
CHLORIDE SERPL-SCNC: 96 MMOL/L — SIGNIFICANT CHANGE UP (ref 96–108)
CO2 SERPL-SCNC: 27 MMOL/L — SIGNIFICANT CHANGE UP (ref 22–31)
CREAT SERPL-MCNC: 1.83 MG/DL — HIGH (ref 0.5–1.3)
GLUCOSE SERPL-MCNC: 99 MG/DL — SIGNIFICANT CHANGE UP (ref 70–99)
HCT VFR BLD CALC: 33.6 % — LOW (ref 34.5–45)
HGB BLD-MCNC: 10.9 G/DL — LOW (ref 11.5–15.5)
MAGNESIUM SERPL-MCNC: 1.9 MG/DL — SIGNIFICANT CHANGE UP (ref 1.6–2.6)
MCHC RBC-ENTMCNC: 29.2 PG — SIGNIFICANT CHANGE UP (ref 27–34)
MCHC RBC-ENTMCNC: 32.6 GM/DL — SIGNIFICANT CHANGE UP (ref 32–36)
MCV RBC AUTO: 89.5 FL — SIGNIFICANT CHANGE UP (ref 80–100)
PHOSPHATE SERPL-MCNC: 3.7 MG/DL — SIGNIFICANT CHANGE UP (ref 2.5–4.5)
PLATELET # BLD AUTO: 228 K/UL — SIGNIFICANT CHANGE UP (ref 150–400)
POTASSIUM SERPL-MCNC: 4.1 MMOL/L — SIGNIFICANT CHANGE UP (ref 3.5–5.3)
POTASSIUM SERPL-SCNC: 4.1 MMOL/L — SIGNIFICANT CHANGE UP (ref 3.5–5.3)
RBC # BLD: 3.75 M/UL — LOW (ref 3.8–5.2)
RBC # FLD: 15.5 % — HIGH (ref 10.3–14.5)
SODIUM SERPL-SCNC: 135 MMOL/L — SIGNIFICANT CHANGE UP (ref 135–145)
WBC # BLD: 11.2 K/UL — HIGH (ref 3.8–10.5)
WBC # FLD AUTO: 11.2 K/UL — HIGH (ref 3.8–10.5)

## 2019-06-06 PROCEDURE — 99232 SBSQ HOSP IP/OBS MODERATE 35: CPT

## 2019-06-06 PROCEDURE — 36558 INSERT TUNNELED CV CATH: CPT

## 2019-06-06 PROCEDURE — 76937 US GUIDE VASCULAR ACCESS: CPT | Mod: 26

## 2019-06-06 PROCEDURE — 77001 FLUOROGUIDE FOR VEIN DEVICE: CPT | Mod: 26

## 2019-06-06 RX ORDER — METOPROLOL TARTRATE 50 MG
25 TABLET ORAL ONCE
Refills: 0 | Status: COMPLETED | OUTPATIENT
Start: 2019-06-06 | End: 2019-06-06

## 2019-06-06 RX ORDER — MAGNESIUM SULFATE 500 MG/ML
1 VIAL (ML) INJECTION ONCE
Refills: 0 | Status: COMPLETED | OUTPATIENT
Start: 2019-06-06 | End: 2019-06-06

## 2019-06-06 RX ORDER — POTASSIUM CHLORIDE 20 MEQ
20 PACKET (EA) ORAL ONCE
Refills: 0 | Status: COMPLETED | OUTPATIENT
Start: 2019-06-06 | End: 2019-06-06

## 2019-06-06 RX ADMIN — OXYCODONE HYDROCHLORIDE 10 MILLIGRAM(S): 5 TABLET ORAL at 14:04

## 2019-06-06 RX ADMIN — Medication 20 MILLIEQUIVALENT(S): at 02:22

## 2019-06-06 RX ADMIN — HEPARIN SODIUM 5000 UNIT(S): 5000 INJECTION INTRAVENOUS; SUBCUTANEOUS at 05:55

## 2019-06-06 RX ADMIN — OXYCODONE HYDROCHLORIDE 10 MILLIGRAM(S): 5 TABLET ORAL at 23:09

## 2019-06-06 RX ADMIN — CLOPIDOGREL BISULFATE 75 MILLIGRAM(S): 75 TABLET, FILM COATED ORAL at 11:21

## 2019-06-06 RX ADMIN — Medication 40 MILLIGRAM(S): at 05:55

## 2019-06-06 RX ADMIN — OXYCODONE HYDROCHLORIDE 10 MILLIGRAM(S): 5 TABLET ORAL at 14:34

## 2019-06-06 RX ADMIN — AMLODIPINE BESYLATE 10 MILLIGRAM(S): 2.5 TABLET ORAL at 05:55

## 2019-06-06 RX ADMIN — HEPARIN SODIUM 5000 UNIT(S): 5000 INJECTION INTRAVENOUS; SUBCUTANEOUS at 13:57

## 2019-06-06 RX ADMIN — CHLORHEXIDINE GLUCONATE 1 APPLICATION(S): 213 SOLUTION TOPICAL at 11:36

## 2019-06-06 RX ADMIN — SODIUM CHLORIDE 3 MILLILITER(S): 9 INJECTION INTRAMUSCULAR; INTRAVENOUS; SUBCUTANEOUS at 06:01

## 2019-06-06 RX ADMIN — SPIRONOLACTONE 25 MILLIGRAM(S): 25 TABLET, FILM COATED ORAL at 05:55

## 2019-06-06 RX ADMIN — OXYCODONE HYDROCHLORIDE 10 MILLIGRAM(S): 5 TABLET ORAL at 23:39

## 2019-06-06 RX ADMIN — Medication 216 GRAM(S): at 13:56

## 2019-06-06 RX ADMIN — FAMOTIDINE 20 MILLIGRAM(S): 10 INJECTION INTRAVENOUS at 11:21

## 2019-06-06 RX ADMIN — Medication 325 MILLIGRAM(S): at 13:29

## 2019-06-06 RX ADMIN — Medication 25 MILLIGRAM(S): at 05:55

## 2019-06-06 RX ADMIN — CEFTRIAXONE 100 GRAM(S): 500 INJECTION, POWDER, FOR SOLUTION INTRAMUSCULAR; INTRAVENOUS at 05:56

## 2019-06-06 RX ADMIN — CEFTRIAXONE 100 GRAM(S): 500 INJECTION, POWDER, FOR SOLUTION INTRAMUSCULAR; INTRAVENOUS at 17:54

## 2019-06-06 RX ADMIN — PRAMIPEXOLE DIHYDROCHLORIDE 0.5 MILLIGRAM(S): 0.12 TABLET ORAL at 23:08

## 2019-06-06 RX ADMIN — Medication 1 TABLET(S): at 05:55

## 2019-06-06 RX ADMIN — Medication 25 MILLIGRAM(S): at 00:35

## 2019-06-06 RX ADMIN — SODIUM CHLORIDE 3 MILLILITER(S): 9 INJECTION INTRAMUSCULAR; INTRAVENOUS; SUBCUTANEOUS at 11:40

## 2019-06-06 RX ADMIN — Medication 216 GRAM(S): at 02:20

## 2019-06-06 RX ADMIN — BUPROPION HYDROCHLORIDE 150 MILLIGRAM(S): 150 TABLET, EXTENDED RELEASE ORAL at 11:22

## 2019-06-06 RX ADMIN — Medication 216 GRAM(S): at 20:28

## 2019-06-06 RX ADMIN — Medication 25 MILLIGRAM(S): at 20:28

## 2019-06-06 RX ADMIN — ATORVASTATIN CALCIUM 20 MILLIGRAM(S): 80 TABLET, FILM COATED ORAL at 23:08

## 2019-06-06 RX ADMIN — Medication 216 GRAM(S): at 08:33

## 2019-06-06 RX ADMIN — Medication 100 GRAM(S): at 02:28

## 2019-06-06 RX ADMIN — SODIUM CHLORIDE 3 MILLILITER(S): 9 INJECTION INTRAMUSCULAR; INTRAVENOUS; SUBCUTANEOUS at 21:48

## 2019-06-06 RX ADMIN — Medication 1 TABLET(S): at 23:08

## 2019-06-06 NOTE — PROGRESS NOTE ADULT - PROBLEM SELECTOR PLAN 4
continue postop care  continue asa/ statin/ plavix/ b-blockers  pw d/c today  diuresis  pain management  pulm toilet  increase activity as tolerated  Discharge planning- home with IV ABX once he placement
continue postop care  continue asa/ statin/ plavix/ b-blockers  pw d/c today  diuresis  pain management  pulm toilet  increase activity as tolerated  Discharge planning- home with IV ABX once he placement

## 2019-06-06 NOTE — PROGRESS NOTE ADULT - SUBJECTIVE AND OBJECTIVE BOX
S/p OHS 5/31, doing well, awake, alert, denies complaints    Vital Signs Last 24 Hrs  T(C): 36.7 (06-06-19 @ 13:14), Max: 37 (06-05-19 @ 20:53)  T(F): 98.1 (06-06-19 @ 13:14), Max: 98.6 (06-05-19 @ 20:53)  HR: 73 (06-06-19 @ 13:14) (62 - 73)  BP: 111/71 (06-06-19 @ 13:14) (111/71 - 171/87)  RR: 19 (06-06-19 @ 13:14) (18 - 19)  SpO2: 96% (06-06-19 @ 13:14) (94% - 96%)     CHEST/LUNG: b/l air entry  HEART: S1S2  ABDOMEN: Soft, Nondistended, NT  EXTREMITIES: no edema                                                          10.9   11.2  )-----------( 228      ( 06 Jun 2019 01:48 )             33.6     06 Jun 2019 01:48    135    |  96     |  30     ----------------------------<  99     4.1     |  27     |  1.83     Ca    9.6        06 Jun 2019 01:48  Phos  3.7       06 Jun 2019 01:48  Mg     1.9       06 Jun 2019 01:48    amLODIPine   Tablet 10 milliGRAM(s) Oral daily  ampicillin  IVPB 2 Gram(s) IV Intermittent every 6 hours  aspirin 325 milliGRAM(s) Oral daily  atorvastatin 20 milliGRAM(s) Oral at bedtime  buPROPion XL . 150 milliGRAM(s) Oral daily  calcium carbonate 1250 mG  + Vitamin D (OsCal 500 + D) 1 Tablet(s) Oral two times a day  cefTRIAXone   IVPB 2 Gram(s) IV Intermittent every 12 hours  chlorhexidine 2% Cloths 1 Application(s) Topical <User Schedule>  clopidogrel Tablet 75 milliGRAM(s) Oral daily  famotidine    Tablet 20 milliGRAM(s) Oral daily  furosemide    Tablet 40 milliGRAM(s) Oral daily  heparin  Injectable 5000 Unit(s) SubCutaneous every 8 hours  metoprolol tartrate 25 milliGRAM(s) Oral every 12 hours  oxyCODONE    IR 10 milliGRAM(s) Oral every 6 hours PRN  oxyCODONE    IR 5 milliGRAM(s) Oral every 6 hours PRN  polyethylene glycol 3350 17 Gram(s) Oral daily  pramipexole 0.5 milliGRAM(s) Oral at bedtime  sodium chloride 0.9% lock flush 3 milliLiter(s) IV Push every 8 hours  spironolactone 25 milliGRAM(s) Oral daily    Impression/Plan:    Known CKD 3 w/Cr 1.8-2 at baseline  AV endocarditis, severe AI  S/p CABG x 1, AVR, MV repair 5/31  Stable renal fx on Lasix, Aldactone  Abx per ID  S/p Mae cath for op abx tx  Dose meds for CrCl < 30  Avoid nephrotoxins

## 2019-06-06 NOTE — PROGRESS NOTE ADULT - PROBLEM SELECTOR PROBLEM 1
Acute bacterial endocarditis

## 2019-06-06 NOTE — PROGRESS NOTE ADULT - SUBJECTIVE AND OBJECTIVE BOX
[facility] - Chao Lomax, Kunal, Regino, [place], Roshan Gerrilety  Office Number:  [phone]    Patient resting comfortably in bed in NAD.  Laying flat with no respiratory distress.  No complaints of chest pain, dyspnea, palpitations, PND, or orthopnea.  feeling well. PAT overnight    ROS: negative unless otherwise mentioned.    Telemetry:  sr, pat to 200's    MEDICATIONS  (STANDING):  amLODIPine   Tablet 10 milliGRAM(s) Oral daily  ampicillin  IVPB 2 Gram(s) IV Intermittent every 6 hours  aspirin 325 milliGRAM(s) Oral daily  atorvastatin 20 milliGRAM(s) Oral at bedtime  buPROPion XL . 150 milliGRAM(s) Oral daily  calcium carbonate 1250 mG  + Vitamin D (OsCal 500 + D) 1 Tablet(s) Oral two times a day  cefTRIAXone   IVPB 2 Gram(s) IV Intermittent every 12 hours  chlorhexidine 2% Cloths 1 Application(s) Topical <User Schedule>  clopidogrel Tablet 75 milliGRAM(s) Oral daily  famotidine    Tablet 20 milliGRAM(s) Oral daily  furosemide    Tablet 40 milliGRAM(s) Oral daily  heparin  Injectable 5000 Unit(s) SubCutaneous every 8 hours  metoprolol tartrate 25 milliGRAM(s) Oral every 12 hours  polyethylene glycol 3350 17 Gram(s) Oral daily  pramipexole 0.5 milliGRAM(s) Oral at bedtime  sodium chloride 0.9% lock flush 3 milliLiter(s) IV Push every 8 hours  spironolactone 25 milliGRAM(s) Oral daily    MEDICATIONS  (PRN):  oxyCODONE    IR 10 milliGRAM(s) Oral every 6 hours PRN Severe Pain (7 - 10)  oxyCODONE    IR 5 milliGRAM(s) Oral every 6 hours PRN Moderate Pain (4 - 6)      Allergies    No Known Allergies    Intolerances        Vital Signs Last 24 Hrs  T(C): 36.7 (06 Jun 2019 05:13), Max: 37 (05 Jun 2019 20:53)  T(F): 98 (06 Jun 2019 05:13), Max: 98.6 (05 Jun 2019 20:53)  HR: 69 (06 Jun 2019 05:13) (62 - 76)  BP: 166/94 (06 Jun 2019 05:13) (132/81 - 171/87)  BP(mean): --  RR: 18 (06 Jun 2019 05:13) (18 - 18)  SpO2: 94% (06 Jun 2019 05:13) (94% - 96%)    I&O's Summary    05 Jun 2019 07:01  -  06 Jun 2019 07:00  --------------------------------------------------------  IN: 1120 mL / OUT: 2800 mL / NET: -1680 mL        ON EXAM:    Constitutional: NAD, awake    HEENT: Moist Mucous Membranes, Anicteric  Pulmonary: Decreased breath sounds b/l. No rales, crackles or wheeze appreciated.   Cardiovascular: Regular, S1 and S2, +sm at lusb 1/vi, no rubs, gallops or clicks  Gastrointestinal: Bowel Sounds present, soft, nontender.   Lymph: No peripheral edema. No lymphadenopathy.  Skin: No visible rashes or ulcers; + ecchymosis  Psych:  Mood & affect appropriate for situation    LABS: All Labs Reviewed:                        10.9   11.2  )-----------( 228      ( 06 Jun 2019 01:48 )             33.6                         11.1   12.3  )-----------( 235      ( 05 Jun 2019 06:59 )             33.4                         10.8   14.5  )-----------( 200      ( 04 Jun 2019 06:19 )             31.2     06 Jun 2019 01:48    135    |  96     |  30     ----------------------------<  99     4.1     |  27     |  1.83   05 Jun 2019 06:59    139    |  95     |  32     ----------------------------<  113    3.9     |  28     |  1.85   04 Jun 2019 06:19    135    |  96     |  34     ----------------------------<  89     3.7     |  25     |  1.89     Ca    9.6        06 Jun 2019 01:48  Ca    9.8        05 Jun 2019 06:59  Ca    9.0        04 Jun 2019 06:19  Phos  3.7       06 Jun 2019 01:48  Mg     1.9       06 Jun 2019 01:48            Blood Culture: Organism --  Gram Stain Blood -- Gram Stain --  Specimen Source .Blood  Culture-Blood --    Organism --  Gram Stain Blood -- Gram Stain --  Specimen Source .Blood  Culture-Blood --

## 2019-06-06 NOTE — PROGRESS NOTE ADULT - SUBJECTIVE AND OBJECTIVE BOX
63 F PMHX HTN, CKD ,PAD ,endocarditis s/p CABG, MVR and AVR on 5/31 referred to IR for tunneled central venous catheter placement or long term IV abx.      Anticoagulation: Plavix and aspirin    Allergies: No Known Allergies      PAST MEDICAL & SURGICAL HISTORY:  Hypertension  Restless leg syndrome: Especially with General Anesthesia  Arthritis: Cervical Spine  and Hands  H/O cervical discectomy  Acute peptic ulcer with perforation: 1980  S/P appendectomy: 2014  S/P tubal ligation: 1986  Osteoarthritis of right shoulder region: Right Shoulder Arthroscopy  2007  Rheumatoid arthritis of carpometacarpal joint of thumb: Total Joint Replacement of Left Thumb        Pertinent labs:                      10.9   11.2  )-----------( 228      ( 06 Jun 2019 01:48 )             33.6   06-06    135  |  96  |  30<H>  ----------------------------<  99  4.1   |  27  |  1.83<H>    Ca    9.6      06 Jun 2019 01:48  Phos  3.7     06-06  Mg     1.9     06-06    Culture - Blood (06.03.19 @ 16:29)    Specimen Source: .Blood    Culture Results:   No growth to date.    Culture - Blood (06.03.19 @ 14:00)    Specimen Source: .Blood    Culture Results:   No growth to date.    Consent: Procedure/risks/ Benefits explained. Informed consent obtained. Pt verbalizes understanding.

## 2019-06-06 NOTE — PROGRESS NOTE ADULT - ASSESSMENT
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   No signs or symptoms of neurological issues ,  no prior UTI or infection or ppt event.  Follow up bc negative on ampicillin.  Pt has renal insufficieny , unclear how long it has been present and whether it is related to endocarditis         ceftriaxone and  ampicillin for synergy ( want to avoid genta)   Valve tissue gram stain with polys and  enteroccus  will need prolonged course with picc  in view of positive or culture; 6 weeks from surgery

## 2019-06-06 NOTE — PROGRESS NOTE ADULT - SUBJECTIVE AND OBJECTIVE BOX
infectious diseases progress note:    Patient is a 63y old  Female who presents with a chief complaint of torito (05 Jun 2019 17:54)        Atherosclerosis of native coronary artery without angina pectoris        ROS:  CONSTITUTIONAL:  Negative fever or chills, feels well, good appetite  EYES:  Negative  blurry vision or double vision  CARDIOVASCULAR:  Negative for chest pain or palpitations  RESPIRATORY:  Negative for cough, wheezing, or SOB   GASTROINTESTINAL:  Negative for nausea, vomiting, diarrhea, constipation, or abdominal pain  GENITOURINARY:  Negative frequency, urgency or dysuria  NEUROLOGIC:  No headache, confusion, dizziness, lightheadedness    Allergies    No Known Allergies    Intolerances        ANTIBIOTICS/RELEVANT:  antimicrobials  ampicillin  IVPB 2 Gram(s) IV Intermittent every 6 hours  cefTRIAXone   IVPB 2 Gram(s) IV Intermittent every 12 hours    immunologic:    OTHER:  amLODIPine   Tablet 10 milliGRAM(s) Oral daily  aspirin 325 milliGRAM(s) Oral daily  atorvastatin 20 milliGRAM(s) Oral at bedtime  buPROPion XL . 150 milliGRAM(s) Oral daily  calcium carbonate 1250 mG  + Vitamin D (OsCal 500 + D) 1 Tablet(s) Oral two times a day  chlorhexidine 2% Cloths 1 Application(s) Topical <User Schedule>  clopidogrel Tablet 75 milliGRAM(s) Oral daily  famotidine    Tablet 20 milliGRAM(s) Oral daily  furosemide    Tablet 40 milliGRAM(s) Oral daily  heparin  Injectable 5000 Unit(s) SubCutaneous every 8 hours  metoprolol tartrate 25 milliGRAM(s) Oral every 12 hours  oxyCODONE    IR 10 milliGRAM(s) Oral every 6 hours PRN  oxyCODONE    IR 5 milliGRAM(s) Oral every 6 hours PRN  polyethylene glycol 3350 17 Gram(s) Oral daily  pramipexole 0.5 milliGRAM(s) Oral at bedtime  sodium chloride 0.9% lock flush 3 milliLiter(s) IV Push every 8 hours  spironolactone 25 milliGRAM(s) Oral daily      Objective:  Vital Signs Last 24 Hrs  T(C): 36.7 (06 Jun 2019 05:13), Max: 37 (05 Jun 2019 20:53)  T(F): 98 (06 Jun 2019 05:13), Max: 98.6 (05 Jun 2019 20:53)  HR: 69 (06 Jun 2019 05:13) (62 - 76)  BP: 166/94 (06 Jun 2019 05:13) (132/81 - 171/87)  BP(mean): --  RR: 18 (06 Jun 2019 05:13) (18 - 18)  SpO2: 94% (06 Jun 2019 05:13) (94% - 96%)    PHYSICAL EXAM:     Eyes:AMY, EOMI  Ear/Nose/Throat: no oral lesion, no sinus tenderness on percussion	  Neck:no JVD, no lymphadenopathy, supple  Respiratory: CTA elle  Cardiovascular: S1S2 RRR, no murmurs  Gastrointestinal:soft, (+) BS, no HSM  Extremities:no e/e/c        LABS:                        10.9   11.2  )-----------( 228      ( 06 Jun 2019 01:48 )             33.6     06-06    135  |  96  |  30<H>  ----------------------------<  99  4.1   |  27  |  1.83<H>    Ca    9.6      06 Jun 2019 01:48  Phos  3.7     06-06  Mg     1.9     06-06              MICROBIOLOGY:    RECENT CULTURES:  06-03 @ 16:29 .Blood                No growth to date.    06-03 @ 14:00 .Blood                No growth to date.    06-01 @ 00:21 .Tissue   JR    **Please Note**: This is a Corrected Report**  No polymorphonuclear cells seen per low power field  Numerous Gram Variable Cocci seen per oil power field  Previously reported as:  No polymorphonuclear cells seen per low power field  No organisms seen per oil power field    Enterococcus faecalis  Enterococcus faecalis     Few Enterococcus faecalis          RESPIRATORY CULTURES:              RADIOLOGY & ADDITIONAL STUDIES:        Pager [phone]  After 5 pm/weekends or if no response :[phone] Dementia with behavioral disturbance, unspecified dementia type Dementia with behavioral disturbance, unspecified dementia type Dementia with behavioral disturbance, unspecified dementia type Dementia with behavioral disturbance, unspecified dementia type Dementia with behavioral disturbance, unspecified dementia type Dementia with behavioral disturbance, unspecified dementia type Dementia with behavioral disturbance, unspecified dementia type Dementia with behavioral disturbance, unspecified dementia type Dementia with behavioral disturbance, unspecified dementia type Dementia with behavioral disturbance, unspecified dementia type Dementia with behavioral disturbance, unspecified dementia type

## 2019-06-06 NOTE — PROGRESS NOTE ADULT - ASSESSMENT
63 f   post op  5/31  C1L, AVR, MV repair   + E faecalis,  nl EF    H     CKD  cervical fusion,  smoker, PAD  lt fem endarectorectomy  post op products    ID for abx     will need long course  abx  and PICC after BC    6/3  trf too floor  NSR   +pw   abx  and diuretics   6/4: Preliminary bld cx negative. Pt will need PICC line when bld cx b/l negative  6/5 VSS; WBC 12- continue ampicillin and ceftriaxone as per ID; place he catheter as per renal- Dr. Berger; d/w IR - he catheter to be placed 6/6 as per renal- npo after midnight   pw d/c this am; abx til 7/12 as per ID  6/6/19- VSS he placed @ IR - slight bleeding at site  Discharge planning- home friday after  IV ABX setup

## 2019-06-06 NOTE — PROGRESS NOTE ADULT - SUBJECTIVE AND OBJECTIVE BOX
Patient is a 63y old  Female who presents with a chief complaint of torito (06 Jun 2019 11:34)      INTERVAL HPI/OVERNIGHT EVENTS: feels well, for picc line today      Vital Signs Last 24 Hrs  T(C): 36.7 (06 Jun 2019 13:14), Max: 37 (05 Jun 2019 20:53)  T(F): 98.1 (06 Jun 2019 13:14), Max: 98.6 (05 Jun 2019 20:53)  HR: 73 (06 Jun 2019 13:14) (62 - 73)  BP: 111/71 (06 Jun 2019 13:14) (111/71 - 171/87)  BP(mean): --  RR: 19 (06 Jun 2019 13:14) (18 - 19)  SpO2: 96% (06 Jun 2019 13:14) (94% - 96%)    amLODIPine   Tablet 10 milliGRAM(s) Oral daily  ampicillin  IVPB 2 Gram(s) IV Intermittent every 6 hours  aspirin 325 milliGRAM(s) Oral daily  atorvastatin 20 milliGRAM(s) Oral at bedtime  buPROPion XL . 150 milliGRAM(s) Oral daily  calcium carbonate 1250 mG  + Vitamin D (OsCal 500 + D) 1 Tablet(s) Oral two times a day  cefTRIAXone   IVPB 2 Gram(s) IV Intermittent every 12 hours  chlorhexidine 2% Cloths 1 Application(s) Topical <User Schedule>  clopidogrel Tablet 75 milliGRAM(s) Oral daily  famotidine    Tablet 20 milliGRAM(s) Oral daily  furosemide    Tablet 40 milliGRAM(s) Oral daily  heparin  Injectable 5000 Unit(s) SubCutaneous every 8 hours  metoprolol tartrate 25 milliGRAM(s) Oral every 12 hours  oxyCODONE    IR 10 milliGRAM(s) Oral every 6 hours PRN  oxyCODONE    IR 5 milliGRAM(s) Oral every 6 hours PRN  polyethylene glycol 3350 17 Gram(s) Oral daily  pramipexole 0.5 milliGRAM(s) Oral at bedtime  sodium chloride 0.9% lock flush 3 milliLiter(s) IV Push every 8 hours  spironolactone 25 milliGRAM(s) Oral daily      PHYSICAL EXAM:  GENERAL: NAD,   EYES: conjunctiva and sclera clear  ENMT: Moist mucous membranes  NECK: Supple, No JVD, Normal thyroid  NERVOUS SYSTEM:  Alert & Oriented X3,   CHEST/LUNG: Clear to auscultation bilaterally; No rales, rhonchi, wheezing, or rubs  HEART: Regular rate and rhythm;   ABDOMEN: Soft, Nontender, Nondistended; Bowel sounds present  EXTREMITIES:  2+ Peripheral Pulses, No clubbing, cyanosis, or edema  LYMPH: No lymphadenopathy noted  SKIN: No rashes or lesions    Consultant(s) Notes Reviewed:  [x ] YES  [ ] NO  Care Discussed with Consultants/Other Providers [ x] YES  [ ] NO    LABS:                        10.9   11.2  )-----------( 228      ( 06 Jun 2019 01:48 )             33.6     06-06    135  |  96  |  30<H>  ----------------------------<  99  4.1   |  27  |  1.83<H>    Ca    9.6      06 Jun 2019 01:48  Phos  3.7     06-06  Mg     1.9     06-06          CAPILLARY BLOOD GLUCOSE                RADIOLOGY & ADDITIONAL TESTS:    Imaging Personally Reviewed:  [x ] YES  [ ] NO

## 2019-06-06 NOTE — PROGRESS NOTE ADULT - SUBJECTIVE AND OBJECTIVE BOX
VITAL SIGNS-Telemetry:    Vital Signs Last 24 Hrs  T(C): 36.7 (19 @ 13:14), Max: 37 (19 @ 20:53)  T(F): 98.1 (19 @ 13:14), Max: 98.6 (19 @ 20:53)  HR: 73 (19 @ 13:14) (62 - 73)  BP: 111/71 (19 @ 13:14) (111/71 - 171/87)  RR: 19 (19 @ 13:14) (18 - 19)  SpO2: 96% (19 @ 13:14) (94% - 96%)          @ 07:01  -   @ 07:00  --------------------------------------------------------  IN: 1120 mL / OUT: 2800 mL / NET: -1680 mL     @ 07:01  -   @ 15:26  --------------------------------------------------------  IN: 360 mL / OUT: 1000 mL / NET: -640 mL        Daily     Daily Weight in k.3 (2019 07:51)    CAPILLARY BLOOD GLUCOSE              Drains:     MS         [  ] Drainage:                 L Pleural  [  ]  Drainage:                R Pleural  [  ]  Drainage:  Pacing Wires        [  ]   Settings:                                  Isolated  [  ]  Coumadin    [ ] YES          [  ]      NO         Reason:       PHYSICAL EXAM:  Neurology: alert and oriented x 3, nonfocal, no gross deficits  CV :  Sternal Wound :  CDI , Stable  Lungs:  Abdomen: soft, nontender, nondistended, positive bowel sounds, last bowel movement         Extremities:         amLODIPine   Tablet 10 milliGRAM(s) Oral daily  ampicillin  IVPB 2 Gram(s) IV Intermittent every 6 hours  aspirin 325 milliGRAM(s) Oral daily  atorvastatin 20 milliGRAM(s) Oral at bedtime  buPROPion XL . 150 milliGRAM(s) Oral daily  calcium carbonate 1250 mG  + Vitamin D (OsCal 500 + D) 1 Tablet(s) Oral two times a day  cefTRIAXone   IVPB 2 Gram(s) IV Intermittent every 12 hours  chlorhexidine 2% Cloths 1 Application(s) Topical <User Schedule>  clopidogrel Tablet 75 milliGRAM(s) Oral daily  famotidine    Tablet 20 milliGRAM(s) Oral daily  furosemide    Tablet 40 milliGRAM(s) Oral daily  heparin  Injectable 5000 Unit(s) SubCutaneous every 8 hours  metoprolol tartrate 25 milliGRAM(s) Oral every 12 hours  oxyCODONE    IR 10 milliGRAM(s) Oral every 6 hours PRN  oxyCODONE    IR 5 milliGRAM(s) Oral every 6 hours PRN  polyethylene glycol 3350 17 Gram(s) Oral daily  pramipexole 0.5 milliGRAM(s) Oral at bedtime  sodium chloride 0.9% lock flush 3 milliLiter(s) IV Push every 8 hours  spironolactone 25 milliGRAM(s) Oral daily      Physical Therapy Rec:   Home  [  ]   Home w/ PT  [  ]  Rehab  [  ]  Discussed with Cardiothoracic Team at AM rounds.Dan VITAL SIGNS-Telemetry:  Sr 60-70  Vital Signs Last 24 Hrs  T(C): 36.7 (19 @ 13:14), Max: 37 (19 @ 20:53)  T(F): 98.1 (19 @ 13:14), Max: 98.6 (19 @ 20:53)  HR: 73 (19 @ 13:14) (62 - 73)  BP: 111/71 (19 @ 13:14) (111/71 - 171/87)  RR: 19 (19 @ 13:14) (18 - 19)  SpO2: 96% (19 @ 13:14) (94% - 96%)          @ 07:01  -   @ 07:00  --------------------------------------------------------  IN: 1120 mL / OUT: 2800 mL / NET: -1680 mL     @ 07:01  -   @ 15:26  --------------------------------------------------------  IN: 360 mL / OUT: 1000 mL / NET: -640 mL    Daily     Daily Weight in k.3 (2019 07:51)        Pacing Wires        [  ]   Settings:                                  Isolated  [  ]       PHYSICAL EXAM:  Neurology: alert and oriented x 3, nonfocal, no gross deficits  CV : S1S2  Sternal Wound :  CDI , Stable  Lungs: CTA  Abdomen: soft, nontender, nondistended, positive bowel sounds, last bowel movement   +bm      Extremities:     no calf tenderness no calf tenderness    amLODIPine   Tablet 10 milliGRAM(s) Oral daily  ampicillin  IVPB 2 Gram(s) IV Intermittent every 6 hours  aspirin 325 milliGRAM(s) Oral daily  atorvastatin 20 milliGRAM(s) Oral at bedtime  buPROPion XL . 150 milliGRAM(s) Oral daily  calcium carbonate 1250 mG  + Vitamin D (OsCal 500 + D) 1 Tablet(s) Oral two times a day  cefTRIAXone   IVPB 2 Gram(s) IV Intermittent every 12 hours  chlorhexidine 2% Cloths 1 Application(s) Topical <User Schedule>  clopidogrel Tablet 75 milliGRAM(s) Oral daily  famotidine    Tablet 20 milliGRAM(s) Oral daily  furosemide    Tablet 40 milliGRAM(s) Oral daily  heparin  Injectable 5000 Unit(s) SubCutaneous every 8 hours  metoprolol tartrate 25 milliGRAM(s) Oral every 12 hours  oxyCODONE    IR 10 milliGRAM(s) Oral every 6 hours PRN  oxyCODONE    IR 5 milliGRAM(s) Oral every 6 hours PRN  polyethylene glycol 3350 17 Gram(s) Oral daily  pramipexole 0.5 milliGRAM(s) Oral at bedtime  sodium chloride 0.9% lock flush 3 milliLiter(s) IV Push every 8 hours  spironolactone 25 milliGRAM(s) Oral daily      Physical Therapy Rec:   Home  [  ]   Home w/ PT  [  ]  Rehab  [  ]  Discussed with Cardiothoracic Team at AM rounds.Dan

## 2019-06-06 NOTE — PROGRESS NOTE ADULT - PROBLEM SELECTOR PLAN 1
ID following  iv abx as per ID- ampicillin and ceftriaxone - 6 weeks from the date of surgery : 7/12   place he catheter as per renal - d/w IR  he catheter placement 6/6 in IR

## 2019-06-06 NOTE — PROGRESS NOTE ADULT - ASSESSMENT
63y Female with HTN, CKD, PAD s/p right femoral endarterectomy, depression, and smoker (quit 5 PTA) presented to the ED c/o SOB  found to have Gram + sepsis with now signs of endocarditis, s/p OR for bio AVR, MV repair, and CABG with LIMA to LAD     -there is no evidence of acute ischemia.  -s/p cabg with lima to the lad for significant ostial disease  -cont asa, plavix  -cont statin  -cont bb    -runs of pat noted on telemetry  -titrate up metoprolol to 50 q12    -volume overload has improved and she is no longer orthopneic  -cont po lasix 40mg and spironolactone    -DVT prophylaxis  -monitor electrolytes, keep k>4, Mg>2  -will follow

## 2019-06-06 NOTE — PROGRESS NOTE ADULT - PROBLEM SELECTOR PLAN 2
continue postop care  continue asa/ statin/ plavix/ b-blockers  pw d/c today  diuresis  pain management  pulm toilet  increase activity as tolerated  Discharge planning- home with IV ABX once he placement

## 2019-06-06 NOTE — PROGRESS NOTE ADULT - ASSESSMENT
63 F PMH HTN CKD PAD s/p femoral  endarterectomy,  Plavix ,depression , and smoker ( quit  4/2019 ) endorsed 10 lb weight loss reports increasing SOB s/p colonoscopy -unremarkable.    Presented to [place] ED found to have b/l pleural effusions, anemia, elevated creatinine 2.8 and TTE reveals mobile echodensity on AV with moderate to severe AI. Blood cultures + for gram + cocci in pairs/chains. Treated with one unit PRBC-  Ampicillin 2gram IV q6-  PET negative for evidence of infection. Stabilized transferred to [facility] for ZAHIRA. Admitted to  CT Sx for further management.    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  sp AVR  and MV repair and cabg 5/31  known CKD-3- Cr at baseline        Appreciate CT sx, cards, ID and nephrology mgmt  Awaiting Bld cx clearence  cont current abx regimen   plan for picc and long term abx    Will cont to follow  [facility]  [phone]

## 2019-06-07 ENCOUNTER — TRANSCRIPTION ENCOUNTER (OUTPATIENT)
Age: 64
End: 2019-06-07

## 2019-06-07 VITALS
TEMPERATURE: 98 F | OXYGEN SATURATION: 95 % | RESPIRATION RATE: 18 BRPM | SYSTOLIC BLOOD PRESSURE: 113 MMHG | DIASTOLIC BLOOD PRESSURE: 61 MMHG | HEART RATE: 68 BPM

## 2019-06-07 LAB
ANION GAP SERPL CALC-SCNC: 13 MMOL/L — SIGNIFICANT CHANGE UP (ref 5–17)
BUN SERPL-MCNC: 28 MG/DL — HIGH (ref 7–23)
CALCIUM SERPL-MCNC: 9.7 MG/DL — SIGNIFICANT CHANGE UP (ref 8.4–10.5)
CHLORIDE SERPL-SCNC: 96 MMOL/L — SIGNIFICANT CHANGE UP (ref 96–108)
CO2 SERPL-SCNC: 27 MMOL/L — SIGNIFICANT CHANGE UP (ref 22–31)
CREAT SERPL-MCNC: 2.05 MG/DL — HIGH (ref 0.5–1.3)
GLUCOSE SERPL-MCNC: 93 MG/DL — SIGNIFICANT CHANGE UP (ref 70–99)
HCT VFR BLD CALC: 32.5 % — LOW (ref 34.5–45)
HGB BLD-MCNC: 10.6 G/DL — LOW (ref 11.5–15.5)
MCHC RBC-ENTMCNC: 29.4 PG — SIGNIFICANT CHANGE UP (ref 27–34)
MCHC RBC-ENTMCNC: 32.7 GM/DL — SIGNIFICANT CHANGE UP (ref 32–36)
MCV RBC AUTO: 90.1 FL — SIGNIFICANT CHANGE UP (ref 80–100)
PLATELET # BLD AUTO: 262 K/UL — SIGNIFICANT CHANGE UP (ref 150–400)
POTASSIUM SERPL-MCNC: 3.9 MMOL/L — SIGNIFICANT CHANGE UP (ref 3.5–5.3)
POTASSIUM SERPL-SCNC: 3.9 MMOL/L — SIGNIFICANT CHANGE UP (ref 3.5–5.3)
RBC # BLD: 3.61 M/UL — LOW (ref 3.8–5.2)
RBC # FLD: 16 % — HIGH (ref 10.3–14.5)
SODIUM SERPL-SCNC: 136 MMOL/L — SIGNIFICANT CHANGE UP (ref 135–145)
WBC # BLD: 11 K/UL — HIGH (ref 3.8–10.5)
WBC # FLD AUTO: 11 K/UL — HIGH (ref 3.8–10.5)

## 2019-06-07 PROCEDURE — 82553 CREATINE MB FRACTION: CPT

## 2019-06-07 PROCEDURE — 82803 BLOOD GASES ANY COMBINATION: CPT

## 2019-06-07 PROCEDURE — 87040 BLOOD CULTURE FOR BACTERIA: CPT

## 2019-06-07 PROCEDURE — 88305 TISSUE EXAM BY PATHOLOGIST: CPT

## 2019-06-07 PROCEDURE — 85027 COMPLETE CBC AUTOMATED: CPT

## 2019-06-07 PROCEDURE — 85014 HEMATOCRIT: CPT

## 2019-06-07 PROCEDURE — 83036 HEMOGLOBIN GLYCOSYLATED A1C: CPT

## 2019-06-07 PROCEDURE — 85610 PROTHROMBIN TIME: CPT

## 2019-06-07 PROCEDURE — 97530 THERAPEUTIC ACTIVITIES: CPT

## 2019-06-07 PROCEDURE — 82962 GLUCOSE BLOOD TEST: CPT

## 2019-06-07 PROCEDURE — 77001 FLUOROGUIDE FOR VEIN DEVICE: CPT

## 2019-06-07 PROCEDURE — 82947 ASSAY GLUCOSE BLOOD QUANT: CPT

## 2019-06-07 PROCEDURE — C1894: CPT

## 2019-06-07 PROCEDURE — C1889: CPT

## 2019-06-07 PROCEDURE — 80048 BASIC METABOLIC PNL TOTAL CA: CPT

## 2019-06-07 PROCEDURE — 99232 SBSQ HOSP IP/OBS MODERATE 35: CPT

## 2019-06-07 PROCEDURE — 86923 COMPATIBILITY TEST ELECTRIC: CPT

## 2019-06-07 PROCEDURE — C1751: CPT

## 2019-06-07 PROCEDURE — 36430 TRANSFUSION BLD/BLD COMPNT: CPT

## 2019-06-07 PROCEDURE — 86891 AUTOLOGOUS BLOOD OP SALVAGE: CPT

## 2019-06-07 PROCEDURE — 93312 ECHO TRANSESOPHAGEAL: CPT

## 2019-06-07 PROCEDURE — 86901 BLOOD TYPING SEROLOGIC RH(D): CPT

## 2019-06-07 PROCEDURE — C1887: CPT

## 2019-06-07 PROCEDURE — 82435 ASSAY OF BLOOD CHLORIDE: CPT

## 2019-06-07 PROCEDURE — 86900 BLOOD TYPING SEROLOGIC ABO: CPT

## 2019-06-07 PROCEDURE — 97162 PT EVAL MOD COMPLEX 30 MIN: CPT

## 2019-06-07 PROCEDURE — 83735 ASSAY OF MAGNESIUM: CPT

## 2019-06-07 PROCEDURE — 94640 AIRWAY INHALATION TREATMENT: CPT

## 2019-06-07 PROCEDURE — 93454 CORONARY ARTERY ANGIO S&I: CPT

## 2019-06-07 PROCEDURE — 83605 ASSAY OF LACTIC ACID: CPT

## 2019-06-07 PROCEDURE — 93306 TTE W/DOPPLER COMPLETE: CPT

## 2019-06-07 PROCEDURE — 84295 ASSAY OF SERUM SODIUM: CPT

## 2019-06-07 PROCEDURE — 87186 SC STD MICRODIL/AGAR DIL: CPT

## 2019-06-07 PROCEDURE — P9047: CPT

## 2019-06-07 PROCEDURE — 84100 ASSAY OF PHOSPHORUS: CPT

## 2019-06-07 PROCEDURE — 76376 3D RENDER W/INTRP POSTPROCES: CPT

## 2019-06-07 PROCEDURE — 84443 ASSAY THYROID STIM HORMONE: CPT

## 2019-06-07 PROCEDURE — 97116 GAIT TRAINING THERAPY: CPT

## 2019-06-07 PROCEDURE — 87640 STAPH A DNA AMP PROBE: CPT

## 2019-06-07 PROCEDURE — 82565 ASSAY OF CREATININE: CPT

## 2019-06-07 PROCEDURE — C1769: CPT

## 2019-06-07 PROCEDURE — P9045: CPT

## 2019-06-07 PROCEDURE — 81003 URINALYSIS AUTO W/O SCOPE: CPT

## 2019-06-07 PROCEDURE — C1729: CPT

## 2019-06-07 PROCEDURE — 82330 ASSAY OF CALCIUM: CPT

## 2019-06-07 PROCEDURE — 71045 X-RAY EXAM CHEST 1 VIEW: CPT

## 2019-06-07 PROCEDURE — 76937 US GUIDE VASCULAR ACCESS: CPT

## 2019-06-07 PROCEDURE — 84132 ASSAY OF SERUM POTASSIUM: CPT

## 2019-06-07 PROCEDURE — 93005 ELECTROCARDIOGRAM TRACING: CPT

## 2019-06-07 PROCEDURE — 82550 ASSAY OF CK (CPK): CPT

## 2019-06-07 PROCEDURE — 85576 BLOOD PLATELET AGGREGATION: CPT

## 2019-06-07 PROCEDURE — 87070 CULTURE OTHR SPECIMN AEROBIC: CPT

## 2019-06-07 PROCEDURE — 85384 FIBRINOGEN ACTIVITY: CPT

## 2019-06-07 PROCEDURE — 36558 INSERT TUNNELED CV CATH: CPT

## 2019-06-07 PROCEDURE — 86850 RBC ANTIBODY SCREEN: CPT

## 2019-06-07 PROCEDURE — 94002 VENT MGMT INPAT INIT DAY: CPT

## 2019-06-07 PROCEDURE — 85730 THROMBOPLASTIN TIME PARTIAL: CPT

## 2019-06-07 PROCEDURE — P9016: CPT

## 2019-06-07 PROCEDURE — 83880 ASSAY OF NATRIURETIC PEPTIDE: CPT

## 2019-06-07 PROCEDURE — 80053 COMPREHEN METABOLIC PANEL: CPT

## 2019-06-07 PROCEDURE — 84484 ASSAY OF TROPONIN QUANT: CPT

## 2019-06-07 RX ORDER — FUROSEMIDE 40 MG
1 TABLET ORAL
Qty: 30 | Refills: 0
Start: 2019-06-07 | End: 2019-07-06

## 2019-06-07 RX ORDER — POTASSIUM CHLORIDE 20 MEQ
20 PACKET (EA) ORAL ONCE
Refills: 0 | Status: COMPLETED | OUTPATIENT
Start: 2019-06-07 | End: 2019-06-07

## 2019-06-07 RX ORDER — OXYCODONE HYDROCHLORIDE 5 MG/1
1 TABLET ORAL
Qty: 40 | Refills: 0
Start: 2019-06-07 | End: 2019-06-16

## 2019-06-07 RX ORDER — AMPICILLIN TRIHYDRATE 250 MG
2 CAPSULE ORAL
Qty: 0 | Refills: 0 | DISCHARGE
Start: 2019-06-07

## 2019-06-07 RX ORDER — AMLODIPINE BESYLATE 2.5 MG/1
1 TABLET ORAL
Qty: 30 | Refills: 0
Start: 2019-06-07 | End: 2019-07-06

## 2019-06-07 RX ORDER — METOPROLOL TARTRATE 50 MG
3 TABLET ORAL
Qty: 90 | Refills: 0
Start: 2019-06-07 | End: 2019-07-06

## 2019-06-07 RX ORDER — SIMVASTATIN 20 MG/1
1 TABLET, FILM COATED ORAL
Qty: 30 | Refills: 0
Start: 2019-06-07 | End: 2019-07-06

## 2019-06-07 RX ORDER — DILTIAZEM HCL 120 MG
1 CAPSULE, EXT RELEASE 24 HR ORAL
Qty: 0 | Refills: 0 | DISCHARGE

## 2019-06-07 RX ORDER — SPIRONOLACTONE 25 MG/1
1 TABLET, FILM COATED ORAL
Qty: 30 | Refills: 0
Start: 2019-06-07 | End: 2019-07-06

## 2019-06-07 RX ORDER — SIMVASTATIN 20 MG/1
1 TABLET, FILM COATED ORAL
Qty: 0 | Refills: 0 | DISCHARGE

## 2019-06-07 RX ORDER — METOPROLOL TARTRATE 50 MG
75 TABLET ORAL
Refills: 0 | Status: DISCONTINUED | OUTPATIENT
Start: 2019-06-07 | End: 2019-06-07

## 2019-06-07 RX ORDER — ASPIRIN/CALCIUM CARB/MAGNESIUM 324 MG
1 TABLET ORAL
Qty: 30 | Refills: 0
Start: 2019-06-07 | End: 2019-07-06

## 2019-06-07 RX ORDER — CEFTRIAXONE 500 MG/1
2 INJECTION, POWDER, FOR SOLUTION INTRAMUSCULAR; INTRAVENOUS
Qty: 0 | Refills: 0 | DISCHARGE
Start: 2019-06-07

## 2019-06-07 RX ORDER — POLYETHYLENE GLYCOL 3350 17 G/17G
17 POWDER, FOR SOLUTION ORAL
Qty: 0 | Refills: 0 | DISCHARGE
Start: 2019-06-07

## 2019-06-07 RX ORDER — CLOPIDOGREL BISULFATE 75 MG/1
1 TABLET, FILM COATED ORAL
Qty: 30 | Refills: 0
Start: 2019-06-07 | End: 2019-07-06

## 2019-06-07 RX ADMIN — HEPARIN SODIUM 5000 UNIT(S): 5000 INJECTION INTRAVENOUS; SUBCUTANEOUS at 14:43

## 2019-06-07 RX ADMIN — OXYCODONE HYDROCHLORIDE 10 MILLIGRAM(S): 5 TABLET ORAL at 15:30

## 2019-06-07 RX ADMIN — Medication 216 GRAM(S): at 08:13

## 2019-06-07 RX ADMIN — Medication 325 MILLIGRAM(S): at 12:17

## 2019-06-07 RX ADMIN — SODIUM CHLORIDE 3 MILLILITER(S): 9 INJECTION INTRAMUSCULAR; INTRAVENOUS; SUBCUTANEOUS at 16:00

## 2019-06-07 RX ADMIN — AMLODIPINE BESYLATE 10 MILLIGRAM(S): 2.5 TABLET ORAL at 05:05

## 2019-06-07 RX ADMIN — SPIRONOLACTONE 25 MILLIGRAM(S): 25 TABLET, FILM COATED ORAL at 05:05

## 2019-06-07 RX ADMIN — CLOPIDOGREL BISULFATE 75 MILLIGRAM(S): 75 TABLET, FILM COATED ORAL at 12:17

## 2019-06-07 RX ADMIN — BUPROPION HYDROCHLORIDE 150 MILLIGRAM(S): 150 TABLET, EXTENDED RELEASE ORAL at 12:17

## 2019-06-07 RX ADMIN — Medication 216 GRAM(S): at 01:26

## 2019-06-07 RX ADMIN — CEFTRIAXONE 100 GRAM(S): 500 INJECTION, POWDER, FOR SOLUTION INTRAMUSCULAR; INTRAVENOUS at 05:05

## 2019-06-07 RX ADMIN — OXYCODONE HYDROCHLORIDE 10 MILLIGRAM(S): 5 TABLET ORAL at 06:46

## 2019-06-07 RX ADMIN — SODIUM CHLORIDE 3 MILLILITER(S): 9 INJECTION INTRAMUSCULAR; INTRAVENOUS; SUBCUTANEOUS at 05:02

## 2019-06-07 RX ADMIN — Medication 75 MILLIGRAM(S): at 14:43

## 2019-06-07 RX ADMIN — CHLORHEXIDINE GLUCONATE 1 APPLICATION(S): 213 SOLUTION TOPICAL at 12:18

## 2019-06-07 RX ADMIN — Medication 25 MILLIGRAM(S): at 08:14

## 2019-06-07 RX ADMIN — HEPARIN SODIUM 5000 UNIT(S): 5000 INJECTION INTRAVENOUS; SUBCUTANEOUS at 05:05

## 2019-06-07 RX ADMIN — Medication 40 MILLIGRAM(S): at 05:05

## 2019-06-07 RX ADMIN — Medication 20 MILLIEQUIVALENT(S): at 10:34

## 2019-06-07 RX ADMIN — OXYCODONE HYDROCHLORIDE 10 MILLIGRAM(S): 5 TABLET ORAL at 14:43

## 2019-06-07 RX ADMIN — FAMOTIDINE 20 MILLIGRAM(S): 10 INJECTION INTRAVENOUS at 12:17

## 2019-06-07 RX ADMIN — Medication 216 GRAM(S): at 14:43

## 2019-06-07 RX ADMIN — Medication 1 TABLET(S): at 05:05

## 2019-06-07 NOTE — DISCHARGE NOTE PROVIDER - REASON FOR ADMISSION
S/P 5/31/19 open heart surgery: single bypass/ aortic valve replacement with tissue valve/ mitral valve repair s/p 5/31/19 open heart surgery: single bypass/ aortic valve replacement with tissue valve/ mitral valve repair  secondary to endocarditis s/p 5/31/19 open heart surgery: single bypass/ aortic valve replacement with tissue valve and mitral valve repair

## 2019-06-07 NOTE — DISCHARGE NOTE PROVIDER - NSDCHHNEEDSERVICE_GEN_ALL_CORE
Other, specify.../Medication teaching and assessment/Observation and assessment/Teaching and training/Wound care and assessment

## 2019-06-07 NOTE — DISCHARGE NOTE PROVIDER - CARE PROVIDERS DIRECT ADDRESSES
[email or web address]

## 2019-06-07 NOTE — PROGRESS NOTE ADULT - ASSESSMENT
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   No signs or symptoms of neurological issues ,  no prior UTI or infection or ppt event.  Follow up bc negative on ampicillin.  Pt has renal insufficieny , unclear how long it has been present and whether it is related to endocarditis         ceftriaxone and  ampicillin for synergy ( want to avoid genta)   Valve tissue gram stain with polys and  enteroccus  will need prolonged course with picc  in view of positive or culture; 6 weeks from surgery  until july 14 cmp and creat qweek

## 2019-06-07 NOTE — PROGRESS NOTE ADULT - SUBJECTIVE AND OBJECTIVE BOX
[facility] - Chao Lomax, Kunal, Regino, [place], Elva Islas  Office Number:  [phone]    Patient resting comfortably in bed in NAD.  Laying flat with no respiratory distress.  No complaints of chest pain, dyspnea, palpitations, PND, or orthopnea.  Bleeding from PICC overnight, now controlled after pressure applied.    F/U for:  Endocarditis    Telemetry:  NSR    MEDICATIONS  (STANDING):  amLODIPine   Tablet 10 milliGRAM(s) Oral daily  ampicillin  IVPB 2 Gram(s) IV Intermittent every 6 hours  aspirin 325 milliGRAM(s) Oral daily  atorvastatin 20 milliGRAM(s) Oral at bedtime  buPROPion XL . 150 milliGRAM(s) Oral daily  calcium carbonate 1250 mG  + Vitamin D (OsCal 500 + D) 1 Tablet(s) Oral two times a day  cefTRIAXone   IVPB 2 Gram(s) IV Intermittent every 12 hours  chlorhexidine 2% Cloths 1 Application(s) Topical <User Schedule>  clopidogrel Tablet 75 milliGRAM(s) Oral daily  famotidine    Tablet 20 milliGRAM(s) Oral daily  furosemide    Tablet 40 milliGRAM(s) Oral daily  heparin  Injectable 5000 Unit(s) SubCutaneous every 8 hours  metoprolol tartrate 25 milliGRAM(s) Oral every 12 hours  polyethylene glycol 3350 17 Gram(s) Oral daily  pramipexole 0.5 milliGRAM(s) Oral at bedtime  sodium chloride 0.9% lock flush 3 milliLiter(s) IV Push every 8 hours  spironolactone 25 milliGRAM(s) Oral daily    MEDICATIONS  (PRN):  oxyCODONE    IR 10 milliGRAM(s) Oral every 6 hours PRN Severe Pain (7 - 10)  oxyCODONE    IR 5 milliGRAM(s) Oral every 6 hours PRN Moderate Pain (4 - 6)      Allergies    No Known Allergies    Intolerances        Vital Signs Last 24 Hrs  T(C): 36.8 (07 Jun 2019 04:58), Max: 36.8 (07 Jun 2019 04:58)  T(F): 98.2 (07 Jun 2019 04:58), Max: 98.2 (07 Jun 2019 04:58)  HR: 70 (07 Jun 2019 04:58) (70 - 75)  BP: 112/70 (07 Jun 2019 04:58) (111/71 - 157/83)  BP(mean): --  RR: 18 (07 Jun 2019 04:58) (18 - 19)  SpO2: 96% (07 Jun 2019 04:58) (96% - 96%)    I&O's Summary    05 Jun 2019 07:01  -  06 Jun 2019 07:00  --------------------------------------------------------  IN: 1120 mL / OUT: 2800 mL / NET: -1680 mL    06 Jun 2019 07:01  -  07 Jun 2019 06:37  --------------------------------------------------------  IN: 1100 mL / OUT: 1400 mL / NET: -300 mL        ON EXAM:    General: NAD, awake and alert, oriented x 3  HEENT: Mucous membranes are moist, anicteric  Lungs: Non-labored, breath sounds are clear bilaterally, No wheezing, rales or rhonchi  Cardiovascular: Regular, S1 and S2, 2/6 systolic murmur  Gastrointestinal: Bowel Sounds present, soft, nontender.   Lymph: No peripheral edema. No lymphadenopathy.  Skin: No rashes or ulcers  Psych:  Mood & affect appropriate    LABS: All Labs Reviewed:                        10.9   11.2  )-----------( 228      ( 06 Jun 2019 01:48 )             33.6                         11.1   12.3  )-----------( 235      ( 05 Jun 2019 06:59 )             33.4     06 Jun 2019 01:48    135    |  96     |  30     ----------------------------<  99     4.1     |  27     |  1.83   05 Jun 2019 06:59    139    |  95     |  32     ----------------------------<  113    3.9     |  28     |  1.85     Ca    9.6        06 Jun 2019 01:48  Ca    9.8        05 Jun 2019 06:59  Phos  3.7       06 Jun 2019 01:48  Mg     1.9       06 Jun 2019 01:48            Blood Culture: Organism --  Gram Stain Blood -- Gram Stain --  Specimen Source .Blood  Culture-Blood --    Organism --  Gram Stain Blood -- Gram Stain --  Specimen Source .Blood  Culture-Blood --

## 2019-06-07 NOTE — PROGRESS NOTE ADULT - SUBJECTIVE AND OBJECTIVE BOX
infectious diseases progress note:    Patient is a 63y old  Female who presents with a chief complaint of torito (07 Jun 2019 06:37)        Atherosclerosis of native coronary artery without angina pectoris        ROS:  CONSTITUTIONAL:  Negative fever or chills, feels well, good appetite  EYES:  Negative  blurry vision or double vision  CARDIOVASCULAR:  Negative for chest pain or palpitations  RESPIRATORY:  Negative for cough, wheezing, or SOB   GASTROINTESTINAL:  Negative for nausea, vomiting, diarrhea, constipation, or abdominal pain  GENITOURINARY:  Negative frequency, urgency or dysuria  NEUROLOGIC:  No headache, confusion, dizziness, lightheadedness    Allergies    No Known Allergies    Intolerances        ANTIBIOTICS/RELEVANT:  antimicrobials  ampicillin  IVPB 2 Gram(s) IV Intermittent every 6 hours  cefTRIAXone   IVPB 2 Gram(s) IV Intermittent every 12 hours    immunologic:    OTHER:  amLODIPine   Tablet 10 milliGRAM(s) Oral daily  aspirin 325 milliGRAM(s) Oral daily  atorvastatin 20 milliGRAM(s) Oral at bedtime  buPROPion XL . 150 milliGRAM(s) Oral daily  calcium carbonate 1250 mG  + Vitamin D (OsCal 500 + D) 1 Tablet(s) Oral two times a day  chlorhexidine 2% Cloths 1 Application(s) Topical <User Schedule>  clopidogrel Tablet 75 milliGRAM(s) Oral daily  famotidine    Tablet 20 milliGRAM(s) Oral daily  furosemide    Tablet 40 milliGRAM(s) Oral daily  heparin  Injectable 5000 Unit(s) SubCutaneous every 8 hours  metoprolol tartrate 25 milliGRAM(s) Oral every 12 hours  oxyCODONE    IR 10 milliGRAM(s) Oral every 6 hours PRN  oxyCODONE    IR 5 milliGRAM(s) Oral every 6 hours PRN  polyethylene glycol 3350 17 Gram(s) Oral daily  pramipexole 0.5 milliGRAM(s) Oral at bedtime  sodium chloride 0.9% lock flush 3 milliLiter(s) IV Push every 8 hours  spironolactone 25 milliGRAM(s) Oral daily      Objective:  Vital Signs Last 24 Hrs  T(C): 36.8 (07 Jun 2019 04:58), Max: 36.8 (07 Jun 2019 04:58)  T(F): 98.2 (07 Jun 2019 04:58), Max: 98.2 (07 Jun 2019 04:58)  HR: 70 (07 Jun 2019 04:58) (70 - 75)  BP: 112/70 (07 Jun 2019 04:58) (111/71 - 157/83)  BP(mean): --  RR: 18 (07 Jun 2019 04:58) (18 - 19)  SpO2: 96% (07 Jun 2019 04:58) (96% - 96%)     Eyes:AMY, EOMI  Ear/Nose/Throat: no oral lesion, no sinus tenderness on percussion	  Neck:no JVD, no lymphadenopathy, supple  Respiratory: CTA elle  Cardiovascular: S1S2 RRR, no murmurs  Gastrointestinal:soft, (+) BS, no HSM  Extremities:no e/e/c        LABS:                        10.9   11.2  )-----------( 228      ( 06 Jun 2019 01:48 )             33.6     06-06    135  |  96  |  30<H>  ----------------------------<  99  4.1   |  27  |  1.83<H>    Ca    9.6      06 Jun 2019 01:48  Phos  3.7     06-06  Mg     1.9     06-06              MICROBIOLOGY:    RECENT CULTURES:  06-03 @ 16:29 .Blood                No growth to date.    06-03 @ 14:00 .Blood                No growth to date.    06-01 @ 00:21 .Tissue   JR    **Please Note**: This is a Corrected Report**  No polymorphonuclear cells seen per low power field  Numerous Gram Variable Cocci seen per oil power field  Previously reported as:  No polymorphonuclear cells seen per low power field  No organisms seen per oil power field    Enterococcus faecalis  Enterococcus faecalis     Few Enterococcus faecalis          RESPIRATORY CULTURES:              RADIOLOGY & ADDITIONAL STUDIES:        Pager [phone]  After 5 pm/weekends or if no response :[phone]

## 2019-06-07 NOTE — PROGRESS NOTE ADULT - REASON FOR ADMISSION
torito
sp  C1l, AVR, MV repair
torito

## 2019-06-07 NOTE — PROGRESS NOTE ADULT - PROVIDER SPECIALTY LIST ADULT
CT Surgery
Cardiology
Critical Care
Infectious Disease
Internal Medicine
Intervent Radiology
Nephrology
CT Surgery
Cardiology
CT Surgery
Infectious Disease
Nephrology
Cardiology
Cardiology
Infectious Disease
CT Surgery

## 2019-06-07 NOTE — PROGRESS NOTE ADULT - ASSESSMENT
63y Female with HTN, CKD, PAD s/p right femoral endarterectomy, depression, and smoker (quit 5 PTA) presented to the ED c/o SOB  found to have Gram + sepsis with now signs of endocarditis, s/p OR for bio AVR, MV repair, and CABG with LIMA to LAD     -there is no evidence of acute ischemia.  -s/p cabg with lima to the lad for significant ostial disease  -cont asa, plavix  -cont statin  -cont bb    -No further PAT on telemetry.  Continue BB at current dose    -volume overload has improved and she is no longer orthopneic  -cont po lasix 40mg and spironolactone    -monitor electrolytes, keep k>4, Mg>2  -will follow.  No objection to d/c home today.  Abx to be continued as an outpatient by ID

## 2019-06-07 NOTE — DISCHARGE NOTE PROVIDER - NSDCACTIVITY_GEN_ALL_CORE
Do not make important decisions/Walking - Indoors allowed/Do not drive or operate machinery/Sex allowed/Walking - Outdoors allowed/No heavy lifting/straining/Stairs allowed/Showering allowed

## 2019-06-07 NOTE — DISCHARGE NOTE PROVIDER - NSDCPNSUBOBJ_GEN_ALL_CORE
VSS    tele: RSR 60-70    he catheter cdi ju     midsternal incision cdi ju    lungs clear, RR easy unlabored    + bs nt nd; + bm; n/v/d    LE: neg calf tenderness, PPP bilaterally, neg LE edema bilaterally        Discharge pt home today with IV ABX til 7/12 as per ID and Dr. Cee

## 2019-06-07 NOTE — DISCHARGE NOTE PROVIDER - NSDCCPCAREPLAN_GEN_ALL_CORE_FT
PRINCIPAL DISCHARGE DIAGNOSIS  Diagnosis: S/P CABG x 1  Assessment and Plan of Treatment: shower daily  weigh yourself daily  continue current prescriptions as ordered  increase activity as tolerated   no added salt; low fat; low cholesterol, low salt diet.   follow up with Cardiologist, Dr Booth,  in 1-2 weeks. Call to schedule appointment.  follow up with cardiac surgeon, Dr. Cee, on June 24th at 2:30 pm. call to confirm appointment. [phone]   follow up with infectious disease doctor, Dr. Prater, when antibiotics complete 7/12. Call to schedule appointment. [phone]  followup with nephrologist, Dr. Berger, within 7 days fo discharge. Call to schedule appointment.

## 2019-06-07 NOTE — DISCHARGE NOTE PROVIDER - PROVIDER TOKENS
PROVIDER:[TOKEN:[[id number]]],PROVIDER:[TOKEN:[[id number]]],PROVIDER:[TOKEN:[[id number]]],PROVIDER:[TOKEN:[[id number]]]

## 2019-06-07 NOTE — DISCHARGE NOTE PROVIDER - NSDCFUADDINST_GEN_ALL_CORE_FT
call Dr. Cee for fever > 101  refer to cardiac surgery do and don't checklist  no driving until cleared by Dr. Cee  IV antibiotics for endocarditis until 7/12 as per infectious disease doctor, Dr. Prater

## 2019-06-07 NOTE — DISCHARGE NOTE PROVIDER - CARE PROVIDER_API CALL
Jamarcus Cee)  Surgery; Thoracic and Cardiac Surgery  [address] [place] [address]  Phone: [phone]  Fax: [phone]  Follow Up Time:     Jordan Lacy)  Cardiovascular Disease  [address] [place] [address]  Phone: [phone]  Fax: [phone]  Follow Up Time:     Arabella Berger)  Medicine  [address] [facility] [address] [place] [id number]  Phone: [phone]  Fax: [phone]  Follow Up Time:     Mathew Prater)  Infectious Disease; Internal Medicine  [address] [place] [address]  Phone: [phone]  Fax: [phone]  Follow Up Time:

## 2019-06-07 NOTE — DISCHARGE NOTE NURSING/CASE MANAGEMENT/SOCIAL WORK - NSDCDPATPORTLINK_GEN_ALL_CORE
You can access the [email or web address] Patient Portal, offered by [facility], by registering with the following website: [email or web address]

## 2019-06-07 NOTE — PROGRESS NOTE ADULT - SUBJECTIVE AND OBJECTIVE BOX
S/p OHS 5/31, doing well    Vital Signs Last 24 Hrs  T(C): 36.8 (06-07-19 @ 15:59), Max: 36.8 (06-07-19 @ 04:58)  T(F): 98.2 (06-07-19 @ 15:59), Max: 98.2 (06-07-19 @ 04:58)  HR: 68 (06-07-19 @ 15:59) (68 - 75)  BP: 113/61 (06-07-19 @ 15:59) (112/70 - 157/83)  RR: 18 (06-07-19 @ 15:59) (18 - 19)  SpO2: 95% (06-07-19 @ 15:59) (95% - 96%)    CHEST/LUNG: b/l air entry  HEART: S1S2  ABDOMEN: Soft, Nondistended, NT  EXTREMITIES: no edema                                                                  10.6   11.0  )-----------( 262      ( 07 Jun 2019 07:10 )             32.5     07 Jun 2019 07:10    136    |  96     |  28     ----------------------------<  93     3.9     |  27     |  2.05     Ca    9.7        07 Jun 2019 07:10  Phos  3.7       06 Jun 2019 01:48  Mg     1.9       06 Jun 2019 01:48    amLODIPine   Tablet 10 milliGRAM(s) Oral daily  ampicillin  IVPB 2 Gram(s) IV Intermittent every 6 hours  aspirin 325 milliGRAM(s) Oral daily  atorvastatin 20 milliGRAM(s) Oral at bedtime  buPROPion XL . 150 milliGRAM(s) Oral daily  calcium carbonate 1250 mG  + Vitamin D (OsCal 500 + D) 1 Tablet(s) Oral two times a day  cefTRIAXone   IVPB 2 Gram(s) IV Intermittent every 12 hours  chlorhexidine 2% Cloths 1 Application(s) Topical <User Schedule>  clopidogrel Tablet 75 milliGRAM(s) Oral daily  famotidine    Tablet 20 milliGRAM(s) Oral daily  furosemide    Tablet 40 milliGRAM(s) Oral daily  heparin  Injectable 5000 Unit(s) SubCutaneous every 8 hours  metoprolol succinate ER 75 milliGRAM(s) Oral <User Schedule>  oxyCODONE    IR 10 milliGRAM(s) Oral every 6 hours PRN  oxyCODONE    IR 5 milliGRAM(s) Oral every 6 hours PRN  polyethylene glycol 3350 17 Gram(s) Oral daily  pramipexole 0.5 milliGRAM(s) Oral at bedtime  sodium chloride 0.9% lock flush 3 milliLiter(s) IV Push every 8 hours  spironolactone 25 milliGRAM(s) Oral daily    Impression/Plan:    Known CKD 3 w/Cr 1.8-2 at baseline  AV endocarditis, severe AI  S/p CABG x 1, AVR, MV repair 5/31  S/p Mae cath for op abx tx  Abx per ID  Avoid nephrotoxins  F/u w/Dr Zepeda (op renal) after d/c

## 2019-06-08 LAB
CULTURE RESULTS: SIGNIFICANT CHANGE UP
CULTURE RESULTS: SIGNIFICANT CHANGE UP
SPECIMEN SOURCE: SIGNIFICANT CHANGE UP
SPECIMEN SOURCE: SIGNIFICANT CHANGE UP

## 2019-06-10 ENCOUNTER — APPOINTMENT (OUTPATIENT)
Age: 64
End: 2019-06-10
Payer: COMMERCIAL

## 2019-06-10 PROCEDURE — 99024 POSTOP FOLLOW-UP VISIT: CPT

## 2019-06-11 VITALS
WEIGHT: 100 LBS | RESPIRATION RATE: 16 BRPM | OXYGEN SATURATION: 97 % | SYSTOLIC BLOOD PRESSURE: 140 MMHG | DIASTOLIC BLOOD PRESSURE: 80 MMHG | HEART RATE: 70 BPM | BODY MASS INDEX: 17.17 KG/M2

## 2019-06-11 RX ORDER — BUPROPION HYDROCHLORIDE 150 MG/1
150 TABLET, FILM COATED, EXTENDED RELEASE ORAL DAILY
Refills: 0 | Status: ACTIVE | COMMUNITY
Start: 2019-06-11

## 2019-06-11 NOTE — PHYSICAL EXAM
[Respiration, Rhythm And Depth] : normal respiratory rhythm and effort [Auscultation Breath Sounds / Voice Sounds] : lungs were clear to auscultation bilaterally [Heart Rate And Rhythm] : heart rate was normal and rhythm regular [Heart Sounds] : normal S1 and S2 [FreeTextEntry1] : MSI, CT sites and SVG sites without erythema, drainage or warmth, with edges well approximated.  Sternum stable. BLE minimal edema. infusaport site c/d/i [Bowel Sounds] : normal bowel sounds [Abdomen Soft] : soft [Abdomen Tenderness] : non-tender

## 2019-06-12 RX ORDER — PANTOPRAZOLE 40 MG/1
40 TABLET, DELAYED RELEASE ORAL DAILY
Refills: 0 | Status: DISCONTINUED | COMMUNITY
Start: 2019-06-11 | End: 2019-06-12

## 2019-06-24 ENCOUNTER — TRANSCRIPTION ENCOUNTER (OUTPATIENT)
Age: 64
End: 2019-06-24

## 2019-06-24 ENCOUNTER — APPOINTMENT (OUTPATIENT)
Dept: CARDIOTHORACIC SURGERY | Facility: CLINIC | Age: 64
End: 2019-06-24
Payer: COMMERCIAL

## 2019-06-24 VITALS
TEMPERATURE: 98.8 F | DIASTOLIC BLOOD PRESSURE: 80 MMHG | RESPIRATION RATE: 16 BRPM | WEIGHT: 103 LBS | HEIGHT: 64 IN | BODY MASS INDEX: 17.58 KG/M2 | HEART RATE: 71 BPM | OXYGEN SATURATION: 97 % | SYSTOLIC BLOOD PRESSURE: 131 MMHG

## 2019-06-24 PROCEDURE — 99024 POSTOP FOLLOW-UP VISIT: CPT

## 2019-06-24 RX ORDER — SPIRONOLACTONE 25 MG/1
25 TABLET ORAL DAILY
Refills: 0 | Status: COMPLETED | COMMUNITY
Start: 2019-06-11 | End: 2019-06-24

## 2019-06-24 RX ORDER — FUROSEMIDE 40 MG/1
40 TABLET ORAL DAILY
Refills: 0 | Status: COMPLETED | COMMUNITY
Start: 2019-06-11 | End: 2019-06-24

## 2019-07-08 NOTE — PHYSICAL THERAPY INITIAL EVALUATION ADULT - PRECAUTIONS/LIMITATIONS, REHAB EVAL
89 sternal precautions/fall precautions/Stabilized transferred to [facility] for ZAHIRA. Pt s/p CABG, MV repair and AVR on 5/31/19. Cardiac Cath 5/29/19: Left coronary angiography. Right coronary angiography. +CXR 6/1/19: Bilateral lower lung linear atelectasis. Trace left pleural effusion. +ZAHIRA w/TTE 5/28/19: Mitral annular calcification. Thickened mitral valve leaflet tips with central malcoaptation. Moderate-severe mitral regurgitation. Severe aortic regurgitation. Severe atheroma noted in aortic arch/descending aorta. Severely dilated left atrium. Severe left ventricular enlargement. Minimal tricuspid regurgitation. Small pericardial effusion. Bilateral pleural effusions./cardiac precautions

## 2019-07-16 ENCOUNTER — APPOINTMENT (OUTPATIENT)
Dept: INFECTIOUS DISEASE | Facility: CLINIC | Age: 64
End: 2019-07-16
Payer: COMMERCIAL

## 2019-07-16 VITALS
TEMPERATURE: 97.5 F | HEART RATE: 80 BPM | DIASTOLIC BLOOD PRESSURE: 90 MMHG | WEIGHT: 99 LBS | BODY MASS INDEX: 16.9 KG/M2 | HEIGHT: 64 IN | OXYGEN SATURATION: 98 % | SYSTOLIC BLOOD PRESSURE: 154 MMHG

## 2019-07-16 PROCEDURE — 99213 OFFICE O/P EST LOW 20 MIN: CPT

## 2019-07-16 NOTE — PHYSICAL EXAM
[General Appearance - Alert] : alert [General Appearance - In No Acute Distress] : in no acute distress [Sclera] : the sclera and conjunctiva were normal [PERRL With Normal Accommodation] : pupils were equal in size, round, reactive to light [Extraocular Movements] : extraocular movements were intact [Outer Ear] : the ears and nose were normal in appearance [Oropharynx] : the oropharynx was normal with no thrush [Neck Appearance] : the appearance of the neck was normal [Neck Cervical Mass (___cm)] : no neck mass was observed [Jugular Venous Distention Increased] : there was no jugular-venous distention [Thyroid Diffuse Enlargement] : the thyroid was not enlarged [] : no respiratory distress [Auscultation Breath Sounds / Voice Sounds] : lungs were clear to auscultation bilaterally [Heart Rate And Rhythm] : heart rate was normal and rhythm regular [Heart Sounds] : normal S1 and S2 [Heart Sounds Gallop] : no gallops [Murmurs] : no murmurs [Heart Sounds Pericardial Friction Rub] : no pericardial rub

## 2019-07-16 NOTE — ASSESSMENT
[FreeTextEntry1] : doing well completed 6 weeks of ampicillin and ceftriaxone \par to merle Mae and get follow up blood cultures

## 2019-07-21 ENCOUNTER — FORM ENCOUNTER (OUTPATIENT)
Age: 64
End: 2019-07-21

## 2019-07-22 ENCOUNTER — OUTPATIENT (OUTPATIENT)
Dept: OUTPATIENT SERVICES | Facility: HOSPITAL | Age: 64
LOS: 1 days | End: 2019-07-22
Payer: COMMERCIAL

## 2019-07-22 DIAGNOSIS — Z90.49 ACQUIRED ABSENCE OF OTHER SPECIFIED PARTS OF DIGESTIVE TRACT: Chronic | ICD-10-CM

## 2019-07-22 DIAGNOSIS — M06.9 RHEUMATOID ARTHRITIS, UNSPECIFIED: Chronic | ICD-10-CM

## 2019-07-22 DIAGNOSIS — Z98.890 OTHER SPECIFIED POSTPROCEDURAL STATES: Chronic | ICD-10-CM

## 2019-07-22 DIAGNOSIS — I33.0 ACUTE AND SUBACUTE INFECTIVE ENDOCARDITIS: ICD-10-CM

## 2019-07-22 DIAGNOSIS — Z98.51 TUBAL LIGATION STATUS: Chronic | ICD-10-CM

## 2019-07-22 DIAGNOSIS — K27.1 ACUTE PEPTIC ULCER, SITE UNSPECIFIED, WITH PERFORATION: Chronic | ICD-10-CM

## 2019-07-22 DIAGNOSIS — M19.011 PRIMARY OSTEOARTHRITIS, RIGHT SHOULDER: Chronic | ICD-10-CM

## 2019-07-22 LAB
BACTERIA BLD CULT: NORMAL
BACTERIA BLD CULT: NORMAL

## 2019-07-22 PROCEDURE — 36589 REMOVAL TUNNELED CV CATH: CPT

## 2019-07-25 ENCOUNTER — APPOINTMENT (OUTPATIENT)
Dept: CARDIOLOGY | Facility: CLINIC | Age: 64
End: 2019-07-25
Payer: COMMERCIAL

## 2019-07-25 ENCOUNTER — NON-APPOINTMENT (OUTPATIENT)
Age: 64
End: 2019-07-25

## 2019-07-25 VITALS
SYSTOLIC BLOOD PRESSURE: 122 MMHG | BODY MASS INDEX: 17.07 KG/M2 | OXYGEN SATURATION: 100 % | WEIGHT: 100 LBS | HEART RATE: 84 BPM | DIASTOLIC BLOOD PRESSURE: 77 MMHG | HEIGHT: 64 IN

## 2019-07-25 DIAGNOSIS — Z45.2 ENCOUNTER FOR ADJUSTMENT AND MANAGEMENT OF VASCULAR ACCESS DEVICE: ICD-10-CM

## 2019-07-25 DIAGNOSIS — N28.9 DISORDER OF KIDNEY AND URETER, UNSPECIFIED: ICD-10-CM

## 2019-07-25 DIAGNOSIS — I38 ENDOCARDITIS, VALVE UNSPECIFIED: ICD-10-CM

## 2019-07-25 PROCEDURE — 93000 ELECTROCARDIOGRAM COMPLETE: CPT

## 2019-07-25 PROCEDURE — 99214 OFFICE O/P EST MOD 30 MIN: CPT

## 2019-07-25 NOTE — PHYSICAL EXAM
[General Appearance - Well Developed] : well developed [Normal Appearance] : normal appearance [Well Groomed] : well groomed [General Appearance - Well Nourished] : well nourished [No Deformities] : no deformities [General Appearance - In No Acute Distress] : no acute distress [Normal Conjunctiva] : the conjunctiva exhibited no abnormalities [Eyelids - No Xanthelasma] : the eyelids demonstrated no xanthelasmas [Normal Oral Mucosa] : normal oral mucosa [No Oral Pallor] : no oral pallor [No Oral Cyanosis] : no oral cyanosis [Normal Jugular Venous A Waves Present] : normal jugular venous A waves present [Normal Jugular Venous V Waves Present] : normal jugular venous V waves present [No Jugular Venous Godwin A Waves] : no jugular venous godwin A waves [Respiration, Rhythm And Depth] : normal respiratory rhythm and effort [Exaggerated Use Of Accessory Muscles For Inspiration] : no accessory muscle use [Auscultation Breath Sounds / Voice Sounds] : lungs were clear to auscultation bilaterally [Abdomen Soft] : soft [Abdomen Tenderness] : non-tender [Abdomen Mass (___ Cm)] : no abdominal mass palpated [Abnormal Walk] : normal gait [Gait - Sufficient For Exercise Testing] : the gait was sufficient for exercise testing [Nail Clubbing] : no clubbing of the fingernails [Cyanosis, Localized] : no localized cyanosis [Petechial Hemorrhages (___cm)] : no petechial hemorrhages [Skin Color & Pigmentation] : normal skin color and pigmentation [] : no rash [No Venous Stasis] : no venous stasis [Skin Lesions] : no skin lesions [No Skin Ulcers] : no skin ulcer [No Xanthoma] : no  xanthoma was observed [Oriented To Time, Place, And Person] : oriented to person, place, and time [Affect] : the affect was normal [Mood] : the mood was normal [No Anxiety] : not feeling anxious [Normal] : normal [Normal Rate] : normal [Rhythm Regular] : regular [Normal S1] : normal S1 [Normal S2] : normal S2 [No Gallop] : no gallop heard [S3] : no S3 [S4] : no S4 [No Murmur] : no murmurs heard [II] : a grade 2 [Right Carotid Bruit] : right carotid bruit heard [Left Carotid Bruit] : left carotid bruit heard [Right Femoral Bruit] : no bruit heard over the right femoral artery [Left Femoral Bruit] : no bruit heard over the left femoral artery [No Pitting Edema] : no pitting edema present

## 2019-07-25 NOTE — CARDIOLOGY SUMMARY
[No Ischemia] : no Ischemia [No Exercise Ind Arr] : no exercise induced arrhythmias [No Symptoms] : no Symptoms [___] : [unfilled] [LVEF ___%] : LVEF [unfilled]% [None] : no pulmonary hypertension [Normal] : normal LA size [Mild] : mild mitral regurgitation

## 2019-07-25 NOTE — HISTORY OF PRESENT ILLNESS
[FreeTextEntry1] : Stephanie Mcknight presented to the office today for a followup evaluation.  She was last seen in the office in 2017, but was hospitalized in May.\par \par She is now 63 years old, with a history of peripheral vascular disease, with asymptomatic carotid disease, and iliac/femoral disease for which she had iliac stenting and femoral endarterectomy.  She has a history of PVCs. She did not have any known structural heart disease. \par \par She presented to the hospital in May with fatigue, having previously presented to the hospital with a GI bleed. Unfortunately, she was found to have strep bacteremia and endocarditis, for which she required aortic valve replacement for severe AI.  Preop cath revealed nonobstructive CAD.  She has now completed 6 weeks of postoperative antibiotics, and had the port removed several days ago.\par \par She has been feeling well. Her memory is a little bit worse postoperatively, but otherwise she has had no sequelae of the procedure. She does not report symptoms suggestive of angina, heart failure or arrhythmia.

## 2019-07-25 NOTE — DISCUSSION/SUMMARY
[FreeTextEntry1] : Stephanie has a history of peripheral vascular disease. She has nonobstructive coronary artery disease. She presented with aortic valve endocarditis, and is now status post bioprosthetic aortic valve replacement.\par \par She has been doing well postoperatively. She does not really have any complaints. She is without symptoms of acute ischemia. She appears euvolemic. She is in sinus rhythm.\par \par She will continue aspirin, simvastatin and antiplatelet therapy. She will continue metoprolol. I don't think she needs any adjustments at this time. She has an echocardiogram scheduled, and will see Dr. Cee in the office in September. She will see me in October.

## 2019-08-05 ENCOUNTER — APPOINTMENT (OUTPATIENT)
Dept: CARDIOLOGY | Facility: CLINIC | Age: 64
End: 2019-08-05
Payer: COMMERCIAL

## 2019-08-05 PROCEDURE — 93306 TTE W/DOPPLER COMPLETE: CPT

## 2019-08-12 ENCOUNTER — APPOINTMENT (OUTPATIENT)
Dept: INFECTIOUS DISEASE | Facility: CLINIC | Age: 64
End: 2019-08-12
Payer: COMMERCIAL

## 2019-08-12 VITALS
SYSTOLIC BLOOD PRESSURE: 106 MMHG | OXYGEN SATURATION: 97 % | RESPIRATION RATE: 16 BRPM | DIASTOLIC BLOOD PRESSURE: 67 MMHG | TEMPERATURE: 97.1 F | BODY MASS INDEX: 17.42 KG/M2 | WEIGHT: 102 LBS | HEIGHT: 64 IN | HEART RATE: 61 BPM

## 2019-08-12 PROCEDURE — 99213 OFFICE O/P EST LOW 20 MIN: CPT

## 2019-08-12 NOTE — ASSESSMENT
[FreeTextEntry1] : sp doing well.  completed 6 weeks of combined therapy for entero \par endocarditis  no signs of active infection  exam is good.

## 2019-08-12 NOTE — HISTORY OF PRESENT ILLNESS
[FreeTextEntry1] : doing well  off ab for 2 plus weeks.  no fever or chills.  no malaise  . \par  occ nausea  no fever no chills.

## 2019-08-12 NOTE — PHYSICAL EXAM
[General Appearance - Alert] : alert [General Appearance - In No Acute Distress] : in no acute distress [Sclera] : the sclera and conjunctiva were normal [PERRL With Normal Accommodation] : pupils were equal in size, round, reactive to light [Extraocular Movements] : extraocular movements were intact [Oropharynx] : the oropharynx was normal with no thrush [Outer Ear] : the ears and nose were normal in appearance [Neck Appearance] : the appearance of the neck was normal [Neck Cervical Mass (___cm)] : no neck mass was observed [Thyroid Diffuse Enlargement] : the thyroid was not enlarged [Jugular Venous Distention Increased] : there was no jugular-venous distention [] : no respiratory distress [Auscultation Breath Sounds / Voice Sounds] : lungs were clear to auscultation bilaterally [Heart Rate And Rhythm] : heart rate was normal and rhythm regular [Heart Sounds] : normal S1 and S2 [Heart Sounds Gallop] : no gallops [Murmurs] : no murmurs [Heart Sounds Pericardial Friction Rub] : no pericardial rub

## 2019-08-19 LAB
BACTERIA BLD CULT: NORMAL
BACTERIA BLD CULT: NORMAL

## 2019-08-20 NOTE — PATIENT PROFILE ADULT - NSPRONUTRITIONRISK_GEN_A_NUR
Denies known Latex allergy or symptoms of Latex sensitivity. Medications reviewed with patient:No changes made. Tobacco use verified. Medical and Surgical history reviewed: No changes made.       Preferred Pharmacy:   [address], [address] [facility]  Phone: [phone] Fax: [phone]        Refills needed? no  Letter for work/school needed? no    Chief Complaint   Patient presents with   â¢ Hand     New patient visit Left hand mass No indicators present BMI less than 18

## 2019-08-21 ENCOUNTER — MEDICATION RENEWAL (OUTPATIENT)
Age: 64
End: 2019-08-21

## 2019-09-16 ENCOUNTER — APPOINTMENT (OUTPATIENT)
Dept: INFECTIOUS DISEASE | Facility: CLINIC | Age: 64
End: 2019-09-16
Payer: COMMERCIAL

## 2019-09-16 VITALS
BODY MASS INDEX: 19.46 KG/M2 | SYSTOLIC BLOOD PRESSURE: 127 MMHG | DIASTOLIC BLOOD PRESSURE: 81 MMHG | TEMPERATURE: 97.6 F | WEIGHT: 114 LBS | OXYGEN SATURATION: 99 % | HEART RATE: 75 BPM | HEIGHT: 64 IN

## 2019-09-16 DIAGNOSIS — I33.0 ACUTE AND SUBACUTE INFECTIVE ENDOCARDITIS: ICD-10-CM

## 2019-09-16 PROCEDURE — 99213 OFFICE O/P EST LOW 20 MIN: CPT

## 2019-09-16 NOTE — PHYSICAL EXAM
[General Appearance - In No Acute Distress] : in no acute distress [General Appearance - Alert] : alert [Sclera] : the sclera and conjunctiva were normal [Extraocular Movements] : extraocular movements were intact [PERRL With Normal Accommodation] : pupils were equal in size, round, reactive to light [Oropharynx] : the oropharynx was normal with no thrush [Outer Ear] : the ears and nose were normal in appearance [Neck Appearance] : the appearance of the neck was normal [Jugular Venous Distention Increased] : there was no jugular-venous distention [Neck Cervical Mass (___cm)] : no neck mass was observed [Thyroid Diffuse Enlargement] : the thyroid was not enlarged [Auscultation Breath Sounds / Voice Sounds] : lungs were clear to auscultation bilaterally [Heart Rate And Rhythm] : heart rate was normal and rhythm regular [Heart Sounds] : normal S1 and S2 [Heart Sounds Gallop] : no gallops [Murmurs] : no murmurs [Heart Sounds Pericardial Friction Rub] : no pericardial rub [Bowel Sounds] : normal bowel sounds [Abdomen Soft] : soft [] : no hepato-splenomegaly [Abdomen Tenderness] : non-tender [Abdomen Mass (___ Cm)] : no abdominal mass palpated [Costovertebral Angle Tenderness] : no CVA tenderness

## 2019-09-16 NOTE — ASSESSMENT
[FreeTextEntry1] : sp treatment for  endocarditis and AVR  off ab for over 2 months  no issues \par to check followup labs and cultures but no other follow up needed pt aware of need for ab prior to dental work or if there is an active infection does not need it for upcoming Mohs surgery

## 2019-09-17 LAB — ERYTHROCYTE [SEDIMENTATION RATE] IN BLOOD BY WESTERGREN METHOD: 14 MM/HR

## 2019-09-23 ENCOUNTER — APPOINTMENT (OUTPATIENT)
Dept: CARDIOTHORACIC SURGERY | Facility: CLINIC | Age: 64
End: 2019-09-23
Payer: COMMERCIAL

## 2019-09-23 VITALS
RESPIRATION RATE: 16 BRPM | SYSTOLIC BLOOD PRESSURE: 124 MMHG | DIASTOLIC BLOOD PRESSURE: 77 MMHG | TEMPERATURE: 98 F | HEIGHT: 64 IN | HEART RATE: 75 BPM

## 2019-09-23 DIAGNOSIS — Z09 ENCOUNTER FOR FOLLOW-UP EXAMINATION AFTER COMPLETED TREATMENT FOR CONDITIONS OTHER THAN MALIGNANT NEOPLASM: ICD-10-CM

## 2019-09-23 LAB
BACTERIA BLD CULT: NORMAL
BACTERIA BLD CULT: NORMAL

## 2019-09-23 PROCEDURE — 99214 OFFICE O/P EST MOD 30 MIN: CPT

## 2019-09-23 RX ORDER — CEFTRIAXONE 2 G/1
2 INJECTION, POWDER, FOR SOLUTION INTRAMUSCULAR; INTRAVENOUS
Refills: 0 | Status: COMPLETED | COMMUNITY
Start: 2019-06-11 | End: 2019-09-23

## 2019-09-23 RX ORDER — AMPICILLIN SODIUM 2 G/1
2 INJECTION, POWDER, FOR SOLUTION INTRAVENOUS
Refills: 0 | Status: COMPLETED | COMMUNITY
Start: 2019-06-11 | End: 2019-09-23

## 2019-09-23 RX ORDER — PRAMIPEXOLE DIHYDROCHLORIDE 0.25 MG/1
0.25 TABLET ORAL
Refills: 0 | Status: ACTIVE | COMMUNITY
Start: 2019-09-23

## 2019-09-23 NOTE — CONSULT LETTER
[FreeTextEntry2] : Luan Syed M.D.\par [address] [facility]\[place]  [address]\par [phone]\par Fax:  [phone] [FreeTextEntry1] : I had the pleasure of seeing your patient, VIVIANA CHAPMAN, in my office today. \par \par We take a multidisciplinary team approach to patient care and consider you, the referring physician, an extension of our team. We will maintain an open line of communication with you throughout your patient's treatment course.  \par \par As you recall, she is a 63 year old female status post Aortic valve Replacement using 21 mm Valentino Perimount bio prosthetic aortic valve, Mitral valve Repair using 25 mm Valentino physio annuloplasty ring, CABG X1 ( LIMA to LAD ) on 5/31/19.The patient presents to the office today for a routine follow up visit with repeat diagnostic imaging . I have enclosed a copy for your records.\par \par 8/5/19 ECHO revealed EF 32%, An annuloplasty ring in mitral position, Minimal mitral Regurgitation.Mean trans mitral valve gradient 3 mm hg which is normal in the setting of annuloplasty ring .Bio prosthetic aortic valve is well seated. Peak trans aortic valve gradient 18 mm hg, mean trans aortic valve gradient 8 mm hg which is probably normal in presence of bio prosthetic aortic valve . LVEF 60%.\par \par Today on exam patient's lungs clear bilaterally, normal sinus rhythm, sternum stable, incision clean, dry and intact.  No peripheral edema noted. Therefore, I have recommended that the patient will follow up with Cardiologist and PCP. \par \par I appreciate the opportunity to care for your patient at [facility] . If there are any questions or concerns, please call me at [phone]. \par \par \par Sincerely, \par \par \par \par \par Jamarcus Cee MD\par Director\par [facility]\par Professor \par Cardiovascular & Thoracic Surgery\par [facility] [employer] [facility] [employer].\par \par \par [facility]:\par Department of Cardiovascular and Thoracic Surgery\[address], [address]\par Office: [phone]\par Fax: [phone]\par \par Valorie Fox\par  \par Department of Cardiovascular and Thoracic Surgery\[address], [address]\par Phone: [phone]\par Office: [phone]\par \par

## 2019-09-23 NOTE — REVIEW OF SYSTEMS
[Fever] : no fever [Chills] : no chills [Chest Pain] : no chest pain [Palpitations] : no palpitations [Lower Ext Edema] : no lower extremity edema [Shortness Of Breath] : no shortness of breath [SOB on Exertion] : no shortness of breath during exertion [Dizziness] : no dizziness [Fainting] : no fainting [Easy Bleeding] : no tendency for easy bleeding [Easy Bruising] : no tendency for easy bruising

## 2019-09-23 NOTE — PHYSICAL EXAM
[Sclera] : the sclera and conjunctiva were normal [PERRL With Normal Accommodation] : pupils were equal in size, round, and reactive to light [Neck Appearance] : the appearance of the neck was normal [] : no respiratory distress [Respiration, Rhythm And Depth] : normal respiratory rhythm and effort [Apical Impulse] : the apical impulse was normal [Auscultation Breath Sounds / Voice Sounds] : lungs were clear to auscultation bilaterally [Heart Rate And Rhythm] : heart rate was normal and rhythm regular [Heart Sounds] : normal S1 and S2 [Murmurs] : no murmurs [Examination Of The Chest] : the chest was normal in appearance [2+] : left 2+ [Breast Appearance] : normal in appearance [Bowel Sounds] : normal bowel sounds [Abdomen Soft] : soft [Abdomen Tenderness] : non-tender [No CVA Tenderness] : no ~M costovertebral angle tenderness [Involuntary Movements] : no involuntary movements were seen [Skin Color & Pigmentation] : normal skin color and pigmentation [Skin Turgor] : normal skin turgor [No Focal Deficits] : no focal deficits [Oriented To Time, Place, And Person] : oriented to person, place, and time [Impaired Insight] : insight and judgment were intact [Affect] : the affect was normal [Memory Recent] : recent memory was not impaired [Mood] : the mood was normal [Memory Remote] : remote memory was not impaired

## 2019-09-23 NOTE — DATA REVIEWED
[FreeTextEntry1] : 8/5/19 ECHO revealed EF 32%, An annuloplasty ring in mitral position, Minimal mitral Regurgitation.Mean trans mitral valve gradient 3 mm hg which is normal in the setting of annuloplasty ring .Bio prosthetic aortic valve is well seated. Peak trans aortic valve gradient 18 mm hg, mean trans aortic valve gradient 8 mm hg which is probably normal in presence of bio prosthetic aortic valve . LVEF 60%.

## 2019-09-23 NOTE — HISTORY OF PRESENT ILLNESS
[FreeTextEntry1] : This is a 63 year old female status post Aortic valve Replacement using 21 mm Valentino Perimount bio prosthetic aortic valve, Mitral valve Repair using 25 mm Valentino physio annuloplasty ring, CABG X1 ( LIMA to LAD ) on 5/31/19. Post op course uneventful. Discharged to home with IV antibiotics ampicillin and ceftriaxone as per ID till 7/12/19 via Mae catheter . She is here for follow up visit with Echocardiogram.

## 2019-09-23 NOTE — ASSESSMENT
[FreeTextEntry1] : This is a 63 year old female status post Aortic valve Replacement using 21 mm Valentino Perimount bio prosthetic aortic valve, Mitral valve Repair using 25 mm Valentino physio annuloplasty ring, CABG X1 ( LIMA to LAD ) on 5/31/19. Post op course uneventful. Discharged to home with IV antibiotics ampicillin and ceftriaxone as per ID till 7/12/19 via Mae catheter . She is here for follow up visit with Echocardiogram. \par \par This visit, she stated that she is doing well. Denies any chest pain, palpitations, shortness of breath, Dizziness or pedal edema.\par \par I have reviewed the ECHO and explained the patient.8/5/19 ECHO revealed EF 32%, An annuloplasty ring in mitral position, Minimal mitral Regurgitation.Mean trans mitral valve gradient 3 mm hg which is normal in the setting of annuloplasty ring .Bio prosthetic aortic valve is well seated. Peak trans aortic valve gradient 18 mm hg, mean trans aortic valve gradient 8 mm hg which is probably normal in presence of bio prosthetic aortic valve . LVEF 60%.\par \par \par Today on exam patient's lungs clear bilaterally, normal sinus rhythm, sternum stable, incision clean, dry and intact.   No peripheral edema noted. Instructed patient on importance of optimal glycemic control, daily showering, daily weights, incentive spirometer use, and increase ambulation as tolerated. Instructed to call office with any signs or symptoms of infection or weight gain of 2 or more pounds in 1 day or 3 or more pounds in 1 week.  \par \par Plan:\par 1) Continue current medication regimen\par 2) Follow up with cardiologist and PCP \par 3) May return on as needed basis \par 4) Ambulate as tolerated\par 5) SBE antibiotic prophylaxis discussed at length  \par \par Echo on 8/5/19 shows a normally functioning bioprosthetic aortic valve and minimal mitral regurgitation with normal LV systolic function with LVEF 60%.\par \par

## 2019-09-23 NOTE — REASON FOR VISIT
[FreeTextEntry1] : status post Aortic valve Replacement using 21 mm Valentino perimount bio prosthetic aortic valve, Mitral valve Repair using 25 mm Valentino physio annuloplasty ring, CABG X1 ( LIMA to LAD ) on 5/31/19

## 2019-10-07 ENCOUNTER — NON-APPOINTMENT (OUTPATIENT)
Age: 64
End: 2019-10-07

## 2019-10-07 ENCOUNTER — APPOINTMENT (OUTPATIENT)
Dept: CARDIOLOGY | Facility: CLINIC | Age: 64
End: 2019-10-07
Payer: COMMERCIAL

## 2019-10-07 VITALS
DIASTOLIC BLOOD PRESSURE: 80 MMHG | WEIGHT: 114 LBS | BODY MASS INDEX: 19.46 KG/M2 | SYSTOLIC BLOOD PRESSURE: 134 MMHG | OXYGEN SATURATION: 99 % | HEIGHT: 64 IN | HEART RATE: 79 BPM

## 2019-10-07 DIAGNOSIS — I35.9 NONRHEUMATIC AORTIC VALVE DISORDER, UNSPECIFIED: ICD-10-CM

## 2019-10-07 DIAGNOSIS — I05.9 RHEUMATIC MITRAL VALVE DISEASE, UNSPECIFIED: ICD-10-CM

## 2019-10-07 PROCEDURE — 93000 ELECTROCARDIOGRAM COMPLETE: CPT

## 2019-10-07 PROCEDURE — 99215 OFFICE O/P EST HI 40 MIN: CPT

## 2019-10-07 RX ORDER — ASPIRIN 325 MG/1
325 TABLET ORAL
Qty: 90 | Refills: 0 | Status: DISCONTINUED | COMMUNITY
Start: 2019-06-11 | End: 2019-10-07

## 2019-10-07 NOTE — DISCUSSION/SUMMARY
[FreeTextEntry1] : Stephanie has a history of peripheral vascular disease. She has nonobstructive coronary artery disease. She presented with aortic valve endocarditis, and is now status post bioprosthetic aortic valve replacement, and mitral valve repair.\par \par She is without symptoms of acute ischemia. She appears euvolemic. She is in sinus rhythm.\par \par She will stop aspirin, but continue simvastatin and plavix. She will continue metoprolol. Her claudication is significant, and potentially worsening. She will start with an ultrasound to see if we can localize the areas of concern. She may require a CT scan or an MRI, depending upon what we find, or do not find.

## 2019-10-07 NOTE — HISTORY OF PRESENT ILLNESS
[FreeTextEntry1] : Stephanie Mcknight presented to the office today for a followup evaluation.  She was last seen in the office in July.\par \par She is now 63 years old, with a history of peripheral vascular disease, with asymptomatic carotid disease, and iliac/femoral disease for which she had iliac stenting and femoral endarterectomy.  She has a history of PVCs. She presented to the hospital in May with fatigue, having previously presented to the hospital with a GI bleed. Unfortunately, she was found to have strep bacteremia and endocarditis, for which she required aortic valve replacement for severe AI.  Preop cath revealed nonobstructive CAD.  \par \par She has been feeling well. She does not report symptoms suggestive of angina, heart failure or arrhythmia. Her most recent echo has revealed normal function of her bioprosthetic valve, and only minimal MR, status post repair. Her memory has improved. She is concerned about symptoms of claudication, which she has had in the past. She gets symptoms walking back and forth from the store to her car in the parking lot. Her symptoms are relieved with rest.

## 2019-10-07 NOTE — PHYSICAL EXAM
[General Appearance - Well Developed] : well developed [Well Groomed] : well groomed [Normal Appearance] : normal appearance [General Appearance - Well Nourished] : well nourished [No Deformities] : no deformities [General Appearance - In No Acute Distress] : no acute distress [Normal Conjunctiva] : the conjunctiva exhibited no abnormalities [Eyelids - No Xanthelasma] : the eyelids demonstrated no xanthelasmas [Normal Oral Mucosa] : normal oral mucosa [No Oral Pallor] : no oral pallor [No Oral Cyanosis] : no oral cyanosis [Normal Jugular Venous A Waves Present] : normal jugular venous A waves present [Normal Jugular Venous V Waves Present] : normal jugular venous V waves present [No Jugular Venous Godwin A Waves] : no jugular venous godwin A waves [Respiration, Rhythm And Depth] : normal respiratory rhythm and effort [Exaggerated Use Of Accessory Muscles For Inspiration] : no accessory muscle use [Auscultation Breath Sounds / Voice Sounds] : lungs were clear to auscultation bilaterally [Abdomen Soft] : soft [Abdomen Tenderness] : non-tender [Abdomen Mass (___ Cm)] : no abdominal mass palpated [Abnormal Walk] : normal gait [Gait - Sufficient For Exercise Testing] : the gait was sufficient for exercise testing [Nail Clubbing] : no clubbing of the fingernails [Cyanosis, Localized] : no localized cyanosis [Petechial Hemorrhages (___cm)] : no petechial hemorrhages [Skin Color & Pigmentation] : normal skin color and pigmentation [] : no rash [No Venous Stasis] : no venous stasis [No Skin Ulcers] : no skin ulcer [Skin Lesions] : no skin lesions [No Xanthoma] : no  xanthoma was observed [Oriented To Time, Place, And Person] : oriented to person, place, and time [Affect] : the affect was normal [No Anxiety] : not feeling anxious [Mood] : the mood was normal [Normal] : normal [Rhythm Regular] : regular [Normal Rate] : normal [Normal S2] : normal S2 [Normal S1] : normal S1 [No Gallop] : no gallop heard [S4] : no S4 [S3] : no S3 [No Murmur] : no murmurs heard [II] : a grade 2 [Right Carotid Bruit] : right carotid bruit heard [Left Carotid Bruit] : left carotid bruit heard [Right Femoral Bruit] : no bruit heard over the right femoral artery [No Pitting Edema] : no pitting edema present [Left Femoral Bruit] : no bruit heard over the left femoral artery

## 2019-10-12 ENCOUNTER — APPOINTMENT (OUTPATIENT)
Dept: CARDIOLOGY | Facility: CLINIC | Age: 64
End: 2019-10-12
Payer: COMMERCIAL

## 2019-10-12 PROCEDURE — 93925 LOWER EXTREMITY STUDY: CPT

## 2019-10-19 ENCOUNTER — APPOINTMENT (OUTPATIENT)
Dept: CARDIOLOGY | Facility: CLINIC | Age: 64
End: 2019-10-19
Payer: COMMERCIAL

## 2019-10-19 PROCEDURE — 93978 VASCULAR STUDY: CPT

## 2019-10-21 ENCOUNTER — OTHER (OUTPATIENT)
Age: 64
End: 2019-10-21

## 2019-10-23 ENCOUNTER — OTHER (OUTPATIENT)
Age: 64
End: 2019-10-23

## 2019-10-23 LAB
ANION GAP SERPL CALC-SCNC: 14 MMOL/L
BUN SERPL-MCNC: 27 MG/DL
CALCIUM SERPL-MCNC: 9.7 MG/DL
CHLORIDE SERPL-SCNC: 108 MMOL/L
CO2 SERPL-SCNC: 21 MMOL/L
CREAT SERPL-MCNC: 2.29 MG/DL
GLUCOSE SERPL-MCNC: 124 MG/DL
POTASSIUM SERPL-SCNC: 4.5 MMOL/L
SODIUM SERPL-SCNC: 143 MMOL/L

## 2019-12-10 ENCOUNTER — RX RENEWAL (OUTPATIENT)
Age: 64
End: 2019-12-10

## 2019-12-20 ENCOUNTER — APPOINTMENT (OUTPATIENT)
Dept: CARDIOLOGY | Facility: CLINIC | Age: 64
End: 2019-12-20
Payer: COMMERCIAL

## 2019-12-20 VITALS
BODY MASS INDEX: 21.17 KG/M2 | OXYGEN SATURATION: 98 % | HEART RATE: 74 BPM | SYSTOLIC BLOOD PRESSURE: 162 MMHG | HEIGHT: 64 IN | DIASTOLIC BLOOD PRESSURE: 82 MMHG | WEIGHT: 124 LBS

## 2019-12-20 DIAGNOSIS — I73.9 PERIPHERAL VASCULAR DISEASE, UNSPECIFIED: ICD-10-CM

## 2019-12-20 PROCEDURE — 99214 OFFICE O/P EST MOD 30 MIN: CPT

## 2019-12-20 RX ORDER — RANITIDINE 150 MG/1
150 TABLET ORAL
Refills: 0 | Status: DISCONTINUED | COMMUNITY
Start: 2019-06-11 | End: 2019-12-20

## 2019-12-20 NOTE — PHYSICAL EXAM
[Normal Appearance] : normal appearance [General Appearance - Well Developed] : well developed [No Deformities] : no deformities [General Appearance - Well Nourished] : well nourished [Well Groomed] : well groomed [Normal Conjunctiva] : the conjunctiva exhibited no abnormalities [General Appearance - In No Acute Distress] : no acute distress [Eyelids - No Xanthelasma] : the eyelids demonstrated no xanthelasmas [No Oral Pallor] : no oral pallor [Normal Oral Mucosa] : normal oral mucosa [Normal Jugular Venous V Waves Present] : normal jugular venous V waves present [Normal Jugular Venous A Waves Present] : normal jugular venous A waves present [No Oral Cyanosis] : no oral cyanosis [No Jugular Venous Godwin A Waves] : no jugular venous godwin A waves [Respiration, Rhythm And Depth] : normal respiratory rhythm and effort [Exaggerated Use Of Accessory Muscles For Inspiration] : no accessory muscle use [Auscultation Breath Sounds / Voice Sounds] : lungs were clear to auscultation bilaterally [Abdomen Soft] : soft [Abdomen Tenderness] : non-tender [Abnormal Walk] : normal gait [Abdomen Mass (___ Cm)] : no abdominal mass palpated [Gait - Sufficient For Exercise Testing] : the gait was sufficient for exercise testing [Nail Clubbing] : no clubbing of the fingernails [Cyanosis, Localized] : no localized cyanosis [Petechial Hemorrhages (___cm)] : no petechial hemorrhages [] : no rash [Skin Color & Pigmentation] : normal skin color and pigmentation [No Venous Stasis] : no venous stasis [Skin Lesions] : no skin lesions [Oriented To Time, Place, And Person] : oriented to person, place, and time [No Xanthoma] : no  xanthoma was observed [No Skin Ulcers] : no skin ulcer [No Anxiety] : not feeling anxious [Mood] : the mood was normal [Affect] : the affect was normal [Normal] : normal [Normal Rate] : normal [Normal S1] : normal S1 [Rhythm Regular] : regular [Normal S2] : normal S2 [No Gallop] : no gallop heard [II] : a grade 2 [No Murmur] : no murmurs heard [Left Carotid Bruit] : left carotid bruit heard [Right Carotid Bruit] : right carotid bruit heard [No Pitting Edema] : no pitting edema present [S3] : no S3 [S4] : no S4 [Right Femoral Bruit] : no bruit heard over the right femoral artery [Left Femoral Bruit] : no bruit heard over the left femoral artery

## 2019-12-20 NOTE — REASON FOR VISIT
[Initial Evaluation] : an initial evaluation of [FreeTextEntry2] : PAD [FreeTextEntry1] : pre-op evaluation

## 2019-12-20 NOTE — DISCUSSION/SUMMARY
[FreeTextEntry1] : 63 years old F current tobacco abuse, spinal fusion, with a history of PAD, with asymptomatic carotid disease, and iliac/femoral disease for which she had iliac stenting and femoral endarterectomy in 2016. In May '19 was found to have strep bacteremia and endocarditis, for which she required Aortic valve Replacement using 21 mm Valentino perimount bio prosthetic aortic valve, Mitral valve Repair using 25 mm Valentino physio annuloplasty ring, CABG X1 ( LIMA to LAD )\par \par Presenting today for initial evaluation due to c/o b/l claudication LT>Rt progressively getting worse. 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

## 2019-12-20 NOTE — HISTORY OF PRESENT ILLNESS
[FreeTextEntry1] : 63 years old F current tobacco abuse, spinal fusion, with a history of PAD, with asymptomatic carotid disease, and iliac/femoral disease for which she had iliac stenting and femoral endarterectomy in 2016. In May '19 was found to have strep bacteremia and endocarditis, for which she required Aortic valve Replacement using 21 mm Valentino perimount bio prosthetic aortic valve, Mitral valve Repair using 25 mm Valentino physio annuloplasty ring, CABG X1 ( LIMA to LAD )\par \par Presenting today for initial evaluation due to c/o b/l claudication LT>Rt progressively getting worse. 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

## 2020-01-08 ENCOUNTER — RX RENEWAL (OUTPATIENT)
Age: 65
End: 2020-01-08

## 2020-06-18 ENCOUNTER — NON-APPOINTMENT (OUTPATIENT)
Age: 65
End: 2020-06-18

## 2020-06-18 ENCOUNTER — APPOINTMENT (OUTPATIENT)
Dept: CARDIOLOGY | Facility: CLINIC | Age: 65
End: 2020-06-18
Payer: COMMERCIAL

## 2020-06-18 VITALS
OXYGEN SATURATION: 97 % | HEART RATE: 65 BPM | WEIGHT: 140 LBS | HEIGHT: 64 IN | DIASTOLIC BLOOD PRESSURE: 76 MMHG | BODY MASS INDEX: 23.9 KG/M2 | SYSTOLIC BLOOD PRESSURE: 145 MMHG

## 2020-06-18 PROCEDURE — 93000 ELECTROCARDIOGRAM COMPLETE: CPT

## 2020-06-18 PROCEDURE — 99214 OFFICE O/P EST MOD 30 MIN: CPT

## 2020-07-01 ENCOUNTER — APPOINTMENT (OUTPATIENT)
Dept: CARDIOLOGY | Facility: CLINIC | Age: 65
End: 2020-07-01
Payer: COMMERCIAL

## 2020-07-02 ENCOUNTER — APPOINTMENT (OUTPATIENT)
Dept: CARDIOLOGY | Facility: CLINIC | Age: 65
End: 2020-07-02
Payer: COMMERCIAL

## 2020-07-02 PROCEDURE — 93306 TTE W/DOPPLER COMPLETE: CPT

## 2020-07-09 NOTE — HISTORY OF PRESENT ILLNESS
[FreeTextEntry1] : Stephanie Mcknight presented to the office today for a followup evaluation.  She was last seen in the office in October.\par \par She is now 63 years old, with a history of peripheral vascular disease, with asymptomatic carotid disease, and iliac/femoral disease for which she had iliac stenting and femoral endarterectomy.  She has a history of PVCs. She presented to the hospital in May with fatigue, having previously presented to the hospital with a GI bleed. Unfortunately, she was found to have strep bacteremia and endocarditis, for which she required aortic valve replacement for severe AI.  Preop cath revealed nonobstructive CAD.  \par \par At the time of the last visit, she reported worsening claudication. Followup imaging suggested multilevel disease, and I referred her for evaluation. There was a decision to pursue an exercise program, and reserve interventional therapy for failure of that.\par \par She continues to have claudication, especially if she is carrying something heavy. She does not report symptoms of angina, or dyspnea. She describes palpitations, lasting several minutes. This happens several times a day, without unclear trigger. She further describes as a sense that her heart suddenly speeds up. She does not become lightheaded with it. She has had intermittent edema, generally at the end of the day, especially prominent if she wears tight socks.\par \par She is planned for a cervical laminectomy and fusion, in approximately one month.

## 2020-07-09 NOTE — DISCUSSION/SUMMARY
[FreeTextEntry1] : Stephanie has a history of peripheral vascular disease. She has nonobstructive coronary artery disease. She presented with aortic valve endocarditis, and is now status post bioprosthetic aortic valve replacement, and mitral valve repair.\par \par It is not clear if she is having a paroxysmal arrhythmia, as in atrial fibrillation. She will have a Holter, as well as a repeat echo.\par \par Claudication is not better. I will put that on the back burner.\par \par Assuming her echocardiogram and Holter did not reveal any significant abnormalities, I would consider her optimized for her planned orthopedic surgery. She can hold Plavix for up to 7 days preop.  We will plan on having her return to the office about one month postoperatively.

## 2020-07-09 NOTE — ADDENDUM
[FreeTextEntry1] : Echocardiography revealed no significant abnormalities. Holter monitoring revealed sinus rhythm, with an insignificant paroxysmal atrial tachycardia.  She is optimized for her surgery.

## 2020-07-15 PROCEDURE — 93224 XTRNL ECG REC UP TO 48 HRS: CPT

## 2020-08-19 ENCOUNTER — APPOINTMENT (OUTPATIENT)
Dept: CARDIOLOGY | Facility: CLINIC | Age: 65
End: 2020-08-19
Payer: COMMERCIAL

## 2020-08-19 ENCOUNTER — NON-APPOINTMENT (OUTPATIENT)
Age: 65
End: 2020-08-19

## 2020-08-19 VITALS
OXYGEN SATURATION: 100 % | HEART RATE: 61 BPM | BODY MASS INDEX: 23.22 KG/M2 | SYSTOLIC BLOOD PRESSURE: 107 MMHG | WEIGHT: 136 LBS | HEIGHT: 64 IN | DIASTOLIC BLOOD PRESSURE: 63 MMHG

## 2020-08-19 PROCEDURE — 99214 OFFICE O/P EST MOD 30 MIN: CPT

## 2020-08-19 PROCEDURE — 93000 ELECTROCARDIOGRAM COMPLETE: CPT

## 2020-08-19 NOTE — HISTORY OF PRESENT ILLNESS
[FreeTextEntry1] : Stephanie Mcknight presented to the office today for a followup evaluation.  She was last seen in the office in June.\par \par She is now 64 years old, with a history of peripheral vascular disease, with asymptomatic carotid disease, and iliac/femoral disease for which she had iliac stenting and femoral endarterectomy.  She has a history of PVCs. She presented to the hospital in May with fatigue, having previously presented to the hospital with a GI bleed. Unfortunately, she was found to have strep bacteremia and endocarditis, for which she required aortic valve replacement for severe AI.  Preop cath revealed nonobstructive CAD.  \par \par In October, 2019, she reported worsening claudication. Followup imaging suggested multilevel disease, and I referred her for evaluation. There was a decision to pursue an exercise program, and reserve interventional therapy for failure of that.\par \par In June she reported several symptoms,including unchanged claudication, palpitations and a need for cervical laminectomy and fusion.\par \par She went on to have an echocardiogram and a Holter monitor. The Holter revealed sustained atrial tachycardia. Echocardiogram was unremarkable, with normal function of her aortic valve. I allowed her to proceed with surgery, which was uneventful.\par \par She presents to the office today with several ongoing issues. She continues to recover from her cervical laminectomy and fusion. She continues to have symptoms of claudication, much of which might be vascular, but some of which might be neurologic. It is unclear how much of this might improve as she recovers from her surgery. She does not report any recent palpitations.

## 2020-08-19 NOTE — PHYSICAL EXAM
[General Appearance - Well Developed] : well developed [Well Groomed] : well groomed [Normal Appearance] : normal appearance [No Deformities] : no deformities [General Appearance - Well Nourished] : well nourished [General Appearance - In No Acute Distress] : no acute distress [Normal Oral Mucosa] : normal oral mucosa [Normal Conjunctiva] : the conjunctiva exhibited no abnormalities [Eyelids - No Xanthelasma] : the eyelids demonstrated no xanthelasmas [No Oral Pallor] : no oral pallor [No Oral Cyanosis] : no oral cyanosis [Normal Jugular Venous A Waves Present] : normal jugular venous A waves present [No Jugular Venous Godwin A Waves] : no jugular venous godwin A waves [Normal Jugular Venous V Waves Present] : normal jugular venous V waves present [Exaggerated Use Of Accessory Muscles For Inspiration] : no accessory muscle use [Respiration, Rhythm And Depth] : normal respiratory rhythm and effort [Abdomen Soft] : soft [Auscultation Breath Sounds / Voice Sounds] : lungs were clear to auscultation bilaterally [Abdomen Tenderness] : non-tender [Abdomen Mass (___ Cm)] : no abdominal mass palpated [Abnormal Walk] : normal gait [Gait - Sufficient For Exercise Testing] : the gait was sufficient for exercise testing [Nail Clubbing] : no clubbing of the fingernails [Petechial Hemorrhages (___cm)] : no petechial hemorrhages [Cyanosis, Localized] : no localized cyanosis [] : no rash [Skin Color & Pigmentation] : normal skin color and pigmentation [No Venous Stasis] : no venous stasis [Skin Lesions] : no skin lesions [No Skin Ulcers] : no skin ulcer [No Xanthoma] : no  xanthoma was observed [Oriented To Time, Place, And Person] : oriented to person, place, and time [No Anxiety] : not feeling anxious [Affect] : the affect was normal [Mood] : the mood was normal [Normal Rate] : normal [Normal] : normal [Rhythm Regular] : regular [Normal S1] : normal S1 [Normal S2] : normal S2 [No Gallop] : no gallop heard [No Murmur] : no murmurs heard [II] : a grade 2 [Right Carotid Bruit] : right carotid bruit heard [Left Carotid Bruit] : left carotid bruit heard [S3] : no S3 [S4] : no S4 [Right Femoral Bruit] : no bruit heard over the right femoral artery [No Pitting Edema] : no pitting edema present [Left Femoral Bruit] : no bruit heard over the left femoral artery

## 2020-08-19 NOTE — CARDIOLOGY SUMMARY
[No Exercise Ind Arr] : no exercise induced arrhythmias [No Ischemia] : no Ischemia [___] : [unfilled] [No Symptoms] : no Symptoms [LVEF ___%] : LVEF [unfilled]% [None] : no pulmonary hypertension [Mild] : mild mitral regurgitation [Normal] : normal LA size

## 2020-08-19 NOTE — DISCUSSION/SUMMARY
[FreeTextEntry1] : Stephanie has a history of peripheral vascular disease. She has nonobstructive coronary artery disease. She presented with aortic valve endocarditis, and is now status post bioprosthetic aortic valve replacement, and mitral valve repair.\par \par Claudication is not better. I will defer additional eval for that now, and we can see how she is in a few months.  I may refer her to vascular surgery.\par \par She had PAT on her Holter but these sxs have resolved.  We can watch this.

## 2020-11-18 ENCOUNTER — NON-APPOINTMENT (OUTPATIENT)
Age: 65
End: 2020-11-18

## 2020-11-18 ENCOUNTER — APPOINTMENT (OUTPATIENT)
Dept: CARDIOLOGY | Facility: CLINIC | Age: 65
End: 2020-11-18
Payer: MEDICARE

## 2020-11-18 VITALS
BODY MASS INDEX: 23.22 KG/M2 | HEIGHT: 64 IN | OXYGEN SATURATION: 97 % | WEIGHT: 136 LBS | SYSTOLIC BLOOD PRESSURE: 133 MMHG | DIASTOLIC BLOOD PRESSURE: 82 MMHG | HEART RATE: 70 BPM

## 2020-11-18 PROCEDURE — 93000 ELECTROCARDIOGRAM COMPLETE: CPT

## 2020-11-18 PROCEDURE — 99214 OFFICE O/P EST MOD 30 MIN: CPT

## 2020-11-18 NOTE — DISCUSSION/SUMMARY
[FreeTextEntry1] : Stephanie has a history of peripheral vascular disease. She has nonobstructive coronary artery disease. She presented with aortic valve endocarditis, and is status post bioprosthetic aortic valve replacement, and mitral valve repair.\par \par Claudication is not better. I will defer additional eval for that now, and we can see how she is in a few months.  I may refer her to vascular surgery when COVID is less of an issue.\par \par She had PAT on her Holter but these sxs have resolved.  We can watch this.

## 2020-11-18 NOTE — HISTORY OF PRESENT ILLNESS
[FreeTextEntry1] : Stephanie Mcknight presented to the office today for a followup evaluation.  She was last seen in the office in August.\par \par She is now 64 years old, with a history of peripheral vascular disease, with asymptomatic carotid disease, and iliac/femoral disease for which she had iliac stenting and femoral endarterectomy.  She has a history of PVCs. She presented to the hospital in 2019 with fatigue, having previously presented to the hospital with a GI bleed. Unfortunately, she was found to have strep bacteremia and endocarditis, for which she required aortic valve replacement for severe AI.  Preop cath revealed nonobstructive CAD.  \par \par In October, 2019, she reported worsening claudication. Followup imaging suggested multilevel disease, and I referred her for evaluation. There was a decision to pursue an exercise program, and reserve interventional therapy for failure of that.\par \par In June she reported several symptoms,including unchanged claudication, palpitations and a need for cervical laminectomy and fusion.\par \par She went on to have an echocardiogram and a Holter monitor. The Holter revealed sustained atrial tachycardia. Echocardiogram was unremarkable, with normal function of her aortic valve. I allowed her to proceed with surgery, which was uneventful.\par \par She presents to the office today with several ongoing issues.  She continues to have symptoms of claudication, much of which might be vascular, but some of which might be neurologic. She has some numbness and hyperesthesia in the right thigh.  She does not report any recent palpitations.

## 2020-11-18 NOTE — PHYSICAL EXAM
[General Appearance - Well Developed] : well developed [Normal Appearance] : normal appearance [Well Groomed] : well groomed [General Appearance - Well Nourished] : well nourished [No Deformities] : no deformities [General Appearance - In No Acute Distress] : no acute distress [Normal Conjunctiva] : the conjunctiva exhibited no abnormalities [Eyelids - No Xanthelasma] : the eyelids demonstrated no xanthelasmas [Normal Oral Mucosa] : normal oral mucosa [No Oral Pallor] : no oral pallor [No Oral Cyanosis] : no oral cyanosis [Normal Jugular Venous A Waves Present] : normal jugular venous A waves present [Normal Jugular Venous V Waves Present] : normal jugular venous V waves present [No Jugular Venous Godwin A Waves] : no jugular venous godwin A waves [Respiration, Rhythm And Depth] : normal respiratory rhythm and effort [Exaggerated Use Of Accessory Muscles For Inspiration] : no accessory muscle use [Auscultation Breath Sounds / Voice Sounds] : lungs were clear to auscultation bilaterally [Abdomen Soft] : soft [Abdomen Tenderness] : non-tender [Abdomen Mass (___ Cm)] : no abdominal mass palpated [Abnormal Walk] : normal gait [Gait - Sufficient For Exercise Testing] : the gait was sufficient for exercise testing [Nail Clubbing] : no clubbing of the fingernails [Cyanosis, Localized] : no localized cyanosis [Petechial Hemorrhages (___cm)] : no petechial hemorrhages [Skin Color & Pigmentation] : normal skin color and pigmentation [] : no rash [No Venous Stasis] : no venous stasis [Skin Lesions] : no skin lesions [No Skin Ulcers] : no skin ulcer [No Xanthoma] : no  xanthoma was observed [Oriented To Time, Place, And Person] : oriented to person, place, and time [Affect] : the affect was normal [Mood] : the mood was normal [No Anxiety] : not feeling anxious [Normal] : normal [Normal Rate] : normal [Rhythm Regular] : regular [Normal S1] : normal S1 [Normal S2] : normal S2 [No Gallop] : no gallop heard [No Murmur] : no murmurs heard [II] : a grade 2 [Right Carotid Bruit] : right carotid bruit heard [Left Carotid Bruit] : left carotid bruit heard [No Pitting Edema] : no pitting edema present [S3] : no S3 [S4] : no S4 [Right Femoral Bruit] : no bruit heard over the right femoral artery [Left Femoral Bruit] : no bruit heard over the left femoral artery

## 2020-12-07 ENCOUNTER — RX RENEWAL (OUTPATIENT)
Age: 65
End: 2020-12-07

## 2020-12-07 NOTE — DISCHARGE NOTE NURSING/CASE MANAGEMENT/SOCIAL WORK - NSDPACMPNY_GEN_ALL_CORE
Vaccine Information Statement(s) was given today. This has been reviewed, questions answered, and verbal consent given by Patient for injection(s) and administration of Influenza (Inactivated). Patient tolerated without incident. See immunization grid for documentation. Family

## 2021-03-24 ENCOUNTER — RX RENEWAL (OUTPATIENT)
Age: 66
End: 2021-03-24

## 2021-05-11 ENCOUNTER — NON-APPOINTMENT (OUTPATIENT)
Age: 66
End: 2021-05-11

## 2021-05-19 ENCOUNTER — APPOINTMENT (OUTPATIENT)
Dept: CARDIOLOGY | Facility: CLINIC | Age: 66
End: 2021-05-19
Payer: MEDICARE

## 2021-05-19 ENCOUNTER — NON-APPOINTMENT (OUTPATIENT)
Age: 66
End: 2021-05-19

## 2021-05-19 VITALS
DIASTOLIC BLOOD PRESSURE: 71 MMHG | OXYGEN SATURATION: 99 % | BODY MASS INDEX: 24.75 KG/M2 | HEIGHT: 64 IN | WEIGHT: 145 LBS | SYSTOLIC BLOOD PRESSURE: 152 MMHG | HEART RATE: 65 BPM

## 2021-05-19 PROCEDURE — 99215 OFFICE O/P EST HI 40 MIN: CPT

## 2021-05-19 PROCEDURE — 93000 ELECTROCARDIOGRAM COMPLETE: CPT

## 2021-05-19 PROCEDURE — 99072 ADDL SUPL MATRL&STAF TM PHE: CPT

## 2021-05-19 NOTE — HISTORY OF PRESENT ILLNESS
[FreeTextEntry1] : Stephanie Mcknight presented to the office today for a followup evaluation.  She was last seen in the office in November.\par \par She is now 65 years old, with a history of peripheral vascular disease, with asymptomatic carotid disease, and iliac/femoral disease for which she had iliac stenting and femoral endarterectomy.  She has a history of PVCs. She presented to the hospital in 2019 with fatigue, having previously presented to the hospital with a GI bleed. Unfortunately, she was found to have strep bacteremia and endocarditis, for which she required aortic valve replacement for severe AI.  Preop cath revealed nonobstructive CAD.  \par \par In October, 2019, she reported worsening claudication. Followup imaging suggested multilevel disease, and I referred her for evaluation. There was a decision to pursue an exercise program, and reserve interventional therapy for failure of that.\par \par In June she reported several symptoms,including unchanged claudication, palpitations and a need for cervical laminectomy and fusion. She went on to have an echocardiogram and a Holter monitor. The Holter revealed sustained atrial tachycardia. Echocardiogram was unremarkable, with normal function of her aortic valve. I allowed her to proceed with surgery, which was uneventful.\par \par She presents to the office today with several ongoing issues.  She continues to have symptoms of claudication, much of which might be vascular, but some of which might be neurologic. She has some numbness and hyperesthesia in the right thigh.  She has had discomfort in her right great toe, which she believes might be the toenail, but could be vascular as well.  Her claudication has been quite lifestyle limiting, and she often pretends to be looking at her phone so that she can rest.  She does not report any recent palpitations.

## 2021-05-19 NOTE — DISCUSSION/SUMMARY
[FreeTextEntry1] : Stephanie has a history of peripheral vascular disease. She has nonobstructive coronary artery disease. She presented with aortic valve endocarditis, and is status post bioprosthetic aortic valve replacement, and mitral valve repair.\par \par Claudication is not better.  I have referred her back to vascular surgery to be reevaluated.  Her claudication is clearly severe and lifestyle limiting.\par \par Her blood pressure is somewhat elevated, but her blood pressure was recently normal at her primary care physician's office.  She will check her blood pressure at home.

## 2021-05-26 NOTE — H&P PST ADULT - CARDIOVASCULAR DETAILS
[facility]  Pulmonary, Critical Care, and Sleep Medicine      Pulmonary Office Progress Notes    Name: Layla West     : 1942     Date: 2021        Subjective:     Patient is a 78 y.o. male is here for follow up for: Problems-severe obstructive sleep apnea, rheumatoid arthritis . Recovering COVID-19 pneumonia-status post acute hypoxic respiratory failure    21  Patient reports significant improvement in overall symptoms especially shortness of breath since she is able to take good deep breaths now. He completed 6-minute walk test on 3/31 and did not demonstrate any oxygen desaturation. He states that he is more compliant with his BiPAP machine now-wears it regularly although not able to get more than 4 hours average at night-forgets to put it on and sometimes after he takes it off for bathroom break does not put it back on. BiPAP download from 2021 through 2021 shows 90% of time used for an average of 4-hour 30 minutes at IPAP of 19 EPAP of 15. Significant leak noted. AHI of 11.6 with 2.3 central and 1.6 obstructive sleep apnea events  Does feel better with no further symptoms of fatigue. Denies any cough, chest congestion or chest tightness that he was experiencing before  Continues to work full-time  He was seen by cardiology-event monitor was placed. Rare PVCs PACs and a brief run of SVT  He received 2 doses of COVID-19 vaccine  Completed pulmonary function tests, 6-minute walk test and is here to discuss    Past Medical History:   Diagnosis Date    BPH without obstruction/lower urinary tract symptoms     Refusing treatment with medictions or TURBT. Dr. Kim Pandya.    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 Dr. Ursula Garcia. Allergies   Allergen Reactions    Latex, Natural Rubber Swelling    Adhesive Tape-Silicones Rash and Itching    Aldactone [Spironolactone] Not Reported This Time     Breast tenderness    Codeine Not Reported This Time     \"Drives crazy\"    Tape [Adhesive] Rash    Tetanus Toxoid, Adsorbed Anaphylaxis    Tetanus Vaccines And Toxoid Anaphylaxis       Current Outpatient Medications   Medication Sig Dispense Refill    hydrALAZINE (APRESOLINE) 25 mg tablet Take 1 Tab by mouth two (2) times a day. 180 Tab 3    lisinopriL (PRINIVIL, ZESTRIL) 40 mg tablet TAKE 1 TABLET BY MOUTH ONCE DAILY 90 Tab 3    torsemide (DEMADEX) 20 mg tablet Take 1 Tab by mouth two (2) times a day. 60 Tab 3    sertraline (ZOLOFT) 50 mg tablet Take 1.5 Tabs by mouth daily. 135 Tab 2    omeprazole (PRILOSEC) 20 mg capsule TAKE 1 CAPSULE BY MOUTH ONCE DAILY 90 Cap 2    celecoxib (CELEBREX) 200 mg capsule TAKE 1 CAPSULE BY MOUTH ONCE DAILY 90 Cap 2    amLODIPine (NORVASC) 10 mg tablet TAKE 1 TABLET BY MOUTH ONCE DAILY 90 Tab 3    hydrOXYchloroQUINE (PLAQUENIL) 200 mg tablet Take 400 mg by mouth daily.  tamsulosin (Flomax) 0.4 mg capsule Take 1 Cap by mouth daily. Indications: enlarged prostate with urination problem 90 Cap 3    atorvastatin (LIPITOR) 10 mg tablet TAKE 1 TABLET BY MOUTH ONCE DAILY 90 Tab 3    mineral oil liquid Take 30 mL by mouth daily as needed for Constipation.  cycloSPORINE (RESTASIS) 0.05 % ophthalmic emulsion Administer 1 Drop to both eyes nightly.  colchicine (MITIGARE) 0.6 mg capsule Take 0.6 mg by mouth every other day.  cholecalciferol, vitamin D3, (VITAMIN D3) 2,000 unit tab Take 2,000 Units by mouth daily.       LUMIGAN 0.01 % ophthalmic drops Administer 1 Drop to both eyes nightly.  MULTIVITS/IRON FUM/FA/D3/LYCOP (MULTI FOR HIM PO) Take  by mouth daily.  Xiidra 5 % dpet  (Patient not taking: Reported on 5/26/2021)      Oxygen 2 Liters continuous [employer] (Patient not taking: Reported on 5/26/2021)      diclofenac (VOLTAREN) 1 % topical gel Apply 4 g to affected area four (4) times daily.  (Patient not taking: Reported on 5/26/2021)         Review of Systems:  HEENT: No epistaxis, no nasal drainage, no difficulty in swallowing, no redness in eyes  Respiratory: as above  Cardiovascular: no chest pain, no palpitations, no chronic leg edema, no syncope  Gastrointestinal: no abd pain, no vomiting, no diarrhea, no bleeding symptoms  Genitourinary: No urinary symptoms or hematuria  Integument/breast: No ulcers or rashes  Musculoskeletal:Neg  Neurological: No focal weakness, no seizures, no headaches  Behvioral/Psych: No anxiety, no depression  Constitutional: No fever, no chills, no weight loss, no night sweats     Objective:     Visit Vitals  /64 (BP 1 Location: Left upper arm, BP Patient Position: Sitting, BP Cuff Size: Large adult)   Pulse 85   Temp 98.2 °F (36.8 °C) (Oral)   Resp 16   Ht 5' 8\" (1.727 m)   Wt 95.4 kg (210 lb 6.4 oz)   SpO2 97% Comment: RA Rest   BMI 31.99 kg/m²        Physical Exam:   General: comfortable, no acute distress  HEENT: pupils reactive, sclera anicteric, EOM intact  Neck: No adenopathy or thyroid swelling, no JVD, supple  CVS: S1S2 no murmurs  RS: Mod AE bilaterally, no tactile fremitus or egophony, no accessory muscle use  Abd: soft, non tender, no hepatosplenomegaly  Neuro: non focal, awake, alert  Extrm: no leg edema, clubbing or cyanosis  Skin: no rash    Data review:     Hospital Outpatient Visit on 05/13/2021   Component Date Value Ref Range Status    WBC 05/13/2021 4.9  4.6 - 13.2 K/uL Final    RBC 05/13/2021 3.98* 4.35 - 5.65 M/uL Final    HGB 05/13/2021 12.9* 13.0 - 16.0 g/dL Final    HCT 05/13/2021 39.7  36.0 - 48.0 % Final    MCV 05/13/2021 99.7* 74.0 - 97.0 FL Final    MCH 05/13/2021 32.4  24.0 - 34.0 PG Final    MCHC 05/13/2021 32.5  31.0 - 37.0 g/dL Final    RDW 05/13/2021 13.7  11.6 - 14.5 % Final    PLATELET 59/95/7584 097  135 - 420 K/uL Final    MPV 05/13/2021 10.3  9.2 - 11.8 FL Final    NEUTROPHILS 05/13/2021 54  40 - 73 % Final    LYMPHOCYTES 05/13/2021 29  21 - 52 % Final    MONOCYTES 05/13/2021 12* 3 - 10 % Final    EOSINOPHILS 05/13/2021 5  0 - 5 % Final    BASOPHILS 05/13/2021 1  0 - 2 % Final    ABS. NEUTROPHILS 05/13/2021 2.6  1.8 - 8.0 K/UL Final    ABS. LYMPHOCYTES 05/13/2021 1.4  0.9 - 3.6 K/UL Final    ABS. MONOCYTES 05/13/2021 0.6  0.05 - 1.2 K/UL Final    ABS. EOSINOPHILS 05/13/2021 0.2  0.0 - 0.4 K/UL Final    ABS. BASOPHILS 05/13/2021 0.0  0.0 - 0.1 K/UL Final    DF 05/13/2021 AUTOMATED    Final    Hemoglobin A1c 05/13/2021 5.6  4.2 - 5.6 % Final    Comment: (NOTE)  HbA1C Interpretive Ranges  <5.7              Normal  5.7 - 6.4         Consider Prediabetes  >6.5              Consider Diabetes      Est. average glucose 05/13/2021 114  mg/dL Final    Comment: (NOTE)  The eAG should be interpreted with patient characteristics in mind   since ethnicity, interindividual differences, red cell lifespan,   variation in rates of glycation, etc. may affect the validity of the   calculation.  LIPID PROFILE 05/13/2021        Final    Cholesterol, total 05/13/2021 129  <200 MG/DL Final    Triglyceride 05/13/2021 53  <150 MG/DL Final    Comment: The drugs N-acetylcysteine (NAC) and  Metamiszole have been found to cause falsely  low results in this chemical assay. Please  be sure to submit blood samples obtained  BEFORE administration of either of these  drugs to assure correct results.       HDL Cholesterol 05/13/2021 56  40 - 60 MG/DL Final    LDL, calculated 05/13/2021 62.4  0 - 100 MG/DL Final    VLDL, calculated 05/13/2021 10.6  MG/DL Final  CHOL/HDL Ratio 05/13/2021 2.3  0 - 5.0   Final    Magnesium 05/13/2021 2.1  1.6 - 2.6 mg/dL Final    Sodium 05/13/2021 147* 136 - 145 mmol/L Final    Potassium 05/13/2021 4.4  3.5 - 5.5 mmol/L Final    Chloride 05/13/2021 109  100 - 111 mmol/L Final    CO2 05/13/2021 32  21 - 32 mmol/L Final    Anion gap 05/13/2021 6  3.0 - 18 mmol/L Final    Glucose 05/13/2021 107* 74 - 99 mg/dL Final    BUN 05/13/2021 19* 7.0 - 18 MG/DL Final    Creatinine 05/13/2021 0.99  0.6 - 1.3 MG/DL Final    BUN/Creatinine ratio 05/13/2021 19  12 - 20   Final    GFR est AA 05/13/2021 >60  >60 ml/min/1.73m2 Final    GFR est non-AA 05/13/2021 >60  >60 ml/min/1.73m2 Final    Comment: (NOTE)  Estimated GFR is calculated using the Modification of Diet in Renal   Disease (MDRD) Study equation, reported for both  Americans   (GFRAA) and non- Americans (GFRNA), and normalized to 1.73m2   body surface area. The physician must decide which value applies to   the patient. The MDRD study equation should only be used in   individuals age 25 or older. It has not been validated for the   following: pregnant women, patients with serious comorbid conditions,   or on certain medications, or persons with extremes of body size,   muscle mass, or nutritional status.  Calcium 05/13/2021 9.1  8.5 - 10.1 MG/DL Final    Bilirubin, total 05/13/2021 0.6  0.2 - 1.0 MG/DL Final    ALT (SGPT) 05/13/2021 32  16 - 61 U/L Final    AST (SGOT) 05/13/2021 23  10 - 38 U/L Final    Alk.  phosphatase 05/13/2021 97  45 - 117 U/L Final    Protein, total 05/13/2021 6.5  6.4 - 8.2 g/dL Final    Albumin 05/13/2021 4.0  3.4 - 5.0 g/dL Final    Globulin 05/13/2021 2.5  2.0 - 4.0 g/dL Final    A-G Ratio 05/13/2021 1.6  0.8 - 1.7   Final    Microalbumin,urine random 05/13/2021 <0.50  0 - 3.0 MG/DL Final    Creatinine, urine 05/13/2021 46.00  30 - 125 mg/dL Final    Microalbumin/Creat ratio (mg/g cre* 05/13/2021 Cannot calculate ratio due to microalbumin result outside reportable range. 0 - 30 mg/g Final    Vitamin D 25-Hydroxy 05/13/2021 59.9  30 - 100 ng/mL Final    Comment: (NOTE)  Deficiency               <20 ng/mL  Insufficiency          20-30 ng/mL  Sufficient             ng/mL  Possible toxicity       >100 ng/mL    The Method used is Siemens Advia Centaur currently standardized to a   Center of Disease Control and Prevention (CDC) certified reference   [facility]. Samples containing fluorescein dye can produce falsely   elevated values when tested with the ADVIA Centaur Vitamin D Assay. It is recommended that results in the toxic range, >100 ng/mL, be   retested 72 hours post fluorescein exposure.  Hepatitis C virus Ab 05/13/2021 0.1  <0.80 Index Final    Hep C virus Ab Interp. 05/13/2021 Negative  NEG   Final    Hep C  virus Ab comment 05/13/2021        Final    Comment: Index <0.80. ......................... [place] Negative  Index > or = to 0.80 and <1.00. .... [place] Equivocal  Index >1.00. ......................... [place] Positive          For Equivocal or Positive results, confirmation with Hepatitis C RNA by PCR or bDNA is suggested. Hospital Outpatient Visit on 04/16/2021   Component Date Value Ref Range Status    Hepatitis C virus Ab 04/16/2021 0.1  <0.80 Index Final    Hep C virus Ab Interp. 04/16/2021 Negative  NEG   Final    Hep C  virus Ab comment 04/16/2021        Final    Comment: Index <0.80. ......................... [place] Negative  Index > or = to 0.80 and <1.00. .... [place] Equivocal  Index >1.00. ......................... [place] Positive          For Equivocal or Positive results, confirmation with Hepatitis C RNA by PCR or bDNA is suggested.       Magnesium 04/16/2021 2.2  1.6 - 2.6 mg/dL Final    Sodium 04/16/2021 143  136 - 145 mmol/L Final    Potassium 04/16/2021 3.9  3.5 - 5.5 mmol/L Final    Chloride 04/16/2021 108  100 - 111 mmol/L Final    CO2 04/16/2021 31  21 - 32 mmol/L Final    Anion gap 04/16/2021 4  3.0 - 18 mmol/L Final    Glucose 04/16/2021 89  74 - 99 mg/dL Final    BUN 04/16/2021 20* 7.0 - 18 MG/DL Final    Creatinine 04/16/2021 0.84  0.6 - 1.3 MG/DL Final    BUN/Creatinine ratio 04/16/2021 24* 12 - 20   Final    GFR est AA 04/16/2021 >60  >60 ml/min/1.73m2 Final    GFR est non-AA 04/16/2021 >60  >60 ml/min/1.73m2 Final    Comment: (NOTE)  Estimated GFR is calculated using the Modification of Diet in Renal   Disease (MDRD) Study equation, reported for both  Americans   (GFRAA) and non- Americans (GFRNA), and normalized to 1.73m2   body surface area. The physician must decide which value applies to   the patient. The MDRD study equation should only be used in   individuals age 25 or older. It has not been validated for the   following: pregnant women, patients with serious comorbid conditions,   or on certain medications, or persons with extremes of body size,   muscle mass, or nutritional status.  Calcium 04/16/2021 9.1  8.5 - 10.1 MG/DL Final   Hospital Outpatient Visit on 04/08/2021   Component Date Value Ref Range Status    Magnesium 04/08/2021 2.1  1.6 - 2.6 mg/dL Final    Sodium 04/08/2021 145  136 - 145 mmol/L Final    Potassium 04/08/2021 3.2* 3.5 - 5.5 mmol/L Final    Chloride 04/08/2021 106  100 - 111 mmol/L Final    CO2 04/08/2021 34* 21 - 32 mmol/L Final    Anion gap 04/08/2021 5  3.0 - 18 mmol/L Final    Glucose 04/08/2021 100* 74 - 99 mg/dL Final    BUN 04/08/2021 25* 7.0 - 18 MG/DL Final    Creatinine 04/08/2021 1.03  0.6 - 1.3 MG/DL Final    BUN/Creatinine ratio 04/08/2021 24* 12 - 20   Final    GFR est AA 04/08/2021 >60  >60 ml/min/1.73m2 Final    GFR est non-AA 04/08/2021 >60  >60 ml/min/1.73m2 Final    Comment: (NOTE)  Estimated GFR is calculated using the Modification of Diet in Renal   Disease (MDRD) Study equation, reported for both  Americans   (GFRAA) and non- Americans (GFRNA), and normalized to 1.73m2   body surface area.  The physician must decide which value applies to   the patient. The MDRD study equation should only be used in   individuals age 25 or older. It has not been validated for the   following: pregnant women, patients with serious comorbid conditions,   or on certain medications, or persons with extremes of body size,   muscle mass, or nutritional status.  Calcium 04/08/2021 8.9  8.5 - 10.1 MG/DL Final   Hospital Outpatient Visit on 03/26/2021   Component Date Value Ref Range Status    SARS-CoV-2 03/26/2021 Not Detected  Not Detected   Final    Comment: (NOTE)  This nucleic acid amplification test was developed and its  performance characteristics determined by [employer]. Nucleic acid amplification tests include RT-PCR and TMA. This test  has not been FDA cleared or approved. This test has been authorized  by FDA under an Emergency Use Authorization (EUA). This test is only  authorized for the duration of time the declaration that  circumstances exist justifying the authorization of the emergency use  of in vitro diagnostic tests for detection of SARS-CoV-2 virus and/or  diagnosis of COVID-19 infection under section 564(b)(1) of the Act,  21 U. S.C. 836YFI-3(V) (1), unless the authorization is terminated or  revoked sooner. When diagnostic testing is negative, the possibility of a false  negative result should be considered in the context of a patient's  recent exposures and the presence of clinical signs and symptoms  consistent with COVID-19. An individual without symptoms of COVID-  19 and who is not shedding SARS-CoV-2                            virus would expect to have a  negative (not detected) result in this assay.   Performed At: [address] [id number]  Reggie Malik MD [id number]     Ancillary Procedure on 02/24/2021   Component Date Value Ref Range Status    IVSd 02/24/2021 1.07* 0.60 - 1.00 cm Final    LVIDd 02/24/2021 5.35  4.20 - 5.90 cm Final    LVIDs 02/24/2021 2.84  cm Final  LVPWd 02/24/2021 1.12* 0.60 - 1.00 cm Final    LVOT Peak Gradient 02/24/2021 3.3  mmHg Final    LVOT Peak Velocity 02/24/2021 90.89  cm/s Final    RVIDd 02/24/2021 3.98  cm Final    Left Atrium Major Axis 02/24/2021 3.68  cm Final    LA Volume 02/24/2021 61.63  18.0 - 58.0 mL Final    LA Area 4C 02/24/2021 18.2  cm2 Final    LA Vol 2C 02/24/2021 62.58* 18.00 - 58.00 mL Final    LA Vol 4C 02/24/2021 50.21  18.00 - 58.00 mL Final    AoV PG 02/24/2021 7.8  mmHg Final    Aortic Valve Systolic Peak Velocity 07/39/3028 139.52  cm/s Final    MV \"A\" wave duration 02/24/2021 133.09  ms Final    MV A Eliot 02/24/2021 99.50  cm/s Final    Mitral Valve E Wave Deceleration T* 02/24/2021 263.1  ms Final    MV E Eliot 02/24/2021 87.13  cm/s Final    Mitral Valve Pressure Half-time 02/24/2021 76.3  ms Final    MVA (PHT) 02/24/2021 2.9  cm2 Final    Pulmonic Valve Systolic Peak Insta* 99/20/1235 3.81  mmHg Final    Pulmonic Valve Max Velocity 02/24/2021 97.54  cm/s Final    Triscuspid Valve Regurgitation Pea* 02/24/2021 19.6  mmHg Final    TR Max Velocity 02/24/2021 221.31  cm/s Final    AO ASC D 02/24/2021 3.73  cm Final    Ao Root D 02/24/2021 3.47  cm Final    LV E' Septal Velocity 02/24/2021 8.33  cm/s Final    LV E' Lateral Velocity 02/24/2021 9.99  cm/s Final    MV E/A 02/24/2021 0.88   Final    Left Atrium to Aortic Root Ratio 02/24/2021 1.06   Final    LA Area 2C 02/24/2021 22.03  cm2 Final    LV Mass AL 02/24/2021 229.8* 88.0 - 224.0 g Final    LV Mass AL Index 02/24/2021 110.1  49.0 - 115.0 g/m2 Final    E/E' lateral 02/24/2021 8.72   Final    E/E' septal 02/24/2021 10.46   Final    Right Atrial Area 4C 02/24/2021 16.9  cm2 Final    E/E' ratio (averaged) 02/24/2021 9.59   Final    Est. RA Pressure 02/24/2021 3.0  mmHg Final    Left Atrium Minor Axis 02/24/2021 1.76  cm Final    Tapse 02/24/2021 2.34* 1.50 - 2.00 cm Final    LA Vol Index 02/24/2021 29.53  16.00 - 28.00 ml/m2 Final    PASP 02/24/2021 23.0  mmHg Final    LA Vol Index 02/24/2021 29.99  16.00 - 28.00 ml/m2 Final    LA Vol Index 02/24/2021 24.06  16.00 - 28.00 ml/m2 Final    Left Ventricular Fractional [place]* 02/24/2021 [id number]  % Final    Mitral Valve Deceleration [place] 02/24/2021 [phone]   Final    AV Velocity Ratio 02/24/2021 0.65   Final    Left Ventricular End Diastolic Vol* 78/44/5626 [id number]  mL Final    Left Ventricular End Systolic Volu* 16/60/2318 0.46885713948  mL Final    Left Ventricular Stroke Volume by * 02/24/2021 77.517142206  mL Final    PV peak gradient 02/24/2021 3.8  mmHg Final   Ancillary Procedure on 02/24/2021   Component Date Value Ref Range Status    Target HR 02/24/2021 141  bpm Final    ST Elevation (mm) 02/24/2021 0  mm Final    ST Depression (mm) 02/24/2021 0  mm Final    Baseline HR 02/24/2021 72  bpm Final    Baseline BP 02/24/2021 134  mmHg Final    Percent HR 02/24/2021 72  % Final    Post peak HR 02/24/2021 102  bpm Final    Stress Base Diastolic BP 50/76/3713 74  mmHg Final    Stress Base Systolic BP 01/54/6991 894  mmHg Final    Stress Base Diastolic BP 33/73/7435 60  mmHg Final    Stress Rate Pressure Product 02/24/2021 15,912  bpm*mmHg Final    Stress Stage 1 HR 02/24/2021 85  bpm Final    Stress Stage 1 BP 02/24/2021 150/62  mmHg Final    Recovery Stage 1 HR 02/24/2021 96  bpm Final    Recovery Stage 1 BP 02/24/2021 156/60  mmHg Final    Recovery Stage 2 HR 02/24/2021 90  bpm Final    Recovery Stage 2 BP 02/24/2021 130/62  mmHg Final    Nuc Stress Diastolic Volume Index 90/22/4685 109.00  mL/m2 Final    Nuc Stress Systolic Volume Index 31/30/1870 441.00  mL/m2 Final   Hospital Outpatient Visit on 02/02/2021   Component Date Value Ref Range Status    WBC 02/02/2021 6.9  4.6 - 13.2 K/uL Final    RBC 02/02/2021 4.10* 4.70 - 5.50 M/uL Final    HGB 02/02/2021 13.4  13.0 - 16.0 g/dL Final    HCT 02/02/2021 41.2  36.0 - 48.0 % Final    MCV 02/02/2021 100.5* 74.0 - 97.0 FL Final    MCH 02/02/2021 32.7  24.0 - 34.0 PG Final    MCHC 02/02/2021 32.5  31.0 - 37.0 g/dL Final    RDW 02/02/2021 14.2  11.6 - 14.5 % Final    PLATELET 33/08/7022 507  135 - 420 K/uL Final    MPV 02/02/2021 10.5  9.2 - 11.8 FL Final    NEUTROPHILS 02/02/2021 70  40 - 73 % Final    LYMPHOCYTES 02/02/2021 18* 21 - 52 % Final    MONOCYTES 02/02/2021 8  3 - 10 % Final    EOSINOPHILS 02/02/2021 4  0 - 5 % Final    BASOPHILS 02/02/2021 0  0 - 2 % Final    ABS. NEUTROPHILS 02/02/2021 4.8  1.8 - 8.0 K/UL Final    ABS. LYMPHOCYTES 02/02/2021 1.3  0.9 - 3.6 K/UL Final    ABS. MONOCYTES 02/02/2021 0.6  0.05 - 1.2 K/UL Final    ABS. EOSINOPHILS 02/02/2021 0.3  0.0 - 0.4 K/UL Final    ABS. BASOPHILS 02/02/2021 0.0  0.0 - 0.1 K/UL Final    DF 02/02/2021 AUTOMATED    Final    Hemoglobin A1c 02/02/2021 5.4  4.2 - 5.6 % Final    Comment: (NOTE)  HbA1C Interpretive Ranges  <5.7              Normal  5.7 - 6.4         Consider Prediabetes  >6.5              Consider Diabetes      Est. average glucose 02/02/2021 108  mg/dL Final    Comment: (NOTE)  The eAG should be interpreted with patient characteristics in mind   since ethnicity, interindividual differences, red cell lifespan,   variation in rates of glycation, etc. may affect the validity of the   calculation.  LIPID PROFILE 02/02/2021        Final    Cholesterol, total 02/02/2021 132  <200 MG/DL Final    Triglyceride 02/02/2021 32  <150 MG/DL Final    Comment: The drugs N-acetylcysteine (NAC) and  Metamiszole have been found to cause falsely  low results in this chemical assay. Please  be sure to submit blood samples obtained  BEFORE administration of either of these  drugs to assure correct results.       HDL Cholesterol 02/02/2021 68* 40 - 60 MG/DL Final    LDL, calculated 02/02/2021 57.6  0 - 100 MG/DL Final    VLDL, calculated 02/02/2021 6.4  MG/DL Final    CHOL/HDL Ratio 02/02/2021 1.9  0 - 5.0   Final    Magnesium 02/02/2021 2.0  1.6 - 2.6 mg/dL Final    Sodium 02/02/2021 146* 136 - 145 mmol/L Final    Potassium 02/02/2021 4.4  3.5 - 5.5 mmol/L Final    Chloride 02/02/2021 112* 100 - 111 mmol/L Final    CO2 02/02/2021 29  21 - 32 mmol/L Final    Anion gap 02/02/2021 5  3.0 - 18 mmol/L Final    Glucose 02/02/2021 112* 74 - 99 mg/dL Final    BUN 02/02/2021 14  7.0 - 18 MG/DL Final    Creatinine 02/02/2021 0.78  0.6 - 1.3 MG/DL Final    BUN/Creatinine ratio 02/02/2021 18  12 - 20   Final    GFR est AA 02/02/2021 >60  >60 ml/min/1.73m2 Final    GFR est non-AA 02/02/2021 >60  >60 ml/min/1.73m2 Final    Comment: (NOTE)  Estimated GFR is calculated using the Modification of Diet in Renal   Disease (MDRD) Study equation, reported for both  Americans   (GFRAA) and non- Americans (GFRNA), and normalized to 1.73m2   body surface area. The physician must decide which value applies to   the patient. The MDRD study equation should only be used in   individuals age 25 or older. It has not been validated for the   following: pregnant women, patients with serious comorbid conditions,   or on certain medications, or persons with extremes of body size,   muscle mass, or nutritional status.  Calcium 02/02/2021 8.8  8.5 - 10.1 MG/DL Final    Bilirubin, total 02/02/2021 0.5  0.2 - 1.0 MG/DL Final    ALT (SGPT) 02/02/2021 34  16 - 61 U/L Final    AST (SGOT) 02/02/2021 17  10 - 38 U/L Final    Alk.  phosphatase 02/02/2021 112  45 - 117 U/L Final    Protein, total 02/02/2021 6.2* 6.4 - 8.2 g/dL Final    Albumin 02/02/2021 3.8  3.4 - 5.0 g/dL Final    Globulin 02/02/2021 2.4  2.0 - 4.0 g/dL Final    A-G Ratio 02/02/2021 1.6  0.8 - 1.7   Final    Microalbumin,urine random 02/02/2021 0.82  0 - 3.0 MG/DL Final    Creatinine, urine 02/02/2021 69.00  30 - 125 mg/dL Final    Microalbumin/Creat ratio (mg/g cre* 02/02/2021 12  0 - 30 mg/g Final    Vitamin D 25-Hydroxy 02/02/2021 27.3* 30 - 100 ng/mL Final    Comment: (NOTE)  Deficiency               <20 ng/mL  Insufficiency          20-30 ng/mL  Sufficient             ng/mL  Possible toxicity       >100 ng/mL    The Method used is Siemens Advia Centaur currently standardized to a   Center of Disease Control and Prevention (CDC) certified reference   [address]. Samples containing fluorescein dye can produce falsely   elevated values when tested with the ADVIA Centaur Vitamin D Assay. It is recommended that results in the toxic range, >100 ng/mL, be   retested 72 hours post fluorescein exposure.  Lead, blood 02/02/2021 10* 0 - 4 ug/dL Final    Comment: (NOTE)  Testing performed by Inductively coupled plasma/Mass Spectrometry. **Verified by repeat analysis**  This test was developed and its performance characteristics  determined by [facility]. It has not been cleared or approved  by the Food and Drug Administration. Environmental Exposure:                            WHO Recommendation    <20                           Occupational Exposure:                            OSHA Lead Std          40                            MANUEL                    30                                 Detection Limit =  1  Performed At: [address] [id number]   Vidya Zhao [facility] ED:[id number]     Hospital Outpatient Visit on 01/25/2021   Component Date Value Ref Range Status    SARS-CoV-2 01/25/2021 Not Detected  Not Detected   Final    Comment: (NOTE)  This nucleic acid amplification test was developed and its  performance characteristics determined by [facility]. Nucleic acid amplification tests include RT-PCR and TMA. This test  has not been FDA cleared or approved. This test has been authorized  by FDA under an Emergency Use Authorization (EUA).  This test is only  authorized for the duration of time the declaration that  circumstances exist justifying the authorization of the emergency use  of in vitro diagnostic tests for detection of SARS-CoV-2 virus and/or  diagnosis of COVID-19 infection under section 564(b)(1) of the Act,  21 U. S.C. 682MRA-9(A) (1), unless the authorization is terminated or  revoked sooner. When diagnostic testing is negative, the possibility of a false  negative result should be considered in the context of a patient's  recent exposures and the presence of clinical signs and symptoms  consistent with COVID-19. An individual without symptoms of COVID-  19 and who is not shedding SARS-CoV-2                            virus would expect to have a  negative (not detected) result in this assay. Performed At: [address] [id number]  Christiana Mclean MD QE:[id number]     PFT  Isolated reduction of RV FRC related to body habitus otherwise normal  Date FVC FEV1  FEV1/FVC [id number] TLC RV RV/TLC VC DLCO   3/31/2021  normal  normal  normal  normal   reduced                                            6-minute walk test-walked a distance of 432 m without any oxygen desaturation    Imaging:  I have personally reviewed the patients radiographs and have reviewed the reports:  XR Results (most recent):  Results from Hospital Encounter encounter on 09/10/20    XR SPINE CERV PA LAT ODONT 3 V MAX    Narrative  History: Neck pain. No trauma. TECHNIQUE: 4 views cervical spine. COMPARISON: January 2019    FINDINGS:    Prevertebral soft tissues within normal limits. Multilevel degenerative disc disease, uncovertebral joint hypertrophy and facet  arthropathy without gross interval change. No acute bone findings. C1-C2 relationship maintained. Visualized dens is intact. Right carotid atherosclerosis grossly unchanged. Visualized lung apices are  stable. Impression  IMPRESSION:    No acute findings or gross interval change. MRI can be obtained for further  clarification. Atherosclerosis.     CT Results (most recent):  Results from [facility] Encounter encounter on 08/31/19    CT CHEST W CONT    Narrative  CT CHEST WITH ENHANCEMENT    INDICATION: Lung nodule seen on imaging study, abnormal chest x-ray. TECHNIQUE: CT images obtained from the thoracic inlet to the level of the  diaphragm following uneventful administration of 80 mL Isovue 300 nonionic  intravenous contrast. Axial, coronal and sagittal reformats were obtained. All CT scans at this facility are performed using dose optimization technique as  appropriate to the performed exam, to include automated exposure control,  adjustment of the mA and/or kV according to patient's size (Including  appropriate matching for site-specific examinations), or use of iterative  reconstruction technique. COMPARISON: CT chest 2/28/2007. CHEST FINDINGS:    Thyroid/Base Of Neck:  Unremarkable  . Lungs:  No acute infiltrates are evident. .  No mass lesions are seen. .  Trachea and central bronchi are clear. .  Stable 4 mm chronic pleural based nodule left posterior right upper lobe (17). Also stable 2 mm nodule posterior right upper lobe (21). Stable 2-3 mm nodule  subpleural left upper lobe (14) and 2 mm nodule anterior inferior left upper  lobe (23)    Pleural Spaces: There is no pneumothorax or pleural fluid evident. No pleural plaques are seen    Lymph Nodes:  Axillae: No enlargement. Mediastinum / Janina: No enlargement. Mediastinum, Great Vessels And Heart: The heart is normal in size. No pericardial effusion. No aortic aneurysm. Moderate calcified plaques in the aortic arch and descending aorta. .    Abdomen Structures Included: The included portions of the liver and spleen are unremarkable. .    Osseous Structures:  No destructive osseous process. Degenerative changes in the shoulders. Thoracic  spondylosis. Impression  IMPRESSION:    1. No evidence of pulmonary mass. Several stable tiny bilateral pulmonary  nodules as discussed.  -The reported abnormal chest x-ray is not available for direct comparison.   2. Atherosclerosis. 3. Degenerative changes in the spine and shoulders. Patient Active Problem List   Diagnosis Code    BPH with obstruction/lower urinary tract symptoms N40.1, N13.8    Hypertension I10    Primary osteoarthritis involving multiple joints M89.49    Hyperlipidemia E78.5    GERD (gastroesophageal reflux disease) K21.9    Pre-diabetes R73.03    Rheumatoid arthritis involving both hands with negative rheumatoid factor ([facility]) M06.041, M06.042    Vitamin D deficiency E55.9    Colon polyps K63.5    CPDD (calcium pyrophosphate deposition disease) M11.20    Impaired fasting glucose R73.01    Class 1 obesity due to excess calories with serious comorbidity and body mass index (BMI) of 32.0 to 32.9 in adult E66.09, Z68.32    APC (atrial premature contractions) I49.1    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, sequela ([facility]) S68.621S    Obstructive sleep apnea syndrome G47.33    Lumbar facet arthropathy M47.816    Synovial cyst of lumbar facet joint M71.38    Mild episode of recurrent major depressive disorder ([facility]) F33.0    History of 2019 novel coronavirus disease (COVID-19) Z86.16    Bilateral lower extremity edema R60.0    Chest wall pain R07.89    Facet arthropathy, cervical M47.812    DDD (degenerative disc disease), cervical M50.30    Mobitz type 1 second degree atrioventricular block I44.1    Post-acute sequelae of COVID-19 ([facility]) B94.8    SVT (supraventricular tachycardia) ([facility]) I47.1     IMPRESSION:   Obstructive sleep apnea-severe with AHI of 35 - 45. Evaluated by Dr. Tray Mendieta. Brett Zarate and completed home sleep study and diagnosed with severe JANNET. Recent sleep study with AHI 68/h with RDI of 71/h-initiated treatment with BiPAP 19/15 with current download showing AHI of 11.2. Patient is tolerating the current settings and mask much better and wears it for an average of 4 hours 20 minutes or so.   S/p COVID-19 pneumonia: Positive covid- 19 test 6/29/20 -improved with no further functional impairment. Seronegative rheumatoid arthritis on Plaquenil  Hypertension  GERD  CPDD      RECOMMENDATIONS:   · We will continue to encourage longer use of BiPAP. At least 6 hours encouraged.   Instructions given to patient to put the mask on early while he is still awake and watching TV  · Explained to him different techniques to include trying to wear the mask in the daytime when he is able to adjust to comfort-he can incrementally increase the duration and sleep   · Encouraged healthy diet active lifestyle  · No further interventions needed as far as pulmonary issues concerned-normal PFTs and 6-minute walk very encouraging  · Preventive vaccinations  · We will follow-up in 6 months        Casey Ulloa MD positive S1/positive S2

## 2021-06-17 ENCOUNTER — APPOINTMENT (OUTPATIENT)
Dept: VASCULAR SURGERY | Facility: CLINIC | Age: 66
End: 2021-06-17
Payer: MEDICARE

## 2021-06-17 VITALS
DIASTOLIC BLOOD PRESSURE: 83 MMHG | SYSTOLIC BLOOD PRESSURE: 130 MMHG | HEART RATE: 63 BPM | HEIGHT: 64 IN | BODY MASS INDEX: 23.56 KG/M2 | WEIGHT: 138 LBS

## 2021-06-17 PROCEDURE — 99203 OFFICE O/P NEW LOW 30 MIN: CPT

## 2021-06-17 PROCEDURE — 93978 VASCULAR STUDY: CPT

## 2021-06-17 PROCEDURE — 93880 EXTRACRANIAL BILAT STUDY: CPT

## 2021-06-17 PROCEDURE — 93923 UPR/LXTR ART STDY 3+ LVLS: CPT

## 2021-06-17 PROCEDURE — 93926 LOWER EXTREMITY STUDY: CPT

## 2021-06-17 NOTE — PHYSICAL EXAM
[JVD] : no jugular venous distention  [Normal Breath Sounds] : Normal breath sounds [Normal Rate and Rhythm] : normal rate and rhythm [2+] : left 2+ [0] : left 0 [Ankle Swelling (On Exam)] : not present [Varicose Veins Of Lower Extremities] : not present [] : not present [Abdomen Tenderness] : ~T ~M No abdominal tenderness [No Rash or Lesion] : No rash or lesion [Skin Ulcer] : ulcer [Skin Induration] : no induration [Alert] : alert [Calm] : calm [de-identified] : Appears well

## 2021-06-17 NOTE — ASSESSMENT
[FreeTextEntry1] : In the office today the patient underwent a carotid duplex which shows a 60 to 79% right internal carotid artery stenosis which is unchanged from her previous studies.  In addition, she underwent arterial Dopplers which show moderate to severe disease bilaterally.  At this time given the right great toe pain along with the chronic ingrown toenail would recommend right leg angiogram with possible intervention.  In addition, I have spoken to the patient regarding smoking.  She will remain on the Plavix.

## 2021-06-26 LAB
ANION GAP SERPL CALC-SCNC: 14 MMOL/L
BASOPHILS # BLD AUTO: 0.1 K/UL
BASOPHILS NFR BLD AUTO: 1.3 %
BUN SERPL-MCNC: 25 MG/DL
CALCIUM SERPL-MCNC: 9.4 MG/DL
CHLORIDE SERPL-SCNC: 107 MMOL/L
CO2 SERPL-SCNC: 21 MMOL/L
CREAT SERPL-MCNC: 2.47 MG/DL
EOSINOPHIL # BLD AUTO: 0.14 K/UL
EOSINOPHIL NFR BLD AUTO: 1.8 %
GLUCOSE SERPL-MCNC: 92 MG/DL
HCT VFR BLD CALC: 37.2 %
HGB BLD-MCNC: 11.6 G/DL
IMM GRANULOCYTES NFR BLD AUTO: 0.5 %
LYMPHOCYTES # BLD AUTO: 1.47 K/UL
LYMPHOCYTES NFR BLD AUTO: 19 %
MAN DIFF?: NORMAL
MCHC RBC-ENTMCNC: 30.1 PG
MCHC RBC-ENTMCNC: 31.2 GM/DL
MCV RBC AUTO: 96.6 FL
MONOCYTES # BLD AUTO: 0.67 K/UL
MONOCYTES NFR BLD AUTO: 8.7 %
NEUTROPHILS # BLD AUTO: 5.31 K/UL
NEUTROPHILS NFR BLD AUTO: 68.7 %
PLATELET # BLD AUTO: 218 K/UL
POTASSIUM SERPL-SCNC: 4.2 MMOL/L
RBC # BLD: 3.85 M/UL
RBC # FLD: 14.2 %
SODIUM SERPL-SCNC: 142 MMOL/L
WBC # FLD AUTO: 7.73 K/UL

## 2021-07-07 NOTE — PHYSICAL EXAM
[Normal Breath Sounds] : Normal breath sounds [Normal Rate and Rhythm] : normal rate and rhythm [2+] : left 2+ [0] : left 0 [No Rash or Lesion] : No rash or lesion [Skin Ulcer] : ulcer [Alert] : alert [Calm] : calm

## 2021-07-08 ENCOUNTER — RESULT REVIEW (OUTPATIENT)
Age: 66
End: 2021-07-08

## 2021-07-08 ENCOUNTER — APPOINTMENT (OUTPATIENT)
Dept: ENDOVASCULAR SURGERY | Facility: CLINIC | Age: 66
End: 2021-07-08
Payer: MEDICARE

## 2021-07-08 VITALS
DIASTOLIC BLOOD PRESSURE: 90 MMHG | SYSTOLIC BLOOD PRESSURE: 160 MMHG | HEART RATE: 84 BPM | WEIGHT: 138 LBS | TEMPERATURE: 97.7 F | RESPIRATION RATE: 16 BRPM | BODY MASS INDEX: 23.56 KG/M2 | OXYGEN SATURATION: 98 % | HEIGHT: 64 IN

## 2021-07-08 PROCEDURE — 37224Z: CUSTOM

## 2021-07-08 PROCEDURE — 75625 CONTRAST EXAM ABDOMINL AORTA: CPT

## 2021-07-08 NOTE — ASSESSMENT
[FreeTextEntry1] :  arterial Dopplers  show moderate to severe disease bilaterally.   given the right great toe pain along with the chronic ingrown toenail plan for right leg angiogram with possible intervention.

## 2021-07-08 NOTE — PAST MEDICAL HISTORY
[FreeTextEntry1] : Malignant Hyperthermia Screening Tool and Risk of Bleeding Assessment\par \par Ms. VIVIANA CHAPMAN denies family history of unexpected death following Anesthesia or Exercise.\par Denies Family history of Malignant Hyperthermia, Muscle or Neuromuscular disorder and High Temperature following exercise.\par \par Ms. VIVIANA CHAPMAN denies history of Muscle Spasm, Dark or Chocolate - Colored urine and Unanticipated fever immediately following anesthesia or serious exercise. \par Ms. CHAPMAN also denies bleeding tendencies/ Risks of Bleeding.\par

## 2021-07-08 NOTE — PHYSICAL EXAM
[JVD] : no jugular venous distention  [Ankle Swelling (On Exam)] : not present [Varicose Veins Of Lower Extremities] : not present [] : not present [Abdomen Tenderness] : ~T ~M No abdominal tenderness [Skin Induration] : no induration [de-identified] : Appears well

## 2021-07-08 NOTE — HISTORY OF PRESENT ILLNESS
[FreeTextEntry1] : accompanied by  Jamarcus [phone]\par feels ok\par covid not detected 7/3/2021 \par Cr 2.47 6/25/2021\par took plavix last night, gave aspirin at 720, BP meds this morning [FreeTextEntry5] : 930pm 7/7/2021 [FreeTextEntry6] : Dr. [place]

## 2021-07-13 ENCOUNTER — APPOINTMENT (OUTPATIENT)
Dept: VASCULAR SURGERY | Facility: CLINIC | Age: 66
End: 2021-07-13
Payer: MEDICARE

## 2021-07-13 PROCEDURE — 93926 LOWER EXTREMITY STUDY: CPT

## 2021-07-13 PROCEDURE — 99213 OFFICE O/P EST LOW 20 MIN: CPT

## 2021-07-13 NOTE — ASSESSMENT
[FreeTextEntry1] : Last week patient underwent arteriogram which showed a severely diseased left common iliac artery.  The previously placed right iliac stent extends into the aorta across the orifice of the left iliac artery therefore, no endovascular intervention is possible.  Patient will require a femoral-femoral bypass.  In the office today I discussed with the patient the risks and benefits.  Patient wishes to proceed.  Will be scheduled in the upcoming weeks.

## 2021-07-13 NOTE — PHYSICAL EXAM
[JVD] : no jugular venous distention  [Normal Breath Sounds] : Normal breath sounds [Normal Rate and Rhythm] : normal rate and rhythm [2+] : left 2+ [0] : left 0 [Ankle Swelling (On Exam)] : not present [Varicose Veins Of Lower Extremities] : not present [] : not present [Abdomen Tenderness] : ~T ~M No abdominal tenderness [No Rash or Lesion] : No rash or lesion [Skin Ulcer] : ulcer [Skin Induration] : no induration [Alert] : alert [Calm] : calm [de-identified] : Appears well

## 2021-07-13 NOTE — HISTORY OF PRESENT ILLNESS
[FreeTextEntry1] : 65-year-old female, active smoker with history of carotid stenosis, presents to the office complaining of right leg and foot pain.  Patient has a history of right iliac stent and a right femoral endarterectomy several years ago.  Patient states now she can only ambulate under 1 block at which time she experiences right calf cramping.  In addition, she also has pain in her right great toe from an ingrown toenail that at this time cannot be treated due to her underlying vascular disease.  She is here for evaluation.

## 2021-07-19 ENCOUNTER — APPOINTMENT (OUTPATIENT)
Dept: CARDIOLOGY | Facility: CLINIC | Age: 66
End: 2021-07-19
Payer: MEDICARE

## 2021-07-19 ENCOUNTER — NON-APPOINTMENT (OUTPATIENT)
Age: 66
End: 2021-07-19

## 2021-07-19 VITALS
DIASTOLIC BLOOD PRESSURE: 79 MMHG | HEIGHT: 64 IN | OXYGEN SATURATION: 99 % | HEART RATE: 60 BPM | SYSTOLIC BLOOD PRESSURE: 144 MMHG | WEIGHT: 136 LBS | BODY MASS INDEX: 23.22 KG/M2

## 2021-07-19 PROCEDURE — 99215 OFFICE O/P EST HI 40 MIN: CPT

## 2021-07-19 PROCEDURE — 93000 ELECTROCARDIOGRAM COMPLETE: CPT

## 2021-07-19 NOTE — HISTORY OF PRESENT ILLNESS
[FreeTextEntry1] : Stephanie Mcknight presented to the office today for a followup evaluation.  She was last seen in the office in May.\par \par She is now 65 years old, with a history of peripheral vascular disease, with asymptomatic carotid disease, and iliac/femoral disease for which she had iliac stenting and femoral endarterectomy.  She has a history of PVCs. She presented to the hospital in 2019 with fatigue, having previously presented to the hospital with a GI bleed. Unfortunately, she was found to have strep bacteremia and endocarditis, for which she required aortic valve replacement for severe AI.  Preop cath revealed nonobstructive CAD.  \par \par In October, 2019, she reported worsening claudication. Followup imaging suggested multilevel disease, and I referred her for evaluation. There was a decision to pursue an exercise program, and reserve interventional therapy for failure of that.\par \par Last June she reported several symptoms,including unchanged claudication, palpitations and a need for cervical laminectomy and fusion. She went on to have an echocardiogram and a Holter monitor. The Holter revealed sustained atrial tachycardia. Echocardiogram was unremarkable, with normal function of her aortic valve. I allowed her to proceed with surgery, which was uneventful.\par \par At the time of her last visit in May, she described ongoing issues with claudication, which was severe and lifestyle limiting.  I referred her to surgery, and she has now planned for femoral femoral bypass.  This is scheduled for August 4.\par \par She continues to feel well from a cardiovascular perspective.  She does not report symptoms of angina, heart failure or arrhythmia.  She continues to experience severe and lifestyle limiting claudication.

## 2021-07-19 NOTE — DISCUSSION/SUMMARY
[FreeTextEntry1] : Stephanie has a history of peripheral vascular disease. She has mild nonobstructive coronary artery disease, based on cardiac catheterization from 2019. She presented with aortic valve endocarditis, and is status post bioprosthetic aortic valve replacement, and mitral valve repair.\par \par Her claudication is not better.  She will have an echocardiogram, which has not been performed in about a year, and assuming there are no surprises, she would be considered optimized from a cardiovascular perspective for her planned lower extremity bypass surgery.  Routine hemodynamic monitoring would be recommended.  As far as I am concerned, she can hold Plavix for 5 days prior to the procedure, but I will leave that up to Dr. Green to decide, as her Plavix is being administered for vascular reasons, not cardiac months.

## 2021-07-21 ENCOUNTER — OUTPATIENT (OUTPATIENT)
Dept: OUTPATIENT SERVICES | Facility: HOSPITAL | Age: 66
LOS: 1 days | End: 2021-07-21
Payer: MEDICARE

## 2021-07-21 VITALS
HEIGHT: 64 IN | SYSTOLIC BLOOD PRESSURE: 136 MMHG | WEIGHT: 134.7 LBS | DIASTOLIC BLOOD PRESSURE: 85 MMHG | HEART RATE: 67 BPM | OXYGEN SATURATION: 98 % | TEMPERATURE: 97 F | RESPIRATION RATE: 18 BRPM

## 2021-07-21 DIAGNOSIS — I10 ESSENTIAL (PRIMARY) HYPERTENSION: ICD-10-CM

## 2021-07-21 DIAGNOSIS — Z95.2 PRESENCE OF PROSTHETIC HEART VALVE: Chronic | ICD-10-CM

## 2021-07-21 DIAGNOSIS — I73.9 PERIPHERAL VASCULAR DISEASE, UNSPECIFIED: ICD-10-CM

## 2021-07-21 DIAGNOSIS — Z90.49 ACQUIRED ABSENCE OF OTHER SPECIFIED PARTS OF DIGESTIVE TRACT: Chronic | ICD-10-CM

## 2021-07-21 DIAGNOSIS — Z98.51 TUBAL LIGATION STATUS: Chronic | ICD-10-CM

## 2021-07-21 DIAGNOSIS — Z95.1 PRESENCE OF AORTOCORONARY BYPASS GRAFT: Chronic | ICD-10-CM

## 2021-07-21 DIAGNOSIS — Z98.890 OTHER SPECIFIED POSTPROCEDURAL STATES: Chronic | ICD-10-CM

## 2021-07-21 DIAGNOSIS — M06.9 RHEUMATOID ARTHRITIS, UNSPECIFIED: Chronic | ICD-10-CM

## 2021-07-21 DIAGNOSIS — Z29.9 ENCOUNTER FOR PROPHYLACTIC MEASURES, UNSPECIFIED: ICD-10-CM

## 2021-07-21 DIAGNOSIS — K27.1 ACUTE PEPTIC ULCER, SITE UNSPECIFIED, WITH PERFORATION: Chronic | ICD-10-CM

## 2021-07-21 DIAGNOSIS — M19.011 PRIMARY OSTEOARTHRITIS, RIGHT SHOULDER: Chronic | ICD-10-CM

## 2021-07-21 DIAGNOSIS — Z01.818 ENCOUNTER FOR OTHER PREPROCEDURAL EXAMINATION: ICD-10-CM

## 2021-07-21 LAB
ANION GAP SERPL CALC-SCNC: 15 MMOL/L — SIGNIFICANT CHANGE UP (ref 5–17)
BLD GP AB SCN SERPL QL: NEGATIVE — SIGNIFICANT CHANGE UP
BUN SERPL-MCNC: 35 MG/DL — HIGH (ref 7–23)
CALCIUM SERPL-MCNC: 9.2 MG/DL — SIGNIFICANT CHANGE UP (ref 8.4–10.5)
CHLORIDE SERPL-SCNC: 105 MMOL/L — SIGNIFICANT CHANGE UP (ref 96–108)
CO2 SERPL-SCNC: 19 MMOL/L — LOW (ref 22–31)
CREAT SERPL-MCNC: 3.26 MG/DL — HIGH (ref 0.5–1.3)
GLUCOSE SERPL-MCNC: 95 MG/DL — SIGNIFICANT CHANGE UP (ref 70–99)
HCT VFR BLD CALC: 37.1 % — SIGNIFICANT CHANGE UP (ref 34.5–45)
HGB BLD-MCNC: 11.3 G/DL — LOW (ref 11.5–15.5)
MCHC RBC-ENTMCNC: 29.1 PG — SIGNIFICANT CHANGE UP (ref 27–34)
MCHC RBC-ENTMCNC: 30.5 GM/DL — LOW (ref 32–36)
MCV RBC AUTO: 95.6 FL — SIGNIFICANT CHANGE UP (ref 80–100)
NRBC # BLD: 0 /100 WBCS — SIGNIFICANT CHANGE UP (ref 0–0)
PLATELET # BLD AUTO: 325 K/UL — SIGNIFICANT CHANGE UP (ref 150–400)
POTASSIUM SERPL-MCNC: 4.6 MMOL/L — SIGNIFICANT CHANGE UP (ref 3.5–5.3)
POTASSIUM SERPL-SCNC: 4.6 MMOL/L — SIGNIFICANT CHANGE UP (ref 3.5–5.3)
RBC # BLD: 3.88 M/UL — SIGNIFICANT CHANGE UP (ref 3.8–5.2)
RBC # FLD: 14 % — SIGNIFICANT CHANGE UP (ref 10.3–14.5)
RH IG SCN BLD-IMP: POSITIVE — SIGNIFICANT CHANGE UP
SODIUM SERPL-SCNC: 139 MMOL/L — SIGNIFICANT CHANGE UP (ref 135–145)
WBC # BLD: 7.75 K/UL — SIGNIFICANT CHANGE UP (ref 3.8–10.5)
WBC # FLD AUTO: 7.75 K/UL — SIGNIFICANT CHANGE UP (ref 3.8–10.5)

## 2021-07-21 PROCEDURE — 86901 BLOOD TYPING SEROLOGIC RH(D): CPT

## 2021-07-21 PROCEDURE — 85027 COMPLETE CBC AUTOMATED: CPT

## 2021-07-21 PROCEDURE — G0463: CPT

## 2021-07-21 PROCEDURE — 86900 BLOOD TYPING SEROLOGIC ABO: CPT

## 2021-07-21 PROCEDURE — 86850 RBC ANTIBODY SCREEN: CPT

## 2021-07-21 PROCEDURE — 80048 BASIC METABOLIC PNL TOTAL CA: CPT

## 2021-07-21 RX ORDER — CHLORHEXIDINE GLUCONATE 213 G/1000ML
1 SOLUTION TOPICAL ONCE
Refills: 0 | Status: DISCONTINUED | OUTPATIENT
Start: 2021-08-04 | End: 2021-08-06

## 2021-07-21 RX ORDER — RANITIDINE HYDROCHLORIDE 150 MG/1
1 TABLET, FILM COATED ORAL
Qty: 0 | Refills: 0 | DISCHARGE

## 2021-07-21 NOTE — H&P PST ADULT - NSICDXPASTSURGICALHX_GEN_ALL_CORE_FT
PAST SURGICAL HISTORY:  Acute peptic ulcer with perforation 1980    H/O cervical discectomy C1-T1 fusion 2020    History of endarterectomy iliac stenting and femoral endarterectomy    Osteoarthritis of right shoulder region Right Shoulder Arthroscopy  2007    Rheumatoid arthritis of carpometacarpal joint of thumb Total Joint Replacement of Left Thumb    S/P appendectomy 2014    S/P AVR (aortic valve replacement) 2019    S/P CABG x 1 2019    S/P tubal ligation 1986

## 2021-07-21 NOTE — H&P PST ADULT - VISION (WITH CORRECTIVE LENSES IF THE PATIENT USUALLY WEARS THEM):
Recommended yearly dilated eye examinations. Normal vision: sees adequately in most situations; can see medication labels, newsprint

## 2021-07-21 NOTE — H&P PST ADULT - NSICDXPROBLEM_GEN_ALL_CORE_FT
PROBLEM DIAGNOSES  Problem: Need for prophylactic measure  Assessment and Plan: The Caprini score indicates this patient is at risk for a VTE event (score 3-5).  Most surgical patients in this group would benefit from pharmacologic prophylaxis.  The surgical team will determine the balance between VTE risk and bleeding risk     Problem: Peripheral vascular disease  Assessment and Plan: Sx planned for 8/4/21. Cards seen, plavix to stop 5 days prior to sx. Note in chart. COVID swab at [facility] on 8/1/21. Surgical and chlorhexidine instructions reviewed w/ pt.     Problem: Hypertension  Assessment and Plan: To continue oral medications as prescribed.

## 2021-07-21 NOTE — H&P PST ADULT - ASSESSMENT
CAPRINI SCORE [CLOT updated 18]    AGE RELATED RISK FACTORS                                                       MOBILITY RELATED FACTORS  [ ] Age 41-60 years                                            (1 Point)                    [ ] Bed rest                                                        (1 Point)  [ ] Age: 61-74 years                                           (2 Points)                  [ ] Plaster cast                                                   (2 Points)  [ ] Age= 75 years                                              (3 Points)                    [ ] Bed bound for more than 72 hours                 (2 Points)    DISEASE RELATED RISK FACTORS                                               GENDER SPECIFIC FACTORS  [ ] Edema in the lower extremities                       (1 Point)              [ ] Pregnancy                                                     (1 Point)  [ ] Varicose veins                                               (1 Point)                     [ ] Post-partum < 6 weeks                                   (1 Point)             [ ] BMI > 25 Kg/m2                                            (1 Point)                     [ ] Hormonal therapy  or oral contraception          (1 Point)                 [ ] Sepsis (in the previous month)                        (1 Point)               [ ] History of pregnancy complications                 (1 point)  [ ] Pneumonia or serious lung disease                                               [ ] Unexplained or recurrent                     (1 Point)           (in the previous month)                               (1 Point)  [ ] Abnormal pulmonary function test                     (1 Point)                 SURGERY RELATED RISK FACTORS  [ ] Acute myocardial infarction                              (1 Point)               [ ]  Section                                             (1 Point)  [ ] Congestive heart failure (in the previous month)  (1 Point)      [ ] Minor surgery                                                  (1 Point)   [ ] Inflammatory bowel disease                             (1 Point)               [ ] Arthroscopic surgery                                        (2 Points)  [ ] Central venous access                                      (2 Points)                [ ] General surgery lasting more than 45 minutes (2 points)  [ ] Present or previous malignancy                     (2 Points)                [ ] Elective arthroplasty                                         (5 points)    [ ] Stroke (in the previous month)                          (5 Points)                                                                                                                                                           HEMATOLOGY RELATED FACTORS                                                 TRAUMA RELATED RISK FACTORS  [ ] Prior episodes of VTE                                     (3 Points)                [ ] Fracture of the hip, pelvis, or leg                       (5 Points)  [ ] Positive family history for VTE                         (3 Points)             [ ] Acute spinal cord injury (in the previous month)  (5 Points)  [ ] Prothrombin 14998 A                                     (3 Points)               [ ] Paralysis  (less than 1 month)                             (5 Points)  [ ] Factor V Leiden                                             (3 Points)                  [ ] Multiple Trauma within 1 month                        (5 Points)  [ ] Lupus anticoagulants                                     (3 Points)                                                           [ ] Anticardiolipin antibodies                               (3 Points)                                                       [ ] High homocysteine in the blood                      (3 Points)                                             [ ] Other congenital or acquired thrombophilia      (3 Points)                                                [ ] Heparin induced thrombocytopenia                  (3 Points)                                     Total Score [ 5   ]

## 2021-07-21 NOTE — H&P PST ADULT - HISTORY OF PRESENT ILLNESS
64yr old female w/ hx of PVD, CAD, Arthritis, HTN, HLD, GERD, CKD, COVID (3/21) and iliac/femoral dx which she had iliac stenting and femoral endarterectomy. In 2019 she was found to have bacteremia and endocarditis and required a AVR and a CABG x1. She now presents to PST for a Femoral Femoral Bypass on 8/4/21. She c/o lower extremity pain while ambulating that has gotten progressively worse and is relieved by resting. Denies recent cough, fever, chills, chest pain or SOB and feels well otherwise. She received her first dose of Moderna. Cards seen on 7/19/21 for eval (to hold Plavix 5days prior to sx). [facility] swab at [facility] on 8/1/2021.  64yr old female w/ hx of PVD, CAD, Arthritis, HTN, HLD, GERD, CKD, COVID (3/21) and iliac/femoral dx which she had iliac stenting and femoral endarterectomy. In 2019 she was found to have bacteremia and endocarditis and required a AVR and a CABG x1. She now presents to PST for a Femoral Femoral Bypass on 8/4/21. She c/o lower extremity pain while ambulating that has gotten progressively worse and is relieved by resting. Denies recent cough, fever, chills, chest pain or SOB and feels well otherwise. She received her first dose of Moderna. Cards seen on 7/19/21 for eval (to hold Plavix 5days prior to sx). COVID swab at [facility] on 8/1/2021.     **As per Dr Green she may stay on her plavix, patient was made aware.

## 2021-07-21 NOTE — H&P PST ADULT - NSICDXFAMILYHX_GEN_ALL_CORE_FT
FAMILY HISTORY:  Father  Still living? No  Family history of arthritis, Age at diagnosis: Age Unknown    Mother  Still living? No  No family history of alcoholism, Age at diagnosis: Age Unknown    Sibling  Still living? Yes, Estimated age: Age Unknown  Family history of atrial fibrillation, Age at diagnosis: Age Unknown  Family history of hypothyroidism, Age at diagnosis: Age Unknown  Family history of parkinsonism, Age at diagnosis: Age Unknown  Family hx of aortic aneurysm, Age at diagnosis: Age Unknown  Family hx of hypertension, Age at diagnosis: Age Unknown

## 2021-07-21 NOTE — H&P PST ADULT - NSICDXPASTMEDICALHX_GEN_ALL_CORE_FT
PAST MEDICAL HISTORY:  2019 novel coronavirus disease (COVID-19) 3/2021    Arthritis Cervical Spine  and Hands    CAD (coronary artery disease)     CKD (chronic kidney disease)     Depression     GERD (gastroesophageal reflux disease)     History of endocarditis     Hyperlipidemia     Hypertension     Peripheral vascular disease     Restless leg syndrome Especially with General Anesthesia

## 2021-07-23 ENCOUNTER — APPOINTMENT (OUTPATIENT)
Dept: CARDIOLOGY | Facility: CLINIC | Age: 66
End: 2021-07-23
Payer: MEDICARE

## 2021-07-23 PROCEDURE — 93306 TTE W/DOPPLER COMPLETE: CPT

## 2021-07-27 ENCOUNTER — APPOINTMENT (OUTPATIENT)
Dept: VASCULAR SURGERY | Facility: CLINIC | Age: 66
End: 2021-07-27

## 2021-07-27 PROBLEM — I73.9 PERIPHERAL VASCULAR DISEASE, UNSPECIFIED: Chronic | Status: ACTIVE | Noted: 2021-07-21

## 2021-07-27 PROBLEM — K21.9 GASTRO-ESOPHAGEAL REFLUX DISEASE WITHOUT ESOPHAGITIS: Chronic | Status: ACTIVE | Noted: 2021-07-21

## 2021-07-27 PROBLEM — Z86.79 PERSONAL HISTORY OF OTHER DISEASES OF THE CIRCULATORY SYSTEM: Chronic | Status: ACTIVE | Noted: 2021-07-21

## 2021-07-27 PROBLEM — E78.5 HYPERLIPIDEMIA, UNSPECIFIED: Chronic | Status: ACTIVE | Noted: 2021-07-21

## 2021-07-27 PROBLEM — N18.9 CHRONIC KIDNEY DISEASE, UNSPECIFIED: Chronic | Status: ACTIVE | Noted: 2021-07-21

## 2021-07-27 PROBLEM — F32.9 MAJOR DEPRESSIVE DISORDER, SINGLE EPISODE, UNSPECIFIED: Chronic | Status: ACTIVE | Noted: 2021-07-21

## 2021-07-27 PROBLEM — I25.10 ATHEROSCLEROTIC HEART DISEASE OF NATIVE CORONARY ARTERY WITHOUT ANGINA PECTORIS: Chronic | Status: ACTIVE | Noted: 2021-07-21

## 2021-08-01 ENCOUNTER — OUTPATIENT (OUTPATIENT)
Dept: OUTPATIENT SERVICES | Facility: HOSPITAL | Age: 66
LOS: 1 days | End: 2021-08-01

## 2021-08-01 DIAGNOSIS — Z98.890 OTHER SPECIFIED POSTPROCEDURAL STATES: Chronic | ICD-10-CM

## 2021-08-01 DIAGNOSIS — Z95.2 PRESENCE OF PROSTHETIC HEART VALVE: Chronic | ICD-10-CM

## 2021-08-01 DIAGNOSIS — Z90.49 ACQUIRED ABSENCE OF OTHER SPECIFIED PARTS OF DIGESTIVE TRACT: Chronic | ICD-10-CM

## 2021-08-01 DIAGNOSIS — Z95.1 PRESENCE OF AORTOCORONARY BYPASS GRAFT: Chronic | ICD-10-CM

## 2021-08-01 DIAGNOSIS — Z98.51 TUBAL LIGATION STATUS: Chronic | ICD-10-CM

## 2021-08-01 DIAGNOSIS — K27.1 ACUTE PEPTIC ULCER, SITE UNSPECIFIED, WITH PERFORATION: Chronic | ICD-10-CM

## 2021-08-01 DIAGNOSIS — M19.011 PRIMARY OSTEOARTHRITIS, RIGHT SHOULDER: Chronic | ICD-10-CM

## 2021-08-01 DIAGNOSIS — M06.9 RHEUMATOID ARTHRITIS, UNSPECIFIED: Chronic | ICD-10-CM

## 2021-08-01 DIAGNOSIS — Z11.52 ENCOUNTER FOR SCREENING FOR COVID-19: ICD-10-CM

## 2021-08-01 LAB — SARS-COV-2 RNA SPEC QL NAA+PROBE: SIGNIFICANT CHANGE UP

## 2021-08-03 ENCOUNTER — TRANSCRIPTION ENCOUNTER (OUTPATIENT)
Age: 66
End: 2021-08-03

## 2021-08-04 ENCOUNTER — APPOINTMENT (OUTPATIENT)
Dept: VASCULAR SURGERY | Facility: HOSPITAL | Age: 66
End: 2021-08-04
Payer: MEDICARE

## 2021-08-04 ENCOUNTER — INPATIENT (INPATIENT)
Facility: HOSPITAL | Age: 66
LOS: 1 days | Discharge: HOME | DRG: 253 | End: 2021-08-06
Attending: SURGERY | Admitting: SURGERY
Payer: MEDICARE

## 2021-08-04 VITALS
HEIGHT: 64 IN | DIASTOLIC BLOOD PRESSURE: 99 MMHG | TEMPERATURE: 98 F | WEIGHT: 134.7 LBS | OXYGEN SATURATION: 99 % | SYSTOLIC BLOOD PRESSURE: 169 MMHG | HEART RATE: 82 BPM | RESPIRATION RATE: 16 BRPM

## 2021-08-04 DIAGNOSIS — M19.011 PRIMARY OSTEOARTHRITIS, RIGHT SHOULDER: Chronic | ICD-10-CM

## 2021-08-04 DIAGNOSIS — Z98.890 OTHER SPECIFIED POSTPROCEDURAL STATES: Chronic | ICD-10-CM

## 2021-08-04 DIAGNOSIS — Z95.2 PRESENCE OF PROSTHETIC HEART VALVE: Chronic | ICD-10-CM

## 2021-08-04 DIAGNOSIS — I73.9 PERIPHERAL VASCULAR DISEASE, UNSPECIFIED: ICD-10-CM

## 2021-08-04 DIAGNOSIS — Z90.49 ACQUIRED ABSENCE OF OTHER SPECIFIED PARTS OF DIGESTIVE TRACT: Chronic | ICD-10-CM

## 2021-08-04 DIAGNOSIS — Z95.1 PRESENCE OF AORTOCORONARY BYPASS GRAFT: Chronic | ICD-10-CM

## 2021-08-04 DIAGNOSIS — K27.1 ACUTE PEPTIC ULCER, SITE UNSPECIFIED, WITH PERFORATION: Chronic | ICD-10-CM

## 2021-08-04 DIAGNOSIS — Z98.51 TUBAL LIGATION STATUS: Chronic | ICD-10-CM

## 2021-08-04 DIAGNOSIS — M06.9 RHEUMATOID ARTHRITIS, UNSPECIFIED: Chronic | ICD-10-CM

## 2021-08-04 PROCEDURE — 35661 BPG FEMORAL-FEMORAL: CPT | Mod: LT

## 2021-08-04 RX ORDER — HEPARIN SODIUM 5000 [USP'U]/ML
5000 INJECTION INTRAVENOUS; SUBCUTANEOUS EVERY 8 HOURS
Refills: 0 | Status: DISCONTINUED | OUTPATIENT
Start: 2021-08-04 | End: 2021-08-06

## 2021-08-04 RX ORDER — ONDANSETRON 8 MG/1
4 TABLET, FILM COATED ORAL ONCE
Refills: 0 | Status: DISCONTINUED | OUTPATIENT
Start: 2021-08-04 | End: 2021-08-04

## 2021-08-04 RX ORDER — BUPROPION HYDROCHLORIDE 150 MG/1
150 TABLET, EXTENDED RELEASE ORAL DAILY
Refills: 0 | Status: DISCONTINUED | OUTPATIENT
Start: 2021-08-04 | End: 2021-08-06

## 2021-08-04 RX ORDER — SODIUM CHLORIDE 9 MG/ML
3 INJECTION INTRAMUSCULAR; INTRAVENOUS; SUBCUTANEOUS EVERY 8 HOURS
Refills: 0 | Status: DISCONTINUED | OUTPATIENT
Start: 2021-08-04 | End: 2021-08-04

## 2021-08-04 RX ORDER — CEFAZOLIN SODIUM 1 G
2000 VIAL (EA) INJECTION ONCE
Refills: 0 | Status: COMPLETED | OUTPATIENT
Start: 2021-08-04 | End: 2021-08-04

## 2021-08-04 RX ORDER — FENTANYL CITRATE 50 UG/ML
50 INJECTION INTRAVENOUS
Refills: 0 | Status: DISCONTINUED | OUTPATIENT
Start: 2021-08-04 | End: 2021-08-04

## 2021-08-04 RX ORDER — FENTANYL CITRATE 50 UG/ML
25 INJECTION INTRAVENOUS
Refills: 0 | Status: DISCONTINUED | OUTPATIENT
Start: 2021-08-04 | End: 2021-08-04

## 2021-08-04 RX ORDER — ACETAMINOPHEN 500 MG
975 TABLET ORAL EVERY 6 HOURS
Refills: 0 | Status: DISCONTINUED | OUTPATIENT
Start: 2021-08-04 | End: 2021-08-06

## 2021-08-04 RX ORDER — OXYCODONE HYDROCHLORIDE 5 MG/1
5 TABLET ORAL ONCE
Refills: 0 | Status: DISCONTINUED | OUTPATIENT
Start: 2021-08-04 | End: 2021-08-04

## 2021-08-04 RX ORDER — OXYCODONE HYDROCHLORIDE 5 MG/1
10 TABLET ORAL EVERY 6 HOURS
Refills: 0 | Status: DISCONTINUED | OUTPATIENT
Start: 2021-08-04 | End: 2021-08-06

## 2021-08-04 RX ORDER — LIDOCAINE HCL 20 MG/ML
0.2 VIAL (ML) INJECTION ONCE
Refills: 0 | Status: DISCONTINUED | OUTPATIENT
Start: 2021-08-04 | End: 2021-08-04

## 2021-08-04 RX ORDER — PRAMIPEXOLE DIHYDROCHLORIDE 0.12 MG/1
0.5 TABLET ORAL AT BEDTIME
Refills: 0 | Status: DISCONTINUED | OUTPATIENT
Start: 2021-08-04 | End: 2021-08-06

## 2021-08-04 RX ORDER — OXYCODONE HYDROCHLORIDE 5 MG/1
5 TABLET ORAL EVERY 4 HOURS
Refills: 0 | Status: DISCONTINUED | OUTPATIENT
Start: 2021-08-04 | End: 2021-08-06

## 2021-08-04 RX ORDER — SIMVASTATIN 20 MG/1
10 TABLET, FILM COATED ORAL AT BEDTIME
Refills: 0 | Status: DISCONTINUED | OUTPATIENT
Start: 2021-08-04 | End: 2021-08-06

## 2021-08-04 RX ORDER — CLOPIDOGREL BISULFATE 75 MG/1
75 TABLET, FILM COATED ORAL DAILY
Refills: 0 | Status: DISCONTINUED | OUTPATIENT
Start: 2021-08-04 | End: 2021-08-06

## 2021-08-04 RX ORDER — PHENYLEPHRINE HYDROCHLORIDE 10 MG/ML
0.2 INJECTION INTRAVENOUS
Qty: 40 | Refills: 0 | Status: DISCONTINUED | OUTPATIENT
Start: 2021-08-04 | End: 2021-08-04

## 2021-08-04 RX ORDER — AMLODIPINE BESYLATE 2.5 MG/1
10 TABLET ORAL DAILY
Refills: 0 | Status: DISCONTINUED | OUTPATIENT
Start: 2021-08-04 | End: 2021-08-06

## 2021-08-04 RX ORDER — SODIUM CHLORIDE 9 MG/ML
1000 INJECTION INTRAMUSCULAR; INTRAVENOUS; SUBCUTANEOUS
Refills: 0 | Status: DISCONTINUED | OUTPATIENT
Start: 2021-08-04 | End: 2021-08-05

## 2021-08-04 RX ORDER — METOPROLOL TARTRATE 50 MG
75 TABLET ORAL DAILY
Refills: 0 | Status: DISCONTINUED | OUTPATIENT
Start: 2021-08-04 | End: 2021-08-06

## 2021-08-04 RX ADMIN — SODIUM CHLORIDE 125 MILLILITER(S): 9 INJECTION INTRAMUSCULAR; INTRAVENOUS; SUBCUTANEOUS at 11:38

## 2021-08-04 RX ADMIN — OXYCODONE HYDROCHLORIDE 10 MILLIGRAM(S): 5 TABLET ORAL at 21:37

## 2021-08-04 RX ADMIN — FENTANYL CITRATE 50 MICROGRAM(S): 50 INJECTION INTRAVENOUS at 11:50

## 2021-08-04 RX ADMIN — HEPARIN SODIUM 5000 UNIT(S): 5000 INJECTION INTRAVENOUS; SUBCUTANEOUS at 15:25

## 2021-08-04 RX ADMIN — BUPROPION HYDROCHLORIDE 150 MILLIGRAM(S): 150 TABLET, EXTENDED RELEASE ORAL at 15:25

## 2021-08-04 RX ADMIN — OXYCODONE HYDROCHLORIDE 5 MILLIGRAM(S): 5 TABLET ORAL at 13:41

## 2021-08-04 RX ADMIN — OXYCODONE HYDROCHLORIDE 5 MILLIGRAM(S): 5 TABLET ORAL at 19:26

## 2021-08-04 RX ADMIN — SIMVASTATIN 10 MILLIGRAM(S): 20 TABLET, FILM COATED ORAL at 21:37

## 2021-08-04 RX ADMIN — HEPARIN SODIUM 5000 UNIT(S): 5000 INJECTION INTRAVENOUS; SUBCUTANEOUS at 21:37

## 2021-08-04 RX ADMIN — OXYCODONE HYDROCHLORIDE 10 MILLIGRAM(S): 5 TABLET ORAL at 16:53

## 2021-08-04 RX ADMIN — CLOPIDOGREL BISULFATE 75 MILLIGRAM(S): 75 TABLET, FILM COATED ORAL at 15:25

## 2021-08-04 RX ADMIN — FENTANYL CITRATE 50 MICROGRAM(S): 50 INJECTION INTRAVENOUS at 11:35

## 2021-08-04 RX ADMIN — PHENYLEPHRINE HYDROCHLORIDE 4.58 MICROGRAM(S)/KG/MIN: 10 INJECTION INTRAVENOUS at 11:38

## 2021-08-04 RX ADMIN — OXYCODONE HYDROCHLORIDE 10 MILLIGRAM(S): 5 TABLET ORAL at 16:23

## 2021-08-04 RX ADMIN — PRAMIPEXOLE DIHYDROCHLORIDE 0.5 MILLIGRAM(S): 0.12 TABLET ORAL at 21:37

## 2021-08-04 RX ADMIN — OXYCODONE HYDROCHLORIDE 10 MILLIGRAM(S): 5 TABLET ORAL at 22:07

## 2021-08-04 RX ADMIN — OXYCODONE HYDROCHLORIDE 5 MILLIGRAM(S): 5 TABLET ORAL at 13:11

## 2021-08-04 RX ADMIN — OXYCODONE HYDROCHLORIDE 5 MILLIGRAM(S): 5 TABLET ORAL at 18:56

## 2021-08-04 NOTE — PRE-ANESTHESIA EVALUATION ADULT - NSANTHPMHFT_GEN_ALL_CORE
64yr old female w/ hx of HTN, HLD, CAD s/p CABG x 1 (LIMA to LAD) in 2019, endocarditis s/p bio AVR & MV repair, cervical spine arthritis s/p C1-T1 fusion, GERD, CKD, COVID (3/21), PVD s/p iliac stenting and femoral endarterectomy    She c/o lower extremity pain while ambulating that has gotten progressively worse and is relieved by resting.  She received her first dose of Moderna. Pt to stay on Plavix as per Dr. Green

## 2021-08-04 NOTE — PATIENT PROFILE ADULT - ARRIVAL FROM
I reviewed the H&P, I examined the patient, and there are no changes in the patient's condition.  
Home

## 2021-08-04 NOTE — CHART NOTE - NSCHARTNOTEFT_GEN_A_CORE
POST-OPERATIVE NOTE    Subjective:  Patient is s/p R to Left Femoral Femoral bypass graft with ringed PTFE. At end of case patient had a palpable L PT pulse. At this time, reports 7/10 LLQ/RLQ abdominal pain but otherwise has no complaints. Denies CP, SOB, fevers/chills, N/V.     Vital Signs Last 24 Hrs  T(C): 36.6 (04 Aug 2021 17:05), Max: 36.7 (04 Aug 2021 16:13)  T(F): 97.8 (04 Aug 2021 17:05), Max: 98 (04 Aug 2021 16:13)  HR: 74 (04 Aug 2021 17:05) (63 - 82)  BP: 102/62 (04 Aug 2021 17:05) (84/52 - 169/99)  BP(mean): 75 (04 Aug 2021 13:15) (64 - 87)  RR: 16 (04 Aug 2021 17:05) (12 - 16)  SpO2: 97% (04 Aug 2021 17:05) (95% - 100%)  I&O's Detail    04 Aug 2021 07:01  -  04 Aug 2021 17:11  --------------------------------------------------------  IN:    Phenylephrine: 6.9 mL    sodium chloride 0.9%: 325 mL  Total IN: 331.9 mL    OUT:    Bulb (mL): 10 mL    Bulb (mL): 5 mL    Indwelling Catheter - Urethral (mL): 360 mL  Total OUT: 375 mL    Total NET: -43.1 mL        amLODIPine   Tablet 10  clopidogrel Tablet 75  heparin   Injectable 5000  metoprolol succinate ER 75    PAST MEDICAL & SURGICAL HISTORY:  Arthritis  Cervical Spine  and Hands    Restless leg syndrome  Especially with General Anesthesia    Hypertension    Hyperlipidemia    CAD (coronary artery disease)    CKD (chronic kidney disease)    Peripheral vascular disease    GERD (gastroesophageal reflux disease)    History of endocarditis    2019 novel coronavirus disease (COVID-19)  3/2021    Depression    Rheumatoid arthritis of carpometacarpal joint of thumb  Total Joint Replacement of Left Thumb    Osteoarthritis of right shoulder region  Right Shoulder Arthroscopy  2007    S/P tubal ligation  1986    S/P appendectomy  2014    Acute peptic ulcer with perforation  1980    H/O cervical discectomy  C1-T1 fusion 2020    S/P CABG x 1  2019    S/P AVR (aortic valve replacement)  2019    History of endarterectomy  iliac stenting and femoral endarterectomy        Physical Exam:   GEN: resting in bed comfortably in NAD  RESP: no increased WOB  ABD: soft, non-distended, tender at LLQ/RLQ   EXTR: warm, well-perfused without gross deformities, motor, sensation intact in both lower extremities and feet, Incisions at b/l groin with aquacel dressing in place, TITI b/l w minimal SS output, LLE palpable PT pulse, no DP pulse, dopplerable PT, DP, RLE DP/PT pulse not palpable, dopplerable PT, DP   NEURO: awake, alert       Radiology and Additional Studies:    Assessment:  The patient is a 65y Female who is now several hours post-op from a R to L femoral femoral bypass w PTFE graft.     Plan:  - Pain control as needed  - DVT ppx: SQH   - cw munoz until midnight   - TOV after munoz removal  - OOB and ambulating as tolerated  - F/u AM labs    Dilshad Anderson MD   General Surgery Resident, PGY1  # [id number]

## 2021-08-04 NOTE — BRIEF OPERATIVE NOTE - NSICDXBRIEFPOSTOP_GEN_ALL_CORE_FT
15-Nov-2019 15:05 POST-OP DIAGNOSIS:  PVD (peripheral vascular disease) 04-Aug-2021 10:48:11  Daxa Ibarra

## 2021-08-05 ENCOUNTER — TRANSCRIPTION ENCOUNTER (OUTPATIENT)
Age: 66
End: 2021-08-05

## 2021-08-05 LAB
ANION GAP SERPL CALC-SCNC: 12 MMOL/L — SIGNIFICANT CHANGE UP (ref 5–17)
BUN SERPL-MCNC: 38 MG/DL — HIGH (ref 7–23)
CALCIUM SERPL-MCNC: 8.5 MG/DL — SIGNIFICANT CHANGE UP (ref 8.4–10.5)
CHLORIDE SERPL-SCNC: 104 MMOL/L — SIGNIFICANT CHANGE UP (ref 96–108)
CO2 SERPL-SCNC: 19 MMOL/L — LOW (ref 22–31)
CREAT SERPL-MCNC: 2.99 MG/DL — HIGH (ref 0.5–1.3)
GLUCOSE SERPL-MCNC: 107 MG/DL — HIGH (ref 70–99)
HCT VFR BLD CALC: 26.1 % — LOW (ref 34.5–45)
HCT VFR BLD CALC: 27.5 % — LOW (ref 34.5–45)
HGB BLD-MCNC: 8.1 G/DL — LOW (ref 11.5–15.5)
HGB BLD-MCNC: 8.5 G/DL — LOW (ref 11.5–15.5)
MCHC RBC-ENTMCNC: 30.1 PG — SIGNIFICANT CHANGE UP (ref 27–34)
MCHC RBC-ENTMCNC: 30.1 PG — SIGNIFICANT CHANGE UP (ref 27–34)
MCHC RBC-ENTMCNC: 30.9 GM/DL — LOW (ref 32–36)
MCHC RBC-ENTMCNC: 31 GM/DL — LOW (ref 32–36)
MCV RBC AUTO: 97 FL — SIGNIFICANT CHANGE UP (ref 80–100)
MCV RBC AUTO: 97.5 FL — SIGNIFICANT CHANGE UP (ref 80–100)
NRBC # BLD: 0 /100 WBCS — SIGNIFICANT CHANGE UP (ref 0–0)
NRBC # BLD: 0 /100 WBCS — SIGNIFICANT CHANGE UP (ref 0–0)
PLATELET # BLD AUTO: 198 K/UL — SIGNIFICANT CHANGE UP (ref 150–400)
PLATELET # BLD AUTO: 231 K/UL — SIGNIFICANT CHANGE UP (ref 150–400)
POTASSIUM SERPL-MCNC: 4.9 MMOL/L — SIGNIFICANT CHANGE UP (ref 3.5–5.3)
POTASSIUM SERPL-SCNC: 4.9 MMOL/L — SIGNIFICANT CHANGE UP (ref 3.5–5.3)
RBC # BLD: 2.69 M/UL — LOW (ref 3.8–5.2)
RBC # BLD: 2.82 M/UL — LOW (ref 3.8–5.2)
RBC # FLD: 14.2 % — SIGNIFICANT CHANGE UP (ref 10.3–14.5)
RBC # FLD: 14.3 % — SIGNIFICANT CHANGE UP (ref 10.3–14.5)
SODIUM SERPL-SCNC: 135 MMOL/L — SIGNIFICANT CHANGE UP (ref 135–145)
WBC # BLD: 10.84 K/UL — HIGH (ref 3.8–10.5)
WBC # BLD: 9.4 K/UL — SIGNIFICANT CHANGE UP (ref 3.8–10.5)
WBC # FLD AUTO: 10.84 K/UL — HIGH (ref 3.8–10.5)
WBC # FLD AUTO: 9.4 K/UL — SIGNIFICANT CHANGE UP (ref 3.8–10.5)

## 2021-08-05 PROCEDURE — 99222 1ST HOSP IP/OBS MODERATE 55: CPT

## 2021-08-05 RX ORDER — KETOROLAC TROMETHAMINE 30 MG/ML
10 SYRINGE (ML) INJECTION ONCE
Refills: 0 | Status: DISCONTINUED | OUTPATIENT
Start: 2021-08-05 | End: 2021-08-05

## 2021-08-05 RX ORDER — ACETAMINOPHEN 500 MG
650 TABLET ORAL ONCE
Refills: 0 | Status: COMPLETED | OUTPATIENT
Start: 2021-08-05 | End: 2021-08-05

## 2021-08-05 RX ADMIN — OXYCODONE HYDROCHLORIDE 10 MILLIGRAM(S): 5 TABLET ORAL at 19:06

## 2021-08-05 RX ADMIN — OXYCODONE HYDROCHLORIDE 10 MILLIGRAM(S): 5 TABLET ORAL at 11:00

## 2021-08-05 RX ADMIN — HEPARIN SODIUM 5000 UNIT(S): 5000 INJECTION INTRAVENOUS; SUBCUTANEOUS at 21:29

## 2021-08-05 RX ADMIN — CLOPIDOGREL BISULFATE 75 MILLIGRAM(S): 75 TABLET, FILM COATED ORAL at 11:00

## 2021-08-05 RX ADMIN — HEPARIN SODIUM 5000 UNIT(S): 5000 INJECTION INTRAVENOUS; SUBCUTANEOUS at 13:57

## 2021-08-05 RX ADMIN — BUPROPION HYDROCHLORIDE 150 MILLIGRAM(S): 150 TABLET, EXTENDED RELEASE ORAL at 13:55

## 2021-08-05 RX ADMIN — OXYCODONE HYDROCHLORIDE 10 MILLIGRAM(S): 5 TABLET ORAL at 04:09

## 2021-08-05 RX ADMIN — SIMVASTATIN 10 MILLIGRAM(S): 20 TABLET, FILM COATED ORAL at 21:28

## 2021-08-05 RX ADMIN — Medication 650 MILLIGRAM(S): at 01:10

## 2021-08-05 RX ADMIN — PRAMIPEXOLE DIHYDROCHLORIDE 0.5 MILLIGRAM(S): 0.12 TABLET ORAL at 21:40

## 2021-08-05 RX ADMIN — OXYCODONE HYDROCHLORIDE 10 MILLIGRAM(S): 5 TABLET ORAL at 11:30

## 2021-08-05 RX ADMIN — HEPARIN SODIUM 5000 UNIT(S): 5000 INJECTION INTRAVENOUS; SUBCUTANEOUS at 06:36

## 2021-08-05 RX ADMIN — OXYCODONE HYDROCHLORIDE 10 MILLIGRAM(S): 5 TABLET ORAL at 04:39

## 2021-08-05 RX ADMIN — Medication 260 MILLIGRAM(S): at 00:50

## 2021-08-05 RX ADMIN — OXYCODONE HYDROCHLORIDE 10 MILLIGRAM(S): 5 TABLET ORAL at 18:36

## 2021-08-05 NOTE — PROGRESS NOTE ADULT - SUBJECTIVE AND OBJECTIVE BOX
VASCULAR SURGERY PROGRESS NOTE    s/p Right to Left femoral femoral bypass with PTFE graft  POD 1    SUBJECTIVE  Pt seen and examined at bedside. No acute events overnight, passed TOV o/n. No complaints this AM.   Pain controlled. Denies N/V. Tolerating diet. Passing flatus and BM.     OBJECTIVE:    PHYSICAL EXAM   General Appearance: Appears well, NAD  Resp: Patent airway, non-labored breathing  Abdomen: Soft, mildly tender in RLQ/LLQ, Nondistended  Extremities: B/l aquacel dressing cdi, b/l TITI w SS output, mild SS saturation of drain insertion site dressing, changed, LLE palpable PT, strong dopplerable signals PT/DP b/l, no edema, erythema     Vital Signs Last 24 Hrs  T(C): 36.7 (05 Aug 2021 06:10), Max: 36.7 (04 Aug 2021 16:13)  T(F): 98 (05 Aug 2021 06:10), Max: 98 (04 Aug 2021 16:13)  HR: 74 (05 Aug 2021 06:10) (63 - 76)  BP: 98/58 (05 Aug 2021 06:10) (84/52 - 122/64)  BP(mean): 75 (04 Aug 2021 13:15) (64 - 87)  RR: 18 (05 Aug 2021 06:10) (12 - 18)  SpO2: 96% (05 Aug 2021 06:10) (95% - 100%)                         VASCULAR SURGERY PROGRESS NOTE    s/p Right to Left femoral femoral bypass with PTFE graft  POD 1    SUBJECTIVE  Pt seen and examined at bedside. No acute events overnight, passed TOV o/n. No complaints this AM.   Pain controlled. Denies N/V. Tolerating diet. Passing flatus and BM.     OBJECTIVE:    PHYSICAL EXAM   General Appearance: Appears well, NAD  Resp: Patent airway, non-labored breathing  Abdomen: Soft, mildly tender in RLQ/LLQ, Nondistended. Fem-fem bypass graft doppler signal+  Extremities: B/l aquacel dressing cdi, b/l TITI w SS output, mild SS saturation of drain insertion site dressing, changed, LLE palpable PT, strong dopplerable signals PT/DP b/l, no edema, erythema.     Vital Signs Last 24 Hrs  T(C): 36.7 (05 Aug 2021 06:10), Max: 36.7 (04 Aug 2021 16:13)  T(F): 98 (05 Aug 2021 06:10), Max: 98 (04 Aug 2021 16:13)  HR: 74 (05 Aug 2021 06:10) (63 - 76)  BP: 98/58 (05 Aug 2021 06:10) (84/52 - 122/64)  BP(mean): 75 (04 Aug 2021 13:15) (64 - 87)  RR: 18 (05 Aug 2021 06:10) (12 - 18)  SpO2: 96% (05 Aug 2021 06:10) (95% - 100%)

## 2021-08-05 NOTE — CONSULT NOTE ADULT - ASSESSMENT
Stephanie is a 64 year old female with PVD, AVR for endocarditis in 2019, mild nonobstructive CAD, Arthritis, HTN, HLD, GERD, CKD, and iliac/femoral dx which she had iliac stenting and femoral endarterectomy.  Because of recurrent claudication symptoms, she is now s/p Right to left femoral femoral bypass graft with ringed PTFE.    - feeling well this morning  - no sign of acute ischemia. non- obstructive cad on cath in 2019. cont plavix and statin  - no sign of volume overload. She has normal LV function as of 7/21.  - BP was low post-operatively and remains so.  - can hold amlodipine and toprol if needed  - Hb 8.1 this morning, without obvious blood loss. Cont to trend  - trend creatinine and electrolytes. Keep K>4, mg>2  - care per vascular surgery  - will follow with you        
64yr old female w/ hx of PVD, CAD, Arthritis, HTN, HLD, GERD, CKD, COVID (3/21) and iliac/femoral dx which she had iliac stenting and femoral endarterectomy. In 2019 she was found to have bacteremia and endocarditis and required a AVR and a CABG x1.   pt now sp  Femoral Femoral Bypass on 8/4/21 POD#1  pain control  bowel regimen  mgmt per vasc team    #HTN bp optimal  cont current meds    #CAD plavix, statin, bblocker    #Anemia: likely dt postop blood loss  monitor    #CKD stable, monitor    [facility]  [phone]

## 2021-08-05 NOTE — DISCHARGE NOTE PROVIDER - NSDCCPCAREPLAN_GEN_ALL_CORE_FT
PRINCIPAL DISCHARGE DIAGNOSIS  Diagnosis: Peripheral vascular disease  Assessment and Plan of Treatment: WOUND CARE: Keep incisions clean and dry.  BATHING: Please do not submerge wound underwater. You may shower and/or sponge bathe.  ACTIVITY: No heavy lifting or straining. Otherwise, you may return to your usual level of physical activity. If you are taking narcotic pain medication (such as Percocet), do NOT drive a car, operate machinery or make important decisions.  DIET: Return to your usual diet.  NOTIFY YOUR SURGEON IF: You have any bleeding that does not stop, any pus draining from your wound, any fever (over 100.4 F) or chills, persistent nausea/vomiting, persistent diarrhea, or if your pain is not controlled on your discharge pain medications.  FOLLOW-UP:  1. Follow-up with Dr. Green within 1-2 weeks of discharge.  Please call office for appointment  2. Please follow up with your primary care physician in one week regarding your hospitalization.       PRINCIPAL DISCHARGE DIAGNOSIS  Diagnosis: Peripheral vascular disease  Assessment and Plan of Treatment: WOUND CARE: Keep incisions clean and dry. You will be discharged with staples in both incisions, DO NOT REMOVE until you see Dr. Green.  BATHING: Please do not submerge wound underwater. You may shower and/or sponge bathe.  ACTIVITY: No heavy lifting or straining. Otherwise, you may return to your usual level of physical activity. If you are taking narcotic pain medication (such as Percocet), do NOT drive a car, operate machinery or make important decisions.  DIET: Return to your usual diet.  NOTIFY YOUR SURGEON IF: You have any bleeding that does not stop, any pus draining from your wound, any fever (over 100.4 F) or chills, persistent nausea/vomiting, persistent diarrhea, or if your pain is not controlled on your discharge pain medications.  FOLLOW-UP:  1. Follow-up with Dr. Green within 1-2 weeks of discharge.  Please call office for appointment   2. Please follow up with your primary care physician in one week regarding your hospitalization.

## 2021-08-05 NOTE — PROGRESS NOTE ADULT - ASSESSMENT
Pt is a 64yr old female w PMHx of PVD, CAD, Arthritis, HTN, HLD, GERD, CKD, COVID (3/21) and PAD s/p iliac stenting and femoral endarterectomy presented for a scheduled R-->L femoral-femoral bypass with Graft, now POD 1, recovering on floor.     -OOBAT  -monitor drain output  -PT evaluation  -Trend H/H-8.1/26 (08/05 AM)  -f/u AM BMP   -DVT ppx: SQH  -Pain: oxy 5, oxy 10, tylenol 975 PRN  -Diet: regular diet   -ID: no Abx    Vascular Surgery   # [id number]  Pt is a 64yr old female w PMHx of PVD, CAD, Arthritis, HTN, HLD, GERD, CKD, COVID (3/21) and PAD s/p iliac stenting and femoral endarterectomy presented for a scheduled R-->L femoral-femoral bypass with Graft, now POD 1, recovering on floor.     - Monitor drain output, only 30 (L TITI) and 40 (R TITI) over the last 24h  - PT evaluation  - Trend H/H-8.1/26 (08/05 AM)  - Dispo planning    Vascular Surgery   # [id number]

## 2021-08-05 NOTE — DISCHARGE NOTE PROVIDER - CARE PROVIDER_API CALL
Jero Green)  Vascular Surgery  [address], [address]  [place] [address]  Phone: [phone]  Fax: [phone]  Follow Up Time: 1 week

## 2021-08-05 NOTE — DISCHARGE NOTE PROVIDER - NSDCCPTREATMENT_GEN_ALL_CORE_FT
PRINCIPAL PROCEDURE  Procedure: Femoral-femoral bypass graft with non-vein  Findings and Treatment:

## 2021-08-05 NOTE — CONSULT NOTE ADULT - SUBJECTIVE AND OBJECTIVE BOX
[facility] - Chao Lomax, Kunal, Regino, [place], Elva Islas  Office Number: [phone]    Initial Consult Note    CHIEF COMPLAINT: Patient is a 65y old  Female who presents with a chief complaint of Peripheral vascular disease (05 Aug 2021 08:47)      HPI:  64yr old female w/ hx of PVD, CAD, Arthritis, HTN, HLD, GERD, CKD, COVID (3/21) and iliac/femoral dx which she had iliac stenting and femoral endarterectomy. In 2019 she was found to have bacteremia and endocarditis and required a AVR and a CABG x1. She now presents to PST for a Femoral Femoral Bypass on 8/4/21. She c/o lower extremity pain while ambulating that has gotten progressively worse and is relieved by resting. Denies recent cough, fever, chills, chest pain or SOB and feels well otherwise. She received her first dose of Moderna. Cards seen on 7/19/21 for eval (to hold Plavix 5days prior to sx). COVID swab at [facility] on 8/1/2021.     She is now s/p Right to left femoral femoral bypass graft with ringed PTFE  She feels well this morning without pain  She has no chest pain, difficulty breathing or palpitations.  She was last seen in our office 7/21.      PAST MEDICAL & SURGICAL HISTORY:  Arthritis  Cervical Spine  and Hands    Restless leg syndrome  Especially with General Anesthesia    Hypertension    Hyperlipidemia    CAD (coronary artery disease)    CKD (chronic kidney disease)    Peripheral vascular disease    GERD (gastroesophageal reflux disease)    History of endocarditis    2019 novel coronavirus disease (COVID-19)  3/2021    Depression    Rheumatoid arthritis of carpometacarpal joint of thumb  Total Joint Replacement of Left Thumb    Osteoarthritis of right shoulder region  Right Shoulder Arthroscopy  2007    S/P tubal ligation  1986    S/P appendectomy  2014    Acute peptic ulcer with perforation  1980    H/O cervical discectomy  C1-T1 fusion 2020    S/P CABG x 1  2019    S/P AVR (aortic valve replacement)  2019    History of endarterectomy  iliac stenting and femoral endarterectomy        SOCIAL HISTORY:  No tobacco, ethanol, or drug abuse.    FAMILY HISTORY:  Family history of arthritis (Father)    No family history of alcoholism (Mother)    Family history of hypothyroidism (Sibling)    Family hx of aortic aneurysm (Sibling)    Family history of atrial fibrillation (Sibling)    Family history of parkinsonism (Sibling)    Family hx of hypertension (Sibling)      No family history of acute MI or sudden cardiac death.    MEDICATIONS  (STANDING):  amLODIPine   Tablet 10 milliGRAM(s) Oral daily  buPROPion XL (24-Hour) . 150 milliGRAM(s) Oral daily  chlorhexidine 2% Cloths 1 Application(s) Topical once  clopidogrel Tablet 75 milliGRAM(s) Oral daily  heparin   Injectable 5000 Unit(s) SubCutaneous every 8 hours  metoprolol succinate ER 75 milliGRAM(s) Oral daily  pramipexole 0.5 milliGRAM(s) Oral at bedtime  simvastatin 10 milliGRAM(s) Oral at bedtime  sodium chloride 0.9%. 1000 milliLiter(s) (125 mL/Hr) IV Continuous <Continuous>    MEDICATIONS  (PRN):  acetaminophen   Tablet .. 975 milliGRAM(s) Oral every 6 hours PRN Moderate Pain (4 - 6)  oxyCODONE    IR 5 milliGRAM(s) Oral every 4 hours PRN Moderate Pain (4 - 6)  oxyCODONE    IR 10 milliGRAM(s) Oral every 6 hours PRN Severe Pain (7 - 10)      Allergies    No Known Allergies    Intolerances        REVIEW OF SYSTEMS:    CONSTITUTIONAL: No weakness, fevers or chills  EYES/ENT: No visual changes;  No vertigo or throat pain   NECK: No pain or stiffness  RESPIRATORY: No cough, wheezing, hemoptysis; No shortness of breath  CARDIOVASCULAR: No chest pain or palpitations  GASTROINTESTINAL: No abdominal pain. No nausea, vomiting, or hematemesis; No diarrhea or constipation. No melena or hematochezia.  GENITOURINARY: No dysuria, frequency or hematuria  NEUROLOGICAL: No numbness or weakness  SKIN: No itching or rash  All other review of systems is negative unless indicated above    VITAL SIGNS:   Vital Signs Last 24 Hrs  T(C): 36.6 (05 Aug 2021 08:02), Max: 36.7 (04 Aug 2021 16:13)  T(F): 97.9 (05 Aug 2021 08:02), Max: 98 (04 Aug 2021 16:13)  HR: 79 (05 Aug 2021 08:02) (63 - 79)  BP: 95/62 (05 Aug 2021 08:02) (94/52 - 110/64)  BP(mean): 75 (04 Aug 2021 13:15) (67 - 75)  RR: 18 (05 Aug 2021 08:02) (12 - 18)  SpO2: 94% (05 Aug 2021 08:02) (94% - 100%)    I&O's Summary    04 Aug 2021 07:01  -  05 Aug 2021 07:00  --------------------------------------------------------  IN: 891.9 mL / OUT: 1220 mL / NET: -328.1 mL    05 Aug 2021 07:01  -  05 Aug 2021 11:36  --------------------------------------------------------  IN: 260 mL / OUT: 0 mL / NET: 260 mL        On Exam:    Constitutional: NAD, alert and oriented x 3  Lungs:  Non-labored, breath sounds are clear bilaterally, No wheezing, rales or rhonchi  Cardiovascular: RRR.  S1 and S2 positive.  No murmurs, rubs, gallops or clicks  Gastrointestinal: Bowel Sounds present, soft, nontender.   Lymph: No peripheral edema. No cervical lymphadenopathy; extremities are warm with peripheral pulses  Neurological: Alert, no focal deficits  Skin: No rashes or ulcers   Psych:  Mood & affect appropriate.    LABS: All Labs Reviewed:                        8.5    10.84 )-----------( 231      ( 05 Aug 2021 08:19 )             27.5                         8.1    9.40  )-----------( 198      ( 05 Aug 2021 06:59 )             26.1     05 Aug 2021 06:59    135    |  104    |  38     ----------------------------<  107    4.9     |  19     |  2.99     Ca    8.5        05 Aug 2021 06:59            Blood Culture:         RADIOLOGY:    EKG: sr

## 2021-08-05 NOTE — DISCHARGE NOTE PROVIDER - NSDCMRMEDTOKEN_GEN_ALL_CORE_FT
acetaminophen 325 mg oral tablet: 2 tab(s) orally every 6 hours, As needed, Temp greater or equal to 38C (100.4F), Mild Pain (1 - 3)  Align 4 mg oral capsule: 1 cap(s) orally once a day  amLODIPine 10 mg oral tablet: 1 tab(s) orally once a day  buPROPion: 150 milligram(s) orally once a day  Caltrate 600 + D oral tablet: 1 tab(s) orally once a day  clopidogrel 75 mg oral tablet: 1 tab(s) orally once a day  metoprolol succinate 25 mg oral tablet, extended release: 3 tab(s) = total 75 mg- orally once a day   Mirapex 0.25 mg oral tablet: 2 tab(s) orally once a day (at bedtime)  simvastatin 10 mg oral tablet: 1 tab(s) orally once a day (at bedtime)   acetaminophen 325 mg oral tablet: 2 tab(s) orally every 6 hours, As needed, Temp greater or equal to 38C (100.4F), Mild Pain (1 - 3)  Align 4 mg oral capsule: 1 cap(s) orally once a day  amLODIPine 10 mg oral tablet: 1 tab(s) orally once a day  buPROPion: 150 milligram(s) orally once a day  Caltrate 600 + D oral tablet: 1 tab(s) orally once a day  clopidogrel 75 mg oral tablet: 1 tab(s) orally once a day  metoprolol succinate 25 mg oral tablet, extended release: 3 tab(s) = total 75 mg- orally once a day   Mirapex 0.25 mg oral tablet: 2 tab(s) orally once a day (at bedtime)  oxyCODONE 5 mg oral tablet: 1 tab(s) orally every 6 hours MDD:4 tabs  simvastatin 10 mg oral tablet: 1 tab(s) orally once a day (at bedtime)

## 2021-08-05 NOTE — DISCHARGE NOTE PROVIDER - HOSPITAL COURSE
64yr old female w/ hx of PVD, CAD, Arthritis, HTN, HLD, GERD, CKD, COVID (3/21) and iliac/femoral dx which she had iliac stenting and femoral endarterectomy. In 2019 she was found to have bacteremia and endocarditis and required a AVR and a CABG x1. She now presents to PST for a Femoral Femoral Bypass on 8/4/21. She c/o lower extremity pain while ambulating that has gotten progressively worse and is relieved by resting. Denies recent cough, fever, chills, chest pain or SOB and feels well otherwise. She received her first dose of Moderna. Cards seen on 7/19/21 for eval (to hold Plavix 5days prior to sx). [facility]   Pt was advised to continue Plavix.  On  8/4 pt underwent Right to left femoral femoral bypass graft with ringed PTFE.  She tolerated the procedure well, was extubated and sent to PACU  in  stable condition. She remained hemodynamically stable and was transferred to a surgical floor. Her pain was controlled by IV pain medications and then by PO pain medications. She was evaluated by PT who determined-------------------------------------  She is ambulating, voiding, tolerating a regular diet, and pain is controlled with oral pain medications.  2 TITI drains were placed intraoperatively and--------------------------------------------  Patient is stable for discharge home and will follow-up with Dr. Green and her PMD within 1-2 weeks. 64yr old female w/ hx of PVD, CAD, Arthritis, HTN, HLD, GERD, CKD, COVID (3/21) and iliac/femoral dx which she had iliac stenting and femoral endarterectomy. In 2019 she was found to have bacteremia and endocarditis and required a AVR and a CABG x1. She now presents to PST for a Femoral Femoral Bypass on 8/4/21. She c/o lower extremity pain while ambulating that has gotten progressively worse and is relieved by resting. Denies recent cough, fever, chills, chest pain or SOB and feels well otherwise. She received her first dose of Moderna. Cards seen on 7/19/21 for eval (to hold Plavix 5days prior to sx). COVID swab at [facility] on 8/1/2021.   Pt was advised to continue Plavix.  On  8/4 pt underwent Right to left femoral femoral bypass graft with ringed PTFE.  She tolerated the procedure well, was extubated and sent to PACU  in  stable condition. She remained hemodynamically stable and was transferred to a surgical floor. Her pain was controlled by IV pain medications and then by PO pain medications. Mckeon catheter was removed and she voided without difficulty.  She was evaluated by PT who determined-------------------------------------  She is ambulating, voiding, tolerating a regular diet, and pain is controlled with oral pain medications.  2 TITI drains were placed intraoperatively and--------------------------------------------  Patient is stable for discharge home and will follow-up with Dr. Green and her PMD within 1-2 weeks. 64yr old female w/ hx of PVD, CAD, Arthritis, HTN, HLD, GERD, CKD, COVID (3/21) and iliac/femoral dx which she had iliac stenting and femoral endarterectomy. In 2019 she was found to have bacteremia and endocarditis and required a AVR and a CABG x1. She now presents to PST for a Femoral Femoral Bypass on 8/4/21. She c/o lower extremity pain while ambulating that has gotten progressively worse and is relieved by resting. Denies recent cough, fever, chills, chest pain or SOB and feels well otherwise. She received her first dose of Moderna. Cards seen on 7/19/21 for eval (to hold Plavix 5days prior to sx). COVID swab at [facility] on 8/1/2021.   Pt was advised to continue Plavix.    On  8/4 pt underwent Right to left femoral femoral bypass graft with ringed PTFE.  She tolerated the procedure well, was extubated and sent to PACU  in  stable condition. She remained hemodynamically stable and was transferred to a surgical floor. Her pain was controlled by IV pain medications and then by PO pain medications. Mckeon catheter was removed and she voided without difficulty.  She was walking independently post-operatively and did not need physical therapy.    She is ambulating, voiding, tolerating a regular diet, and pain is controlled with oral pain medications.  2 TITI drains were placed intraoperatively and--------------------------------------------  Patient is stable for discharge home and will follow-up with Dr. Green and her PMD within 1-2 weeks. 64yr old female w/ hx of PVD, CAD, Arthritis, HTN, HLD, GERD, CKD, COVID (3/21) and iliac/femoral dx which she had iliac stenting and femoral endarterectomy. In 2019 she was found to have bacteremia and endocarditis and required a AVR and a CABG x1. She now presents to PST for a Femoral Femoral Bypass on 8/4/21. She c/o lower extremity pain while ambulating that has gotten progressively worse and is relieved by resting. Denies recent cough, fever, chills, chest pain or SOB and feels well otherwise. She received her first dose of Moderna. Cards seen on 7/19/21 for eval (to hold Plavix 5days prior to sx). COVID swab at [facility] on 8/1/2021.   Pt was advised to continue Plavix.    On 8/4 pt underwent Right to left femoral femoral bypass graft with ringed PTFE.  She tolerated the procedure well, was extubated and sent to PACU in stable condition. She remained hemodynamically stable and was transferred to a surgical floor. Her pain was controlled by IV pain medications and then by PO pain medications. Mckeon catheter was removed and she voided without difficulty.  She was walking independently post-operatively and did not need physical therapy.    She is ambulating, voiding, tolerating a regular diet, and pain is controlled with oral pain medications.  2 TITI drains were placed intraoperatively and removed 08/06 prior to discharge.   Patient is stable for discharge home and will follow-up with Dr. Green and her PMD within 1-2 weeks.

## 2021-08-05 NOTE — CONSULT NOTE ADULT - SUBJECTIVE AND OBJECTIVE BOX
Patient is a 65y old  Female who presents with a chief complaint of Peripheral vascular disease (05 Aug 2021 08:47)      HPI:  64yr old female w/ hx of PVD, CAD, Arthritis, HTN, HLD, GERD, CKD, COVID (3/21) and iliac/femoral dx which she had iliac stenting and femoral endarterectomy. In 2019 she was found to have bacteremia and endocarditis and required a AVR and a CABG x1. She now presents to [facility] for a Femoral Femoral Bypass on 8/4/21. She c/o lower extremity pain while ambulating that has gotten progressively worse and is relieved by resting. Denies recent cough, fever, chills, chest pain or SOB and feels well otherwise. She received her first dose of Moderna. Cards seen on 7/19/21 for eval (to hold Plavix 5days prior to sx). COVID swab at [facility] on 8/1/2021.     **As per Dr Green she may stay on her plavix, patient was made aware. (21 Jul 2021 15:57)      PAST MEDICAL & SURGICAL HISTORY:  Arthritis  Cervical Spine  and Hands    Restless leg syndrome  Especially with General Anesthesia    Hypertension    Hyperlipidemia    CAD (coronary artery disease)    CKD (chronic kidney disease)    Peripheral vascular disease    GERD (gastroesophageal reflux disease)    History of endocarditis    2019 novel coronavirus disease (COVID-19)  3/2021    Depression    Rheumatoid arthritis of carpometacarpal joint of thumb  Total Joint Replacement of Left Thumb    Osteoarthritis of right shoulder region  Right Shoulder Arthroscopy  2007    S/P tubal ligation  1986    S/P appendectomy  2014    Acute peptic ulcer with perforation  1980    H/O cervical discectomy  C1-T1 fusion 2020    S/P CABG x 1  2019    S/P AVR (aortic valve replacement)  2019    History of endarterectomy  iliac stenting and femoral endarterectomy        FAMILY HISTORY:  Family history of arthritis (Father)    No family history of alcoholism (Mother)    Family history of hypothyroidism (Sibling)    Family hx of aortic aneurysm (Sibling)    Family history of atrial fibrillation (Sibling)    Family history of parkinsonism (Sibling)    Family hx of hypertension (Sibling)        SOCIAL HISTORY:    Allergies    No Known Allergies    Intolerances          REVIEW OF SYSEMS:  General: no weakness, no fever/chills, no weight loss/gain  Skin/Breast: no rash, no jaundice  Ophthalmologic: no vision changes, no dry eyes   Respiratory and Thorax: no cough, no wheezing, no hemoptysis, no dyspnea  Cardiovascular: no chest pain, no shortness of breath, no orthopnea  Gastrointestinal: no n/v/d, no abdominal pain, no dysphagia   Genitourinary: no dysuria, no frequency, no nocturia, no hematuria  Musculoskeletal: no trauma, no sprain/strain, no myalgias, no arthralgias, no fracture  Neurological: no HA, no dizziness, no weakness, no numbness  Psychiatric: no depression, no SI/HI  Hematology/Lymphatics: no easy bruising  Endocrine: no heat or cold intolerance. no weight gain or loss  Allergic/Immunologic: no allergy or recent reaction       Vital Signs Last 24 Hrs  T(C): 36.6 (05 Aug 2021 08:02), Max: 36.7 (04 Aug 2021 16:13)  T(F): 97.9 (05 Aug 2021 08:02), Max: 98 (04 Aug 2021 16:13)  HR: 79 (05 Aug 2021 08:02) (66 - 79)  BP: 95/62 (05 Aug 2021 08:02) (95/62 - 110/64)  BP(mean): 75 (04 Aug 2021 13:15) (75 - 75)  RR: 18 (05 Aug 2021 08:02) (14 - 18)  SpO2: 94% (05 Aug 2021 08:02) (94% - 98%)  I&O's Summary    04 Aug 2021 07:01  -  05 Aug 2021 07:00  --------------------------------------------------------  IN: 891.9 mL / OUT: 1220 mL / NET: -328.1 mL    05 Aug 2021 07:01  -  05 Aug 2021 13:11  --------------------------------------------------------  IN: 260 mL / OUT: 0 mL / NET: 260 mL        PHYSICAL EXAM:  GENERAL: NAD, Comfortable  HEAD:  Atraumatic, Normocephalic  EYES: EOMI, PERRLA, conjunctiva and sclera clear  NECK: Supple, No JVD  CHEST/LUNG: Clear to auscultation bilaterally; No wheeze  HEART: Regular rate and rhythm; No murmurs, rubs, or gallops  ABDOMEN: Soft, Nontender, Nondistended; Bowel sounds present  Neuro: AAOx3, no focal deficit, 5/5 b/l extremities  EXTREMITIES:  2+ Peripheral Pulses, No clubbing, cyanosis, or edema  SKIN: No rashes or lesions    LABS:                        8.5    10.84 )-----------( 231      ( 05 Aug 2021 08:19 )             27.5     08-05    135  |  104  |  38<H>  ----------------------------<  107<H>  4.9   |  19<L>  |  2.99<H>    Ca    8.5      05 Aug 2021 06:59        CAPILLARY BLOOD GLUCOSE                RADIOLOGY & ADDITIONAL TESTS:    Imaging Personally Reviewed:  [x] YES  [ ] NO    Consultant(s) Notes Reviewed:  [x] YES  [ ] NO      MEDICATIONS  (STANDING):  amLODIPine   Tablet 10 milliGRAM(s) Oral daily  buPROPion XL (24-Hour) . 150 milliGRAM(s) Oral daily  chlorhexidine 2% Cloths 1 Application(s) Topical once  clopidogrel Tablet 75 milliGRAM(s) Oral daily  heparin   Injectable 5000 Unit(s) SubCutaneous every 8 hours  metoprolol succinate ER 75 milliGRAM(s) Oral daily  pramipexole 0.5 milliGRAM(s) Oral at bedtime  simvastatin 10 milliGRAM(s) Oral at bedtime    MEDICATIONS  (PRN):  acetaminophen   Tablet .. 975 milliGRAM(s) Oral every 6 hours PRN Moderate Pain (4 - 6)  oxyCODONE    IR 5 milliGRAM(s) Oral every 4 hours PRN Moderate Pain (4 - 6)  oxyCODONE    IR 10 milliGRAM(s) Oral every 6 hours PRN Severe Pain (7 - 10)      Care Discussed with Consultants/Other Providers [x] YES  [ ] NO    HEALTH ISSUES - PROBLEM Dx:  Need for prophylactic measure    Hypertension    Peripheral vascular disease        Patient is a 65y old  Female who presents with a chief complaint of Peripheral vascular disease (05 Aug 2021 08:47)      HPI:  64yr old female w/ hx of PVD, CAD, Arthritis, HTN, HLD, GERD, CKD, COVID (3/21) and iliac/femoral dx which she had iliac stenting and femoral endarterectomy. In 2019 she was found to have bacteremia and endocarditis and required a AVR and a CABG x1. She now presents to [facility] for a Femoral Femoral Bypass on 8/4/21. She c/o lower extremity pain while ambulating that has gotten progressively worse and is relieved by resting. Denies recent cough, fever, chills, chest pain or SOB and feels well otherwise. She received her first dose of Moderna. Cards seen on 7/19/21 for eval (to hold Plavix 5days prior to sx). COVID swab at [facility] on 8/1/2021.     **As per Dr Green she may stay on her plavix, patient was made aware. (21 Jul 2021 15:57)      PAST MEDICAL & SURGICAL HISTORY:  Arthritis  Cervical Spine  and Hands    Restless leg syndrome  Especially with General Anesthesia    Hypertension    Hyperlipidemia    CAD (coronary artery disease)    CKD (chronic kidney disease)    Peripheral vascular disease    GERD (gastroesophageal reflux disease)    History of endocarditis    2019 novel coronavirus disease (COVID-19)  3/2021    Depression    Rheumatoid arthritis of carpometacarpal joint of thumb  Total Joint Replacement of Left Thumb    Osteoarthritis of right shoulder region  Right Shoulder Arthroscopy  2007    S/P tubal ligation  1986    S/P appendectomy  2014    Acute peptic ulcer with perforation  1980    H/O cervical discectomy  C1-T1 fusion 2020    S/P CABG x 1  2019    S/P AVR (aortic valve replacement)  2019    History of endarterectomy  iliac stenting and femoral endarterectomy        FAMILY HISTORY:  Family history of arthritis (Father)    No family history of alcoholism (Mother)    Family history of hypothyroidism (Sibling)    Family hx of aortic aneurysm (Sibling)    Family history of atrial fibrillation (Sibling)    Family history of parkinsonism (Sibling)    Family hx of hypertension (Sibling)        SOCIAL HISTORY:    Allergies    No Known Allergies    Intolerances          REVIEW OF SYSEMS:  General: no weakness, no fever/chills, no weight loss/gain  Skin/Breast: no rash, no jaundice  Ophthalmologic: no vision changes, no dry eyes   Respiratory and Thorax: no cough, no wheezing, no hemoptysis, no dyspnea  Cardiovascular: no chest pain, no shortness of breath, no orthopnea  Gastrointestinal: no n/v/d, no abdominal pain, no dysphagia   Genitourinary: no dysuria, no frequency, no nocturia, no hematuria  Musculoskeletal: no trauma, no sprain/strain, no myalgias, no arthralgias, no fracture  Neurological: no HA, no dizziness, no weakness, no numbness  Psychiatric: no depression, no SI/HI  Hematology/Lymphatics: no easy bruising  Endocrine: no heat or cold intolerance. no weight gain or loss  Allergic/Immunologic: no allergy or recent reaction       Vital Signs Last 24 Hrs  T(C): 36.6 (05 Aug 2021 08:02), Max: 36.7 (04 Aug 2021 16:13)  T(F): 97.9 (05 Aug 2021 08:02), Max: 98 (04 Aug 2021 16:13)  HR: 79 (05 Aug 2021 08:02) (66 - 79)  BP: 95/62 (05 Aug 2021 08:02) (95/62 - 110/64)  BP(mean): 75 (04 Aug 2021 13:15) (75 - 75)  RR: 18 (05 Aug 2021 08:02) (14 - 18)  SpO2: 94% (05 Aug 2021 08:02) (94% - 98%)  I&O's Summary    04 Aug 2021 07:01  -  05 Aug 2021 07:00  --------------------------------------------------------  IN: 891.9 mL / OUT: 1220 mL / NET: -328.1 mL    05 Aug 2021 07:01  -  05 Aug 2021 13:13  --------------------------------------------------------  IN: 260 mL / OUT: 0 mL / NET: 260 mL        PHYSICAL EXAM:  GENERAL: NAD, Comfortable  HEAD:  Atraumatic, Normocephalic  EYES: EOMI, PERRLA, conjunctiva and sclera clear  NECK: Supple, No JVD  CHEST/LUNG: Clear to auscultation bilaterally; No wheeze  HEART: Regular rate and rhythm; No murmurs, rubs, or gallops  ABDOMEN: Soft, Nontender, Nondistended; Bowel sounds present  Neuro: AAOx3, no focal deficit, 5/5 b/l extremities  EXTREMITIES:  2+ Peripheral Pulses, No clubbing, cyanosis, or edema  SKIN: No rashes or lesions    LABS:                        8.5    10.84 )-----------( 231      ( 05 Aug 2021 08:19 )             27.5     08-05    135  |  104  |  38<H>  ----------------------------<  107<H>  4.9   |  19<L>  |  2.99<H>    Ca    8.5      05 Aug 2021 06:59        CAPILLARY BLOOD GLUCOSE                RADIOLOGY & ADDITIONAL TESTS:    Imaging Personally Reviewed:  [x] YES  [ ] NO    Consultant(s) Notes Reviewed:  [x] YES  [ ] NO      MEDICATIONS  (STANDING):  amLODIPine   Tablet 10 milliGRAM(s) Oral daily  buPROPion XL (24-Hour) . 150 milliGRAM(s) Oral daily  chlorhexidine 2% Cloths 1 Application(s) Topical once  clopidogrel Tablet 75 milliGRAM(s) Oral daily  heparin   Injectable 5000 Unit(s) SubCutaneous every 8 hours  metoprolol succinate ER 75 milliGRAM(s) Oral daily  pramipexole 0.5 milliGRAM(s) Oral at bedtime  simvastatin 10 milliGRAM(s) Oral at bedtime    MEDICATIONS  (PRN):  acetaminophen   Tablet .. 975 milliGRAM(s) Oral every 6 hours PRN Moderate Pain (4 - 6)  oxyCODONE    IR 5 milliGRAM(s) Oral every 4 hours PRN Moderate Pain (4 - 6)  oxyCODONE    IR 10 milliGRAM(s) Oral every 6 hours PRN Severe Pain (7 - 10)      Care Discussed with Consultants/Other Providers [x] YES  [ ] NO    HEALTH ISSUES - PROBLEM Dx:  Need for prophylactic measure    Hypertension    Peripheral vascular disease         Patient is a 65y old  Female who presents with a chief complaint of Peripheral vascular disease (05 Aug 2021 08:47)      HPI:  64yr old female w/ hx of PVD, CAD, Arthritis, HTN, HLD, GERD, CKD, COVID (3/21) and iliac/femoral dx which she had iliac stenting and femoral endarterectomy. In 2019 she was found to have bacteremia and endocarditis and required a AVR and a CABG x1. She now presents to [facility] for a Femoral Femoral Bypass on 8/4/21. She c/o lower extremity pain while ambulating that has gotten progressively worse and is relieved by resting. Denies recent cough, fever, chills, chest pain or SOB and feels well otherwise. She received her first dose of Moderna. Cards seen on 7/19/21 for eval (to hold Plavix 5days prior to sx). COVID swab at [facility] on 8/1/2021.     **As per Dr Green she may stay on her plavix, patient was made aware. (21 Jul 2021 15:57)      PAST MEDICAL & SURGICAL HISTORY:  Arthritis  Cervical Spine  and Hands    Restless leg syndrome  Especially with General Anesthesia    Hypertension    Hyperlipidemia    CAD (coronary artery disease)    CKD (chronic kidney disease)    Peripheral vascular disease    GERD (gastroesophageal reflux disease)    History of endocarditis    2019 novel coronavirus disease (COVID-19)  3/2021    Depression    Rheumatoid arthritis of carpometacarpal joint of thumb  Total Joint Replacement of Left Thumb    Osteoarthritis of right shoulder region  Right Shoulder Arthroscopy  2007    S/P tubal ligation  1986    S/P appendectomy  2014    Acute peptic ulcer with perforation  1980    H/O cervical discectomy  C1-T1 fusion 2020    S/P CABG x 1  2019    S/P AVR (aortic valve replacement)  2019    History of endarterectomy  iliac stenting and femoral endarterectomy        FAMILY HISTORY:  Family history of arthritis (Father)    No family history of alcoholism (Mother)    Family history of hypothyroidism (Sibling)    Family hx of aortic aneurysm (Sibling)    Family history of atrial fibrillation (Sibling)    Family history of parkinsonism (Sibling)    Family hx of hypertension (Sibling)        SOCIAL HISTORY:    Allergies    No Known Allergies    Intolerances          REVIEW OF SYSEMS:  General: no weakness, no fever/chills, no weight loss/gain  Skin/Breast: no rash, no jaundice  Ophthalmologic: no vision changes, no dry eyes   Respiratory and Thorax: no cough, no wheezing, no hemoptysis, no dyspnea  Cardiovascular: no chest pain, no shortness of breath, no orthopnea  Gastrointestinal: no n/v/d, no abdominal pain, no dysphagia   Genitourinary: no dysuria, no frequency, no nocturia, no hematuria  Musculoskeletal: no trauma, no sprain/strain, no myalgias, no arthralgias, no fracture  Neurological: no HA, no dizziness, no weakness, no numbness  Psychiatric: no depression, no SI/HI  Hematology/Lymphatics: no easy bruising  Endocrine: no heat or cold intolerance. no weight gain or loss  Allergic/Immunologic: no allergy or recent reaction       Vital Signs Last 24 Hrs  T(C): 36.6 (05 Aug 2021 08:02), Max: 36.7 (04 Aug 2021 16:13)  T(F): 97.9 (05 Aug 2021 08:02), Max: 98 (04 Aug 2021 16:13)  HR: 79 (05 Aug 2021 08:02) (66 - 79)  BP: 95/62 (05 Aug 2021 08:02) (95/62 - 110/64)  BP(mean): 75 (04 Aug 2021 13:15) (75 - 75)  RR: 18 (05 Aug 2021 08:02) (14 - 18)  SpO2: 94% (05 Aug 2021 08:02) (94% - 98%)  I&O's Summary    04 Aug 2021 07:01  -  05 Aug 2021 07:00  --------------------------------------------------------  IN: 891.9 mL / OUT: 1220 mL / NET: -328.1 mL    05 Aug 2021 07:01  -  05 Aug 2021 13:11  --------------------------------------------------------  IN: 260 mL / OUT: 0 mL / NET: 260 mL        PHYSICAL EXAM:  GENERAL: NAD, Comfortable  HEAD:  Atraumatic, Normocephalic  EYES: EOMI, PERRLA, conjunctiva and sclera clear  NECK: Supple, No JVD  CHEST/LUNG: Clear to auscultation bilaterally; No wheeze  HEART: Regular rate and rhythm; No murmurs, rubs, or gallops  ABDOMEN: Soft, Nontender, Nondistended; Bowel sounds present  Neuro: AAOx3, no focal deficit, 5/5 b/l extremities  EXTREMITIES:  2+ Peripheral Pulses, No clubbing, cyanosis, or edema  SKIN: No rashes or lesions    LABS:                        8.5    10.84 )-----------( 231      ( 05 Aug 2021 08:19 )             27.5     08-05    135  |  104  |  38<H>  ----------------------------<  107<H>  4.9   |  19<L>  |  2.99<H>    Ca    8.5      05 Aug 2021 06:59        CAPILLARY BLOOD GLUCOSE                RADIOLOGY & ADDITIONAL TESTS:    Imaging Personally Reviewed:  [x] YES  [ ] NO    Consultant(s) Notes Reviewed:  [x] YES  [ ] NO      MEDICATIONS  (STANDING):  amLODIPine   Tablet 10 milliGRAM(s) Oral daily  buPROPion XL (24-Hour) . 150 milliGRAM(s) Oral daily  chlorhexidine 2% Cloths 1 Application(s) Topical once  clopidogrel Tablet 75 milliGRAM(s) Oral daily  heparin   Injectable 5000 Unit(s) SubCutaneous every 8 hours  metoprolol succinate ER 75 milliGRAM(s) Oral daily  pramipexole 0.5 milliGRAM(s) Oral at bedtime  simvastatin 10 milliGRAM(s) Oral at bedtime    MEDICATIONS  (PRN):  acetaminophen   Tablet .. 975 milliGRAM(s) Oral every 6 hours PRN Moderate Pain (4 - 6)  oxyCODONE    IR 5 milliGRAM(s) Oral every 4 hours PRN Moderate Pain (4 - 6)  oxyCODONE    IR 10 milliGRAM(s) Oral every 6 hours PRN Severe Pain (7 - 10)      Care Discussed with Consultants/Other Providers [x] YES  [ ] NO    HEALTH ISSUES - PROBLEM Dx:  Need for prophylactic measure    Hypertension    Peripheral vascular disease

## 2021-08-06 ENCOUNTER — TRANSCRIPTION ENCOUNTER (OUTPATIENT)
Age: 66
End: 2021-08-06

## 2021-08-06 VITALS
RESPIRATION RATE: 18 BRPM | TEMPERATURE: 98 F | DIASTOLIC BLOOD PRESSURE: 69 MMHG | SYSTOLIC BLOOD PRESSURE: 104 MMHG | HEART RATE: 82 BPM | OXYGEN SATURATION: 99 %

## 2021-08-06 LAB
ANION GAP SERPL CALC-SCNC: 13 MMOL/L — SIGNIFICANT CHANGE UP (ref 5–17)
BUN SERPL-MCNC: 36 MG/DL — HIGH (ref 7–23)
CALCIUM SERPL-MCNC: 8.3 MG/DL — LOW (ref 8.4–10.5)
CHLORIDE SERPL-SCNC: 105 MMOL/L — SIGNIFICANT CHANGE UP (ref 96–108)
CO2 SERPL-SCNC: 19 MMOL/L — LOW (ref 22–31)
CREAT SERPL-MCNC: 2.92 MG/DL — HIGH (ref 0.5–1.3)
GLUCOSE SERPL-MCNC: 106 MG/DL — HIGH (ref 70–99)
HCT VFR BLD CALC: 27.7 % — LOW (ref 34.5–45)
HGB BLD-MCNC: 8.5 G/DL — LOW (ref 11.5–15.5)
MAGNESIUM SERPL-MCNC: 2.2 MG/DL — SIGNIFICANT CHANGE UP (ref 1.6–2.6)
MCHC RBC-ENTMCNC: 30 PG — SIGNIFICANT CHANGE UP (ref 27–34)
MCHC RBC-ENTMCNC: 30.7 GM/DL — LOW (ref 32–36)
MCV RBC AUTO: 97.9 FL — SIGNIFICANT CHANGE UP (ref 80–100)
NRBC # BLD: 0 /100 WBCS — SIGNIFICANT CHANGE UP (ref 0–0)
PHOSPHATE SERPL-MCNC: 2.8 MG/DL — SIGNIFICANT CHANGE UP (ref 2.5–4.5)
PLATELET # BLD AUTO: 207 K/UL — SIGNIFICANT CHANGE UP (ref 150–400)
POTASSIUM SERPL-MCNC: 4.5 MMOL/L — SIGNIFICANT CHANGE UP (ref 3.5–5.3)
POTASSIUM SERPL-SCNC: 4.5 MMOL/L — SIGNIFICANT CHANGE UP (ref 3.5–5.3)
RBC # BLD: 2.83 M/UL — LOW (ref 3.8–5.2)
RBC # FLD: 14.5 % — SIGNIFICANT CHANGE UP (ref 10.3–14.5)
SODIUM SERPL-SCNC: 137 MMOL/L — SIGNIFICANT CHANGE UP (ref 135–145)
WBC # BLD: 10.93 K/UL — HIGH (ref 3.8–10.5)
WBC # FLD AUTO: 10.93 K/UL — HIGH (ref 3.8–10.5)

## 2021-08-06 PROCEDURE — 99231 SBSQ HOSP IP/OBS SF/LOW 25: CPT

## 2021-08-06 PROCEDURE — C9803: CPT

## 2021-08-06 PROCEDURE — 84100 ASSAY OF PHOSPHORUS: CPT

## 2021-08-06 PROCEDURE — C1769: CPT

## 2021-08-06 PROCEDURE — 99232 SBSQ HOSP IP/OBS MODERATE 35: CPT

## 2021-08-06 PROCEDURE — U0003: CPT

## 2021-08-06 PROCEDURE — 85027 COMPLETE CBC AUTOMATED: CPT

## 2021-08-06 PROCEDURE — 83735 ASSAY OF MAGNESIUM: CPT

## 2021-08-06 PROCEDURE — C1768: CPT

## 2021-08-06 PROCEDURE — 80048 BASIC METABOLIC PNL TOTAL CA: CPT

## 2021-08-06 PROCEDURE — U0005: CPT

## 2021-08-06 PROCEDURE — C9399: CPT

## 2021-08-06 PROCEDURE — C1889: CPT

## 2021-08-06 RX ORDER — OXYCODONE HYDROCHLORIDE 5 MG/1
1 TABLET ORAL
Qty: 8 | Refills: 0
Start: 2021-08-06 | End: 2021-08-07

## 2021-08-06 RX ADMIN — CLOPIDOGREL BISULFATE 75 MILLIGRAM(S): 75 TABLET, FILM COATED ORAL at 12:21

## 2021-08-06 RX ADMIN — OXYCODONE HYDROCHLORIDE 10 MILLIGRAM(S): 5 TABLET ORAL at 02:15

## 2021-08-06 RX ADMIN — Medication 75 MILLIGRAM(S): at 05:41

## 2021-08-06 RX ADMIN — OXYCODONE HYDROCHLORIDE 10 MILLIGRAM(S): 5 TABLET ORAL at 01:32

## 2021-08-06 RX ADMIN — OXYCODONE HYDROCHLORIDE 10 MILLIGRAM(S): 5 TABLET ORAL at 11:08

## 2021-08-06 RX ADMIN — HEPARIN SODIUM 5000 UNIT(S): 5000 INJECTION INTRAVENOUS; SUBCUTANEOUS at 05:42

## 2021-08-06 NOTE — PROGRESS NOTE ADULT - ASSESSMENT
Stephanie is a 64 year old female with PVD, AVR for endocarditis in 2019, mild nonobstructive CAD, Arthritis, HTN, HLD, GERD, CKD, and iliac/femoral dx which she had iliac stenting and femoral endarterectomy.  Because of recurrent claudication symptoms, she is now s/p Right to left femoral femoral bypass graft with ringed PTFE.    - feeling well this morning  - no sign of acute ischemia. non- obstructive cad on cath in 2019. cont plavix and statin  - no sign of volume overload. She has normal LV function as of 7/21.  - BP has been soft   - can hold amlodipine  if needed  - cont toprol  - Hb stable. Cont to trend  - trend creatinine and electrolytes. Keep K>4, mg>2  - care per vascular surgery  - will follow with you

## 2021-08-06 NOTE — PROGRESS NOTE ADULT - SUBJECTIVE AND OBJECTIVE BOX
Patient is a 65y old  Female who presents with a chief complaint of Peripheral vascular disease (05 Aug 2021 08:47)      INTERVAL HPI/OVERNIGHT EVENTS: noted  pt seen and examined this am   events noted  feels well, ambulating  drains removed      Vital Signs Last 24 Hrs  T(C): 36.6 (06 Aug 2021 10:42), Max: 37.1 (06 Aug 2021 01:30)  T(F): 97.9 (06 Aug 2021 10:42), Max: 98.8 (06 Aug 2021 01:30)  HR: 82 (06 Aug 2021 10:42) (79 - 95)  BP: 104/69 (06 Aug 2021 10:42) (99/62 - 111/78)  BP(mean): --  RR: 18 (06 Aug 2021 10:42) (17 - 18)  SpO2: 99% (06 Aug 2021 10:42) (97% - 100%)    acetaminophen   Tablet .. 975 milliGRAM(s) Oral every 6 hours PRN  amLODIPine   Tablet 10 milliGRAM(s) Oral daily  buPROPion XL (24-Hour) . 150 milliGRAM(s) Oral daily  chlorhexidine 2% Cloths 1 Application(s) Topical once  clopidogrel Tablet 75 milliGRAM(s) Oral daily  heparin   Injectable 5000 Unit(s) SubCutaneous every 8 hours  metoprolol succinate ER 75 milliGRAM(s) Oral daily  oxyCODONE    IR 5 milliGRAM(s) Oral every 4 hours PRN  oxyCODONE    IR 10 milliGRAM(s) Oral every 6 hours PRN  pramipexole 0.5 milliGRAM(s) Oral at bedtime  simvastatin 10 milliGRAM(s) Oral at bedtime      PHYSICAL EXAM:  GENERAL: NAD,   EYES: conjunctiva and sclera clear  ENMT: Moist mucous membranes  NECK: Supple, No JVD, Normal thyroid  CHEST/LUNG: non labored, cta b/l  HEART: Regular rate and rhythm; No murmurs, rubs, or gallops  ABDOMEN: Soft, Nontender, Nondistended; Bowel sounds present  EXTREMITIES:  2+ Peripheral Pulses, No clubbing, cyanosis, or edema  LYMPH: No lymphadenopathy noted  SKIN: No rashes or lesions    Consultant(s) Notes Reviewed:  [x ] YES  [ ] NO  Care Discussed with Consultants/Other Providers [ x] YES  [ ] NO    LABS:                        8.5    10.93 )-----------( 207      ( 06 Aug 2021 06:54 )             27.7     08-06    137  |  105  |  36<H>  ----------------------------<  106<H>  4.5   |  19<L>  |  2.92<H>    Ca    8.3<L>      06 Aug 2021 06:53  Phos  2.8     08-06  Mg     2.2     08-06          CAPILLARY BLOOD GLUCOSE                  RADIOLOGY & ADDITIONAL TESTS:    Imaging Personally Reviewed:  [x ] YES  [ ] NO

## 2021-08-06 NOTE — DISCHARGE NOTE NURSING/CASE MANAGEMENT/SOCIAL WORK - PATIENT PORTAL LINK FT
You can access the FollowMyHealth Patient Portal offered by [facility] by registering at the following website: [email or web address]. By joining [facility]’s FollowMyHealth portal, you will also be able to view your health information using other applications (apps) compatible with our system.

## 2021-08-06 NOTE — PROGRESS NOTE ADULT - SUBJECTIVE AND OBJECTIVE BOX
[facility] -- Chao Lomax, Kunal, Regino, Roshan Paris Savella  Office # [phone]      Follow Up:  CAD HTN    Subjective/Observations: Patient seen and examined. Events noted. Resting comfortably in bed. No complaints of chest pain, dyspnea, or palpitations reported. No signs of orthopnea or PND.       REVIEW OF SYSTEMS: All other review of systems is negative unless indicated above    PAST MEDICAL & SURGICAL HISTORY:  Arthritis  Cervical Spine  and Hands    Restless leg syndrome  Especially with General Anesthesia    Hypertension    Hyperlipidemia    CAD (coronary artery disease)    CKD (chronic kidney disease)    Peripheral vascular disease    GERD (gastroesophageal reflux disease)    History of endocarditis    2019 novel coronavirus disease (COVID-19)  3/2021    Depression    Rheumatoid arthritis of carpometacarpal joint of thumb  Total Joint Replacement of Left Thumb    Osteoarthritis of right shoulder region  Right Shoulder Arthroscopy  2007    S/P tubal ligation  1986    S/P appendectomy  2014    Acute peptic ulcer with perforation  1980    H/O cervical discectomy  C1-T1 fusion 2020    S/P CABG x 1  2019    S/P AVR (aortic valve replacement)  2019    History of endarterectomy  iliac stenting and femoral endarterectomy        MEDICATIONS  (STANDING):  amLODIPine   Tablet 10 milliGRAM(s) Oral daily  buPROPion XL (24-Hour) . 150 milliGRAM(s) Oral daily  chlorhexidine 2% Cloths 1 Application(s) Topical once  clopidogrel Tablet 75 milliGRAM(s) Oral daily  heparin   Injectable 5000 Unit(s) SubCutaneous every 8 hours  metoprolol succinate ER 75 milliGRAM(s) Oral daily  pramipexole 0.5 milliGRAM(s) Oral at bedtime  simvastatin 10 milliGRAM(s) Oral at bedtime    MEDICATIONS  (PRN):  acetaminophen   Tablet .. 975 milliGRAM(s) Oral every 6 hours PRN Moderate Pain (4 - 6)  oxyCODONE    IR 5 milliGRAM(s) Oral every 4 hours PRN Moderate Pain (4 - 6)  oxyCODONE    IR 10 milliGRAM(s) Oral every 6 hours PRN Severe Pain (7 - 10)      Allergies    No Known Allergies    Intolerances            Vital Signs Last 24 Hrs  T(C): 36.6 (06 Aug 2021 10:42), Max: 37.1 (06 Aug 2021 01:30)  T(F): 97.9 (06 Aug 2021 10:42), Max: 98.8 (06 Aug 2021 01:30)  HR: 82 (06 Aug 2021 10:42) (79 - 95)  BP: 104/69 (06 Aug 2021 10:42) (99/62 - 114/68)  BP(mean): --  RR: 18 (06 Aug 2021 10:42) (16 - 18)  SpO2: 99% (06 Aug 2021 10:42) (94% - 100%)    I&O's Summary    05 Aug 2021 07:01  -  06 Aug 2021 07:00  --------------------------------------------------------  IN: 1550 mL / OUT: 1260 mL / NET: 290 mL    06 Aug 2021 07:01  -  06 Aug 2021 11:49  --------------------------------------------------------  IN: 240 mL / OUT: 10 mL / NET: 230 mL          PHYSICAL EXAM:     Constitutional: NAD, awake and alert, well-developed  HEENT: Moist Mucous Membranes, Anicteric  Pulmonary: Non-labored, breath sounds are clear bilaterally, No wheezing, rales or rhonchi  Cardiovascular: Regular, S1 and S2, No murmurs, rubs, gallops or clicks  Gastrointestinal: Bowel Sounds present, soft, nontender.   Lymph: No peripheral edema. No lymphadenopathy.  Skin: No visible rashes or ulcers.  Psych:  Mood & affect appropriate for situation    LABS: All Labs Reviewed:                        8.5    10.93 )-----------( 207      ( 06 Aug 2021 06:54 )             27.7                         8.5    10.84 )-----------( 231      ( 05 Aug 2021 08:19 )             27.5                         8.1    9.40  )-----------( 198      ( 05 Aug 2021 06:59 )             26.1     06 Aug 2021 06:53    137    |  105    |  36     ----------------------------<  106    4.5     |  19     |  2.92   05 Aug 2021 06:59    135    |  104    |  38     ----------------------------<  107    4.9     |  19     |  2.99     Ca    8.3        06 Aug 2021 06:53  Ca    8.5        05 Aug 2021 06:59  Phos  2.8       06 Aug 2021 06:53  Mg     2.2       06 Aug 2021 06:53

## 2021-08-06 NOTE — PROGRESS NOTE ADULT - ASSESSMENT
64yr old female w/ hx of PVD, CAD, Arthritis, HTN, HLD, GERD, CKD, COVID (3/21) and iliac/femoral dx which she had iliac stenting and femoral endarterectomy. In 2019 she was found to have bacteremia and endocarditis and required a AVR and a CABG x1.   pt now sp  Femoral Femoral Bypass on 8/4/21 POD#2  pain control  bowel regimen  mgmt per vasc team    #HTN bp optimal  cont current meds    #CAD plavix, statin, bblocker    #Anemia: likely dt postop blood loss  monitor    #CKD stable, monitor    [facility]  [phone]

## 2021-08-06 NOTE — PROGRESS NOTE ADULT - SUBJECTIVE AND OBJECTIVE BOX
VASCULAR SURGERY DAILY PROGRESS NOTE:       SUBJECTIVE/ROS: Patient feels well- no events overnight- pain controlled- Denies nausea, vomiting, chest pain, numbness, tingling. shortness of breath         MEDICATIONS  (STANDING):  amLODIPine   Tablet 10 milliGRAM(s) Oral daily  buPROPion XL (24-Hour) . 150 milliGRAM(s) Oral daily  chlorhexidine 2% Cloths 1 Application(s) Topical once  clopidogrel Tablet 75 milliGRAM(s) Oral daily  heparin   Injectable 5000 Unit(s) SubCutaneous every 8 hours  metoprolol succinate ER 75 milliGRAM(s) Oral daily  pramipexole 0.5 milliGRAM(s) Oral at bedtime  simvastatin 10 milliGRAM(s) Oral at bedtime    MEDICATIONS  (PRN):  acetaminophen   Tablet .. 975 milliGRAM(s) Oral every 6 hours PRN Moderate Pain (4 - 6)  oxyCODONE    IR 5 milliGRAM(s) Oral every 4 hours PRN Moderate Pain (4 - 6)  oxyCODONE    IR 10 milliGRAM(s) Oral every 6 hours PRN Severe Pain (7 - 10)      OBJECTIVE:    Vital Signs Last 24 Hrs  T(C): 36.6 (06 Aug 2021 05:39), Max: 37.1 (06 Aug 2021 01:30)  T(F): 97.8 (06 Aug 2021 05:39), Max: 98.8 (06 Aug 2021 01:30)  HR: 95 (06 Aug 2021 05:39) (79 - 95)  BP: 111/78 (06 Aug 2021 05:39) (95/62 - 114/68)  BP(mean): --  RR: 17 (06 Aug 2021 05:39) (16 - 18)  SpO2: 100% (06 Aug 2021 05:39) (94% - 100%)        I&O's Detail    05 Aug 2021 07:01  -  06 Aug 2021 07:00  --------------------------------------------------------  IN:    Oral Fluid: 1550 mL  Total IN: 1550 mL    OUT:    Bulb (mL): 70 mL    Bulb (mL): 40 mL    Voided (mL): 1150 mL  Total OUT: 1260 mL    Total NET: 290 mL          Daily     Daily     LABS:                        8.5    10.84 )-----------( 231      ( 05 Aug 2021 08:19 )             27.5     08-05    135  |  104  |  38<H>  ----------------------------<  107<H>  4.9   |  19<L>  |  2.99<H>    Ca    8.5      05 Aug 2021 06:59                    PHYSICAL EXAM   General Appearance: Appears well, NAD  Resp: Patent airway, non-labored breathing  Abdomen: Soft, mildly tender in RLQ/LLQ, Nondistended. Fem-fem bypass graft doppler signal+  Extremities: B/l aquacel dressing cdi, b/l TITI w SS output, mild SS saturation of drain insertion site dressing, changed, LLE palpable PT, strong dopplerable signals PT/DP b/l, no edema, erythema.        VASCULAR SURGERY DAILY PROGRESS NOTE:       SUBJECTIVE/ROS:   Patient feels well- no events overnight- pain controlled- Denies nausea, vomiting, chest pain, numbness, tingling. shortness of breath     OBJECTIVE:    Vital Signs Last 24 Hrs  T(C): 36.6 (06 Aug 2021 05:39), Max: 37.1 (06 Aug 2021 01:30)  T(F): 97.8 (06 Aug 2021 05:39), Max: 98.8 (06 Aug 2021 01:30)  HR: 95 (06 Aug 2021 05:39) (79 - 95)  BP: 111/78 (06 Aug 2021 05:39) (95/62 - 114/68)  BP(mean): --  RR: 17 (06 Aug 2021 05:39) (16 - 18)  SpO2: 100% (06 Aug 2021 05:39) (94% - 100%)      I&O's Detail    05 Aug 2021 07:01  -  06 Aug 2021 07:00  --------------------------------------------------------  IN:    Oral Fluid: 1550 mL  Total IN: 1550 mL    OUT:    Bulb (mL): 70 mL    Bulb (mL): 40 mL    Voided (mL): 1150 mL  Total OUT: 1260 mL    Total NET: 290 mL      Physical Exam  Gen: Pt appears well, resting comfortably in NAD  Resp: no increased WOB  Abd: TTP in RLQ and suprapubic region over graft tunneling site, graft dopplerable  Ext: palpable L PT, dopplerable L DP    LABS:                        8.5    10.84 )-----------( 231      ( 05 Aug 2021 08:19 )             27.5     08-05    135  |  104  |  38<H>  ----------------------------<  107<H>  4.9   |  19<L>  |  2.99<H>    Ca    8.5      05 Aug 2021 06:59                    PHYSICAL EXAM   General Appearance: Appears well, NAD  Resp: Patent airway, non-labored breathing  Abdomen: Soft, mildly tender in RLQ/LLQ, Nondistended. Fem-fem bypass graft doppler signal+  Extremities: B/l aquacel dressing cdi, b/l TITI w SS output, mild SS saturation of drain insertion site dressing, changed, LLE palpable PT, strong dopplerable signals PT/DP b/l, no edema, erythema.

## 2021-08-06 NOTE — PROGRESS NOTE ADULT - ASSESSMENT
64yr old female w PMHx of PVD, CAD, Arthritis, HTN, HLD, GERD, CKD, COVID (3/21) and PAD s/p iliac stenting and femoral endarterectomy presented for a scheduled R-->L femoral-femoral bypass with Marci 8/4    - Possible d/c drains today  - Pain control  - OOB/Ambulate  - Trend H/H  - Dispo planning    Vascular Surgery   # [id number]      64yr old female w PMHx of PVD, CAD, Arthritis, HTN, CKD, and PAD with h/o iliac stenting/femoral endarterectomy, now s/p R-->L femoral-femoral bypass with PTFE graft 8/4. Pt is recovering well on the floor and feels ready to go home.    - D/c drains, remove aquacel dressing  - H/H stable  - Dispo: home today    Vascular Surgery   # [id number]

## 2021-08-14 NOTE — H&P ADULT - NSICDXFAMILYHX_GEN_ALL_CORE_FT
Date of Service: 08/12/2021    SUBJECTIVE:  The patient comes in for followup of UTI from an urgent care.  The patient has been having symptoms of dysuria, urgency and frequency.  No back pain.  She says she has a little bit of the chills, no fever, no nausea or vomiting.  Has been keeping herself hydrated.  No rash.  No vaginal discharge or problems.  She does have a history of urinary incontinence and has been getting Botox injections, the last being in May.  She had a urine at that time which was unremarkable.  She did go to an urgent care on 08/04/2021 complaining of urinary tract symptoms.  They gave her Cipro, which she finished.  That urine did show some Candida.  She went again on 08/09/2021, culture actually did not show anything, and they treated her with Cipro again.  Also, she was taking Pyridium, which did help the bladder spasms which she says are very painful.    All other review of systems reviewed and are negative.    Problem list, allergy and medication list reviewed.  Does not smoke.    Also of note, she had a CT done in July related to her hernia and it did show some mild thickening of her bladder.    IMPRESSION:  Patient with symptoms of urinary tract infection, but her urine cultures have been unremarkable.  She has been treated with Cipro.  She did have Candida in her urine.  Will treat that with Diflucan 200 mg a day for 2 weeks.  She is also going to talk to her pelvic , but she probably needs a cystoscopy done since she is having recurrent symptoms, and to also discuss if this could be a side effect from the Botox injections.  She will call if any other problems or questions.      If she does not have resolution of her issues, she will call back and we will refer to a urologist.      Dictated By: Kp Nayak MD  Signing Provider: Kp Nayak MD    TS/sukhjinder ([id number])  DD: 08/12/2021 14:44:27 TD: 08/14/2021 16:46:44    Copy Sent To:   
Muriel is here today for No chief complaint on file.  .        Medication Refills needed today?  NO,   if you receive a prescription today would you like it to be sent to [facility]? NO    Medications: medications verified and updated    Patient would like communication of their results via:    Phone or card    Tobacco history: verified          COVID-19 Vaccine Interest Assessment:   • Documented in epic  If the patient reports an outside immunization, please update the WIR/ICARE information in the Immunizations activity         
This office note has been dictated.    
FAMILY HISTORY:  No pertinent family history in first degree relatives

## 2021-08-19 ENCOUNTER — APPOINTMENT (OUTPATIENT)
Dept: VASCULAR SURGERY | Facility: CLINIC | Age: 66
End: 2021-08-19
Payer: MEDICARE

## 2021-08-19 VITALS
SYSTOLIC BLOOD PRESSURE: 138 MMHG | BODY MASS INDEX: 25.86 KG/M2 | HEIGHT: 61 IN | HEART RATE: 66 BPM | WEIGHT: 137 LBS | DIASTOLIC BLOOD PRESSURE: 78 MMHG

## 2021-08-19 PROCEDURE — 99212 OFFICE O/P EST SF 10 MIN: CPT

## 2021-08-24 NOTE — REASON FOR VISIT
[de-identified] : Femoral-femoral bypass [de-identified] : Status post femoral-femoral bypass.  No complaints.  Patient is ambulating well.

## 2021-08-24 NOTE — DISCUSSION/SUMMARY
[FreeTextEntry1] : Patient is doing well status post femoral-femoral bypass.  Staples removed.  Patient to follow-up in 4 to 6 weeks.

## 2021-08-24 NOTE — PHYSICAL EXAM
[2+] : left 2+ [de-identified] : Appears well [FreeTextEntry1] : Feet are warm.\par Wounds are healing well

## 2021-10-05 ENCOUNTER — APPOINTMENT (OUTPATIENT)
Dept: VASCULAR SURGERY | Facility: CLINIC | Age: 66
End: 2021-10-05
Payer: MEDICARE

## 2021-10-05 PROCEDURE — 93925 LOWER EXTREMITY STUDY: CPT

## 2021-10-05 PROCEDURE — 99024 POSTOP FOLLOW-UP VISIT: CPT

## 2021-10-05 NOTE — PHYSICAL EXAM
[2+] : left 2+ [de-identified] : Appears well [FreeTextEntry1] : Feet are warm.\par Wounds are healing well

## 2021-10-05 NOTE — REASON FOR VISIT
[de-identified] : Femoral-femoral bypass [de-identified] : Status post femoral-femoral bypass.  No complaints.  Patient is ambulating well.

## 2021-10-05 NOTE — DISCUSSION/SUMMARY
[FreeTextEntry1] : Patient is doing well status post femoral-femoral bypass.  \par duplex shows patent bypass without stenosis\par f/u 3 months

## 2021-11-02 NOTE — ED ADULT TRIAGE NOTE - TEMPERATURE IN CELSIUS (DEGREES C)
ADRIANO 5/3/2021: Significant improvement in dry eye and vision. Continue current dry eye management. 37.2

## 2021-11-22 ENCOUNTER — NON-APPOINTMENT (OUTPATIENT)
Age: 66
End: 2021-11-22

## 2021-11-22 ENCOUNTER — APPOINTMENT (OUTPATIENT)
Dept: CARDIOLOGY | Facility: CLINIC | Age: 66
End: 2021-11-22
Payer: MEDICARE

## 2021-11-22 VITALS
DIASTOLIC BLOOD PRESSURE: 75 MMHG | SYSTOLIC BLOOD PRESSURE: 150 MMHG | OXYGEN SATURATION: 98 % | HEIGHT: 61 IN | BODY MASS INDEX: 24.55 KG/M2 | HEART RATE: 57 BPM | WEIGHT: 130 LBS

## 2021-11-22 VITALS — SYSTOLIC BLOOD PRESSURE: 140 MMHG | DIASTOLIC BLOOD PRESSURE: 65 MMHG

## 2021-11-22 PROCEDURE — 99214 OFFICE O/P EST MOD 30 MIN: CPT

## 2021-11-22 PROCEDURE — 93000 ELECTROCARDIOGRAM COMPLETE: CPT

## 2021-11-22 NOTE — HISTORY OF PRESENT ILLNESS
[FreeTextEntry1] : Stephanie Mcknight presented to the office today for a followup evaluation.  She was last seen in the office in July.\par \par She is now 65 years old, with a history of peripheral vascular disease, with asymptomatic carotid disease, and iliac/femoral disease for which she had iliac stenting and femoral endarterectomy.  She has a history of PVCs. She presented to the hospital in 2019 with fatigue, having previously presented to the hospital with a GI bleed. Unfortunately, she was found to have strep bacteremia and endocarditis, for which she required aortic valve replacement for severe AI.  Preop cath revealed nonobstructive CAD.  \par \par In October, 2019, she reported worsening claudication. Followup imaging suggested multilevel disease, and I referred her for evaluation. There was a decision to pursue an exercise program, and reserve interventional therapy for failure of that.\par \par Last June she reported several symptoms,including unchanged claudication, palpitations and a need for cervical laminectomy and fusion. She went on to have an echocardiogram and a Holter monitor. The Holter revealed sustained atrial tachycardia. Echocardiogram was unremarkable, with normal function of her aortic valve. I allowed her to proceed with surgery, which was uneventful.\par \par At the time of her last visit in May, 2021 she described ongoing issues with claudication, which was severe and lifestyle limiting.  I referred her to surgery, and at the last visit she was planned for femoral femoral bypass.  She had a femoral femoral bypass in August, which was uncomplicated. She was in the hospital 4 days/  She remains with some numbness in her legs, and she notes that she has a right popliteal stenosis which is causing her symptoms if she walks about 100 feet, which will need to be addressed. The claudication in her left leg has completely resolved. She describes some dyspnea when she speaks, or when she lies down. This is very sporadic.\par \par She continues to feel well from a cardiovascular perspective.  She does not report symptoms of angina, heart failure or arrhythmia.  Her claudication has improved nicely.

## 2021-11-22 NOTE — DISCUSSION/SUMMARY
[FreeTextEntry1] : Stephanie has a history of peripheral vascular disease. She has mild nonobstructive coronary artery disease, based on cardiac catheterization from 2019. She presented with aortic valve endocarditis, and is status post bioprosthetic aortic valve replacement, and mitral valve repair.\par \par Claudication is now much improved after femoral femoral bypass.\par \par She now has claudication of her right leg, stemming from the popliteal stenosis, which seemingly will be addressed over the next couple of months.\par \par They are going to [place], and will stay for about 6 weeks.\par \par She will check her blood pressure for me over the next few weeks, and let me know what it is.  Her systolic blood pressure remains borderline when I repeated, but with a relatively low diastolic blood pressure.

## 2021-11-24 ENCOUNTER — RX RENEWAL (OUTPATIENT)
Age: 66
End: 2021-11-24

## 2022-01-11 ENCOUNTER — APPOINTMENT (OUTPATIENT)
Dept: VASCULAR SURGERY | Facility: CLINIC | Age: 67
End: 2022-01-11
Payer: MEDICARE

## 2022-01-11 VITALS
SYSTOLIC BLOOD PRESSURE: 123 MMHG | HEART RATE: 65 BPM | WEIGHT: 130 LBS | HEIGHT: 61 IN | DIASTOLIC BLOOD PRESSURE: 77 MMHG | BODY MASS INDEX: 24.55 KG/M2

## 2022-01-11 PROCEDURE — 99213 OFFICE O/P EST LOW 20 MIN: CPT

## 2022-01-11 PROCEDURE — 93926 LOWER EXTREMITY STUDY: CPT

## 2022-01-11 NOTE — HISTORY OF PRESENT ILLNESS
[FreeTextEntry1] : 66-year-old female status post femoral-femoral bypass several months ago states that she is improving.  Patient still has claudication symptoms of her right lower extremity.  Her previous angiogram showed a short segment popliteal occlusion directly behind the knee.  At that time it was decided to continue with conservative management.  Patient continues to smoke at this time.

## 2022-01-11 NOTE — PHYSICAL EXAM
[JVD] : no jugular venous distention  [Normal Breath Sounds] : Normal breath sounds [Normal Rate and Rhythm] : normal rate and rhythm [2+] : left 2+ [Ankle Swelling (On Exam)] : not present [Varicose Veins Of Lower Extremities] : not present [] : not present [Abdomen Tenderness] : ~T ~M No abdominal tenderness [No Rash or Lesion] : No rash or lesion [Skin Ulcer] : no ulcer [Alert] : alert [Calm] : calm [de-identified] : Appears well

## 2022-01-11 NOTE — ASSESSMENT
[FreeTextEntry1] : In the office today patient underwent a duplex study which shows a patent femorofemoral bypass.  Patient will return in 3 months at which time she will also undergo a repeat carotid duplex study. [Smoking Cessation] : smoking cessation

## 2022-03-15 ENCOUNTER — RX RENEWAL (OUTPATIENT)
Age: 67
End: 2022-03-15

## 2022-04-12 ENCOUNTER — APPOINTMENT (OUTPATIENT)
Dept: VASCULAR SURGERY | Facility: CLINIC | Age: 67
End: 2022-04-12
Payer: MEDICARE

## 2022-04-12 PROCEDURE — 99214 OFFICE O/P EST MOD 30 MIN: CPT

## 2022-04-12 PROCEDURE — 93880 EXTRACRANIAL BILAT STUDY: CPT

## 2022-04-12 PROCEDURE — 93925 LOWER EXTREMITY STUDY: CPT

## 2022-04-12 PROCEDURE — 99406 BEHAV CHNG SMOKING 3-10 MIN: CPT

## 2022-04-12 NOTE — ASSESSMENT
[FreeTextEntry1] : In the office today patient underwent a duplex study which shows a patent femorofemoral bypass.  In addition she underwent a carotid duplex which is unchanged.  In the office today I had a long discussion with the patient she is still having significant difficulty ambulating due to the right lower extremity.  At this time I will consider doing a below the knee popliteal bypass.  She will return for a right leg vein mapping along with arterial Dopplers with exercise.  I have continue to advise her to avoid smoking. [Smoking Cessation] : smoking cessation

## 2022-04-12 NOTE — HISTORY OF PRESENT ILLNESS
[FreeTextEntry1] : 66-year-old female with a history of open heart surgery, carotid artery stenosis, status post femoral-femoral bypass several months ago states that she is still having some difficulty ambulating with the right leg..  Patient still has claudication symptoms of her right lower extremity.  Her previous angiogram showed a short segment popliteal occlusion directly behind the knee.  At that time it was decided to continue with conservative management.  Patient continues to smoke at this time.

## 2022-04-12 NOTE — PHYSICAL EXAM
[JVD] : no jugular venous distention  [Normal Breath Sounds] : Normal breath sounds [Normal Rate and Rhythm] : normal rate and rhythm [2+] : left 2+ [Ankle Swelling (On Exam)] : not present [Varicose Veins Of Lower Extremities] : not present [] : not present [Abdomen Tenderness] : ~T ~M No abdominal tenderness [No Rash or Lesion] : No rash or lesion [Skin Ulcer] : no ulcer [Alert] : alert [Calm] : calm [de-identified] : Appears well

## 2022-04-13 ENCOUNTER — APPOINTMENT (OUTPATIENT)
Dept: VASCULAR SURGERY | Facility: CLINIC | Age: 67
End: 2022-04-13
Payer: MEDICARE

## 2022-04-13 PROCEDURE — 93971 EXTREMITY STUDY: CPT

## 2022-04-13 PROCEDURE — 93924 LWR XTR VASC STDY BILAT: CPT

## 2022-04-25 NOTE — ED ADULT TRIAGE NOTE - PRO INTERPRETER NEED 2
Patient: Ellie Lewis    Procedure: Procedure(s):  COLONOSCOPY  ESOPHAGOGASTRODUODENOSCOPY, WITH BIOPSY       Diagnosis: Dysphagia, unspecified type [R13.10]  Screening for colon cancer [Z12.11]  Diagnosis Additional Information: No value filed.    Anesthesia Type:   General     Note:    Oropharynx: oropharynx clear of all foreign objects and spontaneously breathing  Level of Consciousness: awake and drowsy  Oxygen Supplementation: room air    Independent Airway: airway patency satisfactory and stable  Dentition: dentition unchanged  Vital Signs Stable: post-procedure vital signs reviewed and stable  Report to RN Given: handoff report given  Patient transferred to: Phase II    Handoff Report: Identifed the Patient, Identified the Reponsible Provider, Reviewed the pertinent medical history, Discussed the surgical course, Reviewed Intra-OP anesthesia mangement and issues during anesthesia, Set expectations for post-procedure period and Allowed opportunity for questions and acknowledgement of understanding      Vitals:  Vitals Value Taken Time   BP     Temp     Pulse     Resp     SpO2         Electronically Signed By: Alivia Hooper CRNA, APRN CRNA  April 25, 2022  5:07 PM  
English

## 2022-04-26 ENCOUNTER — OUTPATIENT (OUTPATIENT)
Dept: OUTPATIENT SERVICES | Facility: HOSPITAL | Age: 67
LOS: 1 days | End: 2022-04-26
Payer: MEDICARE

## 2022-04-26 VITALS
SYSTOLIC BLOOD PRESSURE: 115 MMHG | OXYGEN SATURATION: 99 % | DIASTOLIC BLOOD PRESSURE: 78 MMHG | TEMPERATURE: 98 F | RESPIRATION RATE: 20 BRPM | HEART RATE: 57 BPM | WEIGHT: 136.91 LBS | HEIGHT: 64 IN

## 2022-04-26 DIAGNOSIS — Z01.818 ENCOUNTER FOR OTHER PREPROCEDURAL EXAMINATION: ICD-10-CM

## 2022-04-26 DIAGNOSIS — Z98.890 OTHER SPECIFIED POSTPROCEDURAL STATES: Chronic | ICD-10-CM

## 2022-04-26 DIAGNOSIS — Z95.2 PRESENCE OF PROSTHETIC HEART VALVE: Chronic | ICD-10-CM

## 2022-04-26 DIAGNOSIS — Z95.828 PRESENCE OF OTHER VASCULAR IMPLANTS AND GRAFTS: Chronic | ICD-10-CM

## 2022-04-26 DIAGNOSIS — I73.9 PERIPHERAL VASCULAR DISEASE, UNSPECIFIED: ICD-10-CM

## 2022-04-26 DIAGNOSIS — I10 ESSENTIAL (PRIMARY) HYPERTENSION: ICD-10-CM

## 2022-04-26 DIAGNOSIS — K27.1 ACUTE PEPTIC ULCER, SITE UNSPECIFIED, WITH PERFORATION: Chronic | ICD-10-CM

## 2022-04-26 DIAGNOSIS — Z90.49 ACQUIRED ABSENCE OF OTHER SPECIFIED PARTS OF DIGESTIVE TRACT: Chronic | ICD-10-CM

## 2022-04-26 DIAGNOSIS — M06.9 RHEUMATOID ARTHRITIS, UNSPECIFIED: Chronic | ICD-10-CM

## 2022-04-26 DIAGNOSIS — Z95.1 PRESENCE OF AORTOCORONARY BYPASS GRAFT: Chronic | ICD-10-CM

## 2022-04-26 DIAGNOSIS — M19.011 PRIMARY OSTEOARTHRITIS, RIGHT SHOULDER: Chronic | ICD-10-CM

## 2022-04-26 DIAGNOSIS — Z98.51 TUBAL LIGATION STATUS: Chronic | ICD-10-CM

## 2022-04-26 LAB
ANION GAP SERPL CALC-SCNC: 12 MMOL/L — SIGNIFICANT CHANGE UP (ref 5–17)
BLD GP AB SCN SERPL QL: NEGATIVE — SIGNIFICANT CHANGE UP
BUN SERPL-MCNC: 34 MG/DL — HIGH (ref 7–23)
CALCIUM SERPL-MCNC: 10 MG/DL — SIGNIFICANT CHANGE UP (ref 8.4–10.5)
CHLORIDE SERPL-SCNC: 107 MMOL/L — SIGNIFICANT CHANGE UP (ref 96–108)
CO2 SERPL-SCNC: 24 MMOL/L — SIGNIFICANT CHANGE UP (ref 22–31)
CREAT SERPL-MCNC: 2.93 MG/DL — HIGH (ref 0.5–1.3)
EGFR: 17 ML/MIN/1.73M2 — LOW
GLUCOSE SERPL-MCNC: 92 MG/DL — SIGNIFICANT CHANGE UP (ref 70–99)
HCT VFR BLD CALC: 36.1 % — SIGNIFICANT CHANGE UP (ref 34.5–45)
HGB BLD-MCNC: 11.3 G/DL — LOW (ref 11.5–15.5)
MCHC RBC-ENTMCNC: 30.7 PG — SIGNIFICANT CHANGE UP (ref 27–34)
MCHC RBC-ENTMCNC: 31.3 GM/DL — LOW (ref 32–36)
MCV RBC AUTO: 98.1 FL — SIGNIFICANT CHANGE UP (ref 80–100)
NRBC # BLD: 0 /100 WBCS — SIGNIFICANT CHANGE UP (ref 0–0)
PLATELET # BLD AUTO: 213 K/UL — SIGNIFICANT CHANGE UP (ref 150–400)
POTASSIUM SERPL-MCNC: 4.6 MMOL/L — SIGNIFICANT CHANGE UP (ref 3.5–5.3)
POTASSIUM SERPL-SCNC: 4.6 MMOL/L — SIGNIFICANT CHANGE UP (ref 3.5–5.3)
RBC # BLD: 3.68 M/UL — LOW (ref 3.8–5.2)
RBC # FLD: 13.2 % — SIGNIFICANT CHANGE UP (ref 10.3–14.5)
RH IG SCN BLD-IMP: POSITIVE — SIGNIFICANT CHANGE UP
SODIUM SERPL-SCNC: 143 MMOL/L — SIGNIFICANT CHANGE UP (ref 135–145)
WBC # BLD: 6.32 K/UL — SIGNIFICANT CHANGE UP (ref 3.8–10.5)
WBC # FLD AUTO: 6.32 K/UL — SIGNIFICANT CHANGE UP (ref 3.8–10.5)

## 2022-04-26 PROCEDURE — 86901 BLOOD TYPING SEROLOGIC RH(D): CPT

## 2022-04-26 PROCEDURE — 80048 BASIC METABOLIC PNL TOTAL CA: CPT

## 2022-04-26 PROCEDURE — 85027 COMPLETE CBC AUTOMATED: CPT

## 2022-04-26 PROCEDURE — 36415 COLL VENOUS BLD VENIPUNCTURE: CPT

## 2022-04-26 PROCEDURE — 86850 RBC ANTIBODY SCREEN: CPT

## 2022-04-26 PROCEDURE — G0463: CPT

## 2022-04-26 PROCEDURE — 86900 BLOOD TYPING SEROLOGIC ABO: CPT

## 2022-04-26 NOTE — H&P PST ADULT - NSICDXPASTSURGICALHX_GEN_ALL_CORE_FT
PAST SURGICAL HISTORY:  Acute peptic ulcer with perforation 1980 - s/p surgery    H/O cervical discectomy C1-T1 fusion 2020    History of endarterectomy iliac stenting and femoral endarterectomy    Osteoarthritis of right shoulder region Right Shoulder Arthroscopy  2007    Rheumatoid arthritis of carpometacarpal joint of thumb Total Joint Replacement of Left Thumb    S/P appendectomy 2014    S/P AVR (aortic valve replacement) 2019    S/P CABG x 1 2019    S/P MVR (mitral valve repair) x 1    S/P tubal ligation 1986     PAST SURGICAL HISTORY:  Acute peptic ulcer with perforation 1980 - s/p surgery    H/O cervical discectomy C1-T1 fusion 2020    History of endarterectomy iliac stenting and femoral endarterectomy    Osteoarthritis of right shoulder region Right Shoulder Arthroscopy  2007    Rheumatoid arthritis of carpometacarpal joint of thumb Total Joint Replacement of Left Thumb    S/P appendectomy 2014    S/P AVR (aortic valve replacement) x 1,2019    S/P CABG x 1 2019    S/P femoral-femoral bypass surgery 2021    S/P MVR (mitral valve repair) x 1 2019    S/P tubal ligation 1986

## 2022-04-26 NOTE — H&P PST ADULT - FALL HARM RISK - UNIVERSAL INTERVENTIONS
Bed in lowest position, wheels locked, appropriate side rails in place/Call bell, personal items and telephone in reach/Instruct patient to call for assistance before getting out of bed or chair/Non-slip footwear when patient is out of bed/[place] to call system/Physically safe environment - no spills, clutter or unnecessary equipment/Purposeful Proactive Rounding/Room/bathroom lighting operational, light cord in reach

## 2022-04-26 NOTE — H&P PST ADULT - ASSESSMENT
JARADI VTE 2.0 SCORE [CLOT updated 2019]    AGE RELATED RISK FACTORS                                                       MOBILITY RELATED FACTORS  [ ] Age 41-60 years                                            (1 Point)                    [ ] Bed rest                                                        (1 Point)  [x ] Age: 61-74 years                                           (2 Points)                  [ ] Plaster cast                                                   (2 Points)  [ ] Age= 75 years                                              (3 Points)                    [ ] Bed bound for more than 72 hours                 (2 Points)    DISEASE RELATED RISK FACTORS                                               GENDER SPECIFIC FACTORS  [ ] Edema in the lower extremities                       (1 Point)              [ ] Pregnancy                                                     (1 Point)  [ ] Varicose veins                                               (1 Point)                     [ ] Post-partum < 6 weeks                                   (1 Point)             [ ] BMI > 25 Kg/m2                                            (1 Point)                     [ ] Hormonal therapy  or oral contraception          (1 Point)                 [ ] Sepsis (in the previous month)                        (1 Point)               [ ] History of pregnancy complications                 (1 point)  [ ] Pneumonia or serious lung disease                                               [ ] Unexplained or recurrent                     (1 Point)           (in the previous month)                               (1 Point)  [ ] Abnormal pulmonary function test                     (1 Point)                 SURGERY RELATED RISK FACTORS  [ ] Acute myocardial infarction                              (1 Point)               [ ]  Section                                             (1 Point)  [ ] Congestive heart failure (in the previous month)  (1 Point)      [ ] Minor surgery                                                  (1 Point)   [ ] Inflammatory bowel disease                             (1 Point)               [ ] Arthroscopic surgery                                        (2 Points)  [ ] Central venous access                                      (2 Points)                [x ] General surgery lasting more than 45 minutes (2 points)  [ ] Malignancy- Present or previous                   (2 Points)                [ ] Elective arthroplasty                                         (5 points)    [ ] Stroke (in the previous month)                          (5 Points)                                                                                                                                                           HEMATOLOGY RELATED FACTORS                                                 TRAUMA RELATED RISK FACTORS  [ ] Prior episodes of VTE                                     (3 Points)                [ ] Fracture of the hip, pelvis, or leg                       (5 Points)  [ ] Positive family history for VTE                         (3 Points)             [ ] Acute spinal cord injury (in the previous month)  (5 Points)  [ ] Prothrombin 85605 A                                     (3 Points)               [ ] Paralysis  (less than 1 month)                             (5 Points)  [ ] Factor V Leiden                                             (3 Points)                  [ ] Multiple Trauma within 1 month                        (5 Points)  [ ] Lupus anticoagulants                                     (3 Points)                                                           [ ] Anticardiolipin antibodies                               (3 Points)                                                       [ ] High homocysteine in the blood                      (3 Points)                                             [ ] Other congenital or acquired thrombophilia      (3 Points)                                                [ ] Heparin induced thrombocytopenia                  (3 Points)                                     Total Score [ 4         ]

## 2022-04-26 NOTE — H&P PST ADULT - PROBLEM SELECTOR PLAN 1
Right lower extremity femoral to below knee popliteal bypass with GSV Vein on 5/11/2022.   Chlorhexidine wash give Pre-Op  Pt was advised to stay on Plavix for surgery . Pt to see cardio on 5/2/2022 for pre-op evaluation

## 2022-04-26 NOTE — H&P PST ADULT - HISTORY OF PRESENT ILLNESS
64yr old female w/ hx of PVD, CAD, Arthritis, HTN, HLD, GERD, CKD, COVID (3/21) and iliac/femoral dx which she had iliac stenting and femoral endarterectomy. In 2019 she was found to have bacteremia and endocarditis and required a AVR and a CABG x1. She now presents to PST for a Femoral Femoral Bypass on 8/4/21. She c/o lower extremity pain while ambulating that has gotten progressively worse and is relieved by resting. Denies recent cough, fever, chills, chest pain or SOB and feels well otherwise. She received her first dose of Moderna. Cards seen on 7/19/21 for eval (to hold Plavix 5days prior to sx). COVID swab at [facility] on 8/1/2021.     **As per Dr Green she may stay on her plavix, patient was made aware. 66yr old female  of Arthritis, HTN, HLD, GERD, CKD, COVID (3/21)- Monoclonal antibody treatement , Restless leg syndrome, Bacterial endocarditis - 2019- s/p AVR, MVR, CABG X 1, PAD - s/p right iliac stent and right femoral endarterectomy years ago , s/p femoral-femoral bypass 2021, with c/o right leg pain -difficulty ambulating, claudication -angiogram 66yr old female  of Arthritis, HTN, HLD, GERD, CKD, COVID (3/21)- Monoclonal antibody treatement , Restless leg syndrome, Bacterial endocarditis - 2019- s/p AVR, MVR, CABG X 1, PAD - s/p right iliac stent and right femoral endarterectomy years ago , s/p femoral-femoral bypass 2021, with c/o right leg pain -difficulty ambulating, claudication -angiogram- revealed short segment popliteal occlusion . Now coming in for Right lower extremity femoral to below knee popliteal bypass with GSV Vein on 5/11/2022.     Denies Recent travel, Exposure or Covid symptoms  Covid test- 5/8/2022   66yr old female  of Arthritis, HTN, HLD, GERD, CKD, COVID (3/21)- Monoclonal antibody treatement , Restless leg syndrome, Bacterial endocarditis - 2019- s/p AVR, MVR, CABG X 1, PVD - s/p right iliac stent and right femoral endarterectomy years ago , s/p femoral-femoral bypass 2021, with c/o right leg pain -difficulty ambulating, claudication -angiogram- revealed short segment popliteal occlusion . Now coming in for Right lower extremity femoral to below knee popliteal bypass with GSV Vein on 5/11/2022.     Denies Recent travel, Exposure or Covid symptoms  Covid test- 5/8/2022

## 2022-04-26 NOTE — H&P PST ADULT - NSICDXPASTMEDICALHX_GEN_ALL_CORE_FT
PAST MEDICAL HISTORY:  2019 novel coronavirus disease (COVID-19) 3/2021    Arthritis Cervical Spine  and Hands    CAD (coronary artery disease)     CKD (chronic kidney disease)     Depression     GERD (gastroesophageal reflux disease)     History of endocarditis     Hyperlipidemia     Hypertension     Peripheral vascular disease     Restless leg syndrome Especially with General Anesthesia     PAST MEDICAL HISTORY:  2019 novel coronavirus disease (COVID-19) 3/2021- monoclonal antibody treatement    Arthritis Cervical Spine  and Hands    CAD (coronary artery disease)     CKD (chronic kidney disease)     Depression     GERD (gastroesophageal reflux disease)     History of endocarditis     Hyperlipidemia     Hypertension     Peripheral vascular disease     Restless leg syndrome Especially with General Anesthesia

## 2022-05-02 ENCOUNTER — APPOINTMENT (OUTPATIENT)
Dept: CARDIOLOGY | Facility: CLINIC | Age: 67
End: 2022-05-02
Payer: MEDICARE

## 2022-05-02 ENCOUNTER — NON-APPOINTMENT (OUTPATIENT)
Age: 67
End: 2022-05-02

## 2022-05-02 VITALS
BODY MASS INDEX: 7.18 KG/M2 | OXYGEN SATURATION: 97 % | HEIGHT: 61 IN | HEART RATE: 60 BPM | SYSTOLIC BLOOD PRESSURE: 151 MMHG | WEIGHT: 38 LBS | DIASTOLIC BLOOD PRESSURE: 74 MMHG

## 2022-05-02 PROCEDURE — 93000 ELECTROCARDIOGRAM COMPLETE: CPT

## 2022-05-02 PROCEDURE — 99214 OFFICE O/P EST MOD 30 MIN: CPT

## 2022-05-02 NOTE — HISTORY OF PRESENT ILLNESS
[FreeTextEntry1] : Stephanie Mcknight presented to the office today for a followup evaluation.  She was last seen in the office in November.\par \par She is now 66 years old, with a history of peripheral vascular disease, with asymptomatic carotid disease, and iliac/femoral disease for which she had iliac stenting and femoral endarterectomy.  She has a history of PVCs. She presented to the hospital in 2019 with fatigue, having previously presented to the hospital with a GI bleed. Unfortunately, she was found to have strep bacteremia and endocarditis, for which she required aortic valve replacement for severe AI.  Preop cath revealed moderate CAD, and she had single-vessel bypass to the LAD with her valve surgery.  \par \par In October, 2019, she reported worsening claudication. Followup imaging suggested multilevel disease, and I referred her for evaluation. There was a decision to pursue an exercise program, and reserve interventional therapy for failure of that.\par \par Last June she reported several symptoms,including unchanged claudication, palpitations and a need for cervical laminectomy and fusion. She went on to have an echocardiogram and a Holter monitor. The Holter revealed sustained atrial tachycardia. Echocardiogram was unremarkable, with normal function of her aortic valve. I allowed her to proceed with surgery, which was uneventful.\par \par At the time of her last visit in May, 2021 she described ongoing issues with claudication, which was severe and lifestyle limiting.  I referred her to surgery, and at the last visit she was planned for femoral femoral bypass.  She had a femoral femoral bypass in August, which was uncomplicated. She was in the hospital 4 days.    The claudication in her left leg completely resolved. \par \par She continues to feel well from a cardiovascular perspective.  She does not report symptoms of angina, heart failure or arrhythmia.  Her claudication of her left leg remains a non-issue but she has been dealing with claudication of her right leg, for which she is planned for femoral-popliteal bypass  5/11/2022.

## 2022-05-02 NOTE — DISCUSSION/SUMMARY
[FreeTextEntry1] : Stephanie has a history of peripheral vascular disease. She has moderate coronary artery disease, based on cardiac catheterization from 2019, now status post single-vessel bypass. She presented with aortic valve endocarditis, and is status post bioprosthetic aortic valve replacement, and mitral valve repair, along with her LIMA to the LAD.\par \par LLE claudication is now much improved after femoral femoral bypass.  She continues to suffer from right lower extremity claudication, for which she is now planned for femoral popliteal bypass on May 11.\par \par She is optimized from a cardiovascular perspective for her planned surgery.  She was told that she can continue clopidogrel without interruption, and her other medications should be continued as well.  Routine hemodynamic monitoring is recommended.  We will follow her while she is hospitalized.

## 2022-05-09 PROBLEM — U07.1 COVID-19: Chronic | Status: ACTIVE | Noted: 2021-07-21

## 2022-05-11 ENCOUNTER — APPOINTMENT (OUTPATIENT)
Dept: VASCULAR SURGERY | Facility: HOSPITAL | Age: 67
End: 2022-05-11

## 2022-05-12 NOTE — REVIEW OF SYSTEMS
I called pt's pharmacy and spoke to pharmacist. The co-pay cost of Lantus was too high for pt to afford. Pharmacist tool verbal order for pt to change to Levemir. We can complete the PA and see what happens.
[Negative] : Heme/Lymph

## 2022-05-17 ENCOUNTER — APPOINTMENT (OUTPATIENT)
Dept: VASCULAR SURGERY | Facility: CLINIC | Age: 67
End: 2022-05-17

## 2022-05-24 ENCOUNTER — TRANSCRIPTION ENCOUNTER (OUTPATIENT)
Age: 67
End: 2022-05-24

## 2022-05-24 LAB — SARS-COV-2 N GENE NPH QL NAA+PROBE: NOT DETECTED

## 2022-05-25 ENCOUNTER — INPATIENT (INPATIENT)
Facility: HOSPITAL | Age: 67
LOS: 4 days | Discharge: HOME HEALTH | DRG: 253 | End: 2022-05-30
Attending: SURGERY | Admitting: SURGERY
Payer: MEDICARE

## 2022-05-25 ENCOUNTER — APPOINTMENT (OUTPATIENT)
Dept: VASCULAR SURGERY | Facility: HOSPITAL | Age: 67
End: 2022-05-25
Payer: MEDICARE

## 2022-05-25 VITALS
SYSTOLIC BLOOD PRESSURE: 170 MMHG | RESPIRATION RATE: 18 BRPM | DIASTOLIC BLOOD PRESSURE: 92 MMHG | HEART RATE: 70 BPM | WEIGHT: 136.91 LBS | HEIGHT: 64 IN | TEMPERATURE: 98 F | OXYGEN SATURATION: 100 %

## 2022-05-25 DIAGNOSIS — Z95.2 PRESENCE OF PROSTHETIC HEART VALVE: Chronic | ICD-10-CM

## 2022-05-25 DIAGNOSIS — Z98.890 OTHER SPECIFIED POSTPROCEDURAL STATES: Chronic | ICD-10-CM

## 2022-05-25 DIAGNOSIS — I73.9 PERIPHERAL VASCULAR DISEASE, UNSPECIFIED: ICD-10-CM

## 2022-05-25 DIAGNOSIS — K27.1 ACUTE PEPTIC ULCER, SITE UNSPECIFIED, WITH PERFORATION: Chronic | ICD-10-CM

## 2022-05-25 DIAGNOSIS — M06.9 RHEUMATOID ARTHRITIS, UNSPECIFIED: Chronic | ICD-10-CM

## 2022-05-25 DIAGNOSIS — Z95.1 PRESENCE OF AORTOCORONARY BYPASS GRAFT: Chronic | ICD-10-CM

## 2022-05-25 DIAGNOSIS — Z95.828 PRESENCE OF OTHER VASCULAR IMPLANTS AND GRAFTS: Chronic | ICD-10-CM

## 2022-05-25 DIAGNOSIS — M19.011 PRIMARY OSTEOARTHRITIS, RIGHT SHOULDER: Chronic | ICD-10-CM

## 2022-05-25 DIAGNOSIS — Z90.49 ACQUIRED ABSENCE OF OTHER SPECIFIED PARTS OF DIGESTIVE TRACT: Chronic | ICD-10-CM

## 2022-05-25 DIAGNOSIS — Z98.51 TUBAL LIGATION STATUS: Chronic | ICD-10-CM

## 2022-05-25 LAB
ANION GAP SERPL CALC-SCNC: 12 MMOL/L — SIGNIFICANT CHANGE UP (ref 5–17)
ANION GAP SERPL CALC-SCNC: 15 MMOL/L — SIGNIFICANT CHANGE UP (ref 5–17)
BASOPHILS # BLD AUTO: 0.04 K/UL — SIGNIFICANT CHANGE UP (ref 0–0.2)
BASOPHILS NFR BLD AUTO: 0.4 % — SIGNIFICANT CHANGE UP (ref 0–2)
BLD GP AB SCN SERPL QL: NEGATIVE — SIGNIFICANT CHANGE UP
BUN SERPL-MCNC: 32 MG/DL — HIGH (ref 7–23)
BUN SERPL-MCNC: 33 MG/DL — HIGH (ref 7–23)
CALCIUM SERPL-MCNC: 10 MG/DL — SIGNIFICANT CHANGE UP (ref 8.4–10.5)
CALCIUM SERPL-MCNC: 7.6 MG/DL — LOW (ref 8.4–10.5)
CHLORIDE SERPL-SCNC: 107 MMOL/L — SIGNIFICANT CHANGE UP (ref 96–108)
CHLORIDE SERPL-SCNC: 110 MMOL/L — HIGH (ref 96–108)
CO2 SERPL-SCNC: 19 MMOL/L — LOW (ref 22–31)
CO2 SERPL-SCNC: 19 MMOL/L — LOW (ref 22–31)
CREAT SERPL-MCNC: 2.4 MG/DL — HIGH (ref 0.5–1.3)
CREAT SERPL-MCNC: 2.48 MG/DL — HIGH (ref 0.5–1.3)
EGFR: 21 ML/MIN/1.73M2 — LOW
EGFR: 22 ML/MIN/1.73M2 — LOW
EOSINOPHIL # BLD AUTO: 0.06 K/UL — SIGNIFICANT CHANGE UP (ref 0–0.5)
EOSINOPHIL NFR BLD AUTO: 0.5 % — SIGNIFICANT CHANGE UP (ref 0–6)
GAS PNL BLDA: SIGNIFICANT CHANGE UP
GAS PNL BLDA: SIGNIFICANT CHANGE UP
GLUCOSE SERPL-MCNC: 110 MG/DL — HIGH (ref 70–99)
GLUCOSE SERPL-MCNC: 92 MG/DL — SIGNIFICANT CHANGE UP (ref 70–99)
HCT VFR BLD CALC: 26.1 % — LOW (ref 34.5–45)
HCT VFR BLD CALC: 38.4 % — SIGNIFICANT CHANGE UP (ref 34.5–45)
HGB BLD-MCNC: 12.2 G/DL — SIGNIFICANT CHANGE UP (ref 11.5–15.5)
HGB BLD-MCNC: 8.3 G/DL — LOW (ref 11.5–15.5)
IMM GRANULOCYTES NFR BLD AUTO: 0.6 % — SIGNIFICANT CHANGE UP (ref 0–1.5)
LYMPHOCYTES # BLD AUTO: 0.75 K/UL — LOW (ref 1–3.3)
LYMPHOCYTES # BLD AUTO: 6.6 % — LOW (ref 13–44)
MCHC RBC-ENTMCNC: 30.5 PG — SIGNIFICANT CHANGE UP (ref 27–34)
MCHC RBC-ENTMCNC: 31.4 PG — SIGNIFICANT CHANGE UP (ref 27–34)
MCHC RBC-ENTMCNC: 31.8 GM/DL — LOW (ref 32–36)
MCHC RBC-ENTMCNC: 31.8 GM/DL — LOW (ref 32–36)
MCV RBC AUTO: 96 FL — SIGNIFICANT CHANGE UP (ref 80–100)
MCV RBC AUTO: 98.9 FL — SIGNIFICANT CHANGE UP (ref 80–100)
MONOCYTES # BLD AUTO: 0.23 K/UL — SIGNIFICANT CHANGE UP (ref 0–0.9)
MONOCYTES NFR BLD AUTO: 2 % — SIGNIFICANT CHANGE UP (ref 2–14)
NEUTROPHILS # BLD AUTO: 10.19 K/UL — HIGH (ref 1.8–7.4)
NEUTROPHILS NFR BLD AUTO: 89.9 % — HIGH (ref 43–77)
NRBC # BLD: 0 /100 WBCS — SIGNIFICANT CHANGE UP (ref 0–0)
NRBC # BLD: 0 /100 WBCS — SIGNIFICANT CHANGE UP (ref 0–0)
PLATELET # BLD AUTO: 256 K/UL — SIGNIFICANT CHANGE UP (ref 150–400)
PLATELET # BLD AUTO: 306 K/UL — SIGNIFICANT CHANGE UP (ref 150–400)
POTASSIUM BLDV-SCNC: 4.6 MMOL/L — SIGNIFICANT CHANGE UP (ref 3.5–5.1)
POTASSIUM SERPL-MCNC: 4.7 MMOL/L — SIGNIFICANT CHANGE UP (ref 3.5–5.3)
POTASSIUM SERPL-MCNC: 5.1 MMOL/L — SIGNIFICANT CHANGE UP (ref 3.5–5.3)
POTASSIUM SERPL-SCNC: 4.7 MMOL/L — SIGNIFICANT CHANGE UP (ref 3.5–5.3)
POTASSIUM SERPL-SCNC: 5.1 MMOL/L — SIGNIFICANT CHANGE UP (ref 3.5–5.3)
RBC # BLD: 2.64 M/UL — LOW (ref 3.8–5.2)
RBC # BLD: 4 M/UL — SIGNIFICANT CHANGE UP (ref 3.8–5.2)
RBC # FLD: 13.8 % — SIGNIFICANT CHANGE UP (ref 10.3–14.5)
RBC # FLD: 13.9 % — SIGNIFICANT CHANGE UP (ref 10.3–14.5)
RH IG SCN BLD-IMP: POSITIVE — SIGNIFICANT CHANGE UP
SODIUM SERPL-SCNC: 141 MMOL/L — SIGNIFICANT CHANGE UP (ref 135–145)
SODIUM SERPL-SCNC: 141 MMOL/L — SIGNIFICANT CHANGE UP (ref 135–145)
WBC # BLD: 11.34 K/UL — HIGH (ref 3.8–10.5)
WBC # BLD: 7.86 K/UL — SIGNIFICANT CHANGE UP (ref 3.8–10.5)
WBC # FLD AUTO: 11.34 K/UL — HIGH (ref 3.8–10.5)
WBC # FLD AUTO: 7.86 K/UL — SIGNIFICANT CHANGE UP (ref 3.8–10.5)

## 2022-05-25 PROCEDURE — 35556 ART BYP GRFT FEM-POPLITEAL: CPT | Mod: RT

## 2022-05-25 DEVICE — CLIP APPLIER COVIDIEN SURGICLIP III 9" SM: Type: IMPLANTABLE DEVICE | Site: RIGHT | Status: FUNCTIONAL

## 2022-05-25 DEVICE — KIT A-LINE 1LUM 20G X 12CM SAFE KIT: Type: IMPLANTABLE DEVICE | Site: RIGHT | Status: FUNCTIONAL

## 2022-05-25 DEVICE — ARISTA 3GR: Type: IMPLANTABLE DEVICE | Site: RIGHT | Status: FUNCTIONAL

## 2022-05-25 DEVICE — SURGIFOAM PAD 8CM X 12.5CM X 10MM (100): Type: IMPLANTABLE DEVICE | Site: RIGHT | Status: FUNCTIONAL

## 2022-05-25 DEVICE — CLIP APPLIER ETHICON LIGACLIP 9 3/8" MEDIUM: Type: IMPLANTABLE DEVICE | Site: RIGHT | Status: FUNCTIONAL

## 2022-05-25 RX ORDER — SODIUM CHLORIDE 9 MG/ML
1000 INJECTION INTRAMUSCULAR; INTRAVENOUS; SUBCUTANEOUS ONCE
Refills: 0 | Status: DISCONTINUED | OUTPATIENT
Start: 2022-05-25 | End: 2022-05-30

## 2022-05-25 RX ORDER — AMLODIPINE BESYLATE 2.5 MG/1
10 TABLET ORAL DAILY
Refills: 0 | Status: DISCONTINUED | OUTPATIENT
Start: 2022-05-25 | End: 2022-05-26

## 2022-05-25 RX ORDER — CHLORHEXIDINE GLUCONATE 213 G/1000ML
1 SOLUTION TOPICAL ONCE
Refills: 0 | Status: DISCONTINUED | OUTPATIENT
Start: 2022-05-25 | End: 2022-05-30

## 2022-05-25 RX ORDER — METOPROLOL TARTRATE 50 MG
75 TABLET ORAL DAILY
Refills: 0 | Status: DISCONTINUED | OUTPATIENT
Start: 2022-05-25 | End: 2022-05-26

## 2022-05-25 RX ORDER — SODIUM CHLORIDE 9 MG/ML
3 INJECTION INTRAMUSCULAR; INTRAVENOUS; SUBCUTANEOUS EVERY 8 HOURS
Refills: 0 | Status: DISCONTINUED | OUTPATIENT
Start: 2022-05-25 | End: 2022-05-25

## 2022-05-25 RX ORDER — ONDANSETRON 8 MG/1
4 TABLET, FILM COATED ORAL ONCE
Refills: 0 | Status: DISCONTINUED | OUTPATIENT
Start: 2022-05-25 | End: 2022-05-26

## 2022-05-25 RX ORDER — CEFAZOLIN SODIUM 1 G
2000 VIAL (EA) INJECTION ONCE
Refills: 0 | Status: DISCONTINUED | OUTPATIENT
Start: 2022-05-25 | End: 2022-05-30

## 2022-05-25 RX ORDER — LIDOCAINE HCL 20 MG/ML
0.2 VIAL (ML) INJECTION ONCE
Refills: 0 | Status: DISCONTINUED | OUTPATIENT
Start: 2022-05-25 | End: 2022-05-30

## 2022-05-25 RX ORDER — OXYCODONE HYDROCHLORIDE 5 MG/1
5 TABLET ORAL EVERY 6 HOURS
Refills: 0 | Status: DISCONTINUED | OUTPATIENT
Start: 2022-05-25 | End: 2022-05-30

## 2022-05-25 RX ORDER — ACETAMINOPHEN 500 MG
650 TABLET ORAL EVERY 6 HOURS
Refills: 0 | Status: DISCONTINUED | OUTPATIENT
Start: 2022-05-25 | End: 2022-05-30

## 2022-05-25 RX ORDER — GABAPENTIN 400 MG/1
300 CAPSULE ORAL AT BEDTIME
Refills: 0 | Status: DISCONTINUED | OUTPATIENT
Start: 2022-05-25 | End: 2022-05-30

## 2022-05-25 RX ORDER — CEFAZOLIN SODIUM 1 G
1000 VIAL (EA) INJECTION EVERY 8 HOURS
Refills: 0 | Status: COMPLETED | OUTPATIENT
Start: 2022-05-25 | End: 2022-05-26

## 2022-05-25 RX ORDER — BUPROPION HYDROCHLORIDE 150 MG/1
150 TABLET, EXTENDED RELEASE ORAL DAILY
Refills: 0 | Status: DISCONTINUED | OUTPATIENT
Start: 2022-05-25 | End: 2022-05-30

## 2022-05-25 RX ORDER — SIMVASTATIN 20 MG/1
10 TABLET, FILM COATED ORAL AT BEDTIME
Refills: 0 | Status: DISCONTINUED | OUTPATIENT
Start: 2022-05-25 | End: 2022-05-30

## 2022-05-25 RX ORDER — CLOPIDOGREL BISULFATE 75 MG/1
75 TABLET, FILM COATED ORAL DAILY
Refills: 0 | Status: DISCONTINUED | OUTPATIENT
Start: 2022-05-26 | End: 2022-05-27

## 2022-05-25 RX ORDER — PHENYLEPHRINE HYDROCHLORIDE 10 MG/ML
0.5 INJECTION INTRAVENOUS
Qty: 40 | Refills: 0 | Status: DISCONTINUED | OUTPATIENT
Start: 2022-05-25 | End: 2022-05-26

## 2022-05-25 RX ORDER — HEPARIN SODIUM 5000 [USP'U]/ML
5000 INJECTION INTRAVENOUS; SUBCUTANEOUS EVERY 8 HOURS
Refills: 0 | Status: DISCONTINUED | OUTPATIENT
Start: 2022-05-25 | End: 2022-05-27

## 2022-05-25 RX ORDER — HYDROMORPHONE HYDROCHLORIDE 2 MG/ML
0.5 INJECTION INTRAMUSCULAR; INTRAVENOUS; SUBCUTANEOUS
Refills: 0 | Status: DISCONTINUED | OUTPATIENT
Start: 2022-05-25 | End: 2022-05-26

## 2022-05-25 RX ADMIN — HEPARIN SODIUM 5000 UNIT(S): 5000 INJECTION INTRAVENOUS; SUBCUTANEOUS at 23:10

## 2022-05-25 RX ADMIN — Medication 100 MILLIGRAM(S): at 22:09

## 2022-05-25 RX ADMIN — HYDROMORPHONE HYDROCHLORIDE 0.5 MILLIGRAM(S): 2 INJECTION INTRAMUSCULAR; INTRAVENOUS; SUBCUTANEOUS at 21:30

## 2022-05-25 RX ADMIN — GABAPENTIN 300 MILLIGRAM(S): 400 CAPSULE ORAL at 23:10

## 2022-05-25 RX ADMIN — HYDROMORPHONE HYDROCHLORIDE 0.5 MILLIGRAM(S): 2 INJECTION INTRAMUSCULAR; INTRAVENOUS; SUBCUTANEOUS at 22:45

## 2022-05-25 NOTE — PRE-ANESTHESIA EVALUATION ADULT - NSANTHPMHFT_GEN_ALL_CORE
66yr old female  of Arthritis, HTN, HLD, GERD, CKD, COVID (3/21)- Monoclonal antibody treatement , Restless leg syndrome, Bacterial endocarditis - 2019- s/p AVR, MVR, CABG X 1, PVD - s/p right iliac stent and right femoral endarterectomy years ago , s/p femoral-femoral bypass 2021, with c/o right leg pain -difficulty ambulating, claudication -angiogram- revealed short segment popliteal occlusion . Now coming in for Right lower extremity femoral to below knee popliteal bypass with GSV Vein on 5/11/2022.     Cardiology clearance note appreciated.

## 2022-05-25 NOTE — PATIENT PROFILE ADULT - FALL HARM RISK - UNIVERSAL INTERVENTIONS
Bed in lowest position, wheels locked, appropriate side rails in place/Call bell, personal items and telephone in reach/Instruct patient to call for assistance before getting out of bed or chair/Non-slip footwear when patient is out of bed/[place] to call system/Physically safe environment - no spills, clutter or unnecessary equipment/Purposeful Proactive Rounding/Room/bathroom lighting operational, light cord in reach

## 2022-05-25 NOTE — BRIEF OPERATIVE NOTE - NSICDXBRIEFPROCEDURE_GEN_ALL_CORE_FT
PROCEDURES:  Creation of bypass from right femoral artery to popliteal artery using vein graft 25-May-2022 19:03:52  Jessa Lieberman

## 2022-05-25 NOTE — PRE-ANESTHESIA EVALUATION ADULT - NS MD HP INPLANTS MED DEV
- Goal blood pressure for most patients is less than 130/85. Both numbers are important. If you have questions about your specific goal blood pressure, please ask at your clinic visits.   - Check blood pressure outside of clinic, record the numbers, and bring the log to all your office visits.   - Take a daily, 81mg Aspirin, unless instructed to take a different dose for another medical purpose. Also do not take Aspirin if you have a history of adverse reaction to Aspirin.   - If you have any chest pain, SOB, or other concerning symptoms, report these immediately, or go to the nearest ER.    - Recommend cardiovascular exercise, at least 3 times per week, for at least 15 minutes.    
cervical spina hardware , pig valve/Artificial joint/Vascular stents/Clips/Heart valve

## 2022-05-25 NOTE — PRE-OP CHECKLIST - BLOOD AVAILABLE
abo sent/yes Gabapentin Counseling: I discussed with the patient the risks of gabapentin including but not limited to dizziness, somnolence, fatigue and ataxia.

## 2022-05-25 NOTE — CHART NOTE - NSCHARTNOTEFT_GEN_A_CORE
POST-OPERATIVE NOTE    Subjective:  Patient is s/p fem-pop bypass graft using vein graft. Post OR CBC 8.7, asymptomatic and vitals stable. Requiring 0.5 of phenylephrine to main MAP >65    Vital Signs Last 24 Hrs  T(C): 36.8 (25 May 2022 20:00), Max: 36.8 (25 May 2022 11:31)  T(F): 98.2 (25 May 2022 20:00), Max: 98.2 (25 May 2022 11:31)  HR: 64 (25 May 2022 22:00) (64 - 70)  BP: 99/53 (25 May 2022 21:45) (68/44 - 170/92)  BP(mean): 71 (25 May 2022 21:45) (52 - 76)  RR: 16 (25 May 2022 22:00) (16 - 18)  SpO2: 96% (25 May 2022 22:00) (95% - 100%)  I&O's Detail    25 May 2022 07:01  -  25 May 2022 23:07  --------------------------------------------------------  IN:    Phenylephrine: 11 mL  Total IN: 11 mL    OUT:    Bulb (mL): 10 mL    Bulb (mL): 10 mL    Indwelling Catheter - Urethral (mL): 15 mL  Total OUT: 35 mL    Total NET: -24 mL        ceFAZolin   IVPB 1000  ceFAZolin   IVPB 2000  amLODIPine   Tablet 10  ceFAZolin   IVPB 1000  ceFAZolin   IVPB 2000  heparin   Injectable 5000  metoprolol succinate ER 75  phenylephrine    Infusion 0.5    PAST MEDICAL & SURGICAL HISTORY:  Arthritis  Cervical Spine  and Hands      Restless leg syndrome  Especially with General Anesthesia      Hypertension      Hyperlipidemia      CAD (coronary artery disease)      CKD (chronic kidney disease)      Peripheral vascular disease      GERD (gastroesophageal reflux disease)      History of endocarditis      2019 novel coronavirus disease (COVID-19)  3/2021- monoclonal antibody treatement      Depression      Rheumatoid arthritis of carpometacarpal joint of thumb  Total Joint Replacement of Left Thumb      Osteoarthritis of right shoulder region  Right Shoulder Arthroscopy  2007      S/P tubal ligation  1986      S/P appendectomy  2014      Acute peptic ulcer with perforation  1980 - s/p surgery      H/O cervical discectomy  C1-T1 fusion 2020      S/P CABG x 1 2019      S/P AVR (aortic valve replacement)  x 1,2019      History of endarterectomy  iliac stenting and femoral endarterectomy      S/P MVR (mitral valve repair)  x 1 2019      S/P femoral-femoral bypass surgery  2021            Physical Exam:  General: NAD, resting comfortably in bed  Pulmonary: Nonlabored breathing, no respiratory distress  Cardiovascular: NSR  Abdominal: soft, NT/ND  Extremities: WWP. aquacel dressing c/d/i. TITI drains with serosanguinous output.       LABS:                        8.3    11.34 )-----------( 256      ( 25 May 2022 19:33 )             26.1     05-25    141  |  110<H>  |  32<H>  ----------------------------<  110<H>  5.1   |  19<L>  |  2.40<H>    Ca    7.6<L>      25 May 2022 19:33        CAPILLARY BLOOD GLUCOSE          Radiology and Additional Studies:    Assessment:  The patient is a 66y Female who is now several hours post-op from a fem pop bypass. Recovering appropriately    Plan:  - Pain control as needed  - DVT ppx  - OOB and ambulating as tolerated  - F/u AM labs- transfuse as needed  - MAP goal >65 POST-OPERATIVE NOTE    Subjective:  Patient is s/p R fem-pop bypass graft using vein graft. Post OR CBC 8.7, asymptomatic and vitals stable. Requiring 0.5 of phenylephrine to main MAP >65    Vital Signs Last 24 Hrs  T(C): 36.8 (25 May 2022 20:00), Max: 36.8 (25 May 2022 11:31)  T(F): 98.2 (25 May 2022 20:00), Max: 98.2 (25 May 2022 11:31)  HR: 64 (25 May 2022 22:00) (64 - 70)  BP: 99/53 (25 May 2022 21:45) (68/44 - 170/92)  BP(mean): 71 (25 May 2022 21:45) (52 - 76)  RR: 16 (25 May 2022 22:00) (16 - 18)  SpO2: 96% (25 May 2022 22:00) (95% - 100%)  I&O's Detail    25 May 2022 07:01  -  25 May 2022 23:07  --------------------------------------------------------  IN:    Phenylephrine: 11 mL  Total IN: 11 mL    OUT:    Bulb (mL): 10 mL    Bulb (mL): 10 mL    Indwelling Catheter - Urethral (mL): 15 mL  Total OUT: 35 mL    Total NET: -24 mL        ceFAZolin   IVPB 1000  ceFAZolin   IVPB 2000  amLODIPine   Tablet 10  ceFAZolin   IVPB 1000  ceFAZolin   IVPB 2000  heparin   Injectable 5000  metoprolol succinate ER 75  phenylephrine    Infusion 0.5    PAST MEDICAL & SURGICAL HISTORY:  Arthritis  Cervical Spine  and Hands      Restless leg syndrome  Especially with General Anesthesia      Hypertension      Hyperlipidemia      CAD (coronary artery disease)      CKD (chronic kidney disease)      Peripheral vascular disease      GERD (gastroesophageal reflux disease)      History of endocarditis      2019 novel coronavirus disease (COVID-19)  3/2021- monoclonal antibody treatement      Depression      Rheumatoid arthritis of carpometacarpal joint of thumb  Total Joint Replacement of Left Thumb      Osteoarthritis of right shoulder region  Right Shoulder Arthroscopy  2007      S/P tubal ligation  1986      S/P appendectomy  2014      Acute peptic ulcer with perforation  1980 - s/p surgery      H/O cervical discectomy  C1-T1 fusion 2020      S/P CABG x 1 2019      S/P AVR (aortic valve replacement)  x 1,2019      History of endarterectomy  iliac stenting and femoral endarterectomy      S/P MVR (mitral valve repair)  x 1 2019      S/P femoral-femoral bypass surgery  2021            Physical Exam:  General: NAD, resting comfortably in bed  Pulmonary: Nonlabored breathing, no respiratory distress  Cardiovascular: NSR  Abdominal: soft, NT/ND  Extremities: WWP. RLE aquacel dressing c/d/i. TITI drains with serosanguinous output.       LABS:                        8.3    11.34 )-----------( 256      ( 25 May 2022 19:33 )             26.1     05-25    141  |  110<H>  |  32<H>  ----------------------------<  110<H>  5.1   |  19<L>  |  2.40<H>    Ca    7.6<L>      25 May 2022 19:33        CAPILLARY BLOOD GLUCOSE          Radiology and Additional Studies:    Assessment:  The patient is a 66y Female who is now several hours post-op from a R fem pop bypass. Recovering appropriately    Plan:  - Pain control as needed  - DVT ppx  - OOB and ambulating as tolerated  - F/u AM labs- transfuse as needed  - MAP goal >65 POST-OPERATIVE NOTE    Subjective:  Patient is s/p R fem-pop bypass graft using vein graft. Post OR CBC 8.7, asymptomatic and vitals stable. Requiring 0.5 of phenylephrine to maintain MAP >65    Vital Signs Last 24 Hrs  T(C): 36.8 (25 May 2022 20:00), Max: 36.8 (25 May 2022 11:31)  T(F): 98.2 (25 May 2022 20:00), Max: 98.2 (25 May 2022 11:31)  HR: 64 (25 May 2022 22:00) (64 - 70)  BP: 99/53 (25 May 2022 21:45) (68/44 - 170/92)  BP(mean): 71 (25 May 2022 21:45) (52 - 76)  RR: 16 (25 May 2022 22:00) (16 - 18)  SpO2: 96% (25 May 2022 22:00) (95% - 100%)  I&O's Detail    25 May 2022 07:01  -  25 May 2022 23:07  --------------------------------------------------------  IN:    Phenylephrine: 11 mL  Total IN: 11 mL    OUT:    Bulb (mL): 10 mL    Bulb (mL): 10 mL    Indwelling Catheter - Urethral (mL): 15 mL  Total OUT: 35 mL    Total NET: -24 mL        ceFAZolin   IVPB 1000  ceFAZolin   IVPB 2000  amLODIPine   Tablet 10  ceFAZolin   IVPB 1000  ceFAZolin   IVPB 2000  heparin   Injectable 5000  metoprolol succinate ER 75  phenylephrine    Infusion 0.5    PAST MEDICAL & SURGICAL HISTORY:  Arthritis  Cervical Spine  and Hands      Restless leg syndrome  Especially with General Anesthesia      Hypertension      Hyperlipidemia      CAD (coronary artery disease)      CKD (chronic kidney disease)      Peripheral vascular disease      GERD (gastroesophageal reflux disease)      History of endocarditis      2019 novel coronavirus disease (COVID-19)  3/2021- monoclonal antibody treatement      Depression      Rheumatoid arthritis of carpometacarpal joint of thumb  Total Joint Replacement of Left Thumb      Osteoarthritis of right shoulder region  Right Shoulder Arthroscopy  2007      S/P tubal ligation  1986      S/P appendectomy  2014      Acute peptic ulcer with perforation  1980 - s/p surgery      H/O cervical discectomy  C1-T1 fusion 2020      S/P CABG x 1 2019      S/P AVR (aortic valve replacement)  x 1,2019      History of endarterectomy  iliac stenting and femoral endarterectomy      S/P MVR (mitral valve repair)  x 1 2019      S/P femoral-femoral bypass surgery  2021            Physical Exam:  General: NAD, resting comfortably in bed  Pulmonary: Nonlabored breathing, no respiratory distress  Cardiovascular: NSR  Abdominal: soft, NT/ND  Extremities: WWP. RLE aquacel dressing c/d/i. TITI drains with serosanguinous output.       LABS:                        8.3    11.34 )-----------( 256      ( 25 May 2022 19:33 )             26.1     05-25    141  |  110<H>  |  32<H>  ----------------------------<  110<H>  5.1   |  19<L>  |  2.40<H>    Ca    7.6<L>      25 May 2022 19:33        CAPILLARY BLOOD GLUCOSE          Radiology and Additional Studies:    Assessment:  The patient is a 66y Female who is now several hours post-op from a R fem pop bypass. Recovering appropriately    Plan:  - Pain control as needed  - DVT ppx  - OOB and ambulating as tolerated  - F/u AM labs- transfuse as needed  - MAP goal >65 POST-OPERATIVE NOTE    Subjective:  Patient is s/p R fem-pop bypass graft using vein graft. Post OR CBC 8.3, asymptomatic and vitals stable. Requiring 0.5 of phenylephrine to maintain MAP >65    Vital Signs Last 24 Hrs  T(C): 36.8 (25 May 2022 20:00), Max: 36.8 (25 May 2022 11:31)  T(F): 98.2 (25 May 2022 20:00), Max: 98.2 (25 May 2022 11:31)  HR: 64 (25 May 2022 22:00) (64 - 70)  BP: 99/53 (25 May 2022 21:45) (68/44 - 170/92)  BP(mean): 71 (25 May 2022 21:45) (52 - 76)  RR: 16 (25 May 2022 22:00) (16 - 18)  SpO2: 96% (25 May 2022 22:00) (95% - 100%)  I&O's Detail    25 May 2022 07:01  -  25 May 2022 23:07  --------------------------------------------------------  IN:    Phenylephrine: 11 mL  Total IN: 11 mL    OUT:    Bulb (mL): 10 mL    Bulb (mL): 10 mL    Indwelling Catheter - Urethral (mL): 15 mL  Total OUT: 35 mL    Total NET: -24 mL        ceFAZolin   IVPB 1000  ceFAZolin   IVPB 2000  amLODIPine   Tablet 10  ceFAZolin   IVPB 1000  ceFAZolin   IVPB 2000  heparin   Injectable 5000  metoprolol succinate ER 75  phenylephrine    Infusion 0.5    PAST MEDICAL & SURGICAL HISTORY:  Arthritis  Cervical Spine  and Hands      Restless leg syndrome  Especially with General Anesthesia      Hypertension      Hyperlipidemia      CAD (coronary artery disease)      CKD (chronic kidney disease)      Peripheral vascular disease      GERD (gastroesophageal reflux disease)      History of endocarditis      2019 novel coronavirus disease (COVID-19)  3/2021- monoclonal antibody treatement      Depression      Rheumatoid arthritis of carpometacarpal joint of thumb  Total Joint Replacement of Left Thumb      Osteoarthritis of right shoulder region  Right Shoulder Arthroscopy  2007      S/P tubal ligation  1986      S/P appendectomy  2014      Acute peptic ulcer with perforation  1980 - s/p surgery      H/O cervical discectomy  C1-T1 fusion 2020      S/P CABG x 1 2019      S/P AVR (aortic valve replacement)  x 1,2019      History of endarterectomy  iliac stenting and femoral endarterectomy      S/P MVR (mitral valve repair)  x 1 2019      S/P femoral-femoral bypass surgery  2021            Physical Exam:  General: NAD, resting comfortably in bed  Pulmonary: Nonlabored breathing, no respiratory distress  Cardiovascular: NSR  Abdominal: soft, NT/ND  Extremities: WWP. RLE aquacel dressing c/d/i. TITI drains with serosanguinous output.       LABS:                        8.3    11.34 )-----------( 256      ( 25 May 2022 19:33 )             26.1     05-25    141  |  110<H>  |  32<H>  ----------------------------<  110<H>  5.1   |  19<L>  |  2.40<H>    Ca    7.6<L>      25 May 2022 19:33        CAPILLARY BLOOD GLUCOSE          Radiology and Additional Studies:    Assessment:  The patient is a 66y Female who is now several hours post-op from a R fem pop bypass. Recovering appropriately    Plan:  - Pain control as needed  - DVT ppx  - OOB and ambulating as tolerated  - F/u AM labs- transfuse as needed  - MAP goal >65

## 2022-05-26 ENCOUNTER — APPOINTMENT (OUTPATIENT)
Dept: VASCULAR SURGERY | Facility: CLINIC | Age: 67
End: 2022-05-26

## 2022-05-26 LAB
ALBUMIN SERPL ELPH-MCNC: 3 G/DL — LOW (ref 3.3–5)
ALP SERPL-CCNC: 71 U/L — SIGNIFICANT CHANGE UP (ref 40–120)
ALT FLD-CCNC: 7 U/L — LOW (ref 10–45)
ANION GAP SERPL CALC-SCNC: 14 MMOL/L — SIGNIFICANT CHANGE UP (ref 5–17)
ANION GAP SERPL CALC-SCNC: 14 MMOL/L — SIGNIFICANT CHANGE UP (ref 5–17)
AST SERPL-CCNC: 18 U/L — SIGNIFICANT CHANGE UP (ref 10–40)
BASOPHILS # BLD AUTO: 0.02 K/UL — SIGNIFICANT CHANGE UP (ref 0–0.2)
BASOPHILS NFR BLD AUTO: 0.2 % — SIGNIFICANT CHANGE UP (ref 0–2)
BILIRUB SERPL-MCNC: 0.1 MG/DL — LOW (ref 0.2–1.2)
BUN SERPL-MCNC: 36 MG/DL — HIGH (ref 7–23)
BUN SERPL-MCNC: 36 MG/DL — HIGH (ref 7–23)
CALCIUM SERPL-MCNC: 7.3 MG/DL — LOW (ref 8.4–10.5)
CALCIUM SERPL-MCNC: 7.6 MG/DL — LOW (ref 8.4–10.5)
CHLORIDE SERPL-SCNC: 107 MMOL/L — SIGNIFICANT CHANGE UP (ref 96–108)
CHLORIDE SERPL-SCNC: 107 MMOL/L — SIGNIFICANT CHANGE UP (ref 96–108)
CO2 SERPL-SCNC: 18 MMOL/L — LOW (ref 22–31)
CO2 SERPL-SCNC: 18 MMOL/L — LOW (ref 22–31)
CREAT SERPL-MCNC: 2.55 MG/DL — HIGH (ref 0.5–1.3)
CREAT SERPL-MCNC: 2.63 MG/DL — HIGH (ref 0.5–1.3)
EGFR: 19 ML/MIN/1.73M2 — LOW
EGFR: 20 ML/MIN/1.73M2 — LOW
EOSINOPHIL # BLD AUTO: 0 K/UL — SIGNIFICANT CHANGE UP (ref 0–0.5)
EOSINOPHIL NFR BLD AUTO: 0 % — SIGNIFICANT CHANGE UP (ref 0–6)
GLUCOSE SERPL-MCNC: 144 MG/DL — HIGH (ref 70–99)
GLUCOSE SERPL-MCNC: 183 MG/DL — HIGH (ref 70–99)
HCT VFR BLD CALC: 25.2 % — LOW (ref 34.5–45)
HCT VFR BLD CALC: 25.9 % — LOW (ref 34.5–45)
HCT VFR BLD CALC: 25.9 % — LOW (ref 34.5–45)
HGB BLD-MCNC: 7.9 G/DL — LOW (ref 11.5–15.5)
HGB BLD-MCNC: 8 G/DL — LOW (ref 11.5–15.5)
HGB BLD-MCNC: 8.2 G/DL — LOW (ref 11.5–15.5)
IMM GRANULOCYTES NFR BLD AUTO: 0.4 % — SIGNIFICANT CHANGE UP (ref 0–1.5)
LYMPHOCYTES # BLD AUTO: 0.63 K/UL — LOW (ref 1–3.3)
LYMPHOCYTES # BLD AUTO: 5.2 % — LOW (ref 13–44)
MAGNESIUM SERPL-MCNC: 1.9 MG/DL — SIGNIFICANT CHANGE UP (ref 1.6–2.6)
MAGNESIUM SERPL-MCNC: 1.9 MG/DL — SIGNIFICANT CHANGE UP (ref 1.6–2.6)
MCHC RBC-ENTMCNC: 30.1 PG — SIGNIFICANT CHANGE UP (ref 27–34)
MCHC RBC-ENTMCNC: 30.7 PG — SIGNIFICANT CHANGE UP (ref 27–34)
MCHC RBC-ENTMCNC: 30.9 GM/DL — LOW (ref 32–36)
MCHC RBC-ENTMCNC: 31 PG — SIGNIFICANT CHANGE UP (ref 27–34)
MCHC RBC-ENTMCNC: 31.3 GM/DL — LOW (ref 32–36)
MCHC RBC-ENTMCNC: 31.7 GM/DL — LOW (ref 32–36)
MCV RBC AUTO: 97 FL — SIGNIFICANT CHANGE UP (ref 80–100)
MCV RBC AUTO: 97.4 FL — SIGNIFICANT CHANGE UP (ref 80–100)
MCV RBC AUTO: 98.8 FL — SIGNIFICANT CHANGE UP (ref 80–100)
MONOCYTES # BLD AUTO: 0.63 K/UL — SIGNIFICANT CHANGE UP (ref 0–0.9)
MONOCYTES NFR BLD AUTO: 5.2 % — SIGNIFICANT CHANGE UP (ref 2–14)
NEUTROPHILS # BLD AUTO: 10.85 K/UL — HIGH (ref 1.8–7.4)
NEUTROPHILS NFR BLD AUTO: 89 % — HIGH (ref 43–77)
NRBC # BLD: 0 /100 WBCS — SIGNIFICANT CHANGE UP (ref 0–0)
PHOSPHATE SERPL-MCNC: 3.2 MG/DL — SIGNIFICANT CHANGE UP (ref 2.5–4.5)
PHOSPHATE SERPL-MCNC: 4.8 MG/DL — HIGH (ref 2.5–4.5)
PLATELET # BLD AUTO: 186 K/UL — SIGNIFICANT CHANGE UP (ref 150–400)
PLATELET # BLD AUTO: 191 K/UL — SIGNIFICANT CHANGE UP (ref 150–400)
PLATELET # BLD AUTO: 254 K/UL — SIGNIFICANT CHANGE UP (ref 150–400)
POTASSIUM SERPL-MCNC: 4.5 MMOL/L — SIGNIFICANT CHANGE UP (ref 3.5–5.3)
POTASSIUM SERPL-MCNC: 4.8 MMOL/L — SIGNIFICANT CHANGE UP (ref 3.5–5.3)
POTASSIUM SERPL-SCNC: 4.5 MMOL/L — SIGNIFICANT CHANGE UP (ref 3.5–5.3)
POTASSIUM SERPL-SCNC: 4.8 MMOL/L — SIGNIFICANT CHANGE UP (ref 3.5–5.3)
PROT SERPL-MCNC: 4.8 G/DL — LOW (ref 6–8.3)
RBC # BLD: 2.55 M/UL — LOW (ref 3.8–5.2)
RBC # BLD: 2.66 M/UL — LOW (ref 3.8–5.2)
RBC # BLD: 2.67 M/UL — LOW (ref 3.8–5.2)
RBC # FLD: 14 % — SIGNIFICANT CHANGE UP (ref 10.3–14.5)
RBC # FLD: 15.2 % — HIGH (ref 10.3–14.5)
RBC # FLD: 15.8 % — HIGH (ref 10.3–14.5)
SODIUM SERPL-SCNC: 139 MMOL/L — SIGNIFICANT CHANGE UP (ref 135–145)
SODIUM SERPL-SCNC: 139 MMOL/L — SIGNIFICANT CHANGE UP (ref 135–145)
TROPONIN T, HIGH SENSITIVITY RESULT: 14 NG/L — SIGNIFICANT CHANGE UP (ref 0–51)
WBC # BLD: 12.18 K/UL — HIGH (ref 3.8–10.5)
WBC # BLD: 7.87 K/UL — SIGNIFICANT CHANGE UP (ref 3.8–10.5)
WBC # BLD: 8.61 K/UL — SIGNIFICANT CHANGE UP (ref 3.8–10.5)
WBC # FLD AUTO: 12.18 K/UL — HIGH (ref 3.8–10.5)
WBC # FLD AUTO: 7.87 K/UL — SIGNIFICANT CHANGE UP (ref 3.8–10.5)
WBC # FLD AUTO: 8.61 K/UL — SIGNIFICANT CHANGE UP (ref 3.8–10.5)

## 2022-05-26 PROCEDURE — 99223 1ST HOSP IP/OBS HIGH 75: CPT

## 2022-05-26 PROCEDURE — 93010 ELECTROCARDIOGRAM REPORT: CPT

## 2022-05-26 RX ORDER — OXYCODONE HYDROCHLORIDE 5 MG/1
5 TABLET ORAL ONCE
Refills: 0 | Status: DISCONTINUED | OUTPATIENT
Start: 2022-05-26 | End: 2022-05-26

## 2022-05-26 RX ADMIN — OXYCODONE HYDROCHLORIDE 5 MILLIGRAM(S): 5 TABLET ORAL at 08:00

## 2022-05-26 RX ADMIN — Medication 100 MILLIGRAM(S): at 06:11

## 2022-05-26 RX ADMIN — CLOPIDOGREL BISULFATE 75 MILLIGRAM(S): 75 TABLET, FILM COATED ORAL at 11:33

## 2022-05-26 RX ADMIN — OXYCODONE HYDROCHLORIDE 5 MILLIGRAM(S): 5 TABLET ORAL at 01:20

## 2022-05-26 RX ADMIN — OXYCODONE HYDROCHLORIDE 5 MILLIGRAM(S): 5 TABLET ORAL at 20:26

## 2022-05-26 RX ADMIN — SIMVASTATIN 10 MILLIGRAM(S): 20 TABLET, FILM COATED ORAL at 22:46

## 2022-05-26 RX ADMIN — BUPROPION HYDROCHLORIDE 150 MILLIGRAM(S): 150 TABLET, EXTENDED RELEASE ORAL at 15:15

## 2022-05-26 RX ADMIN — SIMVASTATIN 10 MILLIGRAM(S): 20 TABLET, FILM COATED ORAL at 02:56

## 2022-05-26 RX ADMIN — Medication 650 MILLIGRAM(S): at 20:26

## 2022-05-26 RX ADMIN — HEPARIN SODIUM 5000 UNIT(S): 5000 INJECTION INTRAVENOUS; SUBCUTANEOUS at 06:00

## 2022-05-26 RX ADMIN — OXYCODONE HYDROCHLORIDE 5 MILLIGRAM(S): 5 TABLET ORAL at 12:30

## 2022-05-26 RX ADMIN — Medication 650 MILLIGRAM(S): at 20:56

## 2022-05-26 RX ADMIN — HEPARIN SODIUM 5000 UNIT(S): 5000 INJECTION INTRAVENOUS; SUBCUTANEOUS at 15:14

## 2022-05-26 RX ADMIN — GABAPENTIN 300 MILLIGRAM(S): 400 CAPSULE ORAL at 22:46

## 2022-05-26 RX ADMIN — HEPARIN SODIUM 5000 UNIT(S): 5000 INJECTION INTRAVENOUS; SUBCUTANEOUS at 22:46

## 2022-05-26 RX ADMIN — Medication 650 MILLIGRAM(S): at 09:20

## 2022-05-26 RX ADMIN — OXYCODONE HYDROCHLORIDE 5 MILLIGRAM(S): 5 TABLET ORAL at 07:29

## 2022-05-26 RX ADMIN — OXYCODONE HYDROCHLORIDE 5 MILLIGRAM(S): 5 TABLET ORAL at 20:56

## 2022-05-26 RX ADMIN — OXYCODONE HYDROCHLORIDE 5 MILLIGRAM(S): 5 TABLET ORAL at 01:50

## 2022-05-26 RX ADMIN — OXYCODONE HYDROCHLORIDE 5 MILLIGRAM(S): 5 TABLET ORAL at 11:34

## 2022-05-26 NOTE — CONSULT NOTE ADULT - SUBJECTIVE AND OBJECTIVE BOX
CHIEF COMPLAINT: Patient is a 66y old  Female who presents with a chief complaint of     HPI: 66yr old female  of Arthritis, HTN, HLD, GERD, CKD, COVID (3/21)- Monoclonal antibody treatement , Restless leg syndrome, Bacterial endocarditis - 2019- s/p AVR, MVR, CABG X 1, PVD - s/p right iliac stent and right femoral endarterectomy years ago , s/p femoral-femoral bypass 2021, with c/o right leg pain -difficulty ambulating, claudication -angiogram- revealed short segment popliteal occlusion .     She is now s/p right  lower extremity femoral to below knee popliteal bypass with GSV Vein. Denies any chest pain, dyspnea, palpitations, PND, orthopnea, near syncope, syncope, lower extremity edema, stroke like symptoms. She needs PRBC tx today. has pain at surgical site.            REVIEW OF SYSTEMS:   All other review of systems are negative unless indicated above    PAST MEDICAL & SURGICAL HISTORY:  Arthritis  Cervical Spine  and Hands      Restless leg syndrome  Especially with General Anesthesia      Hypertension      Hyperlipidemia      CAD (coronary artery disease)      CKD (chronic kidney disease)      Peripheral vascular disease      GERD (gastroesophageal reflux disease)      History of endocarditis      2019 novel coronavirus disease (COVID-19)  3/2021- monoclonal antibody treatement      Depression      Rheumatoid arthritis of carpometacarpal joint of thumb  Total Joint Replacement of Left Thumb      Osteoarthritis of right shoulder region  Right Shoulder Arthroscopy  2007      S/P tubal ligation  1986      S/P appendectomy  2014      Acute peptic ulcer with perforation  1980 - s/p surgery      H/O cervical discectomy  C1-T1 fusion 2020      S/P CABG x 1 2019      S/P AVR (aortic valve replacement)  x 1,2019      History of endarterectomy  iliac stenting and femoral endarterectomy      S/P MVR (mitral valve repair)  x 1 2019      S/P femoral-femoral bypass surgery  2021          SOCIAL HISTORY:  No tobacco, ethanol, or drug abuse.    FAMILY HISTORY:  Family history of arthritis (Father)    No family history of alcoholism (Mother)    Family history of hypothyroidism (Sibling)    Family hx of aortic aneurysm (Sibling)    Family history of atrial fibrillation (Sibling)    Family history of parkinsonism (Sibling)    Family hx of hypertension (Sibling)      No family history of acute MI or sudden cardiac death.    MEDICATIONS  (STANDING):  buPROPion XL (24-Hour) . 150 milliGRAM(s) Oral daily  ceFAZolin   IVPB 2000 milliGRAM(s) IV Intermittent once  chlorhexidine 2% Cloths 1 Application(s) Topical once  clopidogrel Tablet 75 milliGRAM(s) Oral daily  gabapentin 300 milliGRAM(s) Oral at bedtime  heparin   Injectable 5000 Unit(s) SubCutaneous every 8 hours  lidocaine 1% Injectable 0.2 milliLiter(s) Local Injection once  phenylephrine    Infusion 0.5 MICROgram(s)/kG/Min (11.6 mL/Hr) IV Continuous <Continuous>  simvastatin 10 milliGRAM(s) Oral at bedtime  sodium chloride 0.9% Bolus 1000 milliLiter(s) IV Bolus once    MEDICATIONS  (PRN):  acetaminophen     Tablet .. 650 milliGRAM(s) Oral every 6 hours PRN Temp greater or equal to 38C (100.4F), Mild Pain (1 - 3), Moderate Pain (4 - 6), Severe Pain (7 - 10)  HYDROmorphone  Injectable 0.5 milliGRAM(s) IV Push every 10 minutes PRN Moderate Pain (4 - 6)  ondansetron Injectable 4 milliGRAM(s) IV Push once PRN Nausea and/or Vomiting  oxyCODONE    IR 5 milliGRAM(s) Oral every 6 hours PRN Moderate Pain (4 - 6)      Allergies    No Known Allergies    Intolerances        Home meds:  Home Medications:  Align 4 mg oral capsule: 1 cap(s) orally once a day (25 May 2022 11:49)  buPROPion: 150 milligram(s) orally once a day (25 May 2022 11:49)  Caltrate 600 + D oral tablet: 1 tab(s) orally once a day (25 May 2022 11:49)  gabapentin 300 mg oral capsule: 1 cap(s) orally once a day (at bedtime) (25 May 2022 11:49)  Mirapex 0.25 mg oral tablet: 2 tab(s) orally once a day (at bedtime) (25 May 2022 11:49)        VITAL SIGNS:   Vital Signs Last 24 Hrs  T(C): 36.5 (26 May 2022 08:00), Max: 37 (26 May 2022 00:00)  T(F): 97.7 (26 May 2022 08:00), Max: 98.6 (26 May 2022 00:00)  HR: 72 (26 May 2022 08:00) (62 - 74)  BP: 100/52 (26 May 2022 08:00) (68/44 - 170/92)  BP(mean): 71 (26 May 2022 08:00) (52 - 86)  RR: 16 (26 May 2022 08:00) (14 - 18)  SpO2: 95% (26 May 2022 08:00) (93% - 100%)    I&O's Summary    25 May 2022 07:01  -  26 May 2022 07:00  --------------------------------------------------------  IN: 827 mL / OUT: 575 mL / NET: 252 mL    26 May 2022 07:01  -  26 May 2022 09:22  --------------------------------------------------------  IN: 120 mL / OUT: 35 mL / NET: 85 mL        On Exam:  TELE: SR  Constitutional: NAD, awake and alert, well-developed  HEENT: Moist Mucous Membranes, Anicteric  Pulmonary: Decreased breath sounds b/l. No rales, crackles or wheeze appreciated.   Cardiovascular: Regular, S1 and S2, 3/6 SM  Gastrointestinal: Bowel Sounds present, soft, nontender.   Lymph: + peripheral edema. No lymphadenopathy.  Skin: No visible rashes or ulcers. drain and dressing in right leg   Psych:  Mood & affect appropriate    LABS: All Labs Reviewed:                        8.2    8.61  )-----------( 186      ( 26 May 2022 09:00 )             25.9                         7.9    12.18 )-----------( 254      ( 26 May 2022 02:48 )             25.2                         8.3    11.34 )-----------( 256      ( 25 May 2022 19:33 )             26.1     26 May 2022 02:48    139    |  107    |  36     ----------------------------<  183    4.8     |  18     |  2.55   25 May 2022 19:33    141    |  110    |  32     ----------------------------<  110    5.1     |  19     |  2.40   25 May 2022 11:53    141    |  107    |  33     ----------------------------<  92     4.7     |  19     |  2.48     Ca    7.6        26 May 2022 02:48  Ca    7.6        25 May 2022 19:33  Ca    10.0       25 May 2022 11:53  Phos  4.8       26 May 2022 02:48  Mg     1.9       26 May 2022 02:48    TPro  4.8    /  Alb  3.0    /  TBili  0.1    /  DBili  x      /  AST  18     /  ALT  7      /  AlkPhos  71     26 May 2022 02:48          Blood Culture:         RADIOLOGY:

## 2022-05-26 NOTE — CONSULT NOTE ADULT - NS ATTEND AMEND GEN_ALL_CORE FT
pt seen and examined. as per above. post op hypotension off pressors. post op anemia sp 1 unit prbc transfusion. consider additional PRBC transfusion if BP remains low.

## 2022-05-26 NOTE — CONSULT NOTE ADULT - SUBJECTIVE AND OBJECTIVE BOX
Patient is a 66y old  Female who presents with a chief complaint of     HPI:      PAST MEDICAL & SURGICAL HISTORY:  Arthritis  Cervical Spine  and Hands      Restless leg syndrome  Especially with General Anesthesia      Hypertension      Hyperlipidemia      CAD (coronary artery disease)      CKD (chronic kidney disease)      Peripheral vascular disease      GERD (gastroesophageal reflux disease)      History of endocarditis      2019 novel coronavirus disease (COVID-19)  3/2021- monoclonal antibody treatement      Depression      Rheumatoid arthritis of carpometacarpal joint of thumb  Total Joint Replacement of Left Thumb      Osteoarthritis of right shoulder region  Right Shoulder Arthroscopy  2007      S/P tubal ligation  1986      S/P appendectomy  2014      Acute peptic ulcer with perforation  1980 - s/p surgery      H/O cervical discectomy  C1-T1 fusion 2020      S/P CABG x 1  2019      S/P AVR (aortic valve replacement)  x 1,2019      History of endarterectomy  iliac stenting and femoral endarterectomy      S/P MVR (mitral valve repair)  x 1 2019      S/P femoral-femoral bypass surgery  2021          FAMILY HISTORY:  Family history of arthritis (Father)    No family history of alcoholism (Mother)    Family history of hypothyroidism (Sibling)    Family hx of aortic aneurysm (Sibling)    Family history of atrial fibrillation (Sibling)    Family history of parkinsonism (Sibling)    Family hx of hypertension (Sibling)        SOCIAL HISTORY:    Allergies    No Known Allergies    Intolerances          REVIEW OF SYSTEMS:  General: no weakness, no fever/chills, no weight loss/gain  Skin/Breast: no rash, no jaundice  Ophthalmologic: no vision changes, no dry eyes   Respiratory and Thorax: no cough, no wheezing, no hemoptysis, no dyspnea  Cardiovascular: no chest pain, no shortness of breath, no orthopnea  Gastrointestinal: no n/v/d, no abdominal pain, no dysphagia   Genitourinary: no dysuria, no frequency, no nocturia, no hematuria  Musculoskeletal: no trauma, no sprain/strain, no myalgias, no arthralgias, no fracture  Neurological: no HA, no dizziness, no weakness, no numbness  Psychiatric: no depression, no SI/HI  Hematology/Lymphatics: no easy bruising  Endocrine: no heat or cold intolerance. no weight gain or loss  Allergic/Immunologic: no allergy or recent reaction     SUBJECTIVE / OVERNIGHT EVENTS:    s/p Right lower extremity femoral to below knee popliteal bypass with GSV  hypotensive and acute blood loss anemia post op  s/p Levo now off pressors  s/p 1 unit PRBC    Vital Signs Last 24 Hrs  T(C): 36.5 (26 May 2022 08:00), Max: 37 (26 May 2022 00:00)  T(F): 97.7 (26 May 2022 08:00), Max: 98.6 (26 May 2022 00:00)  HR: 72 (26 May 2022 09:00) (62 - 74)  BP: 108/56 (26 May 2022 09:00) (68/44 - 170/92)  BP(mean): 71 (26 May 2022 08:00) (52 - 86)  RR: 16 (26 May 2022 09:00) (14 - 18)  SpO2: 95% (26 May 2022 09:00) (93% - 100%)  I&O's Summary    25 May 2022 07:01  -  26 May 2022 07:00  --------------------------------------------------------  IN: 827 mL / OUT: 575 mL / NET: 252 mL    26 May 2022 07:01  -  26 May 2022 10:15  --------------------------------------------------------  IN: 240 mL / OUT: 65 mL / NET: 175 mL        PHYSICAL EXAM:  GENERAL: NAD, Comfortable  HEAD:  Atraumatic, Normocephalic  EYES: EOMI, PERRLA, conjunctiva and sclera clear  NECK: Supple, No JVD  CHEST/LUNG: Clear to auscultation bilaterally; No wheeze  HEART: Regular rate and rhythm; No murmurs, rubs, or gallops  ABDOMEN: Soft, Nontender, Nondistended; Bowel sounds present  NEURO: AAOx3, no focal deficit, 5/5 b/l extremities  EXTREMITIES:  2+ Peripheral Pulses, No clubbing, cyanosis, or edema, right leg dressing c/d, +TITI drain  SKIN: No rashes or lesions    LABS:                        8.2    8.61  )-----------( 186      ( 26 May 2022 09:00 )             25.9     05-26    139  |  107  |  36<H>  ----------------------------<  144<H>  4.5   |  18<L>  |  2.63<H>    Ca    7.3<L>      26 May 2022 09:00  Phos  3.2     05-26  Mg     1.9     05-26    TPro  4.8<L>  /  Alb  3.0<L>  /  TBili  0.1<L>  /  DBili  x   /  AST  18  /  ALT  7<L>  /  AlkPhos  71  05-26      CAPILLARY BLOOD GLUCOSE                RADIOLOGY & ADDITIONAL TESTS:    Imaging Personally Reviewed:  [x] YES  [ ] NO    Consultant(s) Notes Reviewed:  [x] YES  [ ] NO      MEDICATIONS  (STANDING):  buPROPion XL (24-Hour) . 150 milliGRAM(s) Oral daily  ceFAZolin   IVPB 2000 milliGRAM(s) IV Intermittent once  chlorhexidine 2% Cloths 1 Application(s) Topical once  clopidogrel Tablet 75 milliGRAM(s) Oral daily  gabapentin 300 milliGRAM(s) Oral at bedtime  heparin   Injectable 5000 Unit(s) SubCutaneous every 8 hours  lidocaine 1% Injectable 0.2 milliLiter(s) Local Injection once  simvastatin 10 milliGRAM(s) Oral at bedtime  sodium chloride 0.9% Bolus 1000 milliLiter(s) IV Bolus once    MEDICATIONS  (PRN):  acetaminophen     Tablet .. 650 milliGRAM(s) Oral every 6 hours PRN Temp greater or equal to 38C (100.4F), Mild Pain (1 - 3), Moderate Pain (4 - 6), Severe Pain (7 - 10)  oxyCODONE    IR 5 milliGRAM(s) Oral every 6 hours PRN Moderate Pain (4 - 6)      Care Discussed with Consultants/Other Providers [x] YES  [ ] NO     Patient is a 66y old  Female who presents with a chief complaint of     HPI:  66yr old female of Arthritis, HTN, HLD, GERD, CKD, COVID (3/21)- Monoclonal antibody treatement, Restless leg syndrome, Bacterial endocarditis s/p AVR, MVR, CABG X 1, PVD - s/p right iliac stent and right femoral endarterectomy years ago, s/p femoral-femoral bypass 2021, with c/o right leg pain, difficulty ambulating, claudication. angiogram revealed short segment popliteal occlusion. Now, coming in for Right lower extremity femoral to below knee popliteal bypass with GSV Vein on 5/11/2022.     PAST MEDICAL & SURGICAL HISTORY:  Arthritis  Cervical Spine  and Hands      Restless leg syndrome  Especially with General Anesthesia      Hypertension      Hyperlipidemia      CAD (coronary artery disease)      CKD (chronic kidney disease)      Peripheral vascular disease      GERD (gastroesophageal reflux disease)      History of endocarditis      2019 novel coronavirus disease (COVID-19)  3/2021- monoclonal antibody treatement      Depression      Rheumatoid arthritis of carpometacarpal joint of thumb  Total Joint Replacement of Left Thumb      Osteoarthritis of right shoulder region  Right Shoulder Arthroscopy  2007      S/P tubal ligation  1986      S/P appendectomy  2014      Acute peptic ulcer with perforation  1980 - s/p surgery      H/O cervical discectomy  C1-T1 fusion 2020      S/P CABG x 1  2019      S/P AVR (aortic valve replacement)  x 1,2019      History of endarterectomy  iliac stenting and femoral endarterectomy      S/P MVR (mitral valve repair)  x 1 2019      S/P femoral-femoral bypass surgery  2021          FAMILY HISTORY:  Family history of arthritis (Father)    No family history of alcoholism (Mother)    Family history of hypothyroidism (Sibling)    Family hx of aortic aneurysm (Sibling)    Family history of atrial fibrillation (Sibling)    Family history of parkinsonism (Sibling)    Family hx of hypertension (Sibling)        SOCIAL HISTORY:    Allergies    No Known Allergies    Intolerances          REVIEW OF SYSTEMS:  General: no weakness, no fever/chills, no weight loss/gain  Skin/Breast: no rash, no jaundice  Ophthalmologic: no vision changes, no dry eyes   Respiratory and Thorax: no cough, no wheezing, no hemoptysis, no dyspnea  Cardiovascular: no chest pain, no shortness of breath, no orthopnea  Gastrointestinal: no n/v/d, no abdominal pain, no dysphagia   Genitourinary: no dysuria, no frequency, no nocturia, no hematuria  Musculoskeletal: no trauma, no sprain/strain, no myalgias, no arthralgias, no fracture  Neurological: no HA, no dizziness, no weakness, no numbness  Psychiatric: no depression, no SI/HI  Hematology/Lymphatics: no easy bruising  Endocrine: no heat or cold intolerance. no weight gain or loss  Allergic/Immunologic: no allergy or recent reaction     SUBJECTIVE / OVERNIGHT EVENTS:    s/p Right lower extremity femoral to below knee popliteal bypass with GSV  hypotensive and acute blood loss anemia post op  s/p phenylephrine now off pressors  s/p 1 unit PRBC  denies dizziness, cp sob abd pain nvd. co right leg pain.    Vital Signs Last 24 Hrs  T(C): 36.5 (26 May 2022 08:00), Max: 37 (26 May 2022 00:00)  T(F): 97.7 (26 May 2022 08:00), Max: 98.6 (26 May 2022 00:00)  HR: 72 (26 May 2022 09:00) (62 - 74)  BP: 108/56 (26 May 2022 09:00) (68/44 - 170/92)  BP(mean): 71 (26 May 2022 08:00) (52 - 86)  RR: 16 (26 May 2022 09:00) (14 - 18)  SpO2: 95% (26 May 2022 09:00) (93% - 100%)  I&O's Summary    25 May 2022 07:01  -  26 May 2022 07:00  --------------------------------------------------------  IN: 827 mL / OUT: 575 mL / NET: 252 mL    26 May 2022 07:01  -  26 May 2022 10:15  --------------------------------------------------------  IN: 240 mL / OUT: 65 mL / NET: 175 mL        PHYSICAL EXAM:  GENERAL: NAD, Comfortable  HEAD:  Atraumatic, Normocephalic  EYES: conjunctiva and sclera clear  NECK: Supple, No JVD  CHEST/LUNG: Clear to auscultation bilaterally; No wheeze  HEART: Regular rate and rhythm; No murmurs, rubs, or gallops  ABDOMEN: Soft, Nontender, Nondistended; Bowel sounds present  NEURO: AAOx3, no focal deficit  EXTREMITIES:  right leg dressing c/d, +TITI drain, no edema  SKIN: No rashes or lesions    LABS:                        8.2    8.61  )-----------( 186      ( 26 May 2022 09:00 )             25.9     05-26    139  |  107  |  36<H>  ----------------------------<  144<H>  4.5   |  18<L>  |  2.63<H>    Ca    7.3<L>      26 May 2022 09:00  Phos  3.2     05-26  Mg     1.9     05-26    TPro  4.8<L>  /  Alb  3.0<L>  /  TBili  0.1<L>  /  DBili  x   /  AST  18  /  ALT  7<L>  /  AlkPhos  71  05-26      CAPILLARY BLOOD GLUCOSE                RADIOLOGY & ADDITIONAL TESTS:    Imaging Personally Reviewed:  [x] YES  [ ] NO    Consultant(s) Notes Reviewed:  [x] YES  [ ] NO      MEDICATIONS  (STANDING):  buPROPion XL (24-Hour) . 150 milliGRAM(s) Oral daily  ceFAZolin   IVPB 2000 milliGRAM(s) IV Intermittent once  chlorhexidine 2% Cloths 1 Application(s) Topical once  clopidogrel Tablet 75 milliGRAM(s) Oral daily  gabapentin 300 milliGRAM(s) Oral at bedtime  heparin   Injectable 5000 Unit(s) SubCutaneous every 8 hours  lidocaine 1% Injectable 0.2 milliLiter(s) Local Injection once  simvastatin 10 milliGRAM(s) Oral at bedtime  sodium chloride 0.9% Bolus 1000 milliLiter(s) IV Bolus once    MEDICATIONS  (PRN):  acetaminophen     Tablet .. 650 milliGRAM(s) Oral every 6 hours PRN Temp greater or equal to 38C (100.4F), Mild Pain (1 - 3), Moderate Pain (4 - 6), Severe Pain (7 - 10)  oxyCODONE    IR 5 milliGRAM(s) Oral every 6 hours PRN Moderate Pain (4 - 6)      Care Discussed with Consultants/Other Providers [x] YES  [ ] NO

## 2022-05-26 NOTE — PROGRESS NOTE ADULT - ASSESSMENT
66yr old female Hx of Arthritis, HTN, HLD, GERD, CKD, COVID (3/21)- Monoclonal antibody treatement , Restless leg syndrome, Bacterial endocarditis - 2019- s/p AVR, MVR, CABG X 1, PVD - s/p right iliac stent and right femoral endarterectomy years ago , s/p femoral-femoral bypass 2021, with c/o right leg pain -difficulty ambulating, claudication -angiogram- revealed short segment popliteal occlusion . Now POD 1 s/p coming in for Right lower extremity femoral to below knee popliteal bypass with GSV. Post- op course complicated by hypotension and acute blood loss anemia.    Plan:  - wean pressors as tolerated  - 1 unit of prbc running, will follow up post transfusion cbc  - Pain control as needed  - DVT ppx  - OOB and ambulating as tolerated  - F/u AM labs- transfuse as needed  - MAP goal >65    [id number]

## 2022-05-26 NOTE — CONSULT NOTE ADULT - ASSESSMENT
66yr old female  of Arthritis, HTN, HLD, GERD, CKD, COVID (3/21)- Monoclonal antibody treatement , Restless leg syndrome, Bacterial endocarditis - 2019- s/p AVR, MVR, CABG X 1, PVD - s/p right iliac stent and right femoral endarterectomy years ago , s/p femoral-femoral bypass 2021 now s/p right fem pop bypass    - No signs of significant ischemia   - Appears compensated from HF POV.   - cont statin   - cont plavix  - monitor H/h Transfuse as needed  - post op hypotension noted. was briefly on pressor support. now off  - will monitor hemodynamics closely   - hold all antiHTN meds for now  - Please continue to maintain strict I/Os, monitor daily weights, Cr, and K.  - Further cardiac workup will depend on clinical course.   - All other workup per primary team. Will followup.   -

## 2022-05-26 NOTE — PROGRESS NOTE ADULT - SUBJECTIVE AND OBJECTIVE BOX
VASCULAR SURGERY DAILY PROGRESS NOTE:     Interval events: Patient requiring levo overnight 2/2 hypotension, currently on [place] .5, 1 unit prbc being transfused     SUBJECTIVE/ROS: Patient feels well  Denies nausea, vomiting, chest pain, shortness of breath, resting comfortably      MEDICATIONS  (STANDING):  buPROPion XL (24-Hour) . 150 milliGRAM(s) Oral daily  ceFAZolin   IVPB 2000 milliGRAM(s) IV Intermittent once  chlorhexidine 2% Cloths 1 Application(s) Topical once  clopidogrel Tablet 75 milliGRAM(s) Oral daily  gabapentin 300 milliGRAM(s) Oral at bedtime  heparin   Injectable 5000 Unit(s) SubCutaneous every 8 hours  lidocaine 1% Injectable 0.2 milliLiter(s) Local Injection once  phenylephrine    Infusion 0.5 MICROgram(s)/kG/Min (11.6 mL/Hr) IV Continuous <Continuous>  simvastatin 10 milliGRAM(s) Oral at bedtime  sodium chloride 0.9% Bolus 1000 milliLiter(s) IV Bolus once    MEDICATIONS  (PRN):  acetaminophen     Tablet .. 650 milliGRAM(s) Oral every 6 hours PRN Temp greater or equal to 38C (100.4F), Mild Pain (1 - 3), Moderate Pain (4 - 6), Severe Pain (7 - 10)  HYDROmorphone  Injectable 0.5 milliGRAM(s) IV Push every 10 minutes PRN Moderate Pain (4 - 6)  ondansetron Injectable 4 milliGRAM(s) IV Push once PRN Nausea and/or Vomiting  oxyCODONE    IR 5 milliGRAM(s) Oral every 6 hours PRN Moderate Pain (4 - 6)      OBJECTIVE:    Vital Signs Last 24 Hrs  T(C): 36.6 (26 May 2022 07:30), Max: 37 (26 May 2022 00:00)  T(F): 97.9 (26 May 2022 07:30), Max: 98.6 (26 May 2022 00:00)  HR: 74 (26 May 2022 07:30) (62 - 74)  BP: 101/67 (26 May 2022 07:30) (68/44 - 170/92)  BP(mean): 78 (26 May 2022 07:30) (52 - 86)  RR: 15 (26 May 2022 07:30) (14 - 18)  SpO2: 95% (26 May 2022 07:30) (93% - 100%)        I&O's Detail    25 May 2022 07:01  -  26 May 2022 07:00  --------------------------------------------------------  IN:    IV PiggyBack: 100 mL    Oral Fluid: 300 mL    Phenylephrine: 127 mL    PRBCs (Packed Red Blood Cells): 300 mL  Total IN: 827 mL    OUT:    Bulb (mL): 55 mL    Bulb (mL): 130 mL    Indwelling Catheter - Urethral (mL): 390 mL  Total OUT: 575 mL    Total NET: 252 mL      26 May 2022 07:01  -  26 May 2022 08:15  --------------------------------------------------------  IN:  Total IN: 0 mL    OUT:    Bulb (mL): 0 mL    Bulb (mL): 0 mL    Indwelling Catheter - Urethral (mL): 35 mL  Total OUT: 35 mL    Total NET: -35 mL          Daily Height in cm: 162.56 (25 May 2022 17:00)    Daily     LABS:                        7.9    12.18 )-----------( 254      ( 26 May 2022 02:48 )             25.2     05-26    139  |  107  |  36<H>  ----------------------------<  183<H>  4.8   |  18<L>  |  2.55<H>    Ca    7.6<L>      26 May 2022 02:48  Phos  4.8     05-26  Mg     1.9     05-26    TPro  4.8<L>  /  Alb  3.0<L>  /  TBili  0.1<L>  /  DBili  x   /  AST  18  /  ALT  7<L>  /  AlkPhos  71  05-26              Physical Exam:  General: NAD, resting comfortably in bed  Pulmonary: Nonlabored breathing, no respiratory distress  Cardiovascular: NSR  Abdominal: soft, NT/ND  Extremities: WWP. RLE aquacel dressing c/d/i. TITI drains with serosanguinous output. RLE dopplerable DP/PT pulses

## 2022-05-26 NOTE — CONSULT NOTE ADULT - ASSESSMENT
Patient is a 66 year old female with PMHx of Arthritis, Bacterial Endocarditis s/p AVR, MVR, CABG X 1 in 2019, CKD, COVID s/p Monoclonal Antibody Treatment, GERD, HLD, HTN, PVD s/pright iliac stent and right femoral endarterectomy years ago; s/p femoral-femoral bypass 2021 and Restless Leg Syndrome. Presents to [facility] for right lower extremity femoral to below knee popliteal bypass with GSV. Consult called for post op/co medical mgmt.    s/p right lower extremity femoral to below knee popliteal bypass with GSV POD1:  Incision care per Vascular  Monitor outpt via drain  Pain mgmt  Incentive spirometry  Care per Vascular Surgery    Post-op Hypotension:  s/p Levo, now off pressors  No signs of significant ischemia  Hold all BP meds for now as per Cards  Cards following    Acute Post Op Blood Loss Anemia:  s/p 1 unit PRBC  Serial CBC  Transfuse PRN  Continue to monitor    CAD/HLD/HTN:  No active chest pain  C/w Plavix/Statin. BP meds on hold as per above    CKD:  Monitor I/O's  Serial Cr  Avoid nephrotoxins  Renally dose medications  Continue to monitor    DVT ppx:  Heparin subq    [facility]  [phone]   66 year old female PMHx Arthritis, Bacterial Endocarditis s/p AVR, MVR, CABG X 1 in 2019, CKD, COVID s/p Monoclonal Antibody Treatment, GERD, HLD, HTN, PVD s/p right iliac stent and right femoral endarterectomy years ago; s/p femoral-femoral bypass 2021 and Restless Leg Syndrome. Presents to [facility] for right lower extremity femoral to below knee popliteal bypass with GSV. Consult called for post op/co medical mgmt.    s/p right lower extremity femoral to below knee popliteal bypass with GSV POD1:  Incision care per Vascular  Monitor outpt via drain  Pain mgmt  Incentive spirometry  PT OOB, TOV when appropriate  Care per Vascular Surgery    Post-op Hypotension:  s/p Levo, now off pressors  Hold all BP meds for now as per Cards  check orthostatics  Cards following    Acute Post Op Blood Loss Anemia:  s/p 1 unit PRBC  Serial CBC  if remains hypotensive, would recommend transfusing another unit    CAD/HLD/HTN:  C/w Plavix/Statin. BP meds on hold as per above    CKD:  Monitor I/O's, Serial Cr  Avoid nephrotoxins  Renally dose medications    DVT ppx:  Heparin subq    [facility]  [phone]

## 2022-05-27 ENCOUNTER — TRANSCRIPTION ENCOUNTER (OUTPATIENT)
Age: 67
End: 2022-05-27

## 2022-05-27 LAB
ANION GAP SERPL CALC-SCNC: 11 MMOL/L — SIGNIFICANT CHANGE UP (ref 5–17)
BUN SERPL-MCNC: 37 MG/DL — HIGH (ref 7–23)
CALCIUM SERPL-MCNC: 7.9 MG/DL — LOW (ref 8.4–10.5)
CHLORIDE SERPL-SCNC: 109 MMOL/L — HIGH (ref 96–108)
CO2 SERPL-SCNC: 20 MMOL/L — LOW (ref 22–31)
CREAT SERPL-MCNC: 2.97 MG/DL — HIGH (ref 0.5–1.3)
EGFR: 17 ML/MIN/1.73M2 — LOW
GLUCOSE SERPL-MCNC: 92 MG/DL — SIGNIFICANT CHANGE UP (ref 70–99)
HCT VFR BLD CALC: 31.7 % — LOW (ref 34.5–45)
HGB BLD-MCNC: 10 G/DL — LOW (ref 11.5–15.5)
MAGNESIUM SERPL-MCNC: 2.2 MG/DL — SIGNIFICANT CHANGE UP (ref 1.6–2.6)
MCHC RBC-ENTMCNC: 30 PG — SIGNIFICANT CHANGE UP (ref 27–34)
MCHC RBC-ENTMCNC: 31.5 GM/DL — LOW (ref 32–36)
MCV RBC AUTO: 95.2 FL — SIGNIFICANT CHANGE UP (ref 80–100)
NRBC # BLD: 0 /100 WBCS — SIGNIFICANT CHANGE UP (ref 0–0)
PHOSPHATE SERPL-MCNC: 2.4 MG/DL — LOW (ref 2.5–4.5)
PLATELET # BLD AUTO: 164 K/UL — SIGNIFICANT CHANGE UP (ref 150–400)
POTASSIUM SERPL-MCNC: 4.2 MMOL/L — SIGNIFICANT CHANGE UP (ref 3.5–5.3)
POTASSIUM SERPL-SCNC: 4.2 MMOL/L — SIGNIFICANT CHANGE UP (ref 3.5–5.3)
RBC # BLD: 3.33 M/UL — LOW (ref 3.8–5.2)
RBC # FLD: 17.2 % — HIGH (ref 10.3–14.5)
SODIUM SERPL-SCNC: 140 MMOL/L — SIGNIFICANT CHANGE UP (ref 135–145)
WBC # BLD: 7.11 K/UL — SIGNIFICANT CHANGE UP (ref 3.8–10.5)
WBC # FLD AUTO: 7.11 K/UL — SIGNIFICANT CHANGE UP (ref 3.8–10.5)

## 2022-05-27 PROCEDURE — 99232 SBSQ HOSP IP/OBS MODERATE 35: CPT

## 2022-05-27 RX ORDER — ASPIRIN/CALCIUM CARB/MAGNESIUM 324 MG
81 TABLET ORAL DAILY
Refills: 0 | Status: DISCONTINUED | OUTPATIENT
Start: 2022-05-27 | End: 2022-05-30

## 2022-05-27 RX ORDER — APIXABAN 2.5 MG/1
2.5 TABLET, FILM COATED ORAL
Refills: 0 | Status: DISCONTINUED | OUTPATIENT
Start: 2022-05-27 | End: 2022-05-30

## 2022-05-27 RX ADMIN — Medication 85 MILLIMOLE(S): at 12:58

## 2022-05-27 RX ADMIN — HEPARIN SODIUM 5000 UNIT(S): 5000 INJECTION INTRAVENOUS; SUBCUTANEOUS at 05:32

## 2022-05-27 RX ADMIN — APIXABAN 2.5 MILLIGRAM(S): 2.5 TABLET, FILM COATED ORAL at 17:01

## 2022-05-27 RX ADMIN — SIMVASTATIN 10 MILLIGRAM(S): 20 TABLET, FILM COATED ORAL at 21:25

## 2022-05-27 RX ADMIN — Medication 81 MILLIGRAM(S): at 12:58

## 2022-05-27 RX ADMIN — OXYCODONE HYDROCHLORIDE 5 MILLIGRAM(S): 5 TABLET ORAL at 18:06

## 2022-05-27 RX ADMIN — OXYCODONE HYDROCHLORIDE 5 MILLIGRAM(S): 5 TABLET ORAL at 11:15

## 2022-05-27 RX ADMIN — OXYCODONE HYDROCHLORIDE 5 MILLIGRAM(S): 5 TABLET ORAL at 02:21

## 2022-05-27 RX ADMIN — Medication 650 MILLIGRAM(S): at 09:11

## 2022-05-27 RX ADMIN — OXYCODONE HYDROCHLORIDE 5 MILLIGRAM(S): 5 TABLET ORAL at 10:19

## 2022-05-27 RX ADMIN — Medication 650 MILLIGRAM(S): at 23:15

## 2022-05-27 RX ADMIN — BUPROPION HYDROCHLORIDE 150 MILLIGRAM(S): 150 TABLET, EXTENDED RELEASE ORAL at 12:58

## 2022-05-27 RX ADMIN — Medication 650 MILLIGRAM(S): at 10:15

## 2022-05-27 RX ADMIN — Medication 650 MILLIGRAM(S): at 18:06

## 2022-05-27 RX ADMIN — OXYCODONE HYDROCHLORIDE 5 MILLIGRAM(S): 5 TABLET ORAL at 01:51

## 2022-05-27 RX ADMIN — GABAPENTIN 300 MILLIGRAM(S): 400 CAPSULE ORAL at 21:25

## 2022-05-27 RX ADMIN — OXYCODONE HYDROCHLORIDE 5 MILLIGRAM(S): 5 TABLET ORAL at 23:15

## 2022-05-27 RX ADMIN — Medication 650 MILLIGRAM(S): at 02:21

## 2022-05-27 RX ADMIN — APIXABAN 2.5 MILLIGRAM(S): 2.5 TABLET, FILM COATED ORAL at 09:11

## 2022-05-27 RX ADMIN — Medication 650 MILLIGRAM(S): at 17:01

## 2022-05-27 RX ADMIN — OXYCODONE HYDROCHLORIDE 5 MILLIGRAM(S): 5 TABLET ORAL at 17:01

## 2022-05-27 RX ADMIN — Medication 650 MILLIGRAM(S): at 01:51

## 2022-05-27 NOTE — PHYSICAL THERAPY INITIAL EVALUATION ADULT - PERTINENT HX OF CURRENT PROBLEM, REHAB EVAL
66 y.o f pw hx of Arthritis, HTN, HLD, GERD, CKD, COVID (3/21)- Monoclonal antibody treatement , Restless leg syndrome, Bacterial endocarditis - 2019- s/p AVR, MVR, CABG X 1, PVD - s/p right iliac stent and right femoral endarterectomy years ago , s/p femoral-femoral bypass 2021, with c/o right leg pain -difficulty ambulating, claudication -angiogram- revealed short segment popliteal occlusion . Now coming in for Right lower extremity femoral to below knee popliteal bypass with 66 y.o f pw hx of Arthritis, HTN, HLD, GERD, CKD, COVID (3/21)- Monoclonal antibody treatement , Restless leg syndrome, Bacterial endocarditis - 2019- s/p AVR, MVR, CABG X 1, PVD - s/p right iliac stent and right femoral endarterectomy years ago , s/p femoral-femoral bypass 2021, with c/o right leg pain -difficulty ambulating, claudication -angiogram- revealed short segment popliteal occlusion . Pt now s/p Right lower extremity femoral to below knee popliteal bypass with

## 2022-05-27 NOTE — DISCHARGE NOTE PROVIDER - PROVIDER TOKENS
PROVIDER:[TOKEN:[[id number]],FOLLOWUP:[2 weeks]] PROVIDER:[TOKEN:[[id number]],FOLLOWUP:[1 week]]

## 2022-05-27 NOTE — DISCHARGE NOTE PROVIDER - NSDCCPCAREPLAN_GEN_ALL_CORE_FT
PRINCIPAL DISCHARGE DIAGNOSIS  Diagnosis: Occlusion of popliteal artery  Assessment and Plan of Treatment: WOUND CARE: Staples and Nylon Sutures will be removed at follow up office visit.    BATHING: Please do not submerge wound underwater. You may shower and/or sponge bathe.  ACTIVITY: No heavy lifting anything more than 10-15lbs or straining. Otherwise, you may return to your usual level of physical activity. If you are taking narcotic pain medication (such as Percocet), do NOT drive a car, operate machinery or make important decisions.  DIET: Your Regular Diet   NOTIFY YOUR SURGEON IF: You have any bleeding that does not stop, any pus draining from your wound, any fever (over 100.4 F) or chills, persistent nausea/vomiting with inability to tolerate food or liquids, persistent diarrhea, or if your pain is not controlled on your discharge pain medications.  FOLLOW-UP:  1. Please call to make a follow-up appointment with Dr. GUEVARA ONE WEEK AFTER DISCHARGE   2. Please follow up with your PRIMARY CARE DOCTOR ONE WEEK AFTER DISCHARGE

## 2022-05-27 NOTE — PHYSICAL THERAPY INITIAL EVALUATION ADULT - PHYSICAL ASSIST/NONPHYSICAL ASSIST: SIT/STAND, REHAB EVAL
supervision/verbal cues/nonverbal cues (demo/gestures) verbal cues/nonverbal cues (demo/gestures)/1 person assist

## 2022-05-27 NOTE — CHART NOTE - NSCHARTNOTEFT_GEN_A_CORE
Due to patients deconditioning and generalized weakness secondary to peripheral arterial disease s/p Bypass, patient will require a standard wheelchair. This is necessary to achieve daily tasks and therapies which cannot be achieved with the use of a cane or rolling walker. Patient and family are in agreement with the standard wheelchair use at home and assistance will be provided if needed    Lashay Whaley PA-C

## 2022-05-27 NOTE — PROGRESS NOTE ADULT - SUBJECTIVE AND OBJECTIVE BOX
SURGERY DAILY PROGRESS NOTE:       SUBJECTIVE/ROS: No acute overnight events. Patient seen and examined at bedside during morning rounds.    OBJECTIVE:    Vital Signs Last 24 Hrs  T(C): 36.9 (27 May 2022 02:03), Max: 37 (26 May 2022 04:00)  T(F): 98.4 (27 May 2022 02:03), Max: 98.6 (26 May 2022 04:00)  HR: 70 (27 May 2022 02:03) (65 - 79)  BP: 105/61 (27 May 2022 02:03) (79/48 - 130/60)  BP(mean): 71 (26 May 2022 08:00) (58 - 86)  RR: 18 (27 May 2022 02:03) (14 - 18)  SpO2: 96% (27 May 2022 02:03) (93% - 96%)    I&O's Detail    25 May 2022 07:01  -  26 May 2022 07:00  --------------------------------------------------------  IN:    IV PiggyBack: 100 mL    Oral Fluid: 300 mL    Phenylephrine: 127 mL    PRBCs (Packed Red Blood Cells): 300 mL  Total IN: 827 mL    OUT:    Bulb (mL): 55 mL    Bulb (mL): 130 mL    Indwelling Catheter - Urethral (mL): 390 mL  Total OUT: 575 mL    Total NET: 252 mL      26 May 2022 07:01  -  27 May 2022 03:38  --------------------------------------------------------  IN:    Oral Fluid: 540 mL    PRBCs (Packed Red Blood Cells): 300 mL  Total IN: 840 mL    OUT:    Bulb (mL): 25 mL    Bulb (mL): 35 mL    Indwelling Catheter - Urethral (mL): 1315 mL    Phenylephrine: 0 mL  Total OUT: 1375 mL    Total NET: -535 mL      Physical Exam:  General: NAD, resting comfortably in bed  Pulmonary: Nonlabored breathing, no respiratory distress  Cardiovascular: NSR  Abdominal: soft, NT/ND  Extremities: WWP. RLE aquacel dressing c/d/i. TITI drains with serosanguinous output. RLE dopplerable DP/PT pulses     LABS:                        8.0    7.87  )-----------( 191      ( 26 May 2022 15:51 )             25.9     05-26    139  |  107  |  36<H>  ----------------------------<  144<H>  4.5   |  18<L>  |  2.63<H>    Ca    7.3<L>      26 May 2022 09:00  Phos  3.2     05-26  Mg     1.9     05-26    TPro  4.8<L>  /  Alb  3.0<L>  /  TBili  0.1<L>  /  DBili  x   /  AST  18  /  ALT  7<L>  /  AlkPhos  71  05-26                         SURGERY DAILY PROGRESS NOTE:       SUBJECTIVE/ROS: No acute overnight events. Patient seen and examined at bedside during morning rounds. No new overnight event.  No N/V/D.  Tolerating diet. new complaints. Pain is controlled.    OBJECTIVE:    Vital Signs Last 24 Hrs  T(C): 36.9 (27 May 2022 02:03), Max: 37 (26 May 2022 04:00)  T(F): 98.4 (27 May 2022 02:03), Max: 98.6 (26 May 2022 04:00)  HR: 70 (27 May 2022 02:03) (65 - 79)  BP: 105/61 (27 May 2022 02:03) (79/48 - 130/60)  BP(mean): 71 (26 May 2022 08:00) (58 - 86)  RR: 18 (27 May 2022 02:03) (14 - 18)  SpO2: 96% (27 May 2022 02:03) (93% - 96%)    I&O's Detail    25 May 2022 07:01  -  26 May 2022 07:00  --------------------------------------------------------  IN:    IV PiggyBack: 100 mL    Oral Fluid: 300 mL    Phenylephrine: 127 mL    PRBCs (Packed Red Blood Cells): 300 mL  Total IN: 827 mL    OUT:    Bulb (mL): 55 mL    Bulb (mL): 130 mL    Indwelling Catheter - Urethral (mL): 390 mL  Total OUT: 575 mL    Total NET: 252 mL      26 May 2022 07:01  -  27 May 2022 03:38  --------------------------------------------------------  IN:    Oral Fluid: 540 mL    PRBCs (Packed Red Blood Cells): 300 mL  Total IN: 840 mL    OUT:    Bulb (mL): 25 mL    Bulb (mL): 35 mL    Indwelling Catheter - Urethral (mL): 1315 mL    Phenylephrine: 0 mL  Total OUT: 1375 mL    Total NET: -535 mL      Physical Exam:  General: NAD, resting comfortably in bed  Pulmonary: Nonlabored breathing, no respiratory distress  Cardiovascular: NSR  Abdominal: soft, NT/ND  Extremities: WWP. RLE aquacel dressing c/d/i. TITI drains with serosanguinous output. RLE palpable DP/PT pulses     LABS:                        8.0    7.87  )-----------( 191      ( 26 May 2022 15:51 )             25.9     05-26    139  |  107  |  36<H>  ----------------------------<  144<H>  4.5   |  18<L>  |  2.63<H>    Ca    7.3<L>      26 May 2022 09:00  Phos  3.2     05-26  Mg     1.9     05-26    TPro  4.8<L>  /  Alb  3.0<L>  /  TBili  0.1<L>  /  DBili  x   /  AST  18  /  ALT  7<L>  /  AlkPhos  71  05-26

## 2022-05-27 NOTE — PHYSICAL THERAPY INITIAL EVALUATION ADULT - TRANSFER TRAINING, PT EVAL
Goal: Pt. will perform all transfers w (S) and appropriate AD in 4 weeks. Goal: Pt. will perform all transfers w (I) and appropriate AD in 4 weeks.

## 2022-05-27 NOTE — PROGRESS NOTE ADULT - ASSESSMENT
66yr old female Hx of Arthritis, HTN, HLD, GERD, CKD, COVID (3/21)- Monoclonal antibody treatement , Restless leg syndrome, Bacterial endocarditis - 2019- s/p AVR, MVR, CABG X 1, PVD - s/p right iliac stent and right femoral endarterectomy years ago , s/p femoral-femoral bypass 2021, with c/o right leg pain -difficulty ambulating, claudication -angiogram- revealed short segment popliteal occlusion . Now POD 2 s/p coming in for Right lower extremity femoral to below knee popliteal bypass with GSV. Post- op course complicated by hypotension and acute blood loss anemia.    Plan:  - wean pressors as tolerated  - Pain control as needed  - DVT ppx  - OOB and ambulating as tolerated  - F/u AM labs- transfuse as needed  - MAP goal >65    Vascular Surgery, [id number]   66yr old female Hx of Arthritis, HTN, HLD, GERD, CKD, COVID (3/21)- Monoclonal antibody treatement , Restless leg syndrome, Bacterial endocarditis - 2019- s/p AVR, MVR, CABG X 1, PVD - s/p right iliac stent and right femoral endarterectomy years ago , s/p femoral-femoral bypass 2021, with c/o right leg pain -difficulty ambulating, claudication -angiogram- revealed short segment popliteal occlusion . Now POD 2 s/p coming in for Right lower extremity femoral to below knee popliteal bypass with GSV. Post- op course complicated by hypotension and acute blood loss anemia.    Plan:  - asa 81, Eliquis 2.5 BID  - Pain control as needed  - DVT ppx  - OOB and ambulating as tolerated  - F/u AM labs- transfuse as needed  - MAP goal >65  - physical therapy    Vascular Surgery, [id number]

## 2022-05-27 NOTE — DISCHARGE NOTE PROVIDER - CARE PROVIDER_API CALL
Jero Green)  Vascular Surgery  [address] [facility], [address]  [place] [address]  Phone: [phone]  Fax: [phone]  Follow Up Time: 2 weeks   Jero Green)  Vascular Surgery  [address], [address]  [place] [address]  Phone: [phone]  Fax: [phone]  Follow Up Time: 1 week

## 2022-05-27 NOTE — PHYSICAL THERAPY INITIAL EVALUATION ADULT - ASR EQUIP NEEDS DISCH PT EVAL
Pt. inquired about a Wheelchair/3:1 commode/wheelchair Owns R/W and shower chair. DME recommending: Wheelchair and 3-in-1 commode/3:1 commode/wheelchair

## 2022-05-27 NOTE — PROGRESS NOTE ADULT - SUBJECTIVE AND OBJECTIVE BOX
[facility] - Chao Lomax, Kunal, Regino, [place], Elva Islas  Office Number:  [phone]    Patient resting comfortably in bed in NAD.  Laying flat with no respiratory distress.  No complaints of chest pain, dyspnea, palpitations, PND, or orthopnea.  reports sig calf and leg pain    ROS: negative unless otherwise mentioned.    Telemetry:  off    MEDICATIONS  (STANDING):  apixaban 2.5 milliGRAM(s) Oral two times a day  aspirin  chewable 81 milliGRAM(s) Oral daily  buPROPion XL (24-Hour) . 150 milliGRAM(s) Oral daily  ceFAZolin   IVPB 2000 milliGRAM(s) IV Intermittent once  chlorhexidine 2% Cloths 1 Application(s) Topical once  gabapentin 300 milliGRAM(s) Oral at bedtime  lidocaine 1% Injectable 0.2 milliLiter(s) Local Injection once  simvastatin 10 milliGRAM(s) Oral at bedtime  sodium chloride 0.9% Bolus 1000 milliLiter(s) IV Bolus once    MEDICATIONS  (PRN):  acetaminophen     Tablet .. 650 milliGRAM(s) Oral every 6 hours PRN Temp greater or equal to 38C (100.4F), Mild Pain (1 - 3), Moderate Pain (4 - 6), Severe Pain (7 - 10)  oxyCODONE    IR 5 milliGRAM(s) Oral every 6 hours PRN Moderate Pain (4 - 6)      Allergies    No Known Allergies    Intolerances        Vital Signs Last 24 Hrs  T(C): 36.4 (27 May 2022 06:26), Max: 36.9 (26 May 2022 16:30)  T(F): 97.5 (27 May 2022 06:26), Max: 98.5 (26 May 2022 16:30)  HR: 67 (27 May 2022 06:26) (67 - 79)  BP: 105/64 (27 May 2022 06:26) (94/55 - 105/64)  BP(mean): --  RR: 18 (27 May 2022 06:26) (18 - 18)  SpO2: 96% (27 May 2022 06:26) (93% - 96%)    I&O's Summary    26 May 2022 07:01  -  27 May 2022 07:00  --------------------------------------------------------  IN: 840 mL / OUT: 1740 mL / NET: -900 mL    27 May 2022 07:01  -  27 May 2022 09:06  --------------------------------------------------------  IN: 0 mL / OUT: 400 mL / NET: -400 mL        ON EXAM:    Constitutional: NAD, awake and alert, well-developed  HEENT: Moist Mucous Membranes, Anicteric  Pulmonary: Decreased breath sounds b/l. No rales, crackles or wheeze appreciated.   Cardiovascular: Regular, S1 and S2, 3/6 SM  Gastrointestinal: Bowel Sounds present, soft, nontender.   Lymph: + peripheral edema. No lymphadenopathy.  Skin: No visible rashes or ulcers. drain and dressing in right leg   Psych:  Mood & affect appropriate    LABS: All Labs Reviewed:                        10.0   7.11  )-----------( 164      ( 27 May 2022 07:21 )             31.7                         8.0    7.87  )-----------( 191      ( 26 May 2022 15:51 )             25.9                         8.2    8.61  )-----------( 186      ( 26 May 2022 09:00 )             25.9     27 May 2022 07:20    140    |  109    |  37     ----------------------------<  92     4.2     |  20     |  2.97   26 May 2022 09:00    139    |  107    |  36     ----------------------------<  144    4.5     |  18     |  2.63   26 May 2022 02:48    139    |  107    |  36     ----------------------------<  183    4.8     |  18     |  2.55     Ca    7.9        27 May 2022 07:20  Ca    7.3        26 May 2022 09:00  Ca    7.6        26 May 2022 02:48  Phos  2.4       27 May 2022 07:20  Phos  3.2       26 May 2022 09:00  Phos  4.8       26 May 2022 02:48  Mg     2.2       27 May 2022 07:20  Mg     1.9       26 May 2022 09:00  Mg     1.9       26 May 2022 02:48    TPro  4.8    /  Alb  3.0    /  TBili  0.1    /  DBili  x      /  AST  18     /  ALT  7      /  AlkPhos  71     26 May 2022 02:48          Blood Culture:

## 2022-05-27 NOTE — PHYSICAL THERAPY INITIAL EVALUATION ADULT - ACTIVE RANGE OF MOTION EXAMINATION, REHAB EVAL
RLE limited 2/2 pain/Left UE Active ROM was WFL (within functional limits)/Right UE Active ROM was WFL (within functional limits)/Left LE Active ROM was WFL (within functional limits)/deficits as listed below RLE limited 2/2 pain, R knee flexion limited ~60 degrees at EOB/Left UE Active ROM was WFL (within functional limits)/Right UE Active ROM was WFL (within functional limits)/Left LE Active ROM was WFL (within functional limits)/deficits as listed below RLE limited 2/2 pain, R knee flexion limited ~60 degrees at EOB, R DF limited 2/2 incisional pain/Left UE Active ROM was WFL (within functional limits)/Right UE Active ROM was WFL (within functional limits)/Left LE Active ROM was WFL (within functional limits)/deficits as listed below

## 2022-05-27 NOTE — DISCHARGE NOTE PROVIDER - HOSPITAL COURSE
66yr old female  of Arthritis, HTN, HLD, GERD, CKD, COVID (3/21)- Monoclonal antibody treatement , Restless leg syndrome, Bacterial endocarditis - 2019- s/p AVR, MVR, CABG X 1, PVD - s/p right iliac stent and right femoral endarterectomy years ago , s/p femoral-femoral bypass 2021, with c/o right leg pain -difficulty ambulating, claudication -angiogram- revealed short segment popliteal occlusion . Now coming in for Right lower extremity femoral to below knee popliteal bypass with GSV Vein on 5/11/2022.     On 5/25 patient underwent R fem-pop bypass graft using vein graft. The patient tolerated the procedure well without complications, was extubated, and transferred to the PACU in stable condition. Post- operatively patient required pressor support for a few hours. received 2 units of PRBC post operatively. Patient weaned off pressors POD 1. Physical therapy worked with the patient and recommended home. On day of discharge, the patient was tolerating diet, ambulating well and pain controlled.     66yr old female  of Arthritis, HTN, HLD, GERD, CKD, COVID (3/21)- Monoclonal antibody treatement , Restless leg syndrome, Bacterial endocarditis - 2019- s/p AVR, MVR, CABG X 1, PVD - s/p right iliac stent and right femoral endarterectomy years ago , s/p femoral-femoral bypass 2021, with c/o right leg pain -difficulty ambulating, claudication -angiogram- revealed short segment popliteal occlusion . Now coming in for Right lower extremity femoral to below knee popliteal bypass with GSV Vein on 5/11/2022.     On 5/25 patient underwent R fem-pop bypass graft using vein graft. The patient tolerated the procedure well without complications, was extubated, and transferred to the PACU in stable condition. Post- operatively patient required pressor support for a few hours. received 2 units of PRBC post operatively. Patient weaned off pressors POD 1. Physical therapy worked with the patient and recommended home. On day of discharge, the patient was tolerating diet, ambulating well and pain controlled.    On the day of discharge patient is hemodynamically stable and clinically feeling well. She will be discharged home with home PT and follow up with Dr. Green (Vascular Surgery) and her Primary Care Doctor in 1 week.

## 2022-05-27 NOTE — PHYSICAL THERAPY INITIAL EVALUATION ADULT - PLANNED THERAPY INTERVENTIONS, PT EVAL
balance training/gait training/strengthening/transfer training balance training/bed mobility training/gait training/strengthening/transfer training

## 2022-05-27 NOTE — PHYSICAL THERAPY INITIAL EVALUATION ADULT - GENERAL OBSERVATIONS, REHAB EVAL
Pt. rec'd semi supine in bed in NAD. VSS, +2JP drains, +IVL Pt. rec'd semi supine in bed in NAD. VSS, +2JP drains, +IVL, agreeable to PT session.

## 2022-05-27 NOTE — PHYSICAL THERAPY INITIAL EVALUATION ADULT - GAIT TRAINING, PT EVAL
Goal: Pt. will ambulate 75 ft w (S) and appropriate AD in 4 weeks. Goal: Pt. will ambulate 75 ft w (I) and appropriate AD in 4 weeks.

## 2022-05-27 NOTE — PHYSICAL THERAPY INITIAL EVALUATION ADULT - PRECAUTIONS/LIMITATIONS, REHAB EVAL
GSV Vein on 5/11/2022./no known precautions/limitations GSV Vein on 5/11/2022. Post op course c/b hypotension placed on levo and anemia s/p I unit PRBC./no known precautions/limitations/fall precautions

## 2022-05-27 NOTE — DISCHARGE NOTE PROVIDER - NSDCFUSCHEDAPPT_GEN_ALL_CORE_FT
Jero Green  [facility] Physician [facility]  VASCULAR [address]  Scheduled Appointment: 06/07/2022

## 2022-05-27 NOTE — PROGRESS NOTE ADULT - SUBJECTIVE AND OBJECTIVE BOX
Patient is a 66y old  Female who presents with a chief complaint of PAD, bypass (27 May 2022 09:05)      SUBJECTIVE / OVERNIGHT EVENTS:    Patient seen and examined. no complaints. no ha dizziness. has not been out of bed yet.      Vital Signs Last 24 Hrs  T(C): 37.1 (27 May 2022 10:24), Max: 37.1 (27 May 2022 10:24)  T(F): 98.7 (27 May 2022 10:24), Max: 98.7 (27 May 2022 10:24)  HR: 75 (27 May 2022 10:24) (67 - 79)  BP: 120/71 (27 May 2022 10:24) (94/56 - 120/71)  BP(mean): --  RR: 18 (27 May 2022 10:24) (18 - 18)  SpO2: 96% (27 May 2022 10:24) (95% - 96%)  I&O's Summary    26 May 2022 07:01  -  27 May 2022 07:00  --------------------------------------------------------  IN: 840 mL / OUT: 1740 mL / NET: -900 mL    27 May 2022 07:01  -  27 May 2022 12:19  --------------------------------------------------------  IN: 220 mL / OUT: 415 mL / NET: -195 mL        PE:  GENERAL: NAD, Comfortable  CHEST/LUNG: Clear to auscultation bilaterally; No wheeze  HEART: Regular rate and rhythm; No murmurs, rubs, or gallops  ABDOMEN: Soft, Nontender, Nondistended; Bowel sounds present  NEURO: AAOx3, no focal deficit  EXTREMITIES:  right leg dressing c/d, +TITI drain x 2  SKIN: No rashes or lesions    LABS:                        10.0   7.11  )-----------( 164      ( 27 May 2022 07:21 )             31.7     05-27    140  |  109<H>  |  37<H>  ----------------------------<  92  4.2   |  20<L>  |  2.97<H>    Ca    7.9<L>      27 May 2022 07:20  Phos  2.4     05-27  Mg     2.2     05-27    TPro  4.8<L>  /  Alb  3.0<L>  /  TBili  0.1<L>  /  DBili  x   /  AST  18  /  ALT  7<L>  /  AlkPhos  71  05-26      CAPILLARY BLOOD GLUCOSE                RADIOLOGY & ADDITIONAL TESTS:    Imaging Personally Reviewed:  [x] YES  [ ] NO    Consultant(s) Notes Reviewed:  [x] YES  [ ] NO    MEDICATIONS  (STANDING):  apixaban 2.5 milliGRAM(s) Oral two times a day  aspirin  chewable 81 milliGRAM(s) Oral daily  buPROPion XL (24-Hour) . 150 milliGRAM(s) Oral daily  ceFAZolin   IVPB 2000 milliGRAM(s) IV Intermittent once  chlorhexidine 2% Cloths 1 Application(s) Topical once  gabapentin 300 milliGRAM(s) Oral at bedtime  lidocaine 1% Injectable 0.2 milliLiter(s) Local Injection once  simvastatin 10 milliGRAM(s) Oral at bedtime  sodium chloride 0.9% Bolus 1000 milliLiter(s) IV Bolus once  sodium phosphate IVPB 30 milliMole(s) IV Intermittent once    MEDICATIONS  (PRN):  acetaminophen     Tablet .. 650 milliGRAM(s) Oral every 6 hours PRN Temp greater or equal to 38C (100.4F), Mild Pain (1 - 3), Moderate Pain (4 - 6), Severe Pain (7 - 10)  oxyCODONE    IR 5 milliGRAM(s) Oral every 6 hours PRN Moderate Pain (4 - 6)      Care Discussed with Consultants/Other Providers [x] YES  [ ] NO    HEALTH ISSUES - PROBLEM Dx:

## 2022-05-27 NOTE — PHYSICAL THERAPY INITIAL EVALUATION ADULT - BALANCE TRAINING, PT EVAL
Goal: Pt. will improve dynamic/static standing balance by 1/2 grade in 4 weeks to improve stability and decrease fall risk.

## 2022-05-27 NOTE — PROGRESS NOTE ADULT - ASSESSMENT
66 year old female PMHx Arthritis, Bacterial Endocarditis s/p AVR, MVR, CABG X 1 in 2019, CKD, COVID s/p Monoclonal Antibody Treatment, GERD, HLD, HTN, PVD s/p right iliac stent and right femoral endarterectomy years ago; s/p femoral-femoral bypass 2021 and Restless Leg Syndrome. Presents to [facility] for right lower extremity femoral to below knee popliteal bypass with GSV. Consult called for post op/co medical mgmt.    s/p right lower extremity femoral to below knee popliteal bypass with GSV POD2:  Monitor outpt via drain  asa eliquis statin  Pain mgmt  Incentive spirometry  PT OOB  Care per Vascular Surgery    Post-op Hypotension:  s/p Levo, now off pressors  Hold all BP meds for now  BP stable not symptommatic  Cards following    Acute Post Op Blood Loss Anemia:  s/p 2 unit PRBC with proper response    CAD/HLD/HTN:  C/w asa Statin    CKD:  creat 2.9, likely from borderline BP, previously crear 2.9 in april  Monitor I/O's, Serial Cr  Avoid nephrotoxins  Renally dose medications    DVT ppx: eliquis    [facility]  [phone]

## 2022-05-27 NOTE — DISCHARGE NOTE PROVIDER - NSDCMRMEDTOKEN_GEN_ALL_CORE_FT
Align 4 mg oral capsule: 1 cap(s) orally once a day  amLODIPine 10 mg oral tablet: 1 tab(s) orally once a day  buPROPion: 150 milligram(s) orally once a day  Caltrate 600 + D oral tablet: 1 tab(s) orally once a day  clopidogrel 75 mg oral tablet: 1 tab(s) orally once a day  gabapentin 300 mg oral capsule: 1 cap(s) orally once a day (at bedtime)  metoprolol succinate 25 mg oral tablet, extended release: 3 tab(s) = total 75 mg- orally once a day   Mirapex 0.25 mg oral tablet: 2 tab(s) orally once a day (at bedtime)  simvastatin 10 mg oral tablet: 1 tab(s) orally once a day (at bedtime)   3 :1 commode :   Align 4 mg oral capsule: 1 cap(s) orally once a day  amLODIPine 10 mg oral tablet: 1 tab(s) orally once a day  buPROPion: 150 milligram(s) orally once a day  Caltrate 600 + D oral tablet: 1 tab(s) orally once a day  clopidogrel 75 mg oral tablet: 1 tab(s) orally once a day  gabapentin 300 mg oral capsule: 1 cap(s) orally once a day (at bedtime)  metoprolol succinate 25 mg oral tablet, extended release: 3 tab(s) = total 75 mg- orally once a day   Mirapex 0.25 mg oral tablet: 2 tab(s) orally once a day (at bedtime)  simvastatin 10 mg oral tablet: 1 tab(s) orally once a day (at bedtime)  standard wheelchair1:    3 :1 commode : 1 Commode   Align 4 mg oral capsule: 1 cap(s) orally once a day  amLODIPine 10 mg oral tablet: 1 tab(s) orally once a day  apixaban 2.5 mg oral tablet: 1 tab(s) orally 2 times a day MDD:2  aspirin 81 mg oral tablet, chewable: 1 tab(s) orally once a day  buPROPion: 150 milligram(s) orally once a day  Caltrate 600 + D oral tablet: 1 tab(s) orally once a day  clopidogrel 75 mg oral tablet: 1 tab(s) orally once a day  gabapentin 300 mg oral capsule: 1 cap(s) orally once a day (at bedtime)  metoprolol succinate 25 mg oral tablet, extended release: 3 tab(s) = total 75 mg- orally once a day   Mirapex 0.25 mg oral tablet: 2 tab(s) orally once a day (at bedtime)  simvastatin 10 mg oral tablet: 1 tab(s) orally once a day (at bedtime)  Standard Wheelchair: 1 Wheel Chair   standard wheelchair1:    3 :1 commode : 1 Commode   Align 4 mg oral capsule: 1 cap(s) orally once a day  amLODIPine 10 mg oral tablet: 1 tab(s) orally once a day  apixaban 2.5 mg oral tablet: 1 tab(s) orally 2 times a day MDD:2  aspirin 81 mg oral tablet, chewable: 1 tab(s) orally once a day  buPROPion: 150 milligram(s) orally once a day  Caltrate 600 + D oral tablet: 1 tab(s) orally once a day  clopidogrel 75 mg oral tablet: 1 tab(s) orally once a day  gabapentin 300 mg oral capsule: 1 cap(s) orally once a day (at bedtime)  metoprolol succinate 25 mg oral tablet, extended release: 3 tab(s) = total 75 mg- orally once a day   Mirapex 0.25 mg oral tablet: 2 tab(s) orally once a day (at bedtime)  oxyCODONE 5 mg oral tablet: 1 tab(s) orally every 6 hours, As needed, Moderate Pain (4 - 6) MDD:4  simvastatin 10 mg oral tablet: 1 tab(s) orally once a day (at bedtime)  Standard Wheelchair: 1 Wheel Chair

## 2022-05-27 NOTE — PHYSICAL THERAPY INITIAL EVALUATION ADULT - LEVEL OF INDEPENDENCE: SIT/STAND, REHAB EVAL
2/2 RLE pain/supervision/contact guard 2/2 RLE pain/contact guard 2/2 RLE pain, dec'd weight bearing/contact guard

## 2022-05-27 NOTE — PHYSICAL THERAPY INITIAL EVALUATION ADULT - ADDITIONAL COMMENTS
Pt. lives with her  in a 2 story house with one flight of stairs leading to the 2nd floor. No steps to enter household. Pt. has bedroom and bathroom on the first floor and does not go upstairs. Reports she owns and has experience using a shower chair and a rolling walker 2/2 past surgeries. Pt. lives with her  in a 2 story house with one flight of stairs leading to the 2nd floor (pt rents second floor). No steps to enter household. Pt. has bedroom and bathroom on the first floor and does not go upstairs. Reports she owns and has experience using a shower chair and a rolling walker 2/2 past surgeries. Pt. lives with her  in a 2 story house with one flight of stairs leading to the 2nd floor (pt rents second floor). No steps to enter household. Pt. has bedroom and bathroom on the first floor and does not go upstairs. Reports she owns and has experience using a shower chair and a rolling walker 2/2 past surgeries. Did not use an AD prior to surgery.

## 2022-05-27 NOTE — DISCHARGE NOTE PROVIDER - NSDCCPTREATMENT_GEN_ALL_CORE_FT
PRINCIPAL PROCEDURE  Procedure: Creation of bypass from right femoral artery to popliteal artery using vein graft  Findings and Treatment: WOUND CARE: Staples will be removed at follow up office visit.    BATHING: Please do not submerge wound underwater. You may shower and/or sponge bathe.  ACTIVITY: No heavy lifting anything more than 10-15lbs or straining. Otherwise, you may return to your usual level of physical activity. If you are taking narcotic pain medication (such as Percocet), do NOT drive a car, operate machinery or make important decisions.  DIET: regular diet   NOTIFY YOUR SURGEON IF: You have any bleeding that does not stop, any pus draining from your wound, any fever (over 100.4 F) or chills, persistent nausea/vomiting with inability to tolerate food or liquids, persistent diarrhea, or if your pain is not controlled on your discharge pain medications.  FOLLOW-UP:  1. Please call to make a follow-up appointment within one week of discharge   2. Please follow up with your primary care physician in one week regarding your hospitalization.

## 2022-05-27 NOTE — PROGRESS NOTE ADULT - ASSESSMENT
66yr old female  of Arthritis, HTN, HLD, GERD, CKD, COVID (3/21)- Monoclonal antibody treatement , Restless leg syndrome, Bacterial endocarditis - 2019- s/p AVR, MVR, CABG X 1, PVD - s/p right iliac stent and right femoral endarterectomy years ago , s/p femoral-femoral bypass 2021 now s/p right fem pop bypass    - No signs of significant ischemia   - Appears compensated from HF POV.   - cont statin and asa  - monitor H/h Transfuse as needed  - post op hypotension noted. was briefly on pressor support. now off  - will monitor hemodynamics closely   - hold all antiHTN meds for now  - PT evaluation and pain control  - Please continue to maintain strict I/Os, monitor daily weights, Cr, and K.  - Further cardiac workup will depend on clinical course.   - All other workup per primary team. Will followup.

## 2022-05-27 NOTE — PHYSICAL THERAPY INITIAL EVALUATION ADULT - MANUAL MUSCLE TESTING RESULTS, REHAB EVAL
B/L UE and LLE at least 3+/5. RLE at least 3-/5, 2/2 to pain./grossly assessed due to B/L UE and LLE at least 3/5. RLE at least 3-/5, 2/2 to pain./grossly assessed due to

## 2022-05-27 NOTE — PHYSICAL THERAPY INITIAL EVALUATION ADULT - TRANSFER SAFETY CONCERNS NOTED: SIT/STAND, REHAB EVAL
2/2 RLE pain/decreased sequencing ability/stand pivot/decreased step length/decreased weight-shifting ability

## 2022-05-28 LAB
ANION GAP SERPL CALC-SCNC: 13 MMOL/L — SIGNIFICANT CHANGE UP (ref 5–17)
BUN SERPL-MCNC: 30 MG/DL — HIGH (ref 7–23)
CALCIUM SERPL-MCNC: 7.9 MG/DL — LOW (ref 8.4–10.5)
CHLORIDE SERPL-SCNC: 110 MMOL/L — HIGH (ref 96–108)
CO2 SERPL-SCNC: 20 MMOL/L — LOW (ref 22–31)
CREAT SERPL-MCNC: 2.38 MG/DL — HIGH (ref 0.5–1.3)
EGFR: 22 ML/MIN/1.73M2 — LOW
GLUCOSE SERPL-MCNC: 89 MG/DL — SIGNIFICANT CHANGE UP (ref 70–99)
HCT VFR BLD CALC: 30.9 % — LOW (ref 34.5–45)
HGB BLD-MCNC: 9.8 G/DL — LOW (ref 11.5–15.5)
MAGNESIUM SERPL-MCNC: 2 MG/DL — SIGNIFICANT CHANGE UP (ref 1.6–2.6)
MCHC RBC-ENTMCNC: 30.3 PG — SIGNIFICANT CHANGE UP (ref 27–34)
MCHC RBC-ENTMCNC: 31.7 GM/DL — LOW (ref 32–36)
MCV RBC AUTO: 95.7 FL — SIGNIFICANT CHANGE UP (ref 80–100)
NRBC # BLD: 0 /100 WBCS — SIGNIFICANT CHANGE UP (ref 0–0)
PHOSPHATE SERPL-MCNC: 3.8 MG/DL — SIGNIFICANT CHANGE UP (ref 2.5–4.5)
PLATELET # BLD AUTO: 183 K/UL — SIGNIFICANT CHANGE UP (ref 150–400)
POTASSIUM SERPL-MCNC: 4.3 MMOL/L — SIGNIFICANT CHANGE UP (ref 3.5–5.3)
POTASSIUM SERPL-SCNC: 4.3 MMOL/L — SIGNIFICANT CHANGE UP (ref 3.5–5.3)
RBC # BLD: 3.23 M/UL — LOW (ref 3.8–5.2)
RBC # FLD: 16.5 % — HIGH (ref 10.3–14.5)
SODIUM SERPL-SCNC: 143 MMOL/L — SIGNIFICANT CHANGE UP (ref 135–145)
WBC # BLD: 6.78 K/UL — SIGNIFICANT CHANGE UP (ref 3.8–10.5)
WBC # FLD AUTO: 6.78 K/UL — SIGNIFICANT CHANGE UP (ref 3.8–10.5)

## 2022-05-28 PROCEDURE — 99233 SBSQ HOSP IP/OBS HIGH 50: CPT

## 2022-05-28 RX ORDER — FUROSEMIDE 40 MG
40 TABLET ORAL ONCE
Refills: 0 | Status: COMPLETED | OUTPATIENT
Start: 2022-05-28 | End: 2022-05-28

## 2022-05-28 RX ADMIN — OXYCODONE HYDROCHLORIDE 5 MILLIGRAM(S): 5 TABLET ORAL at 00:15

## 2022-05-28 RX ADMIN — Medication 81 MILLIGRAM(S): at 12:13

## 2022-05-28 RX ADMIN — Medication 650 MILLIGRAM(S): at 14:15

## 2022-05-28 RX ADMIN — OXYCODONE HYDROCHLORIDE 5 MILLIGRAM(S): 5 TABLET ORAL at 08:00

## 2022-05-28 RX ADMIN — Medication 650 MILLIGRAM(S): at 07:13

## 2022-05-28 RX ADMIN — APIXABAN 2.5 MILLIGRAM(S): 2.5 TABLET, FILM COATED ORAL at 17:47

## 2022-05-28 RX ADMIN — Medication 650 MILLIGRAM(S): at 20:40

## 2022-05-28 RX ADMIN — APIXABAN 2.5 MILLIGRAM(S): 2.5 TABLET, FILM COATED ORAL at 05:48

## 2022-05-28 RX ADMIN — OXYCODONE HYDROCHLORIDE 5 MILLIGRAM(S): 5 TABLET ORAL at 14:15

## 2022-05-28 RX ADMIN — Medication 650 MILLIGRAM(S): at 08:00

## 2022-05-28 RX ADMIN — Medication 40 MILLIGRAM(S): at 09:45

## 2022-05-28 RX ADMIN — OXYCODONE HYDROCHLORIDE 5 MILLIGRAM(S): 5 TABLET ORAL at 07:13

## 2022-05-28 RX ADMIN — OXYCODONE HYDROCHLORIDE 5 MILLIGRAM(S): 5 TABLET ORAL at 20:10

## 2022-05-28 RX ADMIN — BUPROPION HYDROCHLORIDE 150 MILLIGRAM(S): 150 TABLET, EXTENDED RELEASE ORAL at 12:13

## 2022-05-28 RX ADMIN — OXYCODONE HYDROCHLORIDE 5 MILLIGRAM(S): 5 TABLET ORAL at 20:40

## 2022-05-28 RX ADMIN — OXYCODONE HYDROCHLORIDE 5 MILLIGRAM(S): 5 TABLET ORAL at 13:38

## 2022-05-28 RX ADMIN — Medication 650 MILLIGRAM(S): at 13:38

## 2022-05-28 RX ADMIN — Medication 650 MILLIGRAM(S): at 20:10

## 2022-05-28 RX ADMIN — Medication 40 MILLIGRAM(S): at 17:47

## 2022-05-28 RX ADMIN — SIMVASTATIN 10 MILLIGRAM(S): 20 TABLET, FILM COATED ORAL at 22:33

## 2022-05-28 RX ADMIN — GABAPENTIN 300 MILLIGRAM(S): 400 CAPSULE ORAL at 22:33

## 2022-05-28 RX ADMIN — Medication 650 MILLIGRAM(S): at 00:15

## 2022-05-28 NOTE — PROGRESS NOTE ADULT - SUBJECTIVE AND OBJECTIVE BOX
TEAM Surgery Progress Note    INTERVAL EVENTS: No acute events overnight.  SUBJECTIVE: Patient seen and examined at bedside with surgical team    OBJECTIVE:    Vital Signs Last 24 Hrs  T(C): 37.2 (27 May 2022 21:59), Max: 37.2 (27 May 2022 21:59)  T(F): 98.9 (27 May 2022 21:59), Max: 98.9 (27 May 2022 21:59)  HR: 77 (27 May 2022 21:59) (67 - 77)  BP: 122/72 (27 May 2022 21:59) (103/57 - 122/72)  BP(mean): --  RR: 18 (27 May 2022 21:59) (18 - 18)  SpO2: 95% (27 May 2022 21:59) (95% - 97%)I&O's Detail    26 May 2022 07:01  -  27 May 2022 07:00  --------------------------------------------------------  IN:    Oral Fluid: 540 mL    PRBCs (Packed Red Blood Cells): 300 mL  Total IN: 840 mL    OUT:    Bulb (mL): 45 mL    Bulb (mL): 30 mL    Indwelling Catheter - Urethral (mL): 1665 mL    Phenylephrine: 0 mL  Total OUT: 1740 mL    Total NET: -900 mL      27 May 2022 07:01  -  28 May 2022 04:24  --------------------------------------------------------  IN:    Oral Fluid: 540 mL  Total IN: 540 mL    OUT:    Bulb (mL): 25 mL    Bulb (mL): 35 mL    Indwelling Catheter - Urethral (mL): 400 mL    Voided (mL): 200 mL  Total OUT: 660 mL    Total NET: -120 mL      MEDICATIONS  (STANDING):  apixaban 2.5 milliGRAM(s) Oral two times a day  aspirin  chewable 81 milliGRAM(s) Oral daily  buPROPion XL (24-Hour) . 150 milliGRAM(s) Oral daily  ceFAZolin   IVPB 2000 milliGRAM(s) IV Intermittent once  chlorhexidine 2% Cloths 1 Application(s) Topical once  gabapentin 300 milliGRAM(s) Oral at bedtime  lidocaine 1% Injectable 0.2 milliLiter(s) Local Injection once  simvastatin 10 milliGRAM(s) Oral at bedtime  sodium chloride 0.9% Bolus 1000 milliLiter(s) IV Bolus once    MEDICATIONS  (PRN):  acetaminophen     Tablet .. 650 milliGRAM(s) Oral every 6 hours PRN Temp greater or equal to 38C (100.4F), Mild Pain (1 - 3), Moderate Pain (4 - 6), Severe Pain (7 - 10)  oxyCODONE    IR 5 milliGRAM(s) Oral every 6 hours PRN Moderate Pain (4 - 6)      Physical Exam:  General: NAD, resting comfortably in bed  Pulmonary: Nonlabored breathing, no respiratory distress  Cardiovascular: NSR  Abdominal: soft, NT/ND  Extremities: WWP. RLE aquacel dressing c/d/i. TITI drains with serosanguinous output. RLE palpable DP/PT pulses     LABS:                        10.0   7.11  )-----------( 164      ( 27 May 2022 07:21 )             31.7     05-27    140  |  109<H>  |  37<H>  ----------------------------<  92  4.2   |  20<L>  |  2.97<H>    Ca    7.9<L>      27 May 2022 07:20  Phos  2.4     05-27  Mg     2.2     05-27                IMAGING:

## 2022-05-28 NOTE — PROGRESS NOTE ADULT - ASSESSMENT
66yr old female Hx of Arthritis, HTN, HLD, GERD, CKD, COVID (3/21)- Monoclonal antibody treatement , Restless leg syndrome, Bacterial endocarditis - 2019- s/p AVR, MVR, CABG X 1, PVD - s/p right iliac stent and right femoral endarterectomy years ago , s/p femoral-femoral bypass 2021, with c/o right leg pain -difficulty ambulating, claudication -angiogram- revealed short segment popliteal occlusion . Now POD 2 s/p coming in for Right lower extremity femoral to below knee popliteal bypass with GSV. Post- op course complicated by hypotension and acute blood loss anemia.    Plan:  - asa 81, Eliquis 2.5 BID  - Pain control as needed  - DVT ppx  - OOB and ambulating as tolerated  - F/u AM labs- transfuse as needed  - MAP goal >65  - physical therapy    Vascular Surgery, [id number] 66yr old female Hx of Arthritis, HTN, HLD, GERD, CKD, COVID (3/21)- Monoclonal antibody treatement , Restless leg syndrome, Bacterial endocarditis - 2019- s/p AVR, MVR, CABG X 1, PVD - s/p right iliac stent and right femoral endarterectomy years ago , s/p femoral-femoral bypass 2021, with c/o right leg pain -difficulty ambulating, claudication -angiogram- revealed short segment popliteal occlusion . Now s/p Right lower extremity femoral to below knee popliteal bypass with GSV on 5/25. Post- op course complicated by hypotension and acute blood loss anemia.    Plan:  - asa 81, Eliquis 2.5 BID  - Pain control as needed  - DVT ppx  - OOB and ambulating as tolerated  - F/u AM labs - Hb 10, appropriate response s/p transfusion  - MAP goal >65  - physical therapy: home w/ home PT    Vascular Surgery, [id number]

## 2022-05-28 NOTE — PROGRESS NOTE ADULT - SUBJECTIVE AND OBJECTIVE BOX
[facility] -- Chao Lomax, Kunal, Regino, Roshan Paris Savella  Office # [phone]      Follow Up:  PAD, CHF    Subjective/Observations: Patient seen and examined. Events noted. Resting comfortably in bed. No complaints of chest pain,  or palpitations reported. No signs of orthopnea or PND. + cough and dyspnea. +leg pain      REVIEW OF SYSTEMS: All other review of systems is negative unless indicated above    PAST MEDICAL & SURGICAL HISTORY:  Arthritis  Cervical Spine  and Hands      Restless leg syndrome  Especially with General Anesthesia      Hypertension      Hyperlipidemia      CAD (coronary artery disease)      CKD (chronic kidney disease)      Peripheral vascular disease      GERD (gastroesophageal reflux disease)      History of endocarditis      2019 novel coronavirus disease (COVID-19)  3/2021- monoclonal antibody treatement      Depression      Rheumatoid arthritis of carpometacarpal joint of thumb  Total Joint Replacement of Left Thumb      Osteoarthritis of right shoulder region  Right Shoulder Arthroscopy  2007      S/P tubal ligation  1986      S/P appendectomy  2014      Acute peptic ulcer with perforation  1980 - s/p surgery      H/O cervical discectomy  C1-T1 fusion 2020      S/P CABG x 1 2019      S/P AVR (aortic valve replacement)  x 1,2019      History of endarterectomy  iliac stenting and femoral endarterectomy      S/P MVR (mitral valve repair)  x 1 2019      S/P femoral-femoral bypass surgery  2021          MEDICATIONS  (STANDING):  apixaban 2.5 milliGRAM(s) Oral two times a day  aspirin  chewable 81 milliGRAM(s) Oral daily  buPROPion XL (24-Hour) . 150 milliGRAM(s) Oral daily  ceFAZolin   IVPB 2000 milliGRAM(s) IV Intermittent once  chlorhexidine 2% Cloths 1 Application(s) Topical once  gabapentin 300 milliGRAM(s) Oral at bedtime  lidocaine 1% Injectable 0.2 milliLiter(s) Local Injection once  simvastatin 10 milliGRAM(s) Oral at bedtime  sodium chloride 0.9% Bolus 1000 milliLiter(s) IV Bolus once    MEDICATIONS  (PRN):  acetaminophen     Tablet .. 650 milliGRAM(s) Oral every 6 hours PRN Temp greater or equal to 38C (100.4F), Mild Pain (1 - 3), Moderate Pain (4 - 6), Severe Pain (7 - 10)  oxyCODONE    IR 5 milliGRAM(s) Oral every 6 hours PRN Moderate Pain (4 - 6)      Allergies    No Known Allergies    Intolerances            Vital Signs Last 24 Hrs  T(C): 36.5 (28 May 2022 06:57), Max: 37.2 (27 May 2022 21:59)  T(F): 97.7 (28 May 2022 06:57), Max: 98.9 (27 May 2022 21:59)  HR: 70 (28 May 2022 06:57) (70 - 77)  BP: 114/74 (28 May 2022 06:57) (103/57 - 122/72)  BP(mean): --  RR: 18 (28 May 2022 06:57) (18 - 18)  SpO2: 97% (28 May 2022 06:57) (95% - 97%)    I&O's Summary    27 May 2022 07:01  -  28 May 2022 07:00  --------------------------------------------------------  IN: 540 mL / OUT: 670 mL / NET: -130 mL          PHYSICAL EXAM:    Constitutional: NAD, awake and alert, well-developed  HEENT: Moist Mucous Membranes, Anicteric  Pulmonary: Decreased breath sounds b/l. basilar wheeze + crackles   Cardiovascular: Regular, S1 and S2, No murmurs, rubs, gallops or clicks  Gastrointestinal: Bowel Sounds present, soft, nontender.   Lymph: trace peripheral edema. No lymphadenopathy.  Skin: No visible rashes or ulcers.  Psych:  Mood & affect appropriate for situation    LABS: All Labs Reviewed:                        9.8    6.78  )-----------( 183      ( 28 May 2022 07:25 )             30.9                         10.0   7.11  )-----------( 164      ( 27 May 2022 07:21 )             31.7                         8.0    7.87  )-----------( 191      ( 26 May 2022 15:51 )             25.9     28 May 2022 07:24    143    |  110    |  30     ----------------------------<  89     4.3     |  20     |  2.38   27 May 2022 07:20    140    |  109    |  37     ----------------------------<  92     4.2     |  20     |  2.97   26 May 2022 09:00    139    |  107    |  36     ----------------------------<  144    4.5     |  18     |  2.63     Ca    7.9        28 May 2022 07:24  Ca    7.9        27 May 2022 07:20  Ca    7.3        26 May 2022 09:00  Phos  3.8       28 May 2022 07:24  Phos  2.4       27 May 2022 07:20  Phos  3.2       26 May 2022 09:00  Mg     2.0       28 May 2022 07:24  Mg     2.2       27 May 2022 07:20  Mg     1.9       26 May 2022 09:00    TPro  4.8    /  Alb  3.0    /  TBili  0.1    /  DBili  x      /  AST  18     /  ALT  7      /  AlkPhos  71     26 May 2022 02:48

## 2022-05-28 NOTE — PROGRESS NOTE ADULT - ASSESSMENT
66yr old female  of Arthritis, HTN, HLD, GERD, CKD, COVID (3/21)- Monoclonal antibody treatement , Restless leg syndrome, Bacterial endocarditis - 2019- s/p AVR, MVR, CABG X 1, PVD - s/p right iliac stent and right femoral endarterectomy years ago , s/p femoral-femoral bypass 2021 now s/p right fem pop bypass    - No signs of significant ischemia   - cont statin and asa  - monitor H/h Transfuse as needed  - post op hypotension noted. was briefly on pressor support. now off  - will monitor hemodynamics closely     - Now appears to be more volume overloaded on exam  - Would give Lasix 40mg IV x 1 today  - Please continue to maintain strict I/Os, monitor daily weights, Cr, and K.     - hold all antiHTN meds for now  - PT evaluation and pain control  - Please continue to maintain strict I/Os, monitor daily weights, Cr, and K.  - Further cardiac workup will depend on clinical course.   - All other workup per primary team. Will followup.

## 2022-05-29 LAB
ANION GAP SERPL CALC-SCNC: 13 MMOL/L — SIGNIFICANT CHANGE UP (ref 5–17)
BUN SERPL-MCNC: 36 MG/DL — HIGH (ref 7–23)
CALCIUM SERPL-MCNC: 8.5 MG/DL — SIGNIFICANT CHANGE UP (ref 8.4–10.5)
CHLORIDE SERPL-SCNC: 104 MMOL/L — SIGNIFICANT CHANGE UP (ref 96–108)
CO2 SERPL-SCNC: 20 MMOL/L — LOW (ref 22–31)
CREAT SERPL-MCNC: 2.83 MG/DL — HIGH (ref 0.5–1.3)
EGFR: 18 ML/MIN/1.73M2 — LOW
GLUCOSE SERPL-MCNC: 106 MG/DL — HIGH (ref 70–99)
HCT VFR BLD CALC: 29.6 % — LOW (ref 34.5–45)
HGB BLD-MCNC: 9.2 G/DL — LOW (ref 11.5–15.5)
MAGNESIUM SERPL-MCNC: 2 MG/DL — SIGNIFICANT CHANGE UP (ref 1.6–2.6)
MCHC RBC-ENTMCNC: 29.7 PG — SIGNIFICANT CHANGE UP (ref 27–34)
MCHC RBC-ENTMCNC: 31.1 GM/DL — LOW (ref 32–36)
MCV RBC AUTO: 95.5 FL — SIGNIFICANT CHANGE UP (ref 80–100)
NRBC # BLD: 0 /100 WBCS — SIGNIFICANT CHANGE UP (ref 0–0)
PHOSPHATE SERPL-MCNC: 4.2 MG/DL — SIGNIFICANT CHANGE UP (ref 2.5–4.5)
PLATELET # BLD AUTO: 191 K/UL — SIGNIFICANT CHANGE UP (ref 150–400)
POTASSIUM SERPL-MCNC: 4.3 MMOL/L — SIGNIFICANT CHANGE UP (ref 3.5–5.3)
POTASSIUM SERPL-SCNC: 4.3 MMOL/L — SIGNIFICANT CHANGE UP (ref 3.5–5.3)
RBC # BLD: 3.1 M/UL — LOW (ref 3.8–5.2)
RBC # FLD: 16.2 % — HIGH (ref 10.3–14.5)
SODIUM SERPL-SCNC: 137 MMOL/L — SIGNIFICANT CHANGE UP (ref 135–145)
WBC # BLD: 7.6 K/UL — SIGNIFICANT CHANGE UP (ref 3.8–10.5)
WBC # FLD AUTO: 7.6 K/UL — SIGNIFICANT CHANGE UP (ref 3.8–10.5)

## 2022-05-29 PROCEDURE — 99232 SBSQ HOSP IP/OBS MODERATE 35: CPT

## 2022-05-29 RX ADMIN — Medication 650 MILLIGRAM(S): at 03:15

## 2022-05-29 RX ADMIN — OXYCODONE HYDROCHLORIDE 5 MILLIGRAM(S): 5 TABLET ORAL at 19:35

## 2022-05-29 RX ADMIN — Medication 650 MILLIGRAM(S): at 03:45

## 2022-05-29 RX ADMIN — OXYCODONE HYDROCHLORIDE 5 MILLIGRAM(S): 5 TABLET ORAL at 23:27

## 2022-05-29 RX ADMIN — Medication 650 MILLIGRAM(S): at 23:27

## 2022-05-29 RX ADMIN — OXYCODONE HYDROCHLORIDE 5 MILLIGRAM(S): 5 TABLET ORAL at 22:57

## 2022-05-29 RX ADMIN — GABAPENTIN 300 MILLIGRAM(S): 400 CAPSULE ORAL at 22:57

## 2022-05-29 RX ADMIN — BUPROPION HYDROCHLORIDE 150 MILLIGRAM(S): 150 TABLET, EXTENDED RELEASE ORAL at 12:18

## 2022-05-29 RX ADMIN — Medication 650 MILLIGRAM(S): at 09:50

## 2022-05-29 RX ADMIN — APIXABAN 2.5 MILLIGRAM(S): 2.5 TABLET, FILM COATED ORAL at 17:08

## 2022-05-29 RX ADMIN — Medication 81 MILLIGRAM(S): at 12:18

## 2022-05-29 RX ADMIN — OXYCODONE HYDROCHLORIDE 5 MILLIGRAM(S): 5 TABLET ORAL at 17:08

## 2022-05-29 RX ADMIN — APIXABAN 2.5 MILLIGRAM(S): 2.5 TABLET, FILM COATED ORAL at 05:11

## 2022-05-29 RX ADMIN — OXYCODONE HYDROCHLORIDE 5 MILLIGRAM(S): 5 TABLET ORAL at 03:15

## 2022-05-29 RX ADMIN — Medication 650 MILLIGRAM(S): at 17:08

## 2022-05-29 RX ADMIN — Medication 650 MILLIGRAM(S): at 22:57

## 2022-05-29 RX ADMIN — Medication 650 MILLIGRAM(S): at 18:10

## 2022-05-29 RX ADMIN — OXYCODONE HYDROCHLORIDE 5 MILLIGRAM(S): 5 TABLET ORAL at 10:50

## 2022-05-29 RX ADMIN — Medication 650 MILLIGRAM(S): at 10:50

## 2022-05-29 RX ADMIN — SIMVASTATIN 10 MILLIGRAM(S): 20 TABLET, FILM COATED ORAL at 22:57

## 2022-05-29 RX ADMIN — OXYCODONE HYDROCHLORIDE 5 MILLIGRAM(S): 5 TABLET ORAL at 09:50

## 2022-05-29 RX ADMIN — OXYCODONE HYDROCHLORIDE 5 MILLIGRAM(S): 5 TABLET ORAL at 03:45

## 2022-05-29 NOTE — PROGRESS NOTE ADULT - SUBJECTIVE AND OBJECTIVE BOX
Patient is a 66y old  Female who presents with a chief complaint of PAD, bypass (27 May 2022 09:05)      SUBJECTIVE / OVERNIGHT EVENTS:    Patient seen and examined. ambulated in the hallway. no cp sob.       Vital Signs Last 24 Hrs  T(C): 36.7 (29 May 2022 10:00), Max: 36.9 (28 May 2022 22:13)  T(F): 98 (29 May 2022 10:00), Max: 98.5 (29 May 2022 01:00)  HR: 54 (29 May 2022 10:00) (54 - 88)  BP: 142/83 (29 May 2022 10:00) (101/66 - 142/83)  BP(mean): --  RR: 18 (29 May 2022 10:00) (18 - 18)  SpO2: 96% (29 May 2022 10:00) (96% - 99%)  I&O's Summary    28 May 2022 07:01  -  29 May 2022 07:00  --------------------------------------------------------  IN: 1440 mL / OUT: 2375 mL / NET: -935 mL    29 May 2022 07:01  -  29 May 2022 10:45  --------------------------------------------------------  IN: 240 mL / OUT: 0 mL / NET: 240 mL        PE:  GENERAL: NAD, Comfortable  CHEST/LUNG: Clear to auscultation bilaterally  HEART: Regular rate and rhythm; No murmurs, rubs, or gallops  ABDOMEN: Soft, Nontender, Nondistended; Bowel sounds present  NEURO: AAOx3, no focal deficit  EXTREMITIES:  right leg dressing c/d, +TITI drain x 2  SKIN: No rashes or lesions    LABS:                        9.2    7.60  )-----------( 191      ( 29 May 2022 07:38 )             29.6     05-29    137  |  104  |  36<H>  ----------------------------<  106<H>  4.3   |  20<L>  |  2.83<H>    Ca    8.5      29 May 2022 07:38  Phos  4.2     05-29  Mg     2.0     05-29        CAPILLARY BLOOD GLUCOSE                RADIOLOGY & ADDITIONAL TESTS:    Imaging Personally Reviewed:  [x] YES  [ ] NO    Consultant(s) Notes Reviewed:  [x] YES  [ ] NO    MEDICATIONS  (STANDING):  apixaban 2.5 milliGRAM(s) Oral two times a day  aspirin  chewable 81 milliGRAM(s) Oral daily  buPROPion XL (24-Hour) . 150 milliGRAM(s) Oral daily  ceFAZolin   IVPB 2000 milliGRAM(s) IV Intermittent once  chlorhexidine 2% Cloths 1 Application(s) Topical once  gabapentin 300 milliGRAM(s) Oral at bedtime  lidocaine 1% Injectable 0.2 milliLiter(s) Local Injection once  simvastatin 10 milliGRAM(s) Oral at bedtime  sodium chloride 0.9% Bolus 1000 milliLiter(s) IV Bolus once    MEDICATIONS  (PRN):  acetaminophen     Tablet .. 650 milliGRAM(s) Oral every 6 hours PRN Temp greater or equal to 38C (100.4F), Mild Pain (1 - 3), Moderate Pain (4 - 6), Severe Pain (7 - 10)  oxyCODONE    IR 5 milliGRAM(s) Oral every 6 hours PRN Moderate Pain (4 - 6)      Care Discussed with Consultants/Other Providers [x] YES  [ ] NO    HEALTH ISSUES - PROBLEM Dx:

## 2022-05-29 NOTE — PROGRESS NOTE ADULT - ASSESSMENT
66 year old female PMHx Arthritis, Bacterial Endocarditis s/p AVR, MVR, CABG X 1 in 2019, CKD, COVID s/p Monoclonal Antibody Treatment, GERD, HLD, HTN, PVD s/p right iliac stent and right femoral endarterectomy years ago; s/p femoral-femoral bypass 2021 and Restless Leg Syndrome. Presents to [facility] for right lower extremity femoral to below knee popliteal bypass with GSV. Consult called for post op/co medical mgmt.    s/p right lower extremity femoral to below knee popliteal bypass with GSV:  Monitor outpt via drain, drain per vascular  asa eliquis statin  Pain mgmt  Incentive spirometry  PT OOB  Care per Vascular Surgery    Post-op Hypotension:  BP stable, off BP meds for now, cont to monitor    Acute Post Op Blood Loss Anemia:  h/h stable    CAD/HLD/HTN:  C/w asa Statin    CKD:  stable  Monitor I/O's, Serial Cr, Avoid nephrotoxins  Renally dose medications    DVT ppx: eliquis    [facility]  [phone]

## 2022-05-29 NOTE — PROGRESS NOTE ADULT - ASSESSMENT
66yr old female Hx of Arthritis, HTN, HLD, GERD, CKD, COVID (3/21)- Monoclonal antibody treatement , Restless leg syndrome, Bacterial endocarditis - 2019- s/p AVR, MVR, CABG X 1, PVD - s/p right iliac stent and right femoral endarterectomy years ago , s/p femoral-femoral bypass 2021, with c/o right leg pain -difficulty ambulating, claudication -angiogram- revealed short segment popliteal occlusion . Now s/p Right lower extremity femoral to below knee popliteal bypass with GSV on 5/25. Post- op course complicated by hypotension and acute blood loss anemia.    Plan:  - asa 81, Eliquis 2.5 BID  - Pain control as needed  - DVT ppx  - OOB and ambulating as tolerated  - F/u AM labs - Hb 10, appropriate response s/p transfusion  - MAP goal >65  - physical therapy: home w/ home PT    Vascular Surgery, [id number]

## 2022-05-29 NOTE — PROGRESS NOTE ADULT - SUBJECTIVE AND OBJECTIVE BOX
[facility] -- Chao Lomax, Regino Syed, Roshan Paris Savella  Office # [phone]      Follow Up:  CAD    Subjective/Observations: Patient seen and examined. Events noted. Resting comfortably in bed. No complaints of chest pain,  or palpitations reported. No signs of orthopnea or PND. Still with SOB and wheeze       REVIEW OF SYSTEMS: All other review of systems is negative unless indicated above    PAST MEDICAL & SURGICAL HISTORY:  Arthritis  Cervical Spine  and Hands      Restless leg syndrome  Especially with General Anesthesia      Hypertension      Hyperlipidemia      CAD (coronary artery disease)      CKD (chronic kidney disease)      Peripheral vascular disease      GERD (gastroesophageal reflux disease)      History of endocarditis      2019 novel coronavirus disease (COVID-19)  3/2021- monoclonal antibody treatement      Depression      Rheumatoid arthritis of carpometacarpal joint of thumb  Total Joint Replacement of Left Thumb      Osteoarthritis of right shoulder region  Right Shoulder Arthroscopy  2007      S/P tubal ligation  1986      S/P appendectomy  2014      Acute peptic ulcer with perforation  1980 - s/p surgery      H/O cervical discectomy  C1-T1 fusion 2020      S/P CABG x 1 2019      S/P AVR (aortic valve replacement)  x 1,2019      History of endarterectomy  iliac stenting and femoral endarterectomy      S/P MVR (mitral valve repair)  x 1 2019      S/P femoral-femoral bypass surgery  2021          MEDICATIONS  (STANDING):  apixaban 2.5 milliGRAM(s) Oral two times a day  aspirin  chewable 81 milliGRAM(s) Oral daily  buPROPion XL (24-Hour) . 150 milliGRAM(s) Oral daily  ceFAZolin   IVPB 2000 milliGRAM(s) IV Intermittent once  chlorhexidine 2% Cloths 1 Application(s) Topical once  gabapentin 300 milliGRAM(s) Oral at bedtime  lidocaine 1% Injectable 0.2 milliLiter(s) Local Injection once  simvastatin 10 milliGRAM(s) Oral at bedtime  sodium chloride 0.9% Bolus 1000 milliLiter(s) IV Bolus once    MEDICATIONS  (PRN):  acetaminophen     Tablet .. 650 milliGRAM(s) Oral every 6 hours PRN Temp greater or equal to 38C (100.4F), Mild Pain (1 - 3), Moderate Pain (4 - 6), Severe Pain (7 - 10)  oxyCODONE    IR 5 milliGRAM(s) Oral every 6 hours PRN Moderate Pain (4 - 6)      Allergies    No Known Allergies    Intolerances            Vital Signs Last 24 Hrs  T(C): 36.7 (29 May 2022 05:53), Max: 36.9 (28 May 2022 22:13)  T(F): 98 (29 May 2022 05:53), Max: 98.5 (29 May 2022 01:00)  HR: 76 (29 May 2022 05:53) (74 - 88)  BP: 102/66 (29 May 2022 05:53) (101/66 - 138/75)  BP(mean): --  RR: 18 (29 May 2022 05:53) (18 - 18)  SpO2: 96% (29 May 2022 05:53) (96% - 99%)    I&O's Summary    28 May 2022 07:01  -  29 May 2022 07:00  --------------------------------------------------------  IN: 1440 mL / OUT: 2375 mL / NET: -935 mL          PHYSICAL EXAM:  TELE:   Constitutional: NAD, awake and alert, well-developed  HEENT: Moist Mucous Membranes, Anicteric  Pulmonary: Decreased breath sounds b/l. Wheeze  Cardiovascular: Regular, S1 and S2, No murmurs, rubs, gallops or clicks  Gastrointestinal: Bowel Sounds present, soft, nontender.   Lymph: No peripheral edema. No lymphadenopathy.  Skin: No visible rashes or ulcers.  Psych:  Mood & affect appropriate for situation    LABS: All Labs Reviewed:                        9.2    7.60  )-----------( 191      ( 29 May 2022 07:38 )             29.6                         9.8    6.78  )-----------( 183      ( 28 May 2022 07:25 )             30.9                         10.0   7.11  )-----------( 164      ( 27 May 2022 07:21 )             31.7     29 May 2022 07:38    137    |  104    |  36     ----------------------------<  106    4.3     |  20     |  2.83   28 May 2022 07:24    143    |  110    |  30     ----------------------------<  89     4.3     |  20     |  2.38   27 May 2022 07:20    140    |  109    |  37     ----------------------------<  92     4.2     |  20     |  2.97     Ca    8.5        29 May 2022 07:38  Ca    7.9        28 May 2022 07:24  Ca    7.9        27 May 2022 07:20  Phos  4.2       29 May 2022 07:38  Phos  3.8       28 May 2022 07:24  Phos  2.4       27 May 2022 07:20  Mg     2.0       29 May 2022 07:38  Mg     2.0       28 May 2022 07:24  Mg     2.2       27 May 2022 07:20

## 2022-05-29 NOTE — PROGRESS NOTE ADULT - SUBJECTIVE AND OBJECTIVE BOX
SURGERY DAILY PROGRESS NOTE:       SUBJECTIVE/ROS: No acute overnight events. Patient seen and examined at bedside during morning rounds.    OBJECTIVE:    Vital Signs Last 24 Hrs  T(C): 36.9 (29 May 2022 01:00), Max: 36.9 (28 May 2022 22:13)  T(F): 98.5 (29 May 2022 01:00), Max: 98.5 (29 May 2022 01:00)  HR: 82 (29 May 2022 01:00) (70 - 88)  BP: 101/66 (29 May 2022 01:00) (101/66 - 138/75)  BP(mean): --  RR: 18 (29 May 2022 01:00) (18 - 18)  SpO2: 97% (29 May 2022 01:00) (97% - 99%)    I&O's Detail    27 May 2022 07:01  -  28 May 2022 07:00  --------------------------------------------------------  IN:    Oral Fluid: 540 mL  Total IN: 540 mL    OUT:    Bulb (mL): 30 mL    Bulb (mL): 40 mL    Indwelling Catheter - Urethral (mL): 400 mL    Voided (mL): 200 mL  Total OUT: 670 mL    Total NET: -130 mL      28 May 2022 07:01  -  29 May 2022 04:29  --------------------------------------------------------  IN:    Oral Fluid: 960 mL  Total IN: 960 mL    OUT:    Bulb (mL): 20 mL    Bulb (mL): 20 mL    Voided (mL): 2300 mL  Total OUT: 2340 mL    Total NET: -1380 mL    Physical Exam:  General: NAD, resting comfortably in bed  Pulmonary: Nonlabored breathing, no respiratory distress  Cardiovascular: NSR  Abdominal: soft, NT/ND  Extremities: WWP. RLE aquacel dressing c/d/i. TITI drains with serosanguinous output. RLE palpable DP/PT pulses     LABS:                        9.8    6.78  )-----------( 183      ( 28 May 2022 07:25 )             30.9     05-28    143  |  110<H>  |  30<H>  ----------------------------<  89  4.3   |  20<L>  |  2.38<H>    Ca    7.9<L>      28 May 2022 07:24  Phos  3.8     05-28  Mg     2.0     05-28

## 2022-05-29 NOTE — PROGRESS NOTE ADULT - ASSESSMENT
66yr old female  of Arthritis, HTN, HLD, GERD, CKD, COVID (3/21)- Monoclonal antibody treatement , Restless leg syndrome, Bacterial endocarditis - 2019- s/p AVR, MVR, CABG X 1, PVD - s/p right iliac stent and right femoral endarterectomy years ago , s/p femoral-femoral bypass 2021 now s/p right fem pop bypass    - No signs of significant ischemia   - cont statin and asa  - monitor H/h Transfuse as needed  - post op hypotension noted. was briefly on pressor support. now off  - will monitor hemodynamics closely     - s/p lasix and with only modest improvement in sx  - defer more lasix given rising cr  - still with wheeze on exam  - would try neb rx today    - hold all antiHTN meds for now  - PT evaluation and pain control  - Please continue to maintain strict I/Os, monitor daily weights, Cr, and K.  - Further cardiac workup will depend on clinical course.   - All other workup per primary team. Will followup.

## 2022-05-30 ENCOUNTER — TRANSCRIPTION ENCOUNTER (OUTPATIENT)
Age: 67
End: 2022-05-30

## 2022-05-30 VITALS
SYSTOLIC BLOOD PRESSURE: 132 MMHG | HEART RATE: 92 BPM | RESPIRATION RATE: 18 BRPM | DIASTOLIC BLOOD PRESSURE: 79 MMHG | OXYGEN SATURATION: 97 % | TEMPERATURE: 98 F

## 2022-05-30 LAB
ANION GAP SERPL CALC-SCNC: 13 MMOL/L — SIGNIFICANT CHANGE UP (ref 5–17)
BUN SERPL-MCNC: 44 MG/DL — HIGH (ref 7–23)
CALCIUM SERPL-MCNC: 8.8 MG/DL — SIGNIFICANT CHANGE UP (ref 8.4–10.5)
CHLORIDE SERPL-SCNC: 102 MMOL/L — SIGNIFICANT CHANGE UP (ref 96–108)
CO2 SERPL-SCNC: 21 MMOL/L — LOW (ref 22–31)
CREAT SERPL-MCNC: 2.99 MG/DL — HIGH (ref 0.5–1.3)
EGFR: 17 ML/MIN/1.73M2 — LOW
GLUCOSE SERPL-MCNC: 126 MG/DL — HIGH (ref 70–99)
HCT VFR BLD CALC: 31.8 % — LOW (ref 34.5–45)
HGB BLD-MCNC: 9.9 G/DL — LOW (ref 11.5–15.5)
MAGNESIUM SERPL-MCNC: 2.2 MG/DL — SIGNIFICANT CHANGE UP (ref 1.6–2.6)
MCHC RBC-ENTMCNC: 30 PG — SIGNIFICANT CHANGE UP (ref 27–34)
MCHC RBC-ENTMCNC: 31.1 GM/DL — LOW (ref 32–36)
MCV RBC AUTO: 96.4 FL — SIGNIFICANT CHANGE UP (ref 80–100)
NRBC # BLD: 0 /100 WBCS — SIGNIFICANT CHANGE UP (ref 0–0)
PHOSPHATE SERPL-MCNC: 4.5 MG/DL — SIGNIFICANT CHANGE UP (ref 2.5–4.5)
PLATELET # BLD AUTO: 240 K/UL — SIGNIFICANT CHANGE UP (ref 150–400)
POTASSIUM SERPL-MCNC: 4.6 MMOL/L — SIGNIFICANT CHANGE UP (ref 3.5–5.3)
POTASSIUM SERPL-SCNC: 4.6 MMOL/L — SIGNIFICANT CHANGE UP (ref 3.5–5.3)
RBC # BLD: 3.3 M/UL — LOW (ref 3.8–5.2)
RBC # FLD: 15.9 % — HIGH (ref 10.3–14.5)
SODIUM SERPL-SCNC: 136 MMOL/L — SIGNIFICANT CHANGE UP (ref 135–145)
WBC # BLD: 8.04 K/UL — SIGNIFICANT CHANGE UP (ref 3.8–10.5)
WBC # FLD AUTO: 8.04 K/UL — SIGNIFICANT CHANGE UP (ref 3.8–10.5)

## 2022-05-30 PROCEDURE — 80048 BASIC METABOLIC PNL TOTAL CA: CPT

## 2022-05-30 PROCEDURE — 85025 COMPLETE CBC W/AUTO DIFF WBC: CPT

## 2022-05-30 PROCEDURE — 99232 SBSQ HOSP IP/OBS MODERATE 35: CPT

## 2022-05-30 PROCEDURE — 85014 HEMATOCRIT: CPT

## 2022-05-30 PROCEDURE — 85018 HEMOGLOBIN: CPT

## 2022-05-30 PROCEDURE — 82803 BLOOD GASES ANY COMBINATION: CPT

## 2022-05-30 PROCEDURE — 86901 BLOOD TYPING SEROLOGIC RH(D): CPT

## 2022-05-30 PROCEDURE — 83605 ASSAY OF LACTIC ACID: CPT

## 2022-05-30 PROCEDURE — 82435 ASSAY OF BLOOD CHLORIDE: CPT

## 2022-05-30 PROCEDURE — 82330 ASSAY OF CALCIUM: CPT

## 2022-05-30 PROCEDURE — 97530 THERAPEUTIC ACTIVITIES: CPT

## 2022-05-30 PROCEDURE — 84100 ASSAY OF PHOSPHORUS: CPT

## 2022-05-30 PROCEDURE — 86923 COMPATIBILITY TEST ELECTRIC: CPT

## 2022-05-30 PROCEDURE — 97161 PT EVAL LOW COMPLEX 20 MIN: CPT

## 2022-05-30 PROCEDURE — C1769: CPT

## 2022-05-30 PROCEDURE — 86850 RBC ANTIBODY SCREEN: CPT

## 2022-05-30 PROCEDURE — 85027 COMPLETE CBC AUTOMATED: CPT

## 2022-05-30 PROCEDURE — C1889: CPT

## 2022-05-30 PROCEDURE — 82947 ASSAY GLUCOSE BLOOD QUANT: CPT

## 2022-05-30 PROCEDURE — 36430 TRANSFUSION BLD/BLD COMPNT: CPT

## 2022-05-30 PROCEDURE — 84132 ASSAY OF SERUM POTASSIUM: CPT

## 2022-05-30 PROCEDURE — 84295 ASSAY OF SERUM SODIUM: CPT

## 2022-05-30 PROCEDURE — 82565 ASSAY OF CREATININE: CPT

## 2022-05-30 PROCEDURE — 80053 COMPREHEN METABOLIC PANEL: CPT

## 2022-05-30 PROCEDURE — 84484 ASSAY OF TROPONIN QUANT: CPT

## 2022-05-30 PROCEDURE — 83735 ASSAY OF MAGNESIUM: CPT

## 2022-05-30 PROCEDURE — 93005 ELECTROCARDIOGRAM TRACING: CPT

## 2022-05-30 PROCEDURE — P9016: CPT

## 2022-05-30 PROCEDURE — 36415 COLL VENOUS BLD VENIPUNCTURE: CPT

## 2022-05-30 PROCEDURE — 86900 BLOOD TYPING SEROLOGIC ABO: CPT

## 2022-05-30 RX ORDER — OXYCODONE HYDROCHLORIDE 5 MG/1
1 TABLET ORAL
Qty: 20 | Refills: 0
Start: 2022-05-30 | End: 2022-06-03

## 2022-05-30 RX ORDER — APIXABAN 2.5 MG/1
1 TABLET, FILM COATED ORAL
Qty: 60 | Refills: 0
Start: 2022-05-30 | End: 2022-06-28

## 2022-05-30 RX ORDER — ASPIRIN/CALCIUM CARB/MAGNESIUM 324 MG
1 TABLET ORAL
Qty: 30 | Refills: 0
Start: 2022-05-30 | End: 2022-06-28

## 2022-05-30 RX ADMIN — OXYCODONE HYDROCHLORIDE 5 MILLIGRAM(S): 5 TABLET ORAL at 12:30

## 2022-05-30 RX ADMIN — Medication 650 MILLIGRAM(S): at 05:42

## 2022-05-30 RX ADMIN — Medication 650 MILLIGRAM(S): at 12:30

## 2022-05-30 RX ADMIN — Medication 650 MILLIGRAM(S): at 06:12

## 2022-05-30 RX ADMIN — OXYCODONE HYDROCHLORIDE 5 MILLIGRAM(S): 5 TABLET ORAL at 11:55

## 2022-05-30 RX ADMIN — OXYCODONE HYDROCHLORIDE 5 MILLIGRAM(S): 5 TABLET ORAL at 05:42

## 2022-05-30 RX ADMIN — APIXABAN 2.5 MILLIGRAM(S): 2.5 TABLET, FILM COATED ORAL at 05:38

## 2022-05-30 RX ADMIN — Medication 650 MILLIGRAM(S): at 11:55

## 2022-05-30 RX ADMIN — OXYCODONE HYDROCHLORIDE 5 MILLIGRAM(S): 5 TABLET ORAL at 06:12

## 2022-05-30 NOTE — PROGRESS NOTE ADULT - ASSESSMENT
66yr old female Hx of Arthritis, HTN, HLD, GERD, CKD, COVID (3/21)- Monoclonal antibody treatement , Restless leg syndrome, Bacterial endocarditis - 2019- s/p AVR, MVR, CABG X 1, PVD - s/p right iliac stent and right femoral endarterectomy years ago , s/p femoral-femoral bypass 2021, with c/o right leg pain -difficulty ambulating, claudication -angiogram- revealed short segment popliteal occlusion . Now s/p Right lower extremity femoral to below knee popliteal bypass with GSV on 5/25. Post- op course complicated by hypotension and acute blood loss anemia.    Plan:  - Asa 81, Eliquis 2.5 BID  - Pain control as needed  - DVT ppx  - OOB and ambulating as tolerated  - physical therapy: home w/ home PT  - d/c TITI drains  - D/C home.       Vascular Surgery, [id number]

## 2022-05-30 NOTE — PROGRESS NOTE ADULT - SUBJECTIVE AND OBJECTIVE BOX
SURGERY PROGRESS NOTE  Hospital Day #05-25-22 (5d)  Procedure/Dx: Creation of bypass from right femoral artery to popliteal artery using vein graft    Overnight, no acute events.   Pt seen and examined at bedside.      Vital Signs   T(C): 36.9 (30 May 2022 05:53), Max: 37 (30 May 2022 01:12)  T(F): 98.5 (30 May 2022 05:53), Max: 98.6 (30 May 2022 01:12)  HR: 93 (30 May 2022 05:53) (80 - 93)  BP: 119/64 (30 May 2022 05:53) (110/70 - 120/74)  RR: 18 (30 May 2022 05:53) (18 - 18)  SpO2: 97% (30 May 2022 05:53) (95% - 98%)    Physical Exam:  General: NAD, resting comfortably in bed  Pulmonary: Nonlabored breathing, no respiratory distress  Cardiovascular: NSR  Abdominal: soft, NT/ND  Extremities: WWP. RLE aquacel dressing removed. TITI drains with serosanguinous output. Staples and stitches C/D/I. RLE palpable DP/PT pulses     MEDICATIONS:   ANTIBIOTICS: ceFAZolin   IVPB 2000 milliGRAM(s)    LAB/STUDIES:             9.9    8.04  )-----------( 240      ( 30 May 2022 07:22 )             31.8   136  |  102  |  44<H>  ----------------------------<  126<H>  4.6   |  21<L>  |  2.99<H>  Ca    8.8      30 May 2022 07:23  Phos  4.5     05-30  Mg     2.2     05-30

## 2022-05-30 NOTE — PROGRESS NOTE ADULT - ASSESSMENT
66yr old female  of Arthritis, HTN, HLD, GERD, CKD, COVID (3/21)- Monoclonal antibody treatement , Restless leg syndrome, Bacterial endocarditis - 2019- s/p AVR, MVR, CABG X 1, PVD - s/p right iliac stent and right femoral endarterectomy years ago , s/p femoral-femoral bypass 2021 now s/p right fem pop bypass    - No signs of significant ischemia   - cont statin and asa  - monitor H/h Transfuse as needed  - post op hypotension noted. was briefly on pressor support. now off  - will monitor hemodynamics closely     - s/p lasix and with only modest improvement in sx  - defer more lasix given rising cr    -  all antiHTN meds have been held. can resume toprol 25mg Qday  - PT evaluation and pain control  - Please continue to maintain strict I/Os, monitor daily weights, Cr, and K.  - Further cardiac workup will depend on clinical course.   - All other workup per primary team. Will followup.   DC planning per primary team.

## 2022-05-30 NOTE — DISCHARGE NOTE NURSING/CASE MANAGEMENT/SOCIAL WORK - NSDCPEFALRISK_GEN_ALL_CORE
For information on Fall & Injury Prevention, visit: [email or web address] OR  [email or web address] OR  https://www.cdc.gov/steadi/patient.html

## 2022-05-30 NOTE — PROGRESS NOTE ADULT - PROVIDER SPECIALTY LIST ADULT
Internal Medicine
Vascular Surgery
Vascular Surgery
Cardiology
Cardiology
Surgery
Vascular Surgery
Cardiology
Cardiology
Internal Medicine
Internal Medicine
Vascular Surgery

## 2022-05-30 NOTE — PROGRESS NOTE ADULT - ASSESSMENT
66 year old female PMHx Arthritis, Bacterial Endocarditis s/p AVR, MVR, CABG X 1 in 2019, CKD, COVID s/p Monoclonal Antibody Treatment, GERD, HLD, HTN, PVD s/p right iliac stent and right femoral endarterectomy years ago; s/p femoral-femoral bypass 2021 and Restless Leg Syndrome. Presents to [facility] for right lower extremity femoral to below knee popliteal bypass with GSV. Consult called for post op/co medical mgmt.    s/p right lower extremity femoral to below knee popliteal bypass with GSV:  Monitor outpt via drain, drains per vascular  asa eliquis statin  Pain mgmt  Incentive spirometry  PT OOB  dc planning per Vascular Surgery    Post-op Hypotension:  BP stable, off BP meds for now  can fu outpt to resume BP meds PCP Dr. Angelo Jarquin in 1-2 weeks    Acute Post Op Blood Loss Anemia:  h/h stable    CAD/HLD/HTN:  C/w asa Statin    CKD:  stable at baseline  Monitor I/O's, Serial Cr, Avoid nephrotoxins, Renally dose medications    DVT ppx: eliquis    [facility]  [phone]

## 2022-05-30 NOTE — PROGRESS NOTE ADULT - SUBJECTIVE AND OBJECTIVE BOX
[facility] -- Chao Lomax, Regino Syed, Roshan Paris Savella  Office # [phone]      Follow Up:  PAD    Subjective/Observations: Patient seen and examined. Events noted. Resting comfortably in bed. No complaints of chest pain, dyspnea, or palpitations reported. No signs of orthopnea or PND. Stil lwith cough but improved.       REVIEW OF SYSTEMS: All other review of systems is negative unless indicated above    PAST MEDICAL & SURGICAL HISTORY:  Arthritis  Cervical Spine  and Hands      Restless leg syndrome  Especially with General Anesthesia      Hypertension      Hyperlipidemia      CAD (coronary artery disease)      CKD (chronic kidney disease)      Peripheral vascular disease      GERD (gastroesophageal reflux disease)      History of endocarditis      2019 novel coronavirus disease (COVID-19)  3/2021- monoclonal antibody treatement      Depression      Rheumatoid arthritis of carpometacarpal joint of thumb  Total Joint Replacement of Left Thumb      Osteoarthritis of right shoulder region  Right Shoulder Arthroscopy  2007      S/P tubal ligation  1986      S/P appendectomy  2014      Acute peptic ulcer with perforation  1980 - s/p surgery      H/O cervical discectomy  C1-T1 fusion 2020      S/P CABG x 1 2019      S/P AVR (aortic valve replacement)  x 1,2019      History of endarterectomy  iliac stenting and femoral endarterectomy      S/P MVR (mitral valve repair)  x 1 2019      S/P femoral-femoral bypass surgery  2021          MEDICATIONS  (STANDING):  apixaban 2.5 milliGRAM(s) Oral two times a day  aspirin  chewable 81 milliGRAM(s) Oral daily  buPROPion XL (24-Hour) . 150 milliGRAM(s) Oral daily  ceFAZolin   IVPB 2000 milliGRAM(s) IV Intermittent once  chlorhexidine 2% Cloths 1 Application(s) Topical once  gabapentin 300 milliGRAM(s) Oral at bedtime  lidocaine 1% Injectable 0.2 milliLiter(s) Local Injection once  simvastatin 10 milliGRAM(s) Oral at bedtime  sodium chloride 0.9% Bolus 1000 milliLiter(s) IV Bolus once    MEDICATIONS  (PRN):  acetaminophen     Tablet .. 650 milliGRAM(s) Oral every 6 hours PRN Temp greater or equal to 38C (100.4F), Mild Pain (1 - 3), Moderate Pain (4 - 6), Severe Pain (7 - 10)  oxyCODONE    IR 5 milliGRAM(s) Oral every 6 hours PRN Moderate Pain (4 - 6)      Allergies    No Known Allergies    Intolerances            Vital Signs Last 24 Hrs  T(C): 36.9 (30 May 2022 05:53), Max: 37 (30 May 2022 01:12)  T(F): 98.5 (30 May 2022 05:53), Max: 98.6 (30 May 2022 01:12)  HR: 93 (30 May 2022 05:53) (54 - 93)  BP: 119/64 (30 May 2022 05:53) (110/70 - 142/83)  BP(mean): --  RR: 18 (30 May 2022 05:53) (18 - 18)  SpO2: 97% (30 May 2022 05:53) (95% - 98%)    I&O's Summary    29 May 2022 07:01  -  30 May 2022 07:00  --------------------------------------------------------  IN: 840 mL / OUT: 375 mL / NET: 465 mL          PHYSICAL EXAM:     Constitutional: NAD, awake and alert, well-developed  HEENT: Moist Mucous Membranes, Anicteric  Pulmonary: Non-labored, breath sounds are clear bilaterally, No wheezing, rales or rhonchi  Cardiovascular: Regular, S1 and S2, No murmurs, rubs, gallops or clicks  Gastrointestinal: Bowel Sounds present, soft, nontender.   Lymph: No peripheral edema. No lymphadenopathy.  Skin: No visible rashes or ulcers. + leg drain c/d/i   Psych:  Mood & affect appropriate for situation    LABS: All Labs Reviewed:                        9.9    8.04  )-----------( 240      ( 30 May 2022 07:22 )             31.8                         9.2    7.60  )-----------( 191      ( 29 May 2022 07:38 )             29.6                         9.8    6.78  )-----------( 183      ( 28 May 2022 07:25 )             30.9     30 May 2022 07:23    136    |  102    |  44     ----------------------------<  126    4.6     |  21     |  2.99   29 May 2022 07:38    137    |  104    |  36     ----------------------------<  106    4.3     |  20     |  2.83   28 May 2022 07:24    143    |  110    |  30     ----------------------------<  89     4.3     |  20     |  2.38     Ca    8.8        30 May 2022 07:23  Ca    8.5        29 May 2022 07:38  Ca    7.9        28 May 2022 07:24  Phos  4.5       30 May 2022 07:23  Phos  4.2       29 May 2022 07:38  Phos  3.8       28 May 2022 07:24  Mg     2.2       30 May 2022 07:23  Mg     2.0       29 May 2022 07:38  Mg     2.0       28 May 2022 07:24

## 2022-05-30 NOTE — DISCHARGE NOTE NURSING/CASE MANAGEMENT/SOCIAL WORK - NSSCTYPOFSERV_GEN_ALL_CORE
Home care for skilled nursing and home physical therapy visits. RN will call 1-2 days after discharge to schedule visit.

## 2022-05-30 NOTE — DISCHARGE NOTE NURSING/CASE MANAGEMENT/SOCIAL WORK - PATIENT PORTAL LINK FT
You can access the FollowMyHealth Patient Portal offered by  by registering at the following website: [email or web address]. By joining [facility]’s FollowMyHealth portal, you will also be able to view your health information using other applications (apps) compatible with our system.

## 2022-05-30 NOTE — PROGRESS NOTE ADULT - SUBJECTIVE AND OBJECTIVE BOX
Patient is a 66y old  Female who presents with a chief complaint of PAD, bypass (27 May 2022 09:05)      SUBJECTIVE / OVERNIGHT EVENTS:    Patient seen and examined. doing well. no cp sob abd pain.      Vital Signs Last 24 Hrs  T(C): 36.7 (30 May 2022 10:55), Max: 37 (30 May 2022 01:12)  T(F): 98 (30 May 2022 10:55), Max: 98.6 (30 May 2022 01:12)  HR: 92 (30 May 2022 10:55) (80 - 93)  BP: 132/79 (30 May 2022 10:55) (110/70 - 132/79)  BP(mean): --  RR: 18 (30 May 2022 10:55) (18 - 18)  SpO2: 97% (30 May 2022 10:55) (95% - 98%)  I&O's Summary    29 May 2022 07:01  -  30 May 2022 07:00  --------------------------------------------------------  IN: 840 mL / OUT: 375 mL / NET: 465 mL    30 May 2022 07:01  -  30 May 2022 11:29  --------------------------------------------------------  IN: 240 mL / OUT: 10 mL / NET: 230 mL        PE:  GENERAL: NAD, Comfortable  CHEST/LUNG: Clear to auscultation bilaterally  HEART: Regular rate and rhythm; No murmurs, rubs, or gallops  ABDOMEN: Soft, Nontender, Nondistended; Bowel sounds present  NEURO: AAOx3, no focal deficit  EXTREMITIES:  right leg incision up leg dressing c/d/i, , +TITI drain x 2  SKIN: No rashes or lesions    LABS:                        9.9    8.04  )-----------( 240      ( 30 May 2022 07:22 )             31.8     05-30    136  |  102  |  44<H>  ----------------------------<  126<H>  4.6   |  21<L>  |  2.99<H>    Ca    8.8      30 May 2022 07:23  Phos  4.5     05-30  Mg     2.2     05-30        CAPILLARY BLOOD GLUCOSE                RADIOLOGY & ADDITIONAL TESTS:    Imaging Personally Reviewed:  [x] YES  [ ] NO    Consultant(s) Notes Reviewed:  [x] YES  [ ] NO    MEDICATIONS  (STANDING):  apixaban 2.5 milliGRAM(s) Oral two times a day  aspirin  chewable 81 milliGRAM(s) Oral daily  buPROPion XL (24-Hour) . 150 milliGRAM(s) Oral daily  ceFAZolin   IVPB 2000 milliGRAM(s) IV Intermittent once  chlorhexidine 2% Cloths 1 Application(s) Topical once  gabapentin 300 milliGRAM(s) Oral at bedtime  lidocaine 1% Injectable 0.2 milliLiter(s) Local Injection once  simvastatin 10 milliGRAM(s) Oral at bedtime  sodium chloride 0.9% Bolus 1000 milliLiter(s) IV Bolus once    MEDICATIONS  (PRN):  acetaminophen     Tablet .. 650 milliGRAM(s) Oral every 6 hours PRN Temp greater or equal to 38C (100.4F), Mild Pain (1 - 3), Moderate Pain (4 - 6), Severe Pain (7 - 10)  oxyCODONE    IR 5 milliGRAM(s) Oral every 6 hours PRN Moderate Pain (4 - 6)      Care Discussed with Consultants/Other Providers [x] YES  [ ] NO    HEALTH ISSUES - PROBLEM Dx:

## 2022-06-07 ENCOUNTER — APPOINTMENT (OUTPATIENT)
Dept: VASCULAR SURGERY | Facility: CLINIC | Age: 67
End: 2022-06-07
Payer: MEDICARE

## 2022-06-07 PROCEDURE — 99024 POSTOP FOLLOW-UP VISIT: CPT

## 2022-06-07 NOTE — REASON FOR VISIT
[de-identified] : Right femoral-popliteal bypass with vein [de-identified] : Status post right femoral-popliteal artery bypass with vein.  Doing well.  Ambulating much better.

## 2022-06-07 NOTE — DISCUSSION/SUMMARY
[FreeTextEntry1] : Currently patient is doing well.  Majority of staples to be removed.  We will leave sutures in place.  Patient to follow-up in 2 weeks.  Continue Eliquis.

## 2022-06-07 NOTE — PHYSICAL EXAM
[JVD] : no jugular venous distention  [Normal Breath Sounds] : Normal breath sounds [2+] : right 2+ [Ankle Swelling (On Exam)] : present [Ankle Swelling On The Right] : of the right ankle [Varicose Veins Of Lower Extremities] : not present [de-identified] : Appears well [FreeTextEntry1] : Right foot warm

## 2022-06-21 ENCOUNTER — APPOINTMENT (OUTPATIENT)
Dept: VASCULAR SURGERY | Facility: CLINIC | Age: 67
End: 2022-06-21
Payer: MEDICARE

## 2022-06-21 PROCEDURE — 99024 POSTOP FOLLOW-UP VISIT: CPT

## 2022-06-21 NOTE — PHYSICAL EXAM
[JVD] : no jugular venous distention  [Normal Breath Sounds] : Normal breath sounds [2+] : right 2+ [Ankle Swelling (On Exam)] : present [Ankle Swelling On The Right] : of the right ankle [Varicose Veins Of Lower Extremities] : not present [de-identified] : Appears well [FreeTextEntry1] : Right foot warm

## 2022-06-21 NOTE — REASON FOR VISIT
[de-identified] : Right femoral-popliteal bypass with vein [de-identified] : Status post right femoral-popliteal artery bypass with vein.  Doing well.  Ambulating much better.

## 2022-06-21 NOTE — DISCUSSION/SUMMARY
[FreeTextEntry1] : Currently patient is doing well.  sutures iremoved\par f/u 6 weeks.  Continue Eliquis.

## 2022-06-28 NOTE — ED PROVIDER NOTE - CPE EDP CARDIAC NORM
Libtayo Counseling- I discussed with the patient the risks of Libtayo including but not limited to nausea, vomiting, diarrhea, and bone or muscle pain.  The patient verbalized understanding of the proper use and possible adverse effects of Libtayo.  All of the patient's questions and concerns were addressed. normal...

## 2022-07-11 ENCOUNTER — APPOINTMENT (OUTPATIENT)
Dept: VASCULAR SURGERY | Facility: CLINIC | Age: 67
End: 2022-07-11

## 2022-07-11 VITALS
SYSTOLIC BLOOD PRESSURE: 135 MMHG | DIASTOLIC BLOOD PRESSURE: 75 MMHG | WEIGHT: 135 LBS | HEIGHT: 61 IN | HEART RATE: 69 BPM | BODY MASS INDEX: 25.49 KG/M2

## 2022-07-11 PROCEDURE — 93925 LOWER EXTREMITY STUDY: CPT

## 2022-07-11 PROCEDURE — 93978 VASCULAR STUDY: CPT

## 2022-07-11 PROCEDURE — 93970 EXTREMITY STUDY: CPT

## 2022-07-11 PROCEDURE — 99024 POSTOP FOLLOW-UP VISIT: CPT

## 2022-07-11 NOTE — PHYSICAL EXAM
[Ankle Swelling (On Exam)] : present [Ankle Swelling On The Right] : of the right ankle [Ankle Swelling On The Left] : moderate [Alert] : alert [Calm] : calm [JVD] : no jugular venous distention  [Varicose Veins Of Lower Extremities] : not present [] : not present [de-identified] : appears well  [de-identified] : b/l feet are warm and pin  [de-identified] : tenderness along the right inner thigh and calf, mild erythema noted over the incision site with small area that has not healed with fibrinous exudate along the distal aspect of the right thigh incision, no purlernace noted, area was debrided to show pink granular base

## 2022-07-11 NOTE — REASON FOR VISIT
[de-identified] : right lower extremity fem-pop bypass [de-identified] : 5/23/22 [de-identified] : pt with right lower extremity pain and intermittent drainage from the incision site \par pt also reports swelling of the lower extremity with tenderness in the calf \par pt denies any cp/sob

## 2022-07-11 NOTE — DISCUSSION/SUMMARY
[FreeTextEntry1] : 66-year-old female with a history of open heart surgery, carotid artery stenosis, status post femoral-femoral bypass and more recent right fem-pop bypass presents for follow up with continued pain and swelling of the right lower extremity \par \par duplex shows no evidence of dvt/svt \par right tj stent patent with 50-75% stenosi of the right eia \par fem fem bypass is patent and right sfa to below the knee pop bypass is patent without any evidence of stenosis \par pt advised to limit oxycodone for pain management and attempt tylenol solely \par pt currently on gabapentin denies any history of constipation with percocet \par \par will start medihoney to the distal aspect of the incision site \par will start augmentin for the right lower extremity erythema \par pt to continue with asa, eliquis 2.5 mg bid and simvastatin \par pt to follow up in 2 weeks

## 2022-07-15 NOTE — PATIENT PROFILE ADULT - DO YOU FEEL LIKE HURTING YOURSELF OR OTHERS?
----- Message from Chapito Man CNM sent at 7/15/2022 12:48 PM CDT -----  Please call patient with urine culture result: contaminated specimen, she should complete her abx and will need to repeat culture at next visit. Hopefully her sx are improving. no

## 2022-07-28 ENCOUNTER — APPOINTMENT (OUTPATIENT)
Dept: VASCULAR SURGERY | Facility: CLINIC | Age: 67
End: 2022-07-28

## 2022-07-28 DIAGNOSIS — R60.0 LOCALIZED EDEMA: ICD-10-CM

## 2022-07-28 PROCEDURE — 99024 POSTOP FOLLOW-UP VISIT: CPT

## 2022-07-28 NOTE — DISCUSSION/SUMMARY
[FreeTextEntry1] : 66-year-old female with a history of open heart surgery, carotid artery stenosis, status post femoral-femoral bypass and more recent right fem-pop bypass presents for follow up with continued pain and swelling of the right lower extremity \par \par incision healed improved erythema \par moderate edema \par pt to continue with eliquis and recommend light compression stocking \par pt to follow up in 3 months with repeat bypass duplex \par

## 2022-07-28 NOTE — REASON FOR VISIT
[de-identified] : right lower extremity fem-pop bypass [de-identified] : 5/23/22 [de-identified] : pt with right lower extremity swelling with now healed wound around the distal incision site \par

## 2022-07-28 NOTE — PHYSICAL EXAM
[Ankle Swelling (On Exam)] : present [Ankle Swelling On The Right] : of the right ankle [Ankle Swelling On The Left] : moderate [Alert] : alert [Calm] : calm [JVD] : no jugular venous distention  [Varicose Veins Of Lower Extremities] : not present [] : not present [Skin Ulcer] : no ulcer [de-identified] : appears well  [de-identified] : b/l feet are warm and pink [de-identified] : tenderness along the right inner thigh and calf, mild erythema noted over the incision site healed

## 2022-10-17 ENCOUNTER — RX RENEWAL (OUTPATIENT)
Age: 67
End: 2022-10-17

## 2022-11-01 ENCOUNTER — APPOINTMENT (OUTPATIENT)
Dept: VASCULAR SURGERY | Facility: CLINIC | Age: 67
End: 2022-11-01

## 2022-11-01 PROCEDURE — 93925 LOWER EXTREMITY STUDY: CPT

## 2022-11-01 PROCEDURE — 93978 VASCULAR STUDY: CPT

## 2022-11-01 PROCEDURE — 99214 OFFICE O/P EST MOD 30 MIN: CPT

## 2022-11-01 PROCEDURE — 99406 BEHAV CHNG SMOKING 3-10 MIN: CPT

## 2022-11-03 ENCOUNTER — NON-APPOINTMENT (OUTPATIENT)
Age: 67
End: 2022-11-03

## 2022-11-03 NOTE — H&P ADULT - VASCULAR
----- Message from Freddie Beverly sent at 11/3/2022 11:08 AM EDT -----  Per Dr. Mims Proper will need to fill out the form. 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  Purnima Fuchs saw patient for a physical.  Are you able to sign form or does Ira need to? Patient also needing second  TB.
Left message to return call.
Equal and normal pulses (carotid, femoral, dorsalis pedis)

## 2022-11-03 NOTE — HISTORY OF PRESENT ILLNESS
[FreeTextEntry1] : 66-year-old female, active smoker with a history of carotid stenosis initially presented complaining of right leg and foot pain.  Patient had a previous history of a right iliac stent and a right femoral endarterectomy.  On a repeat angiogram it was found that the patient had compression of the origin of the left iliac artery due to the stent on the right side.  Patient subsequently underwent a femoral-femoral bypass due to severe claudication and since that time also was undergone a right femoral-popliteal bypass.  She presents to the office today with mild complaints of left leg claudication.
54.4

## 2022-11-03 NOTE — ASSESSMENT
[FreeTextEntry1] : In the office today patient underwent a duplex study which shows occlusion of the femoral-femoral bypass.  The femoral-popliteal artery bypass is patent.  There is a high-grade stenosis in the right iliac artery.  Given this finding patient will need an angiogram with possible iliac angioplasty since the right leg bypass is in jeopardy of failing.  In addition we will make another attempt to try to cross the origin of the left common iliac artery.  Patient also instructed to discontinue smoking if possible.\par \par Patient to continue low-dose Eliquis and baby aspirin. [Smoking Cessation] : smoking cessation

## 2022-11-03 NOTE — PHYSICAL EXAM
[Normal Breath Sounds] : Normal breath sounds [Normal Rate and Rhythm] : normal rate and rhythm [2+] : right 2+ [0] : left 0 [Ankle Swelling (On Exam)] : not present [Varicose Veins Of Lower Extremities] : not present [] : not present [Abdomen Tenderness] : ~T ~M No abdominal tenderness [No Rash or Lesion] : No rash or lesion [Alert] : alert [Calm] : calm [de-identified] : Appears well

## 2022-11-10 LAB
ANION GAP SERPL CALC-SCNC: 12 MMOL/L
APTT BLD: 35.3 SEC
BASOPHILS # BLD AUTO: 0.1 K/UL
BASOPHILS NFR BLD AUTO: 1.4 %
BUN SERPL-MCNC: 38 MG/DL
CALCIUM SERPL-MCNC: 9.6 MG/DL
CHLORIDE SERPL-SCNC: 110 MMOL/L
CO2 SERPL-SCNC: 22 MMOL/L
CREAT SERPL-MCNC: 3.01 MG/DL
EGFR: 17 ML/MIN/1.73M2
EOSINOPHIL # BLD AUTO: 0.36 K/UL
EOSINOPHIL NFR BLD AUTO: 5.1 %
GLUCOSE SERPL-MCNC: 99 MG/DL
HCT VFR BLD CALC: 37.9 %
HGB BLD-MCNC: 11.8 G/DL
IMM GRANULOCYTES NFR BLD AUTO: 0.3 %
INR PPP: 1.17 RATIO
LYMPHOCYTES # BLD AUTO: 1.55 K/UL
LYMPHOCYTES NFR BLD AUTO: 21.9 %
MAN DIFF?: NORMAL
MCHC RBC-ENTMCNC: 30 PG
MCHC RBC-ENTMCNC: 31.1 GM/DL
MCV RBC AUTO: 96.4 FL
MONOCYTES # BLD AUTO: 0.67 K/UL
MONOCYTES NFR BLD AUTO: 9.5 %
NEUTROPHILS # BLD AUTO: 4.37 K/UL
NEUTROPHILS NFR BLD AUTO: 61.8 %
PLATELET # BLD AUTO: 230 K/UL
POTASSIUM SERPL-SCNC: 4.3 MMOL/L
PT BLD: 13.6 SEC
RBC # BLD: 3.93 M/UL
RBC # FLD: 14.9 %
SARS-COV-2 N GENE NPH QL NAA+PROBE: NOT DETECTED
SODIUM SERPL-SCNC: 143 MMOL/L
WBC # FLD AUTO: 7.07 K/UL

## 2022-11-15 ENCOUNTER — APPOINTMENT (OUTPATIENT)
Dept: ENDOVASCULAR SURGERY | Facility: CLINIC | Age: 67
End: 2022-11-15

## 2022-11-15 ENCOUNTER — RESULT REVIEW (OUTPATIENT)
Age: 67
End: 2022-11-15

## 2022-11-15 PROCEDURE — 37221Z: CUSTOM | Mod: 50,59

## 2022-11-15 PROCEDURE — 37223Z: CUSTOM | Mod: 50,59

## 2022-11-15 PROCEDURE — 76937 US GUIDE VASCULAR ACCESS: CPT

## 2022-11-15 PROCEDURE — 75625 CONTRAST EXAM ABDOMINL AORTA: CPT

## 2022-11-15 PROCEDURE — 37227Z: CUSTOM | Mod: LT

## 2022-11-15 PROCEDURE — 37227Z: CUSTOM | Mod: 82,LT

## 2022-11-15 RX ORDER — BISMUTH SUBSALICYLATE 525 MG/1
TABLET ORAL
Refills: 0 | Status: ACTIVE | COMMUNITY

## 2022-11-15 RX ORDER — CALCIUM CARBONATE/VITAMIN D3 600 MG-20
600-125 TABLET ORAL
Refills: 0 | Status: ACTIVE | COMMUNITY

## 2022-11-15 RX ORDER — CLOPIDOGREL BISULFATE 75 MG/1
75 TABLET, FILM COATED ORAL
Qty: 30 | Refills: 3 | Status: DISCONTINUED | COMMUNITY
End: 2022-11-15

## 2022-11-15 NOTE — ASSESSMENT
[Arterial/Venous Disease] : arterial/venous disease [Smoking Cessation] : smoking cessation [FreeTextEntry1] :  duplex study  shows occlusion of the femoral-femoral bypass.  The femoral-popliteal artery bypass is patent.  There is a high-grade stenosis in the right iliac artery. plan for an angiogram with possible iliac angioplasty since the right leg bypass is in jeopardy of failing.  In addition we will make another attempt to try to cross the origin of the left common iliac artery.\par \par

## 2022-11-15 NOTE — PROCEDURE
[D/C IV on discharge] : D/C IV on discharge [Resume diet] : resume diet [FreeTextEntry1] : aortogram, bilateral lower extremity angiogram/bilateral iliac stents/athrectomy/angioplasty

## 2022-11-15 NOTE — PAST MEDICAL HISTORY
[Increasing age ( >40 years old)] : Increasing age ( >40 years old) [Major surgery] : Major surgery [No therapy indicated for cases scheduled for less than one hour] : No therapy indicated for cases scheduled for less than one hour. [FreeTextEntry1] : Malignant Hyperthermia Screening Tool and Risk of Bleeding Assessment\par \par Ms. VIVIANA CHAPMAN denies family history of unexpected death following Anesthesia or Exercise.\par Denies Family history of Malignant Hyperthermia, Muscle or Neuromuscular disorder and High Temperature following exercise.\par \par Ms. VIVIANA CHAPMAN denies history of Muscle Spasm, Dark or Chocolate - Colored urine and Unanticipated fever immediately following anesthesia or serious exercise. \par Ms. CHAPMAN also denies bleeding tendencies/ Risks of Bleeding.\par

## 2022-11-15 NOTE — HISTORY OF PRESENT ILLNESS
[FreeTextEntry1] : accompanied by  Jamarcus [phone]\par feels ok\par covid not detected 11/10/2022\par Cr 3.02 11/10/2022\par last Eliquis Sunday 11/12/2022\par took BP meds this morning\par gave aspirin 81mg at 650 [FreeTextEntry5] : yesterday at 830am [FreeTextEntry6] : Dr. [place]

## 2022-11-15 NOTE — PHYSICAL EXAM
[Normal Breath Sounds] : Normal breath sounds [Normal Rate and Rhythm] : normal rate and rhythm [2+] : right 2+ [0] : left 0 [No Rash or Lesion] : No rash or lesion [Skin Ulcer] : ulcer [Alert] : alert [Calm] : calm [JVD] : no jugular venous distention  [Ankle Swelling (On Exam)] : not present [Varicose Veins Of Lower Extremities] : not present [] : not present [Abdomen Tenderness] : ~T ~M No abdominal tenderness [Skin Induration] : no induration [de-identified] : Appears well

## 2022-12-01 ENCOUNTER — APPOINTMENT (OUTPATIENT)
Dept: VASCULAR SURGERY | Facility: CLINIC | Age: 67
End: 2022-12-01

## 2022-12-01 PROCEDURE — 93923 UPR/LXTR ART STDY 3+ LVLS: CPT

## 2022-12-01 PROCEDURE — 99214 OFFICE O/P EST MOD 30 MIN: CPT

## 2022-12-01 RX ORDER — OXYCODONE AND ACETAMINOPHEN 5; 325 MG/1; MG/1
5-325 TABLET ORAL
Qty: 30 | Refills: 0 | Status: DISCONTINUED | COMMUNITY
Start: 2022-06-21 | End: 2022-12-01

## 2022-12-01 RX ORDER — OXYCODONE 5 MG/1
5 TABLET ORAL
Qty: 15 | Refills: 0 | Status: DISCONTINUED | COMMUNITY
Start: 2022-07-11 | End: 2022-12-01

## 2022-12-01 RX ORDER — AMOXICILLIN AND CLAVULANATE POTASSIUM 875; 125 MG/1; MG/1
875-125 TABLET, COATED ORAL
Qty: 20 | Refills: 0 | Status: DISCONTINUED | COMMUNITY
Start: 2022-07-11 | End: 2022-12-01

## 2022-12-01 RX ORDER — OXYCODONE AND ACETAMINOPHEN 5; 325 MG/1; MG/1
5-325 TABLET ORAL
Qty: 18 | Refills: 0 | Status: DISCONTINUED | COMMUNITY
Start: 2022-06-07 | End: 2022-12-01

## 2022-12-01 NOTE — ASSESSMENT
[FreeTextEntry1] : In the office today patient underwent arterial Dopplers of the lower extremity which were within normal limits.\par Patient significantly improved\par \par Patient to continue low-dose Eliquis and baby aspirin. [Smoking Cessation] : smoking cessation

## 2022-12-01 NOTE — PHYSICAL EXAM
[Normal Breath Sounds] : Normal breath sounds [Normal Rate and Rhythm] : normal rate and rhythm [2+] : right 2+ [0] : left 0 [1+] : left 1+ [Ankle Swelling (On Exam)] : not present [Varicose Veins Of Lower Extremities] : not present [] : not present [Abdomen Tenderness] : ~T ~M No abdominal tenderness [No Rash or Lesion] : No rash or lesion [Alert] : alert [Calm] : calm [de-identified] : Appears well

## 2022-12-01 NOTE — HISTORY OF PRESENT ILLNESS
[FreeTextEntry1] : 66-year-old female, active smoker with a history of carotid stenosis initially presented complaining of right leg and foot pain.  Patient had a previous history of a right iliac stent and a right femoral endarterectomy.  On a repeat angiogram it was found that the patient had compression of the origin of the left iliac artery due to the stent on the right side.  Patient subsequently underwent a femoral-femoral bypass due to severe claudication and since that time also was undergone a right femoral-popliteal bypass.  The patient presented with worsening claudication of the left lower extremity.  At the last visit patient was found to have an occluded femoral-femoral bypass.\par She underwent aortogram with bilateral iliac artery stenting along with angioplasty and stenting of her proximal superficial femoral artery.  Since that time she notes marked improvement in her ambulation.  She presents for follow-up.

## 2023-01-01 NOTE — DISCHARGE NOTE NURSING/CASE MANAGEMENT/SOCIAL WORK - WILL THE PATIENT ACCEPT THE PFIZER COVID-19 VACCINE IF ELIGIBLE AND IT IS AVAILABLE?
[facility] Internal Medicine History & Physical Note    Date of Service  1/1/2023    [facility] Team: WOJCIE[facility]  Purple Team   Attending:   Senior Resident: Dr. Mcdonald   Intern:  Dr. Harden   Contact Number: [phone]    Primary Care Physician  Anna Barrera P.A.-C.    Consultants  None     Code Status  Full Code    Chief Complaint  Chief Complaint   Patient presents with    Diarrhea    Seizure       History of Presenting Illness (HPI):   Roby Viramontes II is a 69 y.o. male who presented 1/1/2023 with a seizure. He does not recall the event. His wife is at bedside. Per the patient, he remembers being smoking marihuana yesterday and started to have nausea and vomiting since last night and not tolerating PO intake. Per the wife, he did have seizure-like activity with generalized tonic-clonic movements and sphincter relaxation this morning lasting 15 minutes (including post-ictal), which resolved at home. He denies back, head or joint pain. He denies focal deficits posterior to event. No recent infections, no respiratory symptoms, diarrhea or urinary symptoms. He is still having nausea and vomiting.    He has history of focal epilepsy with a left temporal lobectomy (therapeutic) in 2016, followed by Dr. Michele and on carbamazepine. Unsure if he took medication since yesterday. Last seizure had been 1 year ago associated with cannabis consumption. Otherwise, he has a past medical history of hypertension, CKD stage IV, chronic hypophosphatemia, hyperlipidemia, ADHD, cannabis use and seasonal allergies.     In the ED patient with BP of 180s/80-100s, no tachycardia or hypoxia, oriented and alert, no new episodes of seizures. CT head with no acute findings and stable post-operative changes of left temporal lobectomy, creatinine of 2.59, bicarb 15 with anion gap of 22, hemoglobin of 10.8 and platelets 126. Glucose 256.    I discussed the plan of care with patient and family.    Review of Systems  Review of Systems   Constitutional:  Negative.    HENT: Negative.     Eyes: Negative.    Respiratory: Negative.     Cardiovascular: Negative.    Gastrointestinal: Negative.         Does not know when he had the last bowel movement.    Genitourinary: Negative.    Musculoskeletal: Negative.    Skin: Negative.    Neurological: Negative.    Endo/Heme/Allergies: Negative.    Psychiatric/Behavioral: Negative.       Past Medical History   has a past medical history of CKD (chronic kidney disease) stage 4, GFR 15-29 ml/min ([facility]) (6/22/2021), Dyslipidemia, Hypertension, Hypophosphatemia, Psychiatric problem, and Seizure disorder ([facility]).    Surgical History   has a past surgical history that includes gastroscopy-endo (7/10/2020).     Family History  family history is not on file.   Family history reviewed with patient.     Social History  Tobacco: None   Alcohol: None   Recreational drugs (illegal or prescription): Marihuana, in a pipe daily-every other day.     Allergies  No Known Allergies    Medications  Prior to Admission Medications   Prescriptions Last Dose Informant Patient Reported? Taking?   Calcium Polycarbophil (FIBER) 625 MG Tab   Yes No   Sig: Take 625 mg by mouth every day.   QUEtiapine (SEROQUEL) 25 MG Tab  Rx Bottle (For Med Information) Yes No   Sig: Take 25 mg by mouth every evening.   amLODIPine (NORVASC) 10 MG Tab   Yes No   Sig: Take 10 mg by mouth every day.   atorvastatin (LIPITOR) 40 MG Tab  Rx Bottle (For Med Information) Yes No   Sig: Take 40 mg by mouth every evening.   carBAMazepine (TEGRETOL) 200 MG Tab  Rx Bottle (For Med Information) Yes No   Sig: Take 400 mg by mouth every evening.   escitalopram (LEXAPRO) 20 MG tablet  Rx Bottle (For Med Information) Yes No   Sig: Take 20 mg by mouth every evening.   losartan (COZAAR) 50 MG Tab   Yes No   Sig: Take 50 mg by mouth every day.   omeprazole (PRILOSEC) 20 MG delayed-release capsule   No No   Sig: Take 1 Cap by mouth every day.   ondansetron (ZOFRAN) 4 MG Tab tablet   No No   Sig: Take  1 Tab by mouth every four hours as needed for Nausea/Vomiting.   phosphorus (K-PHOS-NEUTRAL) 155-852-130 MG tablet   No No   Sig: Take 2 Tabs by mouth 3 times a day.   potassium chloride SA (KDUR) 20 MEQ Tab CR   No No   Sig: TAKE 2 TABLETS BY MOUTH EVERY DAY   Patient not taking: Reported on 10/19/2022      Facility-Administered Medications: None       Physical Exam  Temp:  [36.2 °C (97.2 °F)-36.9 °C (98.4 °F)] 36.9 °C (98.4 °F)  Pulse:  [61-79] 73  Resp:  [14-29] 18  BP: (161-187)/() 168/102  SpO2:  [92 %-97 %] 94 %  Blood Pressure : (!) 183/88   Temperature: 36.4 °C (97.5 °F)   Pulse: 65   Respiration: (!) 29   Pulse Oximetry: 97 %       General: Awake, alert and oriented.  Head and Neck: NC/AT, EOMI, no scleral icterus or conjunctival pallor, no nasal discharge or oral erythema or exudates. Neck supple, no cervical or supraclavicular LAD. Dry oral mucosa.   CV: Rhythmic heart sounds, no murmurs, gallops or rubs. No S3,S4 or JVD. Radial and dorsalis pedis pulses 2+ and equal bilaterally.   Pulm: Chest expansion is symmetrical, no crackles, rales, rhonchi, or wheezing.  GI: Mildly distended, mild diffuse pain, no rebound. Flat, non distended, soft, nontender, normal bowel sounds.  Skin: Warm, no rashes, no lesions.  MSK: Normal ROM. No lower extremity edema. No pain, no lesions.   Neuro: Patient is alert and oriented x3. Speech is clear and fluent. Is able to name, repeat and comprehend. Pupils equal, round and reactive to light. Extra ocular movements intact. Facial and body symmetry. Strength 5 out of 5 in upper and lower extremities. Sensitivity intact. Normal deep tendon reflexes. Normal tone.   Psych: Appropriate mood and affect.      Laboratory:  Recent Labs     01/01/23  0922   WBC 7.6   RBC 3.38*   HEMOGLOBIN 10.8*   HEMATOCRIT 31.9*   MCV 94.4   MCH 32.0   MCHC 33.9   RDW 44.7   PLATELETCT 129*   MPV 10.2     Recent Labs     01/01/23  0922   SODIUM 139   POTASSIUM 4.1   CHLORIDE 102   CO2 15*    GLUCOSE 256*   BUN 17   CREATININE 2.59*   CALCIUM 8.1*     Recent Labs     01/01/23  0922   ALTSGPT 10   ASTSGOT 14   ALKPHOSPHAT 190*   TBILIRUBIN 0.4   LIPASE 62   GLUCOSE 256*         No results for input(s): NTPROBNP in the last 72 hours.      No results for input(s): TROPONINT in the last 72 hours.    Imaging:  CT-HEAD W/O   Final Result      1.  Stable postoperative changes left temporal lobectomy.   2.  No acute intracranial abnormality.             Assessment/Plan:  Problem Representation:   I anticipate this patient is appropriate for observation status at this time because provoked seizure and intractable N/V    Patient will need a  bed on MEDICAL/ TELEMETRY service .  The need is secondary to seizure activity.    * Seizure (HCC)- (present on admission)  Assessment & Plan  Patient has history focal epilepsy, S/p left temporal lobectomy in 2016 and on therapy with carbamazepine. Presented after 15 min of seizure-like activity witnessed by wife, received versed at admission. Triggered by multiple factors: dehydration and acidosis from cyclic N/V, missing/nonabsorption of chronic medication, also cannabis use. CT head with no acute events. Stable, recovered from post-ictal, no focal deficits. Patient is A and Ox4.   - Restart carbamazepine  - Carbamazepine levels  - Seizure precautions  - Telemetry     Cannabis hyperemesis syndrome concurrent with and due to cannabis dependence (HCC)- (present on admission)  Assessment & Plan  Patient resumed consumption and is having intractable N/V since last night, not tolerating PO. Contributed to seizure activity. Secondary dehydration, SHANA and acidosis. Also mild abdominal pain associated, no diarrhea or suspected ongoing infection.   - Lipase levels  - Check for QTc   - PRN antiemetics  - IVF   - Advance diet as tolerated   - Continued counseling regarding total cessation of use      Metabolic acidosis  Assessment & Plan  AGMA. Likely from SHANA on CKD.   - IVF   -  "Monitor     SHANA on CKD (chronic kidney disease) stage 4, GFR 15-29 ml/min ([facility])- (present on admission)  Assessment & Plan  Baseline Cr is around 2.37. Now 2.5 with increase in baseline BUN. Likely prerenal. Associated metabolic acidosis.   - IVF   - Monitor     Hypomagnesemia- (present on admission)  Assessment & Plan  Poor PO tolerance, intake.   - Replace as needed  - Monitor     Hypertension- (present on admission)  Assessment & Plan  Uncontrolled, now with SBP >180, DBP 80-90, however he has history of similar values at home. SHANA and seizure activity are most likely secondary to cannabis use and dehydration as above, no suspicion for these as end-organ damage. Patient is now asymptomatic.   - Restarted amlodipine   - Holding ACEi (SHANA)   - Single dose of hydralazine   - EKG  - Telemetry  - Monitor     Elevated alkaline phosphatase level- (present on admission)  Assessment & Plan  Chronic since 2019, no history of liver disorder. Has history of hyperparathyroidism, possible bone-mineral disease, however, no specific bone lesions.   - Follow as outpt     Hyperglycemia- (present on admission)  Assessment & Plan  Last A1C 5.7% in 2019, BG this admission 259.   - New A1C   - IVF   - Monitor     Hypophosphatemia- (present on admission)  Assessment & Plan  Chronic, following with endocrinology, unclear etiology.   - Continue chronic PO4 supplement     ADHD (attention deficit hyperactivity disorder), combined type- (present on admission)  Assessment & Plan  \"Behaviors noted in childhood, was medicated with amphetamine as an adult, stopped recently on account of seizures\" per chart review. Along with depressive symptoms in remision.   - Continue home quetiapine and escitalopram         VTE prophylaxis: Xarelto 10 mg daily as prophylaxis    " No

## 2023-01-04 ENCOUNTER — NON-APPOINTMENT (OUTPATIENT)
Age: 68
End: 2023-01-04

## 2023-01-23 NOTE — CONSULT NOTE ADULT - PROVIDER SPECIALTY LIST ADULT
Nephrology Fluconazole Counseling:  Patient counseled regarding adverse effects of fluconazole including but not limited to headache, diarrhea, nausea, upset stomach, liver function test abnormalities, taste disturbance, and stomach pain.  There is a rare possibility of liver failure that can occur when taking fluconazole.  The patient understands that monitoring of LFTs and kidney function test may be required, especially at baseline. The patient verbalized understanding of the proper use and possible adverse effects of fluconazole.  All of the patient's questions and concerns were addressed.

## 2023-01-28 NOTE — PROGRESS NOTE ADULT - PROBLEM SELECTOR PROBLEM 2
Bacteremia due to Enterococcus
ACP (advance care planning)
Anemia, unspecified
Anemia, unspecified
Bacteremia due to Enterococcus
CKD (chronic kidney disease) stage 4, GFR 15-29 ml/min
Endocarditis of aortic valve
Endocarditis of aortic valve
R/O Endocarditis of aortic valve
R/O Endocarditis of aortic valve
Bacteremia due to Enterococcus
no

## 2023-03-02 ENCOUNTER — RX RENEWAL (OUTPATIENT)
Age: 68
End: 2023-03-02

## 2023-03-20 ENCOUNTER — APPOINTMENT (OUTPATIENT)
Dept: VASCULAR SURGERY | Facility: CLINIC | Age: 68
End: 2023-03-20
Payer: MEDICARE

## 2023-03-20 ENCOUNTER — EMERGENCY (EMERGENCY)
Facility: HOSPITAL | Age: 68
LOS: 1 days | Discharge: HOME | End: 2023-03-20
Attending: STUDENT IN AN ORGANIZED HEALTH CARE EDUCATION/TRAINING PROGRAM | Admitting: STUDENT IN AN ORGANIZED HEALTH CARE EDUCATION/TRAINING PROGRAM
Payer: MEDICARE

## 2023-03-20 VITALS
TEMPERATURE: 98 F | HEART RATE: 66 BPM | RESPIRATION RATE: 18 BRPM | OXYGEN SATURATION: 98 % | SYSTOLIC BLOOD PRESSURE: 136 MMHG | WEIGHT: 134.92 LBS | DIASTOLIC BLOOD PRESSURE: 81 MMHG

## 2023-03-20 DIAGNOSIS — Z98.890 OTHER SPECIFIED POSTPROCEDURAL STATES: Chronic | ICD-10-CM

## 2023-03-20 DIAGNOSIS — M06.9 RHEUMATOID ARTHRITIS, UNSPECIFIED: Chronic | ICD-10-CM

## 2023-03-20 DIAGNOSIS — Z95.2 PRESENCE OF PROSTHETIC HEART VALVE: Chronic | ICD-10-CM

## 2023-03-20 DIAGNOSIS — Z95.828 PRESENCE OF OTHER VASCULAR IMPLANTS AND GRAFTS: Chronic | ICD-10-CM

## 2023-03-20 DIAGNOSIS — K27.1 ACUTE PEPTIC ULCER, SITE UNSPECIFIED, WITH PERFORATION: Chronic | ICD-10-CM

## 2023-03-20 DIAGNOSIS — Z95.1 PRESENCE OF AORTOCORONARY BYPASS GRAFT: Chronic | ICD-10-CM

## 2023-03-20 DIAGNOSIS — Z90.49 ACQUIRED ABSENCE OF OTHER SPECIFIED PARTS OF DIGESTIVE TRACT: Chronic | ICD-10-CM

## 2023-03-20 DIAGNOSIS — M19.011 PRIMARY OSTEOARTHRITIS, RIGHT SHOULDER: Chronic | ICD-10-CM

## 2023-03-20 DIAGNOSIS — Z98.51 TUBAL LIGATION STATUS: Chronic | ICD-10-CM

## 2023-03-20 LAB
ALBUMIN SERPL ELPH-MCNC: 3.3 G/DL — SIGNIFICANT CHANGE UP (ref 3.3–5)
ALP SERPL-CCNC: 144 U/L — HIGH (ref 40–120)
ALT FLD-CCNC: 26 U/L — SIGNIFICANT CHANGE UP (ref 12–78)
ANION GAP SERPL CALC-SCNC: 4 MMOL/L — LOW (ref 5–17)
APPEARANCE UR: ABNORMAL
APTT BLD: 38.5 SEC — HIGH (ref 27.5–35.5)
AST SERPL-CCNC: 25 U/L — SIGNIFICANT CHANGE UP (ref 15–37)
BASOPHILS # BLD AUTO: 0.1 K/UL — SIGNIFICANT CHANGE UP (ref 0–0.2)
BASOPHILS NFR BLD AUTO: 1.3 % — SIGNIFICANT CHANGE UP (ref 0–2)
BILIRUB SERPL-MCNC: 0.3 MG/DL — SIGNIFICANT CHANGE UP (ref 0.2–1.2)
BILIRUB UR-MCNC: NEGATIVE — SIGNIFICANT CHANGE UP
BUN SERPL-MCNC: 33 MG/DL — HIGH (ref 7–23)
CALCIUM SERPL-MCNC: 8.9 MG/DL — SIGNIFICANT CHANGE UP (ref 8.5–10.1)
CHLORIDE SERPL-SCNC: 120 MMOL/L — HIGH (ref 96–108)
CO2 SERPL-SCNC: 20 MMOL/L — LOW (ref 22–31)
COLOR SPEC: SIGNIFICANT CHANGE UP
CREAT SERPL-MCNC: 2.7 MG/DL — HIGH (ref 0.5–1.3)
DIFF PNL FLD: NEGATIVE — SIGNIFICANT CHANGE UP
EGFR: 19 ML/MIN/1.73M2 — LOW
EOSINOPHIL # BLD AUTO: 0.43 K/UL — SIGNIFICANT CHANGE UP (ref 0–0.5)
EOSINOPHIL NFR BLD AUTO: 5.8 % — SIGNIFICANT CHANGE UP (ref 0–6)
GLUCOSE SERPL-MCNC: 86 MG/DL — SIGNIFICANT CHANGE UP (ref 70–99)
GLUCOSE UR QL: NEGATIVE — SIGNIFICANT CHANGE UP
HCT VFR BLD CALC: 33.6 % — LOW (ref 34.5–45)
HGB BLD-MCNC: 10.4 G/DL — LOW (ref 11.5–15.5)
IMM GRANULOCYTES NFR BLD AUTO: 0.3 % — SIGNIFICANT CHANGE UP (ref 0–0.9)
INR BLD: 1.37 RATIO — HIGH (ref 0.88–1.16)
KETONES UR-MCNC: NEGATIVE — SIGNIFICANT CHANGE UP
LACTATE SERPL-SCNC: 0.5 MMOL/L — LOW (ref 0.7–2)
LEUKOCYTE ESTERASE UR-ACNC: ABNORMAL
LYMPHOCYTES # BLD AUTO: 1.26 K/UL — SIGNIFICANT CHANGE UP (ref 1–3.3)
LYMPHOCYTES # BLD AUTO: 16.9 % — SIGNIFICANT CHANGE UP (ref 13–44)
MAGNESIUM SERPL-MCNC: 2 MG/DL — SIGNIFICANT CHANGE UP (ref 1.6–2.6)
MCHC RBC-ENTMCNC: 28.7 PG — SIGNIFICANT CHANGE UP (ref 27–34)
MCHC RBC-ENTMCNC: 31 GM/DL — LOW (ref 32–36)
MCV RBC AUTO: 92.8 FL — SIGNIFICANT CHANGE UP (ref 80–100)
MONOCYTES # BLD AUTO: 0.64 K/UL — SIGNIFICANT CHANGE UP (ref 0–0.9)
MONOCYTES NFR BLD AUTO: 8.6 % — SIGNIFICANT CHANGE UP (ref 2–14)
NEUTROPHILS # BLD AUTO: 5 K/UL — SIGNIFICANT CHANGE UP (ref 1.8–7.4)
NEUTROPHILS NFR BLD AUTO: 67.1 % — SIGNIFICANT CHANGE UP (ref 43–77)
NITRITE UR-MCNC: POSITIVE
NRBC # BLD: 0 /100 WBCS — SIGNIFICANT CHANGE UP (ref 0–0)
PH UR: 5 — SIGNIFICANT CHANGE UP (ref 5–8)
PLATELET # BLD AUTO: 239 K/UL — SIGNIFICANT CHANGE UP (ref 150–400)
POTASSIUM SERPL-MCNC: 4.4 MMOL/L — SIGNIFICANT CHANGE UP (ref 3.5–5.3)
POTASSIUM SERPL-SCNC: 4.4 MMOL/L — SIGNIFICANT CHANGE UP (ref 3.5–5.3)
PROT SERPL-MCNC: 7.1 G/DL — SIGNIFICANT CHANGE UP (ref 6–8.3)
PROT UR-MCNC: NEGATIVE — SIGNIFICANT CHANGE UP
PROTHROM AB SERPL-ACNC: 16.1 SEC — HIGH (ref 10.5–13.4)
RBC # BLD: 3.62 M/UL — LOW (ref 3.8–5.2)
RBC # FLD: 15.9 % — HIGH (ref 10.3–14.5)
SODIUM SERPL-SCNC: 144 MMOL/L — SIGNIFICANT CHANGE UP (ref 135–145)
SP GR SPEC: 1.01 — SIGNIFICANT CHANGE UP (ref 1.01–1.02)
UROBILINOGEN FLD QL: NEGATIVE — SIGNIFICANT CHANGE UP
WBC # BLD: 7.45 K/UL — SIGNIFICANT CHANGE UP (ref 3.8–10.5)
WBC # FLD AUTO: 7.45 K/UL — SIGNIFICANT CHANGE UP (ref 3.8–10.5)

## 2023-03-20 PROCEDURE — 99214 OFFICE O/P EST MOD 30 MIN: CPT

## 2023-03-20 PROCEDURE — 93971 EXTREMITY STUDY: CPT | Mod: 26,LT

## 2023-03-20 PROCEDURE — 74176 CT ABD & PELVIS W/O CONTRAST: CPT | Mod: 26,MA

## 2023-03-20 PROCEDURE — 99284 EMERGENCY DEPT VISIT MOD MDM: CPT | Mod: FS

## 2023-03-20 PROCEDURE — 93926 LOWER EXTREMITY STUDY: CPT

## 2023-03-20 PROCEDURE — 93926 LOWER EXTREMITY STUDY: CPT | Mod: 26,LT

## 2023-03-20 RX ORDER — MORPHINE SULFATE 50 MG/1
4 CAPSULE, EXTENDED RELEASE ORAL ONCE
Refills: 0 | Status: DISCONTINUED | OUTPATIENT
Start: 2023-03-20 | End: 2023-03-20

## 2023-03-20 RX ORDER — SODIUM CHLORIDE 9 MG/ML
1000 INJECTION INTRAMUSCULAR; INTRAVENOUS; SUBCUTANEOUS ONCE
Refills: 0 | Status: COMPLETED | OUTPATIENT
Start: 2023-03-20 | End: 2023-03-20

## 2023-03-20 RX ADMIN — MORPHINE SULFATE 4 MILLIGRAM(S): 50 CAPSULE, EXTENDED RELEASE ORAL at 22:51

## 2023-03-20 RX ADMIN — SODIUM CHLORIDE 2000 MILLILITER(S): 9 INJECTION INTRAMUSCULAR; INTRAVENOUS; SUBCUTANEOUS at 20:52

## 2023-03-20 RX ADMIN — MORPHINE SULFATE 4 MILLIGRAM(S): 50 CAPSULE, EXTENDED RELEASE ORAL at 21:40

## 2023-03-20 RX ADMIN — MORPHINE SULFATE 4 MILLIGRAM(S): 50 CAPSULE, EXTENDED RELEASE ORAL at 20:52

## 2023-03-20 NOTE — ED PROVIDER NOTE - NSICDXPASTSURGICALHX_GEN_ALL_CORE_FT
PAST SURGICAL HISTORY:  Acute peptic ulcer with perforation 1980 - s/p surgery    H/O cervical discectomy C1-T1 fusion 2020    History of endarterectomy iliac stenting and femoral endarterectomy    Osteoarthritis of right shoulder region Right Shoulder Arthroscopy  2007    Rheumatoid arthritis of carpometacarpal joint of thumb Total Joint Replacement of Left Thumb    S/P appendectomy 2014    S/P AVR (aortic valve replacement) x 1,2019    S/P CABG x 1 2019    S/P femoral-femoral bypass surgery 2021    S/P MVR (mitral valve repair) x 1 2019    S/P tubal ligation 1986

## 2023-03-20 NOTE — ED PROVIDER NOTE - PHYSICAL EXAMINATION
Constitutional: Awake, Alert, non-toxic. No acute distress. Well appearing, well nourished.   HEAD: Normocephalic, atraumatic.   EYES: PERRL, EOM intact, conjunctiva and sclera are clear bilaterally.  ENT: External ears normal. No rhinorrhea, no tracheal deviation   NECK: Supple, non-tender  CARDIOVASCULAR: regular rate and rhythm.  RESPIRATORY: Normal respiratory effort; breath sounds CTAB, no wheezes, rhonchi, or rales. Speaking in full sentences. No accessory muscle use.   ABDOMEN: Soft; LLQ TTP, non-distended. No rebound or guarding.   MSK:  no lower extremity edema, no deformities, TTP left upper groin, left foot is warm and well perfused  SKIN: Warm, dry  NEURO: A&O x3. Sensory and motor functions are grossly intact. Speech is normal. No facial droop.  PSYCH: Appearance and judgement seem appropriate for gender and age.

## 2023-03-20 NOTE — ED PROVIDER NOTE - NSICDXPASTMEDICALHX_GEN_ALL_CORE_FT
PAST MEDICAL HISTORY:  2019 novel coronavirus disease (COVID-19) 3/2021- monoclonal antibody treatement    Arthritis Cervical Spine  and Hands    CAD (coronary artery disease)     CKD (chronic kidney disease)     Depression     GERD (gastroesophageal reflux disease)     History of endocarditis     Hyperlipidemia     Hypertension     Peripheral vascular disease     Restless leg syndrome Especially with General Anesthesia

## 2023-03-20 NOTE — PHYSICAL EXAM
[Normal Breath Sounds] : Normal breath sounds [Normal Heart Sounds] : normal heart sounds [2+] : left 2+ [de-identified] : Left lower quadrant is tender to touch with rebound

## 2023-03-20 NOTE — HISTORY OF PRESENT ILLNESS
[FreeTextEntry1] : Patient is well-known to our group.  Status post extensive lower extremity revascularization procedures in the past.  Patient complaining of left lower quadrant and left thigh pain specially after any exertion.\par \par No fevers or chills.  No GI symptoms.  No rest pain or claudication symptoms.

## 2023-03-20 NOTE — ED PROVIDER NOTE - PATIENT PORTAL LINK FT
You can access the FollowMyHealth Patient Portal offered by [facility] by registering at the following website: [email or web address]. By joining [employer]’s FollowMyHealth portal, you will also be able to view your health information using other applications (apps) compatible with our system.

## 2023-03-20 NOTE — ED PROVIDER NOTE - OBJECTIVE STATEMENT
Patient with a past medical history of hypertension, hyperlipidemia, GERD, chronic kidney disease, endocarditis, peripheral vascular disease with multiple stenting on Eliquis is presenting with left lower groin and leg pain for the past month.  Has been constant.  Nothing has made her pain get better or worse during this time.  States that she saw her PCP Dr. Jarquin, vascular surgeon Dr. Green, and OB/GYN doctor who was unable to find cause to her pain and recommended she come to ED for evaluation.  Denies nausea or vomiting.  No urinary complaints.  States the pain is rating towards the anterior portion of her left leg.  Reportedly saw her vascular surgeon today and was told her circulation was "normal".

## 2023-03-20 NOTE — ED ADULT NURSE NOTE - OBJECTIVE STATEMENT
Patient received from home complaining of left lower groin and leg pain x1 month has had numerous vascular work up in the past. States increasing pain and inability to ambulate due to the pain at this time. Patient is AOx4, safety precautions in place, interventions implemented, awaiting evaluation.

## 2023-03-20 NOTE — CONSULT NOTE ADULT - SUBJECTIVE AND OBJECTIVE BOX
Vascular Surgery Consult Note:    CC: Patient is a 67y old  Female who presents with a chief complaint of left groin/lower abd pain    HPI From ED: Patient with a past medical history of hypertension, hyperlipidemia, GERD, chronic kidney disease, endocarditis, peripheral vascular disease with multiple stenting on Eliquis is presenting with left lower groin and leg pain for the past month.  Has been constant.  Nothing has made her pain get better or worse during this time.  States that she saw her PCP Dr. Jarquin, vascular surgeon Dr. Green, and OB/GYN doctor who was unable to find cause to her pain and recommended she come to ED for evaluation.  Denies nausea or vomiting.  No urinary complaints.  States the pain is rating towards the anterior portion of her left leg.  Reportedly saw her vascular surgeon today and was told her circulation was "normal".    Interval HPI: HPI as written above. Pt has hx of right fem endarterectomy (2017), right to left fem-fem bypass (Aug 2021 w/ Dr. Green), right fem-pop bypass (May 2022 w/ Dr. Green). Vascular consulted for occluded fem-fem bypass noted on arterial duplex    Past Medical & Surgical History:  PAST MEDICAL & SURGICAL HISTORY:  Arthritis  Cervical Spine  and Hands  Restless leg syndrome  Especially with General Anesthesia  Hypertension  Hyperlipidemia  CAD (coronary artery disease)  CKD (chronic kidney disease)  Peripheral vascular disease  GERD (gastroesophageal reflux disease)  History of endocarditis  2019 novel coronavirus disease (COVID-19)  3/2021- monoclonal antibody treatement  Depression  Rheumatoid arthritis of carpometacarpal joint of thumb  Total Joint Replacement of Left Thumb  Osteoarthritis of right shoulder region  Right Shoulder Arthroscopy  2007  S/P tubal ligation  1986  S/P appendectomy  2014  Acute peptic ulcer with perforation  1980 - s/p surgery  H/O cervical discectomy  C1-T1 fusion 2020  S/P CABG x 1  2019  S/P AVR (aortic valve replacement)  x 1,2019  History of endarterectomy  iliac stenting and femoral endarterectomy  S/P MVR (mitral valve repair)  x 1 2019  S/P femoral-femoral bypass surgery  2021    ROS:  General: denies fatigue/weakness, dizziness, fevers/chills, night sweats, weight loss  HEENT: denies auditory or visual changes/disturbances, no vertigo, denies neck pain/stiffness, swelling/lumps or hoarseness   Respiratory: denies shortness of breath, wheezing, dyspnea; denies cough or hemoptysis  Cardiac: denies chest pain, palpitations  GI: denies abdominal pain, bloating/fullness; no nausea, vomiting  : denies urinary urgency/frequency, denies dysuria or hematuria; no incontinence or urinary retention  Neuro: denies headache, syncope, paraesthesias, paralysis or tremors  Extremities: denies pain/swelling, arthralgias or weakness; no limited ROM, no paresthesias or pallor  Skin: denies pruritus, rashes    Medications:  Home Medications:  Align 4 mg oral capsule: 1 cap(s) orally once a day (25 May 2022 11:49)  buPROPion: 150 milligram(s) orally once a day (25 May 2022 11:49)  Caltrate 600 + D oral tablet: 1 tab(s) orally once a day (25 May 2022 11:49)  gabapentin 300 mg oral capsule: 1 cap(s) orally once a day (at bedtime) (25 May 2022 11:49)  Mirapex 0.25 mg oral tablet: 2 tab(s) orally once a day (at bedtime) (25 May 2022 11:49)    MEDICATIONS  (STANDING):  MEDICATIONS  (PRN):      Allergies:  ALLERGIES: No Known Allergies  INTOLERANCES: None, unless indicated below    Family History:  Family history of arthritis (Father)  No family history of alcoholism (Mother)  Family history of hypothyroidism (Sibling)  Family hx of aortic aneurysm (Sibling)  Family history of atrial fibrillation (Sibling)  Family history of parkinsonism (Sibling)  Family hx of hypertension (Sibling)    Vitals:  Vital Signs Last 24 Hrs  T(C): 36.5 (20 Mar 2023 18:46), Max: 36.5 (20 Mar 2023 18:46)  T(F): 97.7 (20 Mar 2023 18:46), Max: 97.7 (20 Mar 2023 18:46)  HR: 66 (20 Mar 2023 18:46) (66 - 66)  BP: 136/81 (20 Mar 2023 18:46) (136/81 - 136/81)  BP(mean): --  RR: 18 (20 Mar 2023 18:46) (18 - 18)  SpO2: 98% (20 Mar 2023 18:46) (98% - 98%)  Parameters below as of 20 Mar 2023 18:46  Patient On (Oxygen Delivery Method): room air    Physical Exam:  General: no acute distress, appears comfortable, well nourished, well-groomed  HEENT: normocephalic/atraumatic, vision grossly intact, hearing grossly intact, no nasal discharge  Neck: supple  Chest: good inspiratory effort  Heart: heart rate and rhythm regular  Abdomen: soft, nontender, nondistended  Extremities: no gross deformities, able to move all four extremities, peripheral pulses intact, no edema, left groin pain with leg extension  Neuro: alert & oriented x3, motor and sensory grossly intact  Skin: warm, dry, good turgor; no gross lesions    Labs:                        10.4   7.45  )-----------( 239      ( 20 Mar 2023 20:40 )             33.6     03-20    144  |  120<H>  |  33<H>  ----------------------------<  86  4.4   |  20<L>  |  2.70<H>    Ca    8.9      20 Mar 2023 20:40  Mg     2.0     03-20    TPro  7.1  /  Alb  3.3  /  TBili  0.3  /  DBili  x   /  AST  25  /  ALT  26  /  AlkPhos  144<H>  03-20    PT/INR - ( 20 Mar 2023 20:40 )   PT: 16.1 sec;   INR: 1.37 ratio    PTT - ( 20 Mar 2023 20:40 )  PTT:38.5 sec    LIVER FUNCTIONS - ( 20 Mar 2023 20:40 )  Alb: 3.3 g/dL / Pro: 7.1 g/dL / ALK PHOS: 144 U/L / ALT: 26 U/L / AST: 25 U/L / GGT: x           Radiology & Additional Studies:  < from: US Duplex Venous Lower Ext Ltd, Left (03.20.23 @ 20:46) >  FINDINGS:    There is normal compressibility of the left common femoral, femoral and   popliteal veins.  Doppler examination shows normal spontaneous and phasic flow.    No calf vein thrombosis is detected.    IMPRESSION:  No evidence of left lower extremity deep venous thrombosis.  --- End of Report ---    < end of copied text >    < from: US Duplex Venous Lower Ext Ltd, Left (03.20.23 @ 20:46) >    FINDINGS:    There is normal compressibility of the left common femoral, femoral and   popliteal veins.  Doppler examination shows normal spontaneous and phasic flow.    No calf vein thrombosis is detected.    IMPRESSION:  No evidence of left lower extremity deep venous thrombosis.  --- End of Report ---      < end of copied text >   Vascular Surgery Consult Note:    CC: Patient is a 67y old  Female who presents with a chief complaint of left groin/lower abd pain    HPI From ED: Patient with a past medical history of hypertension, hyperlipidemia, GERD, chronic kidney disease, endocarditis, peripheral vascular disease with multiple stenting on Eliquis is presenting with left lower groin and leg pain for the past month.  Has been constant.  Nothing has made her pain get better or worse during this time.  States that she saw her PCP Dr. Jarquin, vascular surgeon Dr. Green, and OB/GYN doctor who was unable to find cause to her pain and recommended she come to ED for evaluation.  Denies nausea or vomiting.  No urinary complaints.  States the pain is rating towards the anterior portion of her left leg.  Reportedly saw her vascular surgeon today and was told her circulation was "normal".    Interval HPI: HPI as written above. Pt has hx of right fem endarterectomy (2017), right to left fem-fem bypass (Aug 2021 w/ Dr. Green), right fem-pop bypass (May 2022 w/ Dr. Green). Vascular consulted for occluded fem-fem bypass noted on arterial duplex    Past Medical & Surgical History:  PAST MEDICAL & SURGICAL HISTORY:  Arthritis  Cervical Spine  and Hands  Restless leg syndrome  Especially with General Anesthesia  Hypertension  Hyperlipidemia  CAD (coronary artery disease)  CKD (chronic kidney disease)  Peripheral vascular disease  GERD (gastroesophageal reflux disease)  History of endocarditis  2019 novel coronavirus disease (COVID-19)  3/2021- monoclonal antibody treatement  Depression  Rheumatoid arthritis of carpometacarpal joint of thumb  Total Joint Replacement of Left Thumb  Osteoarthritis of right shoulder region  Right Shoulder Arthroscopy  2007  S/P tubal ligation  1986  S/P appendectomy  2014  Acute peptic ulcer with perforation  1980 - s/p surgery  H/O cervical discectomy  C1-T1 fusion 2020  S/P CABG x 1  2019  S/P AVR (aortic valve replacement)  x 1,2019  History of endarterectomy  iliac stenting and femoral endarterectomy  S/P MVR (mitral valve repair)  x 1 2019  S/P femoral-femoral bypass surgery  2021    ROS:  General: denies fatigue/weakness, dizziness, fevers/chills, night sweats, weight loss  HEENT: denies auditory or visual changes/disturbances, no vertigo, denies neck pain/stiffness, swelling/lumps or hoarseness   Respiratory: denies shortness of breath, wheezing, dyspnea; denies cough or hemoptysis  Cardiac: denies chest pain, palpitations  GI: denies abdominal pain, bloating/fullness; no nausea, vomiting  : denies urinary urgency/frequency, denies dysuria or hematuria; no incontinence or urinary retention  Neuro: denies headache, syncope, paraesthesias, paralysis or tremors  Extremities: denies pain/swelling, arthralgias or weakness; no limited ROM, no paresthesias or pallor  Skin: denies pruritus, rashes    Medications:  Home Medications:  Align 4 mg oral capsule: 1 cap(s) orally once a day (25 May 2022 11:49)  buPROPion: 150 milligram(s) orally once a day (25 May 2022 11:49)  Caltrate 600 + D oral tablet: 1 tab(s) orally once a day (25 May 2022 11:49)  gabapentin 300 mg oral capsule: 1 cap(s) orally once a day (at bedtime) (25 May 2022 11:49)  Mirapex 0.25 mg oral tablet: 2 tab(s) orally once a day (at bedtime) (25 May 2022 11:49)    MEDICATIONS  (STANDING):  MEDICATIONS  (PRN):      Allergies:  ALLERGIES: No Known Allergies  INTOLERANCES: None, unless indicated below    Family History:  Family history of arthritis (Father)  No family history of alcoholism (Mother)  Family history of hypothyroidism (Sibling)  Family hx of aortic aneurysm (Sibling)  Family history of atrial fibrillation (Sibling)  Family history of parkinsonism (Sibling)  Family hx of hypertension (Sibling)    Vitals:  Vital Signs Last 24 Hrs  T(C): 36.5 (20 Mar 2023 18:46), Max: 36.5 (20 Mar 2023 18:46)  T(F): 97.7 (20 Mar 2023 18:46), Max: 97.7 (20 Mar 2023 18:46)  HR: 66 (20 Mar 2023 18:46) (66 - 66)  BP: 136/81 (20 Mar 2023 18:46) (136/81 - 136/81)  BP(mean): --  RR: 18 (20 Mar 2023 18:46) (18 - 18)  SpO2: 98% (20 Mar 2023 18:46) (98% - 98%)  Parameters below as of 20 Mar 2023 18:46  Patient On (Oxygen Delivery Method): room air    Physical Exam:  General: no acute distress, appears comfortable, well nourished, well-groomed  HEENT: normocephalic/atraumatic, vision grossly intact, hearing grossly intact, no nasal discharge  Neck: supple  Chest: good inspiratory effort  Heart: heart rate and rhythm regular  Abdomen: soft, nontender, nondistended  Extremities: no gross deformities, able to move all four extremities, peripheral pulses intact, no edema, left groin pain with leg extension; left DP pulse weakly palpable, however BILATERAL distal signals (DP, PT, popliteal, femoral) signals present via doppler  Neuro: alert & oriented x3, motor and sensory grossly intact  Skin: warm, dry, good turgor; no gross lesions    Labs:                        10.4   7.45  )-----------( 239      ( 20 Mar 2023 20:40 )             33.6     03-20    144  |  120<H>  |  33<H>  ----------------------------<  86  4.4   |  20<L>  |  2.70<H>    Ca    8.9      20 Mar 2023 20:40  Mg     2.0     03-20    TPro  7.1  /  Alb  3.3  /  TBili  0.3  /  DBili  x   /  AST  25  /  ALT  26  /  AlkPhos  144<H>  03-20    PT/INR - ( 20 Mar 2023 20:40 )   PT: 16.1 sec;   INR: 1.37 ratio    PTT - ( 20 Mar 2023 20:40 )  PTT:38.5 sec    LIVER FUNCTIONS - ( 20 Mar 2023 20:40 )  Alb: 3.3 g/dL / Pro: 7.1 g/dL / ALK PHOS: 144 U/L / ALT: 26 U/L / AST: 25 U/L / GGT: x           Radiology & Additional Studies:  < from: US Duplex Venous Lower Ext Ltd, Left (03.20.23 @ 20:46) >  FINDINGS:    There is normal compressibility of the left common femoral, femoral and   popliteal veins.  Doppler examination shows normal spontaneous and phasic flow.    No calf vein thrombosis is detected.    IMPRESSION:  No evidence of left lower extremity deep venous thrombosis.  --- End of Report ---    < end of copied text >    < from: US Duplex Venous Lower Ext Ltd, Left (03.20.23 @ 20:46) >    FINDINGS:    There is normal compressibility of the left common femoral, femoral and   popliteal veins.  Doppler examination shows normal spontaneous and phasic flow.    No calf vein thrombosis is detected.    IMPRESSION:  No evidence of left lower extremity deep venous thrombosis.  --- End of Report ---      < end of copied text >   Vascular Surgery Consult Note:    CC: Patient is a 67y old  Female who presents with a chief complaint of left groin/lower abd pain    HPI From ED: Patient with a past medical history of hypertension, hyperlipidemia, GERD, chronic kidney disease, endocarditis, peripheral vascular disease with multiple stenting on Eliquis is presenting with left lower groin and leg pain for the past month.  Has been constant.  Nothing has made her pain get better or worse during this time.  States that she saw her PCP Dr. Jarquin, vascular surgeon Dr. Green, and OB/GYN doctor who was unable to find cause to her pain and recommended she come to ED for evaluation.  Denies nausea or vomiting.  No urinary complaints.  States the pain is rating towards the anterior portion of her left leg.  Reportedly saw her vascular surgeon today and was told her circulation was "normal".    Interval HPI: HPI as written above. Pt has hx of right fem endarterectomy (2017), right to left fem-fem bypass (Aug 2021 w/ Dr. Green), right fem-pop bypass (May 2022 w/ Dr. Green). Vascular consulted for occluded fem-fem bypass noted on arterial duplex. Pt is currently on Eliquis and ASA (81mg).    Past Medical & Surgical History:  Arthritis  Cervical Spine  and Hands  Restless leg syndrome  Especially with General Anesthesia  Hypertension  Hyperlipidemia  CAD (coronary artery disease)  CKD (chronic kidney disease)  Peripheral vascular disease  GERD (gastroesophageal reflux disease)  History of endocarditis  2019 novel coronavirus disease (COVID-19)  3/2021- monoclonal antibody treatement  Depression  Rheumatoid arthritis of carpometacarpal joint of thumb  Total Joint Replacement of Left Thumb  Osteoarthritis of right shoulder region  Right Shoulder Arthroscopy  2007  S/P tubal ligation  1986  S/P appendectomy  2014  Acute peptic ulcer with perforation  1980 - s/p surgery  H/O cervical discectomy  C1-T1 fusion 2020  S/P CABG x 1  2019  S/P AVR (aortic valve replacement)  x 1,2019  History of endarterectomy  iliac stenting and femoral endarterectomy  S/P MVR (mitral valve repair)  x 1 2019  S/P femoral-femoral bypass surgery  2021    ROS:  General: denies fatigue/weakness, dizziness, fevers/chills, night sweats, weight loss  HEENT: denies auditory or visual changes/disturbances, no vertigo, denies neck pain/stiffness, swelling/lumps or hoarseness   Respiratory: denies shortness of breath, wheezing, dyspnea; denies cough or hemoptysis  Cardiac: denies chest pain, palpitations  GI: denies abdominal pain, bloating/fullness; no nausea, vomiting  : denies urinary urgency/frequency, denies dysuria or hematuria; no incontinence or urinary retention  Neuro: denies headache, syncope, paraesthesias, paralysis or tremors  Extremities: denies pain/swelling, arthralgias or weakness; no limited ROM, no paresthesias or pallor  Skin: denies pruritus, rashes    Medications:  Home Medications:  Align 4 mg oral capsule: 1 cap(s) orally once a day (25 May 2022 11:49)  buPROPion: 150 milligram(s) orally once a day (25 May 2022 11:49)  Caltrate 600 + D oral tablet: 1 tab(s) orally once a day (25 May 2022 11:49)  gabapentin 300 mg oral capsule: 1 cap(s) orally once a day (at bedtime) (25 May 2022 11:49)  Mirapex 0.25 mg oral tablet: 2 tab(s) orally once a day (at bedtime) (25 May 2022 11:49)    MEDICATIONS  (STANDING):  MEDICATIONS  (PRN):      Allergies:  ALLERGIES: No Known Allergies  INTOLERANCES: None, unless indicated below    Family History:  Family history of arthritis (Father)  No family history of alcoholism (Mother)  Family history of hypothyroidism (Sibling)  Family hx of aortic aneurysm (Sibling)  Family history of atrial fibrillation (Sibling)  Family history of parkinsonism (Sibling)  Family hx of hypertension (Sibling)    Vitals:  Vital Signs Last 24 Hrs  T(C): 36.5 (20 Mar 2023 18:46), Max: 36.5 (20 Mar 2023 18:46)  T(F): 97.7 (20 Mar 2023 18:46), Max: 97.7 (20 Mar 2023 18:46)  HR: 66 (20 Mar 2023 18:46) (66 - 66)  BP: 136/81 (20 Mar 2023 18:46) (136/81 - 136/81)  BP(mean): --  RR: 18 (20 Mar 2023 18:46) (18 - 18)  SpO2: 98% (20 Mar 2023 18:46) (98% - 98%)  Parameters below as of 20 Mar 2023 18:46  Patient On (Oxygen Delivery Method): room air    Physical Exam:  General: no acute distress, appears comfortable, well nourished, well-groomed  HEENT: normocephalic/atraumatic, vision grossly intact, hearing grossly intact, no nasal discharge  Neck: supple  Chest: good inspiratory effort  Heart: heart rate and rhythm regular  Abdomen: soft, nontender, nondistended  Extremities: no gross deformities, able to move all four extremities, peripheral pulses intact, no edema, left groin pain with leg extension; left DP pulse weakly palpable, however BILATERAL distal signals (DP, PT, popliteal, femoral) signals present via doppler  Neuro: alert & oriented x3, motor and sensory grossly intact  Skin: warm, dry, good turgor; no gross lesions    Labs:                        10.4   7.45  )-----------( 239      ( 20 Mar 2023 20:40 )             33.6     03-20    144  |  120<H>  |  33<H>  ----------------------------<  86  4.4   |  20<L>  |  2.70<H>    Ca    8.9      20 Mar 2023 20:40  Mg     2.0     03-20    TPro  7.1  /  Alb  3.3  /  TBili  0.3  /  DBili  x   /  AST  25  /  ALT  26  /  AlkPhos  144<H>  03-20    PT/INR - ( 20 Mar 2023 20:40 )   PT: 16.1 sec;   INR: 1.37 ratio    PTT - ( 20 Mar 2023 20:40 )  PTT:38.5 sec    LIVER FUNCTIONS - ( 20 Mar 2023 20:40 )  Alb: 3.3 g/dL / Pro: 7.1 g/dL / ALK PHOS: 144 U/L / ALT: 26 U/L / AST: 25 U/L / GGT: x           Radiology & Additional Studies:  < from: US Duplex Venous Lower Ext Ltd, Left (03.20.23 @ 20:46) >  FINDINGS:    There is normal compressibility of the left common femoral, femoral and   popliteal veins.  Doppler examination shows normal spontaneous and phasic flow.    No calf vein thrombosis is detected.    IMPRESSION:  No evidence of left lower extremity deep venous thrombosis.  --- End of Report ---    < end of copied text >    < from: US Duplex Venous Lower Ext Ltd, Left (03.20.23 @ 20:46) >    FINDINGS:    There is normal compressibility of the left common femoral, femoral and   popliteal veins.  Doppler examination shows normal spontaneous and phasic flow.    No calf vein thrombosis is detected.    IMPRESSION:  No evidence of left lower extremity deep venous thrombosis.  --- End of Report ---      < end of copied text >

## 2023-03-20 NOTE — ED PROVIDER NOTE - CARE PROVIDER_API CALL
Jero Green)  Vascular Surgery  [address] [facility], [address]  [place] [address]  Phone: [phone]  Fax: [phone]  Follow Up Time: 1-3 Days

## 2023-03-20 NOTE — CONSULT NOTE ADULT - ASSESSMENT
67 f/o female with PMHx of hypertension, hyperlipidemia, GERD, chronic kidney disease, endocarditis, PVD, hx of right fem endarterectomy (2017), right to left fem-fem bypass (Aug 2021 w/ Dr. Green), right fem-pop bypass (May 2022 w/ Dr. Green) presenting with left groin pain. Vascular consulted for occluded fem-fem bypass noted on arterial duplex.    PLAN:  - Will update once discussed w/ Dr Castro   67 f/o female with PMHx of hypertension, hyperlipidemia, GERD, chronic kidney disease, endocarditis, PVD, hx of right fem endarterectomy (2017), right to left fem-fem bypass (Aug 2021 w/ Dr. Green), right fem-pop bypass (May 2022 w/ Dr. Green) presenting with left groin pain. Vascular consulted for occluded fem-fem bypass noted on arterial duplex.    PLAN:  - Upon reviewing in office charting, appears pt has good flow throughout her legs except by the bypass, her vascular provider in office did not feel her pain was 2/2 an acute vascular issue. Pt also has strong arterial signals via doppler.  - No acute vascular intervention required at this time, may continue home medications including aspirin and eliquis; have pt follow up with Dr. Green.  - F/u results of CT abd/pelvis to assess for source of left groin pain  - Cased discussed and reviewed by Dr. Castro

## 2023-03-20 NOTE — ED PROVIDER NOTE - CLINICAL SUMMARY MEDICAL DECISION MAKING FREE TEXT BOX
Patient with nonspecific left lower quadrant abdominal pain rating towards her left leg for the past month.  Will rule out vascular pathology with ultrasound.  Possible CTA pending renal function.  Her left foot is warm and appears well-perfused.  Given 1 month of symptoms that have been unchanged, do not suspect acute arterial occlusion at this time.  We will still check with ultrasound arterial and venous of the left lower extremity.  We will check urinalysis for hematuria for possible renal stone versus diverticulitis.  Case discussed with CT tech as well as with ultrasound tech.  Plan for labs, imaging, pain control.

## 2023-03-20 NOTE — ED ADULT NURSE NOTE - NS ED NURSE LEVEL OF CONSCIOUSNESS ORIENTATION
CN Testing: b/l frontalis intact; b/l buccinator intact; smile symmetrical; tongue protrusion at midline; b/l eyes open/close intact; b/l shoulder elevation intact Oriented - self; Oriented - place; Oriented - time

## 2023-03-20 NOTE — ASSESSMENT
[FreeTextEntry1] : Patient with severe peripheral vascular disease status post left lower extremity intervention in the past.  Complaining of left lower quadrant pain and left thigh pain.\par \par Duplex of arterial system shows no evidence of pseudoaneurysm or occlusion.\par \par Recommend evaluation in the emergency room with a CT scan of the abdomen and pelvis.\par \par This was discussed with the patient and PCP.

## 2023-03-20 NOTE — PROGRESS NOTE ADULT - SUBJECTIVE AND OBJECTIVE BOX
Vascular Surgery Consult Note:    CC: Patient is a 67y old  Female who presents with a chief complaint of left groin/lower abd pain.    HPI From ED: Patient with a past medical history of hypertension, hyperlipidemia, GERD, chronic kidney disease, endocarditis, peripheral vascular disease with multiple stenting on Eliquis is presenting with left lower groin and leg pain for the past month.  Has been constant.  Nothing has made her pain get better or worse during this time.  States that she saw her PCP Dr. Jarquin, vascular surgeon Dr. Green, and OB/GYN doctor who was unable to find cause to her pain and recommended she come to ED for evaluation.  Denies nausea or vomiting.  No urinary complaints.  States the pain is rating towards the anterior portion of her left leg.  Reportedly saw her vascular surgeon today and was told her circulation was "normal".    Interval HPI: HPI as written above. Pt has hx of right fem endarterectomy, right to left femoral-femoral bypass, right fem to popliteal bypass w/ Dr. Green. Denies any paresthesias, pallor, motor or sensory abnormalities     Past Medical & Surgical History:  Arthritis  Cervical Spine  and Hands  Restless leg syndrome  Especially with General Anesthesia  Hypertension  Hyperlipidemia  CAD (coronary artery disease)  CKD (chronic kidney disease)  Peripheral vascular disease  GERD (gastroesophageal reflux disease)  History of endocarditis  2019 novel coronavirus disease (COVID-19)  3/2021- monoclonal antibody treatement  Depression  Rheumatoid arthritis of carpometacarpal joint of thumb  Total Joint Replacement of Left Thumb  Osteoarthritis of right shoulder region  Right Shoulder Arthroscopy  2007  S/P tubal ligation  1986  S/P appendectomy  2014  Acute peptic ulcer with perforation  1980 - s/p surgery  H/O cervical discectomy  C1-T1 fusion 2020  S/P CABG x 1  2019  S/P AVR (aortic valve replacement)  x 1,2019  History of endarterectomy  iliac stenting and femoral endarterectomy  S/P MVR (mitral valve repair)  x 1 2019  S/P femoral-femoral bypass surgery  2021    ROS:  General: denies fatigue/weakness, dizziness, fevers/chills, night sweats, weight loss  HEENT: denies auditory or visual changes/disturbances, no vertigo, throat pain or dysphagia; denies neck pain/stiffness, swelling/lumps or hoarseness   Respiratory: denies shortness of breath, wheezing, dyspnea; denies cough or hemoptysis  Cardiac: denies chest pain, palpitations  GI: denies abdominal pain, bloating/fullness; no nausea, vomiting, hematemesis or heartburn; denies changes in bowel habits, no hematochezia or melena  : denies urinary urgency/frequency, denies dysuria or hematuria; no incontinence or urinary retention  Neuro: denies headache, syncope, paraesthesias, paralysis or tremors  Extremities: denies pain/swelling, arthralgias or weakness; no limited ROM, denies discoloration of bilateral UE/LE  Skin: denies pruritus, rashes  Psych: denies hallucinations, visual disturbances; denies hx of depression or anxiety    Medications:  Home Medications:  Align 4 mg oral capsule: 1 cap(s) orally once a day (25 May 2022 11:49)  buPROPion: 150 milligram(s) orally once a day (25 May 2022 11:49)  Caltrate 600 + D oral tablet: 1 tab(s) orally once a day (25 May 2022 11:49)  gabapentin 300 mg oral capsule: 1 cap(s) orally once a day (at bedtime) (25 May 2022 11:49)  Mirapex 0.25 mg oral tablet: 2 tab(s) orally once a day (at bedtime) (25 May 2022 11:49)    MEDICATIONS  (STANDING):    MEDICATIONS  (PRN):      Allergies:  ALLERGIES: No Known Allergies    INTOLERANCES: None, unless indicated below      Social History:  Social History:    Smoking: Yes [ ]  No [ ]   ______ pack years  EtOH: Yes [ ]  No [ ]  Social [ ]  Drugs: Yes [ ]  No [ ]  _______    Family History:  FAMILY HISTORY:  Family history of arthritis (Father)    No family history of alcoholism (Mother)    Family history of hypothyroidism (Sibling)    Family hx of aortic aneurysm (Sibling)    Family history of atrial fibrillation (Sibling)    Family history of parkinsonism (Sibling)    Family hx of hypertension (Sibling)        Vitals:  Vital Signs Last 24 Hrs  T(C): 36.5 (20 Mar 2023 18:46), Max: 36.5 (20 Mar 2023 18:46)  T(F): 97.7 (20 Mar 2023 18:46), Max: 97.7 (20 Mar 2023 18:46)  HR: 66 (20 Mar 2023 18:46) (66 - 66)  BP: 136/81 (20 Mar 2023 18:46) (136/81 - 136/81)  BP(mean): --  RR: 18 (20 Mar 2023 18:46) (18 - 18)  SpO2: 98% (20 Mar 2023 18:46) (98% - 98%)    Parameters below as of 20 Mar 2023 18:46  Patient On (Oxygen Delivery Method): room air        Physical Exam:  General: no acute distress, appears comfortable, well nourished, well-groomed  HEENT: normocephalic/atraumatic, vision grossly intact, hearing grossly intact, no nasal discharge, ears & nose symmetrical and atraumatic  Neck: supple, carotids have good upstroke, trachea midline, without JVD or thyromegaly  Lymphatic: no evidence of masses or lymphadenopathy in head/neck, trunk, axillary, inguinal, or supraclavicular regions  Chest: lungs clear to auscultation bilaterally, no wheezing, no rales or rhonchi, good inspiratory effort  Heart: heart rate and rhythm regular, no murmurs noted  Abdomen: soft, nontender, nondistended, bowel sounds present; no guarding, rebound tenderness, or peritoneal signs; no visible or palpable mass, no evidence of hepatosplenomegaly  Extremities: no gross deformities, able to move all four extremities, peripheral pulses intact, no edema, negative Jhon's sign  Neuro: alert & oriented x3, motor and sensory grossly intact  Skin: warm, dry, good turgor; no gross lesions, no eruptions, rashes or jaundice    Labs:                        10.4   7.45  )-----------( 239      ( 20 Mar 2023 20:40 )             33.6     03-20    144  |  120<H>  |  33<H>  ----------------------------<  86  4.4   |  20<L>  |  2.70<H>    Ca    8.9      20 Mar 2023 20:40  Mg     2.0     03-20    TPro  7.1  /  Alb  3.3  /  TBili  0.3  /  DBili  x   /  AST  25  /  ALT  26  /  AlkPhos  144<H>  03-20    PT/INR - ( 20 Mar 2023 20:40 )   PT: 16.1 sec;   INR: 1.37 ratio         PTT - ( 20 Mar 2023 20:40 )  PTT:38.5 sec    LIVER FUNCTIONS - ( 20 Mar 2023 20:40 )  Alb: 3.3 g/dL / Pro: 7.1 g/dL / ALK PHOS: 144 U/L / ALT: 26 U/L / AST: 25 U/L / GGT: x               Radiology & Additional Studies:    Assessment/Plan:  __ y/o F/M __   Entered in Error, meant for consult note

## 2023-03-20 NOTE — ED PROVIDER NOTE - PROGRESS NOTE DETAILS
Patient with femoral-femoral bypass graft occlusion.  She still has distal flow down the left leg.  Spoke with vascular surgery PA, will evaluate patient.  Patient likely will be get a noncontrast study of the abdomen pelvis given her baseline creatinine.  This was discussed with CT tech who will likely switch study to noncontrast if creatinine is still elevated.  Michelet Fernandez MD. Patient evaluated by vascular PA who discussed case with vascular attending on call - they do not recommend urgent vascular intervention at this time and patient can be dced home with outpatient follow up. Patient does have UTI, will dc on abx. Plan and results discussed with patient, copy provided. Patient has appointment with her vascular doctor for this Thursday. Patient understands strict return precautions.

## 2023-03-21 VITALS
TEMPERATURE: 98 F | RESPIRATION RATE: 16 BRPM | OXYGEN SATURATION: 99 % | HEART RATE: 61 BPM | DIASTOLIC BLOOD PRESSURE: 76 MMHG | SYSTOLIC BLOOD PRESSURE: 141 MMHG

## 2023-03-21 PROCEDURE — 85025 COMPLETE CBC W/AUTO DIFF WBC: CPT

## 2023-03-21 PROCEDURE — 83605 ASSAY OF LACTIC ACID: CPT

## 2023-03-21 PROCEDURE — 81001 URINALYSIS AUTO W/SCOPE: CPT

## 2023-03-21 PROCEDURE — 80053 COMPREHEN METABOLIC PANEL: CPT

## 2023-03-21 PROCEDURE — 99285 EMERGENCY DEPT VISIT HI MDM: CPT | Mod: 25

## 2023-03-21 PROCEDURE — 36415 COLL VENOUS BLD VENIPUNCTURE: CPT

## 2023-03-21 PROCEDURE — 74176 CT ABD & PELVIS W/O CONTRAST: CPT | Mod: MA

## 2023-03-21 PROCEDURE — 85730 THROMBOPLASTIN TIME PARTIAL: CPT

## 2023-03-21 PROCEDURE — 93926 LOWER EXTREMITY STUDY: CPT

## 2023-03-21 PROCEDURE — 96361 HYDRATE IV INFUSION ADD-ON: CPT

## 2023-03-21 PROCEDURE — 83735 ASSAY OF MAGNESIUM: CPT

## 2023-03-21 PROCEDURE — 96376 TX/PRO/DX INJ SAME DRUG ADON: CPT

## 2023-03-21 PROCEDURE — 85610 PROTHROMBIN TIME: CPT

## 2023-03-21 PROCEDURE — 93971 EXTREMITY STUDY: CPT

## 2023-03-21 PROCEDURE — 96374 THER/PROPH/DIAG INJ IV PUSH: CPT

## 2023-03-21 RX ORDER — OXYCODONE AND ACETAMINOPHEN 5; 325 MG/1; MG/1
1 TABLET ORAL
Qty: 12 | Refills: 0
Start: 2023-03-21 | End: 2023-03-23

## 2023-03-21 RX ORDER — CEFUROXIME AXETIL 250 MG
1 TABLET ORAL
Qty: 14 | Refills: 0
Start: 2023-03-21 | End: 2023-03-27

## 2023-03-21 RX ORDER — CEFUROXIME AXETIL 250 MG
500 TABLET ORAL ONCE
Refills: 0 | Status: COMPLETED | OUTPATIENT
Start: 2023-03-21 | End: 2023-03-21

## 2023-03-21 RX ADMIN — SODIUM CHLORIDE 1000 MILLILITER(S): 9 INJECTION INTRAMUSCULAR; INTRAVENOUS; SUBCUTANEOUS at 01:05

## 2023-03-21 RX ADMIN — MORPHINE SULFATE 4 MILLIGRAM(S): 50 CAPSULE, EXTENDED RELEASE ORAL at 01:05

## 2023-03-21 RX ADMIN — Medication 500 MILLIGRAM(S): at 00:54

## 2023-03-23 ENCOUNTER — APPOINTMENT (OUTPATIENT)
Dept: VASCULAR SURGERY | Facility: CLINIC | Age: 68
End: 2023-03-23
Payer: MEDICARE

## 2023-03-23 PROCEDURE — 99214 OFFICE O/P EST MOD 30 MIN: CPT

## 2023-03-23 NOTE — PHYSICAL EXAM
[Normal Breath Sounds] : Normal breath sounds [Normal Heart Sounds] : normal heart sounds [2+] : left 2+ [Varicose Veins Of Lower Extremities] : not present [] : not present [Abdomen Tenderness] : ~T ~M No abdominal tenderness [de-identified] : Left lower quadrant is tender to touch with rebound

## 2023-03-23 NOTE — ASSESSMENT
[FreeTextEntry1] : Patient with severe peripheral vascular disease status post left lower extremity intervention in the past.  Complaining of left lower quadrant pain and left thigh pain.\par \par Duplex of arterial system shows no evidence of pseudoaneurysm or occlusion.\par \par Patient underwent a CT scan which showed no evidence of obvious pathology.  Patient with a normal vascular exam.  Patient with tenderness in the left hip and groin area.  CT scan was limited due to lack of contrast.  Patient's last creatinine was 3.1.\par I have scheduled patient for a MRI of the left hip.

## 2023-03-23 NOTE — HISTORY OF PRESENT ILLNESS
[FreeTextEntry1] : Patient is well-known to our group.  Status post extensive lower extremity revascularization procedures in the past.  Patient complaining of left lower quadrant and left thigh pain specially after any exertion.  Patient notes more significant pain with standing.  She recently went to the emergency room at [facility].  She underwent a CT scan which found no evidence of pathology.  She presents for evaluation.\par \par No fevers or chills.  No GI symptoms.  No rest pain or claudication symptoms.

## 2023-03-29 ENCOUNTER — APPOINTMENT (OUTPATIENT)
Dept: MRI IMAGING | Facility: CLINIC | Age: 68
End: 2023-03-29
Payer: MEDICARE

## 2023-03-29 PROCEDURE — 73721 MRI JNT OF LWR EXTRE W/O DYE: CPT | Mod: LT

## 2023-05-22 ENCOUNTER — APPOINTMENT (OUTPATIENT)
Dept: VASCULAR SURGERY | Facility: CLINIC | Age: 68
End: 2023-05-22
Payer: MEDICARE

## 2023-05-22 ENCOUNTER — INPATIENT (INPATIENT)
Facility: HOSPITAL | Age: 68
LOS: 5 days | Discharge: HOME | DRG: 271 | End: 2023-05-28
Attending: SURGERY | Admitting: SURGERY
Payer: MEDICARE

## 2023-05-22 VITALS
OXYGEN SATURATION: 95 % | HEIGHT: 64 IN | SYSTOLIC BLOOD PRESSURE: 147 MMHG | RESPIRATION RATE: 19 BRPM | TEMPERATURE: 98 F | WEIGHT: 134.92 LBS | DIASTOLIC BLOOD PRESSURE: 75 MMHG | HEART RATE: 71 BPM

## 2023-05-22 VITALS
HEART RATE: 70 BPM | SYSTOLIC BLOOD PRESSURE: 142 MMHG | DIASTOLIC BLOOD PRESSURE: 76 MMHG | BODY MASS INDEX: 25.49 KG/M2 | HEIGHT: 61 IN | WEIGHT: 135 LBS

## 2023-05-22 DIAGNOSIS — Z98.51 TUBAL LIGATION STATUS: Chronic | ICD-10-CM

## 2023-05-22 DIAGNOSIS — Z95.828 PRESENCE OF OTHER VASCULAR IMPLANTS AND GRAFTS: Chronic | ICD-10-CM

## 2023-05-22 DIAGNOSIS — M06.9 RHEUMATOID ARTHRITIS, UNSPECIFIED: Chronic | ICD-10-CM

## 2023-05-22 DIAGNOSIS — Z95.2 PRESENCE OF PROSTHETIC HEART VALVE: Chronic | ICD-10-CM

## 2023-05-22 DIAGNOSIS — Z95.1 PRESENCE OF AORTOCORONARY BYPASS GRAFT: Chronic | ICD-10-CM

## 2023-05-22 DIAGNOSIS — Z98.890 OTHER SPECIFIED POSTPROCEDURAL STATES: Chronic | ICD-10-CM

## 2023-05-22 DIAGNOSIS — T82.898A OTHER SPECIFIED COMPLICATION OF VASCULAR PROSTHETIC DEVICES, IMPLANTS AND GRAFTS, INITIAL ENCOUNTER: ICD-10-CM

## 2023-05-22 DIAGNOSIS — K27.1 ACUTE PEPTIC ULCER, SITE UNSPECIFIED, WITH PERFORATION: Chronic | ICD-10-CM

## 2023-05-22 DIAGNOSIS — I70.219 ATHEROSCLEROSIS OF NATIVE ARTERIES OF EXTREMITIES WITH INTERMITTENT CLAUDICATION, UNSPECIFIED EXTREMITY: ICD-10-CM

## 2023-05-22 DIAGNOSIS — I73.9 PERIPHERAL VASCULAR DISEASE, UNSPECIFIED: ICD-10-CM

## 2023-05-22 DIAGNOSIS — M19.011 PRIMARY OSTEOARTHRITIS, RIGHT SHOULDER: Chronic | ICD-10-CM

## 2023-05-22 DIAGNOSIS — Z90.49 ACQUIRED ABSENCE OF OTHER SPECIFIED PARTS OF DIGESTIVE TRACT: Chronic | ICD-10-CM

## 2023-05-22 LAB
ALBUMIN SERPL ELPH-MCNC: 4 G/DL — SIGNIFICANT CHANGE UP (ref 3.3–5)
ALP SERPL-CCNC: 125 U/L — HIGH (ref 40–120)
ALT FLD-CCNC: 10 U/L — SIGNIFICANT CHANGE UP (ref 10–45)
ANION GAP SERPL CALC-SCNC: 13 MMOL/L — SIGNIFICANT CHANGE UP (ref 5–17)
APTT BLD: 39.2 SEC — HIGH (ref 27.5–35.5)
AST SERPL-CCNC: 20 U/L — SIGNIFICANT CHANGE UP (ref 10–40)
BASE EXCESS BLDV CALC-SCNC: -6.9 MMOL/L — LOW (ref -2–3)
BASOPHILS # BLD AUTO: 0.07 K/UL — SIGNIFICANT CHANGE UP (ref 0–0.2)
BASOPHILS NFR BLD AUTO: 1.2 % — SIGNIFICANT CHANGE UP (ref 0–2)
BILIRUB SERPL-MCNC: 0.3 MG/DL — SIGNIFICANT CHANGE UP (ref 0.2–1.2)
BLD GP AB SCN SERPL QL: NEGATIVE — SIGNIFICANT CHANGE UP
BUN SERPL-MCNC: 27 MG/DL — HIGH (ref 7–23)
CA-I SERPL-SCNC: 1.29 MMOL/L — SIGNIFICANT CHANGE UP (ref 1.15–1.33)
CALCIUM SERPL-MCNC: 9.4 MG/DL — SIGNIFICANT CHANGE UP (ref 8.4–10.5)
CHLORIDE BLDV-SCNC: 109 MMOL/L — HIGH (ref 96–108)
CHLORIDE SERPL-SCNC: 109 MMOL/L — HIGH (ref 96–108)
CO2 BLDV-SCNC: 21 MMOL/L — LOW (ref 22–26)
CO2 SERPL-SCNC: 18 MMOL/L — LOW (ref 22–31)
CREAT SERPL-MCNC: 2.66 MG/DL — HIGH (ref 0.5–1.3)
EGFR: 19 ML/MIN/1.73M2 — LOW
EOSINOPHIL # BLD AUTO: 0.15 K/UL — SIGNIFICANT CHANGE UP (ref 0–0.5)
EOSINOPHIL NFR BLD AUTO: 2.6 % — SIGNIFICANT CHANGE UP (ref 0–6)
GAS PNL BLDV: 136 MMOL/L — SIGNIFICANT CHANGE UP (ref 136–145)
GAS PNL BLDV: SIGNIFICANT CHANGE UP
GLUCOSE BLDV-MCNC: 75 MG/DL — SIGNIFICANT CHANGE UP (ref 70–99)
GLUCOSE SERPL-MCNC: 83 MG/DL — SIGNIFICANT CHANGE UP (ref 70–99)
HCO3 BLDV-SCNC: 20 MMOL/L — LOW (ref 22–29)
HCT VFR BLD CALC: 38.1 % — SIGNIFICANT CHANGE UP (ref 34.5–45)
HCT VFR BLDA CALC: 34 % — LOW (ref 34.5–46.5)
HGB BLD CALC-MCNC: 11.4 G/DL — LOW (ref 11.7–16.1)
HGB BLD-MCNC: 11.8 G/DL — SIGNIFICANT CHANGE UP (ref 11.5–15.5)
IMM GRANULOCYTES NFR BLD AUTO: 0.3 % — SIGNIFICANT CHANGE UP (ref 0–0.9)
INR BLD: 1.46 RATIO — HIGH (ref 0.88–1.16)
LACTATE BLDV-MCNC: 0.9 MMOL/L — SIGNIFICANT CHANGE UP (ref 0.5–2)
LYMPHOCYTES # BLD AUTO: 0.99 K/UL — LOW (ref 1–3.3)
LYMPHOCYTES # BLD AUTO: 16.9 % — SIGNIFICANT CHANGE UP (ref 13–44)
MAGNESIUM SERPL-MCNC: 1.9 MG/DL — SIGNIFICANT CHANGE UP (ref 1.6–2.6)
MCHC RBC-ENTMCNC: 28.8 PG — SIGNIFICANT CHANGE UP (ref 27–34)
MCHC RBC-ENTMCNC: 31 GM/DL — LOW (ref 32–36)
MCV RBC AUTO: 92.9 FL — SIGNIFICANT CHANGE UP (ref 80–100)
MONOCYTES # BLD AUTO: 0.5 K/UL — SIGNIFICANT CHANGE UP (ref 0–0.9)
MONOCYTES NFR BLD AUTO: 8.5 % — SIGNIFICANT CHANGE UP (ref 2–14)
NEUTROPHILS # BLD AUTO: 4.14 K/UL — SIGNIFICANT CHANGE UP (ref 1.8–7.4)
NEUTROPHILS NFR BLD AUTO: 70.5 % — SIGNIFICANT CHANGE UP (ref 43–77)
NRBC # BLD: 0 /100 WBCS — SIGNIFICANT CHANGE UP (ref 0–0)
PCO2 BLDV: 43 MMHG — HIGH (ref 39–42)
PH BLDV: 7.27 — LOW (ref 7.32–7.43)
PLATELET # BLD AUTO: 194 K/UL — SIGNIFICANT CHANGE UP (ref 150–400)
PO2 BLDV: 43 MMHG — SIGNIFICANT CHANGE UP (ref 25–45)
POTASSIUM BLDV-SCNC: 4.3 MMOL/L — SIGNIFICANT CHANGE UP (ref 3.5–5.1)
POTASSIUM SERPL-MCNC: 4.2 MMOL/L — SIGNIFICANT CHANGE UP (ref 3.5–5.3)
POTASSIUM SERPL-SCNC: 4.2 MMOL/L — SIGNIFICANT CHANGE UP (ref 3.5–5.3)
PROT SERPL-MCNC: 7 G/DL — SIGNIFICANT CHANGE UP (ref 6–8.3)
PROTHROM AB SERPL-ACNC: 16.9 SEC — HIGH (ref 10.5–13.4)
RBC # BLD: 4.1 M/UL — SIGNIFICANT CHANGE UP (ref 3.8–5.2)
RBC # FLD: 14.9 % — HIGH (ref 10.3–14.5)
RH IG SCN BLD-IMP: POSITIVE — SIGNIFICANT CHANGE UP
SAO2 % BLDV: 74 % — SIGNIFICANT CHANGE UP (ref 67–88)
SODIUM SERPL-SCNC: 140 MMOL/L — SIGNIFICANT CHANGE UP (ref 135–145)
WBC # BLD: 5.87 K/UL — SIGNIFICANT CHANGE UP (ref 3.8–10.5)
WBC # FLD AUTO: 5.87 K/UL — SIGNIFICANT CHANGE UP (ref 3.8–10.5)

## 2023-05-22 PROCEDURE — 93926 LOWER EXTREMITY STUDY: CPT

## 2023-05-22 PROCEDURE — 99285 EMERGENCY DEPT VISIT HI MDM: CPT

## 2023-05-22 PROCEDURE — 93880 EXTRACRANIAL BILAT STUDY: CPT

## 2023-05-22 PROCEDURE — 99213 OFFICE O/P EST LOW 20 MIN: CPT

## 2023-05-22 PROCEDURE — 93979 VASCULAR STUDY: CPT

## 2023-05-22 RX ORDER — OXYCODONE HYDROCHLORIDE 5 MG/1
2.5 TABLET ORAL EVERY 4 HOURS
Refills: 0 | Status: DISCONTINUED | OUTPATIENT
Start: 2023-05-22 | End: 2023-05-24

## 2023-05-22 RX ORDER — HEPARIN SODIUM 5000 [USP'U]/ML
4500 INJECTION INTRAVENOUS; SUBCUTANEOUS EVERY 6 HOURS
Refills: 0 | Status: DISCONTINUED | OUTPATIENT
Start: 2023-05-22 | End: 2023-05-22

## 2023-05-22 RX ORDER — MORPHINE SULFATE 50 MG/1
4 CAPSULE, EXTENDED RELEASE ORAL ONCE
Refills: 0 | Status: DISCONTINUED | OUTPATIENT
Start: 2023-05-22 | End: 2023-05-22

## 2023-05-22 RX ORDER — ATORVASTATIN CALCIUM 80 MG/1
40 TABLET, FILM COATED ORAL AT BEDTIME
Refills: 0 | Status: DISCONTINUED | OUTPATIENT
Start: 2023-05-22 | End: 2023-05-24

## 2023-05-22 RX ORDER — PRAMIPEXOLE DIHYDROCHLORIDE 0.12 MG/1
0.25 TABLET ORAL DAILY
Refills: 0 | Status: DISCONTINUED | OUTPATIENT
Start: 2023-05-22 | End: 2023-05-24

## 2023-05-22 RX ORDER — SODIUM CHLORIDE 9 MG/ML
1000 INJECTION INTRAMUSCULAR; INTRAVENOUS; SUBCUTANEOUS ONCE
Refills: 0 | Status: COMPLETED | OUTPATIENT
Start: 2023-05-22 | End: 2023-05-22

## 2023-05-22 RX ORDER — ACETAMINOPHEN 500 MG
1000 TABLET ORAL EVERY 6 HOURS
Refills: 0 | Status: DISCONTINUED | OUTPATIENT
Start: 2023-05-22 | End: 2023-05-24

## 2023-05-22 RX ORDER — HEPARIN SODIUM 5000 [USP'U]/ML
4500 INJECTION INTRAVENOUS; SUBCUTANEOUS ONCE
Refills: 0 | Status: COMPLETED | OUTPATIENT
Start: 2023-05-22 | End: 2023-05-22

## 2023-05-22 RX ORDER — HEPARIN SODIUM 5000 [USP'U]/ML
2000 INJECTION INTRAVENOUS; SUBCUTANEOUS EVERY 6 HOURS
Refills: 0 | Status: DISCONTINUED | OUTPATIENT
Start: 2023-05-22 | End: 2023-05-22

## 2023-05-22 RX ORDER — HEPARIN SODIUM 5000 [USP'U]/ML
INJECTION INTRAVENOUS; SUBCUTANEOUS
Qty: 25000 | Refills: 0 | Status: DISCONTINUED | OUTPATIENT
Start: 2023-05-22 | End: 2023-05-23

## 2023-05-22 RX ORDER — ACETAMINOPHEN 500 MG
1000 TABLET ORAL ONCE
Refills: 0 | Status: COMPLETED | OUTPATIENT
Start: 2023-05-22 | End: 2023-05-22

## 2023-05-22 RX ORDER — METOPROLOL TARTRATE 50 MG
75 TABLET ORAL DAILY
Refills: 0 | Status: DISCONTINUED | OUTPATIENT
Start: 2023-05-22 | End: 2023-05-24

## 2023-05-22 RX ORDER — OXYCODONE HYDROCHLORIDE 5 MG/1
5 TABLET ORAL EVERY 4 HOURS
Refills: 0 | Status: DISCONTINUED | OUTPATIENT
Start: 2023-05-22 | End: 2023-05-24

## 2023-05-22 RX ORDER — BUPROPION HYDROCHLORIDE 150 MG/1
300 TABLET, EXTENDED RELEASE ORAL DAILY
Refills: 0 | Status: DISCONTINUED | OUTPATIENT
Start: 2023-05-22 | End: 2023-05-24

## 2023-05-22 RX ORDER — SODIUM CHLORIDE 9 MG/ML
1000 INJECTION INTRAMUSCULAR; INTRAVENOUS; SUBCUTANEOUS
Refills: 0 | Status: DISCONTINUED | OUTPATIENT
Start: 2023-05-22 | End: 2023-05-24

## 2023-05-22 RX ORDER — AMLODIPINE BESYLATE 2.5 MG/1
10 TABLET ORAL EVERY 24 HOURS
Refills: 0 | Status: DISCONTINUED | OUTPATIENT
Start: 2023-05-22 | End: 2023-05-24

## 2023-05-22 RX ADMIN — Medication 400 MILLIGRAM(S): at 18:22

## 2023-05-22 RX ADMIN — SODIUM CHLORIDE 100 MILLILITER(S): 9 INJECTION INTRAMUSCULAR; INTRAVENOUS; SUBCUTANEOUS at 20:50

## 2023-05-22 RX ADMIN — HEPARIN SODIUM 4500 UNIT(S): 5000 INJECTION INTRAVENOUS; SUBCUTANEOUS at 17:49

## 2023-05-22 RX ADMIN — MORPHINE SULFATE 4 MILLIGRAM(S): 50 CAPSULE, EXTENDED RELEASE ORAL at 17:22

## 2023-05-22 RX ADMIN — PRAMIPEXOLE DIHYDROCHLORIDE 0.25 MILLIGRAM(S): 0.12 TABLET ORAL at 22:51

## 2023-05-22 RX ADMIN — OXYCODONE HYDROCHLORIDE 5 MILLIGRAM(S): 5 TABLET ORAL at 22:52

## 2023-05-22 RX ADMIN — MORPHINE SULFATE 4 MILLIGRAM(S): 50 CAPSULE, EXTENDED RELEASE ORAL at 18:22

## 2023-05-22 RX ADMIN — HEPARIN SODIUM 1100 UNIT(S)/HR: 5000 INJECTION INTRAVENOUS; SUBCUTANEOUS at 17:48

## 2023-05-22 RX ADMIN — SODIUM CHLORIDE 1000 MILLILITER(S): 9 INJECTION INTRAMUSCULAR; INTRAVENOUS; SUBCUTANEOUS at 21:35

## 2023-05-22 NOTE — ED PROVIDER NOTE - ATTENDING CONTRIBUTION TO CARE
I, Mehnaz Jackson, performed a history and physical exam of the patient and discussed their management with the resident and /or advanced care provider. I reviewed the resident and /or ACP's note and agree with the documented findings and plan of care. I was present and available for all procedures.     67-year-old female with history of hypertension, hyperlipidemia, GERD, CKD, endocarditis, peripheral vascular disease with right femoral endarterectomy in 2017, right to left femorofemoral bypass August 2021 with Dr. Green, right femoropopliteal bypass May 2022 with Dr. Green with recent occlusion of femorofemoral bypass noted on arterial duplex multiple stenting on Eliquis presenting to the ED with complaints of 4 days of right lower extremity pain.  Patient reports has been having progressively worsening right lower extremity pain currently 7 out of 10.  Painful with ambulation no fevers chills chest pain shortness of breath.  Has been compliant on her Eliquis.  Was seen by vascular office and referred to the ED for evaluation and admission with concern of occlusion of lower extremity stent.  Patient uncomfortable but in no acute distress alert and oriented able to ambulate without assistance.  Normocephalic atraumatic moist mucous membranes.  Lungs clear to auscultation bilaterally no increased work of breathing saturating well on room air.  Regular rate and rhythm.  Abdomen soft nontender no rebound or guarding.  Right lower extremity mild swelling cool to the touch unable to palpate pulses.  Tenderness of the calf sensation intact motor intact.  No focal neurological deficits.  Vascular surgery present at bedside.  Concern for vascular compromise.  Plan for CTA abdomen pelvis with runoffs heparin GTT and admission to Dr. Voss any service for intervention.

## 2023-05-22 NOTE — ED PROVIDER NOTE - CLINICAL SUMMARY MEDICAL DECISION MAKING FREE TEXT BOX
67 y F, hx of PVD, s/p right fem endarterectomy (2017), right to left fem-fem bypass (Aug 2021 w/ Dr. Green), right fem-pop bypass (May 2022 w/ Dr. Green), recent occlusion of fem-fem bypass, p/w RLE pain concerning for re-occlusion of bypass. PE as noted above, notable for RLE is mildly swollen, cool to touch, tender to touch on R calf, sensation intact, motor intact, unable to palpate pedal pulse. Will get labs, CT angio run-off, vascular consult to coordinate care.

## 2023-05-22 NOTE — H&P ADULT - NSHPPHYSICALEXAM_GEN_ALL_CORE
General: NAD, resting comfortably  HEENT: NC/AT, EOMI, normal hearing, no oral lesions, no LAD, neck supple  Pulmonary: normal resp effort, CTA-B  Cardiovascular: NSR, no murmurs  Abdominal: soft, ND/NT, no organomegaly  Extremities: R foot-midcalf cooler than L, motor and sensory intact, R leg tender to palpation  Neuro: A/O x 3, CNs II-XII grossly intact, normal sensation, no focal deficits  Pulses: (p=palpable, s=signal)  - Right: femoral (p), popliteal (mildly p), PT (s), DP (p)  - Left: femoral (p), popliteal (mildly p), PT (s), DP (p)
78

## 2023-05-22 NOTE — ED PROVIDER NOTE - NS ED ROS FT
Constitutional:  (-) fever, (-) chills, (-) lethargy  Eyes:  (-) eye pain (-) visual changes  ENMT: (-) nasal discharge, (-) sore throat. (-) neck pain or stiffness  Cardiac: (-) chest pain (-) palpitations  Respiratory:  (-) cough (-) respiratory distress.   GI:  (-) nausea (-) vomiting (-) diarrhea (-) abdominal pain.  :  (-) dysuria (-) frequency (-) burning.  MS:  (-) back pain (-) joint pain (+) RLE pain   Neuro:  (-) headache (-) numbness (-) tingling (-) focal weakness  Skin:  (-) rash  Except as documented in the HPI,  all other systems are negative

## 2023-05-22 NOTE — H&P ADULT - HIV OFFER
Azathioprine Pregnancy And Lactation Text: This medication is Pregnancy Category D and isn't considered safe during pregnancy. It is unknown if this medication is excreted in breast milk. Opt out Topical Sulfur Applications Counseling: Topical Sulfur Counseling: Patient counseled that this medication may cause skin irritation or allergic reactions.  In the event of skin irritation, the patient was advised to reduce the amount of the drug applied or use it less frequently.   The patient verbalized understanding of the proper use and possible adverse effects of topical sulfur application.  All of the patient's questions and concerns were addressed. Siliq Pregnancy And Lactation Text: The risk during pregnancy and breastfeeding is uncertain with this medication. Bactrim Pregnancy And Lactation Text: This medication is Pregnancy Category D and is known to cause fetal risk.  It is also excreted in breast milk. Humira Pregnancy And Lactation Text: This medication is Pregnancy Category B and is considered safe during pregnancy. It is unknown if this medication is excreted in breast milk. Xelcelestinoz Pregnancy And Lactation Text: This medication is Pregnancy Category D and is not considered safe during pregnancy.  The risk during breast feeding is also uncertain. Terbinafine Counseling: Patient counseling regarding adverse effects of terbinafine including but not limited to headache, diarrhea, rash, upset stomach, liver function test abnormalities, itching, taste/smell disturbance, nausea, abdominal pain, and flatulence.  There is a rare possibility of liver failure that can occur when taking terbinafine.  The patient understands that a baseline LFT and kidney function test may be required. The patient verbalized understanding of the proper use and possible adverse effects of terbinafine.  All of the patient's questions and concerns were addressed. Topical Retinoid counseling:  Patient advised to apply a pea-sized amount only at bedtime and wait 30 minutes after washing their face before applying.  If too drying, patient may add a non-comedogenic moisturizer. The patient verbalized understanding of the proper use and possible adverse effects of retinoids.  All of the patient's questions and concerns were addressed. Birth Control Pills Counseling: Birth Control Pill Counseling: I discussed with the patient the potential side effects of OCPs including but not limited to increased risk of stroke, heart attack, thrombophlebitis, deep venous thrombosis, hepatic adenomas, breast changes, GI upset, headaches, and depression.  The patient verbalized understanding of the proper use and possible adverse effects of OCPs. All of the patient's questions and concerns were addressed. Ivermectin Pregnancy And Lactation Text: This medication is Pregnancy Category C and it isn't known if it is safe during pregnancy. It is also excreted in breast milk. High Dose Vitamin A Pregnancy And Lactation Text: High dose vitamin A therapy is contraindicated during pregnancy and breast feeding. Minocycline Pregnancy And Lactation Text: This medication is Pregnancy Category D and not consider safe during pregnancy. It is also excreted in breast milk. Minoxidil Pregnancy And Lactation Text: This medication has not been assigned a Pregnancy Risk Category but animal studies failed to show danger with the topical medication. It is unknown if the medication is excreted in breast milk. Colchicine Pregnancy And Lactation Text: This medication is Pregnancy Category C and isn't considered safe during pregnancy. It is excreted in breast milk. Arava Pregnancy And Lactation Text: This medication is Pregnancy Category X and is absolutely contraindicated during pregnancy. It is unknown if it is excreted in breast milk. Cosentyx Counseling:  I discussed with the patient the risks of Cosentyx including but not limited to worsening of Crohn's disease, immunosuppression, allergic reactions and infections.  The patient understands that monitoring is required including a PPD at baseline and must alert us or the primary physician if symptoms of infection or other concerning signs are noted. Thalidomide Counseling: I discussed with the patient the risks of thalidomide including but not limited to birth defects, anxiety, weakness, chest pain, dizziness, cough and severe allergy. Doxepin Pregnancy And Lactation Text: This medication is Pregnancy Category C and it isn't known if it is safe during pregnancy. It is also excreted in breast milk and breast feeding isn't recommended. Doxycycline Pregnancy And Lactation Text: This medication is Pregnancy Category D and not consider safe during pregnancy. It is also excreted in breast milk but is considered safe for shorter treatment courses. Acitretin Pregnancy And Lactation Text: This medication is Pregnancy Category X and should not be given to women who are pregnant or may become pregnant in the future. This medication is excreted in breast milk. Dapsone Counseling: I discussed with the patient the risks of dapsone including but not limited to hemolytic anemia, agranulocytosis, rashes, methemoglobinemia, kidney failure, peripheral neuropathy, headaches, GI upset, and liver toxicity.  Patients who start dapsone require monitoring including baseline LFTs and weekly CBCs for the first month, then every month thereafter.  The patient verbalized understanding of the proper use and possible adverse effects of dapsone.  All of the patient's questions and concerns were addressed. Erythromycin Counseling:  I discussed with the patient the risks of erythromycin including but not limited to GI upset, allergic reaction, drug rash, diarrhea, increase in liver enzymes, and yeast infections. Hydroquinone Counseling:  Patient advised that medication may result in skin irritation, lightening (hypopigmentation), dryness, and burning.  In the event of skin irritation, the patient was advised to reduce the amount of the drug applied or use it less frequently.  Rarely, spots that are treated with hydroquinone can become darker (pseudoochronosis).  Should this occur, patient instructed to stop medication and call the office. The patient verbalized understanding of the proper use and possible adverse effects of hydroquinone.  All of the patient's questions and concerns were addressed. Hydroxyzine Counseling: Patient advised that the medication is sedating and not to drive a car after taking this medication.  Patient informed of potential adverse effects including but not limited to dry mouth, urinary retention, and blurry vision.  The patient verbalized understanding of the proper use and possible adverse effects of hydroxyzine.  All of the patient's questions and concerns were addressed. Bexarotene Counseling:  I discussed with the patient the risks of bexarotene including but not limited to hair loss, dry lips/skin/eyes, liver abnormalities, hyperlipidemia, pancreatitis, depression/suicidal ideation, photosensitivity, drug rash/allergic reactions, hypothyroidism, anemia, leukopenia, infection, cataracts, and teratogenicity.  Patient understands that they will need regular blood tests to check lipid profile, liver function tests, white blood cell count, thyroid function tests and pregnancy test if applicable. Clofazimine Counseling:  I discussed with the patient the risks of clofazimine including but not limited to skin and eye pigmentation, liver damage, nausea/vomiting, gastrointestinal bleeding and allergy. Topical Sulfur Applications Pregnancy And Lactation Text: This medication is Pregnancy Category C and has an unknown safety profile during pregnancy. It is unknown if this topical medication is excreted in breast milk. Cellcept Counseling:  I discussed with the patient the risks of mycophenolate mofetil including but not limited to infection/immunosuppression, GI upset, hypokalemia, hypercholesterolemia, bone marrow suppression, lymphoproliferative disorders, malignancy, GI ulceration/bleed/perforation, colitis, interstitial lung disease, kidney failure, progressive multifocal leukoencephalopathy, and birth defects.  The patient understands that monitoring is required including a baseline creatinine and regular CBC testing. In addition, patient must alert us immediately if symptoms of infection or other concerning signs are noted. Birth Control Pills Pregnancy And Lactation Text: This medication should be avoided if pregnant and for the first 30 days post-partum. Simponi Counseling:  I discussed with the patient the risks of golimumab including but not limited to myelosuppression, immunosuppression, autoimmune hepatitis, demyelinating diseases, lymphoma, and serious infections.  The patient understands that monitoring is required including a PPD at baseline and must alert us or the primary physician if symptoms of infection or other concerning signs are noted. Cephalexin Counseling: I counseled the patient regarding use of cephalexin as an antibiotic for prophylactic and/or therapeutic purposes. Cephalexin (commonly prescribed under brand name Keflex) is a cephalosporin antibiotic which is active against numerous classes of bacteria, including most skin bacteria. Side effects may include nausea, diarrhea, gastrointestinal upset, rash, hives, yeast infections, and in rare cases, hepatitis, kidney disease, seizures, fever, confusion, neurologic symptoms, and others. Patients with severe allergies to penicillin medications are cautioned that there is about a 10% incidence of cross-reactivity with cephalosporins. When possible, patients with penicillin allergies should use alternatives to cephalosporins for antibiotic therapy. Terbinafine Pregnancy And Lactation Text: This medication is Pregnancy Category B and is considered safe during pregnancy. It is also excreted in breast milk and breast feeding isn't recommended. Griseofulvin Counseling:  I discussed with the patient the risks of griseofulvin including but not limited to photosensitivity, cytopenia, liver damage, nausea/vomiting and severe allergy.  The patient understands that this medication is best absorbed when taken with a fatty meal (e.g., ice cream or french fries). Methotrexate Counseling:  Patient counseled regarding adverse effects of methotrexate including but not limited to nausea, vomiting, abnormalities in liver function tests. Patients may develop mouth sores, rash, diarrhea, and abnormalities in blood counts. The patient understands that monitoring is required including LFT's and blood counts.  There is a rare possibility of scarring of the liver and lung problems that can occur when taking methotrexate. Persistent nausea, loss of appetite, pale stools, dark urine, cough, and shortness of breath should be reported immediately. Patient advised to discontinue methotrexate treatment at least three months before attempting to become pregnant.  I discussed the need for folate supplements while taking methotrexate.  These supplements can decrease side effects during methotrexate treatment. The patient verbalized understanding of the proper use and possible adverse effects of methotrexate.  All of the patient's questions and concerns were addressed. Tremfya Counseling: I discussed with the patient the risks of guselkumab including but not limited to immunosuppression, serious infections, worsening of inflammatory bowel disease and drug reactions.  The patient understands that monitoring is required including a PPD at baseline and must alert us or the primary physician if symptoms of infection or other concerning signs are noted. Odomzo Counseling- I discussed with the patient the risks of Odomzo including but not limited to nausea, vomiting, diarrhea, constipation, weight loss, changes in the sense of taste, decreased appetite, muscle spasms, and hair loss.  The patient verbalized understanding of the proper use and possible adverse effects of Odomzo.  All of the patient's questions and concerns were addressed. 5-Fu Pregnancy And Lactation Text: This medication is Pregnancy Category X and contraindicated in pregnancy and in women who may become pregnant. It is unknown if this medication is excreted in breast milk. Quinolones Counseling:  I discussed with the patient the risks of fluoroquinolones including but not limited to GI upset, allergic reaction, drug rash, diarrhea, dizziness, photosensitivity, yeast infections, liver function test abnormalities, tendonitis/tendon rupture. Picato Counseling:  I discussed with the patient the risks of Picato including but not limited to erythema, scaling, itching, weeping, crusting, and pain. Glycopyrrolate Counseling:  I discussed with the patient the risks of glycopyrrolate including but not limited to skin rash, drowsiness, dry mouth, difficulty urinating, and blurred vision. Quinolones Pregnancy And Lactation Text: This medication is Pregnancy Category C and it isn't know if it is safe during pregnancy. It is also excreted in breast milk. Benzoyl Peroxide Counseling: Patient counseled that medicine may cause skin irritation and bleach clothing.  In the event of skin irritation, the patient was advised to reduce the amount of the drug applied or use it less frequently.   The patient verbalized understanding of the proper use and possible adverse effects of benzoyl peroxide.  All of the patient's questions and concerns were addressed. Dupixent Counseling: I discussed with the patient the risks of dupilumab including but not limited to eye infection and irritation, cold sores, injection site reactions, worsening of asthma, allergic reactions and increased risk of parasitic infection.  Live vaccines should be avoided while taking dupilumab. Dupilumab will also interact with certain medications such as warfarin and cyclosporine. The patient understands that monitoring is required and they must alert us or the primary physician if symptoms of infection or other concerning signs are noted. Glycopyrrolate Pregnancy And Lactation Text: This medication is Pregnancy Category B and is considered safe during pregnancy. It is unknown if it is excreted breast milk. Bexarotene Pregnancy And Lactation Text: This medication is Pregnancy Category X and should not be given to women who are pregnant or may become pregnant. This medication should not be used if you are breast feeding. Valtrex Counseling: I discussed with the patient the risks of valacyclovir including but not limited to kidney damage, nausea, vomiting and severe allergy.  The patient understands that if the infection seems to be worsening or is not improving, they are to call. Azithromycin Counseling:  I discussed with the patient the risks of azithromycin including but not limited to GI upset, allergic reaction, drug rash, diarrhea, and yeast infections. Erythromycin Pregnancy And Lactation Text: This medication is Pregnancy Category B and is considered safe during pregnancy. It is also excreted in breast milk. Hydroxyzine Pregnancy And Lactation Text: This medication is not safe during pregnancy and should not be taken. It is also excreted in breast milk and breast feeding isn't recommended. Dapsone Pregnancy And Lactation Text: This medication is Pregnancy Category C and is not considered safe during pregnancy or breast feeding. Xolair Counseling:  Patient informed of potential adverse effects including but not limited to fever, muscle aches, rash and allergic reactions.  The patient verbalized understanding of the proper use and possible adverse effects of Xolair.  All of the patient's questions and concerns were addressed. Topical Retinoid Pregnancy And Lactation Text: This medication is Pregnancy Category C. It is unknown if this medication is excreted in breast milk. Infliximab Counseling:  I discussed with the patient the risks of infliximab including but not limited to myelosuppression, immunosuppression, autoimmune hepatitis, demyelinating diseases, lymphoma, and serious infections.  The patient understands that monitoring is required including a PPD at baseline and must alert us or the primary physician if symptoms of infection or other concerning signs are noted. Methotrexate Pregnancy And Lactation Text: This medication is Pregnancy Category X and is known to cause fetal harm. This medication is excreted in breast milk. Griseofulvin Pregnancy And Lactation Text: This medication is Pregnancy Category X and is known to cause serious birth defects. It is unknown if this medication is excreted in breast milk but breast feeding should be avoided. Drysol Counseling:  I discussed with the patient the risks of drysol/aluminum chloride including but not limited to skin rash, itching, irritation, burning. Cephalexin Pregnancy And Lactation Text: This medication is Pregnancy Category B and considered safe during pregnancy.  It is also excreted in breast milk but can be used safely for shorter doses. Prednisone Counseling:  I discussed with the patient the risks of prolonged use of prednisone including but not limited to weight gain, insomnia, osteoporosis, mood changes, diabetes, susceptibility to infection, glaucoma and high blood pressure.  In cases where prednisone use is prolonged, patients should be monitored with blood pressure checks, serum glucose levels and an eye exam.  Additionally, the patient may need to be placed on GI prophylaxis, PCP prophylaxis, and calcium and vitamin D supplementation and/or a bisphosphonate.  The patient verbalized understanding of the proper use and the possible adverse effects of prednisone.  All of the patient's questions and concerns were addressed. Protopic Counseling: Patient may experience a mild burning sensation during topical application. Protopic is not approved in children less than 2 years of age. There have been case reports of hematologic and skin malignancies in patients using topical calcineurin inhibitors although causality is questionable. Xeljanz Counseling: I discussed with the patient the risks of Xeljanz therapy including increased risk of infection, liver issues, headache, diarrhea, or cold symptoms. Live vaccines should be avoided. They were instructed to call if they have any problems. Benzoyl Peroxide Pregnancy And Lactation Text: This medication is Pregnancy Category C. It is unknown if benzoyl peroxide is excreted in breast milk. Dupixent Pregnancy And Lactation Text: This medication likely crosses the placenta but the risk for the fetus is uncertain. This medication is excreted in breast milk. Hydroxychloroquine Counseling:  I discussed with the patient that a baseline ophthalmologic exam is needed at the start of therapy and every year thereafter while on therapy. A CBC may also be warranted for monitoring.  The side effects of this medication were discussed with the patient, including but not limited to agranulocytosis, aplastic anemia, seizures, rashes, retinopathy, and liver toxicity. Patient instructed to call the office should any adverse effect occur.  The patient verbalized understanding of the proper use and possible adverse effects of Plaquenil.  All the patient's questions and concerns were addressed. Spironolactone Counseling: Patient advised regarding risks of diarrhea, abdominal pain, hyperkalemia, birth defects (for female patients), liver toxicity and renal toxicity. The patient may need blood work to monitor liver and kidney function and potassium levels while on therapy. The patient verbalized understanding of the proper use and possible adverse effects of spironolactone.  All of the patient's questions and concerns were addressed. Zyclara Counseling:  I discussed with the patient the risks of imiquimod including but not limited to erythema, scaling, itching, weeping, crusting, and pain.  Patient understands that the inflammatory response to imiquimod is variable from person to person and was educated regarded proper titration schedule.  If flu-like symptoms develop, patient knows to discontinue the medication and contact us. Tazorac Counseling:  Patient advised that medication is irritating and drying.  Patient may need to apply sparingly and wash off after an hour before eventually leaving it on overnight.  The patient verbalized understanding of the proper use and possible adverse effects of tazorac.  All of the patient's questions and concerns were addressed. Xolair Pregnancy And Lactation Text: This medication is Pregnancy Category B and is considered safe during pregnancy. This medication is excreted in breast milk. Otezla Counseling: The side effects of Otezla were discussed with the patient, including but not limited to worsening or new depression, weight loss, diarrhea, nausea, upper respiratory tract infection, and headache. Patient instructed to call the office should any adverse effect occur.  The patient verbalized understanding of the proper use and possible adverse effects of Otezla.  All the patient's questions and concerns were addressed. Drysol Pregnancy And Lactation Text: This medication is considered safe during pregnancy and breast feeding. Itraconazole Counseling:  I discussed with the patient the risks of itraconazole including but not limited to liver damage, nausea/vomiting, neuropathy, and severe allergy.  The patient understands that this medication is best absorbed when taken with acidic beverages such as non-diet cola or ginger ale.  The patient understands that monitoring is required including baseline LFTs and repeat LFTs at intervals.  The patient understands that they are to contact us or the primary physician if concerning signs are noted. Tazorac Pregnancy And Lactation Text: This medication is not safe during pregnancy. It is unknown if this medication is excreted in breast milk. Elidel Counseling: Patient may experience a mild burning sensation during topical application. Elidel is not approved in children less than 2 years of age. There have been case reports of hematologic and skin malignancies in patients using topical calcineurin inhibitors although causality is questionable. Azathioprine Counseling:  I discussed with the patient the risks of azathioprine including but not limited to myelosuppression, immunosuppression, hepatotoxicity, lymphoma, and infections.  The patient understands that monitoring is required including baseline LFTs, Creatinine, possible TPMP genotyping and weekly CBCs for the first month and then every 2 weeks thereafter.  The patient verbalized understanding of the proper use and possible adverse effects of azathioprine.  All of the patient's questions and concerns were addressed. Otezla Pregnancy And Lactation Text: This medication is Pregnancy Category C and it isn't known if it is safe during pregnancy. It is unknown if it is excreted in breast milk. Cimzia Counseling:  I discussed with the patient the risks of Cimzia including but not limited to immunosuppression, allergic reactions and infections.  The patient understands that monitoring is required including a PPD at baseline and must alert us or the primary physician if symptoms of infection or other concerning signs are noted. Clindamycin Counseling: I counseled the patient regarding use of clindamycin as an antibiotic for prophylactic and/or therapeutic purposes. Clindamycin is active against numerous classes of bacteria, including skin bacteria. Side effects may include nausea, diarrhea, gastrointestinal upset, rash, hives, yeast infections, and in rare cases, colitis. Protopic Pregnancy And Lactation Text: This medication is Pregnancy Category C. It is unknown if this medication is excreted in breast milk when applied topically. Prednisone Pregnancy And Lactation Text: This medication is Pregnancy Category C and it isn't know if it is safe during pregnancy. This medication is excreted in breast milk. Carac Counseling:  I discussed with the patient the risks of Carac including but not limited to erythema, scaling, itching, weeping, crusting, and pain. Azithromycin Pregnancy And Lactation Text: This medication is considered safe during pregnancy and is also secreted in breast milk. Albendazole Counseling:  I discussed with the patient the risks of albendazole including but not limited to cytopenia, kidney damage, nausea/vomiting and severe allergy.  The patient understands that this medication is being used in an off-label manner. Isotretinoin Counseling: Patient should get monthly blood tests, not donate blood, not drive at night if vision affected, not share medication, and not undergo elective surgery for 6 months after tx completed. Side effects reviewed, pt to contact office should one occur. Cyclophosphamide Counseling:  I discussed with the patient the risks of cyclophosphamide including but not limited to hair loss, hormonal abnormalities, decreased fertility, abdominal pain, diarrhea, nausea and vomiting, bone marrow suppression and infection. The patient understands that monitoring is required while taking this medication. Stelara Counseling:  I discussed with the patient the risks of ustekinumab including but not limited to immunosuppression, malignancy, posterior leukoencephalopathy syndrome, and serious infections.  The patient understands that monitoring is required including a PPD at baseline and must alert us or the primary physician if symptoms of infection or other concerning signs are noted. Rifampin Counseling: I discussed with the patient the risks of rifampin including but not limited to liver damage, kidney damage, red-orange body fluids, nausea/vomiting and severe allergy. Erivedge Counseling- I discussed with the patient the risks of Erivedge including but not limited to nausea, vomiting, diarrhea, constipation, weight loss, changes in the sense of taste, decreased appetite, muscle spasms, and hair loss.  The patient verbalized understanding of the proper use and possible adverse effects of Erivedge.  All of the patient's questions and concerns were addressed. Valtrex Pregnancy And Lactation Text: this medication is Pregnancy Category B and is considered safe during pregnancy. This medication is not directly found in breast milk but it's metabolite acyclovir is present. Spironolactone Pregnancy And Lactation Text: This medication can cause feminization of the male fetus and should be avoided during pregnancy. The active metabolite is also found in breast milk. Imiquimod Counseling:  I discussed with the patient the risks of imiquimod including but not limited to erythema, scaling, itching, weeping, crusting, and pain.  Patient understands that the inflammatory response to imiquimod is variable from person to person and was educated regarded proper titration schedule.  If flu-like symptoms develop, patient knows to discontinue the medication and contact us. Metronidazole Counseling:  I discussed with the patient the risks of metronidazole including but not limited to seizures, nausea/vomiting, a metallic taste in the mouth, nausea/vomiting and severe allergy. Rituxan Counseling:  I discussed with the patient the risks of Rituxan infusions. Side effects can include infusion reactions, severe drug rashes including mucocutaneous reactions, reactivation of latent hepatitis and other infections and rarely progressive multifocal leukoencephalopathy.  All of the patient's questions and concerns were addressed. Cimetidine Counseling:  I discussed with the patient the risks of Cimetidine including but not limited to gynecomastia, headache, diarrhea, nausea, drowsiness, arrhythmias, pancreatitis, skin rashes, psychosis, bone marrow suppression and kidney toxicity. SSKI Counseling:  I discussed with the patient the risks of SSKI including but not limited to thyroid abnormalities, metallic taste, GI upset, fever, headache, acne, arthralgias, paraesthesias, lymphadenopathy, easy bleeding, arrhythmias, and allergic reaction. Use Enhanced Medication Counseling?: No Rituxan Pregnancy And Lactation Text: This medication is Pregnancy Category C and it isn't know if it is safe during pregnancy. It is unknown if this medication is excreted in breast milk but similar antibodies are known to be excreted. Metronidazole Pregnancy And Lactation Text: This medication is Pregnancy Category B and considered safe during pregnancy.  It is also excreted in breast milk. Isotretinoin Pregnancy And Lactation Text: This medication is Pregnancy Category X and is considered extremely dangerous during pregnancy. It is unknown if it is excreted in breast milk. Oxybutynin Counseling:  I discussed with the patient the risks of oxybutynin including but not limited to skin rash, drowsiness, dry mouth, difficulty urinating, and blurred vision. Arava Counseling:  Patient counseled regarding adverse effects of Arava including but not limited to nausea, vomiting, abnormalities in liver function tests. Patients may develop mouth sores, rash, diarrhea, and abnormalities in blood counts. The patient understands that monitoring is required including LFTs and blood counts.  There is a rare possibility of scarring of the liver and lung problems that can occur when taking methotrexate. Persistent nausea, loss of appetite, pale stools, dark urine, cough, and shortness of breath should be reported immediately. Patient advised to discontinue Arava treatment and consult with a physician prior to attempting conception. The patient will have to undergo a treatment to eliminate Arava from the body prior to conception. Colchicine Counseling:  Patient counseled regarding adverse effects including but not limited to stomach upset (nausea, vomiting, stomach pain, or diarrhea).  Patient instructed to limit alcohol consumption while taking this medication.  Colchicine may reduce blood counts especially with prolonged use.  The patient understands that monitoring of kidney function and blood counts may be required, especially at baseline. The patient verbalized understanding of the proper use and possible adverse effects of colchicine.  All of the patient's questions and concerns were addressed. Clindamycin Pregnancy And Lactation Text: This medication can be used in pregnancy if certain situations. Clindamycin is also present in breast milk. Hydroxychloroquine Pregnancy And Lactation Text: This medication has been shown to cause fetal harm but it isn't assigned a Pregnancy Risk Category. There are small amounts excreted in breast milk. Enbrel Counseling:  I discussed with the patient the risks of etanercept including but not limited to myelosuppression, immunosuppression, autoimmune hepatitis, demyelinating diseases, lymphoma, and infections.  The patient understands that monitoring is required including a PPD at baseline and must alert us or the primary physician if symptoms of infection or other concerning signs are noted. Rifampin Pregnancy And Lactation Text: This medication is Pregnancy Category C and it isn't know if it is safe during pregnancy. It is also excreted in breast milk and should not be used if you are breast feeding. Cyclophosphamide Pregnancy And Lactation Text: This medication is Pregnancy Category D and it isn't considered safe during pregnancy. This medication is excreted in breast milk. Tetracycline Counseling: Patient counseled regarding possible photosensitivity and increased risk for sunburn.  Patient instructed to avoid sunlight, if possible.  When exposed to sunlight, patients should wear protective clothing, sunglasses, and sunscreen.  The patient was instructed to call the office immediately if the following severe adverse effects occur:  hearing changes, easy bruising/bleeding, severe headache, or vision changes.  The patient verbalized understanding of the proper use and possible adverse effects of tetracycline.  All of the patient's questions and concerns were addressed. Patient understands to avoid pregnancy while on therapy due to potential birth defects. Gabapentin Counseling: I discussed with the patient the risks of gabapentin including but not limited to dizziness, somnolence, fatigue and ataxia. Cyclosporine Counseling:  I discussed with the patient the risks of cyclosporine including but not limited to hypertension, gingival hyperplasia,myelosuppression, immunosuppression, liver damage, kidney damage, neurotoxicity, lymphoma, and serious infections. The patient understands that monitoring is required including baseline blood pressure, CBC, CMP, lipid panel and uric acid, and then 1-2 times monthly CMP and blood pressure. Taltz Counseling: I discussed with the patient the risks of ixekizumab including but not limited to immunosuppression, serious infections, worsening of inflammatory bowel disease and drug reactions.  The patient understands that monitoring is required including a PPD at baseline and must alert us or the primary physician if symptoms of infection or other concerning signs are noted. Minocycline Counseling: Patient advised regarding possible photosensitivity and discoloration of the teeth, skin, lips, tongue and gums.  Patient instructed to avoid sunlight, if possible.  When exposed to sunlight, patients should wear protective clothing, sunglasses, and sunscreen.  The patient was instructed to call the office immediately if the following severe adverse effects occur:  hearing changes, easy bruising/bleeding, severe headache, or vision changes.  The patient verbalized understanding of the proper use and possible adverse effects of minocycline.  All of the patient's questions and concerns were addressed. Minoxidil Counseling: Minoxidil is a topical medication which can increase blood flow where it is applied. It is uncertain how this medication increases hair growth. Side effects are uncommon and include stinging and allergic reactions. Siliq Counseling:  I discussed with the patient the risks of Siliq including but not limited to new or worsening depression, suicidal thoughts and behavior, immunosuppression, malignancy, posterior leukoencephalopathy syndrome, and serious infections.  The patient understands that monitoring is required including a PPD at baseline and must alert us or the primary physician if symptoms of infection or other concerning signs are noted. There is also a special program designed to monitor depression which is required with Siliq. High Dose Vitamin A Counseling: Side effects reviewed, pt to contact office should one occur. Solaraze Counseling:  I discussed with the patient the risks of Solaraze including but not limited to erythema, scaling, itching, weeping, crusting, and pain. Nsaids Counseling: NSAID Counseling: I discussed with the patient that NSAIDs should be taken with food. Prolonged use of NSAIDs can result in the development of stomach ulcers.  Patient advised to stop taking NSAIDs if abdominal pain occurs.  The patient verbalized understanding of the proper use and possible adverse effects of NSAIDs.  All of the patient's questions and concerns were addressed. Topical Clindamycin Counseling: Patient counseled that this medication may cause skin irritation or allergic reactions.  In the event of skin irritation, the patient was advised to reduce the amount of the drug applied or use it less frequently.   The patient verbalized understanding of the proper use and possible adverse effects of clindamycin.  All of the patient's questions and concerns were addressed. Fluconazole Counseling:  Patient counseled regarding adverse effects of fluconazole including but not limited to headache, diarrhea, nausea, upset stomach, liver function test abnormalities, taste disturbance, and stomach pain.  There is a rare possibility of liver failure that can occur when taking fluconazole.  The patient understands that monitoring of LFTs and kidney function test may be required, especially at baseline. The patient verbalized understanding of the proper use and possible adverse effects of fluconazole.  All of the patient's questions and concerns were addressed. Ketoconazole Counseling:   Patient counseled regarding improving absorption with orange juice.  Adverse effects include but are not limited to breast enlargement, headache, diarrhea, nausea, upset stomach, liver function test abnormalities, taste disturbance, and stomach pain.  There is a rare possibility of liver failure that can occur when taking ketoconazole. The patient understands that monitoring of LFTs may be required, especially at baseline. The patient verbalized understanding of the proper use and possible adverse effects of ketoconazole.  All of the patient's questions and concerns were addressed. Humira Counseling:  I discussed with the patient the risks of adalimumab including but not limited to myelosuppression, immunosuppression, autoimmune hepatitis, demyelinating diseases, lymphoma, and serious infections.  The patient understands that monitoring is required including a PPD at baseline and must alert us or the primary physician if symptoms of infection or other concerning signs are noted. Detail Level: Zone Ketoconazole Pregnancy And Lactation Text: This medication is Pregnancy Category C and it isn't know if it is safe during pregnancy. It is also excreted in breast milk and breast feeding isn't recommended. Oxybutynin Pregnancy And Lactation Text: This medication is Pregnancy Category B and is considered safe during pregnancy. It is unknown if it is excreted in breast milk. Ivermectin Counseling:  Patient instructed to take medication on an empty stomach with a full glass of water.  Patient informed of potential adverse effects including but not limited to nausea, diarrhea, dizziness, itching, and swelling of the extremities or lymph nodes.  The patient verbalized understanding of the proper use and possible adverse effects of ivermectin.  All of the patient's questions and concerns were addressed. Solaraze Pregnancy And Lactation Text: This medication is Pregnancy Category B and is considered safe. There is some data to suggest avoiding during the third trimester. It is unknown if this medication is excreted in breast milk. Nsaids Pregnancy And Lactation Text: These medications are considered safe up to 30 weeks gestation. It is excreted in breast milk. Cimzia Pregnancy And Lactation Text: This medication crosses the placenta but can be considered safe in certain situations. Cimzia may be excreted in breast milk. Eucrisa Counseling: Patient may experience a mild burning sensation during topical application. Eucrisa is not approved in children less than 2 years of age. Sski Pregnancy And Lactation Text: This medication is Pregnancy Category D and isn't considered safe during pregnancy. It is excreted in breast milk. Doxycycline Counseling:  Patient counseled regarding possible photosensitivity and increased risk for sunburn.  Patient instructed to avoid sunlight, if possible.  When exposed to sunlight, patients should wear protective clothing, sunglasses, and sunscreen.  The patient was instructed to call the office immediately if the following severe adverse effects occur:  hearing changes, easy bruising/bleeding, severe headache, or vision changes.  The patient verbalized understanding of the proper use and possible adverse effects of doxycycline.  All of the patient's questions and concerns were addressed. Doxepin Counseling:  Patient advised that the medication is sedating and not to drive a car after taking this medication. Patient informed of potential adverse effects including but not limited to dry mouth, urinary retention, and blurry vision.  The patient verbalized understanding of the proper use and possible adverse effects of doxepin.  All of the patient's questions and concerns were addressed. Acitretin Counseling:  I discussed with the patient the risks of acitretin including but not limited to hair loss, dry lips/skin/eyes, liver damage, hyperlipidemia, depression/suicidal ideation, photosensitivity.  Serious rare side effects can include but are not limited to pancreatitis, pseudotumor cerebri, bony changes, clot formation/stroke/heart attack.  Patient understands that alcohol is contraindicated since it can result in liver toxicity and significantly prolong the elimination of the drug by many years. 5-Fu Counseling: 5-Fluorouracil Counseling:  I discussed with the patient the risks of 5-fluorouracil including but not limited to erythema, scaling, itching, weeping, crusting, and pain. Bactrim Counseling:  I discussed with the patient the risks of sulfa antibiotics including but not limited to GI upset, allergic reaction, drug rash, diarrhea, dizziness, photosensitivity, and yeast infections.  Rarely, more serious reactions can occur including but not limited to aplastic anemia, agranulocytosis, methemoglobinemia, blood dyscrasias, liver or kidney failure, lung infiltrates or desquamative/blistering drug rashes.

## 2023-05-22 NOTE — H&P ADULT - HISTORY OF PRESENT ILLNESS
67F PMHx of hypertension, hyperlipidemia, GERD, chronic kidney disease, endocarditis, peripheral vascular disease, hx of right fem endarterectomy (2017), right to left fem-fem bypass (Aug 2021 w/ Dr. Green), right fem-pop bypass (May 2022 w/ Dr. Green). on Eliquis. Sent from Dr. Green's clinic with c/o 2-3days of colder R foot with crampy pain and tenderness. Pt reports dangling her legs improves the pain and walking makes it worse. Smokes 0.25 PPD. Pt denies nausea, vomiting, CP, SOB, distension, constipation, dysuria.

## 2023-05-22 NOTE — CONSULT NOTE ADULT - SUBJECTIVE AND OBJECTIVE BOX
VASCULAR SURGERY CONSULT  67F PMHx of hypertension, hyperlipidemia, GERD, chronic kidney disease, endocarditis, peripheral vascular disease, hx of right fem endarterectomy (2017), right to left fem-fem bypass (Aug 2021 w/ Dr. Green), right fem-pop bypass (May 2022 w/ Dr. Green). on Eliquis. Sent from Dr. Green's clinic with c/o 2-3days of colder R foot with crampy pain and tenderness. Pt reports dangling her legs improves the pain and walking makes it worse. Smokes 0.25 PPD. Pt denies nausea, vomiting, CP, SOB, distension, constipation, dysuria.      PAST MEDICAL & SURGICAL HISTORY:  Arthritis  Cervical Spine  and Hands      Restless leg syndrome  Especially with General Anesthesia      Hypertension      Hyperlipidemia      CAD (coronary artery disease)      CKD (chronic kidney disease)      Peripheral vascular disease      GERD (gastroesophageal reflux disease)      History of endocarditis      2019 novel coronavirus disease (COVID-19)  3/2021- monoclonal antibody treatement      Depression      Rheumatoid arthritis of carpometacarpal joint of thumb  Total Joint Replacement of Left Thumb      Osteoarthritis of right shoulder region  Right Shoulder Arthroscopy  2007      S/P tubal ligation  1986      S/P appendectomy  2014      Acute peptic ulcer with perforation  1980 - s/p surgery      H/O cervical discectomy  C1-T1 fusion 2020      S/P CABG x 1 2019      S/P AVR (aortic valve replacement)  x 1,2019      History of endarterectomy  iliac stenting and femoral endarterectomy      S/P MVR (mitral valve repair)  x 1 2019      S/P femoral-femoral bypass surgery  2021          MEDICATIONS  (STANDING):  heparin  Infusion.  Unit(s)/Hr (11 mL/Hr) IV Continuous <Continuous>  sodium chloride 0.9%. 1000 milliLiter(s) (100 mL/Hr) IV Continuous <Continuous>    MEDICATIONS  (PRN):  heparin   Injectable 4500 Unit(s) IV Push every 6 hours PRN For aPTT less than 40  heparin   Injectable 2000 Unit(s) IV Push every 6 hours PRN For aPTT between 40 - 57      Allergies    No Known Allergies    Intolerances        SOCIAL HISTORY:    FAMILY HISTORY:  Family history of arthritis (Father)    No family history of alcoholism (Mother)    Family history of hypothyroidism (Sibling)    Family hx of aortic aneurysm (Sibling)    Family history of atrial fibrillation (Sibling)    Family history of parkinsonism (Sibling)    Family hx of hypertension (Sibling)      Physical Exam:  General: NAD, resting comfortably  HEENT: NC/AT, EOMI, normal hearing, no oral lesions, no LAD, neck supple  Pulmonary: normal resp effort, CTA-B  Cardiovascular: NSR, no murmurs  Abdominal: soft, ND/NT, no organomegaly  Extremities: R foot-midcalf cooler than L, motor and sensory intact, R leg tender to palpation  Neuro: A/O x 3, CNs II-XII grossly intact, normal sensation, no focal deficits  Pulses: (p=palpable, s=signal)  - Right: femoral (p), popliteal (mildly p), PT (s), DP (p)  - Left: femoral (p), popliteal (mildly p), PT (s), DP (p)    Vital Signs Last 24 Hrs  T(C): 36.8 (22 May 2023 18:26), Max: 36.8 (22 May 2023 18:26)  T(F): 98.2 (22 May 2023 18:26), Max: 98.2 (22 May 2023 18:26)  HR: 68 (22 May 2023 18:26) (64 - 71)  BP: 147/70 (22 May 2023 18:26) (147/70 - 154/69)  BP(mean): --  RR: 18 (22 May 2023 18:26) (16 - 19)  SpO2: 99% (22 May 2023 18:26) (95% - 99%)    Parameters below as of 22 May 2023 18:26  Patient On (Oxygen Delivery Method): room air        I&O's Summary          LABS:                        11.8   5.87  )-----------( 194      ( 22 May 2023 17:10 )             38.1     05-22    140  |  109<H>  |  27<H>  ----------------------------<  83  4.2   |  18<L>  |  2.66<H>    Ca    9.4      22 May 2023 17:10  Mg     1.9     05-22    TPro  7.0  /  Alb  4.0  /  TBili  0.3  /  DBili  x   /  AST  20  /  ALT  10  /  AlkPhos  125<H>  05-22    PT/INR - ( 22 May 2023 17:10 )   PT: 16.9 sec;   INR: 1.46 ratio         PTT - ( 22 May 2023 17:10 )  PTT:39.2 sec    CAPILLARY BLOOD GLUCOSE        LIVER FUNCTIONS - ( 22 May 2023 17:10 )  Alb: 4.0 g/dL / Pro: 7.0 g/dL / ALK PHOS: 125 U/L / ALT: 10 U/L / AST: 20 U/L / GGT: x           RADIOLOGY & ADDITIONAL STUDIES:

## 2023-05-22 NOTE — CONSULT NOTE ADULT - ASSESSMENT
PLAN  - Hep GTT  - CT angio with LE runoff   67F PMHx of hypertension, hyperlipidemia, GERD, chronic kidney disease, endocarditis, peripheral vascular disease, hx of right fem endarterectomy (2017), right to left fem-fem bypass (Aug 2021 w/ Dr. Green), right fem-pop bypass (May 2022 w/ Dr. Green). on Eliquis. Sent from Dr. Green's clinic with c/o 2-3days of colder R foot with crampy pain and tenderness. Pt reports dangling her legs improves the pain and walking makes it worse. Smokes 0.25 PPD.     PLAN  - Hep GTT  - CT angio with LE runoff  - NPO/IVF    Discussed with Dr. Barnes on behalf of Dr. Green    Vascular Surgery  [id number]

## 2023-05-22 NOTE — ED ADULT NURSE NOTE - ED STAT RN HAND OFF
CHIEF COMPLAINT:   Chief Complaint   Patient presents with   • Sinus Problem     X 1 week. Facial pressure/pain, headache.    • Fever     Low grade, on and off throughout week.   • Sore Throat   • Other     Pt is here alone. Pt is 26 weeks pregnant.       HISTORY OF PRESENT ILLNESS:  Shraddha is a 33 year old female who is approximately 26 weeks pregnant. She presents to the Urgent care clinic today with complaints of sinus pressure and congestion. Onset of symptoms 1 week. Other associated symptoms include facial pain and pressure along her upper jawline and her forehead, headache, low-grade fever off-and-on, sore throat, postnasal drip. Exacerbating factors include positional changes such as leaning forward she feels a significant amount of increase in pressure. Denies any relieving factors including nasal saline rinses, nasal sprays, over-the-counter Sudafed, her allergy medication Zyrtec. No recent travel or antibiotics in last 30 days. Reports no changes with pregnancy and continues to feel the baby moving.    Past Medical History:   Diagnosis Date   • Depression 09-10       Current Outpatient Prescriptions   Medication Sig   • cetirizine (ZYRTEC ALLERGY) 10 MG tablet Take 10 mg by mouth daily.   • ranitidine (ZANTAC) 150 MG capsule Take 150 mg by mouth daily.   • fluticasone (FLONASE) 50 MCG/ACT nasal spray Spray 2 sprays in each nostril daily. May increase to twice daily as needed short term   • Multiple Vitamin (MULTI-VITAMIN PO)      No current facility-administered medications for this visit.        ALLERGIES:   Allergen Reactions   • Aspirin RASH         Tobacco Use: Quit          Packs/Day: .5    Years: 3          Quit date: 06/18/2008      REVIEW OF SYSTEMS: Review of 10 systems is complete and negative except that which is contained in the HPI (history of present illness) and past medical history.    PHYSICAL EXAM:  Vitals:    05/12/18 0722   BP: 114/68   Pulse: 88   Resp: 16   Temp: 98.4 °F (36.9 °C)    TempSrc: Oral   SpO2: 100%   Weight: 80.2 kg   Height: 5' 9\" (1.753 m)     General: Alert and oriented. Nontoxic appearance.   HENT:  Tympanic membranes intact bilaterally, erythema with thick bilateral effusion. Nasal turbinates erythematous. Moist mucous membranes. Frontal and maxillary sinus tenderness with palpation.  Respiratory: Clear to auscultation, no wheezes or crackles appreciated bilaterally. Normal respiratory effort, non labored.  Cardio/vascular: Regular rate and rhythm, no obvious murmur. S1 S2.     ASSESSMENT/PLAN:  1. Acute non-recurrent sinusitis, unspecified location      After visit summary is reviewed at time of discharge today including  Home care instructions.  Increase oral fluid intake.  Continue nasal saline rinses and nasal spray.  Tylenol or ibuprofen as needed for fever and generalized discomfort.    Orders Placed This Encounter   • amoxicillin-clavulanate (AUGMENTIN) 875-125 MG per tablet BID x 10 days. Patient reports that plain amoxicillin does not work for her and has not worked for her in the past. She also reports that she typically gets a short burst of prednisone when she had sinus infections. We discussed the risk of prednisone with pregnancy and at this time will hold off. If symptoms show no improvement she is instructed to call her primary care provider or obstetrician for further guidance.      Follow up with primary care provider if symptoms worsen, show no improvement or new symptoms occur.     Risks, benefits, alternatives, expected outcomes, limitations, and possible complications of treatment were reviewed with patient. Patient verbalizes agreement to proceed at this time with discussed above plan of care.           Handoff

## 2023-05-22 NOTE — ED PROVIDER NOTE - OBJECTIVE STATEMENT
Patient with a past medical history of hypertension, hyperlipidemia, GERD, chronic kidney disease, endocarditis, peripheral vascular disease with multiple stenting on Eliquis, p/w 4-days onset of RLE pain. Patient with a past medical history of hypertension, hyperlipidemia, GERD, chronic kidney disease, endocarditis, peripheral vascular disease with of right fem endarterectomy (2017), right to left fem-fem bypass (Aug 2021 w/ Dr. Green), right fem-pop bypass (May 2022 w/ Dr. Green), recent occlusion of fem-fem bypass noted on arterial duplex, multiple stenting on Eliquis, p/w 4-days onset of RLE pain. Reports pain at 7/10. Pain with ambulation. Denies fever, chest pain, shortness of breath.

## 2023-05-22 NOTE — H&P ADULT - ASSESSMENT
67F PMHx of hypertension, hyperlipidemia, GERD, chronic kidney disease, endocarditis, peripheral vascular disease, hx of right fem endarterectomy (2017), right to left fem-fem bypass (Aug 2021 w/ Dr. Green), right fem-pop bypass (May 2022 w/ Dr. Green). on Eliquis. Sent from Dr. Green's clinic with c/o 2-3days of colder R foot with crampy pain and tenderness. Pt reports dangling her legs improves the pain and walking makes it worse. Smokes 0.25 PPD.     PLAN  - admit to vascular surgery, Dr. Green,  floor bed  - Hep GTT  - CKD4 with creatinine clearance 19, Will hydrate with NS 75cc/h tonight and CT angio with LE runoff tomorrow am  - NPO/IVF  - pain control prn    Discussed with Dr. Barnes on behalf of Dr. Green    Vascular Surgery  [id number]     67F PMHx of hypertension, hyperlipidemia, GERD, chronic kidney disease, endocarditis, peripheral vascular disease, hx of right fem endarterectomy (2017), right to left fem-fem bypass (Aug 2021 w/ Dr. Green), right fem-pop bypass (May 2022 w/ Dr. Green). on Eliquis. Sent from Dr. Green's clinic with c/o 2-3days of colder R foot with crampy pain and tenderness. Pt reports dangling her legs improves the pain and walking makes it worse. Smokes 0.25 PPD.     PLAN  - admit to vascular surgery, Dr. Green,  floor bed  - Hep GTT  - CKD4 with creatinine clearance 19, Will hydrate with NS 75cc/h tonight and CT angio with LE runoff tomorrow am  - NPO/IVF  - med recs pending  - pain control prn    Discussed with Dr. Barnes on behalf of Dr. Green    Vascular Surgery  [id number]

## 2023-05-22 NOTE — ED PROVIDER NOTE - PHYSICAL EXAMINATION
Gen: Patient is NAD, AAOx3, able to ambulate without assistance  HEENT: NCAT, normal conjunctiva, tongue midline, oral mucosa moist  Lung: CTAB, no respiratory distress, no wheezes/rhonchi/rales B/L, speaking in full sentences  CV: RRR, no murmurs, rubs or gallops, distal pulses 2+ b/l  Abd: soft, NT, ND, no guarding, no rigidity, no rebound tenderness, no CVA tenderness   MSK: RLE is mildly swollen, cool to touch, tender to touch on R calf, sensation intact, motor intact, unable to palpate distal pulses, otherwise ROM normal in other UE/LE  Neuro: No focal sensory or motor deficits  Skin: RLE is mildly swollen, cool to touch, tender to touch on R calf  Psych: normal affect, calm

## 2023-05-22 NOTE — HISTORY OF PRESENT ILLNESS
[FreeTextEntry1] : Patient is a 67-year-old female with peripheral arterial disease s/p extensive lower extremity revascularization procedures in the past.  Patient reports progressively worsening right lower extremity pain over the past 5 days which worsens with ambulation.  Denies tissue loss or ulceration.

## 2023-05-22 NOTE — PHYSICAL EXAM
[Normal Breath Sounds] : Normal breath sounds [Normal Heart Sounds] : normal heart sounds [2+] : left 2+ [No Rash or Lesion] : No rash or lesion [Alert] : alert [Varicose Veins Of Lower Extremities] : not present [] : not present [Abdomen Tenderness] : ~T ~M No abdominal tenderness [de-identified] : Left lower quadrant is tender to touch with rebound

## 2023-05-22 NOTE — ED PROVIDER NOTE - PROGRESS NOTE DETAILS
Given pt symptoms of cold and concern for vascular compromise, benefits of CTA angio outweigh risks of kidney injury in this setting per surgery, CT notified to proceed with angio study, maintenance fluids started. - Deon Dee, PGY-2

## 2023-05-22 NOTE — ED ADULT NURSE NOTE - OBJECTIVE STATEMENT
patient is a 66 y/o F with hx of arterial occlusions, depression, endocarditis, GERD, PVD, CKD, CAD, HLD, HTN, restless leg syndrome who presents to the ED with concern for DVT. patient states that for 3-5 days her right leg has become painful, TTP, and difficult to ambulate on, patient was seen by UC and further directed to ED for eval. patient with weak peripheral pulses to RLE, vascular at bedside to assess patient patient is a 66 y/o F with hx of arterial occlusions, depression, endocarditis, GERD, PVD, CKD, CAD, HLD, HTN, restless leg syndrome who presents to the ED with concern for DVT. patient states that for 3-5 days her right leg has become painful, TTP, and difficult to ambulate on, patient was seen by UC and further directed to ED for eval. patient with weak peripheral pulses to RLE, vascular at bedside to assess patient.

## 2023-05-22 NOTE — ASSESSMENT
[FreeTextEntry1] : 67-year-old female with peripheral arterial disease s/p extensive lower extremity revascularization\par \par In the office today, patient underwent arterial duplex of the right lower extremity which demonstrated an occluded common femoral artery that reconstitutes at the proximal SFA and a patent right femoropopliteal bypass with reduced flow.  The common iliac and external iliac stents are patent\par \par Carotid duplex demonstrates moderate stenosis bilaterally with limited visibility\par \par Recommend patient go to the emergency department for heparin infusion and CT angio abdomen and pelvis with runoff as well as CT angio head and neck for workup prior to intervention

## 2023-05-22 NOTE — PATIENT PROFILE ADULT - BRADEN SENSORY
From: Debbie Knight  To: Gilbert Smith  Sent: 5/22/2023 10:18 AM CDT  Subject: Medication     Hi Dr. Smith,   I’m going on a cruise and last time I was on a cruise I got motion sick/ dizzy and nauseous. I took Dramamine and that helped a little. Wondering about meclizine and something for nausea? Wasn’t sure if I had to schedule an appointment for this. Just let me know if I do.   Thanks!   Debbie    (4) no impairment

## 2023-05-23 ENCOUNTER — TRANSCRIPTION ENCOUNTER (OUTPATIENT)
Age: 68
End: 2023-05-23

## 2023-05-23 LAB
ANION GAP SERPL CALC-SCNC: 10 MMOL/L — SIGNIFICANT CHANGE UP (ref 5–17)
APTT BLD: 144.6 SEC — CRITICAL HIGH (ref 27.5–35.5)
APTT BLD: 65.2 SEC — HIGH (ref 27.5–35.5)
APTT BLD: >200 SEC — CRITICAL HIGH (ref 27.5–35.5)
BUN SERPL-MCNC: 25 MG/DL — HIGH (ref 7–23)
CALCIUM SERPL-MCNC: 8.3 MG/DL — LOW (ref 8.4–10.5)
CHLORIDE SERPL-SCNC: 111 MMOL/L — HIGH (ref 96–108)
CO2 SERPL-SCNC: 19 MMOL/L — LOW (ref 22–31)
CREAT SERPL-MCNC: 2.47 MG/DL — HIGH (ref 0.5–1.3)
EGFR: 21 ML/MIN/1.73M2 — LOW
GLUCOSE SERPL-MCNC: 92 MG/DL — SIGNIFICANT CHANGE UP (ref 70–99)
HCT VFR BLD CALC: 31.9 % — LOW (ref 34.5–45)
HCT VFR BLD CALC: 33.3 % — LOW (ref 34.5–45)
HGB BLD-MCNC: 10.1 G/DL — LOW (ref 11.5–15.5)
HGB BLD-MCNC: 10.4 G/DL — LOW (ref 11.5–15.5)
MAGNESIUM SERPL-MCNC: 1.7 MG/DL — SIGNIFICANT CHANGE UP (ref 1.6–2.6)
MCHC RBC-ENTMCNC: 29.1 PG — SIGNIFICANT CHANGE UP (ref 27–34)
MCHC RBC-ENTMCNC: 29.5 PG — SIGNIFICANT CHANGE UP (ref 27–34)
MCHC RBC-ENTMCNC: 31.2 GM/DL — LOW (ref 32–36)
MCHC RBC-ENTMCNC: 31.7 GM/DL — LOW (ref 32–36)
MCV RBC AUTO: 93.3 FL — SIGNIFICANT CHANGE UP (ref 80–100)
MCV RBC AUTO: 93.3 FL — SIGNIFICANT CHANGE UP (ref 80–100)
NRBC # BLD: 0 /100 WBCS — SIGNIFICANT CHANGE UP (ref 0–0)
NRBC # BLD: 0 /100 WBCS — SIGNIFICANT CHANGE UP (ref 0–0)
PHOSPHATE SERPL-MCNC: 3.6 MG/DL — SIGNIFICANT CHANGE UP (ref 2.5–4.5)
PLATELET # BLD AUTO: 173 K/UL — SIGNIFICANT CHANGE UP (ref 150–400)
PLATELET # BLD AUTO: 191 K/UL — SIGNIFICANT CHANGE UP (ref 150–400)
POTASSIUM SERPL-MCNC: 4.5 MMOL/L — SIGNIFICANT CHANGE UP (ref 3.5–5.3)
POTASSIUM SERPL-SCNC: 4.5 MMOL/L — SIGNIFICANT CHANGE UP (ref 3.5–5.3)
RBC # BLD: 3.42 M/UL — LOW (ref 3.8–5.2)
RBC # BLD: 3.57 M/UL — LOW (ref 3.8–5.2)
RBC # FLD: 14.9 % — HIGH (ref 10.3–14.5)
RBC # FLD: 14.9 % — HIGH (ref 10.3–14.5)
SODIUM SERPL-SCNC: 140 MMOL/L — SIGNIFICANT CHANGE UP (ref 135–145)
WBC # BLD: 5.29 K/UL — SIGNIFICANT CHANGE UP (ref 3.8–10.5)
WBC # BLD: 6.51 K/UL — SIGNIFICANT CHANGE UP (ref 3.8–10.5)
WBC # FLD AUTO: 5.29 K/UL — SIGNIFICANT CHANGE UP (ref 3.8–10.5)
WBC # FLD AUTO: 6.51 K/UL — SIGNIFICANT CHANGE UP (ref 3.8–10.5)

## 2023-05-23 PROCEDURE — 75635 CT ANGIO ABDOMINAL ARTERIES: CPT | Mod: 26

## 2023-05-23 PROCEDURE — 76770 US EXAM ABDO BACK WALL COMP: CPT | Mod: 26

## 2023-05-23 PROCEDURE — 99223 1ST HOSP IP/OBS HIGH 75: CPT

## 2023-05-23 RX ORDER — HEPARIN SODIUM 5000 [USP'U]/ML
900 INJECTION INTRAVENOUS; SUBCUTANEOUS
Qty: 25000 | Refills: 0 | Status: DISCONTINUED | OUTPATIENT
Start: 2023-05-23 | End: 2023-05-24

## 2023-05-23 RX ADMIN — ATORVASTATIN CALCIUM 40 MILLIGRAM(S): 80 TABLET, FILM COATED ORAL at 23:08

## 2023-05-23 RX ADMIN — AMLODIPINE BESYLATE 10 MILLIGRAM(S): 2.5 TABLET ORAL at 06:26

## 2023-05-23 RX ADMIN — HEPARIN SODIUM 0 UNIT(S)/HR: 5000 INJECTION INTRAVENOUS; SUBCUTANEOUS at 03:01

## 2023-05-23 RX ADMIN — SODIUM CHLORIDE 75 MILLILITER(S): 9 INJECTION INTRAMUSCULAR; INTRAVENOUS; SUBCUTANEOUS at 00:46

## 2023-05-23 RX ADMIN — OXYCODONE HYDROCHLORIDE 5 MILLIGRAM(S): 5 TABLET ORAL at 17:33

## 2023-05-23 RX ADMIN — HEPARIN SODIUM 9 UNIT(S)/HR: 5000 INJECTION INTRAVENOUS; SUBCUTANEOUS at 04:05

## 2023-05-23 RX ADMIN — OXYCODONE HYDROCHLORIDE 5 MILLIGRAM(S): 5 TABLET ORAL at 13:00

## 2023-05-23 RX ADMIN — Medication 75 MILLIGRAM(S): at 09:33

## 2023-05-23 RX ADMIN — HEPARIN SODIUM 7 UNIT(S)/HR: 5000 INJECTION INTRAVENOUS; SUBCUTANEOUS at 11:34

## 2023-05-23 RX ADMIN — OXYCODONE HYDROCHLORIDE 5 MILLIGRAM(S): 5 TABLET ORAL at 04:04

## 2023-05-23 RX ADMIN — OXYCODONE HYDROCHLORIDE 5 MILLIGRAM(S): 5 TABLET ORAL at 11:28

## 2023-05-23 NOTE — CONSULT NOTE ADULT - SUBJECTIVE AND OBJECTIVE BOX
[facility]         [address], [place], [place]        [phone] (office)    CHIEF COMPLAINT: Patient is a 67y old  Female who presents with a chief complaint of colder R foot with crampy pain and tenderness (22 May 2023 21:09)      HPI:  67F PMHx of hypertension, hyperlipidemia, chronic kidney disease, Bacterial endocarditis - 2019- s/p AVR, MV repair, CABG X 1, with a LIMA to the LAD, peripheral vascular disease, hx of right fem endarterectomy (2017), right to left fem-fem bypass (Aug 2021 w/ Dr. Grene), right fem-pop bypass (May 2022 w/ Dr. Green), prior vasc stents, on Eliquis for vascular and not for cardiac reasons. Sent from Dr. Green's clinic with c/o several days of colder R foot with crampy pain and tenderness. Pt reports dangling her legs improves the pain and walking makes it worse. Smokes 0.25 PPD.      She is now being planned for cta of the lower exts to guide therapy.  She remains with significant pain in her right calf.  She has been getting hydrated overnight given ckd. She reports no cp, dyspnea or other cv sxs.    PAST MEDICAL & SURGICAL HISTORY:  Arthritis  Cervical Spine  and Hands  avr, mvr, cabg  mult vasc procedures      Restless leg syndrome  Especially with General Anesthesia      Hypertension      Hyperlipidemia      CAD (coronary artery disease)      CKD (chronic kidney disease)      Peripheral vascular disease      GERD (gastroesophageal reflux disease)      History of endocarditis      2019 novel coronavirus disease (COVID-19)  3/2021- monoclonal antibody treatement      Depression      Rheumatoid arthritis of carpometacarpal joint of thumb  Total Joint Replacement of Left Thumb      Osteoarthritis of right shoulder region  Right Shoulder Arthroscopy  2007      S/P tubal ligation  1986      S/P appendectomy  2014      Acute peptic ulcer with perforation  1980 - s/p surgery      H/O cervical discectomy  C1-T1 fusion 2020      S/P CABG x 1  2019      S/P AVR (aortic valve replacement)  x 1,2019      History of endarterectomy  iliac stenting and femoral endarterectomy      S/P MVR (mitral valve repair)  x 1 2019      S/P femoral-femoral bypass surgery  2021          SOCIAL HISTORY: No active tobacco, alcohol or illicit drug use    FAMILY HISTORY:  Family history of arthritis (Father)    No family history of alcoholism (Mother)    Family history of hypothyroidism (Sibling)    Family hx of aortic aneurysm (Sibling)    Family history of atrial fibrillation (Sibling)    Family history of parkinsonism (Sibling)    Family hx of hypertension (Sibling)     No pertinent family history of CAD    Outpatient medications (from [facility]scripts)    Align Oral Capsule  amLODIPine Besylate 10 MG Oral Tablet; TAKE 1 TABLET BY MOUTH EVERY DAY  buPROPion HCl ER (SR) 150 MG Oral Tablet Extended Release 12 Hour; TAKE 1  TABLET DAILY AS DIRECTED  Caltrate 600 + D 600-125 MG-IU TABS  Eliquis 2.5 MG Oral Tablet; TAKE 1 TABLET Every twelve hours  Metoprolol Succinate ER 25 MG Oral Tablet Extended Release 24 Hour; TAKE 3  TABLETS BY MOUTH EVERY DAY  Mirapex 0.25 MG TABS; TAKE 2 TABLETS AT BEDTIME    MEDICATIONS  (STANDING):  amLODIPine   Tablet 10 milliGRAM(s) Oral every 24 hours  atorvastatin 40 milliGRAM(s) Oral at bedtime  buPROPion XL (24-Hour) . 300 milliGRAM(s) Oral daily  heparin  Infusion 900 Unit(s)/Hr (9 mL/Hr) IV Continuous <Continuous>  metoprolol succinate ER 75 milliGRAM(s) Oral daily  pramipexole 0.25 milliGRAM(s) Oral daily  sodium chloride 0.9%. 1000 milliLiter(s) (75 mL/Hr) IV Continuous <Continuous>    MEDICATIONS  (PRN):  acetaminophen   IVPB .. 1000 milliGRAM(s) IV Intermittent every 6 hours PRN Mild Pain (1 - 3)  oxyCODONE    IR 2.5 milliGRAM(s) Oral every 4 hours PRN Moderate Pain (4 - 6)  oxyCODONE    IR 5 milliGRAM(s) Oral every 4 hours PRN Severe Pain (7 - 10)      Allergies    No Known Allergies    Intolerances        REVIEW OF SYSTEMS: Is negative for eye, ENT, GI, , allergic, dermatologic, musculoskeletal and neurologic, except as described above.    VITAL SIGNS:   Vital Signs Last 24 Hrs  T(C): 36.7 (23 May 2023 06:00), Max: 36.8 (22 May 2023 18:26)  T(F): 98 (23 May 2023 06:00), Max: 98.2 (22 May 2023 18:26)  HR: 62 (23 May 2023 06:00) (57 - 71)  BP: 150/72 (23 May 2023 06:00) (119/65 - 154/69)  BP(mean): --  RR: 18 (23 May 2023 06:00) (16 - 19)  SpO2: 98% (23 May 2023 06:00) (95% - 99%)    Parameters below as of 23 May 2023 06:00  Patient On (Oxygen Delivery Method): room air        I&O's Summary      PHYSICAL EXAM:    Constitutional: NAD, awake and alert, well-developed  Eyes:  EOMI, no oral cyanosis, conjunctivae clear, anicteric.  Pulmonary: Non-labored, breath sounds are clear bilaterally, no wheezing, rales or rhonchi  Cardiovascular:  regular S1 and S2. 1-2/6 sys murmur.  No rubs, gallops or clicks  Gastrointestinal: Bowel Sounds present, soft, nontender.   Lymph: No peripheral edema. rosina/tenderness rle  Neurological: Alert, strength and sensitivity are grossly intact  Skin: No obvious lesions/rashes.   Psych:  Mood & affect appropriate .    LABS: All Labs Reviewed:                        10.4   5.29  )-----------( 173      ( 23 May 2023 00:45 )             33.3                         11.8   5.87  )-----------( 194      ( 22 May 2023 17:10 )             38.1     23 May 2023 01:11    140    |  111    |  25     ----------------------------<  92     4.5     |  19     |  2.47   22 May 2023 17:10    140    |  109    |  27     ----------------------------<  83     4.2     |  18     |  2.66     Ca    8.3        23 May 2023 01:11  Ca    9.4        22 May 2023 17:10  Phos  3.6       23 May 2023 01:11  Mg     1.7       23 May 2023 01:11  Mg     1.9       22 May 2023 17:10    TPro  7.0    /  Alb  4.0    /  TBili  0.3    /  DBili  x      /  AST  20     /  ALT  10     /  AlkPhos  125    22 May 2023 17:10    PT/INR - ( 22 May 2023 17:10 )   PT: 16.9 sec;   INR: 1.46 ratio         PTT - ( 23 May 2023 00:45 )  PTT:>200.0 sec      Blood Culture:         RADIOLOGY:    EKG: nsr

## 2023-05-23 NOTE — CONSULT NOTE ADULT - ASSESSMENT
67F PMHx of hypertension, hyperlipidemia, chronic kidney disease, Bacterial endocarditis - 2019- s/p AVR, MV repair, CABG X 1, with a LIMA to the LAD, peripheral vascular disease, hx of right fem endarterectomy (2017), right to left fem-fem bypass (Aug 2021 w/ Dr. Green), right fem-pop bypass (May 2022 w/ Dr. Green), prior vasc stents, on Eliquis for vascular and not for cardiac reasons. Sent from Dr. Green's clinic with c/o several days of colder R foot with crampy pain and tenderness. Pt reports dangling her legs improves the pain and walking makes it worse. Smokes 0.25 PPD.      She is now being planned for cta of the lower exts to guide therapy.  She remains with significant pain in her right calf.  She has been getting hydrated overnight given ckd. She reports no cp, dyspnea or other cv sxs.    -there is no evidence of acute ischemia.  -known cad s/p single vessel cabg at the time of avr/mv repair in 2019  -she had moderate lad and rca disease, and mild lcx disease at the time of cath preop  -no ischemic testing since, but none has been indicated, and she has been free of anginal sxs    -there is no evidence of significant arrhythmia.  -nsr on ekg  -no hx of arrhythmias of concern, only pvcs  -on eliquis for vascular purposes, not cardiac purposes    -there is no evidence for meaningful  volume overload.  -last echo 2021, at which time she had a normal LVEF and normal function of her AVR and her MV repair    -bp labile  -cont amlodipine, metoprolol    -planning ct scan today to assess patency of her various vascular interventions and guide treatment of vasc insuff of the rle  -if needed, would be considered optimized from a cv perspective for vascular surgery    -monitor electrolytes, keep k>4, Mg>2  -will follow

## 2023-05-23 NOTE — CONSULT NOTE ADULT - SUBJECTIVE AND OBJECTIVE BOX
Patient is a 67y old  Female who presents with a chief complaint of colder R foot with crampy pain and tenderness (23 May 2023 07:55)      HPI:  67F PMHx of hypertension, hyperlipidemia, GERD, chronic kidney disease, endocarditis, peripheral vascular disease, hx of right fem endarterectomy (2017), right to left fem-fem bypass (Aug 2021 w/ Dr. Green), right fem-pop bypass (May 2022 w/ Dr. Green). on Eliquis. Sent from Dr. Green's clinic with c/o 2-3days of colder R foot with crampy pain and tenderness. Pt reports dangling her legs improves the pain and walking makes it worse. Smokes 0.25 PPD. Pt denies nausea, vomiting, CP, SOB, distension, constipation, dysuria. (22 May 2023 21:09)      PAST MEDICAL & SURGICAL HISTORY:  Arthritis  Cervical Spine  and Hands  Restless leg syndrome  Especially with General Anesthesia  Hypertension  Hyperlipidemia  CAD (coronary artery disease)  CKD (chronic kidney disease)  Peripheral vascular disease  GERD (gastroesophageal reflux disease)  History of endocarditis  2019 novel coronavirus disease (COVID-19)  3/2021- monoclonal antibody treatement  Depression  Rheumatoid arthritis of carpometacarpal joint of thumb  Total Joint Replacement of Left Thumb  Osteoarthritis of right shoulder region  Right Shoulder Arthroscopy  2007  S/P tubal ligation  1986  S/P appendectomy  2014  Acute peptic ulcer with perforation  1980 - s/p surgery  H/O cervical discectomy  C1-T1 fusion 2020  S/P CABG x 1 2019  S/P AVR (aortic valve replacement)  x 1,2019  History of endarterectomy  iliac stenting and femoral endarterectomy  S/P MVR (mitral valve repair)  x 1 2019  S/P femoral-femoral bypass surgery  2021      FAMILY HISTORY:  Family history of arthritis (Father)    No family history of alcoholism (Mother)    Family history of hypothyroidism (Sibling)    Family hx of aortic aneurysm (Sibling)    Family history of atrial fibrillation (Sibling)    Family history of parkinsonism (Sibling)    Family hx of hypertension (Sibling)      SOCIAL HISTORY: N.a    Allergie:   No Known Allergies    Intolerances: N.a        REVIEW OF SYSTEMS:  General: no weakness, no fever/chills, no weight loss/gain  Skin/Breast: no rash, no jaundice  Ophthalmologic: no vision changes, no dry eyes   Respiratory and Thorax: no cough, no wheezing, no hemoptysis, no dyspnea  Cardiovascular: no chest pain, no shortness of breath, no orthopnea  Gastrointestinal: no n/v/d, no abdominal pain, no dysphagia   Genitourinary: no dysuria, no frequency, no nocturia, no hematuria  Musculoskeletal: no trauma, no sprain/strain, no myalgias, no arthralgias, no fracture. + right calf pain  Neurological: no HA, no dizziness, no weakness, no numbness  Psychiatric: no depression, no SI/HI  Hematology/Lymphatics: no easy bruising  Endocrine: no heat or cold intolerance. no weight gain or loss  Allergic/Immunologic: no allergy or recent reaction     SUBJECTIVE / OVERNIGHT EVENTS:    Vital Signs Last 24 Hrs  T(C): 36.7 (23 May 2023 06:00), Max: 36.8 (22 May 2023 18:26)  T(F): 98 (23 May 2023 06:00), Max: 98.2 (22 May 2023 18:26)  HR: 62 (23 May 2023 06:00) (57 - 71)  BP: 150/72 (23 May 2023 06:00) (119/65 - 154/69)  BP(mean): --  RR: 18 (23 May 2023 06:00) (16 - 19)  SpO2: 98% (23 May 2023 06:00) (95% - 99%)    Parameters below as of 23 May 2023 06:00  Patient On (Oxygen Delivery Method): room air      I&O's Summary      PHYSICAL EXAM:  GENERAL: NAD, Comfortable  HEAD:  Atraumatic, Normocephalic  EYES: EOMI, PERRLA, conjunctiva and sclera clear  NECK: Supple, No JVD  CHEST/LUNG: Clear to auscultation bilaterally; No wheeze  HEART: Regular rate and rhythm; No murmurs, rubs, or gallops  ABDOMEN: Soft, Nontender, Nondistended; Bowel sounds present  Neuro: AAOx3, no focal deficit, 5/5 b/l extremities  EXTREMITIES:  2+ Peripheral Pulses, No clubbing, cyanosis, or edema. Right calf pain  SKIN: No rashes or lesions    LABS:                        10.1   6.51  )-----------( 191      ( 23 May 2023 06:54 )             31.9     05-23    140  |  111<H>  |  25<H>  ----------------------------<  92  4.5   |  19<L>  |  2.47<H>    Ca    8.3<L>      23 May 2023 01:11  Phos  3.6     05-23  Mg     1.7     05-23    TPro  7.0  /  Alb  4.0  /  TBili  0.3  /  DBili  x   /  AST  20  /  ALT  10  /  AlkPhos  125<H>  05-22    PT/INR - ( 22 May 2023 17:10 )   PT: 16.9 sec;   INR: 1.46 ratio         PTT - ( 23 May 2023 00:45 )  PTT:>200.0 sec  CAPILLARY BLOOD GLUCOSE                RADIOLOGY & ADDITIONAL TESTS:      Imaging Personally Reviewed:  [x] YES  [ ] NO    Consultant(s) Notes Reviewed:  [x] YES  [ ] NO      MEDICATIONS  (STANDING):  amLODIPine   Tablet 10 milliGRAM(s) Oral every 24 hours  atorvastatin 40 milliGRAM(s) Oral at bedtime  buPROPion XL (24-Hour) . 300 milliGRAM(s) Oral daily  heparin  Infusion 900 Unit(s)/Hr (9 mL/Hr) IV Continuous <Continuous>  metoprolol succinate ER 75 milliGRAM(s) Oral daily  pramipexole 0.25 milliGRAM(s) Oral daily  sodium chloride 0.9%. 1000 milliLiter(s) (75 mL/Hr) IV Continuous <Continuous>    MEDICATIONS  (PRN):  acetaminophen   IVPB .. 1000 milliGRAM(s) IV Intermittent every 6 hours PRN Mild Pain (1 - 3)  oxyCODONE    IR 2.5 milliGRAM(s) Oral every 4 hours PRN Moderate Pain (4 - 6)  oxyCODONE    IR 5 milliGRAM(s) Oral every 4 hours PRN Severe Pain (7 - 10)      Care Discussed with Consultants/Other Providers [x] YES  [ ] NO

## 2023-05-23 NOTE — CONSULT NOTE ADULT - REASON FOR ADMISSION
colder R foot with crampy pain and tenderness

## 2023-05-23 NOTE — CONSULT NOTE ADULT - CONSULT REASON
Cool R leg with pain
PCP [facility]/ [facility] Angelo Jarquin
abnormal creatinine
vascular insuff, preop eval

## 2023-05-23 NOTE — CONSULT NOTE ADULT - ASSESSMENT
67F PMHx of hypertension, hyperlipidemia, GERD, chronic kidney disease, endocarditis, peripheral vascular disease, hx of right fem endarterectomy (2017), right to left fem-fem bypass (Aug 2021 w/ Dr. Green), right fem-pop bypass (May 2022 w/ Dr. Green). on Eliquis. Sent from Dr. Green's clinic with c/o 2-3days of colder R foot with crampy pain and tenderness. Pt reports dangling her legs improves the pain and walking makes it worse. Smokes 0.25 PPD. Pt denies nausea, vomiting, CP, SOB, distension, constipation, dysuria. (22 May 2023 21:09). Presents with  colder R foot with crampy pain and tenderness. Consulted for medical mgmt.        PLAN:    # PVD:  -  Right to left fem-fem bypass (Aug 2021 w/ Dr. Green), right fem-pop bypass (May 2022 w/ Dr. Green)  - Pain mgmt prn  - C/w Heparin gtt  - C/w IVF's  - Planned for CT angio with LE runoff   - Vascular following    # HTN/ HLD/ CAD S/p CABG:  - Trend BP's  - C/w CV meds per Cards rec's  - Cards following    # Anemia:  - Serial cbc's  - Transfuse prn    # RLS:  - C/w Mirapex    # CKD4:   - Monitor I/O's  - Serial Cr  - Avoid nephrotoxins  - Renally dose medications  - Continue to monitor    # Depression:  - C/w Wellbutrin     # GI ppx:  - Bowel regimen prn    # DVT ppx:  - Heparin gtt      [facility]  [phone]  MD. TAYLOR Camacho

## 2023-05-23 NOTE — ED ADULT NURSE REASSESSMENT NOTE - NS ED NURSE REASSESS COMMENT FT1
Weight obtained from patient utilizing standing scale which had been zeroed prior to measurement. Pt educated on risks and benefits of heparin drip, pt verbalized understanding. Heparin medication verified with heparin nomogram protocol. Two peripheral IV access devices obtained prior to IV heparin initiated. Heparin IV bolus and IV drip initiated for patient following heparin nomogram for full anticoagulation due to arterial occlusion
heparin paused at this time
patient endorsing minimal pain relief s/p morphine administration. MD Rendon made aware. pending further orders
pharmacy contacted to send mirapex to [facility]
1106 pts ptt drawn and needed to either stop or continue, paged surgery [phone] to ext [phone] pending response

## 2023-05-23 NOTE — CONSULT NOTE ADULT - ASSESSMENT
67 year old Female PMHx of hypertension on amlodipine and metoprolol, hyperlipidemia, GERD, chronic kidney disease, endocarditis, peripheral vascular disease, hx of right fem endarterectomy (2017), right to left fem-fem bypass (Aug 2021 w/ Dr. Green), right fem-pop bypass (May 2022 w/ Dr. Green). on Eliquis. Sent from Dr. Green's clinic with c/o 2-3days of colder R foot with cramp pain and tenderness. Given her hx of CKD and need for leg pain work up, renal consult called.      1- CKDIV  2- PVD  3- CAD  4- HTN  5- anemia      unclear cause for CKD, reviewed creatinine range from 2017, revealing creatinine 2.0  current baseline 2.4-2.6 range.   CTA today to eval for occlusion of RLE   continue IVF hydration, NS @ 50 ml/hr  appears euvolemic on exam.   she is at risk for worsening renal function due to contrast from CTA. this was d/w patient, and she verbalized understanding.   awaiting f/u plan from vascular sx.  trend creatinine and electrolytes daily   continue amlodipine 10 mg daily for HTN  continue metoprolol 75 mg daily  trend bp  anemia, trend hgb,  check retic count, and iron studies  recent U/A without proteinuria. check U/A today, and protein/creatinine ratio.   will send serologies to eval cause of CKD,  check jeyson, anca, c3, c4, spep, immunofixation, and bence marroquin urine  check renal sono as well.  67 year old Female PMHx of hypertension on amlodipine and metoprolol, hyperlipidemia, GERD, chronic kidney disease, endocarditis, peripheral vascular disease, hx of right fem endarterectomy (2017), right to left fem-fem bypass (Aug 2021 w/ Dr. Green), right fem-pop bypass (May 2022 w/ Dr. Green). on Eliquis. Sent from Dr. Green's clinic with c/o 2-3days of colder R foot with cramp pain and tenderness. Given her hx of CKD and need for leg pain work up, renal consult called.      1- CKDIV  2- PVD  3- CAD  4- HTN  5- anemia      unclear cause for CKD, reviewed creatinine range from 2017, revealing creatinine 2.0  current baseline 2.4-2.6 range.   CTA today to eval for occlusion of RLE   continue IVF hydration, NS @ 75 ml/hr  appears euvolemic on exam.   she is at risk for worsening renal function due to contrast from CTA. this was d/w patient, and she verbalized understanding.   awaiting f/u plan from vascular sx.  trend creatinine and electrolytes daily   continue amlodipine 10 mg daily for HTN  continue metoprolol 75 mg daily  trend bp  anemia, trend hgb,  check retic count, and iron studies  recent U/A without proteinuria. check U/A today, and protein/creatinine ratio.   will send serologies to eval cause of CKD,  check jeyson, anca, c3, c4, spep, immunofixation, and bence marroquin urine  check renal sono as well.

## 2023-05-23 NOTE — CONSULT NOTE ADULT - NS ATTEND AMEND GEN_ALL_CORE FT
formulated plan with and  agree with above as scribed by NP Indy [Mirella]     states has seen nephro years ago for abn creatinine. admitted for LE bypass and eval for claudication     lungs cta no rales   heart RRR   ext no edema     CKD IV suspected  active tobacco user  PAD   HTN       s/p iv contrast  to receive shin ct scan ivf hydration   trend cr  to have urine pro/cr ratio   to have c3/c4/jeyson/anca and spep/ipep  bence jones   suspect ckd in setting of arteriosclerotic disease   monitor lytes   cont anti htn meds as above   farxiga use once vascular issues resolved

## 2023-05-23 NOTE — PROGRESS NOTE ADULT - ASSESSMENT
67F PMHx of hypertension, hyperlipidemia, GERD, chronic kidney disease, endocarditis, peripheral vascular disease, hx of right fem endarterectomy (2017), right to left fem-fem bypass (Aug 2021 w/ Dr. Green), right fem-pop bypass (May 2022 w/ Dr. Green). on Eliquis. Sent from Dr. Green's clinic with c/o 2-3days of colder R foot with crampy pain and tenderness. Pt reports dangling her legs improves the pain and walking makes it worse. Smokes 0.25 PPD.     PLAN  - Pain control   - Reg diet  - IV fluids  - Hep gtt  - Home medications  - F/u CT angio with LE runoff this AM    Vascular Surgery  [id number]

## 2023-05-23 NOTE — CONSULT NOTE ADULT - SUBJECTIVE AND OBJECTIVE BOX
[facility]  [phone]  NEPHROLOGY      INITIAL CONSULT NOTE  --------------------------------------------------------------------------------  HPI:    67 year old Female PMHx of hypertension on amlodipine and metoprolol, hyperlipidemia, GERD, chronic kidney disease, endocarditis, peripheral vascular disease, hx of right fem endarterectomy (2017), right to left fem-fem bypass (Aug 2021 w/ Dr. Green), right fem-pop bypass (May 2022 w/ Dr. Green). on Eliquis. Sent from Dr. Green's clinic with c/o 2-3days of colder R foot with cramp pain and tenderness. Given her hx of CKD and need for leg pain work up, renal consult called.     PAST HISTORY  --------------------------------------------------------------------------------  PAST MEDICAL & SURGICAL HISTORY:  Arthritis  Cervical Spine  and Hands      Restless leg syndrome  Especially with General Anesthesia      Hypertension      Hyperlipidemia      CAD (coronary artery disease)      CKD (chronic kidney disease)      Peripheral vascular disease      GERD (gastroesophageal reflux disease)      History of endocarditis      2019 novel coronavirus disease (COVID-19)  3/2021- monoclonal antibody treatement      Depression      Rheumatoid arthritis of carpometacarpal joint of thumb  Total Joint Replacement of Left Thumb      Osteoarthritis of right shoulder region  Right Shoulder Arthroscopy  2007      S/P tubal ligation  1986      S/P appendectomy  2014      Acute peptic ulcer with perforation  1980 - s/p surgery      H/O cervical discectomy  C1-T1 fusion 2020      S/P CABG x 1 2019      S/P AVR (aortic valve replacement)  x 1,2019      History of endarterectomy  iliac stenting and femoral endarterectomy      S/P MVR (mitral valve repair)  x 1 2019      S/P femoral-femoral bypass surgery  2021        FAMILY HISTORY:  Family history of arthritis (Father)    No family history of alcoholism (Mother)    Family history of hypothyroidism (Sibling)    Family hx of aortic aneurysm (Sibling)    Family history of atrial fibrillation (Sibling)    Family history of parkinsonism (Sibling)    Family hx of hypertension (Sibling)      PAST SOCIAL HISTORY:  current smoker    ALLERGIES & MEDICATIONS  --------------------------------------------------------------------------------  Allergies    No Known Allergies    Intolerances      Standing Inpatient Medications  amLODIPine   Tablet 10 milliGRAM(s) Oral every 24 hours  atorvastatin 40 milliGRAM(s) Oral at bedtime  buPROPion XL (24-Hour) . 300 milliGRAM(s) Oral daily  heparin  Infusion 900 Unit(s)/Hr IV Continuous <Continuous>  metoprolol succinate ER 75 milliGRAM(s) Oral daily  pramipexole 0.25 milliGRAM(s) Oral daily  sodium chloride 0.9%. 1000 milliLiter(s) IV Continuous <Continuous>    PRN Inpatient Medications  acetaminophen   IVPB .. 1000 milliGRAM(s) IV Intermittent every 6 hours PRN  oxyCODONE    IR 2.5 milliGRAM(s) Oral every 4 hours PRN  oxyCODONE    IR 5 milliGRAM(s) Oral every 4 hours PRN      REVIEW OF SYSTEMS  --------------------------------------------------------------------------------  Gen: No fevers/chills   Head/Eyes/Ears/Mouth: No headache; Normal hearing;  Respiratory: No dyspnea, cough, wheezing,   CV: No chest pain, orthopnea  GI: No abdominal pain, diarrhea, nausea, vomiting,   : No dysuria, decrease urination  MSK: +RLE pain. No joint pain/swelling; no back pain  Neuro: No dizziness/lightheadedness, weakness,  also with no edema     VITALS/PHYSICAL EXAM  --------------------------------------------------------------------------------  T(C): 36.7 (05-23-23 @ 06:00), Max: 36.8 (05-22-23 @ 18:26)  HR: 62 (05-23-23 @ 06:00) (57 - 71)  BP: 150/72 (05-23-23 @ 06:00) (119/65 - 154/69)  RR: 18 (05-23-23 @ 06:00) (16 - 19)  SpO2: 98% (05-23-23 @ 06:00) (95% - 99%)  Wt(kg): --  Height (cm): 162.6 (05-22-23 @ 14:16)  Weight (kg): 58.559 (05-22-23 @ 16:17)  BMI (kg/m2): 22.1 (05-22-23 @ 16:17)  BSA (m2): 1.62 (05-22-23 @ 16:17)      Physical Exam:  	Gen: Non toxic comfortable appearing   	Pulm: No JVD. decrease bs  no rales or ronchi or wheezing  	CV: No JVD. RRR, +systolic murmur, no rub  	Back: No CVA tenderness; no sacral edema  	Abd: +BS, soft, nontender/nondistended  	: No suprapubic tenderness  	UE: Warm, no cyanosis  no clubbing,  no edema;   	LE: Warm, no cyanosis  no clubbing, no edema  	Neuro: alert and oriented. speech coherent   	Skin: Warm, no decrease skin turgor   	Vascular access: none    LABS/STUDIES  --------------------------------------------------------------------------------              10.1   6.51  >-----------<  191      [05-23-23 @ 06:54]              31.9     140  |  111  |  25  ----------------------------<  92      [05-23-23 @ 01:11]  4.5   |  19  |  2.47        Ca     8.3     [05-23-23 @ 01:11]      Mg     1.7     [05-23-23 @ 01:11]      Phos  3.6     [05-23-23 @ 01:11]    TPro  7.0  /  Alb  4.0  /  TBili  0.3  /  DBili  x   /  AST  20  /  ALT  10  /  AlkPhos  125  [05-22-23 @ 17:10]    PT/INR: PT 16.9 , INR 1.46       [05-22-23 @ 17:10]  PTT: 144.6      [05-23-23 @ 09:53]      Creatinine Trend:  SCr 2.47 [05-23 @ 01:11]  SCr 2.66 [05-22 @ 17:10]    Urinalysis - [03-20-23 @ 22:50]      Color Pale Yellow / Appearance Slightly Turbid / SG 1.010 / pH 5.0      Gluc Negative / Ketone Negative  / Bili Negative / Urobili Negative       Blood Negative / Protein Negative / Leuk Est Small / Nitrite Positive      RBC 0-2 / WBC 6-10 / Hyaline  / Gran  / Sq Epi  / Non Sq Epi Few / Bacteria Many      HbA1c 5.0      [05-28-19 @ 22:28]    HCV 0.06, Nonreact      [04-13-19 @ 17:54]    C3 Complement 128      [05-23-19 @ 10:51]  C4 Complement 20      [05-23-19 @ 10:51]  Free Light Chains: kappa 7.11, lambda 7.49, ratio = 0.95      [05-22 @ 11:31]  Immunofixation Serum:   No Monoclonal Band Identified    Reference Range: None Detected      [05-22-19 @ 11:31]  SPEP Interpretation: Normal Electrophoresis Pattern      [05-22-19 @ 11:31]  Immunofixation Urine: Reference Range: None Detected      [05-23-19 @ 01:45]  UPEP Interpretation: Normal Electrophoresis Pattern      [05-23-19 @ 01:45]    < from: CT Angio Abd Aorta w/run-off w/ IV Cont (05.23.23 @ 11:47) >  ACC: [id number] EXAM:  CT ANGIO ABD AOR W RUN(W)AW IC   ORDERED BY: BRADLEY MURPHY     PROCEDURE DATE:  05/23/2023          INTERPRETATION:  CLINICAL INFORMATION: Concern for arterial occlusion of   right lower extremity. Right leg pain.    COMPARISON: CT abdomen pelvis 3/20/2023.    CONTRAST/COMPLICATIONS:  IV Contrast: Omnipaque 350  125 cc administered   25 cc discarded  Oral Contrast: NONE  Complications: None reported at time of study completion    CT ANGIOGRAM ABDOMEN, PELVIS, AND LOWER EXTREMITIES:    PROCEDURE:  Initially, nonenhanced CT was obtained through the calves. Then,   following the rapid administration of intravenous contrast, CT   angiography was performed through the abdomen, pelvis, and lower   extremities down to the toes.  Delayed images through the calves were   also obtained. Sagittal and coronal reformats as well as 3D   reconstructions were performed.    FINDINGS:    CENTRAL ARTERIAL SYSTEM:    Severe atherosclerotic disease of the aortoiliac vasculature and its   branches. Focal ectasia of the infrarenal abdominal aorta measuring up to   2.4 cm. Patent bilateral common iliac artery stents extending into the   external iliac arteries. Severe stenosis of the right internal iliac   artery. Occlusion of the left internal iliac artery with distal   reconstitution. Celiac artery is patent. Severe stenosis of the proximal   SMA. SILVIA is patent. Severe atherosclerotic disease of the bilateral renal   arteries.    RIGHT LOWER EXTREMITY:    Occlusion of the common femoral artery and proximal superficial femoral   artery with reconstitution in the proximal thigh. Deep femoral artery is   occluded at its origin with distal reconstitution. A femoral-femoral   bypass graft is occluded as well. Severe atherosclerotic calcification of   the distal SFA and popliteal artery with occlusion of the popliteal   artery which is reconstituted distally below the knee. Mid SFA to below   the knee popliteal bypass graft is patent. Patent three-vessel runoff in   the calf.Dorsalis pedis artery is patent.    LEFT LOWER EXTREMITY:    Calcified and occluded common femoral artery. Blood flow is reconstituted   at the level of the superficial femoral and deep femoral arteries. Mild   to moderate stenoses of the distal SFA and popliteal artery. Patent   three-vessel runoff in the calf. Dorsalis pedis artery is patent.    ADDITIONAL FINDINGS: Colonic diverticulosis.    IMPRESSION:  Severe atherosclerotic disease of the aortoiliac tree and its branches as   above. Patent bilateral common iliac and external iliac artery stents.    Occluded femorofemoral bypass graft.    Severe atherosclerotic disease of the right lower extremity with   occlusion of the common femoral artery and popliteal artery as above. Mid   SFA to below the knee popliteal bypass graft is patent. Patent   three-vessel runoff in the right calf.    Severe atherosclerotic disease of the left lower extremity with occlusion   of the common femoral artery and mild to moderate stenoses of the distal   SFA and popliteal artery. Patent three-vessel runoff in the left calf.      --- End of Report ---      < end of copied text >   [facility]  [phone]  NEPHROLOGY      INITIAL CONSULT NOTE  --------------------------------------------------------------------------------  HPI:    67 year old Female PMHx of hypertension on amlodipine and metoprolol, hyperlipidemia, GERD, chronic kidney disease, endocarditis and severe AI, s/p AVR, MVR, single vessel CABG 5/31/19, peripheral vascular disease, hx of right fem endarterectomy (2017), right to left fem-fem bypass (Aug 2021 w/ Dr. Green), right fem-pop bypass (May 2022 w/ Dr. Green). on Eliquis. Sent from Dr. Green's clinic with c/o 2-3days of colder R foot with cramp pain and tenderness. Given her hx of CKD and need for leg pain work up, renal consult called.     PAST HISTORY  --------------------------------------------------------------------------------  PAST MEDICAL & SURGICAL HISTORY:  Arthritis  Cervical Spine  and Hands      Restless leg syndrome  Especially with General Anesthesia      Hypertension      Hyperlipidemia      CAD (coronary artery disease)      CKD (chronic kidney disease)      Peripheral vascular disease      GERD (gastroesophageal reflux disease)      History of endocarditis      2019 novel coronavirus disease (COVID-19)  3/2021- monoclonal antibody treatement      Depression      Rheumatoid arthritis of carpometacarpal joint of thumb  Total Joint Replacement of Left Thumb      Osteoarthritis of right shoulder region  Right Shoulder Arthroscopy  2007      S/P tubal ligation  1986      S/P appendectomy  2014      Acute peptic ulcer with perforation  1980 - s/p surgery      H/O cervical discectomy  C1-T1 fusion 2020      S/P CABG x 1 2019      S/P AVR (aortic valve replacement)  x 1,2019      History of endarterectomy  iliac stenting and femoral endarterectomy      S/P MVR (mitral valve repair)  x 1 2019      S/P femoral-femoral bypass surgery  2021        FAMILY HISTORY:  Family history of arthritis (Father)    No family history of alcoholism (Mother)    Family history of hypothyroidism (Sibling)    Family hx of aortic aneurysm (Sibling)    Family history of atrial fibrillation (Sibling)    Family history of parkinsonism (Sibling)    Family hx of hypertension (Sibling)      PAST SOCIAL HISTORY:  current smoker    ALLERGIES & MEDICATIONS  --------------------------------------------------------------------------------  Allergies    No Known Allergies    Intolerances      Standing Inpatient Medications  amLODIPine   Tablet 10 milliGRAM(s) Oral every 24 hours  atorvastatin 40 milliGRAM(s) Oral at bedtime  buPROPion XL (24-Hour) . 300 milliGRAM(s) Oral daily  heparin  Infusion 900 Unit(s)/Hr IV Continuous <Continuous>  metoprolol succinate ER 75 milliGRAM(s) Oral daily  pramipexole 0.25 milliGRAM(s) Oral daily  sodium chloride 0.9%. 1000 milliLiter(s) IV Continuous <Continuous>    PRN Inpatient Medications  acetaminophen   IVPB .. 1000 milliGRAM(s) IV Intermittent every 6 hours PRN  oxyCODONE    IR 2.5 milliGRAM(s) Oral every 4 hours PRN  oxyCODONE    IR 5 milliGRAM(s) Oral every 4 hours PRN      REVIEW OF SYSTEMS  --------------------------------------------------------------------------------  Gen: No fevers/chills   Head/Eyes/Ears/Mouth: No headache; Normal hearing;  Respiratory: No dyspnea, cough, wheezing,   CV: No chest pain, orthopnea  GI: No abdominal pain, diarrhea, nausea, vomiting,   : No dysuria, decrease urination  MSK: +RLE pain. No joint pain/swelling; no back pain  Neuro: No dizziness/lightheadedness, weakness,  also with no edema     VITALS/PHYSICAL EXAM  --------------------------------------------------------------------------------  T(C): 36.7 (05-23-23 @ 06:00), Max: 36.8 (05-22-23 @ 18:26)  HR: 62 (05-23-23 @ 06:00) (57 - 71)  BP: 150/72 (05-23-23 @ 06:00) (119/65 - 154/69)  RR: 18 (05-23-23 @ 06:00) (16 - 19)  SpO2: 98% (05-23-23 @ 06:00) (95% - 99%)  Wt(kg): --  Height (cm): 162.6 (05-22-23 @ 14:16)  Weight (kg): 58.559 (05-22-23 @ 16:17)  BMI (kg/m2): 22.1 (05-22-23 @ 16:17)  BSA (m2): 1.62 (05-22-23 @ 16:17)      Physical Exam:  	Gen: Non toxic comfortable appearing   	Pulm: No JVD. decrease bs  no rales or ronchi or wheezing  	CV: No JVD. RRR, +systolic murmur, no rub  	Back: No CVA tenderness; no sacral edema  	Abd: +BS, soft, nontender/nondistended  	: No suprapubic tenderness  	UE: Warm, no cyanosis  no clubbing,  no edema;   	LE: Warm, no cyanosis  no clubbing, no edema  	Neuro: alert and oriented. speech coherent   	Skin: Warm, no decrease skin turgor   	Vascular access: none    LABS/STUDIES  --------------------------------------------------------------------------------              10.1   6.51  >-----------<  191      [05-23-23 @ 06:54]              31.9     140  |  111  |  25  ----------------------------<  92      [05-23-23 @ 01:11]  4.5   |  19  |  2.47        Ca     8.3     [05-23-23 @ 01:11]      Mg     1.7     [05-23-23 @ 01:11]      Phos  3.6     [05-23-23 @ 01:11]    TPro  7.0  /  Alb  4.0  /  TBili  0.3  /  DBili  x   /  AST  20  /  ALT  10  /  AlkPhos  125  [05-22-23 @ 17:10]    PT/INR: PT 16.9 , INR 1.46       [05-22-23 @ 17:10]  PTT: 144.6      [05-23-23 @ 09:53]      Creatinine Trend:  SCr 2.47 [05-23 @ 01:11]  SCr 2.66 [05-22 @ 17:10]    Urinalysis - [03-20-23 @ 22:50]      Color Pale Yellow / Appearance Slightly Turbid / SG 1.010 / pH 5.0      Gluc Negative / Ketone Negative  / Bili Negative / Urobili Negative       Blood Negative / Protein Negative / Leuk Est Small / Nitrite Positive      RBC 0-2 / WBC 6-10 / Hyaline  / Gran  / Sq Epi  / Non Sq Epi Few / Bacteria Many      HbA1c 5.0      [05-28-19 @ 22:28]    HCV 0.06, Nonreact      [04-13-19 @ 17:54]    C3 Complement 128      [05-23-19 @ 10:51]  C4 Complement 20      [05-23-19 @ 10:51]  Free Light Chains: kappa 7.11, lambda 7.49, ratio = 0.95      [05-22 @ 11:31]  Immunofixation Serum:   No Monoclonal Band Identified    Reference Range: None Detected      [05-22-19 @ 11:31]  SPEP Interpretation: Normal Electrophoresis Pattern      [05-22-19 @ 11:31]  Immunofixation Urine: Reference Range: None Detected      [05-23-19 @ 01:45]  UPEP Interpretation: Normal Electrophoresis Pattern      [05-23-19 @ 01:45]    < from: CT Angio Abd Aorta w/run-off w/ IV Cont (05.23.23 @ 11:47) >  ACC: [id number] EXAM:  CT ANGIO ABD AOR W RUN(W)AW IC   ORDERED BY: BRADLEY MURHPY     PROCEDURE DATE:  05/23/2023          INTERPRETATION:  CLINICAL INFORMATION: Concern for arterial occlusion of   right lower extremity. Right leg pain.    COMPARISON: CT abdomen pelvis 3/20/2023.    CONTRAST/COMPLICATIONS:  IV Contrast: Omnipaque 350  125 cc administered   25 cc discarded  Oral Contrast: NONE  Complications: None reported at time of study completion    CT ANGIOGRAM ABDOMEN, PELVIS, AND LOWER EXTREMITIES:    PROCEDURE:  Initially, nonenhanced CT was obtained through the calves. Then,   following the rapid administration of intravenous contrast, CT   angiography was performed through the abdomen, pelvis, and lower   extremities down to the toes.  Delayed images through the calves were   also obtained. Sagittal and coronal reformats as well as 3D   reconstructions were performed.    FINDINGS:    CENTRAL ARTERIAL SYSTEM:    Severe atherosclerotic disease of the aortoiliac vasculature and its   branches. Focal ectasia of the infrarenal abdominal aorta measuring up to   2.4 cm. Patent bilateral common iliac artery stents extending into the   external iliac arteries. Severe stenosis of the right internal iliac   artery. Occlusion of the left internal iliac artery with distal   reconstitution. Celiac artery is patent. Severe stenosis of the proximal   SMA. SILVIA is patent. Severe atherosclerotic disease of the bilateral renal   arteries.    RIGHT LOWER EXTREMITY:    Occlusion of the common femoral artery and proximal superficial femoral   artery with reconstitution in the proximal thigh. Deep femoral artery is   occluded at its origin with distal reconstitution. A femoral-femoral   bypass graft is occluded as well. Severe atherosclerotic calcification of   the distal SFA and popliteal artery with occlusion of the popliteal   artery which is reconstituted distally below the knee. Mid SFA to below   the knee popliteal bypass graft is patent. Patent three-vessel runoff in   the calf.Dorsalis pedis artery is patent.    LEFT LOWER EXTREMITY:    Calcified and occluded common femoral artery. Blood flow is reconstituted   at the level of the superficial femoral and deep femoral arteries. Mild   to moderate stenoses of the distal SFA and popliteal artery. Patent   three-vessel runoff in the calf. Dorsalis pedis artery is patent.    ADDITIONAL FINDINGS: Colonic diverticulosis.    IMPRESSION:  Severe atherosclerotic disease of the aortoiliac tree and its branches as   above. Patent bilateral common iliac and external iliac artery stents.    Occluded femorofemoral bypass graft.    Severe atherosclerotic disease of the right lower extremity with   occlusion of the common femoral artery and popliteal artery as above. Mid   SFA to below the knee popliteal bypass graft is patent. Patent   three-vessel runoff in the right calf.    Severe atherosclerotic disease of the left lower extremity with occlusion   of the common femoral artery and mild to moderate stenoses of the distal   SFA and popliteal artery. Patent three-vessel runoff in the left calf.      --- End of Report ---      < end of copied text >

## 2023-05-24 ENCOUNTER — APPOINTMENT (OUTPATIENT)
Dept: VASCULAR SURGERY | Facility: HOSPITAL | Age: 68
End: 2023-05-24
Payer: MEDICARE

## 2023-05-24 LAB
ANA TITR SER: NEGATIVE — SIGNIFICANT CHANGE UP
ANION GAP SERPL CALC-SCNC: 12 MMOL/L — SIGNIFICANT CHANGE UP (ref 5–17)
APTT BLD: 57 SEC — HIGH (ref 27.5–35.5)
APTT BLD: 59.2 SEC — HIGH (ref 27.5–35.5)
BASOPHILS # BLD AUTO: 0.06 K/UL — SIGNIFICANT CHANGE UP (ref 0–0.2)
BASOPHILS NFR BLD AUTO: 1 % — SIGNIFICANT CHANGE UP (ref 0–2)
BUN SERPL-MCNC: 28 MG/DL — HIGH (ref 7–23)
C3 SERPL-MCNC: 112 MG/DL — SIGNIFICANT CHANGE UP (ref 81–157)
C4 SERPL-MCNC: 26 MG/DL — SIGNIFICANT CHANGE UP (ref 13–39)
CALCIUM SERPL-MCNC: 9 MG/DL — SIGNIFICANT CHANGE UP (ref 8.4–10.5)
CHLORIDE SERPL-SCNC: 108 MMOL/L — SIGNIFICANT CHANGE UP (ref 96–108)
CO2 SERPL-SCNC: 18 MMOL/L — LOW (ref 22–31)
CREAT SERPL-MCNC: 2.56 MG/DL — HIGH (ref 0.5–1.3)
EGFR: 20 ML/MIN/1.73M2 — LOW
EOSINOPHIL # BLD AUTO: 0.2 K/UL — SIGNIFICANT CHANGE UP (ref 0–0.5)
EOSINOPHIL NFR BLD AUTO: 3.2 % — SIGNIFICANT CHANGE UP (ref 0–6)
FERRITIN SERPL-MCNC: 65 NG/ML — SIGNIFICANT CHANGE UP (ref 15–150)
GAS PNL BLDA: SIGNIFICANT CHANGE UP
GAS PNL BLDA: SIGNIFICANT CHANGE UP
GLUCOSE BLDC GLUCOMTR-MCNC: 97 MG/DL — SIGNIFICANT CHANGE UP (ref 70–99)
GLUCOSE SERPL-MCNC: 82 MG/DL — SIGNIFICANT CHANGE UP (ref 70–99)
HCT VFR BLD CALC: 29.6 % — LOW (ref 34.5–45)
HCT VFR BLD CALC: 35 % — SIGNIFICANT CHANGE UP (ref 34.5–45)
HGB BLD-MCNC: 10.6 G/DL — LOW (ref 11.5–15.5)
HGB BLD-MCNC: 9.5 G/DL — LOW (ref 11.5–15.5)
IMM GRANULOCYTES NFR BLD AUTO: 0.3 % — SIGNIFICANT CHANGE UP (ref 0–0.9)
INR BLD: 1.17 RATIO — HIGH (ref 0.88–1.16)
IRON SATN MFR SERPL: 19 % — SIGNIFICANT CHANGE UP (ref 14–50)
IRON SATN MFR SERPL: 48 UG/DL — SIGNIFICANT CHANGE UP (ref 30–160)
LYMPHOCYTES # BLD AUTO: 1.38 K/UL — SIGNIFICANT CHANGE UP (ref 1–3.3)
LYMPHOCYTES # BLD AUTO: 21.9 % — SIGNIFICANT CHANGE UP (ref 13–44)
MAGNESIUM SERPL-MCNC: 1.7 MG/DL — SIGNIFICANT CHANGE UP (ref 1.6–2.6)
MCHC RBC-ENTMCNC: 28.5 PG — SIGNIFICANT CHANGE UP (ref 27–34)
MCHC RBC-ENTMCNC: 29.6 PG — SIGNIFICANT CHANGE UP (ref 27–34)
MCHC RBC-ENTMCNC: 30.3 GM/DL — LOW (ref 32–36)
MCHC RBC-ENTMCNC: 32.1 GM/DL — SIGNIFICANT CHANGE UP (ref 32–36)
MCV RBC AUTO: 92.2 FL — SIGNIFICANT CHANGE UP (ref 80–100)
MCV RBC AUTO: 94.1 FL — SIGNIFICANT CHANGE UP (ref 80–100)
MONOCYTES # BLD AUTO: 0.53 K/UL — SIGNIFICANT CHANGE UP (ref 0–0.9)
MONOCYTES NFR BLD AUTO: 8.4 % — SIGNIFICANT CHANGE UP (ref 2–14)
NEUTROPHILS # BLD AUTO: 4.1 K/UL — SIGNIFICANT CHANGE UP (ref 1.8–7.4)
NEUTROPHILS NFR BLD AUTO: 65.2 % — SIGNIFICANT CHANGE UP (ref 43–77)
NRBC # BLD: 0 /100 WBCS — SIGNIFICANT CHANGE UP (ref 0–0)
NRBC # BLD: 0 /100 WBCS — SIGNIFICANT CHANGE UP (ref 0–0)
PHOSPHATE SERPL-MCNC: 3.1 MG/DL — SIGNIFICANT CHANGE UP (ref 2.5–4.5)
PLATELET # BLD AUTO: 156 K/UL — SIGNIFICANT CHANGE UP (ref 150–400)
PLATELET # BLD AUTO: 198 K/UL — SIGNIFICANT CHANGE UP (ref 150–400)
POTASSIUM SERPL-MCNC: 4.3 MMOL/L — SIGNIFICANT CHANGE UP (ref 3.5–5.3)
POTASSIUM SERPL-SCNC: 4.3 MMOL/L — SIGNIFICANT CHANGE UP (ref 3.5–5.3)
PROT SERPL-MCNC: 4.2 G/DL — LOW (ref 6–8.3)
PROT SERPL-MCNC: 4.2 G/DL — LOW (ref 6–8.3)
PROTHROM AB SERPL-ACNC: 13.6 SEC — HIGH (ref 10.5–13.4)
RBC # BLD: 3.21 M/UL — LOW (ref 3.8–5.2)
RBC # BLD: 3.21 M/UL — LOW (ref 3.8–5.2)
RBC # BLD: 3.72 M/UL — LOW (ref 3.8–5.2)
RBC # FLD: 14.9 % — HIGH (ref 10.3–14.5)
RBC # FLD: 15.1 % — HIGH (ref 10.3–14.5)
RETICS #: 50.7 K/UL — SIGNIFICANT CHANGE UP (ref 25–125)
RETICS/RBC NFR: 1.6 % — SIGNIFICANT CHANGE UP (ref 0.5–2.5)
SODIUM SERPL-SCNC: 138 MMOL/L — SIGNIFICANT CHANGE UP (ref 135–145)
TIBC SERPL-MCNC: 254 UG/DL — SIGNIFICANT CHANGE UP (ref 220–430)
TRANSFERRIN SERPL-MCNC: 216 MG/DL — SIGNIFICANT CHANGE UP (ref 200–360)
UIBC SERPL-MCNC: 206 UG/DL — SIGNIFICANT CHANGE UP (ref 110–370)
WBC # BLD: 10.1 K/UL — SIGNIFICANT CHANGE UP (ref 3.8–10.5)
WBC # BLD: 6.29 K/UL — SIGNIFICANT CHANGE UP (ref 3.8–10.5)
WBC # FLD AUTO: 10.1 K/UL — SIGNIFICANT CHANGE UP (ref 3.8–10.5)
WBC # FLD AUTO: 6.29 K/UL — SIGNIFICANT CHANGE UP (ref 3.8–10.5)

## 2023-05-24 PROCEDURE — 35372 RECHANNELING OF ARTERY: CPT | Mod: RT

## 2023-05-24 PROCEDURE — 35661 BPG FEMORAL-FEMORAL: CPT | Mod: RT

## 2023-05-24 PROCEDURE — 99232 SBSQ HOSP IP/OBS MODERATE 35: CPT

## 2023-05-24 PROCEDURE — 35665 BPG ILIOFEMORAL: CPT | Mod: RT

## 2023-05-24 DEVICE — IMPLANTABLE DEVICE: Type: IMPLANTABLE DEVICE | Status: FUNCTIONAL

## 2023-05-24 DEVICE — ARISTA 3GR: Type: IMPLANTABLE DEVICE | Status: FUNCTIONAL

## 2023-05-24 DEVICE — SURGIFOAM PAD 8CM X 12.5CM X 10MM (100): Type: IMPLANTABLE DEVICE | Status: FUNCTIONAL

## 2023-05-24 DEVICE — CATH FOGARTY 6FR X 80CM: Type: IMPLANTABLE DEVICE | Status: FUNCTIONAL

## 2023-05-24 DEVICE — KIT A-LINE 1LUM 20G X 12CM SAFE KIT: Type: IMPLANTABLE DEVICE | Status: FUNCTIONAL

## 2023-05-24 DEVICE — CLIP APPLIER ETHICON LIGACLIP 9 3/8" SMALL: Type: IMPLANTABLE DEVICE | Status: FUNCTIONAL

## 2023-05-24 DEVICE — CLIP APPLIER COVIDIEN SURGICLIP 11.5" MEDIUM: Type: IMPLANTABLE DEVICE | Status: FUNCTIONAL

## 2023-05-24 RX ORDER — BUPROPION HYDROCHLORIDE 150 MG/1
300 TABLET, EXTENDED RELEASE ORAL DAILY
Refills: 0 | Status: DISCONTINUED | OUTPATIENT
Start: 2023-05-24 | End: 2023-05-28

## 2023-05-24 RX ORDER — ONDANSETRON 8 MG/1
4 TABLET, FILM COATED ORAL ONCE
Refills: 0 | Status: DISCONTINUED | OUTPATIENT
Start: 2023-05-24 | End: 2023-05-25

## 2023-05-24 RX ORDER — GABAPENTIN 400 MG/1
300 CAPSULE ORAL AT BEDTIME
Refills: 0 | Status: DISCONTINUED | OUTPATIENT
Start: 2023-05-24 | End: 2023-05-28

## 2023-05-24 RX ORDER — METOPROLOL TARTRATE 50 MG
75 TABLET ORAL DAILY
Refills: 0 | Status: DISCONTINUED | OUTPATIENT
Start: 2023-05-24 | End: 2023-05-28

## 2023-05-24 RX ORDER — AMLODIPINE BESYLATE 2.5 MG/1
10 TABLET ORAL DAILY
Refills: 0 | Status: DISCONTINUED | OUTPATIENT
Start: 2023-05-24 | End: 2023-05-28

## 2023-05-24 RX ORDER — RIVAROXABAN 15 MG-20MG
2.5 KIT ORAL
Refills: 0 | Status: DISCONTINUED | OUTPATIENT
Start: 2023-05-24 | End: 2023-05-24

## 2023-05-24 RX ORDER — SODIUM CHLORIDE 9 MG/ML
1000 INJECTION INTRAMUSCULAR; INTRAVENOUS; SUBCUTANEOUS
Refills: 0 | Status: DISCONTINUED | OUTPATIENT
Start: 2023-05-24 | End: 2023-05-26

## 2023-05-24 RX ORDER — OXYCODONE HYDROCHLORIDE 5 MG/1
2.5 TABLET ORAL EVERY 4 HOURS
Refills: 0 | Status: DISCONTINUED | OUTPATIENT
Start: 2023-05-24 | End: 2023-05-28

## 2023-05-24 RX ORDER — ATORVASTATIN CALCIUM 80 MG/1
40 TABLET, FILM COATED ORAL AT BEDTIME
Refills: 0 | Status: DISCONTINUED | OUTPATIENT
Start: 2023-05-24 | End: 2023-05-28

## 2023-05-24 RX ORDER — PRAMIPEXOLE DIHYDROCHLORIDE 0.12 MG/1
0.25 TABLET ORAL DAILY
Refills: 0 | Status: DISCONTINUED | OUTPATIENT
Start: 2023-05-24 | End: 2023-05-28

## 2023-05-24 RX ORDER — OXYCODONE HYDROCHLORIDE 5 MG/1
5 TABLET ORAL EVERY 4 HOURS
Refills: 0 | Status: DISCONTINUED | OUTPATIENT
Start: 2023-05-24 | End: 2023-05-28

## 2023-05-24 RX ORDER — ASPIRIN/CALCIUM CARB/MAGNESIUM 324 MG
81 TABLET ORAL DAILY
Refills: 0 | Status: DISCONTINUED | OUTPATIENT
Start: 2023-05-24 | End: 2023-05-28

## 2023-05-24 RX ORDER — RIVAROXABAN 15 MG-20MG
2.5 KIT ORAL
Refills: 0 | Status: DISCONTINUED | OUTPATIENT
Start: 2023-05-25 | End: 2023-05-28

## 2023-05-24 RX ORDER — HYDROMORPHONE HYDROCHLORIDE 2 MG/ML
0.5 INJECTION INTRAMUSCULAR; INTRAVENOUS; SUBCUTANEOUS
Refills: 0 | Status: DISCONTINUED | OUTPATIENT
Start: 2023-05-24 | End: 2023-05-25

## 2023-05-24 RX ADMIN — HEPARIN SODIUM 7 UNIT(S)/HR: 5000 INJECTION INTRAVENOUS; SUBCUTANEOUS at 07:56

## 2023-05-24 RX ADMIN — OXYCODONE HYDROCHLORIDE 5 MILLIGRAM(S): 5 TABLET ORAL at 06:56

## 2023-05-24 RX ADMIN — OXYCODONE HYDROCHLORIDE 5 MILLIGRAM(S): 5 TABLET ORAL at 00:22

## 2023-05-24 RX ADMIN — PRAMIPEXOLE DIHYDROCHLORIDE 0.25 MILLIGRAM(S): 0.12 TABLET ORAL at 08:11

## 2023-05-24 RX ADMIN — OXYCODONE HYDROCHLORIDE 5 MILLIGRAM(S): 5 TABLET ORAL at 13:26

## 2023-05-24 RX ADMIN — AMLODIPINE BESYLATE 10 MILLIGRAM(S): 2.5 TABLET ORAL at 06:46

## 2023-05-24 RX ADMIN — HEPARIN SODIUM 7 UNIT(S)/HR: 5000 INJECTION INTRAVENOUS; SUBCUTANEOUS at 00:24

## 2023-05-24 RX ADMIN — Medication 75 MILLIGRAM(S): at 06:46

## 2023-05-24 RX ADMIN — Medication 81 MILLIGRAM(S): at 22:41

## 2023-05-24 RX ADMIN — SODIUM CHLORIDE 75 MILLILITER(S): 9 INJECTION INTRAMUSCULAR; INTRAVENOUS; SUBCUTANEOUS at 22:42

## 2023-05-24 RX ADMIN — BUPROPION HYDROCHLORIDE 300 MILLIGRAM(S): 150 TABLET, EXTENDED RELEASE ORAL at 08:11

## 2023-05-24 NOTE — BRIEF OPERATIVE NOTE - OPERATION/FINDINGS
12-6mm PTFE graft bypass from R external iliac artery to profunda and SFA bypass,  tunneled under inguinal ligament.

## 2023-05-24 NOTE — PROGRESS NOTE ADULT - ASSESSMENT
67F PMHx of hypertension, hyperlipidemia, GERD, chronic kidney disease, endocarditis, peripheral vascular disease, hx of right fem endarterectomy (2017), right to left fem-fem bypass (Aug 2021 w/ Dr. Green), right fem-pop bypass (May 2022 w/ Dr. Green). on Eliquis. Sent from Dr. Green's clinic with c/o 2-3days of colder R foot with crampy pain and tenderness. Pt reports dangling her legs improves the pain and walking makes it worse. Smokes 0.25 PPD.     PLAN  - Pain control   - NPO, IV fluids  - Hep gtt to be turned off at 12 noon  - OR today for bypass at 6pm    Vascular Surgery  [id number]

## 2023-05-24 NOTE — PROGRESS NOTE ADULT - ASSESSMENT
67F PMHx of hypertension, hyperlipidemia, chronic kidney disease, Bacterial endocarditis - 2019- s/p AVR, MV repair, CABG X 1, with a LIMA to the LAD, peripheral vascular disease, hx of right fem endarterectomy (2017), right to left fem-fem bypass (Aug 2021 w/ Dr. Green), right fem-pop bypass (May 2022 w/ Dr. Green), prior vasc stents, on Eliquis for vascular and not for cardiac reasons. Sent from Dr. Green's clinic with c/o several days of colder R foot with crampy pain and tenderness. Pt reports dangling her legs improves the pain and walking makes it worse. Smokes 0.25 PPD.      s/p CTA lower extremities, now planned for RLE bypass 5/24    She is without evidence of active cardiac ischemia, uncontrolled arrhythmia, or decompensated heart failure.  -she had moderate lad and rca disease, and mild lcx disease at the time of cath preop  -cont amlodipine, metoprolol    No cardiac contraindication to the planned procedure.    -will follow

## 2023-05-24 NOTE — BRIEF OPERATIVE NOTE - NSICDXBRIEFOPLAUNCH_GEN_ALL_CORE
PT DAILY TREATMENT NOTE 2-15 Patient Name: Tova Hudson Date:2018 : 1977 [x]  Patient  Verified Payor: Georgette Solorzano / Plan: [facility] / Product Type: Managed Care Medicaid / In time:230 p  Out time:330p Total Treatment Time (min): 60 Visit #: 6 Treatment Area: Neck pain [M54.2] SUBJECTIVE Pain Level (0-10 scale): 3 Any medication changes, allergies to medications, adverse drug reactions, diagnosis change, or new procedure performed?: [x] No    [] Yes (see summary sheet for update) Subjective functional status/changes:   [] No changes reported Patient reports she is doing much better and happy with her progress OBJECTIVE *no estim, pt does not like Modality rationale: decrease pain and increase tissue extensibility to improve the patients ability to sit, stand, lift, carry, reach and complete ADL's  
Min Type Additional Details  
 
 [] Estim: []Att   []Unatt    []TENS instruct []IFC  []Premod   []NMES []Other:  []w/US   []w/ice   []w/heat Position: Location:  
 
 []  Traction: [] Cervical       []Lumbar 
                     [] Prone          []Supine []Intermittent   []Continuous Lbs: 
[] before manual 
[] after manual 
[]w/heat  
 []  Ultrasound: []Continuous   [] Pulsed  
                    at: []1MHz   []3MHz Location: 
W/cm2:  
 [] Paraffin Location:  
[]w/heat  
15 []  Ice     [x]  Heat 
[]  Ice massage Position:supine Location: neck  
 []  Laser 
[]  Other: Position: Location:  
 
 []  Vasopneumatic Device Pressure:       [] lo [] med [] hi  
Temperature:   
[x] Skin assessment post-treatment:  [x]intact []redness- no adverse reaction 
  []redness  adverse reaction:  
 
35 min Therapeutic Exercise:  [x] See flow sheet :  
Rationale: increase ROM and increase strength to improve the patients ability to sit, stand, lift, carry, reach and complete ADL's 
 
 10 min Manual Therapy: bebeto PADILLA. Rationale: decrease pain, increase ROM, increase tissue extensibility and decrease trigger points  to improve the patients ability to sit, stand, lift, carry, reach and complete ADL's With 
 [] TE 
 [] TA 
 [] neuro 
 [] other: Patient Education: [x] Review HEP [] Progressed/Changed HEP based on:  
[] positioning   [] body mechanics   [] transfers   [] heat/ice application   
[] other:   
 
Other Objective/Functional Measures:   
Pain Level (0-10 scale) post treatment: 1/10 ASSESSMENT/Changes in Function:  
 
 
Patient will continue to benefit from skilled PT services to modify and progress therapeutic interventions, address functional mobility deficits, address ROM deficits, address strength deficits, analyze and address soft tissue restrictions, analyze and cue movement patterns, analyze and modify body mechanics/ergonomics and assess and modify postural abnormalities to attain remaining goals. []  See Plan of Care 
[]  See progress note/recertification 
[]  See Discharge Summary Progress towards goals / Updated goals: 
Patient demonstrates good tolerance for interventions and will do well with continued slow progression as tolerated. Patient required verbal cues for postural awareness and mechanics. PLAN [x]  Upgrade activities as tolerated     [x]  Continue plan of care [x]  Update interventions per flow sheet      
[]  Discharge due to:_ 
[]  Other:_ Zara Shoulders, PTA, CPT 11/5/2018  5:06 PM 
 
 <--- Click to Launch ICDx for PreOp, PostOp and Procedure

## 2023-05-24 NOTE — PRE-ANESTHESIA EVALUATION ADULT - NSANTHPMHFT_GEN_ALL_CORE
67F PMHx of hypertension, hyperlipidemia, chronic kidney disease, Bacterial endocarditis - 2019- s/p AVR, MV repair, CABG X 1, with a LIMA to the LAD, peripheral vascular disease, hx of right fem endarterectomy (2017), right to left fem-fem bypass (Aug 2021 w/ Dr. Green), right fem-pop bypass (May 2022 w/ Dr. Green), prior vasc stents, on Eliquis for vascular and not for cardiac reasons. Sent from Dr. Green's clinic with c/o several days of colder R foot with crampy pain and tenderness. Pt reports dangling her legs improves the pain and walking makes it worse. Smokes 0.25 PPD. 67F PMHx of hypertension, hyperlipidemia, chronic kidney disease, Bacterial endocarditis - 2019- s/p AVR, MV repair, CABG X 1, with a LIMA to the LAD, peripheral vascular disease, hx of right fem endarterectomy (2017), right to left fem-fem bypass (Aug 2021 w/ Dr. Green), right fem-pop bypass (May 2022 w/ Dr. Green), prior vasc stents, on Eliquis for vascular and not for cardiac reasons. Sent from Dr. Green's clinic with c/o several days of colder R foot with crampy pain and tenderness. Pt reports dangling her legs improves the pain and walking makes it worse. Smokes 0.25 PPD.    cardiac eval 5/24/23 No cardiac contraindication to the planned procedure.    denies Cp/SOB

## 2023-05-24 NOTE — PROGRESS NOTE ADULT - ASSESSMENT
67F PMHx of hypertension, hyperlipidemia, GERD, chronic kidney disease, endocarditis, peripheral vascular disease, hx of right fem endarterectomy (2017), right to left fem-fem bypass (Aug 2021 w/ Dr. Green), right fem-pop bypass (May 2022 w/ Dr. Green). on Eliquis. Sent from Dr. Green's clinic with c/o 2-3days of colder R foot with crampy pain and tenderness. Pt reports dangling her legs improves the pain and walking makes it worse. Smokes 0.25 PPD. Pt denies nausea, vomiting, CP, SOB, distension, constipation, dysuria. (22 May 2023 21:09). Presents with  colder R foot with crampy pain and tenderness. Consulted for medical mgmt.        PLAN:    # PVD:  -  Right to left fem-fem bypass (Aug 2021 w/ Dr. Green), right fem-pop bypass (May 2022 w/ Dr. Green)  - Pain mgmt prn  - C/w Heparin gtt  - C/w IVF's  - CT angio with LE runoff noted with multiple blockages  - Planned for OR Bypass today 5/24  - Vascular following    # HTN/ HLD/ CAD S/p CABG:  - Trend BP's  - C/w CV meds per Cards rec's  - Cards following    # Anemia:  - Serial cbc's  - Transfuse prn    # RLS:  - C/w Mirapex    # CKD4:   - Renal sono with Mildly increased echogenicity of the bilateral kidneys consistent with   medical renal disease.No hydronephrosis. Bilateral renal cysts  - Monitor I/O's  - Serial Cr  - Avoid nephrotoxins  - Renally dose medications  - Renal following    # Depression:  - C/w Wellbutrin     # GI ppx:  - Bowel regimen prn    # DVT ppx:  - Heparin gtt      [facility]  [phone]  MD. SHORTY Walsh

## 2023-05-24 NOTE — PRE-ANESTHESIA EVALUATION ADULT - LAST ECHOCARDIOGRAM
5/31/19 post bypass intraop ZAHIRA: s/p bio AVR, mitral annuloplasty and CABG. preserved LV/RV function. 7/21 normal biventircular function, mitral annuloplasty, bioprosthetic AVR

## 2023-05-25 ENCOUNTER — TRANSCRIPTION ENCOUNTER (OUTPATIENT)
Age: 68
End: 2023-05-25

## 2023-05-25 LAB
ANION GAP SERPL CALC-SCNC: 12 MMOL/L — SIGNIFICANT CHANGE UP (ref 5–17)
ANION GAP SERPL CALC-SCNC: 15 MMOL/L — SIGNIFICANT CHANGE UP (ref 5–17)
APPEARANCE UR: CLEAR — SIGNIFICANT CHANGE UP
BACTERIA # UR AUTO: NEGATIVE — SIGNIFICANT CHANGE UP
BILIRUB UR-MCNC: NEGATIVE — SIGNIFICANT CHANGE UP
BUN SERPL-MCNC: 26 MG/DL — HIGH (ref 7–23)
BUN SERPL-MCNC: 27 MG/DL — HIGH (ref 7–23)
BUN SERPL-MCNC: 27 MG/DL — HIGH (ref 7–23)
BUN SERPL-MCNC: 30 MG/DL — HIGH (ref 7–23)
C3 SERPL-MCNC: 111 MG/DL — SIGNIFICANT CHANGE UP (ref 81–157)
CALCIUM SERPL-MCNC: 7.2 MG/DL — LOW (ref 8.4–10.5)
CALCIUM SERPL-MCNC: 7.5 MG/DL — LOW (ref 8.4–10.5)
CALCIUM SERPL-MCNC: 7.5 MG/DL — LOW (ref 8.4–10.5)
CALCIUM SERPL-MCNC: 8.4 MG/DL — SIGNIFICANT CHANGE UP (ref 8.4–10.5)
CALCIUM SERPL-MCNC: 8.4 MG/DL — SIGNIFICANT CHANGE UP (ref 8.4–10.5)
CHLORIDE SERPL-SCNC: 106 MMOL/L — SIGNIFICANT CHANGE UP (ref 96–108)
CHLORIDE SERPL-SCNC: 107 MMOL/L — SIGNIFICANT CHANGE UP (ref 96–108)
CHLORIDE SERPL-SCNC: 110 MMOL/L — HIGH (ref 96–108)
CHLORIDE SERPL-SCNC: 110 MMOL/L — HIGH (ref 96–108)
CO2 SERPL-SCNC: 16 MMOL/L — LOW (ref 22–31)
CO2 SERPL-SCNC: 17 MMOL/L — LOW (ref 22–31)
CO2 SERPL-SCNC: 17 MMOL/L — LOW (ref 22–31)
CO2 SERPL-SCNC: 18 MMOL/L — LOW (ref 22–31)
COLOR SPEC: COLORLESS — SIGNIFICANT CHANGE UP
CREAT ?TM UR-MCNC: 57 MG/DL — SIGNIFICANT CHANGE UP
CREAT SERPL-MCNC: 2.14 MG/DL — HIGH (ref 0.5–1.3)
CREAT SERPL-MCNC: 2.25 MG/DL — HIGH (ref 0.5–1.3)
CREAT SERPL-MCNC: 2.37 MG/DL — HIGH (ref 0.5–1.3)
CREAT SERPL-MCNC: 2.44 MG/DL — HIGH (ref 0.5–1.3)
DIFF PNL FLD: ABNORMAL
EGFR: 21 ML/MIN/1.73M2 — LOW
EGFR: 22 ML/MIN/1.73M2 — LOW
EGFR: 23 ML/MIN/1.73M2 — LOW
EGFR: 25 ML/MIN/1.73M2 — LOW
EPI CELLS # UR: 2 /HPF — SIGNIFICANT CHANGE UP
FERRITIN SERPL-MCNC: 68 NG/ML — SIGNIFICANT CHANGE UP (ref 15–150)
GLUCOSE SERPL-MCNC: 111 MG/DL — HIGH (ref 70–99)
GLUCOSE SERPL-MCNC: 113 MG/DL — HIGH (ref 70–99)
GLUCOSE SERPL-MCNC: 117 MG/DL — HIGH (ref 70–99)
GLUCOSE SERPL-MCNC: 95 MG/DL — SIGNIFICANT CHANGE UP (ref 70–99)
GLUCOSE UR QL: NEGATIVE — SIGNIFICANT CHANGE UP
HCT VFR BLD CALC: 30.8 % — LOW (ref 34.5–45)
HCT VFR BLD CALC: 31.4 % — LOW (ref 34.5–45)
HGB BLD-MCNC: 9.6 G/DL — LOW (ref 11.5–15.5)
HGB BLD-MCNC: 9.6 G/DL — LOW (ref 11.5–15.5)
HYALINE CASTS # UR AUTO: 1 /LPF — SIGNIFICANT CHANGE UP (ref 0–2)
IRON SATN MFR SERPL: 20 % — SIGNIFICANT CHANGE UP (ref 14–50)
IRON SATN MFR SERPL: 43 UG/DL — SIGNIFICANT CHANGE UP (ref 30–160)
KETONES UR-MCNC: NEGATIVE — SIGNIFICANT CHANGE UP
LEUKOCYTE ESTERASE UR-ACNC: ABNORMAL
MAGNESIUM SERPL-MCNC: 1.6 MG/DL — SIGNIFICANT CHANGE UP (ref 1.6–2.6)
MAGNESIUM SERPL-MCNC: 1.9 MG/DL — SIGNIFICANT CHANGE UP (ref 1.6–2.6)
MCHC RBC-ENTMCNC: 29.2 PG — SIGNIFICANT CHANGE UP (ref 27–34)
MCHC RBC-ENTMCNC: 29.3 PG — SIGNIFICANT CHANGE UP (ref 27–34)
MCHC RBC-ENTMCNC: 30.6 GM/DL — LOW (ref 32–36)
MCHC RBC-ENTMCNC: 31.2 GM/DL — LOW (ref 32–36)
MCV RBC AUTO: 93.6 FL — SIGNIFICANT CHANGE UP (ref 80–100)
MCV RBC AUTO: 95.7 FL — SIGNIFICANT CHANGE UP (ref 80–100)
NITRITE UR-MCNC: NEGATIVE — SIGNIFICANT CHANGE UP
NRBC # BLD: 0 /100 WBCS — SIGNIFICANT CHANGE UP (ref 0–0)
NRBC # BLD: 0 /100 WBCS — SIGNIFICANT CHANGE UP (ref 0–0)
PH UR: 6 — SIGNIFICANT CHANGE UP (ref 5–8)
PHOSPHATE SERPL-MCNC: 4 MG/DL — SIGNIFICANT CHANGE UP (ref 2.5–4.5)
PHOSPHATE SERPL-MCNC: 5.2 MG/DL — HIGH (ref 2.5–4.5)
PLATELET # BLD AUTO: 167 K/UL — SIGNIFICANT CHANGE UP (ref 150–400)
PLATELET # BLD AUTO: 169 K/UL — SIGNIFICANT CHANGE UP (ref 150–400)
POTASSIUM SERPL-MCNC: 3.3 MMOL/L — LOW (ref 3.5–5.3)
POTASSIUM SERPL-MCNC: 4.2 MMOL/L — SIGNIFICANT CHANGE UP (ref 3.5–5.3)
POTASSIUM SERPL-MCNC: 4.2 MMOL/L — SIGNIFICANT CHANGE UP (ref 3.5–5.3)
POTASSIUM SERPL-MCNC: 4.4 MMOL/L — SIGNIFICANT CHANGE UP (ref 3.5–5.3)
POTASSIUM SERPL-SCNC: 3.3 MMOL/L — LOW (ref 3.5–5.3)
POTASSIUM SERPL-SCNC: 4.2 MMOL/L — SIGNIFICANT CHANGE UP (ref 3.5–5.3)
POTASSIUM SERPL-SCNC: 4.2 MMOL/L — SIGNIFICANT CHANGE UP (ref 3.5–5.3)
POTASSIUM SERPL-SCNC: 4.4 MMOL/L — SIGNIFICANT CHANGE UP (ref 3.5–5.3)
PROT ?TM UR-MCNC: 12 MG/DL — SIGNIFICANT CHANGE UP (ref 0–12)
PROT SERPL-MCNC: 5.3 G/DL — LOW (ref 6–8.3)
PROT SERPL-MCNC: 5.3 G/DL — LOW (ref 6–8.3)
PROT UR-MCNC: ABNORMAL
PROT/CREAT UR-RTO: 0.2 RATIO — SIGNIFICANT CHANGE UP (ref 0–0.2)
PTH-INTACT FLD-MCNC: 88 PG/ML — HIGH (ref 15–65)
RBC # BLD: 3.28 M/UL — LOW (ref 3.8–5.2)
RBC # BLD: 3.29 M/UL — LOW (ref 3.8–5.2)
RBC # FLD: 14.8 % — HIGH (ref 10.3–14.5)
RBC # FLD: 14.9 % — HIGH (ref 10.3–14.5)
RBC CASTS # UR COMP ASSIST: 6 /HPF — HIGH (ref 0–4)
SODIUM SERPL-SCNC: 138 MMOL/L — SIGNIFICANT CHANGE UP (ref 135–145)
SODIUM SERPL-SCNC: 139 MMOL/L — SIGNIFICANT CHANGE UP (ref 135–145)
SODIUM SERPL-SCNC: 140 MMOL/L — SIGNIFICANT CHANGE UP (ref 135–145)
SODIUM SERPL-SCNC: 141 MMOL/L — SIGNIFICANT CHANGE UP (ref 135–145)
SP GR SPEC: 1.02 — SIGNIFICANT CHANGE UP (ref 1.01–1.02)
TIBC SERPL-MCNC: 209 UG/DL — LOW (ref 220–430)
UIBC SERPL-MCNC: 166 UG/DL — SIGNIFICANT CHANGE UP (ref 110–370)
UROBILINOGEN FLD QL: NEGATIVE — SIGNIFICANT CHANGE UP
WBC # BLD: 8.64 K/UL — SIGNIFICANT CHANGE UP (ref 3.8–10.5)
WBC # BLD: 9.94 K/UL — SIGNIFICANT CHANGE UP (ref 3.8–10.5)
WBC # FLD AUTO: 8.64 K/UL — SIGNIFICANT CHANGE UP (ref 3.8–10.5)
WBC # FLD AUTO: 9.94 K/UL — SIGNIFICANT CHANGE UP (ref 3.8–10.5)
WBC UR QL: 16 /HPF — HIGH (ref 0–5)

## 2023-05-25 PROCEDURE — 99232 SBSQ HOSP IP/OBS MODERATE 35: CPT

## 2023-05-25 RX ORDER — ACETAMINOPHEN 500 MG
650 TABLET ORAL EVERY 6 HOURS
Refills: 0 | Status: DISCONTINUED | OUTPATIENT
Start: 2023-05-25 | End: 2023-05-28

## 2023-05-25 RX ADMIN — OXYCODONE HYDROCHLORIDE 5 MILLIGRAM(S): 5 TABLET ORAL at 22:56

## 2023-05-25 RX ADMIN — Medication 650 MILLIGRAM(S): at 09:59

## 2023-05-25 RX ADMIN — OXYCODONE HYDROCHLORIDE 5 MILLIGRAM(S): 5 TABLET ORAL at 17:58

## 2023-05-25 RX ADMIN — PRAMIPEXOLE DIHYDROCHLORIDE 0.25 MILLIGRAM(S): 0.12 TABLET ORAL at 11:54

## 2023-05-25 RX ADMIN — HYDROMORPHONE HYDROCHLORIDE 0.5 MILLIGRAM(S): 2 INJECTION INTRAMUSCULAR; INTRAVENOUS; SUBCUTANEOUS at 01:13

## 2023-05-25 RX ADMIN — BUPROPION HYDROCHLORIDE 300 MILLIGRAM(S): 150 TABLET, EXTENDED RELEASE ORAL at 11:53

## 2023-05-25 RX ADMIN — Medication 650 MILLIGRAM(S): at 22:56

## 2023-05-25 RX ADMIN — OXYCODONE HYDROCHLORIDE 5 MILLIGRAM(S): 5 TABLET ORAL at 21:26

## 2023-05-25 RX ADMIN — Medication 81 MILLIGRAM(S): at 11:51

## 2023-05-25 RX ADMIN — OXYCODONE HYDROCHLORIDE 5 MILLIGRAM(S): 5 TABLET ORAL at 06:12

## 2023-05-25 RX ADMIN — OXYCODONE HYDROCHLORIDE 5 MILLIGRAM(S): 5 TABLET ORAL at 16:58

## 2023-05-25 RX ADMIN — GABAPENTIN 300 MILLIGRAM(S): 400 CAPSULE ORAL at 21:27

## 2023-05-25 RX ADMIN — ATORVASTATIN CALCIUM 40 MILLIGRAM(S): 80 TABLET, FILM COATED ORAL at 21:27

## 2023-05-25 RX ADMIN — Medication 650 MILLIGRAM(S): at 08:59

## 2023-05-25 RX ADMIN — OXYCODONE HYDROCHLORIDE 5 MILLIGRAM(S): 5 TABLET ORAL at 05:12

## 2023-05-25 RX ADMIN — RIVAROXABAN 2.5 MILLIGRAM(S): KIT at 05:11

## 2023-05-25 RX ADMIN — HYDROMORPHONE HYDROCHLORIDE 0.5 MILLIGRAM(S): 2 INJECTION INTRAMUSCULAR; INTRAVENOUS; SUBCUTANEOUS at 01:30

## 2023-05-25 RX ADMIN — Medication 75 MILLIGRAM(S): at 05:11

## 2023-05-25 RX ADMIN — RIVAROXABAN 2.5 MILLIGRAM(S): KIT at 16:58

## 2023-05-25 RX ADMIN — Medication 650 MILLIGRAM(S): at 21:26

## 2023-05-25 RX ADMIN — OXYCODONE HYDROCHLORIDE 5 MILLIGRAM(S): 5 TABLET ORAL at 12:51

## 2023-05-25 RX ADMIN — Medication 650 MILLIGRAM(S): at 16:58

## 2023-05-25 RX ADMIN — OXYCODONE HYDROCHLORIDE 5 MILLIGRAM(S): 5 TABLET ORAL at 11:51

## 2023-05-25 RX ADMIN — Medication 650 MILLIGRAM(S): at 17:58

## 2023-05-25 RX ADMIN — AMLODIPINE BESYLATE 10 MILLIGRAM(S): 2.5 TABLET ORAL at 05:12

## 2023-05-25 RX ADMIN — SODIUM CHLORIDE 75 MILLILITER(S): 9 INJECTION INTRAMUSCULAR; INTRAVENOUS; SUBCUTANEOUS at 05:20

## 2023-05-25 NOTE — DISCHARGE NOTE PROVIDER - NSDCCPTREATMENT_GEN_ALL_CORE_FT
PRINCIPAL PROCEDURE  Procedure: Iliofemoral bypass graft with non-vein  Findings and Treatment:

## 2023-05-25 NOTE — PROGRESS NOTE ADULT - ASSESSMENT
67F PMHx of hypertension, hyperlipidemia, chronic kidney disease, Bacterial endocarditis - 2019- s/p AVR, MV repair, CABG X 1, with a LIMA to the LAD, peripheral vascular disease, hx of right fem endarterectomy (2017), right to left fem-fem bypass (Aug 2021 w/ Dr. Green), right fem-pop bypass (May 2022 w/ Dr. Green), prior vasc stents, on Eliquis for vascular and not for cardiac reasons. Sent from Dr. Green's clinic with c/o several days of colder R foot with crampy pain and tenderness. Pt reports dangling her legs improves the pain and walking makes it worse. Smokes 0.25 PPD.      s/p bypass from R external iliac artery to profunda and SFA bypass 5/24/23    Stable from a cardiac standpoint. No evidence of active ischemia or volume overload.  -cont amlodipine, metoprolol  -cont asa, rivaroxaban for PAD dosing      -will follow

## 2023-05-25 NOTE — PHYSICAL THERAPY INITIAL EVALUATION ADULT - PERTINENT HX OF CURRENT PROBLEM, REHAB EVAL
67F PMHx of hypertension, hyperlipidemia, GERD, chronic kidney disease, endocarditis, peripheral vascular disease, hx of right fem endarterectomy (2017), right to left fem-fem bypass (Aug 2021 w/ Dr. Green), right fem-pop bypass (May 2022 w/ Dr. Green). on Eliquis. Sent from Dr. Green's clinic with c/o 2-3days of colder R foot with crampy pain and tenderness. Pt reports dangling her legs improves the pain and walking makes it worse. Smokes 0.25 PPD. Pt denies nausea, vomiting, CP, SOB, distension, constipation, dysuria. Hospital course: 5/23 CT Angio Abd Aorta: Severe atherosclerotic disease of the left lower extremity with occlusion of the common femoral artery and mild to moderate stenoses of the distal   SFA and popliteal artery. 5/20 US Arterial: Occluded femorofemoral bypass graft. Correlate with CTA lower extremity. 5/24: Pt. is s/p Iliofemoral bypass graft with non-vein

## 2023-05-25 NOTE — DISCHARGE NOTE PROVIDER - NSDCCPCAREPLAN_GEN_ALL_CORE_FT
PRINCIPAL DISCHARGE DIAGNOSIS  Diagnosis: Occlusion of bypass graft  Assessment and Plan of Treatment: WOUND CARE: Keep incisions clean and dry.  Follow-up in office within 1-2 weeks for removal of staples or sutures.  BATHING: Please do not submerge wound underwater. You may shower and/or sponge bathe.  ACTIVITY: No heavy lifting or straining. Otherwise, you may return to your usual level of physical activity. If you are taking narcotic pain medication (such as Percocet), do NOT drive a car, operate machinery or make important decisions.  DIET: Return to your usual diet.  NOTIFY YOUR SURGEON IF: You have any bleeding that does not stop, any pus draining from your wound, any fever (over 100.4 F) or chills, persistent nausea/vomiting, persistent diarrhea, or if your pain is not controlled on your discharge pain medications.  FOLLOW-UP:  1. Follow-up with Dr. Green within 1-2 weeks of discharge.  Please call office for appointment  2. Please follow up with your primary care physician in one week regarding your hospitalization.

## 2023-05-25 NOTE — PHYSICAL THERAPY INITIAL EVALUATION ADULT - ADDITIONAL COMMENTS
Patient reports she lives with her  in single floor private house; no stairs to enter and 1sr floor set up; Pt. reports she was independent in ADLs & IADLs w/intermittent use of SC; denies any hx of fall 2/2 LOB. Pt. reports if she is d/c home, her  will be able to assist her for ADLs needs, if required.

## 2023-05-25 NOTE — DISCHARGE NOTE PROVIDER - HOSPITAL COURSE
67F PMHx of hypertension, hyperlipidemia, GERD, chronic kidney disease, endocarditis, peripheral vascular disease, hx of right fem endarterectomy (2017), right to left fem-fem bypass (Aug 2021 w/ Dr. Green), right fem-pop bypass (May 2022 w/ Dr. Green). on Eliquis. Sent from Dr. Green's clinic with c/o 2-3days of colder R foot with crampy pain and tenderness. Pt reports dangling her legs improves the pain and walking makes it worse. Smokes 0.25 PPD. She was admitted to the vascular surgery service and was started on a heparin drip.  CT angio was performed 5/23 which showed Severe atherosclerotic disease of the aortoiliac tree and its branches as above. Patent bilateral common iliac and external iliac artery stents. Occluded femorofemoral bypass graft. Severe atherosclerotic disease of the right lower extremity with occlusion of the common femoral artery and popliteal artery as above. Mid SFA to below the knee popliteal bypass graft is patent. Patent three-vessel runoff in the right calf. Severe atherosclerotic disease of the left lower extremity with occlusion of the common femoral artery and mild to moderate stenoses of the distal SFA and popliteal artery. Patent three-vessel runoff in the left calf.  Cardiology was consulted for optimization, nephrology was consulted for CKD stage 4.  On 5/24 pt underwent  12-6mm PTFE graft bypass from R external iliac artery to profunda and SFA bypass,  tunneled under inguinal ligament.  She tolerated the procedure well, was extubated and sent to PACU in stable condition. She remained hemodynamically stable and was transferred to a surgical floor. Her pain was controlled by IV pain medications and then by PO pain medications. The patient was advanced from clears to regular diet and tolerated it well.  Mckeon was removed and she voided without difficulty.  She was evaluated by PT who recommended.-------------------------------------------------------    She is ambulating, voiding, tolerating a regular diet, and pain is controlled with oral pain medications.  Patient is stable for discharge and will follow-up with Dr. Green within 1-2 weeks.   67F PMHx of hypertension, hyperlipidemia, GERD, chronic kidney disease, endocarditis, peripheral vascular disease, hx of right fem endarterectomy (2017), right to left fem-fem bypass (Aug 2021 w/ Dr. Green), right fem-pop bypass (May 2022 w/ Dr. Green). on Eliquis. Sent from Dr. Green's clinic with c/o 2-3days of colder R foot with crampy pain and tenderness. Pt reports dangling her legs improves the pain and walking makes it worse. Smokes 0.25 PPD. She was admitted to the vascular surgery service and was started on a heparin drip.  CT angio was performed 5/23 which showed Severe atherosclerotic disease of the aortoiliac tree and its branches as above. Patent bilateral common iliac and external iliac artery stents. Occluded femorofemoral bypass graft. Severe atherosclerotic disease of the right lower extremity with occlusion of the common femoral artery and popliteal artery as above. Mid SFA to below the knee popliteal bypass graft is patent. Patent three-vessel runoff in the right calf. Severe atherosclerotic disease of the left lower extremity with occlusion of the common femoral artery and mild to moderate stenoses of the distal SFA and popliteal artery. Patent three-vessel runoff in the left calf.  Cardiology was consulted for optimization, nephrology was consulted for CKD stage 4.  On 5/24 pt underwent  12-6mm PTFE graft bypass from R external iliac artery to profunda and SFA bypass,  tunneled under inguinal ligament.  She tolerated the procedure well, was extubated and sent to PACU in stable condition. She remained hemodynamically stable and was transferred to a surgical floor. Her pain was controlled by IV pain medications and then by PO pain medications. The patient was advanced from clears to regular diet and tolerated it well.  Mckeon was removed and she voided without difficulty.  She was evaluated by PT who recommended. Patient was able to work with PT who recommended a rolling walker, which was provided, and outpatient physical therapy.    On the day of discharge, patient was ambulating, voiding, tolerating a regular diet, and pain was controlled with oral pain medications.  Patient is stable for discharge and will follow-up with Dr. Green within 1-2 weeks.

## 2023-05-25 NOTE — PROGRESS NOTE ADULT - ASSESSMENT
67F PMHx of hypertension, hyperlipidemia, GERD, chronic kidney disease, endocarditis, peripheral vascular disease, hx of right fem endarterectomy (2017), right to left fem-fem bypass (Aug 2021 w/ Dr. Green), right fem-pop bypass (May 2022 w/ Dr. Green). on Eliquis. Sent from Dr. Green's clinic with c/o 2-3days of colder R foot with crampy pain and tenderness. Pt reports dangling her legs improves the pain and walking makes it worse. Smokes 0.25 PPD. Pt denies nausea, vomiting, CP, SOB, distension, constipation, dysuria. (22 May 2023 21:09). Presents with  colder R foot with crampy pain and tenderness. Consulted for medical mgmt.        PLAN:    # PVD s/p R external iliac artery to profunda and SFA bypass 5/24:  -  Right to left fem-fem bypass (Aug 2021 w/ Dr. Green), right fem-pop bypass (May 2022 w/ Dr. Green)  - CT angio with LE runoff noted with multiple blockages  - Stable post op  - Local wound care per Vascular  - Drain mgmt per Vascular  - Neurovascular checks  - Pain mgmt prn  - PT eval  - Vascular following    # HTN/ HLD/ CAD S/p CABG:  - Trend BP's  - C/w CV meds per Cards rec's  - Cards following    # Anemia:  - Serial cbc's  - Transfuse prn    # RLS:  - C/w Mirapex    # CKD4:   - Renal sono with Mildly increased echogenicity of the bilateral kidneys consistent with   medical renal disease. No hydronephrosis. Bilateral renal cysts  - Monitor I/O's  - Serial Cr  - Avoid nephrotoxins  - Renally dose medications  - Renal following    # Hypokalemia:  - Serial bmp's  - Supplement K prn    # Depression:  - C/w Wellbutrin     # GI ppx:  - Bowel regimen prn    # DVT ppx:  - Xarelto      [facility]  [phone]  MD. SHORTY Walsh

## 2023-05-25 NOTE — PATIENT PROFILE ADULT - VISION (WITH CORRECTIVE LENSES IF THE PATIENT USUALLY WEARS THEM):
patient wear glasses/Normal vision: sees adequately in most situations; can see medication labels, newsprint

## 2023-05-25 NOTE — PATIENT PROFILE ADULT - FALL HARM RISK - FALL HARM RISK
Refill request for     escitalopram (LEXAPRO) 10 MG tablet TAKE 1 AND 1/2 TABLETS BY MOUTH ONE TIME A DAY      Last rx 9/8/2020 #315 no refill  Last seen 9/17/2020   Surgery

## 2023-05-25 NOTE — PROGRESS NOTE ADULT - ASSESSMENT
67 year old Female PMHx of hypertension on amlodipine and metoprolol, hyperlipidemia, GERD, chronic kidney disease, endocarditis, peripheral vascular disease, hx of right fem endarterectomy (2017), right to left fem-fem bypass (Aug 2021 w/ Dr. Green), right fem-pop bypass (May 2022 w/ Dr. Green). on Eliquis. Sent from Dr. Green's clinic with c/o 2-3days of colder R foot with cramp pain and tenderness. Given her hx of CKD and need for leg pain work up, underwent LE bypass on 5/24       1- CKDIV  2- PVD  3- CAD  4- HTN  5- anemia      unclear cause for CKD, reviewed creatinine range from 2017, revealing creatinine 2.0  current baseline 2.4-2.6 range.  cr is steady   suspect renovascular htn serologies so far non revealing.     continue amlodipine 10 mg daily for HTN  continue metoprolol 75 mg daily  trend bp  anemia, trend hgb,  check retic coun start iron supplementation   , spep, immunofixation, and bence marroquin urine

## 2023-05-25 NOTE — DISCHARGE NOTE PROVIDER - NSDCMRMEDTOKEN_GEN_ALL_CORE_FT
3 :1 commode : 1 Commode   Align 4 mg oral capsule: 1 cap(s) orally once a day  amLODIPine 10 mg oral tablet: 1 tab(s) orally once a day  aspirin 81 mg oral tablet, chewable: 1 tab(s) orally once a day  buPROPion: 150 milligram(s) orally once a day  Caltrate 600 + D oral tablet: 1 tab(s) orally once a day  gabapentin 300 mg oral capsule: 1 cap(s) orally once a day (at bedtime)  metoprolol succinate 25 mg oral tablet, extended release: 3 tab(s) = total 75 mg- orally once a day   Mirapex 0.25 mg oral tablet: 2 tab(s) orally once a day (at bedtime)  oxyCODONE 5 mg oral tablet: 1 tab(s) orally every 6 hours, As needed, Moderate Pain (4 - 6) MDD:4  oxycodone-acetaminophen 5 mg-325 mg oral tablet: 1 tab(s) orally every 6 hours MDD:4  simvastatin 10 mg oral tablet: 1 tab(s) orally once a day (at bedtime)  Standard Wheelchair: 1 Wheel Chair    3 :1 commode : 1 Commode   Align 4 mg oral capsule: 1 cap(s) orally once a day  amLODIPine 10 mg oral tablet: 1 tab(s) orally once a day  aspirin 81 mg oral tablet, chewable: 1 tab(s) orally once a day  buPROPion: 150 milligram(s) orally once a day  Caltrate 600 + D oral tablet: 1 tab(s) orally once a day  gabapentin 300 mg oral capsule: 1 cap(s) orally once a day (at bedtime)  metoprolol succinate 25 mg oral tablet, extended release: 3 tab(s) = total 75 mg- orally once a day   Mirapex 0.25 mg oral tablet: 2 tab(s) orally once a day (at bedtime)  oxyCODONE 5 mg oral tablet: 1 tab(s) orally every 6 hours, As needed, Moderate Pain (4 - 6) MDD:4  oxycodone-acetaminophen 5 mg-325 mg oral tablet: 1 tab(s) orally every 6 hours MDD:4  simvastatin 10 mg oral tablet: 1 tab(s) orally once a day (at bedtime)  Standard Wheelchair: 1 Wheel Chair   Xarelto 2.5 mg oral tablet: 1 tab(s) orally 2 times a day   3 :1 commode : 1 Commode   Align 4 mg oral capsule: 1 cap(s) orally once a day  amLODIPine 10 mg oral tablet: 1 tab(s) orally once a day  aspirin 81 mg oral tablet, chewable: 1 tab(s) orally once a day  atorvastatin 40 mg oral tablet: 1 tab(s) orally once a day (at bedtime)  buPROPion: 150 milligram(s) orally once a day  Caltrate 600 + D oral tablet: 1 tab(s) orally once a day  gabapentin 300 mg oral capsule: 1 cap(s) orally once a day (at bedtime)  metoprolol succinate 25 mg oral tablet, extended release: 3 tab(s) = total 75 mg- orally once a day   Mirapex 0.25 mg oral tablet: 2 tab(s) orally once a day (at bedtime)  oxyCODONE 5 mg oral tablet: 1 tab(s) orally every 6 hours, As needed, Moderate Pain (4 - 6) MDD:4  oxycodone-acetaminophen 5 mg-325 mg oral tablet: 1 tab(s) orally every 6 hours MDD:4  Standard Wheelchair: 1 Wheel Chair   Xarelto 2.5 mg oral tablet: 1 tab(s) orally 2 times a day

## 2023-05-25 NOTE — DISCHARGE NOTE PROVIDER - CARE PROVIDER_API CALL
Jero Green  Vascular Surgery  [address], [address]  [place] [address]  Phone: [phone]  Fax: [phone]  Follow Up Time: 2 weeks

## 2023-05-25 NOTE — DISCHARGE NOTE PROVIDER - NSDCFUADDINST_GEN_ALL_CORE_FT
Please remove the sticky tan dressings on 5/29. Taking them off in the shower may be easier. Just pat the incisions dry after the shower and cover with a little bit of gauze and tape. Replace the gauze and tape as necessary.

## 2023-05-25 NOTE — PHYSICAL THERAPY INITIAL EVALUATION ADULT - GAIT DEVIATIONS NOTED, PT EVAL
decreased cory/decreased velocity of limb motion/decreased step length/decreased weight-shifting ability

## 2023-05-25 NOTE — CHART NOTE - NSCHARTNOTEFT_GEN_A_CORE
STATUS POST:  R external iliac artery to profunda and SFA bypass     POST OPERATIVE DAY #: 0    SUBJECTIVE: Pt seen at PACU, doing well, pain controlled. No complaints.    Vital Signs Last 24 Hrs  T(C): 36.5 (24 May 2023 21:50), Max: 36.7 (24 May 2023 06:44)  T(F): 97.7 (24 May 2023 21:50), Max: 98.1 (24 May 2023 06:44)  HR: 53 (25 May 2023 00:00) (53 - 62)  BP: 152/77 (24 May 2023 16:49) (112/72 - 152/77)  BP(mean): --  RR: 16 (25 May 2023 00:00) (16 - 18)  SpO2: 96% (25 May 2023 00:00) (96% - 99%)    Parameters below as of 25 May 2023 00:00  Patient On (Oxygen Delivery Method): room air      I&O's Summary    24 May 2023 07:01  -  25 May 2023 00:11  --------------------------------------------------------  IN: 200 mL / OUT: 145 mL / NET: 55 mL      I&O's Detail    24 May 2023 07:01  -  25 May 2023 00:11  --------------------------------------------------------  IN:    Oral Fluid: 50 mL    sodium chloride 0.9%: 150 mL  Total IN: 200 mL    OUT:    Bulb (mL): 20 mL    Indwelling Catheter - Urethral (mL): 125 mL  Total OUT: 145 mL    Total NET: 55 mL          MEDICATIONS  (STANDING):  amLODIPine   Tablet 10 milliGRAM(s) Oral daily  aspirin  chewable 81 milliGRAM(s) Oral daily  atorvastatin 40 milliGRAM(s) Oral at bedtime  buPROPion XL (24-Hour) . 300 milliGRAM(s) Oral daily  gabapentin 300 milliGRAM(s) Oral at bedtime  metoprolol succinate ER 75 milliGRAM(s) Oral daily  pramipexole 0.25 milliGRAM(s) Oral daily  rivaroxaban 2.5 milliGRAM(s) Oral two times a day  sodium chloride 0.9%. 1000 milliLiter(s) (75 mL/Hr) IV Continuous <Continuous>    MEDICATIONS  (PRN):  HYDROmorphone  Injectable 0.5 milliGRAM(s) IV Push every 10 minutes PRN Moderate Pain (4 - 6)  ondansetron Injectable 4 milliGRAM(s) IV Push once PRN Nausea and/or Vomiting  oxyCODONE    IR 2.5 milliGRAM(s) Oral every 4 hours PRN Moderate Pain (4 - 6)  oxyCODONE    IR 5 milliGRAM(s) Oral every 4 hours PRN Severe Pain (7 - 10)      LABS:                        9.5    10.10 )-----------( 156      ( 24 May 2023 22:40 )             29.6     05-24    138  |  106  |  27<H>  ----------------------------<  95  4.2   |  17<L>  |  2.37<H>    Ca    8.4      24 May 2023 23:38  Phos  3.1     05-24  Mg     1.7     05-24    TPro  4.2<L>  /  Alb  x   /  TBili  x   /  DBili  x   /  AST  x   /  ALT  x   /  AlkPhos  x   05-24    PT/INR - ( 24 May 2023 01:38 )   PT: 13.6 sec;   INR: 1.17 ratio         PTT - ( 24 May 2023 07:45 )  PTT:59.2 sec        Gen: AAOx3, non-toxic  Head: NCAT  HEENT: EOMI, oral mucosa moist, normal conjunctiva  Lung: Breathing on RA, unlabored   Abd: soft, NTND, no guarding.   MSK: no visible deformities. Aquacell c/d/i TITI bulb w/minimal dark SA   Neuro: No focal sensory or motor deficits      A/P: 67y Female s/p R external iliac artery to profunda and SFA bypass   -ASA and DOAC  -Reg diet  -Pain ctrl  -D/c planning  -PT consult

## 2023-05-26 LAB
ANION GAP SERPL CALC-SCNC: 10 MMOL/L — SIGNIFICANT CHANGE UP (ref 5–17)
BUN SERPL-MCNC: 29 MG/DL — HIGH (ref 7–23)
CALCIUM SERPL-MCNC: 8.1 MG/DL — LOW (ref 8.4–10.5)
CHLORIDE SERPL-SCNC: 110 MMOL/L — HIGH (ref 96–108)
CO2 SERPL-SCNC: 20 MMOL/L — LOW (ref 22–31)
CREAT SERPL-MCNC: 2.47 MG/DL — HIGH (ref 0.5–1.3)
EGFR: 21 ML/MIN/1.73M2 — LOW
GLUCOSE SERPL-MCNC: 92 MG/DL — SIGNIFICANT CHANGE UP (ref 70–99)
HCT VFR BLD CALC: 26.6 % — LOW (ref 34.5–45)
HGB BLD-MCNC: 8.2 G/DL — LOW (ref 11.5–15.5)
MAGNESIUM SERPL-MCNC: 1.9 MG/DL — SIGNIFICANT CHANGE UP (ref 1.6–2.6)
MCHC RBC-ENTMCNC: 29.3 PG — SIGNIFICANT CHANGE UP (ref 27–34)
MCHC RBC-ENTMCNC: 30.8 GM/DL — LOW (ref 32–36)
MCV RBC AUTO: 95 FL — SIGNIFICANT CHANGE UP (ref 80–100)
NRBC # BLD: 0 /100 WBCS — SIGNIFICANT CHANGE UP (ref 0–0)
PHOSPHATE SERPL-MCNC: 2.8 MG/DL — SIGNIFICANT CHANGE UP (ref 2.5–4.5)
PLATELET # BLD AUTO: 149 K/UL — LOW (ref 150–400)
POTASSIUM SERPL-MCNC: 4.1 MMOL/L — SIGNIFICANT CHANGE UP (ref 3.5–5.3)
POTASSIUM SERPL-SCNC: 4.1 MMOL/L — SIGNIFICANT CHANGE UP (ref 3.5–5.3)
PROT ?TM UR-MCNC: 14 MG/DL — HIGH (ref 0–12)
RBC # BLD: 2.8 M/UL — LOW (ref 3.8–5.2)
RBC # FLD: 15.1 % — HIGH (ref 10.3–14.5)
SODIUM SERPL-SCNC: 140 MMOL/L — SIGNIFICANT CHANGE UP (ref 135–145)
WBC # BLD: 8.94 K/UL — SIGNIFICANT CHANGE UP (ref 3.8–10.5)
WBC # FLD AUTO: 8.94 K/UL — SIGNIFICANT CHANGE UP (ref 3.8–10.5)

## 2023-05-26 PROCEDURE — 70498 CT ANGIOGRAPHY NECK: CPT | Mod: 26

## 2023-05-26 PROCEDURE — 99232 SBSQ HOSP IP/OBS MODERATE 35: CPT

## 2023-05-26 RX ORDER — RIVAROXABAN 15 MG-20MG
1 KIT ORAL
Qty: 60 | Refills: 0
Start: 2023-05-26 | End: 2023-06-24

## 2023-05-26 RX ADMIN — OXYCODONE HYDROCHLORIDE 5 MILLIGRAM(S): 5 TABLET ORAL at 22:28

## 2023-05-26 RX ADMIN — Medication 650 MILLIGRAM(S): at 10:12

## 2023-05-26 RX ADMIN — OXYCODONE HYDROCHLORIDE 5 MILLIGRAM(S): 5 TABLET ORAL at 05:26

## 2023-05-26 RX ADMIN — Medication 650 MILLIGRAM(S): at 22:28

## 2023-05-26 RX ADMIN — Medication 650 MILLIGRAM(S): at 17:30

## 2023-05-26 RX ADMIN — OXYCODONE HYDROCHLORIDE 5 MILLIGRAM(S): 5 TABLET ORAL at 14:10

## 2023-05-26 RX ADMIN — BUPROPION HYDROCHLORIDE 300 MILLIGRAM(S): 150 TABLET, EXTENDED RELEASE ORAL at 12:27

## 2023-05-26 RX ADMIN — OXYCODONE HYDROCHLORIDE 5 MILLIGRAM(S): 5 TABLET ORAL at 06:00

## 2023-05-26 RX ADMIN — RIVAROXABAN 2.5 MILLIGRAM(S): KIT at 05:26

## 2023-05-26 RX ADMIN — OXYCODONE HYDROCHLORIDE 5 MILLIGRAM(S): 5 TABLET ORAL at 21:27

## 2023-05-26 RX ADMIN — Medication 650 MILLIGRAM(S): at 01:11

## 2023-05-26 RX ADMIN — RIVAROXABAN 2.5 MILLIGRAM(S): KIT at 17:04

## 2023-05-26 RX ADMIN — GABAPENTIN 300 MILLIGRAM(S): 400 CAPSULE ORAL at 21:27

## 2023-05-26 RX ADMIN — Medication 650 MILLIGRAM(S): at 21:28

## 2023-05-26 RX ADMIN — Medication 650 MILLIGRAM(S): at 16:20

## 2023-05-26 RX ADMIN — AMLODIPINE BESYLATE 10 MILLIGRAM(S): 2.5 TABLET ORAL at 05:26

## 2023-05-26 RX ADMIN — OXYCODONE HYDROCHLORIDE 5 MILLIGRAM(S): 5 TABLET ORAL at 13:08

## 2023-05-26 RX ADMIN — Medication 81 MILLIGRAM(S): at 12:28

## 2023-05-26 RX ADMIN — ATORVASTATIN CALCIUM 40 MILLIGRAM(S): 80 TABLET, FILM COATED ORAL at 21:27

## 2023-05-26 RX ADMIN — Medication 75 MILLIGRAM(S): at 05:26

## 2023-05-26 RX ADMIN — PRAMIPEXOLE DIHYDROCHLORIDE 0.25 MILLIGRAM(S): 0.12 TABLET ORAL at 12:27

## 2023-05-26 NOTE — PROGRESS NOTE ADULT - ASSESSMENT
67 year old Female PMHx of hypertension on amlodipine and metoprolol, hyperlipidemia, GERD, chronic kidney disease, endocarditis, peripheral vascular disease, hx of right fem endarterectomy (2017), right to left fem-fem bypass (Aug 2021 w/ Dr. Green), right fem-pop bypass (May 2022 w/ Dr. Green). on Eliquis. Sent from Dr. Green's clinic with c/o 2-3days of colder R foot with cramp pain and tenderness. Given her hx of CKD and need for leg pain work up, underwent LE bypass on 5/24       1- CKDIV  2- PVD  3- CAD  4- HTN  5- anemia      unclear cause for CKD, reviewed creatinine range from 2017, revealing creatinine 2.0  current baseline 2.4-2.6 range.  cr is steady   suspect renovascular htn serologies so far non revealing.     continue amlodipine 10 mg daily for HTN  continue metoprolol 75 mg daily  trend bp  anemia, trend hgb, add iron supplementation

## 2023-05-26 NOTE — PROGRESS NOTE ADULT - ASSESSMENT
ASSESSMENT:  67y Female s/p R external iliac artery to profunda and SFA bypass     PLAN:  - Regular Diet  - c/w ASA/Statin  - Xarelto 2.5mg BID  - monitor TITI output  - ATC Tylenol  - Pain Management PRN  - Dispo Planning      Vascular Surgery  [id number]   ASSESSMENT:  67y Female s/p R external iliac artery to profunda and SFA bypass     PLAN:  - Regular Diet  - CTA neck  - c/w ASA/Statin  - Xarelto 2.5mg BID  - monitor TITI output  - ATC Tylenol  - Pain Management PRN  - Dispo Planning      Vascular Surgery  [id number]

## 2023-05-26 NOTE — PROGRESS NOTE ADULT - ASSESSMENT
67F PMHx of hypertension, hyperlipidemia, chronic kidney disease, Bacterial endocarditis - 2019- s/p AVR, MV repair, CABG X 1, with a LIMA to the LAD, peripheral vascular disease, hx of right fem endarterectomy (2017), right to left fem-fem bypass (Aug 2021 w/ Dr. Green), right fem-pop bypass (May 2022 w/ Dr. Green), prior vasc stents, on Eliquis for vascular and not for cardiac reasons. Sent from Dr. Green's clinic with c/o several days of colder R foot with crampy pain and tenderness. Pt reports dangling her legs improves the pain and walking makes it worse. Smokes 0.25 PPD.      - s/p bypass from R external iliac artery to profunda and SFA bypass 5/24/23  - Stable from a cardiac standpoint. No evidence of active ischemia.  - cont amlodipine, metoprolol  - cont asa, statin, rivaroxaban for PAD dosing  - remains with stable ckd. No evidence of volume overload.  - vascular surgery fu noted  - will follow

## 2023-05-26 NOTE — PROGRESS NOTE ADULT - ASSESSMENT
67F PMHx of hypertension, hyperlipidemia, GERD, chronic kidney disease, endocarditis, peripheral vascular disease, hx of right fem endarterectomy (2017), right to left fem-fem bypass (Aug 2021 w/ Dr. Green), right fem-pop bypass (May 2022 w/ Dr. Green). on Eliquis. Sent from Dr. Green's clinic with c/o 2-3days of colder R foot with crampy pain and tenderness. Pt reports dangling her legs improves the pain and walking makes it worse. Smokes 0.25 PPD. Pt denies nausea, vomiting, CP, SOB, distension, constipation, dysuria. (22 May 2023 21:09). Presents with  colder R foot with crampy pain and tenderness. Consulted for medical mgmt.        PLAN:    # PVD s/p R external iliac artery to profunda and SFA bypass 5/24:  -  Right to left fem-fem bypass (Aug 2021 w/ Dr. Green), right fem-pop bypass (May 2022 w/ Dr. Green)  - CT angio with LE runoff noted with multiple blockages  - Stable post op  - Local wound care per Vascular  - Drain mgmt per Vascular  - Neurovascular checks  - Pain mgmt prn  - PT eval  - CTA neck pending  - C/w Xarelto  - Vascular following    # HTN/ HLD/ CAD S/p CABG:  - Trend BP's  - C/w CV meds per Cards rec's  - Cards following    # Anemia:  - Serial cbc's  - Transfuse prn    # RLS:  - C/w Mirapex    # CKD4:   - Renal sono with Mildly increased echogenicity of the bilateral kidneys consistent with   medical renal disease. No hydronephrosis. Bilateral renal cysts  - Monitor I/O's  - Serial Cr  - Avoid nephrotoxins  - Renally dose medications  - Renal following    # Hypokalemia:  - Serial bmp's  - Supplement K prn    # Depression:  - C/w Wellbutrin     # GI ppx:  - Bowel regimen prn    # DVT ppx:  - Xarelto      [facility]  [phone]  MD. SHORTY Walsh

## 2023-05-27 LAB
AUTO DIFF PNL BLD: ABNORMAL
C-ANCA SER-ACNC: NEGATIVE — SIGNIFICANT CHANGE UP
P-ANCA SER-ACNC: NEGATIVE — SIGNIFICANT CHANGE UP

## 2023-05-27 RX ORDER — SENNA PLUS 8.6 MG/1
2 TABLET ORAL AT BEDTIME
Refills: 0 | Status: DISCONTINUED | OUTPATIENT
Start: 2023-05-27 | End: 2023-05-28

## 2023-05-27 RX ORDER — POLYETHYLENE GLYCOL 3350 17 G/17G
17 POWDER, FOR SOLUTION ORAL DAILY
Refills: 0 | Status: DISCONTINUED | OUTPATIENT
Start: 2023-05-27 | End: 2023-05-28

## 2023-05-27 RX ADMIN — Medication 81 MILLIGRAM(S): at 11:06

## 2023-05-27 RX ADMIN — OXYCODONE HYDROCHLORIDE 5 MILLIGRAM(S): 5 TABLET ORAL at 21:50

## 2023-05-27 RX ADMIN — PRAMIPEXOLE DIHYDROCHLORIDE 0.25 MILLIGRAM(S): 0.12 TABLET ORAL at 11:06

## 2023-05-27 RX ADMIN — RIVAROXABAN 2.5 MILLIGRAM(S): KIT at 05:01

## 2023-05-27 RX ADMIN — Medication 75 MILLIGRAM(S): at 05:01

## 2023-05-27 RX ADMIN — OXYCODONE HYDROCHLORIDE 5 MILLIGRAM(S): 5 TABLET ORAL at 12:09

## 2023-05-27 RX ADMIN — AMLODIPINE BESYLATE 10 MILLIGRAM(S): 2.5 TABLET ORAL at 05:01

## 2023-05-27 RX ADMIN — ATORVASTATIN CALCIUM 40 MILLIGRAM(S): 80 TABLET, FILM COATED ORAL at 21:38

## 2023-05-27 RX ADMIN — BUPROPION HYDROCHLORIDE 300 MILLIGRAM(S): 150 TABLET, EXTENDED RELEASE ORAL at 11:07

## 2023-05-27 RX ADMIN — Medication 650 MILLIGRAM(S): at 06:01

## 2023-05-27 RX ADMIN — OXYCODONE HYDROCHLORIDE 5 MILLIGRAM(S): 5 TABLET ORAL at 06:01

## 2023-05-27 RX ADMIN — GABAPENTIN 300 MILLIGRAM(S): 400 CAPSULE ORAL at 21:38

## 2023-05-27 RX ADMIN — RIVAROXABAN 2.5 MILLIGRAM(S): KIT at 17:34

## 2023-05-27 RX ADMIN — Medication 650 MILLIGRAM(S): at 11:06

## 2023-05-27 RX ADMIN — Medication 650 MILLIGRAM(S): at 12:06

## 2023-05-27 RX ADMIN — OXYCODONE HYDROCHLORIDE 5 MILLIGRAM(S): 5 TABLET ORAL at 21:38

## 2023-05-27 RX ADMIN — OXYCODONE HYDROCHLORIDE 5 MILLIGRAM(S): 5 TABLET ORAL at 13:09

## 2023-05-27 RX ADMIN — SENNA PLUS 2 TABLET(S): 8.6 TABLET ORAL at 21:41

## 2023-05-27 RX ADMIN — OXYCODONE HYDROCHLORIDE 5 MILLIGRAM(S): 5 TABLET ORAL at 05:01

## 2023-05-27 RX ADMIN — Medication 650 MILLIGRAM(S): at 05:01

## 2023-05-27 RX ADMIN — Medication 650 MILLIGRAM(S): at 22:00

## 2023-05-27 RX ADMIN — POLYETHYLENE GLYCOL 3350 17 GRAM(S): 17 POWDER, FOR SOLUTION ORAL at 12:09

## 2023-05-27 RX ADMIN — Medication 650 MILLIGRAM(S): at 21:38

## 2023-05-27 NOTE — PROGRESS NOTE ADULT - ASSESSMENT
67y Female s/p R external iliac artery to profunda and SFA bypass     PLAN:  - Regular Diet  - CTA neck completed  - c/w ASA/Statin  - Xarelto 2.5mg BID  - monitor TITI output  - ATC Tylenol  - Pain Management PRN  - Dispo Planning for tomorrow, 5/28    Vascular Surgery  [id number]

## 2023-05-27 NOTE — PROGRESS NOTE ADULT - TIME BILLING
- Review of records, telemetry, vital signs and daily labs.   - General and cardiovascular physical examination.  - Generation of cardiovascular treatment plan.  - Coordination of care.      Patient was seen and examined by me on 05/27/2023,interim events noted,labs and radiology studies reviewed.  Sunny Mancera MD,FACC.  [address] [place], [place] [id number].  [phone]

## 2023-05-27 NOTE — PROGRESS NOTE ADULT - ASSESSMENT
67 year old Female PMHx of hypertension on amlodipine and metoprolol, hyperlipidemia, GERD, chronic kidney disease, endocarditis, peripheral vascular disease, hx of right fem endarterectomy (2017), right to left fem-fem bypass (Aug 2021 w/ Dr. Green), right fem-pop bypass (May 2022 w/ Dr. Green). on Eliquis. Sent from Dr. Green's clinic with c/o 2-3days of colder R foot with cramp pain and tenderness. Given her hx of CKD and need for leg pain work up, underwent LE bypass on 5/24       1- CKDIV  2- PVD  3- CAD  4- HTN  5- anemia      unclear cause for CKD, reviewed creatinine range from 2017, revealing creatinine 2  current baseline 2.4-2.6 range.  cr is steady   suspect renovascular htn serologies so far non revealing.   continue amlodipine 10 mg daily for HTN  continue metoprolol 75 mg daily  trend bp  anemia, trend hgb, add iron supplementation

## 2023-05-27 NOTE — PROGRESS NOTE ADULT - ASSESSMENT
67F PMHx of hypertension, hyperlipidemia, chronic kidney disease, Bacterial endocarditis - 2019- s/p AVR, MV repair, CABG X 1, with a LIMA to the LAD, peripheral vascular disease, hx of right fem endarterectomy (2017), right to left fem-fem bypass (Aug 2021 w/ Dr. Green), right fem-pop bypass (May 2022 w/ Dr. Green), prior vasc stents, on Eliquis for vascular and not for cardiac reasons. Sent from Dr. Green's clinic with c/o several days of colder R foot with crampy pain and tenderness. Pt reports dangling her legs improves the pain and walking makes it worse. Smokes 0.25 PPD.      - s/p bypass from R external iliac artery to profunda and SFA bypass 5/24/23-hemodynamically stable  - Stable from a cardiac standpoint. No evidence of active ischemia.  - cont amlodipine, metoprolol  - cont asa, statin, rivaroxaban for PAD dosing  - remains with stable ckd. No evidence of volume overload.  - vascular surgery fu noted  - will follow

## 2023-05-28 ENCOUNTER — TRANSCRIPTION ENCOUNTER (OUTPATIENT)
Age: 68
End: 2023-05-28

## 2023-05-28 VITALS
TEMPERATURE: 98 F | DIASTOLIC BLOOD PRESSURE: 65 MMHG | HEART RATE: 64 BPM | RESPIRATION RATE: 18 BRPM | OXYGEN SATURATION: 96 % | SYSTOLIC BLOOD PRESSURE: 123 MMHG

## 2023-05-28 LAB
ANION GAP SERPL CALC-SCNC: 12 MMOL/L — SIGNIFICANT CHANGE UP (ref 5–17)
BUN SERPL-MCNC: 23 MG/DL — SIGNIFICANT CHANGE UP (ref 7–23)
CALCIUM SERPL-MCNC: 8.4 MG/DL — SIGNIFICANT CHANGE UP (ref 8.4–10.5)
CHLORIDE SERPL-SCNC: 109 MMOL/L — HIGH (ref 96–108)
CO2 SERPL-SCNC: 19 MMOL/L — LOW (ref 22–31)
CREAT SERPL-MCNC: 2.49 MG/DL — HIGH (ref 0.5–1.3)
EGFR: 21 ML/MIN/1.73M2 — LOW
GLUCOSE SERPL-MCNC: 83 MG/DL — SIGNIFICANT CHANGE UP (ref 70–99)
HCT VFR BLD CALC: 28.2 % — LOW (ref 34.5–45)
HGB BLD-MCNC: 8.5 G/DL — LOW (ref 11.5–15.5)
MAGNESIUM SERPL-MCNC: 1.9 MG/DL — SIGNIFICANT CHANGE UP (ref 1.6–2.6)
MCHC RBC-ENTMCNC: 28.7 PG — SIGNIFICANT CHANGE UP (ref 27–34)
MCHC RBC-ENTMCNC: 30.1 GM/DL — LOW (ref 32–36)
MCV RBC AUTO: 95.3 FL — SIGNIFICANT CHANGE UP (ref 80–100)
NRBC # BLD: 0 /100 WBCS — SIGNIFICANT CHANGE UP (ref 0–0)
PHOSPHATE SERPL-MCNC: 3.9 MG/DL — SIGNIFICANT CHANGE UP (ref 2.5–4.5)
PLATELET # BLD AUTO: 147 K/UL — LOW (ref 150–400)
POTASSIUM SERPL-MCNC: 3.8 MMOL/L — SIGNIFICANT CHANGE UP (ref 3.5–5.3)
POTASSIUM SERPL-SCNC: 3.8 MMOL/L — SIGNIFICANT CHANGE UP (ref 3.5–5.3)
RBC # BLD: 2.96 M/UL — LOW (ref 3.8–5.2)
RBC # FLD: 15.4 % — HIGH (ref 10.3–14.5)
SODIUM SERPL-SCNC: 140 MMOL/L — SIGNIFICANT CHANGE UP (ref 135–145)
WBC # BLD: 6.79 K/UL — SIGNIFICANT CHANGE UP (ref 3.8–10.5)
WBC # FLD AUTO: 6.79 K/UL — SIGNIFICANT CHANGE UP (ref 3.8–10.5)

## 2023-05-28 PROCEDURE — 86850 RBC ANTIBODY SCREEN: CPT

## 2023-05-28 PROCEDURE — 85045 AUTOMATED RETICULOCYTE COUNT: CPT

## 2023-05-28 PROCEDURE — 82565 ASSAY OF CREATININE: CPT

## 2023-05-28 PROCEDURE — C1889: CPT

## 2023-05-28 PROCEDURE — 85025 COMPLETE CBC W/AUTO DIFF WBC: CPT

## 2023-05-28 PROCEDURE — 84132 ASSAY OF SERUM POTASSIUM: CPT

## 2023-05-28 PROCEDURE — 82962 GLUCOSE BLOOD TEST: CPT

## 2023-05-28 PROCEDURE — 82310 ASSAY OF CALCIUM: CPT

## 2023-05-28 PROCEDURE — 80048 BASIC METABOLIC PNL TOTAL CA: CPT

## 2023-05-28 PROCEDURE — 86900 BLOOD TYPING SEROLOGIC ABO: CPT

## 2023-05-28 PROCEDURE — 86038 ANTINUCLEAR ANTIBODIES: CPT

## 2023-05-28 PROCEDURE — 82947 ASSAY GLUCOSE BLOOD QUANT: CPT

## 2023-05-28 PROCEDURE — 70498 CT ANGIOGRAPHY NECK: CPT

## 2023-05-28 PROCEDURE — 83540 ASSAY OF IRON: CPT

## 2023-05-28 PROCEDURE — C1757: CPT

## 2023-05-28 PROCEDURE — 84155 ASSAY OF PROTEIN SERUM: CPT

## 2023-05-28 PROCEDURE — 96375 TX/PRO/DX INJ NEW DRUG ADDON: CPT

## 2023-05-28 PROCEDURE — 82803 BLOOD GASES ANY COMBINATION: CPT

## 2023-05-28 PROCEDURE — 84295 ASSAY OF SERUM SODIUM: CPT

## 2023-05-28 PROCEDURE — 82728 ASSAY OF FERRITIN: CPT

## 2023-05-28 PROCEDURE — 85027 COMPLETE CBC AUTOMATED: CPT

## 2023-05-28 PROCEDURE — 82784 ASSAY IGA/IGD/IGG/IGM EACH: CPT

## 2023-05-28 PROCEDURE — 84165 PROTEIN E-PHORESIS SERUM: CPT

## 2023-05-28 PROCEDURE — 85730 THROMBOPLASTIN TIME PARTIAL: CPT

## 2023-05-28 PROCEDURE — 85014 HEMATOCRIT: CPT

## 2023-05-28 PROCEDURE — 86325 OTHER IMMUNOELECTROPHORESIS: CPT

## 2023-05-28 PROCEDURE — 86036 ANCA SCREEN EACH ANTIBODY: CPT

## 2023-05-28 PROCEDURE — 86160 COMPLEMENT ANTIGEN: CPT

## 2023-05-28 PROCEDURE — 82570 ASSAY OF URINE CREATININE: CPT

## 2023-05-28 PROCEDURE — 84156 ASSAY OF PROTEIN URINE: CPT

## 2023-05-28 PROCEDURE — 97161 PT EVAL LOW COMPLEX 20 MIN: CPT

## 2023-05-28 PROCEDURE — 85610 PROTHROMBIN TIME: CPT

## 2023-05-28 PROCEDURE — 76770 US EXAM ABDO BACK WALL COMP: CPT

## 2023-05-28 PROCEDURE — 83605 ASSAY OF LACTIC ACID: CPT

## 2023-05-28 PROCEDURE — 84466 ASSAY OF TRANSFERRIN: CPT

## 2023-05-28 PROCEDURE — 81001 URINALYSIS AUTO W/SCOPE: CPT

## 2023-05-28 PROCEDURE — 82330 ASSAY OF CALCIUM: CPT

## 2023-05-28 PROCEDURE — 83550 IRON BINDING TEST: CPT

## 2023-05-28 PROCEDURE — 99285 EMERGENCY DEPT VISIT HI MDM: CPT

## 2023-05-28 PROCEDURE — 86901 BLOOD TYPING SEROLOGIC RH(D): CPT

## 2023-05-28 PROCEDURE — 83970 ASSAY OF PARATHORMONE: CPT

## 2023-05-28 PROCEDURE — 75635 CT ANGIO ABDOMINAL ARTERIES: CPT | Mod: MA

## 2023-05-28 PROCEDURE — 83735 ASSAY OF MAGNESIUM: CPT

## 2023-05-28 PROCEDURE — 86334 IMMUNOFIX E-PHORESIS SERUM: CPT

## 2023-05-28 PROCEDURE — 36415 COLL VENOUS BLD VENIPUNCTURE: CPT

## 2023-05-28 PROCEDURE — 80053 COMPREHEN METABOLIC PANEL: CPT

## 2023-05-28 PROCEDURE — 82435 ASSAY OF BLOOD CHLORIDE: CPT

## 2023-05-28 PROCEDURE — C1769: CPT

## 2023-05-28 PROCEDURE — 84100 ASSAY OF PHOSPHORUS: CPT

## 2023-05-28 PROCEDURE — C1768: CPT

## 2023-05-28 PROCEDURE — 85018 HEMOGLOBIN: CPT

## 2023-05-28 PROCEDURE — 96374 THER/PROPH/DIAG INJ IV PUSH: CPT

## 2023-05-28 RX ORDER — OXYCODONE HYDROCHLORIDE 5 MG/1
1 TABLET ORAL
Qty: 8 | Refills: 0
Start: 2023-05-28

## 2023-05-28 RX ORDER — ATORVASTATIN CALCIUM 80 MG/1
1 TABLET, FILM COATED ORAL
Qty: 30 | Refills: 0
Start: 2023-05-28

## 2023-05-28 RX ADMIN — Medication 650 MILLIGRAM(S): at 11:20

## 2023-05-28 RX ADMIN — POLYETHYLENE GLYCOL 3350 17 GRAM(S): 17 POWDER, FOR SOLUTION ORAL at 11:24

## 2023-05-28 RX ADMIN — RIVAROXABAN 2.5 MILLIGRAM(S): KIT at 05:11

## 2023-05-28 RX ADMIN — Medication 650 MILLIGRAM(S): at 10:26

## 2023-05-28 RX ADMIN — OXYCODONE HYDROCHLORIDE 5 MILLIGRAM(S): 5 TABLET ORAL at 10:26

## 2023-05-28 RX ADMIN — OXYCODONE HYDROCHLORIDE 5 MILLIGRAM(S): 5 TABLET ORAL at 06:30

## 2023-05-28 RX ADMIN — BUPROPION HYDROCHLORIDE 300 MILLIGRAM(S): 150 TABLET, EXTENDED RELEASE ORAL at 11:25

## 2023-05-28 RX ADMIN — OXYCODONE HYDROCHLORIDE 5 MILLIGRAM(S): 5 TABLET ORAL at 11:20

## 2023-05-28 RX ADMIN — OXYCODONE HYDROCHLORIDE 5 MILLIGRAM(S): 5 TABLET ORAL at 06:04

## 2023-05-28 RX ADMIN — Medication 81 MILLIGRAM(S): at 11:24

## 2023-05-28 NOTE — DISCHARGE NOTE NURSING/CASE MANAGEMENT/SOCIAL WORK - NSDCPEXARELTOFU_GEN_ALL_CORE
Follow-up with Rheumatology  We will consider repeating evaluation for autoimmune diseases  Follow-up with Cardiology  You may need diuresis  Continue your oxygen for now  We will try to get you a portable oxygen concentrator Go for blood tests as directed. Your doctor will do lab tests at regular visits to monitor the effects of this medicine. Please follow up with your doctor and keep your health care provider appointments.

## 2023-05-28 NOTE — PROGRESS NOTE ADULT - ASSESSMENT
67 year old Female PMHx of hypertension on amlodipine and metoprolol, hyperlipidemia, GERD, chronic kidney disease, endocarditis, peripheral vascular disease, hx of right fem endarterectomy (2017), right to left fem-fem bypass (Aug 2021 w/ Dr. Green), right fem-pop bypass (May 2022 w/ Dr. Green). on Eliquis. Sent from Dr. Green's clinic with c/o 2-3days of colder R foot with cramp pain and tenderness. Given her hx of CKD and need for leg pain work up, underwent LE bypass on 5/24       1- CKDIV  2- PVD  3- CAD  4- HTN  5- anemia    r CKD likely uncontrolled HTN and arteriosclerosis , reviewed creatinine range from 2017, revealing creatinine 2  current baseline 2.4-2.6 range.  cr is steady   suspect renovascular htn serologies so far non revealing.   continue amlodipine 10 mg daily for HTN  continue metoprolol 75 mg daily  trend bp  anemia, trend hgb, add iron supplementation   shpt add vit D 2000 Units daily   oupt start farxiga for ckd

## 2023-05-28 NOTE — DISCHARGE NOTE NURSING/CASE MANAGEMENT/SOCIAL WORK - NSDCPNINST_GEN_ALL_CORE
Call MD or go to ER if severe rt groin and rt leg pain not relieved with pain meds, nausea, vomiting, fever, signs and symptoms of infections.

## 2023-05-28 NOTE — DISCHARGE NOTE NURSING/CASE MANAGEMENT/SOCIAL WORK - NSDCPEFALRISK_GEN_ALL_CORE
For information on Fall & Injury Prevention, visit: [email or web address] OR  [email or web address] OR  https://www.cdc.gov/steadi/patient.html

## 2023-05-28 NOTE — PROGRESS NOTE ADULT - ASSESSMENT
67y Female s/p R external iliac artery to profunda and SFA bypass     PLAN:  - Regular Diet  - CTA neck completed  - c/w ASA/Statin  - Xarelto 2.5mg BID  - ATC Tylenol  - Pain Management PRN  - D/C home today    Vascular Surgery  [id number]

## 2023-05-28 NOTE — PROGRESS NOTE ADULT - SUBJECTIVE AND OBJECTIVE BOX
[facility]   [phone]  RENAL FOLLOW UP NOTE  --------------------------------------------------------------------------------  Chief Complaint:    24 hour events/subjective:    seen earlier   c/o pain leg and incision site   no sob     PAST HISTORY  --------------------------------------------------------------------------------  No significant changes to PMH, PSH, FHx, SHx, unless otherwise noted    ALLERGIES & MEDICATIONS  --------------------------------------------------------------------------------  Allergies    No Known Allergies    Intolerances      Standing Inpatient Medications  acetaminophen     Tablet .. 650 milliGRAM(s) Oral every 6 hours  amLODIPine   Tablet 10 milliGRAM(s) Oral daily  aspirin  chewable 81 milliGRAM(s) Oral daily  atorvastatin 40 milliGRAM(s) Oral at bedtime  buPROPion XL (24-Hour) . 300 milliGRAM(s) Oral daily  gabapentin 300 milliGRAM(s) Oral at bedtime  metoprolol succinate ER 75 milliGRAM(s) Oral daily  pramipexole 0.25 milliGRAM(s) Oral daily  rivaroxaban 2.5 milliGRAM(s) Oral two times a day    PRN Inpatient Medications  oxyCODONE    IR 2.5 milliGRAM(s) Oral every 4 hours PRN  oxyCODONE    IR 5 milliGRAM(s) Oral every 4 hours PRN      REVIEW OF SYSTEMS  --------------------------------------------------------------------------------    Gen: denies  fevers/chills,  CVS: denies chest pain/palpitations  Resp: denies SOB/Cough  GI: Denies N/V/Abd pain  : Denies dysuria    VITALS/PHYSICAL EXAM  --------------------------------------------------------------------------------  T(C): 37.2 (05-26-23 @ 21:32), Max: 37.2 (05-26-23 @ 17:29)  HR: 62 (05-26-23 @ 21:32) (57 - 68)  BP: 121/65 (05-26-23 @ 21:32) (107/59 - 128/69)  RR: 17 (05-26-23 @ 21:32) (17 - 18)  SpO2: 93% (05-26-23 @ 21:32) (92% - 95%)  Wt(kg): --        05-25-23 @ 07:01  -  05-26-23 @ 07:00  --------------------------------------------------------  IN: 1680 mL / OUT: 585 mL / NET: 1095 mL    05-26-23 @ 07:01  -  05-26-23 @ 22:43  --------------------------------------------------------  IN: 760 mL / OUT: 450 mL / NET: 310 mL      Physical Exam:  	    	Gen: Non toxic comfortable appearing   	Pulm: No JVD. decrease bs  no rales or ronchi or wheezing  	CV: No JVD. RRR, +systolic murmur, no rub  	Abd: +BS, soft, nontender/nondistended  	: No suprapubic tenderness  	UE: Warm, no cyanosis  no clubbing,  no edema;   	LE: Warm, no cyanosis  no clubbing, no edema incision site dressing right femoral   	Neuro: alert and oriented. speech coherent       LABS/STUDIES  --------------------------------------------------------------------------------              8.2    8.94  >-----------<  149      [05-26-23 @ 06:45]              26.6     140  |  110  |  29  ----------------------------<  92      [05-26-23 @ 06:45]  4.1   |  20  |  2.47        Ca     8.1     [05-26-23 @ 06:45]      Mg     1.9     [05-26-23 @ 06:45]      Phos  2.8     [05-26-23 @ 06:45]    TPro  5.3  /  Alb  x   /  TBili  x   /  DBili  x   /  AST  x   /  ALT  x   /  AlkPhos  x   [05-25-23 @ 07:24]          Creatinine Trend:  SCr 2.47 [05-26 @ 06:45]  SCr 2.25 [05-25 @ 09:06]  SCr 2.14 [05-25 @ 07:24]  SCr 2.44 [05-25 @ 04:12]  SCr 2.37 [05-24 @ 23:38]              Urinalysis - [05-25-23 @ 09:21]      Color Colorless / Appearance Clear / SG 1.017 / pH 6.0      Gluc Negative / Ketone Negative  / Bili Negative / Urobili Negative       Blood Trace / Protein Trace / Leuk Est Moderate / Nitrite Negative      RBC 6 / WBC 16 / Hyaline 1 / Gran  / Sq Epi  / Non Sq Epi  / Bacteria Negative    Urine Creatinine 57      [05-25-23 @ 10:06]  Urine Protein 12      [05-25-23 @ 10:06]    Iron 43, TIBC 209, %sat 20      [05-25-23 @ 07:24]  Ferritin 68      [05-25-23 @ 07:24]  PTH -- (Ca 7.5)      [05-25-23 @ 07:24]   88  HbA1c 5.0      [05-28-19 @ 22:28]    KIRAN: titer Negative, pattern --      [05-24-23 @ 01:38]  C3 Complement 111      [05-25-23 @ 07:24]  C4 Complement 26      [05-24-23 @ 01:38]      
SUBJECTIVE / OVERNIGHT EVENTS:    Patient seen and examined at bedside. No events noted overnight. Resting comfortably in bed. S/p Right bypass     --------------------------------------------------------------------------------------------  LABS:                        8.2    8.94  )-----------( 149      ( 26 May 2023 06:45 )             26.6     05-    140  |  110<H>  |  29<H>  ----------------------------<  92  4.1   |  20<L>  |  2.47<H>    Ca    8.1<L>      26 May 2023 06:45  Phos  2.8       Mg     1.9         TPro  5.3<L>  /  Alb  x   /  TBili  x   /  DBili  x   /  AST  x   /  ALT  x   /  AlkPhos  x         CAPILLARY BLOOD GLUCOSE            Urinalysis Basic - ( 25 May 2023 09:21 )    Color: Colorless / Appearance: Clear / S.017 / pH: x  Gluc: x / Ketone: Negative  / Bili: Negative / Urobili: Negative   Blood: x / Protein: Trace / Nitrite: Negative   Leuk Esterase: Moderate / RBC: 6 /hpf / WBC 16 /HPF   Sq Epi: x / Non Sq Epi: x / Bacteria: Negative        RADIOLOGY & ADDITIONAL TESTS:      Imaging Personally Reviewed:  [x] YES  [ ] NO    Consultant(s) Notes Reviewed:  [x] YES  [ ] NO    MEDICATIONS  (STANDING):  acetaminophen     Tablet .. 650 milliGRAM(s) Oral every 6 hours  amLODIPine   Tablet 10 milliGRAM(s) Oral daily  aspirin  chewable 81 milliGRAM(s) Oral daily  atorvastatin 40 milliGRAM(s) Oral at bedtime  buPROPion XL (24-Hour) . 300 milliGRAM(s) Oral daily  gabapentin 300 milliGRAM(s) Oral at bedtime  metoprolol succinate ER 75 milliGRAM(s) Oral daily  pramipexole 0.25 milliGRAM(s) Oral daily  rivaroxaban 2.5 milliGRAM(s) Oral two times a day  sodium chloride 0.9%. 1000 milliLiter(s) (75 mL/Hr) IV Continuous <Continuous>    MEDICATIONS  (PRN):  oxyCODONE    IR 2.5 milliGRAM(s) Oral every 4 hours PRN Moderate Pain (4 - 6)  oxyCODONE    IR 5 milliGRAM(s) Oral every 4 hours PRN Severe Pain (7 - 10)      Care Discussed with Consultants/Other Providers [x] YES  [ ] NO    Vital Signs Last 24 Hrs  T(C): 36.7 (26 May 2023 10:10), Max: 36.8 (25 May 2023 13:18)  T(F): 98 (26 May 2023 10:10), Max: 98.2 (25 May 2023 13:18)  HR: 57 (26 May 2023 10:10) (57 - 75)  BP: 120/66 (26 May 2023 10:10) (107/68 - 128/69)  BP(mean): --  RR: 18 (26 May 2023 10:10) (18 - 18)  SpO2: 92% (26 May 2023 10:10) (92% - 95%)    Parameters below as of 26 May 2023 10:10  Patient On (Oxygen Delivery Method): room air      I&O's Summary    25 May 2023 07:01  -  26 May 2023 07:00  --------------------------------------------------------  IN: 1680 mL / OUT: 585 mL / NET: 1095 mL    26 May 2023 07:01  -  26 May 2023 12:25  --------------------------------------------------------  IN: 240 mL / OUT: 0 mL / NET: 240 mL      PHYSICAL EXAM:  GENERAL: NAD, Comfortable  HEAD:  Atraumatic, Normocephalic  EYES: EOMI, PERRLA, conjunctiva and sclera clear  NECK: Supple, No JVD  CHEST/LUNG: Clear to auscultation bilaterally; No wheeze  HEART: Regular rate and rhythm; No murmurs, rubs, or gallops  ABDOMEN: Soft, Nontender, Nondistended; Bowel sounds present  Neuro: AAOx3, no focal deficit, 5/5 b/l extremities  EXTREMITIES:  2+ Peripheral Pulses, No clubbing, cyanosis, or edema. Right thigh surgical site c/d/i. Drain   SKIN: No rashes or lesions      
TEAM VASCULAR Surgery Daily Progress Note  =====================================================    SUBJECTIVE: Patient seen and examined at bedside on AM rounds. NAD. Patient reports having some pain with movement, but goes away when resting.    --------------------------------------------------------------------------------------  OBJECTIVE:    VITAL SIGNS:  Vital Signs Last 24 Hrs  T(C): 36.7 (27 May 2023 05:00), Max: 37.2 (26 May 2023 17:29)  T(F): 98.1 (27 May 2023 05:00), Max: 99 (26 May 2023 17:29)  HR: 63 (27 May 2023 05:00) (57 - 68)  BP: 126/70 (27 May 2023 05:00) (107/59 - 126/70)  BP(mean): --  RR: 17 (27 May 2023 05:00) (17 - 18)  SpO2: 93% (27 May 2023 05:00) (92% - 95%)    Parameters below as of 27 May 2023 05:00  Patient On (Oxygen Delivery Method): room air      --------------------------------------------------------------------------------------    EXAM:  Gen: AAOx3  Lung: Breathing on RA, unlabored   Abd: soft, NTND, no guarding.   MSK: no visible deformities. Aquacell c/d/i TITI bulb w/minimal dark SA   Vascular: Bilateral +DP/PT signals, extremities warm  Neuro: No focal sensory or motor deficits    --------------------------------------------------------------------------------------    LABS:                        8.2    8.94  )-----------( 149      ( 26 May 2023 06:45 )             26.6     05-26    140  |  110<H>  |  29<H>  ----------------------------<  92  4.1   |  20<L>  |  2.47<H>    Ca    8.1<L>      26 May 2023 06:45  Phos  2.8     05-26  Mg     1.9     05-26            --------------------------------------------------------------------------------------    INS AND OUTS:    05-26-23 @ 07:01  -  05-27-23 @ 07:00  --------------------------------------------------------  IN: 1000 mL / OUT: 480 mL / NET: 520 mL        --------------------------------------------------------------------------------------    MEDICATIONS:  MEDICATIONS  (STANDING):  acetaminophen     Tablet .. 650 milliGRAM(s) Oral every 6 hours  amLODIPine   Tablet 10 milliGRAM(s) Oral daily  aspirin  chewable 81 milliGRAM(s) Oral daily  atorvastatin 40 milliGRAM(s) Oral at bedtime  buPROPion XL (24-Hour) . 300 milliGRAM(s) Oral daily  gabapentin 300 milliGRAM(s) Oral at bedtime  metoprolol succinate ER 75 milliGRAM(s) Oral daily  pramipexole 0.25 milliGRAM(s) Oral daily  rivaroxaban 2.5 milliGRAM(s) Oral two times a day    MEDICATIONS  (PRN):  oxyCODONE    IR 5 milliGRAM(s) Oral every 4 hours PRN Severe Pain (7 - 10)  oxyCODONE    IR 2.5 milliGRAM(s) Oral every 4 hours PRN Moderate Pain (4 - 6)    --------------------------------------------------------------------------------------  
VASCULAR SURGERY DAILY PROGRESS NOTE    SUBJECTIVE:  Patient seen and examined at bedside during morning rounds. For OR later today    Vital Signs Last 24 Hrs  T(C): 36.7 (24 May 2023 06:44), Max: 36.9 (24 May 2023 00:00)  T(F): 98.1 (24 May 2023 06:44), Max: 98.4 (24 May 2023 00:00)  HR: 62 (24 May 2023 06:44) (60 - 62)  BP: 112/72 (24 May 2023 06:44) (112/72 - 126/76)  BP(mean): --  RR: 18 (24 May 2023 06:44) (18 - 18)  SpO2: 97% (24 May 2023 06:44) (96% - 97%)    Parameters below as of 24 May 2023 06:44  Patient On (Oxygen Delivery Method): room air    PHYSICAL EXAM:  General: NAD, resting comfortably  Head, AC, AT  Pulmonary: nonlabored breathing  Abdominal: soft, ND, NT  Extremities: R foot-midcalf cooler than L, motor and sensory intact, R leg tender to palpation. B/L palpable DP pulses, PT signals.   Neuro: A&O, no focal deficits    MEDICATIONS:  acetaminophen   IVPB .. 1000 milliGRAM(s) IV Intermittent every 6 hours PRN  amLODIPine   Tablet 10 milliGRAM(s) Oral every 24 hours  atorvastatin 40 milliGRAM(s) Oral at bedtime  buPROPion XL (24-Hour) . 300 milliGRAM(s) Oral daily  heparin  Infusion 900 Unit(s)/Hr IV Continuous <Continuous>  metoprolol succinate ER 75 milliGRAM(s) Oral daily  oxyCODONE    IR 2.5 milliGRAM(s) Oral every 4 hours PRN  oxyCODONE    IR 5 milliGRAM(s) Oral every 4 hours PRN  pramipexole 0.25 milliGRAM(s) Oral daily  sodium chloride 0.9%. 1000 milliLiter(s) IV Continuous <Continuous>      LABS:                        10.6   6.29  )-----------( 198      ( 24 May 2023 01:38 )             35.0     05-24    138  |  108  |  28<H>  ----------------------------<  82  4.3   |  18<L>  |  2.56<H>    Ca    9.0      24 May 2023 01:38  Phos  3.1     05-24  Mg     1.7     05-24    PT/INR - ( 24 May 2023 01:38 )   PT: 13.6 sec;   INR: 1.17 ratio    PTT - ( 24 May 2023 07:45 )  PTT:59.2 sec    
VASCULAR SURGERY DAILY PROGRESS NOTE:       Subjective / Overnight events:  No acute overnight events.  Pt reports incisional pain relieved with medications.  Tolerating diet, denies N/V.      Objective:      MEDICATIONS  (STANDING):  acetaminophen     Tablet .. 650 milliGRAM(s) Oral every 6 hours  amLODIPine   Tablet 10 milliGRAM(s) Oral daily  aspirin  chewable 81 milliGRAM(s) Oral daily  atorvastatin 40 milliGRAM(s) Oral at bedtime  buPROPion XL (24-Hour) . 300 milliGRAM(s) Oral daily  gabapentin 300 milliGRAM(s) Oral at bedtime  metoprolol succinate ER 75 milliGRAM(s) Oral daily  pramipexole 0.25 milliGRAM(s) Oral daily  rivaroxaban 2.5 milliGRAM(s) Oral two times a day  sodium chloride 0.9%. 1000 milliLiter(s) (75 mL/Hr) IV Continuous <Continuous>    MEDICATIONS  (PRN):  oxyCODONE    IR 2.5 milliGRAM(s) Oral every 4 hours PRN Moderate Pain (4 - 6)  oxyCODONE    IR 5 milliGRAM(s) Oral every 4 hours PRN Severe Pain (7 - 10)      Vital Signs Last 24 Hrs  T(C): 36.5 (26 May 2023 05:04), Max: 36.8 (25 May 2023 10:38)  T(F): 97.7 (26 May 2023 05:04), Max: 98.2 (25 May 2023 10:38)  HR: 60 (26 May 2023 05:04) (60 - 75)  BP: 128/69 (26 May 2023 05:04) (107/68 - 128/69)  BP(mean): --  RR: 18 (26 May 2023 05:04) (18 - 18)  SpO2: 94% (26 May 2023 05:04) (94% - 95%)    Parameters below as of 26 May 2023 05:04  Patient On (Oxygen Delivery Method): room air        I&O's Detail    25 May 2023 07:01  -  26 May 2023 07:00  --------------------------------------------------------  IN:    Oral Fluid: 780 mL    sodium chloride 0.9%: 900 mL  Total IN: 1680 mL    OUT:    Bulb (mL): 60 mL    Voided (mL): 525 mL  Total OUT: 585 mL    Total NET: 1095 mL          Daily     Daily     LABS:                        8.2    8.94  )-----------( 149      ( 26 May 2023 06:45 )             26.6     -    140  |  110<H>  |  29<H>  ----------------------------<  92  4.1   |  20<L>  |  2.47<H>    Ca    8.1<L>      26 May 2023 06:45  Phos  2.8       Mg     1.9         TPro  5.3<L>  /  Alb  x   /  TBili  x   /  DBili  x   /  AST  x   /  ALT  x   /  AlkPhos  x         Urinalysis Basic - ( 25 May 2023 09:21 )    Color: Colorless / Appearance: Clear / S.017 / pH: x  Gluc: x / Ketone: Negative  / Bili: Negative / Urobili: Negative   Blood: x / Protein: Trace / Nitrite: Negative   Leuk Esterase: Moderate / RBC: 6 /hpf / WBC 16 /HPF   Sq Epi: x / Non Sq Epi: x / Bacteria: Negative          PHYSICAL EXAM  --------------------------------------------------------------------------------    Exam:  Gen: AAOx3, non-toxic  Head: NCAT  HEENT: EOMI, oral mucosa moist, normal conjunctiva  Lung: Breathing on RA, unlabored   Abd: soft, NTND, no guarding.   MSK: no visible deformities. Aquacell c/d/i TITI bulb w/minimal dark SA   Vascular: Bilateral +DP/PT signals, extremities warm  Neuro: No focal sensory or motor deficits    	  
Vascular Surgery Daily Progress Note    SUBJECTIVE:  Pt seen and examined, and is resting comfortably in bed. No acute events overnight. Pt reports moderate post surgical pain.    OBJECTIVE:  Vital Signs Last 24 Hrs  T(C): 36.4 (25 May 2023 06:13), Max: 36.7 (24 May 2023 16:26)  T(F): 97.6 (25 May 2023 06:13), Max: 98.1 (24 May 2023 16:26)  HR: 62 (25 May 2023 06:13) (52 - 62)  BP: 118/69 (25 May 2023 06:13) (116/60 - 152/77)  BP(mean): --  RR: 17 (25 May 2023 06:13) (15 - 18)  SpO2: 98% (25 May 2023 06:13) (94% - 99%)    Parameters below as of 25 May 2023 06:13  Patient On (Oxygen Delivery Method): room air        I&O's Detail    24 May 2023 07:01  -  25 May 2023 07:00  --------------------------------------------------------  IN:    Oral Fluid: 170 mL    sodium chloride 0.9%: 450 mL  Total IN: 620 mL    OUT:    Bulb (mL): 55 mL    Indwelling Catheter - Urethral (mL): 730 mL  Total OUT: 785 mL    Total NET: -165 mL        Exam:  Gen: AAOx3, non-toxic  Head: NCAT  HEENT: EOMI, oral mucosa moist, normal conjunctiva  Lung: Breathing on RA, unlabored   Abd: soft, NTND, no guarding.   MSK: no visible deformities. Aquacell c/d/i TITI bulb w/minimal dark SA   Vascular: Bilateral +DP/PT signals, extremities warm  Neuro: No focal sensory or motor deficits                          9.6    8.64  )-----------( 167      ( 25 May 2023 07:24 )             31.4       05-25    141  |  110<H>  |  26<H>  ----------------------------<  117<H>  3.3<L>   |  16<L>  |  2.14<H>    Ca    7.2<L>      25 May 2023 07:24  Phos  5.2     05-25  Mg     1.9     05-25    TPro  4.2<L>  /  Alb  x   /  TBili  x   /  DBili  x   /  AST  x   /  ALT  x   /  AlkPhos  x   05-24      PT/INR - ( 24 May 2023 01:38 )   PT: 13.6 sec;   INR: 1.17 ratio         PTT - ( 24 May 2023 07:45 )  PTT:59.2 sec            ASSESSMENT:  67y Female s/p R external iliac artery to profunda and SFA bypass     PLAN:  - Regular Diet  - c/w ASA/Statin  - Xarelto 2.5mg BID  - d/c munoz  - monitor TITI output  - ATC Tylenol  - Pain Management PRN  - PT Consult  - Dispo Planning      Vascular Surgery  [id number]      
[facility] - Chao Lomax, Regino, Roshan Paris [place], [place]  Office Number:  [phone]    Patient resting comfortably in bed in NAD.   s/p bypass 5/24/23  c/o pain at incisional site    ROS: negative unless otherwise mentioned.    MEDICATIONS  (STANDING):  acetaminophen     Tablet .. 650 milliGRAM(s) Oral every 6 hours  amLODIPine   Tablet 10 milliGRAM(s) Oral daily  aspirin  chewable 81 milliGRAM(s) Oral daily  atorvastatin 40 milliGRAM(s) Oral at bedtime  buPROPion XL (24-Hour) . 300 milliGRAM(s) Oral daily  gabapentin 300 milliGRAM(s) Oral at bedtime  metoprolol succinate ER 75 milliGRAM(s) Oral daily  pramipexole 0.25 milliGRAM(s) Oral daily  rivaroxaban 2.5 milliGRAM(s) Oral two times a day  sodium chloride 0.9%. 1000 milliLiter(s) (75 mL/Hr) IV Continuous <Continuous>    MEDICATIONS  (PRN):  oxyCODONE    IR 5 milliGRAM(s) Oral every 4 hours PRN Severe Pain (7 - 10)  oxyCODONE    IR 2.5 milliGRAM(s) Oral every 4 hours PRN Moderate Pain (4 - 6)      Allergies    No Known Allergies    Intolerances        Vital Signs Last 24 Hrs  T(C): 36.4 (25 May 2023 07:20), Max: 36.7 (24 May 2023 16:26)  T(F): 97.5 (25 May 2023 07:20), Max: 98.1 (24 May 2023 16:26)  HR: 56 (25 May 2023 07:20) (52 - 62)  BP: 107/56 (25 May 2023 07:20) (107/56 - 152/77)  BP(mean): --  RR: 16 (25 May 2023 07:20) (15 - 18)  SpO2: 97% (25 May 2023 07:20) (94% - 99%)    Parameters below as of 25 May 2023 07:20  Patient On (Oxygen Delivery Method): room air        I&O's Summary    24 May 2023 07:01  -  25 May 2023 07:00  --------------------------------------------------------  IN: 965 mL / OUT: 785 mL / NET: 180 mL        ON EXAM:    General: NAD, awake and alert  HEENT: Mucous membranes are moist, anicteric  Lungs: Non-labored, breath sounds are clear bilaterally, No wheezing, rales or rhonchi  Cardiovascular: S1 and S2, 2/6 systolic murmur RUSB, no rubs or gallops  Gastrointestinal: soft, nontender   Lymph: No peripheral edema  Skin: No rashes or ulcers  Psych:  Mood & affect appropriate    LABS: All Labs Reviewed:                        9.6    8.64  )-----------( 167      ( 25 May 2023 07:24 )             31.4                         9.6    9.94  )-----------( 169      ( 25 May 2023 04:12 )             30.8                         9.5    10.10 )-----------( 156      ( 24 May 2023 22:40 )             29.6     25 May 2023 07:24    141    |  110    |  26     ----------------------------<  117    3.3     |  16     |  2.14   25 May 2023 04:12    139    |  107    |  30     ----------------------------<  111    4.4     |  17     |  2.44   24 May 2023 23:38    138    |  106    |  27     ----------------------------<  95     4.2     |  17     |  2.37     Ca    7.2        25 May 2023 07:24  Ca    8.4        25 May 2023 04:12  Ca    8.4        24 May 2023 23:38  Phos  5.2       25 May 2023 04:12  Phos  3.1       24 May 2023 01:38  Phos  3.6       23 May 2023 01:11  Mg     1.9       25 May 2023 04:12  Mg     1.7       24 May 2023 01:38  Mg     1.7       23 May 2023 01:11    TPro  4.2    /  Alb  x      /  TBili  x      /  DBili  x      /  AST  x      /  ALT  x      /  AlkPhos  x      24 May 2023 01:38  TPro  7.0    /  Alb  4.0    /  TBili  0.3    /  DBili  x      /  AST  20     /  ALT  10     /  AlkPhos  125    22 May 2023 17:10    PT/INR - ( 24 May 2023 01:38 )   PT: 13.6 sec;   INR: 1.17 ratio         PTT - ( 24 May 2023 07:45 )  PTT:59.2 sec      Blood Culture:       
SUBJECTIVE / OVERNIGHT EVENTS:    Patient seen and examined at bedside. Resting comfortably in bed. S/p Right bypass yesterday    --------------------------------------------------------------------------------------------  LABS:                        9.6    8.64  )-----------( 167      ( 25 May 2023 07:24 )             31.4     05-25    141  |  110<H>  |  26<H>  ----------------------------<  117<H>  3.3<L>   |  16<L>  |  2.14<H>    Ca    7.2<L>      25 May 2023 07:24  Phos  5.2       Mg     1.9         TPro  4.2<L>  /  Alb  x   /  TBili  x   /  DBili  x   /  AST  x   /  ALT  x   /  AlkPhos  x   24    PT/INR - ( 24 May 2023 01:38 )   PT: 13.6 sec;   INR: 1.17 ratio         PTT - ( 24 May 2023 07:45 )  PTT:59.2 sec  CAPILLARY BLOOD GLUCOSE      POCT Blood Glucose.: 97 mg/dL (24 May 2023 21:58)        Urinalysis Basic - ( 25 May 2023 09:21 )    Color: Colorless / Appearance: Clear / S.017 / pH: x  Gluc: x / Ketone: Negative  / Bili: Negative / Urobili: Negative   Blood: x / Protein: Trace / Nitrite: Negative   Leuk Esterase: Moderate / RBC: 6 /hpf / WBC 16 /HPF   Sq Epi: x / Non Sq Epi: x / Bacteria: Negative        RADIOLOGY & ADDITIONAL TESTS:      Imaging Personally Reviewed:  [x] YES  [ ] NO    Consultant(s) Notes Reviewed:  [x] YES  [ ] NO    MEDICATIONS  (STANDING):  acetaminophen     Tablet .. 650 milliGRAM(s) Oral every 6 hours  amLODIPine   Tablet 10 milliGRAM(s) Oral daily  aspirin  chewable 81 milliGRAM(s) Oral daily  atorvastatin 40 milliGRAM(s) Oral at bedtime  buPROPion XL (24-Hour) . 300 milliGRAM(s) Oral daily  gabapentin 300 milliGRAM(s) Oral at bedtime  metoprolol succinate ER 75 milliGRAM(s) Oral daily  pramipexole 0.25 milliGRAM(s) Oral daily  rivaroxaban 2.5 milliGRAM(s) Oral two times a day  sodium chloride 0.9%. 1000 milliLiter(s) (75 mL/Hr) IV Continuous <Continuous>    MEDICATIONS  (PRN):  oxyCODONE    IR 5 milliGRAM(s) Oral every 4 hours PRN Severe Pain (7 - 10)  oxyCODONE    IR 2.5 milliGRAM(s) Oral every 4 hours PRN Moderate Pain (4 - 6)      Care Discussed with Consultants/Other Providers [x] YES  [ ] NO    Vital Signs Last 24 Hrs  T(C): 36.4 (25 May 2023 07:20), Max: 36.7 (24 May 2023 16:26)  T(F): 97.5 (25 May 2023 07:20), Max: 98.1 (24 May 2023 16:26)  HR: 56 (25 May 2023 07:20) (52 - 62)  BP: 107/56 (25 May 2023 07:20) (107/56 - 152/77)  BP(mean): --  RR: 16 (25 May 2023 07:20) (15 - 18)  SpO2: 97% (25 May 2023 07:20) (94% - 99%)    Parameters below as of 25 May 2023 07:20  Patient On (Oxygen Delivery Method): room air      I&O's Summary    24 May 2023 07:01  -  25 May 2023 07:00  --------------------------------------------------------  IN: 965 mL / OUT: 785 mL / NET: 180 mL      PHYSICAL EXAM:  GENERAL: NAD, Comfortable  HEAD:  Atraumatic, Normocephalic  EYES: EOMI, PERRLA, conjunctiva and sclera clear  NECK: Supple, No JVD  CHEST/LUNG: Clear to auscultation bilaterally; No wheeze  HEART: Regular rate and rhythm; No murmurs, rubs, or gallops  ABDOMEN: Soft, Nontender, Nondistended; Bowel sounds present  Neuro: AAOx3, no focal deficit, 5/5 b/l extremities  EXTREMITIES:  2+ Peripheral Pulses, No clubbing, cyanosis, or edema. Right thigh surgical site c/d/i. Drain   SKIN: No rashes or lesions  
VASCULAR SURGERY DAILY PROGRESS NOTE    SUBJECTIVE:  Patient seen and examined at bedside during morning rounds.     Vital Signs Last 24 Hrs  T(C): 36.7 (23 May 2023 06:00), Max: 36.8 (22 May 2023 18:26)  T(F): 98 (23 May 2023 06:00), Max: 98.2 (22 May 2023 18:26)  HR: 62 (23 May 2023 06:00) (57 - 71)  BP: 150/72 (23 May 2023 06:00) (119/65 - 154/69)  BP(mean): --  RR: 18 (23 May 2023 06:00) (16 - 19)  SpO2: 98% (23 May 2023 06:00) (95% - 99%)    Parameters below as of 23 May 2023 06:00  Patient On (Oxygen Delivery Method): room air      PHYSICAL EXAM:  General: NAD, resting comfortably  Head, AC, AT  Pulmonary: nonlabored breathing  Abdominal: soft, ND, NT  Extremities: R foot-midcalf cooler than L, motor and sensory intact, R leg tender to palpation. B/L palpable DP pulses, PT signals.   Neuro: A&O, no focal deficits      MEDICATIONS:  acetaminophen   IVPB .. 1000 milliGRAM(s) IV Intermittent every 6 hours PRN  amLODIPine   Tablet 10 milliGRAM(s) Oral every 24 hours  atorvastatin 40 milliGRAM(s) Oral at bedtime  buPROPion XL (24-Hour) . 300 milliGRAM(s) Oral daily  heparin  Infusion 900 Unit(s)/Hr IV Continuous <Continuous>  metoprolol succinate ER 75 milliGRAM(s) Oral daily  oxyCODONE    IR 2.5 milliGRAM(s) Oral every 4 hours PRN  oxyCODONE    IR 5 milliGRAM(s) Oral every 4 hours PRN  pramipexole 0.25 milliGRAM(s) Oral daily  sodium chloride 0.9%. 1000 milliLiter(s) IV Continuous <Continuous>      LABS:                        10.1   6.51  )-----------( 191      ( 23 May 2023 06:54 )             31.9     05-23    140  |  111<H>  |  25<H>  ----------------------------<  92  4.5   |  19<L>  |  2.47<H>    Ca    8.3<L>      23 May 2023 01:11  Phos  3.6     05-23  Mg     1.7     05-23    TPro  7.0  /  Alb  4.0  /  TBili  0.3  /  DBili  x   /  AST  20  /  ALT  10  /  AlkPhos  125<H>  05-22    PTT - ( 23 May 2023 00:45 )  PTT:>200.0 sec
[facility] Daily Progress Note  =====================================================    SUBJECTIVE: Patient seen and examined at bedside on AM rounds. NAD. Wants to go home today    --------------------------------------------------------------------------------------  OBJECTIVE:    VITAL SIGNS:  Vital Signs Last 24 Hrs  T(C): 36.6 (28 May 2023 10:02), Max: 37.2 (27 May 2023 10:11)  T(F): 97.9 (28 May 2023 10:02), Max: 98.9 (27 May 2023 10:11)  HR: 64 (28 May 2023 10:02) (55 - 65)  BP: 123/65 (28 May 2023 10:02) (103/54 - 125/72)  BP(mean): --  RR: 18 (28 May 2023 10:02) (18 - 19)  SpO2: 96% (28 May 2023 10:02) (92% - 96%)    Parameters below as of 28 May 2023 10:02  Patient On (Oxygen Delivery Method): room air      --------------------------------------------------------------------------------------    EXAM:  Gen: AAOx3  Lung: Breathing on RA, unlabored   Abd: soft, NTND, no guarding.   MSK: no visible deformities. Aquacell c/d/i TITI removed  Vascular: Bilateral +DP/PT signals, extremities warm  Neuro: No focal sensory or motor deficits    --------------------------------------------------------------------------------------    LABS:                        8.5    6.79  )-----------( 147      ( 28 May 2023 07:03 )             28.2     05-28    140  |  109<H>  |  23  ----------------------------<  83  3.8   |  19<L>  |  2.49<H>    Ca    8.4      28 May 2023 07:01  Phos  3.9     05-28  Mg     1.9     05-28      --------------------------------------------------------------------------------------    INS AND OUTS:    05-27-23 @ 07:01  -  05-28-23 @ 07:00  --------------------------------------------------------  IN: 980 mL / OUT: 23 mL / NET: 957 mL      --------------------------------------------------------------------------------------    MEDICATIONS:  MEDICATIONS  (STANDING):  acetaminophen     Tablet .. 650 milliGRAM(s) Oral every 6 hours  amLODIPine   Tablet 10 milliGRAM(s) Oral daily  aspirin  chewable 81 milliGRAM(s) Oral daily  atorvastatin 40 milliGRAM(s) Oral at bedtime  buPROPion XL (24-Hour) . 300 milliGRAM(s) Oral daily  gabapentin 300 milliGRAM(s) Oral at bedtime  metoprolol succinate ER 75 milliGRAM(s) Oral daily  polyethylene glycol 3350 17 Gram(s) Oral daily  pramipexole 0.25 milliGRAM(s) Oral daily  rivaroxaban 2.5 milliGRAM(s) Oral two times a day  senna 2 Tablet(s) Oral at bedtime    MEDICATIONS  (PRN):  oxyCODONE    IR 2.5 milliGRAM(s) Oral every 4 hours PRN Moderate Pain (4 - 6)  oxyCODONE    IR 5 milliGRAM(s) Oral every 4 hours PRN Severe Pain (7 - 10)    --------------------------------------------------------------------------------------  
[facility] - Chao Lomax, Regino, [place], Elva Islas Goodger  Office Number:  [phone]    Patient resting comfortably in bed in NAD.  Laying flat with no respiratory distress.  No complaints of chest pain, dyspnea, palpitations, PND, or orthopnea.  No change in RLE pain    ROS: negative unless otherwise mentioned.      MEDICATIONS  (STANDING):  amLODIPine   Tablet 10 milliGRAM(s) Oral every 24 hours  atorvastatin 40 milliGRAM(s) Oral at bedtime  buPROPion XL (24-Hour) . 300 milliGRAM(s) Oral daily  heparin  Infusion 900 Unit(s)/Hr (7 mL/Hr) IV Continuous <Continuous>  metoprolol succinate ER 75 milliGRAM(s) Oral daily  pramipexole 0.25 milliGRAM(s) Oral daily  sodium chloride 0.9%. 1000 milliLiter(s) (75 mL/Hr) IV Continuous <Continuous>    MEDICATIONS  (PRN):  acetaminophen   IVPB .. 1000 milliGRAM(s) IV Intermittent every 6 hours PRN Mild Pain (1 - 3)  oxyCODONE    IR 2.5 milliGRAM(s) Oral every 4 hours PRN Moderate Pain (4 - 6)  oxyCODONE    IR 5 milliGRAM(s) Oral every 4 hours PRN Severe Pain (7 - 10)      Allergies    No Known Allergies    Intolerances        Vital Signs Last 24 Hrs  T(C): 36.7 (24 May 2023 06:44), Max: 36.9 (24 May 2023 00:00)  T(F): 98.1 (24 May 2023 06:44), Max: 98.4 (24 May 2023 00:00)  HR: 62 (24 May 2023 06:44) (60 - 62)  BP: 112/72 (24 May 2023 06:44) (112/72 - 126/76)  BP(mean): --  RR: 18 (24 May 2023 06:44) (18 - 18)  SpO2: 97% (24 May 2023 06:44) (96% - 97%)    Parameters below as of 24 May 2023 06:44  Patient On (Oxygen Delivery Method): room air        I&O's Summary      ON EXAM:    General: NAD, awake and alert  HEENT: Mucous membranes are moist, anicteric  Lungs: Non-labored, breath sounds are clear bilaterally, No wheezing, rales or rhonchi  Cardiovascular: S1 and S2, 2/6 systolic murmur RUSB, no rubs or gallops  Gastrointestinal: soft, nontender   Lymph: No peripheral edema  Skin: No rashes or ulcers  Psych:  Mood & affect appropriate    LABS: All Labs Reviewed:                        10.6   6.29  )-----------( 198      ( 24 May 2023 01:38 )             35.0                         10.1   6.51  )-----------( 191      ( 23 May 2023 06:54 )             31.9                         10.4   5.29  )-----------( 173      ( 23 May 2023 00:45 )             33.3     24 May 2023 01:38    138    |  108    |  28     ----------------------------<  82     4.3     |  18     |  2.56   23 May 2023 01:11    140    |  111    |  25     ----------------------------<  92     4.5     |  19     |  2.47   22 May 2023 17:10    140    |  109    |  27     ----------------------------<  83     4.2     |  18     |  2.66     Ca    9.0        24 May 2023 01:38  Ca    8.3        23 May 2023 01:11  Ca    9.4        22 May 2023 17:10  Phos  3.1       24 May 2023 01:38  Phos  3.6       23 May 2023 01:11  Mg     1.7       24 May 2023 01:38  Mg     1.7       23 May 2023 01:11  Mg     1.9       22 May 2023 17:10    TPro  7.0    /  Alb  4.0    /  TBili  0.3    /  DBili  x      /  AST  20     /  ALT  10     /  AlkPhos  125    22 May 2023 17:10    PT/INR - ( 24 May 2023 01:38 )   PT: 13.6 sec;   INR: 1.17 ratio         PTT - ( 24 May 2023 07:45 )  PTT:59.2 sec      Blood Culture:       
[facility]   [phone]  RENAL FOLLOW UP NOTE  --------------------------------------------------------------------------------  Chief Complaint:    24 hour events/subjective:    seen earlier states has incision site pain     PAST HISTORY  --------------------------------------------------------------------------------  No significant changes to PMH, PSH, FHx, SHx, unless otherwise noted    ALLERGIES & MEDICATIONS  --------------------------------------------------------------------------------  Allergies    No Known Allergies    Intolerances      Standing Inpatient Medications  acetaminophen     Tablet .. 650 milliGRAM(s) Oral every 6 hours  amLODIPine   Tablet 10 milliGRAM(s) Oral daily  aspirin  chewable 81 milliGRAM(s) Oral daily  atorvastatin 40 milliGRAM(s) Oral at bedtime  buPROPion XL (24-Hour) . 300 milliGRAM(s) Oral daily  gabapentin 300 milliGRAM(s) Oral at bedtime  metoprolol succinate ER 75 milliGRAM(s) Oral daily  polyethylene glycol 3350 17 Gram(s) Oral daily  pramipexole 0.25 milliGRAM(s) Oral daily  rivaroxaban 2.5 milliGRAM(s) Oral two times a day  senna 2 Tablet(s) Oral at bedtime    PRN Inpatient Medications  oxyCODONE    IR 2.5 milliGRAM(s) Oral every 4 hours PRN  oxyCODONE    IR 5 milliGRAM(s) Oral every 4 hours PRN      REVIEW OF SYSTEMS  --------------------------------------------------------------------------------    Gen: denies  fevers/chills,  CVS: denies chest pain/palpitations  Resp: denies SOB/Cough  GI: Denies N/V/Abd pain  : Denies dysuria/oliguria/hematuria      VITALS/PHYSICAL EXAM  --------------------------------------------------------------------------------  T(C): 36.8 (05-27-23 @ 13:33), Max: 37.2 (05-26-23 @ 17:29)  HR: 65 (05-27-23 @ 13:33) (61 - 68)  BP: 103/54 (05-27-23 @ 13:33) (103/54 - 126/70)  RR: 18 (05-27-23 @ 13:33) (17 - 18)  SpO2: 92% (05-27-23 @ 13:33) (92% - 95%)  Wt(kg): --        05-26-23 @ 07:01  -  05-27-23 @ 07:00  --------------------------------------------------------  IN: 1000 mL / OUT: 480 mL / NET: 520 mL    05-27-23 @ 07:01  -  05-27-23 @ 17:20  --------------------------------------------------------  IN: 480 mL / OUT: 10 mL / NET: 470 mL      Physical Exam:  	    Gen: Non toxic comfortable appearing   	Pulm: No JVD. decrease bs  no rales or ronchi or wheezing  	CV: No JVD. RRR, +systolic murmur, no rub  	Abd: +BS, soft, nontender/nondistended  	: No suprapubic tenderness  	UE: Warm, no cyanosis  no clubbing,  no edema;   	LE: Warm, no cyanosis  no clubbing, no edema incision site dressing right femoral   	Neuro: alert and oriented. speech coherent       LABS/STUDIES  --------------------------------------------------------------------------------              8.2    8.94  >-----------<  149      [05-26-23 @ 06:45]              26.6     140  |  110  |  29  ----------------------------<  92      [05-26-23 @ 06:45]  4.1   |  20  |  2.47        Ca     8.1     [05-26-23 @ 06:45]      Mg     1.9     [05-26-23 @ 06:45]      Phos  2.8     [05-26-23 @ 06:45]            Creatinine Trend:  SCr 2.47 [05-26 @ 06:45]  SCr 2.25 [05-25 @ 09:06]  SCr 2.14 [05-25 @ 07:24]  SCr 2.44 [05-25 @ 04:12]  SCr 2.37 [05-24 @ 23:38]              Urinalysis - [05-25-23 @ 09:21]      Color Colorless / Appearance Clear / SG 1.017 / pH 6.0      Gluc Negative / Ketone Negative  / Bili Negative / Urobili Negative       Blood Trace / Protein Trace / Leuk Est Moderate / Nitrite Negative      RBC 6 / WBC 16 / Hyaline 1 / Gran  / Sq Epi  / Non Sq Epi  / Bacteria Negative    Urine Creatinine 57      [05-25-23 @ 10:06]  Urine Protein 12      [05-25-23 @ 10:06]    Iron 43, TIBC 209, %sat 20      [05-25-23 @ 07:24]  Ferritin 68      [05-25-23 @ 07:24]  PTH -- (Ca 7.5)      [05-25-23 @ 07:24]   88  HbA1c 5.0      [05-28-19 @ 22:28]    KIRAN: titer Negative, pattern --      [05-24-23 @ 01:38]  C3 Complement 111      [05-25-23 @ 07:24]  C4 Complement 26      [05-24-23 @ 01:38]      
[facility]   [phone]  RENAL FOLLOW UP NOTE  --------------------------------------------------------------------------------  Chief Complaint:    24 hour events/subjective:    seen earlier  c/o pain in leg       PAST HISTORY  --------------------------------------------------------------------------------  No significant changes to PMH, PSH, FHx, SHx, unless otherwise noted    ALLERGIES & MEDICATIONS  --------------------------------------------------------------------------------  Allergies    No Known Allergies    Intolerances      Standing Inpatient Medications  acetaminophen     Tablet .. 650 milliGRAM(s) Oral every 6 hours  amLODIPine   Tablet 10 milliGRAM(s) Oral daily  aspirin  chewable 81 milliGRAM(s) Oral daily  atorvastatin 40 milliGRAM(s) Oral at bedtime  buPROPion XL (24-Hour) . 300 milliGRAM(s) Oral daily  gabapentin 300 milliGRAM(s) Oral at bedtime  metoprolol succinate ER 75 milliGRAM(s) Oral daily  pramipexole 0.25 milliGRAM(s) Oral daily  rivaroxaban 2.5 milliGRAM(s) Oral two times a day  sodium chloride 0.9%. 1000 milliLiter(s) IV Continuous <Continuous>    PRN Inpatient Medications  oxyCODONE    IR 2.5 milliGRAM(s) Oral every 4 hours PRN  oxyCODONE    IR 5 milliGRAM(s) Oral every 4 hours PRN      REVIEW OF SYSTEMS  --------------------------------------------------------------------------------    Gen: denies  fevers/chills,  CVS: denies chest pain/palpitations  Resp: denies SOB/Cough  GI: Denies N/V/Abd pain  : Denies dysuria    VITALS/PHYSICAL EXAM  --------------------------------------------------------------------------------  T(C): 36.7 (05-25-23 @ 21:22), Max: 36.8 (05-25-23 @ 10:38)  HR: 64 (05-25-23 @ 21:22) (52 - 75)  BP: 114/60 (05-25-23 @ 21:22) (107/56 - 126/66)  RR: 18 (05-25-23 @ 21:22) (15 - 18)  SpO2: 94% (05-25-23 @ 21:22) (94% - 99%)  Wt(kg): --  Height (cm): 162.6 (05-24-23 @ 16:49)  Weight (kg): 58.6 (05-24-23 @ 16:49)  BMI (kg/m2): 22.2 (05-24-23 @ 16:49)  BSA (m2): 1.62 (05-24-23 @ 16:49)      05-24-23 @ 07:01  -  05-25-23 @ 07:00  --------------------------------------------------------  IN: 965 mL / OUT: 785 mL / NET: 180 mL    05-25-23 @ 07:01  -  05-25-23 @ 21:29  --------------------------------------------------------  IN: 660 mL / OUT: 585 mL / NET: 75 mL      Physical Exam:  	    	Gen: Non toxic comfortable appearing   	Pulm: No JVD. decrease bs  no rales or ronchi or wheezing  	CV: No JVD. RRR, +systolic murmur, no rub  	Abd: +BS, soft, nontender/nondistended  	: No suprapubic tenderness  	UE: Warm, no cyanosis  no clubbing,  no edema;   	LE: Warm, no cyanosis  no clubbing, no edema  	Neuro: alert and oriented. speech coherent       LABS/STUDIES  --------------------------------------------------------------------------------              9.6    8.64  >-----------<  167      [05-25-23 @ 07:24]              31.4     140  |  110  |  27  ----------------------------<  113      [05-25-23 @ 09:06]  4.2   |  18  |  2.25        Ca     7.5     [05-25-23 @ 09:06]      Mg     1.6     [05-25-23 @ 09:06]      Phos  4.0     [05-25-23 @ 09:06]    TPro  5.3  /  Alb  x   /  TBili  x   /  DBili  x   /  AST  x   /  ALT  x   /  AlkPhos  x   [05-25-23 @ 07:24]    PT/INR: PT 13.6 , INR 1.17       [05-24-23 @ 01:38]  PTT: 59.2       [05-24-23 @ 07:45]      Creatinine Trend:  SCr 2.25 [05-25 @ 09:06]  SCr 2.14 [05-25 @ 07:24]  SCr 2.44 [05-25 @ 04:12]  SCr 2.37 [05-24 @ 23:38]  SCr 2.56 [05-24 @ 01:38]              Urinalysis - [05-25-23 @ 09:21]      Color Colorless / Appearance Clear / SG 1.017 / pH 6.0      Gluc Negative / Ketone Negative  / Bili Negative / Urobili Negative       Blood Trace / Protein Trace / Leuk Est Moderate / Nitrite Negative      RBC 6 / WBC 16 / Hyaline 1 / Gran  / Sq Epi  / Non Sq Epi  / Bacteria Negative    Urine Creatinine 57      [05-25-23 @ 10:06]  Urine Protein 12      [05-25-23 @ 10:06]    Iron 43, TIBC 209, %sat 20      [05-25-23 @ 07:24]  Ferritin 68      [05-25-23 @ 07:24]  PTH -- (Ca 7.5)      [05-25-23 @ 07:24]   88  HbA1c 5.0      [05-28-19 @ 22:28]    KIRAN: titer Negative, pattern --      [05-24-23 @ 01:38]  C3 Complement 111      [05-25-23 @ 07:24]  C4 Complement 26      [05-24-23 @ 01:38]      
MR#[id number]  PATIENT NAME:AVNI CHAPMAN    DATE OF SERVICE: 05-27-23 @ 08:22  Patient was seen and examined by me  on    05-27-23 @ 08:22 .Interim events noted.Consultant notes ,Labs,Telemetry reviewed by me       HOSPITAL COURSE: HPI:  67F PMHx of hypertension, hyperlipidemia, GERD, chronic kidney disease, endocarditis, peripheral vascular disease, hx of right fem endarterectomy (2017), right to left fem-fem bypass (Aug 2021 w/ Dr. Green), right fem-pop bypass (May 2022 w/ Dr. Green). on Eliquis. Sent from Dr. Green's clinic with c/o 2-3days of colder R foot with crampy pain and tenderness. Pt reports dangling her legs improves the pain and walking makes it worse. Smokes 0.25 PPD. Pt denies nausea, vomiting, CP, SOB, distension, constipation, dysuria.     INTERIM EVENTS:Patient seen at bedside ,interim events noted.Awake still has mild surgical pain no dyspnea      PMH -reviewed admission note, no change since admission  HEART FAILURE: Acute[ ]Chronic[ ] Systolic[ ] Diastolic[ ] Combined Systolic and Diastolic[ ]  CAD[ ] CABG[ ] PCI[ ]  DEVICES[ ] PPM[ ] ICD[ ] ILR[ ]  ATRIAL FIBRILLATION[ ] Paroxysmal[ ] Permanent[ ] CHADS2-[  ]  INA[ ] CKD1[ ] CKD2[ ] CKD3[ ] CKD4[ ] ESRD[ ]  COPD[ ] HTN[ ]   DM[ ] Type1[ ] Type 2[ ]   CVA[ ] Paresis[ ]    AMBULATION: Assisted[ ] Cane/walker[ ] Independent[ ]    MEDICATIONS  (STANDING):  acetaminophen     Tablet .. 650 milliGRAM(s) Oral every 6 hours  amLODIPine   Tablet 10 milliGRAM(s) Oral daily  aspirin  chewable 81 milliGRAM(s) Oral daily  atorvastatin 40 milliGRAM(s) Oral at bedtime  buPROPion XL (24-Hour) . 300 milliGRAM(s) Oral daily  gabapentin 300 milliGRAM(s) Oral at bedtime  metoprolol succinate ER 75 milliGRAM(s) Oral daily  pramipexole 0.25 milliGRAM(s) Oral daily  rivaroxaban 2.5 milliGRAM(s) Oral two times a day    MEDICATIONS  (PRN):  oxyCODONE    IR 2.5 milliGRAM(s) Oral every 4 hours PRN Moderate Pain (4 - 6)  oxyCODONE    IR 5 milliGRAM(s) Oral every 4 hours PRN Severe Pain (7 - 10)            REVIEW OF SYSTEMS:  Constitutional: [ ] fever, [ ]weight loss,  [ ]fatigue [ ]weight gain  Eyes: [ ] visual changes  Respiratory: [ ]shortness of breath;  [ ] cough, [ ]wheezing, [ ]chills, [ ]hemoptysis  Cardiovascular: [ ] chest pain, [ ]palpitations, [ ]dizziness,  [ ]leg swelling[ ]orthopnea[ ]PND  Gastrointestinal: [ ] abdominal pain, [ ]nausea, [ ]vomiting,  [ ]diarrhea [ ]Constipation [ ]Melena  Genitourinary: [ ] dysuria, [ ] hematuria [ ][place]  Neurologic: [ ] headaches [ ] tremors[ ]weakness [ ]Paralysis Right[ ] Left[ ]  Skin: [ ] itching, [ ]burning, [ ] rashes  Endocrine: [ ] heat or cold intolerance  Musculoskeletal: [ ] joint pain or swelling; [ ] muscle, back, or extremity pain  Psychiatric: [ ] depression, [ ]anxiety, [ ]mood swings, or [ ]difficulty sleeping  Hematologic: [ ] easy bruising, [ ] bleeding gums    [ ] All remaining systems negative except as per above.   [ ]Unable to obtain.  [x] No change in ROS since admission      Vital Signs Last 24 Hrs  T(C): 36.7 (27 May 2023 05:00), Max: 37.2 (26 May 2023 17:29)  T(F): 98.1 (27 May 2023 05:00), Max: 99 (26 May 2023 17:29)  HR: 63 (27 May 2023 05:00) (57 - 68)  BP: 126/70 (27 May 2023 05:00) (107/59 - 126/70)  BP(mean): --  RR: 17 (27 May 2023 05:00) (17 - 18)  SpO2: 93% (27 May 2023 05:00) (92% - 95%)    Parameters below as of 27 May 2023 05:00  Patient On (Oxygen Delivery Method): room air      I&O's Summary    26 May 2023 07:01  -  27 May 2023 07:00  --------------------------------------------------------  IN: 1000 mL / OUT: 480 mL / NET: 520 mL        PHYSICAL EXAM:  General: No acute distress BMI-24  HEENT: EOMI, PERRL  Neck: Supple, [ ] JVD  Lungs: Equal air entry bilaterally; [ ] rales [ ] wheezing [ ] rhonchi  Heart: Regular rate and rhythm; [x ] murmur   2/6 [ x] systolic [ ] diastolic [ ] radiation[ ] rubs [ ]  gallops  Abdomen: Nontender, bowel sounds present  Extremities: No clubbing, cyanosis, [x ] edema [ ]Pulses  equal and intact  Nervous system:  Alert & Oriented X3, no focal deficits  Psychiatric: Normal affect  Skin: No rashes or lesions    LABS:  05-26    140  |  110<H>  |  29<H>  ----------------------------<  92  4.1   |  20<L>  |  2.47<H>    Ca    8.1<L>      26 May 2023 06:45  Phos  2.8     05-26  Mg     1.9     05-26      Creatinine Trend: 2.47<--, 2.25<--, 2.14<--, 2.44<--, 2.37<--, 2.56<--                        8.2    8.94  )-----------( 149      ( 26 May 2023 06:45 )             26.6               
[facility] - Regino Guidry, Roshan Paris Savella  Office Number:  [phone]    Patient resting comfortably in bed in NAD.  Laying flat with no respiratory distress.  No complaints of chest pain, dyspnea, palpitations, PND, or orthopnea.    ROS: negative unless otherwise mentioned.    Telemetry:  off    MEDICATIONS  (STANDING):  acetaminophen     Tablet .. 650 milliGRAM(s) Oral every 6 hours  amLODIPine   Tablet 10 milliGRAM(s) Oral daily  aspirin  chewable 81 milliGRAM(s) Oral daily  atorvastatin 40 milliGRAM(s) Oral at bedtime  buPROPion XL (24-Hour) . 300 milliGRAM(s) Oral daily  gabapentin 300 milliGRAM(s) Oral at bedtime  metoprolol succinate ER 75 milliGRAM(s) Oral daily  pramipexole 0.25 milliGRAM(s) Oral daily  rivaroxaban 2.5 milliGRAM(s) Oral two times a day  sodium chloride 0.9%. 1000 milliLiter(s) (75 mL/Hr) IV Continuous <Continuous>    MEDICATIONS  (PRN):  oxyCODONE    IR 2.5 milliGRAM(s) Oral every 4 hours PRN Moderate Pain (4 - 6)  oxyCODONE    IR 5 milliGRAM(s) Oral every 4 hours PRN Severe Pain (7 - 10)      Allergies    No Known Allergies    Intolerances        Vital Signs Last 24 Hrs  T(C): 36.5 (26 May 2023 05:04), Max: 36.8 (25 May 2023 10:38)  T(F): 97.7 (26 May 2023 05:04), Max: 98.2 (25 May 2023 10:38)  HR: 60 (26 May 2023 05:04) (60 - 75)  BP: 128/69 (26 May 2023 05:04) (107/68 - 128/69)  BP(mean): --  RR: 18 (26 May 2023 05:04) (18 - 18)  SpO2: 94% (26 May 2023 05:04) (94% - 95%)    Parameters below as of 26 May 2023 05:04  Patient On (Oxygen Delivery Method): room air        I&O's Summary    25 May 2023 07:01  -  26 May 2023 07:00  --------------------------------------------------------  IN: 1680 mL / OUT: 585 mL / NET: 1095 mL        ON EXAM:      General: NAD, awake and alert  HEENT: Mucous membranes are moist, anicteric  Lungs: Non-labored, breath sounds are clear bilaterally, No wheezing, rales or rhonchi  Cardiovascular: S1 and S2, 2/6 systolic murmur RUSB, no rubs or gallops  Gastrointestinal: soft, nontender   Lymph: No peripheral edema  Skin: No rashes or ulcers  Psych:  Mood & affect appropriate    LABS: All Labs Reviewed:                        8.2    8.94  )-----------( 149      ( 26 May 2023 06:45 )             26.6                         9.6    8.64  )-----------( 167      ( 25 May 2023 07:24 )             31.4                         9.6    9.94  )-----------( 169      ( 25 May 2023 04:12 )             30.8     26 May 2023 06:45    140    |  110    |  29     ----------------------------<  92     4.1     |  20     |  2.47   25 May 2023 09:06    140    |  110    |  27     ----------------------------<  113    4.2     |  18     |  2.25   25 May 2023 07:24    141    |  110    |  26     ----------------------------<  117    3.3     |  16     |  2.14     Ca    8.1        26 May 2023 06:45  Ca    7.5        25 May 2023 09:06  Ca    7.2        25 May 2023 07:24  Phos  2.8       26 May 2023 06:45  Phos  4.0       25 May 2023 09:06  Phos  5.2       25 May 2023 04:12  Mg     1.9       26 May 2023 06:45  Mg     1.6       25 May 2023 09:06  Mg     1.9       25 May 2023 04:12    TPro  5.3    /  Alb  x      /  TBili  x      /  DBili  x      /  AST  x      /  ALT  x      /  AlkPhos  x      25 May 2023 07:24  TPro  4.2    /  Alb  x      /  TBili  x      /  DBili  x      /  AST  x      /  ALT  x      /  AlkPhos  x      24 May 2023 01:38          Blood Culture:       
[facility]   [phone]  RENAL FOLLOW UP NOTE  --------------------------------------------------------------------------------  Chief Complaint:    24 hour events/subjective:    seen earlier pain in RLE present   no shortness of breath     PAST HISTORY  --------------------------------------------------------------------------------  No significant changes to PMH, PSH, FHx, SHx, unless otherwise noted    ALLERGIES & MEDICATIONS  --------------------------------------------------------------------------------  Allergies    No Known Allergies    Intolerances      Standing Inpatient Medications  acetaminophen     Tablet .. 650 milliGRAM(s) Oral every 6 hours  amLODIPine   Tablet 10 milliGRAM(s) Oral daily  aspirin  chewable 81 milliGRAM(s) Oral daily  atorvastatin 40 milliGRAM(s) Oral at bedtime  buPROPion XL (24-Hour) . 300 milliGRAM(s) Oral daily  gabapentin 300 milliGRAM(s) Oral at bedtime  metoprolol succinate ER 75 milliGRAM(s) Oral daily  polyethylene glycol 3350 17 Gram(s) Oral daily  pramipexole 0.25 milliGRAM(s) Oral daily  rivaroxaban 2.5 milliGRAM(s) Oral two times a day  senna 2 Tablet(s) Oral at bedtime    PRN Inpatient Medications  oxyCODONE    IR 2.5 milliGRAM(s) Oral every 4 hours PRN  oxyCODONE    IR 5 milliGRAM(s) Oral every 4 hours PRN      REVIEW OF SYSTEMS  --------------------------------------------------------------------------------    Gen: denies  fevers/chills,  CVS: denies chest pain/palpitations  Resp: denies SOB/Cough  GI: Denies N/V/Abd pain  : Denies dysuria/oliguria/hematuria    All other systems were reviewed and are negative, except as noted.    VITALS/PHYSICAL EXAM  --------------------------------------------------------------------------------  T(C): 36.6 (05-28-23 @ 10:02), Max: 37.1 (05-27-23 @ 21:23)  HR: 64 (05-28-23 @ 10:02) (55 - 64)  BP: 123/65 (05-28-23 @ 10:02) (115/64 - 125/72)  RR: 18 (05-28-23 @ 10:02) (18 - 19)  SpO2: 96% (05-28-23 @ 10:02) (95% - 96%)  Wt(kg): --        05-27-23 @ 07:01  -  05-28-23 @ 07:00  --------------------------------------------------------  IN: 980 mL / OUT: 23 mL / NET: 957 mL      Physical Exam:  	    Gen: Non toxic comfortable appearing   	Pulm: No JVD. decrease bs  no rales or ronchi or wheezing  	CV: No JVD. RRR, +systolic murmur, no rub  	Abd: +BS, soft, nontender/nondistended  	: No suprapubic tenderness  	UE: Warm, no cyanosis  no clubbing,  no edema;   	LE: Warm, no cyanosis  no clubbing, no edema incision site dressing right femoral   	Neuro: alert and oriented. speech coherent     LABS/STUDIES  --------------------------------------------------------------------------------              8.5    6.79  >-----------<  147      [05-28-23 @ 07:03]              28.2     140  |  109  |  23  ----------------------------<  83      [05-28-23 @ 07:01]  3.8   |  19  |  2.49        Ca     8.4     [05-28-23 @ 07:01]      Mg     1.9     [05-28-23 @ 07:01]      Phos  3.9     [05-28-23 @ 07:01]            Creatinine Trend:  SCr 2.49 [05-28 @ 07:01]  SCr 2.47 [05-26 @ 06:45]  SCr 2.25 [05-25 @ 09:06]  SCr 2.14 [05-25 @ 07:24]  SCr 2.44 [05-25 @ 04:12]              Urinalysis - [05-25-23 @ 09:21]      Color Colorless / Appearance Clear / SG 1.017 / pH 6.0      Gluc Negative / Ketone Negative  / Bili Negative / Urobili Negative       Blood Trace / Protein Trace / Leuk Est Moderate / Nitrite Negative      RBC 6 / WBC 16 / Hyaline 1 / Gran  / Sq Epi  / Non Sq Epi  / Bacteria Negative    Urine Creatinine 57      [05-25-23 @ 10:06]  Urine Protein 12      [05-25-23 @ 10:06]    Iron 43, TIBC 209, %sat 20      [05-25-23 @ 07:24]  Ferritin 68      [05-25-23 @ 07:24]  PTH -- (Ca 7.5)      [05-25-23 @ 07:24]   88  HbA1c 5.0      [05-28-19 @ 22:28]    KIRAN: titer Negative, pattern --      [05-24-23 @ 01:38]  C3 Complement 111      [05-25-23 @ 07:24]  C4 Complement 26      [05-24-23 @ 01:38]  ANCA: cANCA Negative, pANCA Negative, atypical ANCA Indeterminate Method interference due to KIRAN Fluorescence      [05-25-23 @ 07:24]      
SUBJECTIVE / OVERNIGHT EVENTS:      Patient seen and examined at bedside. No events noted overnight. Resting comfortably in bed. Remains on Heparin gtt. Planned for OR today    --------------------------------------------------------------------------------------------  LABS:                        10.6   6.29  )-----------( 198      ( 24 May 2023 01:38 )             35.0     05-24    138  |  108  |  28<H>  ----------------------------<  82  4.3   |  18<L>  |  2.56<H>    Ca    9.0      24 May 2023 01:38  Phos  3.1     05-24  Mg     1.7     05-24    TPro  7.0  /  Alb  4.0  /  TBili  0.3  /  DBili  x   /  AST  20  /  ALT  10  /  AlkPhos  125<H>  05-22    PT/INR - ( 24 May 2023 01:38 )   PT: 13.6 sec;   INR: 1.17 ratio         PTT - ( 24 May 2023 07:45 )  PTT:59.2 sec  CAPILLARY BLOOD GLUCOSE                RADIOLOGY & ADDITIONAL TESTS:  < from: CT Angio Abd Aorta w/run-off w/ IV Cont (05.23.23 @ 11:47) >  IMPRESSION:  Severe atherosclerotic disease of the aortoiliac tree and its branches as   above. Patent bilateral common iliac and external iliac artery stents.    Occluded femorofemoral bypass graft.    Severe atherosclerotic disease of the right lower extremity with   occlusion of the common femoral artery and popliteal artery as above. Mid   SFA to below the knee popliteal bypass graft is patent. Patent   three-vessel runoff in the right calf.    Severe atherosclerotic disease of the left lower extremity with occlusion   of the common femoral artery and mild to moderate stenoses of the distal   SFA and popliteal artery. Patent three-vessel runoff in the left calf.      --- End of Report ---    < end of copied text >  < from: US Kidney and Bladder (05.23.23 @ 21:00) >  IMPRESSION:  Mildly increased echogenicity of the bilateral kidneys consistent with   medical renal disease.    No hydronephrosis.    Bilateral renal cysts.    --- End of Report ---    < end of copied text >      Imaging Personally Reviewed:  [x] YES  [ ] NO    Consultant(s) Notes Reviewed:  [x] YES  [ ] NO    MEDICATIONS  (STANDING):  amLODIPine   Tablet 10 milliGRAM(s) Oral every 24 hours  atorvastatin 40 milliGRAM(s) Oral at bedtime  buPROPion XL (24-Hour) . 300 milliGRAM(s) Oral daily  heparin  Infusion 900 Unit(s)/Hr (7 mL/Hr) IV Continuous <Continuous>  metoprolol succinate ER 75 milliGRAM(s) Oral daily  pramipexole 0.25 milliGRAM(s) Oral daily  sodium chloride 0.9%. 1000 milliLiter(s) (75 mL/Hr) IV Continuous <Continuous>    MEDICATIONS  (PRN):  acetaminophen   IVPB .. 1000 milliGRAM(s) IV Intermittent every 6 hours PRN Mild Pain (1 - 3)  oxyCODONE    IR 2.5 milliGRAM(s) Oral every 4 hours PRN Moderate Pain (4 - 6)  oxyCODONE    IR 5 milliGRAM(s) Oral every 4 hours PRN Severe Pain (7 - 10)      Care Discussed with Consultants/Other Providers [x] YES  [ ] NO    Vital Signs Last 24 Hrs  T(C): 36.7 (24 May 2023 06:44), Max: 36.9 (24 May 2023 00:00)  T(F): 98.1 (24 May 2023 06:44), Max: 98.4 (24 May 2023 00:00)  HR: 62 (24 May 2023 06:44) (60 - 62)  BP: 112/72 (24 May 2023 06:44) (112/72 - 126/76)  BP(mean): --  RR: 18 (24 May 2023 06:44) (18 - 18)  SpO2: 97% (24 May 2023 06:44) (96% - 97%)    Parameters below as of 24 May 2023 06:44  Patient On (Oxygen Delivery Method): room air      I&O's Summary        PHYSICAL EXAM:  GENERAL: NAD, Comfortable  HEAD:  Atraumatic, Normocephalic  EYES: EOMI, PERRLA, conjunctiva and sclera clear  NECK: Supple, No JVD  CHEST/LUNG: Clear to auscultation bilaterally; No wheeze  HEART: Regular rate and rhythm; No murmurs, rubs, or gallops  ABDOMEN: Soft, Nontender, Nondistended; Bowel sounds present  Neuro: AAOx3, no focal deficit, 5/5 b/l extremities  EXTREMITIES:  2+ Peripheral Pulses, No clubbing, cyanosis, or edema. Right calf pain  SKIN: No rashes or lesions

## 2023-05-28 NOTE — DISCHARGE NOTE NURSING/CASE MANAGEMENT/SOCIAL WORK - PATIENT PORTAL LINK FT
You can access the FollowMyHealth Patient Portal offered by [facility] by registering at the following website: [email or web address]. By joining [facility]’s FollowMyHealth portal, you will also be able to view your health information using other applications (apps) compatible with our system.

## 2023-05-28 NOTE — PROGRESS NOTE ADULT - PROVIDER SPECIALTY LIST ADULT
Cardiology
Nephrology
Vascular Surgery
Nephrology
Vascular Surgery
Cardiology
Internal Medicine
Nephrology
Cardiology
Cardiology
Nephrology
Vascular Surgery
Vascular Surgery
Internal Medicine
Internal Medicine

## 2023-05-28 NOTE — PROGRESS NOTE ADULT - REASON FOR ADMISSION
colder R foot with crampy pain and tenderness

## 2023-05-29 LAB
% ALBUMIN: 55.9 % — SIGNIFICANT CHANGE UP
% ALBUMIN: 56 % — SIGNIFICANT CHANGE UP
% ALPHA 1: 5.6 % — SIGNIFICANT CHANGE UP
% ALPHA 1: 6.4 % — SIGNIFICANT CHANGE UP
% ALPHA 2: 13.8 % — SIGNIFICANT CHANGE UP
% ALPHA 2: 14.7 % — SIGNIFICANT CHANGE UP
% BETA: 11.4 % — SIGNIFICANT CHANGE UP
% BETA: 11.6 % — SIGNIFICANT CHANGE UP
% GAMMA: 12.2 % — SIGNIFICANT CHANGE UP
% GAMMA: 12.4 % — SIGNIFICANT CHANGE UP
ALBUMIN SERPL ELPH-MCNC: 2.3 G/DL — LOW (ref 3.6–5.5)
ALBUMIN SERPL ELPH-MCNC: 3 G/DL — LOW (ref 3.6–5.5)
ALBUMIN/GLOB SERPL ELPH: 1.2 RATIO — SIGNIFICANT CHANGE UP
ALBUMIN/GLOB SERPL ELPH: 1.3 RATIO — SIGNIFICANT CHANGE UP
ALPHA1 GLOB SERPL ELPH-MCNC: 0.2 G/DL — SIGNIFICANT CHANGE UP (ref 0.1–0.4)
ALPHA1 GLOB SERPL ELPH-MCNC: 0.3 G/DL — SIGNIFICANT CHANGE UP (ref 0.1–0.4)
ALPHA2 GLOB SERPL ELPH-MCNC: 0.6 G/DL — SIGNIFICANT CHANGE UP (ref 0.5–1)
ALPHA2 GLOB SERPL ELPH-MCNC: 0.7 G/DL — SIGNIFICANT CHANGE UP (ref 0.5–1)
B-GLOBULIN SERPL ELPH-MCNC: 0.5 G/DL — SIGNIFICANT CHANGE UP (ref 0.5–1)
B-GLOBULIN SERPL ELPH-MCNC: 0.6 G/DL — SIGNIFICANT CHANGE UP (ref 0.5–1)
GAMMA GLOBULIN: 0.5 G/DL — LOW (ref 0.6–1.6)
GAMMA GLOBULIN: 0.6 G/DL — SIGNIFICANT CHANGE UP (ref 0.6–1.6)
IGA FLD-MCNC: 104 MG/DL — SIGNIFICANT CHANGE UP (ref 84–499)
IGG FLD-MCNC: 806 MG/DL — SIGNIFICANT CHANGE UP (ref 610–1660)
IGM SERPL-MCNC: 92 MG/DL — SIGNIFICANT CHANGE UP (ref 35–242)
INTERPRETATION SERPL IFE-IMP: SIGNIFICANT CHANGE UP
KAPPA LC SER QL IFE: 5.45 MG/DL — HIGH (ref 0.33–1.94)
KAPPA/LAMBDA FREE LIGHT CHAIN RATIO, SERUM: 1.64 RATIO — SIGNIFICANT CHANGE UP (ref 0.26–1.65)
LAMBDA LC SER QL IFE: 3.32 MG/DL — HIGH (ref 0.57–2.63)
PROT PATTERN SERPL ELPH-IMP: SIGNIFICANT CHANGE UP
PROT PATTERN SERPL ELPH-IMP: SIGNIFICANT CHANGE UP

## 2023-06-06 ENCOUNTER — APPOINTMENT (OUTPATIENT)
Dept: VASCULAR SURGERY | Facility: CLINIC | Age: 68
End: 2023-06-06
Payer: MEDICARE

## 2023-06-06 PROCEDURE — 99024 POSTOP FOLLOW-UP VISIT: CPT

## 2023-06-06 RX ORDER — CEPHALEXIN 500 MG/1
500 CAPSULE ORAL 3 TIMES DAILY
Qty: 21 | Refills: 0 | Status: ACTIVE | COMMUNITY
Start: 2023-06-06 | End: 1900-01-01

## 2023-06-06 NOTE — REASON FOR VISIT
[de-identified] : Right iliofemoral bypass [de-identified] : Patient is status post right iliofemoral bypass for right femoral artery occlusion.  Postoperatively she is doing well.  Additionally, at the time of her admission patient with known carotid stenosis.  She underwent a CTA which showed a high-grade right internal carotid artery stenosis.

## 2023-06-06 NOTE — DISCUSSION/SUMMARY
[FreeTextEntry1] : Patient currently with a palpable pulse in the right foot.\par Staples to be removed.  There is mild erythema so I will prescribe Keflex.  Patient will follow-up in 1 month.  Most likely she will be scheduled for carotid endarterectomy in August.

## 2023-06-30 RX ORDER — APIXABAN 2.5 MG/1
2.5 TABLET, FILM COATED ORAL
Qty: 180 | Refills: 2 | Status: ACTIVE | COMMUNITY
Start: 2022-07-11 | End: 1900-01-01

## 2023-07-18 ENCOUNTER — APPOINTMENT (OUTPATIENT)
Dept: VASCULAR SURGERY | Facility: CLINIC | Age: 68
End: 2023-07-18
Payer: MEDICARE

## 2023-07-18 PROCEDURE — 99024 POSTOP FOLLOW-UP VISIT: CPT

## 2023-07-18 PROCEDURE — 93926 LOWER EXTREMITY STUDY: CPT

## 2023-07-18 NOTE — DISCUSSION/SUMMARY
[FreeTextEntry1] : Currently doing well with excellent pulse.  In the office she underwent a duplex study which shows a patent bypass graft.  Currently patient is doing well and she will undergo carotid endarterectomy in several weeks.

## 2023-07-18 NOTE — REASON FOR VISIT
[de-identified] : Right iliofemoral bypass [de-identified] : Patient is status post right iliofemoral bypass for right femoral artery occlusion.  Postoperatively she is doing well.  Additionally, at the time of her admission patient with known carotid stenosis.  She underwent a CTA which showed a high-grade right internal carotid artery stenosis.

## 2023-07-21 ENCOUNTER — OUTPATIENT (OUTPATIENT)
Dept: OUTPATIENT SERVICES | Facility: HOSPITAL | Age: 68
LOS: 1 days | End: 2023-07-21
Payer: MEDICARE

## 2023-07-21 VITALS
RESPIRATION RATE: 18 BRPM | TEMPERATURE: 98 F | WEIGHT: 132.06 LBS | SYSTOLIC BLOOD PRESSURE: 144 MMHG | OXYGEN SATURATION: 99 % | HEART RATE: 66 BPM | DIASTOLIC BLOOD PRESSURE: 85 MMHG | HEIGHT: 64 IN

## 2023-07-21 DIAGNOSIS — M06.9 RHEUMATOID ARTHRITIS, UNSPECIFIED: Chronic | ICD-10-CM

## 2023-07-21 DIAGNOSIS — Z98.51 TUBAL LIGATION STATUS: Chronic | ICD-10-CM

## 2023-07-21 DIAGNOSIS — Z01.818 ENCOUNTER FOR OTHER PREPROCEDURAL EXAMINATION: ICD-10-CM

## 2023-07-21 DIAGNOSIS — Z29.9 ENCOUNTER FOR PROPHYLACTIC MEASURES, UNSPECIFIED: ICD-10-CM

## 2023-07-21 DIAGNOSIS — Z98.890 OTHER SPECIFIED POSTPROCEDURAL STATES: Chronic | ICD-10-CM

## 2023-07-21 DIAGNOSIS — I73.9 PERIPHERAL VASCULAR DISEASE, UNSPECIFIED: ICD-10-CM

## 2023-07-21 DIAGNOSIS — I25.10 ATHEROSCLEROTIC HEART DISEASE OF NATIVE CORONARY ARTERY WITHOUT ANGINA PECTORIS: ICD-10-CM

## 2023-07-21 DIAGNOSIS — Z90.49 ACQUIRED ABSENCE OF OTHER SPECIFIED PARTS OF DIGESTIVE TRACT: Chronic | ICD-10-CM

## 2023-07-21 DIAGNOSIS — I65.29 OCCLUSION AND STENOSIS OF UNSPECIFIED CAROTID ARTERY: ICD-10-CM

## 2023-07-21 DIAGNOSIS — M19.011 PRIMARY OSTEOARTHRITIS, RIGHT SHOULDER: Chronic | ICD-10-CM

## 2023-07-21 DIAGNOSIS — K27.1 ACUTE PEPTIC ULCER, SITE UNSPECIFIED, WITH PERFORATION: Chronic | ICD-10-CM

## 2023-07-21 DIAGNOSIS — Z95.1 PRESENCE OF AORTOCORONARY BYPASS GRAFT: Chronic | ICD-10-CM

## 2023-07-21 DIAGNOSIS — Z95.2 PRESENCE OF PROSTHETIC HEART VALVE: Chronic | ICD-10-CM

## 2023-07-21 DIAGNOSIS — Z95.828 PRESENCE OF OTHER VASCULAR IMPLANTS AND GRAFTS: Chronic | ICD-10-CM

## 2023-07-21 LAB
ANION GAP SERPL CALC-SCNC: 14 MMOL/L — SIGNIFICANT CHANGE UP (ref 5–17)
BLD GP AB SCN SERPL QL: NEGATIVE — SIGNIFICANT CHANGE UP
BUN SERPL-MCNC: 29 MG/DL — HIGH (ref 7–23)
CALCIUM SERPL-MCNC: 9.1 MG/DL — SIGNIFICANT CHANGE UP (ref 8.4–10.5)
CHLORIDE SERPL-SCNC: 105 MMOL/L — SIGNIFICANT CHANGE UP (ref 96–108)
CO2 SERPL-SCNC: 21 MMOL/L — LOW (ref 22–31)
CREAT SERPL-MCNC: 2.7 MG/DL — HIGH (ref 0.5–1.3)
EGFR: 19 ML/MIN/1.73M2 — LOW
GLUCOSE SERPL-MCNC: 86 MG/DL — SIGNIFICANT CHANGE UP (ref 70–99)
HCT VFR BLD CALC: 33.7 % — LOW (ref 34.5–45)
HGB BLD-MCNC: 10.5 G/DL — LOW (ref 11.5–15.5)
MCHC RBC-ENTMCNC: 28.7 PG — SIGNIFICANT CHANGE UP (ref 27–34)
MCHC RBC-ENTMCNC: 31.2 GM/DL — LOW (ref 32–36)
MCV RBC AUTO: 92.1 FL — SIGNIFICANT CHANGE UP (ref 80–100)
NRBC # BLD: 0 /100 WBCS — SIGNIFICANT CHANGE UP (ref 0–0)
PLATELET # BLD AUTO: 246 K/UL — SIGNIFICANT CHANGE UP (ref 150–400)
POTASSIUM SERPL-MCNC: 4.6 MMOL/L — SIGNIFICANT CHANGE UP (ref 3.5–5.3)
POTASSIUM SERPL-SCNC: 4.6 MMOL/L — SIGNIFICANT CHANGE UP (ref 3.5–5.3)
RBC # BLD: 3.66 M/UL — LOW (ref 3.8–5.2)
RBC # FLD: 14.7 % — HIGH (ref 10.3–14.5)
RH IG SCN BLD-IMP: POSITIVE — SIGNIFICANT CHANGE UP
SODIUM SERPL-SCNC: 140 MMOL/L — SIGNIFICANT CHANGE UP (ref 135–145)
WBC # BLD: 8.48 K/UL — SIGNIFICANT CHANGE UP (ref 3.8–10.5)
WBC # FLD AUTO: 8.48 K/UL — SIGNIFICANT CHANGE UP (ref 3.8–10.5)

## 2023-07-21 PROCEDURE — 80048 BASIC METABOLIC PNL TOTAL CA: CPT

## 2023-07-21 PROCEDURE — 36415 COLL VENOUS BLD VENIPUNCTURE: CPT

## 2023-07-21 PROCEDURE — 86850 RBC ANTIBODY SCREEN: CPT

## 2023-07-21 PROCEDURE — 85027 COMPLETE CBC AUTOMATED: CPT

## 2023-07-21 PROCEDURE — 86900 BLOOD TYPING SEROLOGIC ABO: CPT

## 2023-07-21 PROCEDURE — G0463: CPT

## 2023-07-21 PROCEDURE — 86901 BLOOD TYPING SEROLOGIC RH(D): CPT

## 2023-07-21 RX ORDER — GABAPENTIN 400 MG/1
1 CAPSULE ORAL
Qty: 0 | Refills: 0 | DISCHARGE

## 2023-07-21 RX ORDER — BUPROPION HYDROCHLORIDE 150 MG/1
1 TABLET, EXTENDED RELEASE ORAL
Refills: 0 | DISCHARGE

## 2023-07-21 NOTE — H&P PST ADULT - MUSCULOSKELETAL
neck ROM decreased/decreased ROM/decreased ROM due to pain/normal gait/strength 5/5 bilateral upper extremities details…

## 2023-07-21 NOTE — H&P PST ADULT - CARDIOVASCULAR
details… pig valve no murmur/normal/regular rate and rhythm/S1 S2 present/no gallops/no rub/normal PMI/pedal edema

## 2023-07-21 NOTE — H&P PST ADULT - ASSESSMENT
CAPRINI SCORE [CLOT]    AGE RELATED RISK FACTORS                                                       MOBILITY RELATED FACTORS  [ ] Age 41-60 years                                            (1 Point)                  [ ] Bed rest                                                        (1 Point)  [x ] Age: 61-74 years                                           (2 Points)                 [ ] Plaster cast                                                   (2 Points)  [ ] Age= 75 years                                              (3 Points)                 [ ] Bed bound for more than 72 hours                 (2 Points)    DISEASE RELATED RISK FACTORS                                               GENDER SPECIFIC FACTORS  x ] Edema in the lower extremities                       (1 Point)                  [ ] Pregnancy                                                     (1 Point)  [ ] Varicose veins                                               (1 Point)                  [ ] Post-partum < 6 weeks                                   (1 Point)             [ ] BMI > 25 Kg/m2                                            (1 Point)                  [ ] Hormonal therapy  or oral contraception          (1 Point)                 [ ] Sepsis (in the previous month)                        (1 Point)                  [ ] History of pregnancy complications                 (1 point)  [ ] Pneumonia or serious lung disease                                               [ ] Unexplained or recurrent                     (1 Point)           (in the previous month)                               (1 Point)  [ ] Abnormal pulmonary function test                     (1 Point)                 SURGERY RELATED RISK FACTORS  [ ] Acute myocardial infarction                              (1 Point)                 [ ]  Section                                             (1 Point)  [ ] Congestive heart failure (in the previous month)  (1 Point)               [ ] Minor surgery                                                  (1 Point)   [ ] Inflammatory bowel disease                             (1 Point)                 [ ] Arthroscopic surgery                                        (2 Points)  [ ] Central venous access                                      (2 Points)                [x ] General surgery lasting more than 45 minutes   (2 Points)       [ ] Stroke (in the previous month)                          (5 Points)               [ ] Elective arthroplasty                                         (5 Points)                                                                                                                                               HEMATOLOGY RELATED FACTORS                                                 TRAUMA RELATED RISK FACTORS  [ ] Prior episodes of VTE                                     (3 Points)                [ ] Fracture of the hip, pelvis, or leg                       (5 Points)  [ ] Positive family history for VTE                         (3 Points)                 [ ] Acute spinal cord injury (in the previous month)  (5 Points)  [ ] Prothrombin 83269 A                                     (3 Points)                 [ ] Paralysis  (less than 1 month)                             (5 Points)  [ ] Factor V Leiden                                             (3 Points)                  [ ] Multiple Trauma within 1 month                        (5 Points)  [ ] Lupus anticoagulants                                     (3 Points)                                                           [ ] Anticardiolipin antibodies                               (3 Points)                                                       [ ] High homocysteine in the blood                      (3 Points)                                             [ ] Other congenital or acquired thrombophilia      (3 Points)                                                [ ] Heparin induced thrombocytopenia                  (3 Points)                                          Total Score [    5      ]    Caprini Score 0 - 2:  Low Risk, No VTE Prophylaxis required for most patients, encourage ambulation  Caprini Score 3 - 6:  At Risk, pharmacologic VTE prophylaxis is indicated for most patients (in the absence of a contraindication)    DASI score: 8.23  DASI activity: housework yard work denies C/P, SOB, palpitations at this time   Loose teeth or denture: upper lower denture  Caprini Score Greater than or = 7:  High Risk, pharmacologic VTE prophylaxis is indicated for most patients (in the absence of a contraindication)

## 2023-07-21 NOTE — H&P PST ADULT - NEGATIVE ENMT SYMPTOMS
dentures upper lower/no hearing difficulty/no ear pain/no tinnitus/no vertigo/no sinus symptoms/no nasal congestion

## 2023-07-21 NOTE — H&P PST ADULT - HISTORY OF PRESENT ILLNESS
Stephanie is a 67F PMHx of hypertension, hyperlipidemia, GERD, chronic kidney disease, hx endocarditis, - s/p AVR, MVR, CABG X 1, peripheral vascular disease, hx of right fem endarterectomy  (2017), right to left fem-fem bypass (Aug 2021 ), right fem-pop bypass May 2022.  Revision of right leg fem- pop in 5/2023 on [id number] and Eliquis bid.  The patient with known carotid stenosis. She underwent a CTA which showed a high-grade right internal carotid artery stenosis.    PST CBC, BMP

## 2023-07-21 NOTE — H&P PST ADULT - NSICDXPASTMEDICALHX_GEN_ALL_CORE_FT
PAST MEDICAL HISTORY:  2019 novel coronavirus disease (COVID-19) 3/2021- monoclonal antibody treatement    Arthritis Cervical Spine  and Hands    CAD (coronary artery disease)     Carotid artery stenosis     CKD (chronic kidney disease)     Depression     GERD (gastroesophageal reflux disease)     History of endocarditis     Hyperlipidemia     Hypertension     PAD (peripheral artery disease)     Peripheral vascular disease     Restless leg syndrome Especially with General Anesthesia

## 2023-07-21 NOTE — H&P PST ADULT - EKG AND INTERPRETATION
NORMAL SINUS RHYTHM POSSIBLE LEFT ATRIAL ENLARGEMENT BORDERLINE ECG WHEN COMPARED WITH ECG OF 26-MAY-2022 03:27, NO SIGNIFICANT CHANGE WAS FOUND

## 2023-07-21 NOTE — H&P PST ADULT - NEUROLOGICAL SYMPTOMS
right leg paresthesias since surgery  nerve pain in neck prn gabapentin takes to night/paresthesias/difficulty walking

## 2023-07-21 NOTE — H&P PST ADULT - PROBLEM SELECTOR PLAN 3
pneumatic compression sleeve intra op  chlorhexidine showers reviewed and chlorhexidine surgical prep  early ambulation and vte prophylaxis as appropriate  surgical  prophylactic antibiotics ordered

## 2023-07-21 NOTE — H&P PST ADULT - MS GEN HX ROS MEA POS PC
Neck fused , OA hands hips and fracture pubis bone remote/arthritis/joint pain/stiffness/neck pain/leg pain R

## 2023-07-21 NOTE — H&P PST ADULT - PSY GEN HX ROS MEA POS PC
mild depression since sons death on Wellbutrin increased dose from 150mg to 300mg after passing/depression

## 2023-07-21 NOTE — H&P PST ADULT - NSICDXPASTSURGICALHX_GEN_ALL_CORE_FT
PAST SURGICAL HISTORY:  Acute peptic ulcer with perforation 1980 - s/p surgery    H/O cervical discectomy C1-T1 fusion 2020    History of endarterectomy iliac stenting and femoral endarterectomy    Osteoarthritis of right shoulder region Right Shoulder Arthroscopy  2007    Rheumatoid arthritis of carpometacarpal joint of thumb Total Joint Replacement of Left Thumb    S/P appendectomy 2014    S/P AVR (aortic valve replacement) x 1,2019    S/P CABG x 1 2019    S/P femoral-femoral bypass surgery 2021    S/P femoral-femoral bypass surgery     S/P MVR (mitral valve repair) x 1 2019    S/P tubal ligation 1986

## 2023-07-21 NOTE — H&P PST ADULT - NEGATIVE OPHTHALMOLOGIC SYMPTOMS
glasses reading/no diplopia/no photophobia/no lacrimation L/no lacrimation R/no blurred vision L/no blurred vision R

## 2023-08-03 ENCOUNTER — APPOINTMENT (OUTPATIENT)
Dept: CARDIOLOGY | Facility: CLINIC | Age: 68
End: 2023-08-03
Payer: MEDICARE

## 2023-08-03 VITALS
HEIGHT: 61 IN | DIASTOLIC BLOOD PRESSURE: 71 MMHG | OXYGEN SATURATION: 95 % | WEIGHT: 130 LBS | SYSTOLIC BLOOD PRESSURE: 136 MMHG | BODY MASS INDEX: 24.55 KG/M2 | HEART RATE: 60 BPM

## 2023-08-03 DIAGNOSIS — Z95.1 PRESENCE OF AORTOCORONARY BYPASS GRAFT: ICD-10-CM

## 2023-08-03 DIAGNOSIS — Z98.890 OTHER SPECIFIED POSTPROCEDURAL STATES: ICD-10-CM

## 2023-08-03 DIAGNOSIS — I65.29 OCCLUSION AND STENOSIS OF UNSPECIFIED CAROTID ARTERY: ICD-10-CM

## 2023-08-03 DIAGNOSIS — E78.5 HYPERLIPIDEMIA, UNSPECIFIED: ICD-10-CM

## 2023-08-03 DIAGNOSIS — I10 ESSENTIAL (PRIMARY) HYPERTENSION: ICD-10-CM

## 2023-08-03 DIAGNOSIS — Z95.2 PRESENCE OF PROSTHETIC HEART VALVE: ICD-10-CM

## 2023-08-03 PROBLEM — I73.9 PERIPHERAL VASCULAR DISEASE, UNSPECIFIED: Chronic | Status: ACTIVE | Noted: 2023-07-21

## 2023-08-03 PROCEDURE — 93000 ELECTROCARDIOGRAM COMPLETE: CPT

## 2023-08-03 PROCEDURE — 99214 OFFICE O/P EST MOD 30 MIN: CPT

## 2023-08-03 RX ORDER — ASPIRIN 81 MG
81 TABLET, DELAYED RELEASE (ENTERIC COATED) ORAL
Refills: 0 | Status: ACTIVE | COMMUNITY

## 2023-08-03 RX ORDER — ATORVASTATIN CALCIUM 40 MG/1
40 TABLET, FILM COATED ORAL
Refills: 0 | Status: ACTIVE | COMMUNITY

## 2023-08-03 NOTE — DISCUSSION/SUMMARY
[EKG obtained to assist in diagnosis and management of assessed problem(s)] : EKG obtained to assist in diagnosis and management of assessed problem(s) [FreeTextEntry1] : Stephanie has a history of peripheral vascular disease. She has mild nonobstructive coronary artery disease, based on cardiac catheterization from 2019. She presented with aortic valve endocarditis, and is status post bioprosthetic aortic valve replacement, and mitral valve repair. She arrives today for cardiac clearance. She is without symptoms of HF, angina or unstable arrhythmia. ECG illustrates sinus rhythm w/out acute findings.  Her blood pressure is NML.  She is not due for any non invasive cardiac testing at this time.  She will go for precautionary nuclear stress test and surveillance echocardiogram in December at her next follow-up visit. she will continue her current regimen wiht amlodipine 10mg for blood pressure control. She will continue metoprolol 25mg (3tab)  Atorvastatin for lipid management and ASCVD, goal LDL <70  She will continue oral A/C with eliquis 2.5mg BID f  She will continue to check her blood pressure routinely.  Ms Mcknight is optimized for the upcoming carotid endarterectomy with no cardiac contraindications. There is no evidence of active ischemic heart disease, decompensated heart failure, uncontrolled arrhythmia, or severe obstructive valvular disease. Routine hemodynamic monitoring will be sufficient. She can hold eliquis if required 2 days before her surgery and resume immediately after.  She will followup again with me in December, before she goes to [facility].

## 2023-08-03 NOTE — REASON FOR VISIT
[Consultation] : a consultation regarding [CV Risk Factors and Non-Cardiac Disease] : CV risk factors and non-cardiac disease [Hyperlipidemia] : hyperlipidemia [Hypertension] : hypertension [Coronary Artery Disease] : coronary artery disease [Carotid, Aortic and Peripheral Vascular Disease] : carotid, aortic and peripheral vascular disease [FreeTextEntry1] : pre-op evaluation

## 2023-08-03 NOTE — CARDIOLOGY SUMMARY
[No Ischemia] : no Ischemia [No Exercise Ind Arr] : no exercise induced arrhythmias [No Symptoms] : no Symptoms [___] : [unfilled] [LVEF ___%] : LVEF [unfilled]% [None] : no pulmonary hypertension [Normal] : normal LA size [Mild] : mild mitral regurgitation [de-identified] : sinus rhythm  [de-identified] : 2021 61% MV ring, min MR  bio AVR well seated meand gradient 12 diastolic stage 1 [de-identified] : 2019 non obstructive 405 LAD, D1 50%, CX 20%, mRCA 50% [de-identified] : CABG, AVR, MV repair  2019

## 2023-08-03 NOTE — HISTORY OF PRESENT ILLNESS
[FreeTextEntry1] : Stephanie Mcknight presented to the office today for a followup evaluation.  She was last seen in the office in May by Dr Lacy  She is now 65 years old, with a history of peripheral vascular disease, with asymptomatic carotid disease, and iliac/femoral disease for which she had iliac stenting and femoral endarterectomy.  She has a history of PVCs. She presented to the hospital in 2019 with fatigue, having previously presented to the hospital with a GI bleed. Unfortunately, she was found to have strep bacteremia and endocarditis, for which she required aortic valve replacement for severe AI.  Preop cath revealed nonobstructive CAD.    In October, 2019, she reported worsening claudication. Followup imaging suggested multilevel disease, and I referred her for evaluation. There was a decision to pursue an exercise program, and reserve interventional therapy for failure of that.  Last June she reported several symptoms,including unchanged claudication, palpitations and a need for cervical laminectomy and fusion. She went on to have an echocardiogram and a Holter monitor. The Holter revealed sustained atrial tachycardia. Echocardiogram was unremarkable, with normal function of her aortic valve. I allowed her to proceed with surgery, which was uneventful.  At the time of her last visit in May, 2021 she described ongoing issues with claudication, which was severe and lifestyle limiting.  I referred her to surgery, and at the last visit she was planned for femoral femoral bypass.  She had a femoral femoral bypass in August, which was uncomplicated. She was in the hospital 4 days/  She remains with some numbness in her legs, and she notes that she has a right popliteal stenosis which is causing her symptoms if she walks about 100 feet, which will need to be addressed. The claudication in her left leg has completely resolved. She describes some dyspnea when she speaks, or when she lies down. This is very sporadic.  She had an acute Hospitalization in May 2023.  She is had undergone a graft bypass from R external iliac artery to profunda and SFA bypass on May24th. Earlier in that month, she unfortunately lost her 38 y/o son to drug overdose.    At the time of her admission in May, She underwent a CTA which showed a high-grade right internal carotid artery stenosis, she is now here for carotid endarterectomy on 8/9/23 with Dr Green.  She continues to feel well from a cardiovascular perspective.  She does not report symptoms of angina, heart failure or arrhythmia.  She does not express symptoms c/w claudication. She is still smoking at least one cigarette a day.  Pt is without any symptoms this visit.

## 2023-08-03 NOTE — PHYSICAL EXAM
[General Appearance - Well Developed] : well developed [Normal Appearance] : normal appearance [Well Groomed] : well groomed [General Appearance - Well Nourished] : well nourished [No Deformities] : no deformities [General Appearance - In No Acute Distress] : no acute distress [Eyelids - No Xanthelasma] : the eyelids demonstrated no xanthelasmas [Normal Oral Mucosa] : normal oral mucosa [No Oral Pallor] : no oral pallor [No Oral Cyanosis] : no oral cyanosis [Normal Jugular Venous A Waves Present] : normal jugular venous A waves present [Normal Jugular Venous V Waves Present] : normal jugular venous V waves present [No Jugular Venous Godwin A Waves] : no jugular venous godwin A waves [Respiration, Rhythm And Depth] : normal respiratory rhythm and effort [Exaggerated Use Of Accessory Muscles For Inspiration] : no accessory muscle use [Auscultation Breath Sounds / Voice Sounds] : lungs were clear to auscultation bilaterally [Abdomen Soft] : soft [Abdomen Tenderness] : non-tender [Abdomen Mass (___ Cm)] : no abdominal mass palpated [Abnormal Walk] : normal gait [Gait - Sufficient For Exercise Testing] : the gait was sufficient for exercise testing [Nail Clubbing] : no clubbing of the fingernails [Cyanosis, Localized] : no localized cyanosis [Petechial Hemorrhages (___cm)] : no petechial hemorrhages [Skin Color & Pigmentation] : normal skin color and pigmentation [] : no rash [No Venous Stasis] : no venous stasis [Skin Lesions] : no skin lesions [No Skin Ulcers] : no skin ulcer [No Xanthoma] : no  xanthoma was observed [Oriented To Time, Place, And Person] : oriented to person, place, and time [Affect] : the affect was normal [Mood] : the mood was normal [No Anxiety] : not feeling anxious [Normal] : normal [Normal Rate] : normal [II] : a grade 2 [Well Developed] : well developed [Well Nourished] : well nourished [No Acute Distress] : no acute distress [Normal Conjunctiva] : normal conjunctiva [Normal Venous Pressure] : normal venous pressure [No Carotid Bruit] : no carotid bruit [Normal S1, S2] : normal S1, S2 [No Murmur] : no murmur [No Rub] : no rub [No Gallop] : no gallop [Rhythm Regular] : regular [Normal S1] : normal S1 [Normal S2] : normal S2 [I] : a grade 1 [No Pitting Edema] : no pitting edema present [2+] : right 2+ [1+] : left 1+ [Right Carotid Bruit] : right carotid bruit heard [Left Carotid Bruit] : left carotid bruit heard [No Abnormalities] : the abdominal aorta was not enlarged and no bruit was heard [Clear Lung Fields] : clear lung fields [Good Air Entry] : good air entry [No Respiratory Distress] : no respiratory distress  [Soft] : abdomen soft [Non Tender] : non-tender [No Masses/organomegaly] : no masses/organomegaly [Normal Bowel Sounds] : normal bowel sounds [Normal Gait] : normal gait [No Edema] : no edema [No Cyanosis] : no cyanosis [No Clubbing] : no clubbing [No Varicosities] : no varicosities [No Rash] : no rash [No Skin Lesions] : no skin lesions [Moves all extremities] : moves all extremities [No Focal Deficits] : no focal deficits [Normal Speech] : normal speech [Alert and Oriented] : alert and oriented [Normal memory] : normal memory [S3] : no S3 [S4] : no S4 [Right Femoral Bruit] : no bruit heard over the right femoral artery [Left Femoral Bruit] : no bruit heard over the left femoral artery

## 2023-08-08 ENCOUNTER — TRANSCRIPTION ENCOUNTER (OUTPATIENT)
Age: 68
End: 2023-08-08

## 2023-08-09 ENCOUNTER — APPOINTMENT (OUTPATIENT)
Dept: VASCULAR SURGERY | Facility: HOSPITAL | Age: 68
End: 2023-08-09
Payer: MEDICARE

## 2023-08-09 ENCOUNTER — TRANSCRIPTION ENCOUNTER (OUTPATIENT)
Age: 68
End: 2023-08-09

## 2023-08-09 ENCOUNTER — INPATIENT (INPATIENT)
Facility: HOSPITAL | Age: 68
LOS: 0 days | Discharge: HOME | DRG: 38 | End: 2023-08-10
Attending: SURGERY | Admitting: SURGERY
Payer: MEDICARE

## 2023-08-09 ENCOUNTER — RESULT REVIEW (OUTPATIENT)
Age: 68
End: 2023-08-09

## 2023-08-09 VITALS
HEIGHT: 64 IN | RESPIRATION RATE: 17 BRPM | HEART RATE: 66 BPM | TEMPERATURE: 98 F | SYSTOLIC BLOOD PRESSURE: 166 MMHG | OXYGEN SATURATION: 97 % | DIASTOLIC BLOOD PRESSURE: 79 MMHG | WEIGHT: 132.06 LBS

## 2023-08-09 DIAGNOSIS — I65.29 OCCLUSION AND STENOSIS OF UNSPECIFIED CAROTID ARTERY: ICD-10-CM

## 2023-08-09 DIAGNOSIS — Z98.890 OTHER SPECIFIED POSTPROCEDURAL STATES: Chronic | ICD-10-CM

## 2023-08-09 DIAGNOSIS — M19.011 PRIMARY OSTEOARTHRITIS, RIGHT SHOULDER: Chronic | ICD-10-CM

## 2023-08-09 DIAGNOSIS — Z95.1 PRESENCE OF AORTOCORONARY BYPASS GRAFT: Chronic | ICD-10-CM

## 2023-08-09 DIAGNOSIS — Z98.51 TUBAL LIGATION STATUS: Chronic | ICD-10-CM

## 2023-08-09 DIAGNOSIS — Z95.2 PRESENCE OF PROSTHETIC HEART VALVE: Chronic | ICD-10-CM

## 2023-08-09 DIAGNOSIS — M06.9 RHEUMATOID ARTHRITIS, UNSPECIFIED: Chronic | ICD-10-CM

## 2023-08-09 DIAGNOSIS — K27.1 ACUTE PEPTIC ULCER, SITE UNSPECIFIED, WITH PERFORATION: Chronic | ICD-10-CM

## 2023-08-09 DIAGNOSIS — Z90.49 ACQUIRED ABSENCE OF OTHER SPECIFIED PARTS OF DIGESTIVE TRACT: Chronic | ICD-10-CM

## 2023-08-09 DIAGNOSIS — Z95.828 PRESENCE OF OTHER VASCULAR IMPLANTS AND GRAFTS: Chronic | ICD-10-CM

## 2023-08-09 PROCEDURE — 88311 DECALCIFY TISSUE: CPT | Mod: 26

## 2023-08-09 PROCEDURE — 88304 TISSUE EXAM BY PATHOLOGIST: CPT | Mod: 26

## 2023-08-09 PROCEDURE — 35301 RECHANNELING OF ARTERY: CPT | Mod: RT,58

## 2023-08-09 DEVICE — ARISTA 3GR: Type: IMPLANTABLE DEVICE | Site: RIGHT | Status: FUNCTIONAL

## 2023-08-09 DEVICE — CLIP APPLIER COVIDIEN SURGICLIP II 9.75" MEDIUM: Type: IMPLANTABLE DEVICE | Site: RIGHT | Status: FUNCTIONAL

## 2023-08-09 DEVICE — SURGIFOAM PAD 8CM X 12.5CM X 10MM (100): Type: IMPLANTABLE DEVICE | Site: RIGHT | Status: FUNCTIONAL

## 2023-08-09 DEVICE — KIT A-LINE 1LUM 20G X 12CM SAFE KIT: Type: IMPLANTABLE DEVICE | Site: RIGHT | Status: FUNCTIONAL

## 2023-08-09 DEVICE — CLIP APPLIER COVIDIEN SURGICLIP III 9" SM: Type: IMPLANTABLE DEVICE | Site: RIGHT | Status: FUNCTIONAL

## 2023-08-09 RX ORDER — OXYCODONE HYDROCHLORIDE 5 MG/1
5 TABLET ORAL ONCE
Refills: 0 | Status: DISCONTINUED | OUTPATIENT
Start: 2023-08-09 | End: 2023-08-09

## 2023-08-09 RX ORDER — LIDOCAINE HCL 20 MG/ML
0.2 VIAL (ML) INJECTION ONCE
Refills: 0 | Status: DISCONTINUED | OUTPATIENT
Start: 2023-08-09 | End: 2023-08-09

## 2023-08-09 RX ORDER — HEPARIN SODIUM 5000 [USP'U]/ML
5000 INJECTION INTRAVENOUS; SUBCUTANEOUS EVERY 8 HOURS
Refills: 0 | Status: DISCONTINUED | OUTPATIENT
Start: 2023-08-09 | End: 2023-08-10

## 2023-08-09 RX ORDER — DEXTROSE MONOHYDRATE, SODIUM CHLORIDE, AND POTASSIUM CHLORIDE 50; .745; 4.5 G/1000ML; G/1000ML; G/1000ML
1000 INJECTION, SOLUTION INTRAVENOUS
Refills: 0 | Status: DISCONTINUED | OUTPATIENT
Start: 2023-08-09 | End: 2023-08-10

## 2023-08-09 RX ORDER — BUPROPION HYDROCHLORIDE 150 MG/1
150 TABLET, EXTENDED RELEASE ORAL DAILY
Refills: 0 | Status: DISCONTINUED | OUTPATIENT
Start: 2023-08-09 | End: 2023-08-10

## 2023-08-09 RX ORDER — PRAMIPEXOLE DIHYDROCHLORIDE 0.12 MG/1
2 TABLET ORAL
Qty: 0 | Refills: 0 | DISCHARGE

## 2023-08-09 RX ORDER — HYDROMORPHONE HYDROCHLORIDE 2 MG/ML
0.5 INJECTION INTRAMUSCULAR; INTRAVENOUS; SUBCUTANEOUS
Refills: 0 | Status: DISCONTINUED | OUTPATIENT
Start: 2023-08-09 | End: 2023-08-09

## 2023-08-09 RX ORDER — BENZOCAINE AND MENTHOL 5; 1 G/100ML; G/100ML
1 LIQUID ORAL EVERY 6 HOURS
Refills: 0 | Status: DISCONTINUED | OUTPATIENT
Start: 2023-08-09 | End: 2023-08-10

## 2023-08-09 RX ORDER — ACETAMINOPHEN 500 MG
975 TABLET ORAL EVERY 8 HOURS
Refills: 0 | Status: DISCONTINUED | OUTPATIENT
Start: 2023-08-09 | End: 2023-08-10

## 2023-08-09 RX ORDER — ATORVASTATIN CALCIUM 80 MG/1
40 TABLET, FILM COATED ORAL AT BEDTIME
Refills: 0 | Status: DISCONTINUED | OUTPATIENT
Start: 2023-08-09 | End: 2023-08-10

## 2023-08-09 RX ORDER — PHENYLEPHRINE HYDROCHLORIDE 10 MG/ML
0.3 INJECTION INTRAVENOUS
Qty: 40 | Refills: 0 | Status: DISCONTINUED | OUTPATIENT
Start: 2023-08-09 | End: 2023-08-09

## 2023-08-09 RX ORDER — CEFAZOLIN SODIUM 1 G
2000 VIAL (EA) INJECTION ONCE
Refills: 0 | Status: COMPLETED | OUTPATIENT
Start: 2023-08-09 | End: 2023-08-09

## 2023-08-09 RX ORDER — AMLODIPINE BESYLATE 2.5 MG/1
10 TABLET ORAL DAILY
Refills: 0 | Status: DISCONTINUED | OUTPATIENT
Start: 2023-08-09 | End: 2023-08-10

## 2023-08-09 RX ORDER — BUPROPION HYDROCHLORIDE 150 MG/1
1 TABLET, EXTENDED RELEASE ORAL
Refills: 0 | DISCHARGE

## 2023-08-09 RX ORDER — METOPROLOL TARTRATE 50 MG
75 TABLET ORAL DAILY
Refills: 0 | Status: DISCONTINUED | OUTPATIENT
Start: 2023-08-09 | End: 2023-08-10

## 2023-08-09 RX ORDER — ASPIRIN/CALCIUM CARB/MAGNESIUM 324 MG
81 TABLET ORAL DAILY
Refills: 0 | Status: DISCONTINUED | OUTPATIENT
Start: 2023-08-09 | End: 2023-08-10

## 2023-08-09 RX ORDER — ALUMINUM ZIRCONIUM TRICHLOROHYDREX GLY 0.2 G/G
1 STICK TOPICAL
Qty: 0 | Refills: 0 | DISCHARGE

## 2023-08-09 RX ORDER — SODIUM CHLORIDE 9 MG/ML
1000 INJECTION, SOLUTION INTRAVENOUS
Refills: 0 | Status: DISCONTINUED | OUTPATIENT
Start: 2023-08-09 | End: 2023-08-09

## 2023-08-09 RX ORDER — SODIUM CHLORIDE 9 MG/ML
3 INJECTION INTRAMUSCULAR; INTRAVENOUS; SUBCUTANEOUS EVERY 8 HOURS
Refills: 0 | Status: DISCONTINUED | OUTPATIENT
Start: 2023-08-09 | End: 2023-08-09

## 2023-08-09 RX ORDER — PRAMIPEXOLE DIHYDROCHLORIDE 0.12 MG/1
0.5 TABLET ORAL AT BEDTIME
Refills: 0 | Status: DISCONTINUED | OUTPATIENT
Start: 2023-08-09 | End: 2023-08-10

## 2023-08-09 RX ORDER — CHLORHEXIDINE GLUCONATE 213 G/1000ML
1 SOLUTION TOPICAL DAILY
Refills: 0 | Status: DISCONTINUED | OUTPATIENT
Start: 2023-08-09 | End: 2023-08-09

## 2023-08-09 RX ORDER — ONDANSETRON 8 MG/1
4 TABLET, FILM COATED ORAL ONCE
Refills: 0 | Status: DISCONTINUED | OUTPATIENT
Start: 2023-08-09 | End: 2023-08-09

## 2023-08-09 RX ADMIN — OXYCODONE HYDROCHLORIDE 5 MILLIGRAM(S): 5 TABLET ORAL at 21:46

## 2023-08-09 RX ADMIN — ATORVASTATIN CALCIUM 40 MILLIGRAM(S): 80 TABLET, FILM COATED ORAL at 21:34

## 2023-08-09 RX ADMIN — HYDROMORPHONE HYDROCHLORIDE 0.5 MILLIGRAM(S): 2 INJECTION INTRAMUSCULAR; INTRAVENOUS; SUBCUTANEOUS at 16:00

## 2023-08-09 RX ADMIN — Medication 81 MILLIGRAM(S): at 20:44

## 2023-08-09 RX ADMIN — SODIUM CHLORIDE 3 MILLILITER(S): 9 INJECTION INTRAMUSCULAR; INTRAVENOUS; SUBCUTANEOUS at 06:21

## 2023-08-09 RX ADMIN — HYDROMORPHONE HYDROCHLORIDE 0.5 MILLIGRAM(S): 2 INJECTION INTRAMUSCULAR; INTRAVENOUS; SUBCUTANEOUS at 12:49

## 2023-08-09 RX ADMIN — OXYCODONE HYDROCHLORIDE 5 MILLIGRAM(S): 5 TABLET ORAL at 20:46

## 2023-08-09 RX ADMIN — HYDROMORPHONE HYDROCHLORIDE 0.5 MILLIGRAM(S): 2 INJECTION INTRAMUSCULAR; INTRAVENOUS; SUBCUTANEOUS at 13:04

## 2023-08-09 RX ADMIN — PHENYLEPHRINE HYDROCHLORIDE 6.74 MICROGRAM(S)/KG/MIN: 10 INJECTION INTRAVENOUS at 11:56

## 2023-08-09 RX ADMIN — BENZOCAINE AND MENTHOL 1 LOZENGE: 5; 1 LIQUID ORAL at 20:45

## 2023-08-09 RX ADMIN — DEXTROSE MONOHYDRATE, SODIUM CHLORIDE, AND POTASSIUM CHLORIDE 75 MILLILITER(S): 50; .745; 4.5 INJECTION, SOLUTION INTRAVENOUS at 11:41

## 2023-08-09 RX ADMIN — HYDROMORPHONE HYDROCHLORIDE 0.5 MILLIGRAM(S): 2 INJECTION INTRAMUSCULAR; INTRAVENOUS; SUBCUTANEOUS at 15:45

## 2023-08-09 RX ADMIN — HYDROMORPHONE HYDROCHLORIDE 0.5 MILLIGRAM(S): 2 INJECTION INTRAMUSCULAR; INTRAVENOUS; SUBCUTANEOUS at 11:55

## 2023-08-09 RX ADMIN — Medication 975 MILLIGRAM(S): at 18:30

## 2023-08-09 RX ADMIN — PRAMIPEXOLE DIHYDROCHLORIDE 0.5 MILLIGRAM(S): 0.12 TABLET ORAL at 21:34

## 2023-08-09 RX ADMIN — HYDROMORPHONE HYDROCHLORIDE 0.5 MILLIGRAM(S): 2 INJECTION INTRAMUSCULAR; INTRAVENOUS; SUBCUTANEOUS at 11:40

## 2023-08-09 RX ADMIN — HEPARIN SODIUM 5000 UNIT(S): 5000 INJECTION INTRAVENOUS; SUBCUTANEOUS at 18:30

## 2023-08-09 NOTE — DISCHARGE NOTE PROVIDER - HOSPITAL COURSE
Stephanie is a 67F PMHx of hypertension, hyperlipidemia, GERD, chronic kidney disease, hx endocarditis, - s/p AVR, MVR, CABG X 1, peripheral vascular disease, hx of right fem endarterectomy  (2017), right to left fem-fem bypass (Aug 2021 ), right fem-pop bypass May 2022.  Revision of right leg fem- pop in 5/2023 on [id number] and Eliquis bid.  The patient with known carotid stenosis. She underwent a CTA which showed a high-grade right internal carotid artery stenosis. Pt underwent Rt CEA 8/9 with Dr. Green.  Pt. tolerated procedure well and was transferred to the recovery room where pt was closely managed for postoperative pain and blood pressure control, and then transferred to the floor without incidence.  Pt. was mainly managed for postoperative pain, wound care, as well as close monitoring of fluid resuscitation, neurological status and electrolyte repletion.  Pt has been tolerating a diet, voiding, ambulating, and the pain is now well controlled.   Pt. is ready for discharge home in stable condition, and will follow up in two weeks as an outpatient with Dr. Green.

## 2023-08-09 NOTE — DISCHARGE NOTE PROVIDER - NSDCCPCAREPLAN_GEN_ALL_CORE_FT
PRINCIPAL DISCHARGE DIAGNOSIS  Diagnosis: S/P carotid endarterectomy  Assessment and Plan of Treatment: WOUND CARE: Remove dressing in 24 hours. Leave the white steri strips in place, they will fall off on their own in approximately 5-7 days. You may shower. Do not scrub incision. Pat Dry.  BATHING: Please do not submerge wound underwater. You may shower and/or sponge bathe.  ACTIVITY: No heavy lifting anything more than 10-15lbs or straining. Otherwise, you may return to your usual level of physical activity. If you are taking narcotic pain medication (such as Percocet), do NOT drive a car, operate machinery or make important decisions.  DIET: Resume your regular diet   NOTIFY YOUR SURGEON IF: You have any bleeding that does not stop, any pus draining from your wound, any fever (over 100.4 F) or chills, persistent nausea/vomiting with inability to tolerate food or liquids, persistent diarrhea, or if your pain is not controlled on your discharge pain medications.  FOLLOW-UP:  1. Please call Dr. Green to make a follow-up appointment within one week of discharge   2. Please follow up with your primary care physician in one week regarding your hospitalization.

## 2023-08-09 NOTE — DISCHARGE NOTE PROVIDER - CARE PROVIDER_API CALL
Jero Green  Vascular Surgery  [address], [address]  [place] [address]  Phone: [phone]  Fax: [phone]  Follow Up Time: 1 week

## 2023-08-09 NOTE — CHART NOTE - NSCHARTNOTEFT_GEN_A_CORE
SURGERY POST OP CHECK    STATUS POST PROCEDURE:    SUBJECTIVE: Pt seen and examined without complaints. Pain is controlled. No neurologic deficit. Hemodynamically stable. Denies CP/SOB/N/V.         OBJECTIVE:  Vital Signs Last 24 Hrs  T(C): 36 (09 Aug 2023 10:30), Max: 36.5 (09 Aug 2023 06:06)  T(F): 96.8 (09 Aug 2023 10:30), Max: 97.7 (09 Aug 2023 06:06)  HR: 65 (09 Aug 2023 14:30) (58 - 66)  BP: 104/52 (09 Aug 2023 12:00) (93/54 - 166/79)  BP(mean): 74 (09 Aug 2023 12:00) (69 - 77)  RR: 18 (09 Aug 2023 14:30) (14 - 18)  SpO2: 99% (09 Aug 2023 14:30) (95% - 100%)    Parameters below as of 09 Aug 2023 14:30  Patient On (Oxygen Delivery Method): room air      I&O's Summary      PHYSICAL EXAM:  Gen: NAD, A&Ox3  Pulm: No respiratory distress, no subcostal retractions  CV: RRR, no JVD  Abd: Soft, NT, ND, sx incision site dressings c/d/i  Drains: TITI x   Extremities: FROM, warm and well perfused, equal bilateral upper and lower extremity muscle strength     ASSESSMENT/PLAN: HPI:  Stephanie is a 67F PMHx of hypertension, hyperlipidemia, GERD, chronic kidney disease, hx endocarditis, - s/p AVR, MVR, CABG X 1, peripheral vascular disease, hx of right fem endarterectomy  (2017), right to left fem-fem bypass (Aug 2021 ), right fem-pop bypass May 2022.  Revision of right leg fem- pop in 5/2023 on [id number] and Eliquis bid.  The patient with known carotid stenosis. She underwent a CTA which showed a high-grade right internal carotid artery stenosis. Patient is now s/p right CEA by eversion. Tolerated procedure well. Hemodynamically stable with no deficit on exam.     Plan  SBP maintain 110-160  Can not wear SCD due hx of bypass procedures  Resume Eliquis PAD as outpatient

## 2023-08-09 NOTE — PATIENT PROFILE ADULT - FALL HARM RISK - HARM RISK INTERVENTIONS

## 2023-08-09 NOTE — CHART NOTE - NSCHARTNOTEFT_GEN_A_CORE
67F PMHx of hypertension, hyperlipidemia, GERD, chronic kidney disease, hx endocarditis, - s/p AVR, MVR, CABG X 1, peripheral vascular disease, hx of right fem endarterectomy (2017), right to left fem-fem bypass (Aug 2021 ), right fem-pop bypass May 2022.  Revision of right leg fem- pop in 5/2023 on [id number] and Eliquis bid. Patient underwent a CTA which showed a high-grade right internal carotid artery stenosis. Now s/p right CEA    Called to bedside by PACU RN due to hypotension outside targeted SBP goals of 110-160. Patient initially given phenylephrine 100mcg IVP with appropriate response. Patient then started on casper gtt to maintain SBP goal.     Patient evaluated at bedside. Denies headache, dizziness, chest pain, palpitations, shortness of breath, nausea, vomiting or incisional pain. Reports pain is well controlled with present PACU meds    OR Stats  Anesthesia Time: 189  IVF: 1L  U/O: n/a  EBL: 20  Blood Products: n/a      MEDICATIONS  (STANDING):  acetaminophen     Tablet .. 975 milliGRAM(s) Oral every 8 hours  aspirin  chewable 81 milliGRAM(s) Oral daily  atorvastatin 40 milliGRAM(s) Oral at bedtime  buPROPion XL (24-Hour) . 150 milliGRAM(s) Oral daily  dextrose 5% + sodium chloride 0.45% with potassium chloride 20 mEq/L 1000 milliLiter(s) (75 mL/Hr) IV Continuous <Continuous>  heparin   Injectable 5000 Unit(s) SubCutaneous every 8 hours  lactated ringers. 1000 milliLiter(s) (75 mL/Hr) IV Continuous <Continuous>  phenylephrine    Infusion 0.3 MICROgram(s)/kG/Min (6.74 mL/Hr) IV Continuous <Continuous>  pramipexole 0.5 milliGRAM(s) Oral at bedtime    MEDICATIONS  (PRN):  HYDROmorphone  Injectable 0.5 milliGRAM(s) IV Push every 10 minutes PRN Severe Pain (7 - 10)  ondansetron Injectable 4 milliGRAM(s) IV Push once PRN Nausea and/or Vomiting      PHYSICAL EXAM:  ICU Vital Signs Last 24 Hrs  T(C): 36 (09 Aug 2023 10:30), Max: 36.5 (09 Aug 2023 06:06)  T(F): 96.8 (09 Aug 2023 10:30), Max: 97.7 (09 Aug 2023 06:06)  HR: 61 (09 Aug 2023 13:30) (58 - 66)  BP: 104/52 (09 Aug 2023 12:00) (93/54 - 166/79)  BP(mean): 74 (09 Aug 2023 12:00) (69 - 77)  ABP: 115/42 (09 Aug 2023 13:30) (105/43 - 128/48)  ABP(mean): 68 (09 Aug 2023 13:30) (64 - 76)  RR: 18 (09 Aug 2023 13:30) (14 - 18)  SpO2: 96% (09 Aug 2023 13:30) (95% - 100%)    O2 Parameters below as of 09 Aug 2023 13:00  Patient On (Oxygen Delivery Method): nasal cannula  O2 Flow (L/min): 1    PHYSICAL EXAM:  GENERAL: No acute distress, well-developed  HEAD:  Atraumatic, Normocephalic  EYES: EOMI, PERRLA, conjunctiva and sclera clear  NECK: Supple, no lymphadenopathy, no JVD  CHEST/LUNG: CTAB; No wheezes, rales, or rhonchi  HEART: Regular rate and rhythm. Normal S1/S2. No murmurs, rubs, or gallops. hypotensive out of SBP goal range,, non-tachycardic   EXTREMITIES:  2+ peripheral pulses b/l, No clubbing, cyanosis, or edema  NEUROLOGY: A&O x 3, no focal deficits  SKIN: No rashes, lesions or breakdown. Post-op surgical site clean, dry, intact.    A/P:      67F PMHx of hypertension, hyperlipidemia, GERD, chronic kidney disease, hx endocarditis, - s/p AVR, MVR, CABG X 1, peripheral vascular disease, with hx of multiple vascular surgeries, is now s/p right CEA requiring Casper gtt to maintain SBP goal.     -Patient initiated on Casper gtt to maintain SBP goal 110-160mmHg  -Casper gtt currently on hold, able to maintain SBP goal.  -Will continue to monitor in PACU

## 2023-08-09 NOTE — DISCHARGE NOTE PROVIDER - NSDCMRMEDTOKEN_GEN_ALL_CORE_FT
Align 4 mg oral capsule: 1 cap(s) orally once a day  amLODIPine 10 mg oral tablet: 1 tab(s) orally once a day  aspirin 81 mg oral tablet, chewable: 1 tab(s) orally once a day  atorvastatin 40 mg oral tablet: 1 tab(s) orally once a day (at bedtime)  buPROPion: 150 milligram(s) orally once a day  buPROPion 300 mg/24 hours (XL) oral tablet, extended release: 1 orally once a day  Caltrate 600 + D oral tablet: 1 tab(s) orally once a day  Eliquis 2.5 mg oral tablet: 1 orally 2 times a day  metoprolol succinate 25 mg oral tablet, extended release: 3 tab(s) = total 75 mg- orally once a day   Mirapex 0.25 mg oral tablet: 2 tab(s) orally once a day (at bedtime)   Align 4 mg oral capsule: 1 cap(s) orally once a day  amLODIPine 10 mg oral tablet: 1 tab(s) orally once a day  aspirin 81 mg oral tablet, chewable: 1 tab(s) orally once a day  atorvastatin 40 mg oral tablet: 1 tab(s) orally once a day (at bedtime)  buPROPion 300 mg/24 hours (XL) oral tablet, extended release: 1 orally once a day  Caltrate 600 + D oral tablet: 1 tab(s) orally once a day  Eliquis 2.5 mg oral tablet: 1 orally 2 times a day  metoprolol succinate 25 mg oral tablet, extended release: 3 tab(s) = total 75 mg- orally once a day   Mirapex 0.25 mg oral tablet: 2 tab(s) orally once a day (at bedtime)  oxyCODONE 5 mg oral tablet: 1 tab(s) orally every 4 hours as needed for Moderate Pain (4 - 6) as needed for pain MDD: 6   Align 4 mg oral capsule: 1 cap(s) orally once a day  amLODIPine 10 mg oral tablet: 1 tab(s) orally once a day  aspirin 81 mg oral tablet, chewable: 1 tab(s) orally once a day  atorvastatin 40 mg oral tablet: 1 tab(s) orally once a day (at bedtime)  buPROPion 300 mg/24 hours (XL) oral tablet, extended release: 1 orally once a day  Caltrate 600 + D oral tablet: 1 tab(s) orally once a day  Eliquis 2.5 mg oral tablet: 1 tablet orally 2 times a day  metoprolol succinate 25 mg oral tablet, extended release: 3 tab(s) = total 75 mg- orally once a day   Mirapex 0.25 mg oral tablet: 2 tab(s) orally once a day (at bedtime)  oxyCODONE 5 mg oral tablet: 1 tab(s) orally every 4 hours as needed for Moderate Pain (4 - 6) as needed for pain MDD: 6

## 2023-08-09 NOTE — PRE-ANESTHESIA EVALUATION ADULT - NSANTHPMHFT_GEN_ALL_CORE
67F PMH R ICA > 70% stenosis, L ICA 50-70%, CKD, HTN, HLD, GERD, AVR/MVR/CABG x 1 (2019, denies CP/SOB, cardiology clearance reviewed), PVD s/p right femoral endarterectomy, fem/fem bypass, fem/pop bypass, current smoker (last smoked this AM), cervical fusion (C1-T1),  Normal LVSF on most recent TTE.  Last took eliquis 8/6.

## 2023-08-09 NOTE — DISCHARGE NOTE PROVIDER - NSDCFUSCHEDAPPT_GEN_ALL_CORE_FT
Jero Green  [facility] Physician [facility]  VASCULAR [address]  Scheduled Appointment: 08/22/2023

## 2023-08-10 ENCOUNTER — TRANSCRIPTION ENCOUNTER (OUTPATIENT)
Age: 68
End: 2023-08-10

## 2023-08-10 VITALS
SYSTOLIC BLOOD PRESSURE: 102 MMHG | TEMPERATURE: 98 F | DIASTOLIC BLOOD PRESSURE: 63 MMHG | HEART RATE: 73 BPM | OXYGEN SATURATION: 98 % | RESPIRATION RATE: 18 BRPM

## 2023-08-10 LAB
ANION GAP SERPL CALC-SCNC: 13 MMOL/L — SIGNIFICANT CHANGE UP (ref 5–17)
BUN SERPL-MCNC: 37 MG/DL — HIGH (ref 7–23)
CALCIUM SERPL-MCNC: 8.6 MG/DL — SIGNIFICANT CHANGE UP (ref 8.4–10.5)
CHLORIDE SERPL-SCNC: 105 MMOL/L — SIGNIFICANT CHANGE UP (ref 96–108)
CHOLEST SERPL-MCNC: 123 MG/DL — SIGNIFICANT CHANGE UP
CO2 SERPL-SCNC: 19 MMOL/L — LOW (ref 22–31)
CREAT SERPL-MCNC: 2.8 MG/DL — HIGH (ref 0.5–1.3)
EGFR: 18 ML/MIN/1.73M2 — LOW
GLUCOSE SERPL-MCNC: 134 MG/DL — HIGH (ref 70–99)
HCT VFR BLD CALC: 31 % — LOW (ref 34.5–45)
HDLC SERPL-MCNC: 43 MG/DL — LOW
HGB BLD-MCNC: 9.5 G/DL — LOW (ref 11.5–15.5)
LIPID PNL WITH DIRECT LDL SERPL: 59 MG/DL — SIGNIFICANT CHANGE UP
MCHC RBC-ENTMCNC: 28.6 PG — SIGNIFICANT CHANGE UP (ref 27–34)
MCHC RBC-ENTMCNC: 30.6 GM/DL — LOW (ref 32–36)
MCV RBC AUTO: 93.4 FL — SIGNIFICANT CHANGE UP (ref 80–100)
NON HDL CHOLESTEROL: 80 MG/DL — SIGNIFICANT CHANGE UP
NRBC # BLD: 0 /100 WBCS — SIGNIFICANT CHANGE UP (ref 0–0)
PLATELET # BLD AUTO: 186 K/UL — SIGNIFICANT CHANGE UP (ref 150–400)
POTASSIUM SERPL-MCNC: 4.8 MMOL/L — SIGNIFICANT CHANGE UP (ref 3.5–5.3)
POTASSIUM SERPL-SCNC: 4.8 MMOL/L — SIGNIFICANT CHANGE UP (ref 3.5–5.3)
RBC # BLD: 3.32 M/UL — LOW (ref 3.8–5.2)
RBC # FLD: 14.6 % — HIGH (ref 10.3–14.5)
SODIUM SERPL-SCNC: 137 MMOL/L — SIGNIFICANT CHANGE UP (ref 135–145)
TRIGL SERPL-MCNC: 119 MG/DL — SIGNIFICANT CHANGE UP
WBC # BLD: 10.9 K/UL — HIGH (ref 3.8–10.5)
WBC # FLD AUTO: 10.9 K/UL — HIGH (ref 3.8–10.5)

## 2023-08-10 PROCEDURE — 99222 1ST HOSP IP/OBS MODERATE 55: CPT

## 2023-08-10 PROCEDURE — C1769: CPT

## 2023-08-10 PROCEDURE — 36415 COLL VENOUS BLD VENIPUNCTURE: CPT

## 2023-08-10 PROCEDURE — 80048 BASIC METABOLIC PNL TOTAL CA: CPT

## 2023-08-10 PROCEDURE — 88311 DECALCIFY TISSUE: CPT

## 2023-08-10 PROCEDURE — 85027 COMPLETE CBC AUTOMATED: CPT

## 2023-08-10 PROCEDURE — C9399: CPT

## 2023-08-10 PROCEDURE — 88304 TISSUE EXAM BY PATHOLOGIST: CPT

## 2023-08-10 PROCEDURE — 80061 LIPID PANEL: CPT

## 2023-08-10 PROCEDURE — C1889: CPT

## 2023-08-10 RX ORDER — OXYCODONE HYDROCHLORIDE 5 MG/1
5 TABLET ORAL EVERY 4 HOURS
Refills: 0 | Status: DISCONTINUED | OUTPATIENT
Start: 2023-08-10 | End: 2023-08-10

## 2023-08-10 RX ORDER — LANOLIN ALCOHOL/MO/W.PET/CERES
3 CREAM (GRAM) TOPICAL AT BEDTIME
Refills: 0 | Status: DISCONTINUED | OUTPATIENT
Start: 2023-08-10 | End: 2023-08-10

## 2023-08-10 RX ORDER — OXYCODONE HYDROCHLORIDE 5 MG/1
5 TABLET ORAL ONCE
Refills: 0 | Status: DISCONTINUED | OUTPATIENT
Start: 2023-08-10 | End: 2023-08-10

## 2023-08-10 RX ORDER — ACETAMINOPHEN 500 MG
1000 TABLET ORAL ONCE
Refills: 0 | Status: COMPLETED | OUTPATIENT
Start: 2023-08-10 | End: 2023-08-10

## 2023-08-10 RX ORDER — OXYCODONE HYDROCHLORIDE 5 MG/1
1 TABLET ORAL
Qty: 8 | Refills: 0
Start: 2023-08-10

## 2023-08-10 RX ORDER — APIXABAN 2.5 MG/1
1 TABLET, FILM COATED ORAL
Qty: 0 | Refills: 0 | DISCHARGE

## 2023-08-10 RX ORDER — ACETAMINOPHEN 500 MG
975 TABLET ORAL EVERY 6 HOURS
Refills: 0 | Status: DISCONTINUED | OUTPATIENT
Start: 2023-08-10 | End: 2023-08-10

## 2023-08-10 RX ORDER — BUPROPION HYDROCHLORIDE 150 MG/1
150 TABLET, EXTENDED RELEASE ORAL
Qty: 0 | Refills: 0 | DISCHARGE

## 2023-08-10 RX ORDER — OXYCODONE HYDROCHLORIDE 5 MG/1
10 TABLET ORAL EVERY 4 HOURS
Refills: 0 | Status: DISCONTINUED | OUTPATIENT
Start: 2023-08-10 | End: 2023-08-10

## 2023-08-10 RX ADMIN — OXYCODONE HYDROCHLORIDE 5 MILLIGRAM(S): 5 TABLET ORAL at 01:58

## 2023-08-10 RX ADMIN — AMLODIPINE BESYLATE 10 MILLIGRAM(S): 2.5 TABLET ORAL at 05:59

## 2023-08-10 RX ADMIN — HEPARIN SODIUM 5000 UNIT(S): 5000 INJECTION INTRAVENOUS; SUBCUTANEOUS at 02:54

## 2023-08-10 RX ADMIN — Medication 75 MILLIGRAM(S): at 05:58

## 2023-08-10 RX ADMIN — HEPARIN SODIUM 5000 UNIT(S): 5000 INJECTION INTRAVENOUS; SUBCUTANEOUS at 10:32

## 2023-08-10 RX ADMIN — Medication 1000 MILLIGRAM(S): at 00:58

## 2023-08-10 RX ADMIN — Medication 400 MILLIGRAM(S): at 00:14

## 2023-08-10 RX ADMIN — OXYCODONE HYDROCHLORIDE 5 MILLIGRAM(S): 5 TABLET ORAL at 00:58

## 2023-08-10 RX ADMIN — OXYCODONE HYDROCHLORIDE 10 MILLIGRAM(S): 5 TABLET ORAL at 07:09

## 2023-08-10 NOTE — CONSULT NOTE ADULT - SUBJECTIVE AND OBJECTIVE BOX
CHIEF COMPLAINT: Patient is a 67y old  Female who presents with a chief complaint of CEA for R carotid stenosis (10 Aug 2023 07:05)      HPI:  Stephanie is a 67F PMHx of hypertension, hyperlipidemia, GERD, chronic kidney disease, hx endocarditis, - s/p AVR, MVR, CABG X 1, peripheral vascular disease, hx of right fem endarterectomy (2017), right to left fem-fem bypass (Aug 2021 ), right fem-pop bypass May 2022.  Revision of right leg fem- pop in 5/2023 on [id number] and Eliquis bid.  The patient with known carotid stenosis. She underwent a CTA which showed a high-grade right internal carotid artery stenosis.    PST CBC, BMP (21 Jul 2023 13:58)    Interval hx: Denies any chest pain, dyspnea, palpitations, PND, orthopnea, near syncope, syncope, lower extremity edema, stroke like symptoms. toelrated CEa          REVIEW OF SYSTEMS:   All other review of systems are negative unless indicated above    PAST MEDICAL & SURGICAL HISTORY:  Arthritis  Cervical Spine  and Hands      Restless leg syndrome  Especially with General Anesthesia      Hypertension      Hyperlipidemia      CAD (coronary artery disease)      CKD (chronic kidney disease)      Peripheral vascular disease      GERD (gastroesophageal reflux disease)      History of endocarditis      2019 novel coronavirus disease (COVID-19)  3/2021- monoclonal antibody treatement      Depression      Carotid artery stenosis      PAD (peripheral artery disease)      Rheumatoid arthritis of carpometacarpal joint of thumb  Total Joint Replacement of Left Thumb      Osteoarthritis of right shoulder region  Right Shoulder Arthroscopy  2007      S/P tubal ligation  1986      S/P appendectomy  2014      Acute peptic ulcer with perforation  1980 - s/p surgery      H/O cervical discectomy  C1-T1 fusion 2020      S/P CABG x 1  2019      S/P AVR (aortic valve replacement)  x 1,2019      History of endarterectomy  iliac stenting and femoral endarterectomy      S/P MVR (mitral valve repair)  x 1 2019      S/P femoral-femoral bypass surgery  2021      S/P femoral-femoral bypass surgery          SOCIAL HISTORY:  No tobacco, ethanol, or drug abuse.    FAMILY HISTORY:  Family history of arthritis (Father)    No family history of alcoholism (Mother)    Family history of hypothyroidism (Sibling)    Family hx of aortic aneurysm (Sibling)    Family history of atrial fibrillation (Sibling)    Family history of parkinsonism (Sibling)    Family hx of hypertension (Sibling)      No family history of acute MI or sudden cardiac death.    MEDICATIONS  (STANDING):  amLODIPine   Tablet 10 milliGRAM(s) Oral daily  aspirin  chewable 81 milliGRAM(s) Oral daily  atorvastatin 40 milliGRAM(s) Oral at bedtime  buPROPion XL (24-Hour) . 150 milliGRAM(s) Oral daily  dextrose 5% + sodium chloride 0.45% with potassium chloride 20 mEq/L 1000 milliLiter(s) (75 mL/Hr) IV Continuous <Continuous>  heparin   Injectable 5000 Unit(s) SubCutaneous every 8 hours  melatonin 3 milliGRAM(s) Oral at bedtime  metoprolol succinate ER 75 milliGRAM(s) Oral daily  pramipexole 0.5 milliGRAM(s) Oral at bedtime    MEDICATIONS  (PRN):  benzocaine/menthol Lozenge 1 Lozenge Oral every 6 hours PRN Sore Throat  oxyCODONE    IR 5 milliGRAM(s) Oral every 4 hours PRN Moderate Pain (4 - 6)  oxyCODONE    IR 10 milliGRAM(s) Oral every 4 hours PRN Severe Pain (7 - 10)      Allergies    No Known Allergies    Intolerances        Home meds:  Home Medications:  Align 4 mg oral capsule: 1 cap(s) orally once a day (09 Aug 2023 05:52)  buPROPion 300 mg/24 hours (XL) oral tablet, extended release: 1 orally once a day (09 Aug 2023 05:52)  Caltrate 600 + D oral tablet: 1 tab(s) orally once a day (09 Aug 2023 05:52)  Eliquis 2.5 mg oral tablet: 1 tablet orally 2 times a day (10 Aug 2023 10:20)  Mirapex 0.25 mg oral tablet: 2 tab(s) orally once a day (at bedtime) (09 Aug 2023 05:52)        VITAL SIGNS:   Vital Signs Last 24 Hrs  T(C): 36.4 (10 Aug 2023 15:06), Max: 36.9 (09 Aug 2023 22:00)  T(F): 97.5 (10 Aug 2023 15:06), Max: 98.4 (09 Aug 2023 22:00)  HR: 72 (10 Aug 2023 15:06) (61 - 72)  BP: 101/65 (10 Aug 2023 15:06) (101/65 - 143/63)  BP(mean): 86 (09 Aug 2023 18:00) (86 - 86)  RR: 17 (10 Aug 2023 15:06) (14 - 18)  SpO2: 98% (10 Aug 2023 15:06) (93% - 98%)    Parameters below as of 10 Aug 2023 15:06  Patient On (Oxygen Delivery Method): room air        I&O's Summary    09 Aug 2023 07:01  -  10 Aug 2023 07:00  --------------------------------------------------------  IN: 2220 mL / OUT: 300 mL / NET: 1920 mL    10 Aug 2023 07:01  -  10 Aug 2023 16:40  --------------------------------------------------------  IN: 240 mL / OUT: 0 mL / NET: 240 mL        On Exam:  TELE:  SR  Constitutional: NAD, awake and alert, well-developed  HEENT: Moist Mucous Membranes, Anicteric  Pulmonary: Decreased breath sounds b/l. No rales, crackles or wheeze appreciated.   Cardiovascular: Regular, S1 and S2, + SM   Gastrointestinal: Bowel Sounds present, soft, nontender.   Lymph: No peripheral edema. No lymphadenopathy.  Skin: No visible rashes or ulcers. right neck dressing c/d/i   Psych:  Mood & affect appropriate    LABS: All Labs Reviewed:                        9.5    10.90 )-----------( 186      ( 10 Aug 2023 07:11 )             31.0     10 Aug 2023 07:10    137    |  105    |  37     ----------------------------<  134    4.8     |  19     |  2.80     Ca    8.6        10 Aug 2023 07:10          - Troponin:   Blood Culture:           RADIOLOGY:             
show

## 2023-08-10 NOTE — PROGRESS NOTE ADULT - ASSESSMENT
Stephanie Mcknight is a 67F PMHx of hypertension, hyperlipidemia, GERD, chronic kidney disease, hx endocarditis, - s/p AVR, MVR, CABG X 1, peripheral vascular disease, hx of right fem endarterectomy  (2017), right to left fem-fem bypass (Aug 2021 ), right fem-pop bypass May 2022.  Revision of right leg fem- pop in 5/2023 on [id number] and Eliquis bid.  The patient with known carotid stenosis. She underwent a CTA which showed a high-grade right internal carotid artery stenosis. Patient is now POD1 s/p right CEA by eversion. Tolerated procedure well. Hemodynamically stable with no deficit on exam.    Plan  - Discharge planning for today  - Resume Eliquis outpatient Stephanie Mcknight is a 67F PMHx of hypertension, hyperlipidemia, GERD, chronic kidney disease, hx endocarditis, - s/p AVR, MVR, CABG X 1, peripheral vascular disease, hx of right fem endarterectomy  (2017), right to left fem-fem bypass (Aug 2021 ), right fem-pop bypass May 2022.  Revision of right leg fem- pop in 5/2023 on [id number] and Eliquis bid.  The patient with known carotid stenosis. She underwent a CTA which showed a high-grade right internal carotid artery stenosis. Patient is now POD1 s/p right CEA by eversion. Tolerated procedure well. Hemodynamically stable with no deficit on exam.    Plan  - Discharge planning for today  - Resume Eliquis on Saturday

## 2023-08-10 NOTE — PROGRESS NOTE ADULT - SUBJECTIVE AND OBJECTIVE BOX
Vascular SURGERY DAILY PROGRESS NOTE    SUBJECTIVE: Patient seen and evaluated on AM rounds. Pt is resting comfortably in bed with no complaints. Pain is adequately controlled on current regimen.  Denies fevers/chills, chest pain, dyspnea, abdominal pain, nausea or vomiting.    Overnight Events:  No acute overnight events.  -----------------------------------------------------------------------------------------------------------------------------------------------------------------------------------------------------------  OBJECTIVE:  Vital Signs Last 24 Hrs  T(C): 36.6 (10 Aug 2023 05:40), Max: 36.9 (09 Aug 2023 22:00)  T(F): 97.8 (10 Aug 2023 05:40), Max: 98.4 (09 Aug 2023 22:00)  HR: 72 (10 Aug 2023 05:40) (58 - 72)  BP: 143/63 (10 Aug 2023 05:40) (93/54 - 166/79)  BP(mean): 86 (09 Aug 2023 18:00) (69 - 86)  RR: 18 (10 Aug 2023 05:40) (14 - 18)  SpO2: 93% (10 Aug 2023 05:40) (93% - 100%)    Parameters below as of 10 Aug 2023 05:40  Patient On (Oxygen Delivery Method): room air      I&O's Detail    09 Aug 2023 07:01  -  10 Aug 2023 07:00  --------------------------------------------------------  IN:    dextrose 5% + sodium chloride 0.45% w/ Additives: 600 mL    Oral Fluid: 120 mL  Total IN: 720 mL    OUT:    Voided (mL): 300 mL  Total OUT: 300 mL    Total NET: 420 mL        Daily Height in cm: 162.56 (09 Aug 2023 08:36)    Daily   MEDICATIONS  (STANDING):  amLODIPine   Tablet 10 milliGRAM(s) Oral daily  aspirin  chewable 81 milliGRAM(s) Oral daily  atorvastatin 40 milliGRAM(s) Oral at bedtime  buPROPion XL (24-Hour) . 150 milliGRAM(s) Oral daily  dextrose 5% + sodium chloride 0.45% with potassium chloride 20 mEq/L 1000 milliLiter(s) (75 mL/Hr) IV Continuous <Continuous>  heparin   Injectable 5000 Unit(s) SubCutaneous every 8 hours  melatonin 3 milliGRAM(s) Oral at bedtime  metoprolol succinate ER 75 milliGRAM(s) Oral daily  pramipexole 0.5 milliGRAM(s) Oral at bedtime    MEDICATIONS  (PRN):  benzocaine/menthol Lozenge 1 Lozenge Oral every 6 hours PRN Sore Throat  oxyCODONE    IR 10 milliGRAM(s) Oral every 4 hours PRN Severe Pain (7 - 10)  oxyCODONE    IR 5 milliGRAM(s) Oral every 4 hours PRN Moderate Pain (4 - 6)      LABS:          PHYSICAL EXAM:  Gen: NAD, A&Ox3  Pulm: No respiratory distress, no subcostal retractions  CV: RRR, no JVD  Abd: Soft, NT, ND  Extremities: FROM, warm and well perfused, equal bilateral upper and lower extremity muscle strength

## 2023-08-10 NOTE — DISCHARGE NOTE NURSING/CASE MANAGEMENT/SOCIAL WORK - NSDCPEEMAIL_GEN_ALL_CORE
[facility] for Tobacco Control email [email or web address]

## 2023-08-10 NOTE — DISCHARGE NOTE NURSING/CASE MANAGEMENT/SOCIAL WORK - NSDCPEWEB_GEN_ALL_CORE
[facility] for Tobacco Control website --- [email or web address] --- [email or web address]

## 2023-08-10 NOTE — CONSULT NOTE ADULT - ASSESSMENT
67F PMHx of hypertension, hyperlipidemia, GERD, chronic kidney disease, hx endocarditis, - s/p AVR, MVR, CABG X 1, peripheral vascular disease, hx of right fem endarterectomy (2017), right to left fem-fem bypass (Aug 2021 ), right fem-pop bypass May 2022.  Revision of right leg fem- pop in 5/2023 on [id number] and Eliquis bid.  The patient with known carotid stenosis.    - tolerated procedure wll.   - No signs of significant ischemia or volume overload.   - cont asa   - cont statin   - cont bb  - cont norvasc  - resume ac per vascualr  - Monitor and replete electrolytes. Keep K>4.0 and Mg>2.0.  - Further cardiac workup will depend on clinical course.   DC planning per primary team.  Fu with Dr. Hurley in office.

## 2023-08-10 NOTE — DISCHARGE NOTE NURSING/CASE MANAGEMENT/SOCIAL WORK - NSDCPEFALRISK_GEN_ALL_CORE
For information on Fall & Injury Prevention, visit: [email or web address] OR  [email or web address] OR  https://www.cdc.gov/steadi/patient.html

## 2023-08-10 NOTE — DISCHARGE NOTE NURSING/CASE MANAGEMENT/SOCIAL WORK - PATIENT PORTAL LINK FT
You can access the FollowMyHealth Patient Portal offered by [facility] by registering at the following website: [email or web address]. By joining [employer]’s FollowMyHealth portal, you will also be able to view your health information using other applications (apps) compatible with our system.

## 2023-08-16 LAB — SURGICAL PATHOLOGY STUDY: SIGNIFICANT CHANGE UP

## 2023-08-22 ENCOUNTER — APPOINTMENT (OUTPATIENT)
Dept: VASCULAR SURGERY | Facility: CLINIC | Age: 68
End: 2023-08-22
Payer: MEDICARE

## 2023-08-22 PROCEDURE — 99024 POSTOP FOLLOW-UP VISIT: CPT

## 2023-08-22 NOTE — REASON FOR VISIT
[de-identified] : Right carotid endarterectomy [de-identified] : Patient is status post right carotid endarterectomy.  Doing well.  She is without complaints.

## 2023-08-22 NOTE — DISCUSSION/SUMMARY
[FreeTextEntry1] : Neurologically patient is intact.  She is doing well.  She will follow-up in 2 to 3 months with a repeat carotid duplex and right leg bypass duplex.

## 2023-09-20 NOTE — PROGRESS NOTE ADULT - SUBJECTIVE AND OBJECTIVE BOX
Goal Outcome Evaluation: Pt still experiencing N/V; Pts BP and HR elevated at this time. Medicated as ordered. POC ongoing. Елена Taylor RN.                         Date/Time Patient Seen:  		  Referring MD:   Data Reviewed	       Patient is a 63y old  Female who presents with a chief complaint of CABA, weakness (23 May 2019 15:28)      Subjective/HPI     PAST MEDICAL & SURGICAL HISTORY:  Hypertension  Restless leg syndrome: Especially with General Anesthesia  Arthritis: Cervical Spine  and Hands  H/O cervical discectomy  Acute peptic ulcer with perforation: 1980  S/P appendectomy: 2014  S/P tubal ligation: 1986  Osteoarthritis of right shoulder region: Right Shoulder Arthroscopy  2007  Rheumatoid arthritis of carpometacarpal joint of thumb: Total Joint Replacement of Left Thumb        Medication list         MEDICATIONS  (STANDING):  acetaminophen   Tablet .. 1000 milliGRAM(s) Oral once  ALBUTerol    0.083% 2.5 milliGRAM(s) Nebulizer every 8 hours  ampicillin  IVPB      ampicillin  IVPB 2 Gram(s) IV Intermittent every 8 hours  buPROPion XL . 150 milliGRAM(s) Oral daily  diltiazem    milliGRAM(s) Oral daily  furosemide    Tablet 40 milliGRAM(s) Oral daily  pantoprazole    Tablet 40 milliGRAM(s) Oral before breakfast  pramipexole 0.5 milliGRAM(s) Oral at bedtime  simvastatin 10 milliGRAM(s) Oral at bedtime  tiotropium 18 MICROgram(s) Capsule 1 Capsule(s) Inhalation daily    MEDICATIONS  (PRN):  acetaminophen   Tablet .. 650 milliGRAM(s) Oral every 6 hours PRN Temp greater or equal to 38C (100.4F), Mild Pain (1 - 3)         Vitals log        ICU Vital Signs Last 24 Hrs  T(C): 37.1 (23 May 2019 20:36), Max: 37.1 (23 May 2019 13:55)  T(F): 98.8 (23 May 2019 20:36), Max: 98.8 (23 May 2019 20:36)  HR: 102 (23 May 2019 20:36) (91 - 102)  BP: 113/43 (23 May 2019 20:36) (101/57 - 113/43)  BP(mean): --  ABP: --  ABP(mean): --  RR: 21 (23 May 2019 20:36) (16 - 21)  SpO2: 92% (23 May 2019 20:36) (91% - 98%)           Input and Output:  I&O's Detail      Lab Data                        8.7    12.39 )-----------( 273      ( 23 May 2019 08:36 )             27.6     05-23    136  |  106  |  31<H>  ----------------------------<  92  3.8   |  20<L>  |  2.30<H>    Ca    8.1<L>      23 May 2019 08:36  Mg     1.9     05-22    TPro  x   /  Alb  2.8<L>  /  TBili  x   /  DBili  x   /  AST  x   /  ALT  x   /  AlkPhos  x   05-22            Review of Systems	      Objective     Physical Examination  heart s1s2  lung dec BS        Pertinent Lab findings & Imaging      Mike:  NO   Adequate UO     I&O's Detail           Discussed with:     Cultures:	  Culture Results:   Growth in aerobic bottle: Gram Positive Cocci in Pairs and Chains  Growth in anaerobic bottle: Gram Positive Cocci in Pairs and Chains (05-23 @ 10:50)  Culture Results:   Growth in aerobic and anaerobic bottles: Gram Positive Cocci in Pairs and  Chains (05-23 @ 10:50)        Radiology

## 2023-10-17 ENCOUNTER — APPOINTMENT (OUTPATIENT)
Dept: VASCULAR SURGERY | Facility: CLINIC | Age: 68
End: 2023-10-17
Payer: MEDICARE

## 2023-10-17 PROCEDURE — 93926 LOWER EXTREMITY STUDY: CPT

## 2023-10-17 PROCEDURE — 99024 POSTOP FOLLOW-UP VISIT: CPT

## 2023-10-17 PROCEDURE — 93880 EXTRACRANIAL BILAT STUDY: CPT

## 2023-11-03 ENCOUNTER — RX RENEWAL (OUTPATIENT)
Age: 68
End: 2023-11-03

## 2023-12-04 ENCOUNTER — APPOINTMENT (OUTPATIENT)
Dept: CARDIOLOGY | Facility: CLINIC | Age: 68
End: 2023-12-04

## 2023-12-08 ENCOUNTER — APPOINTMENT (OUTPATIENT)
Dept: CARDIOLOGY | Facility: CLINIC | Age: 68
End: 2023-12-08

## 2023-12-13 ENCOUNTER — APPOINTMENT (OUTPATIENT)
Dept: CARDIOLOGY | Facility: CLINIC | Age: 68
End: 2023-12-13

## 2024-01-29 ENCOUNTER — RX RENEWAL (OUTPATIENT)
Age: 69
End: 2024-01-29

## 2024-01-29 RX ORDER — AMLODIPINE BESYLATE 10 MG/1
10 TABLET ORAL DAILY
Qty: 90 | Refills: 3 | Status: ACTIVE | COMMUNITY
Start: 2019-06-11

## 2024-04-09 ENCOUNTER — APPOINTMENT (OUTPATIENT)
Dept: VASCULAR SURGERY | Facility: CLINIC | Age: 69
End: 2024-04-09
Payer: MEDICARE

## 2024-04-09 PROCEDURE — 99214 OFFICE O/P EST MOD 30 MIN: CPT | Mod: 25

## 2024-04-09 PROCEDURE — 93926 LOWER EXTREMITY STUDY: CPT

## 2024-04-09 PROCEDURE — 99406 BEHAV CHNG SMOKING 3-10 MIN: CPT

## 2024-04-09 PROCEDURE — 93880 EXTRACRANIAL BILAT STUDY: CPT

## 2024-04-09 RX ORDER — METOPROLOL SUCCINATE 25 MG/1
25 TABLET, EXTENDED RELEASE ORAL
Qty: 270 | Refills: 2 | Status: DISCONTINUED | COMMUNITY
Start: 2019-06-11 | End: 2024-04-09

## 2024-04-09 RX ORDER — METOPROLOL TARTRATE 50 MG/1
50 TABLET, FILM COATED ORAL 3 TIMES DAILY
Qty: 90 | Refills: 5 | Status: ACTIVE | COMMUNITY
Start: 2024-04-09

## 2024-04-09 NOTE — HISTORY OF PRESENT ILLNESS
[FreeTextEntry1] : 68-year-old female, active smoker status post right CEA several months ago.  She is status post a right iliofemoral bypass in addition to a distal superficial femoral artery to popliteal artery bypass.  This was all done for right lower extremity ischemic rest pain.  She presents to the office today for routine follow-up.  She states that she is doing well and has no difficulty ambulating.

## 2024-04-09 NOTE — ASSESSMENT
[FreeTextEntry1] : In the office today patient underwent a carotid duplex study which shows a widely patent right carotid endarterectomy site.  The left internal carotid artery with a 50 to 69% ICA stenosis.  The right lower extremity shows a patent iliofemoral bifurcated graft.  There is a widely patent mid superficial femoral artery to popliteal artery bypass.  At this time no intervention is necessary.  I have encouraged the patient to discontinue smoking.  She should continue on Eliquis 2.5 mg twice daily in addition to a baby aspirin.  She will follow-up with me in 6 months. [Arterial/Venous Disease] : arterial/venous disease [Carotid Endarterectomy] : carotid endarterectomy

## 2024-04-09 NOTE — PHYSICAL EXAM
[JVD] : no jugular venous distention  [Normal Breath Sounds] : Normal breath sounds [Normal Rate and Rhythm] : normal rate and rhythm [2+] : left 2+ [Ankle Swelling (On Exam)] : not present [Varicose Veins Of Lower Extremities] : not present [] : not present [Abdomen Tenderness] : ~T ~M No abdominal tenderness [No Rash or Lesion] : No rash or lesion [Alert] : alert [Calm] : calm [de-identified] : Appears well

## 2024-05-08 NOTE — PROGRESS NOTE ADULT - PROBLEM SELECTOR PLAN 3
Multifactorial: secondary to anemia, CHF, PNA, tobacco   S/P 1 unit PRBC  Check ECHO due to elevated proBNP  Lasix 20mg ivp x 1 now then 40mg ivp qd in AM  Start iv rocephin/zithromax  Check urine legionella and strep Ag  Continue nebs  Further work-up/management pending clinical course. pt c/o back pain worsening since february. pt states she hurt her back in cheerleading

## 2024-05-15 NOTE — PRE-ANESTHESIA EVALUATION ADULT - BSA (M2)
Problem: Pain  Goal: Verbalizes/displays adequate comfort level or baseline comfort level  5/14/2024 2214 by Rakesh Medellin RN  Outcome: Progressing  5/14/2024 1548 by Farshad Cox RN  Outcome: Progressing     Problem: Nutrition Deficit:  Goal: Optimize nutritional status  5/14/2024 2214 by Rakesh Medellin RN  Outcome: Progressing  5/14/2024 1548 by Farshad Cox RN  Outcome: Progressing     Problem: Skin/Tissue Integrity  Goal: Absence of new skin breakdown  Description: 1.  Monitor for areas of redness and/or skin breakdown  2.  Assess vascular access sites hourly  3.  Every 4-6 hours minimum:  Change oxygen saturation probe site  4.  Every 4-6 hours:  If on nasal continuous positive airway pressure, respiratory therapy assess nares and determine need for appliance change or resting period.  5/14/2024 2214 by Rakesh Medellin RN  Outcome: Progressing  5/14/2024 1548 by Farshad Cox RN  Outcome: Progressing     Problem: Safety - Adult  Goal: Free from fall injury  5/14/2024 2214 by Rakesh Medellin RN  Outcome: Progressing  5/14/2024 1548 by Farshad Cox RN  Outcome: Progressing      1.49

## 2024-07-19 ENCOUNTER — NON-APPOINTMENT (OUTPATIENT)
Age: 69
End: 2024-07-19

## 2024-07-22 ENCOUNTER — NON-APPOINTMENT (OUTPATIENT)
Age: 69
End: 2024-07-22

## 2024-07-23 ENCOUNTER — APPOINTMENT (OUTPATIENT)
Dept: VASCULAR SURGERY | Facility: CLINIC | Age: 69
End: 2024-07-23
Payer: MEDICARE

## 2024-07-23 PROCEDURE — 99214 OFFICE O/P EST MOD 30 MIN: CPT

## 2024-07-23 PROCEDURE — G2211 COMPLEX E/M VISIT ADD ON: CPT

## 2024-07-23 NOTE — HISTORY OF PRESENT ILLNESS
[FreeTextEntry1] : 68-year-old female, active smoker status post right CEA several months ago.  She is status post a right iliofemoral bypass in addition to a distal superficial femoral artery to popliteal artery bypass.  This was all done for right lower extremity ischemic rest pain.  She presents to the office today for routine follow-up.  She states that she is doing well and has no difficulty ambulating.  The patient had some concerns regarding swelling of the right lower extremity.  She states that the swelling worsens as the day progresses.  It has been occurring since the surgeries.

## 2024-07-23 NOTE — PHYSICAL EXAM
[JVD] : no jugular venous distention  [Normal Breath Sounds] : Normal breath sounds [Normal Rate and Rhythm] : normal rate and rhythm [2+] : left 2+ [Ankle Swelling (On Exam)] : not present [Varicose Veins Of Lower Extremities] : not present [] : not present [Abdomen Tenderness] : ~T ~M No abdominal tenderness [No Rash or Lesion] : No rash or lesion [Alert] : alert [Calm] : calm [de-identified] : Appears well

## 2024-07-23 NOTE — ASSESSMENT
[FreeTextEntry1] : In the office today I had a discussion with the patient regarding the right lower extremity swelling and the numbness surrounding the wounds.  I told her this is typical after the surgery in the right lower extremity.  There may only be a little improvement.  She should wear a light compression stocking and elevate her legs more frequently.  Patient states she has stopped smoking at this time.  She should continue on Eliquis 2.5 mg twice daily in addition to a baby aspirin.  She will follow-up with me in 3 months. [Arterial/Venous Disease] : arterial/venous disease [Carotid Endarterectomy] : carotid endarterectomy

## 2024-09-27 ENCOUNTER — RX RENEWAL (OUTPATIENT)
Age: 69
End: 2024-09-27

## 2024-10-15 ENCOUNTER — APPOINTMENT (OUTPATIENT)
Dept: VASCULAR SURGERY | Facility: CLINIC | Age: 69
End: 2024-10-15
Payer: MEDICARE

## 2024-10-15 PROCEDURE — 99214 OFFICE O/P EST MOD 30 MIN: CPT | Mod: 25

## 2024-10-15 PROCEDURE — 93880 EXTRACRANIAL BILAT STUDY: CPT

## 2024-10-15 PROCEDURE — G0506: CPT

## 2024-10-15 PROCEDURE — 93926 LOWER EXTREMITY STUDY: CPT | Mod: RT

## 2024-12-12 NOTE — ED ADULT NURSE NOTE - NSFALLRSKINDICATORS_ED_ALL_ED
Show Applicator Variable?: Yes Detail Level: Detailed Consent: The patient's consent was obtained including but not limited to risks of crusting, scabbing, blistering, scarring, darker or lighter pigmentary change, recurrence, incomplete removal and infection. Number Of Freeze-Thaw Cycles: 1 freeze-thaw cycle Post-Care Instructions: I reviewed with the patient in detail post-care instructions. Patient is to wear sunprotection, and avoid picking at any of the treated lesions. Pt may apply Vaseline to crusted or scabbing areas. Duration Of Freeze Thaw-Cycle (Seconds): 10 Render Note In Bullet Format When Appropriate: No no

## 2024-12-23 ENCOUNTER — RX RENEWAL (OUTPATIENT)
Age: 69
End: 2024-12-23

## 2024-12-30 NOTE — PRE-ANESTHESIA EVALUATION ADULT - NSANTHPEFT_GEN_ALL_CORE
12/30/24 1st attempt to reach patient. Received paperwork from dialysis center to schedule patient for consult. Left message for patient to call back and schedule appt   GENERAL: NAD  HEAD:  Atraumatic, Normocephalic  CHEST/LUNG: Clear to auscultation bilaterally  HEART: Normal S1/S2  PSYCH: AAOx3  NEUROLOGY: non-focal

## 2025-01-02 ENCOUNTER — RX RENEWAL (OUTPATIENT)
Age: 70
End: 2025-01-02

## 2025-03-11 ENCOUNTER — NON-APPOINTMENT (OUTPATIENT)
Age: 70
End: 2025-03-11

## 2025-03-11 ENCOUNTER — APPOINTMENT (OUTPATIENT)
Dept: CARDIOLOGY | Facility: CLINIC | Age: 70
End: 2025-03-11
Payer: MEDICARE

## 2025-03-11 VITALS
OXYGEN SATURATION: 97 % | HEART RATE: 64 BPM | WEIGHT: 137 LBS | DIASTOLIC BLOOD PRESSURE: 82 MMHG | BODY MASS INDEX: 25.86 KG/M2 | SYSTOLIC BLOOD PRESSURE: 136 MMHG | HEIGHT: 61 IN

## 2025-03-11 DIAGNOSIS — I35.9 NONRHEUMATIC AORTIC VALVE DISORDER, UNSPECIFIED: ICD-10-CM

## 2025-03-11 PROCEDURE — 99214 OFFICE O/P EST MOD 30 MIN: CPT

## 2025-03-11 PROCEDURE — 93000 ELECTROCARDIOGRAM COMPLETE: CPT

## 2025-03-12 RX ORDER — MULTIVIT-MIN/FOLIC/VIT K/LYCOP 400-300MCG
50 MCG TABLET ORAL
Refills: 0 | Status: ACTIVE | COMMUNITY

## 2025-03-21 ENCOUNTER — RX RENEWAL (OUTPATIENT)
Age: 70
End: 2025-03-21

## 2025-03-21 RX ORDER — METOPROLOL TARTRATE 50 MG/1
50 TABLET ORAL
Qty: 270 | Refills: 0 | Status: ACTIVE | COMMUNITY
Start: 2025-03-21 | End: 1900-01-01

## 2025-04-14 NOTE — HISTORY OF PRESENT ILLNESS
[FreeTextEntry1] : 65-year-old female, active smoker with history of carotid stenosis, presents to the office complaining of right leg and foot pain.  Patient has a history of right iliac stent and a right femoral endarterectomy several years ago.  Patient states now she can only ambulate under 1 block at which time she experiences right calf cramping.  In addition, she also has pain in her right great toe from an ingrown toenail that at this time cannot be treated due to her underlying vascular disease.  She is here for evaluation. 1 person assist

## 2025-04-22 ENCOUNTER — APPOINTMENT (OUTPATIENT)
Dept: VASCULAR SURGERY | Facility: CLINIC | Age: 70
End: 2025-04-22
Payer: MEDICARE

## 2025-04-22 PROCEDURE — 93926 LOWER EXTREMITY STUDY: CPT | Mod: RT

## 2025-04-22 PROCEDURE — 99214 OFFICE O/P EST MOD 30 MIN: CPT

## 2025-04-22 PROCEDURE — 93880 EXTRACRANIAL BILAT STUDY: CPT

## 2025-04-23 ENCOUNTER — MED ADMIN CHARGE (OUTPATIENT)
Age: 70
End: 2025-04-23

## 2025-04-23 ENCOUNTER — APPOINTMENT (OUTPATIENT)
Dept: CARDIOLOGY | Facility: CLINIC | Age: 70
End: 2025-04-23
Payer: MEDICARE

## 2025-04-23 PROCEDURE — 93306 TTE W/DOPPLER COMPLETE: CPT

## 2025-04-23 RX ORDER — PERFLUTREN 6.52 MG/ML
6.52 INJECTION, SUSPENSION INTRAVENOUS
Qty: 1 | Refills: 0 | Status: COMPLETED | OUTPATIENT
Start: 2025-04-23

## 2025-04-23 RX ADMIN — PERFLUTREN MG/ML: 6.52 INJECTION, SUSPENSION INTRAVENOUS at 00:00

## 2025-05-22 NOTE — DISCHARGE NOTE ADULT - MEDICATION SUMMARY - MEDICATIONS TO STOP TAKING
Copied from CRM #[id number]. Topic: MW Messaging - MW Patient Request  >> May 22, 2025  3:52 PM Augusta FERNANDO wrote:  --DO NOT REPLY - Sent from PACT - If sent to wrong pool, reroute to P [facility] Reroute pool --    General Message: Pt returning call from Cat    Callback #: [phone]  Can a detailed message be left? Yes - [employer] Message  and Yes - Voicemail   Caller has been advised this message will be addressed within:2-3 business days [routine]        
I will STOP taking the medications listed below when I get home from the hospital:  None

## 2025-06-02 ENCOUNTER — RX RENEWAL (OUTPATIENT)
Age: 70
End: 2025-06-02

## 2025-06-20 ENCOUNTER — RX RENEWAL (OUTPATIENT)
Age: 70
End: 2025-06-20

## 2025-07-07 NOTE — ED ADULT NURSE NOTE - CADM POA CENTRAL LINE
Endocrine Refill protocol for oral medications    Protocol Criteria:  PASSED  Reason: N/A    If below requirement is met, send a 90-day supply with 1 refill per provider protocol.    Verify appointment with Endocrinology completed in the last 6 months or scheduled in the next 3 months.    Last completed office visit:3/3/2025 Jacque Pino MD   Last completed telemed visit: Visit date not found  Next scheduled Follow up:   Future Appointments   Date Time Provider Department Center   9/5/2025 11:15 AM Jacque Pino MD [facility]          No

## 2025-09-16 ENCOUNTER — RX RENEWAL (OUTPATIENT)
Age: 70
End: 2025-09-16

## (undated) DEVICE — GLV 8 PROTEXIS (WHITE)

## (undated) DEVICE — DRAPE 3/4 SHEET W REINFORCEMENT 56X77"

## (undated) DEVICE — SUT SOFSILK 0 18" TIES

## (undated) DEVICE — TUBING CONTRAST INJECTION HIGH PRESSURE 1200PSI 72"

## (undated) DEVICE — WARMING BLANKET UPPER ADULT

## (undated) DEVICE — DRSG OPSITE 2.5 X 2"

## (undated) DEVICE — VENODYNE/SCD SLEEVE CALF LARGE

## (undated) DEVICE — PREP CHLORAPREP HI-LITE ORANGE 26ML

## (undated) DEVICE — DRAPE MAYO STAND 30"

## (undated) DEVICE — SUT POLYSORB 2-0 30" GS-21 UNDYED

## (undated) DEVICE — STAPLER SKIN VISI-STAT 35 WIDE

## (undated) DEVICE — BLADE SCALPEL SAFETYLOCK #11

## (undated) DEVICE — MEDICATION LABELS W MARKER

## (undated) DEVICE — STOPCOCK 4-WAY W SWIVEL MALE LUER LOCK NON VENTED RED CAP

## (undated) DEVICE — NDL HYPO REGULAR BEVEL 25G X 1.5" (BLUE)

## (undated) DEVICE — DRAPE IOBAN 23" X 23"

## (undated) DEVICE — SUT PROLENE 5-0 30" RB-2

## (undated) DEVICE — GLV 7.5 PROTEXIS (WHITE)

## (undated) DEVICE — SUT PROLENE 7-0 4-24" BV-1

## (undated) DEVICE — BULLDOG SPRING CLIP 6MM SOFT/SOFT

## (undated) DEVICE — GLV 7 PROTEXIS (WHITE)

## (undated) DEVICE — SUT SOFSILK 4-0 30" TIES

## (undated) DEVICE — SYR TB 1CC 25G X 5/8 (BLUE)

## (undated) DEVICE — DRAIN JACKSON PRATT 10MM FLAT FULL NO TROCAR

## (undated) DEVICE — SPECIMEN CONTAINER 100ML

## (undated) DEVICE — DRSG TEGADERM 8 X 12"

## (undated) DEVICE — GLV 6.5 PROTEXIS (WHITE)

## (undated) DEVICE — SUT PROLENE 6-0 4-30" C-1

## (undated) DEVICE — DRAPE INSTRUMENT POUCH 6.75" X 11"

## (undated) DEVICE — SUT PROLENE 6-0 4-30" BV-1

## (undated) DEVICE — SUT SOFSILK 3-0 30" TIES

## (undated) DEVICE — DRAPE TOWEL BLUE 17" X 24"

## (undated) DEVICE — DRSG OPSITE 13.75 X 4"

## (undated) DEVICE — DRSG KLING 6"

## (undated) DEVICE — GOWN TRIMAX LG

## (undated) DEVICE — VISITEC 4X4

## (undated) DEVICE — PACK FEM/POP

## (undated) DEVICE — SOL IRR POUR H2O 250ML

## (undated) DEVICE — PREP CHLORAPREP HI-LITE ORANGE 3ML

## (undated) DEVICE — DRSG COMBINE 5X9"

## (undated) DEVICE — TOURNIQUET SET SURE-SNARE 22FR (2 TUBES, 2 UMBILICAL TAPES, 2 PLASTIC SNARES) 5"

## (undated) DEVICE — SUCTION YANKAUER NO CONTROL VENT

## (undated) DEVICE — ELCTR BVI ACCU-TEMP CAUTERY SHAFT FINE TIP 1/2"

## (undated) DEVICE — FOLEY TRAY 16FR 5CC LTX UMETER CLOSED

## (undated) DEVICE — GLV 8.5 PROTEXIS (WHITE)

## (undated) DEVICE — SYR ASEPTO

## (undated) DEVICE — DRAPE 1/2 SHEET 40X57"

## (undated) DEVICE — DRAIN RESERVOIR FOR JACKSON PRATT 100CC CARDINAL

## (undated) DEVICE — ELCTR HEX BLADE

## (undated) DEVICE — SUT SOFSILK 4-0 18" TIES

## (undated) DEVICE — DRSG STERISTRIPS 0.5 X 4"

## (undated) DEVICE — DRAPE MAGNETIC INSTRUMENT MEDIUM

## (undated) DEVICE — GOWN XL

## (undated) DEVICE — SUT SOFSILK 2-0 30" TIES

## (undated) DEVICE — DRAPE IOBAN 13" X 13"

## (undated) DEVICE — DRSG AQUACEL 3.5 X 10"

## (undated) DEVICE — POSITIONER FOAM EGG CRATE ULNAR 2PCS (PINK)

## (undated) DEVICE — WARMING BLANKET LOWER ADULT

## (undated) DEVICE — SUT SOFSILK 3-0 18" TIES

## (undated) DEVICE — CLAMP BULLDOG MAXI 45 DEGREE (ORANGE) DISP

## (undated) DEVICE — TUNNELER SHEATH GREEN LARGE

## (undated) DEVICE — PACK CAROTID ENDARTERECTOMY

## (undated) DEVICE — SUT BIOSYN 4-0 18" P-12

## (undated) DEVICE — DRSG TEGADERM 6"X8"

## (undated) DEVICE — SOL INJ NS 0.9% 500ML 1-PORT

## (undated) DEVICE — DRSG MASTISOL

## (undated) DEVICE — BLADE SCALPEL SAFETYLOCK #15

## (undated) DEVICE — SPONGE PEANUT AUTO COUNT

## (undated) DEVICE — MARKING PEN W RULER

## (undated) DEVICE — NDL SAFETY BUTTERFLY 23G X 3/4

## (undated) DEVICE — DRAPE SPLIT SHEET 77" X 108"

## (undated) DEVICE — SUT SOFSILK 2-0 18" TIES

## (undated) DEVICE — SOL IRR POUR NS 0.9% 500ML

## (undated) DEVICE — LAP PAD 18 X 18"

## (undated) DEVICE — DRSG AQUACEL 3.5 X 14"